# Patient Record
Sex: FEMALE | Race: WHITE | NOT HISPANIC OR LATINO | Employment: OTHER | URBAN - METROPOLITAN AREA
[De-identification: names, ages, dates, MRNs, and addresses within clinical notes are randomized per-mention and may not be internally consistent; named-entity substitution may affect disease eponyms.]

---

## 2017-03-07 ENCOUNTER — OFFICE VISIT (OUTPATIENT)
Dept: INTERNAL MEDICINE | Facility: CLINIC | Age: 62
End: 2017-03-07
Payer: COMMERCIAL

## 2017-03-07 VITALS
OXYGEN SATURATION: 97 % | BODY MASS INDEX: 41.02 KG/M2 | SYSTOLIC BLOOD PRESSURE: 156 MMHG | HEART RATE: 71 BPM | DIASTOLIC BLOOD PRESSURE: 78 MMHG | WEIGHT: 293 LBS | TEMPERATURE: 98 F | HEIGHT: 71 IN

## 2017-03-07 DIAGNOSIS — K21.00 GASTROESOPHAGEAL REFLUX DISEASE WITH ESOPHAGITIS: Primary | ICD-10-CM

## 2017-03-07 DIAGNOSIS — R09.A2 GLOBUS SENSATION: ICD-10-CM

## 2017-03-07 PROCEDURE — 99999 PR PBB SHADOW E&M-EST. PATIENT-LVL III: CPT | Mod: PBBFAC,,, | Performed by: INTERNAL MEDICINE

## 2017-03-07 PROCEDURE — 99213 OFFICE O/P EST LOW 20 MIN: CPT | Mod: S$GLB,,, | Performed by: INTERNAL MEDICINE

## 2017-03-07 PROCEDURE — 1160F RVW MEDS BY RX/DR IN RCRD: CPT | Mod: S$GLB,,, | Performed by: INTERNAL MEDICINE

## 2017-03-07 RX ORDER — GABAPENTIN 600 MG/1
TABLET ORAL
COMMUNITY
Start: 2017-01-14 | End: 2017-06-01 | Stop reason: SDUPTHER

## 2017-03-07 RX ORDER — FOLIC ACID/MULTIVIT,IRON,MINER 0.4MG-18MG
TABLET ORAL
COMMUNITY
End: 2021-02-15

## 2017-03-07 NOTE — MR AVS SNAPSHOT
[facility] - Internal Medicine  [address]  [facility] [address]  Phone: [phone]  Fax: [phone]                  Soha Wheatley   3/7/2017 3:20 PM   Office Visit    Description:  Female : 1955   Provider:  Kortney Lanier MD   Department:  [facility] - Internal Medicine           Reason for Visit     Mass                To Do List           Goals (5 Years of Data)     None      [facility]     [facility] -  Assistance  Registered nurses in the [facility] Center provide clinical advisement, health education, appointment booking, and other advisory services.  Call for this free service at [phone].             Medications           Message regarding Medications     Verify the changes and/or additions to your medication regime listed below are the same as discussed with your clinician today.  If any of these changes or additions are incorrect, please notify your healthcare provider.        These medications were administered today        Dose Freq    (pyxis) gi cocktail (mylanta 30 mL, lidocaine 2 % viscous 10 mL, dicyclomine 10 mL) 50 mL  Clinic/HOD 1 time    Sig: Take by mouth one time.    Class: Normal    Route: Oral           Verify that the below list of medications is an accurate representation of the medications you are currently taking.  If none reported, the list may be blank. If incorrect, please contact your healthcare provider. Carry this list with you in case of emergency.           Current Medications     acetaminophen (TYLENOL ARTHRITIS) 650 MG TbSR Take 650 mg by mouth 2 (two) times daily.     cinnamon bark-chromium picolin 500-100 mg-mcg Cap Take by mouth.    gabapentin (NEURONTIN) 600 MG tablet     GLUCOSAMINE HCL/CHONDR APODACA A NA (OSTEO BI-FLEX ORAL) Take by mouth once daily.    LACTOBACILLUS RHAMNOSUS GG (CULTURELLE ORAL) Take by mouth once daily.    meloxicam (MOBIC) 15 MG tablet Take 1 tablet (15 mg total) by mouth once daily.  "   omeprazole (PRILOSEC) 40 MG capsule Take 1 capsule (40 mg total) by mouth once daily.    tizanidine (ZANAFLEX) 4 MG tablet Take 1 tablet (4 mg total) by mouth every 8 (eight) hours as needed.           Clinical Reference Information           Your Vitals Were     BP Pulse Temp Height    156/78 (BP Location: Left arm, Patient Position: Sitting, BP Method: Manual) 71 98.1 °F (36.7 °C) (Oral) 5' 11" (1.803 m)    Weight SpO2 BMI    151.4 kg (333 lb 12.4 oz) 97% 46.55 kg/m2      Blood Pressure          Most Recent Value    BP  (!)  156/78      Allergies as of 3/7/2017     Adhesive    Flagyl  [Metronidazole Hcl]      Immunizations Administered on Date of Encounter - 3/7/2017     None      Instructions    2 tablespoons of liquid antacid before meals and at bedtime - Maalox, Mylanta, Gaviscon       Language Assistance Services     ATTENTION: Language assistance services are available, free of charge. Please call [phone].      ATENCIÓN: Si habla luis, tiene a mccurdy disposición servicios gratuitos de asistencia lingüística. Llame al [phone].     KAYA Ý: N?u b?n nói Ti?ng Vi?t, có các d?ch v? h? tr? ngôn ng? mi?n phí dành cho b?n. G?i s? [phone].         [facility] complies with applicable Federal civil rights laws and does not discriminate on the basis of race, color, national origin, age, disability, or sex.        "

## 2017-03-07 NOTE — PROGRESS NOTES
Subjective:       Patient ID: Soha Wheatley is a 61 y.o. female.    Chief Complaint: Mass (in throat per pt, diffuculty swallowing )    Head and Neck Mass:   Chronicity:  New  Onset:  In the past 7 days  Progression since onset:  Unchanged  Frequency:  Constantly  Pain Scale:  2/10  Severity:  Mild  Duration:  Off/on all day  Head and neck mass characteristics: can't feel it from the outside.No sore throat, no chills, no ear pain, no fever, no headaches, no dysphasia, no hemoptysis, no myalgias, no nasal congestion, no postnasal drip, no hoarse voice, no immunosuppression, no shortness of breath, no sore throat, no sweats, no weight loss and no infection.   Known GERDAggravated by:  Stress  Treatments tried:  Nothing  Improvement on treatment:  No relief   GERD    Review of Systems   Constitutional: Negative for activity change, chills, fatigue, fever and weight loss.   HENT: Negative for congestion, ear pain, hoarse voice, nosebleeds, postnasal drip, sinus pressure and sore throat.    Eyes: Negative.  Negative for visual disturbance.   Respiratory: Negative for cough, hemoptysis, chest tightness, shortness of breath and wheezing.    Cardiovascular: Negative for chest pain.   Gastrointestinal: Negative for abdominal pain, diarrhea, nausea and vomiting.   Genitourinary: Negative for difficulty urinating, dysuria, frequency and urgency.   Musculoskeletal: Negative for arthralgias, myalgias and neck stiffness.   Skin: Negative for rash.   Neurological: Negative for dizziness, weakness and headaches.   Psychiatric/Behavioral: Negative for sleep disturbance. The patient is not nervous/anxious.        Objective:      Physical Exam   Neck: Trachea normal and full passive range of motion without pain. No thyroid mass and no thyromegaly present.       Assessment:       1. Gastroesophageal reflux disease with esophagitis    2. Globus sensation        Plan:   Soha was seen today for mass.    Diagnoses and all orders for  this visit:    Gastroesophageal reflux disease with esophagitis    Globus sensation    Other orders  -     (pyxis) gi cocktail (mylanta 30 mL, lidocaine 2 % viscous 10 mL, dicyclomine 10 mL) 50 mL; Take by mouth one time.  Relief of symptoms with GI cocktail

## 2017-04-09 ENCOUNTER — HOSPITAL ENCOUNTER (EMERGENCY)
Facility: HOSPITAL | Age: 62
Discharge: HOME | End: 2017-04-09
Attending: EMERGENCY MEDICINE
Payer: COMMERCIAL

## 2017-04-09 VITALS
DIASTOLIC BLOOD PRESSURE: 61 MMHG | BODY MASS INDEX: 41.02 KG/M2 | RESPIRATION RATE: 14 BRPM | WEIGHT: 293 LBS | OXYGEN SATURATION: 99 % | HEART RATE: 69 BPM | SYSTOLIC BLOOD PRESSURE: 132 MMHG | TEMPERATURE: 98 F | HEIGHT: 71 IN

## 2017-04-09 DIAGNOSIS — R00.2 HEART PALPITATIONS: Primary | ICD-10-CM

## 2017-04-09 LAB
ALBUMIN SERPL BCP-MCNC: 4 G/DL
ALP SERPL-CCNC: 84 U/L
ALT SERPL W/O P-5'-P-CCNC: 39 U/L
ANION GAP SERPL CALC-SCNC: 9 MMOL/L
AST SERPL-CCNC: 20 U/L
BASOPHILS # BLD AUTO: 0.01 K/UL
BASOPHILS NFR BLD: 0.2 %
BILIRUB SERPL-MCNC: 0.7 MG/DL
BNP SERPL-MCNC: 51 PG/ML
BUN SERPL-MCNC: 22 MG/DL
CALCIUM SERPL-MCNC: 9.9 MG/DL
CHLORIDE SERPL-SCNC: 107 MMOL/L
CO2 SERPL-SCNC: 25 MMOL/L
CREAT SERPL-MCNC: 0.8 MG/DL
DIFFERENTIAL METHOD: NORMAL
EOSINOPHIL # BLD AUTO: 0.2 K/UL
EOSINOPHIL NFR BLD: 3.6 %
ERYTHROCYTE [DISTWIDTH] IN BLOOD BY AUTOMATED COUNT: 13.8 %
EST. GFR  (AFRICAN AMERICAN): >60 ML/MIN/1.73 M^2
EST. GFR  (NON AFRICAN AMERICAN): >60 ML/MIN/1.73 M^2
GLUCOSE SERPL-MCNC: 121 MG/DL
HCT VFR BLD AUTO: 39.7 %
HGB BLD-MCNC: 12.8 G/DL
INR PPP: 1
LYMPHOCYTES # BLD AUTO: 1.6 K/UL
LYMPHOCYTES NFR BLD: 35.5 %
MCH RBC QN AUTO: 30 PG
MCHC RBC AUTO-ENTMCNC: 32.2 %
MCV RBC AUTO: 93 FL
MONOCYTES # BLD AUTO: 0.4 K/UL
MONOCYTES NFR BLD: 9.2 %
NEUTROPHILS # BLD AUTO: 2.3 K/UL
NEUTROPHILS NFR BLD: 51.3 %
PLATELET # BLD AUTO: 176 K/UL
PMV BLD AUTO: 10.1 FL
POTASSIUM SERPL-SCNC: 4.2 MMOL/L
PROT SERPL-MCNC: 7.4 G/DL
PROTHROMBIN TIME: 10.4 SEC
RBC # BLD AUTO: 4.26 M/UL
SODIUM SERPL-SCNC: 141 MMOL/L
TROPONIN I SERPL DL<=0.01 NG/ML-MCNC: 0.02 NG/ML
TROPONIN I SERPL DL<=0.01 NG/ML-MCNC: 0.02 NG/ML
TSH SERPL DL<=0.005 MIU/L-ACNC: 1.29 UIU/ML
WBC # BLD AUTO: 4.48 K/UL

## 2017-04-09 PROCEDURE — 84443 ASSAY THYROID STIM HORMONE: CPT

## 2017-04-09 PROCEDURE — 99284 EMERGENCY DEPT VISIT MOD MDM: CPT | Mod: 25

## 2017-04-09 PROCEDURE — 85610 PROTHROMBIN TIME: CPT

## 2017-04-09 PROCEDURE — 25000003 PHARM REV CODE 250: Performed by: EMERGENCY MEDICINE

## 2017-04-09 PROCEDURE — 93005 ELECTROCARDIOGRAM TRACING: CPT

## 2017-04-09 PROCEDURE — 80053 COMPREHEN METABOLIC PANEL: CPT

## 2017-04-09 PROCEDURE — 83880 ASSAY OF NATRIURETIC PEPTIDE: CPT

## 2017-04-09 PROCEDURE — 84484 ASSAY OF TROPONIN QUANT: CPT

## 2017-04-09 PROCEDURE — 85025 COMPLETE CBC W/AUTO DIFF WBC: CPT

## 2017-04-09 PROCEDURE — 99285 EMERGENCY DEPT VISIT HI MDM: CPT | Mod: ,,, | Performed by: EMERGENCY MEDICINE

## 2017-04-09 PROCEDURE — 93010 ELECTROCARDIOGRAM REPORT: CPT | Mod: ,,, | Performed by: INTERNAL MEDICINE

## 2017-04-09 RX ORDER — ASPIRIN 325 MG
325 TABLET ORAL
Status: COMPLETED | OUTPATIENT
Start: 2017-04-09 | End: 2017-04-09

## 2017-04-09 RX ORDER — NAPROXEN 250 MG/1
250 TABLET ORAL 2 TIMES DAILY
COMMUNITY
End: 2017-06-13

## 2017-04-09 RX ADMIN — ASPIRIN 325 MG ORAL TABLET 325 MG: 325 PILL ORAL at 09:04

## 2017-04-09 NOTE — ED AVS SNAPSHOT
[facility]  [address] [facility] [facility] [address]               Soha Wheatley   2017  8:40 AM   ED    Description:  Female : 1955   Department:  [facility]           Your Care was Coordinated By:     Provider Role From To    Lisandro Gonzalez MD Attending Provider 17 0846 --    Lisandro Martinez MD Resident 17 0843 --      Reason for Visit     Palpitations           Diagnoses this Visit        Comments    Heart palpitations    -  Primary       ED Disposition     ED Disposition Condition Comment    Discharge             To Do List           Follow-up Information     Follow up with [facility] - Cardiology. Schedule an appointment as soon as possible for a visit in 2 days.    Specialty:  Cardiology    Why:  For follow-up    Contact information:    [address] [id number] [phone]    Additional information:    3rd floor        Go to [facility].    Specialty:  Emergency Medicine    Why:  As needed, If symptoms worsen - heart racing, palpitations, chest pain, shortness of breath    Contact information:    [address] [facility] [id number] [phone]      [facility]     [facility] Care [facility] -  Assistance  Unless otherwise directed by your provider, please contact [facility], our nurse care line that is available for  assistance.     Registered nurses in the [facility] provide: appointment scheduling, clinical advisement, health education, and other advisory services.  Call: [phone] (toll free)               Medications           Message regarding Medications     Verify the changes and/or additions to your medication regime listed below are the same as discussed with your clinician today.  If any of these changes or additions are incorrect, please notify your healthcare provider.        These medications were  "administered today        Dose Freq    aspirin tablet 325 mg 325 mg ED 1 Time    Sig: Take 1 tablet (325 mg total) by mouth ED 1 Time.    Class: Normal    Route: Oral           Verify that the below list of medications is an accurate representation of the medications you are currently taking.  If none reported, the list may be blank. If incorrect, please contact your healthcare provider. Carry this list with you in case of emergency.           Current Medications     naproxen (NAPROSYN) 250 MG tablet Take 250 mg by mouth 2 (two) times daily.    acetaminophen (TYLENOL ARTHRITIS) 650 MG TbSR Take 650 mg by mouth 2 (two) times daily.     cinnamon bark-chromium picolin 500-100 mg-mcg Cap Take by mouth.    EUFLEXXA injection Syrg 40 mg Inject 4 mLs (40 mg total) into the articular space once a week.    gabapentin (NEURONTIN) 600 MG tablet     GLUCOSAMINE HCL/CHONDR APODACA A NA (OSTEO BI-FLEX ORAL) Take by mouth once daily.    LACTOBACILLUS RHAMNOSUS GG (CULTURELLE ORAL) Take by mouth once daily.    meloxicam (MOBIC) 15 MG tablet Take 1 tablet (15 mg total) by mouth once daily.    omeprazole (PRILOSEC) 40 MG capsule Take 1 capsule (40 mg total) by mouth once daily.    tizanidine (ZANAFLEX) 4 MG tablet Take 1 tablet (4 mg total) by mouth every 8 (eight) hours as needed.           Clinical Reference Information           Your Vitals Were     BP Pulse Temp Resp Height Weight    129/60 71 98.2 °F (36.8 °C) (Oral) 17 5' 11" (1.803 m) 152 kg (335 lb)    SpO2 BMI             100% 46.72 kg/m2         Allergies as of 4/9/2017        Reactions    Adhesive     Paper tape usually ok    Flagyl  [Metronidazole Hcl]     Other reaction(s): Unknown      Immunizations Administered on Date of Encounter - 4/9/2017     None      ED Micro, Lab, POCT     Start Ordered       Status Ordering Provider    04/09/17 0846 04/09/17 0910  TSH  STAT      Final result     04/09/17 0845 04/09/17 0910  CBC auto differential  STAT      Final result     04/09/17 " "0845 04/09/17 0910  Comprehensive metabolic panel  STAT      Final result     04/09/17 0845 04/09/17 0910  Protime-INR  STAT      Final result     04/09/17 0845 04/09/17 0910  Troponin I  Now then every 3 hours     Comments:  PLEASE REVIEW ORDER START TIME BEFORE MARKING SPECIMEN COLLECTED.   Start Status   04/09/17 0845 Final result   04/09/17 1145 Final result       Acknowledged     04/09/17 0845 04/09/17 0910  B-Type natriuretic peptide (BNP)  STAT      Final result       ED Imaging Orders     Start Ordered       Status Ordering Provider    04/09/17 0845 04/09/17 0910  X-Ray Chest PA And Lateral  1 time imaging      Final result         Discharge Instructions           Heart Palpitations    Palpitations are the feeling that your heart is beating hard, fast, or irregular. Some describe it as "pounding" or "skipped beats." Palpitations may occur in someone with heart disease, but can also occur in a healthy person.  Heart-related causes:  · Arrhythmia (a change from the heart's normal rhythm)  · Heart valve disease  · Disease of the heart muscle  · Coronary artery disease  · High blood pressure  Non-heart-related causes:  · Certain medicines (such as asthma inhalers and decongestants)  · Some herbal supplements, energy drinks and pills, and weight loss pills  · Illegal stimulant drugs (such as cocaine, crank, methamphetamine, PCP, bath salts, ecstasy)  · Caffeine, alcohol, and tobacco  · Medical conditions such as thyroid disease, anemia, anxiety, and panic disorder  Sometimes the cause can't be found.  Home care  Follow these home care tips:  · Avoid excess caffeine, alcohol, tobacco, and any stimulant drugs.  · Tell your doctor about any prescription or over-the-counter or herbal medicines you take.  Follow-up care  · Follow up with your doctor, or as advised.  Call 911  This is the fastest and safest way to get to the emergency department. The paramedics can also begin treatment on the way to the hospital, if " needed.  Don't wait until your symptoms are severe to call 911. These are reasons to call 911:  · Chest pain  · Shortness of breath  · Feeling lightheaded, faint, or dizzy  · Fainting or loss of consciousness  · Very irregular heartbeat  · Rapid heartbeat that makes you uncomfortable  · Slower than usual heart rate associated with symptoms  · Slower than usual heart rate  · Chest pain with weakness, dizziness, heavy sweating, nausea, or vomiting  · Extreme drowsiness or confusion  · Weakness of an arm or leg, or on 1 side of the face  · Difficulty with speech or vision  When to seek medical advice  Get prompt medical attention if you have palpitations and any of the following:  · Weakness  · Dizziness  · Lightheadedness  · Fainting  Date Last Reviewed: 4/27/2016 © 2000-2016 [employer] [address] [place] [address]. All rights reserved. This information is not intended as a substitute for professional medical care. Always follow your healthcare professional's instructions.           [facility] complies with applicable Federal civil rights laws and does not discriminate on the basis of race, color, national origin, age, disability, or sex.        Language Assistance Services     ATTENTION: Language assistance services are available, free of charge. Please call [phone].      ATENCIÓN: Si habla luis, tiene a mccurdy disposición servicios gratuitos de asistencia lingüística. Llame al [phone].     CHÚ Ý: N?u b?n nói Ti?ng Vi?t, có các d?ch v? h? tr? ngôn ng? mi?n phí dành cho b?n. G?i s? [phone].

## 2017-04-09 NOTE — ED TRIAGE NOTES
Pt reports heart racing this AM around 0230 which woke her up. Denies any chest pain or shortness of breath. Upon arrival pt HR was 144 in triage. Was brought back to room where EKG was completed. HR now 83 on heart monitor. Patient tearful, reports feeling scared. Reports has had an episode of heart racing before where she was sent home on a halter monitor and had an echo completed (about 3 to 4 years ago per patient).    Patient was ambulatory to room.

## 2017-04-09 NOTE — DISCHARGE INSTRUCTIONS
"    Heart Palpitations    Palpitations are the feeling that your heart is beating hard, fast, or irregular. Some describe it as "pounding" or "skipped beats." Palpitations may occur in someone with heart disease, but can also occur in a healthy person.  Heart-related causes:  · Arrhythmia (a change from the heart's normal rhythm)  · Heart valve disease  · Disease of the heart muscle  · Coronary artery disease  · High blood pressure  Non-heart-related causes:  · Certain medicines (such as asthma inhalers and decongestants)  · Some herbal supplements, energy drinks and pills, and weight loss pills  · Illegal stimulant drugs (such as cocaine, crank, methamphetamine, PCP, bath salts, ecstasy)  · Caffeine, alcohol, and tobacco  · Medical conditions such as thyroid disease, anemia, anxiety, and panic disorder  Sometimes the cause can't be found.  Home care  Follow these home care tips:  · Avoid excess caffeine, alcohol, tobacco, and any stimulant drugs.  · Tell your doctor about any prescription or over-the-counter or herbal medicines you take.  Follow-up care  · Follow up with your doctor, or as advised.  Call 911  This is the fastest and safest way to get to the emergency department. The paramedics can also begin treatment on the way to the hospital, if needed.  Don't wait until your symptoms are severe to call 911. These are reasons to call 911:  · Chest pain  · Shortness of breath  · Feeling lightheaded, faint, or dizzy  · Fainting or loss of consciousness  · Very irregular heartbeat  · Rapid heartbeat that makes you uncomfortable  · Slower than usual heart rate associated with symptoms  · Slower than usual heart rate  · Chest pain with weakness, dizziness, heavy sweating, nausea, or vomiting  · Extreme drowsiness or confusion  · Weakness of an arm or leg, or on 1 side of the face  · Difficulty with speech or vision  When to seek medical advice  Get prompt medical attention if you have palpitations and any of the " following:  · Weakness  · Dizziness  · Lightheadedness  · Fainting  Date Last Reviewed: 4/27/2016  © 4884-8432 [employer]. [address] [place] [address], [place] [address]. All rights reserved. This information is not intended as a substitute for professional medical care. Always follow your healthcare professional's instructions.

## 2017-04-09 NOTE — ED PROVIDER NOTES
"Encounter Date: 2017       History     Chief Complaint   Patient presents with    Palpitations     Review of patient's allergies indicates:   Allergen Reactions    Adhesive      Paper tape usually ok    Flagyl  [metronidazole hcl]      Other reaction(s): Unknown     HPI Comments: 62 yo woman with obesity and h/o depression presents with feeling of "heart racing" that started at 3:30 this morning but went away quickly, then returned at 7:30 am and persisted until presentation in ED at 9 am. Experienced brief headache prior to initial palpitations, and brief jaw tightness with palpitations earlier, both now resolved. Denies chest pain, SOB, fever, leg swelling. Endorses more caffeine than usual yesterday including one coffee and two diet soft drinks in the afternoon, but denies recent ETOH use in the past week, smoking, other stimulants, h/o thyroid abnormality, throat swelling. States she was worked up for same thing several years ago. Per chart review, normal EKG and echo .    The history is provided by the patient and medical records.     Past Medical History:   Diagnosis Date    Allergy     Arthritis     Chronic kidney disease     Depression     GERD (gastroesophageal reflux disease)     History of kidney stones     Kidney stones     Obesity      Past Surgical History:   Procedure Laterality Date     SECTION      CHOLECYSTECTOMY      KNEE ARTHRODESIS      KNEE CARTILAGE SURGERY      TONSILLECTOMY      URETEROSCOPY       Family History   Problem Relation Age of Onset    Cancer Mother      breast    VANDANA disease Maternal Grandmother     Heart disease Maternal Grandmother     Hyperlipidemia Maternal Grandmother     Pancreatic cancer Maternal Grandfather     Celiac disease Neg Hx     Cirrhosis Neg Hx     Colon cancer Neg Hx     Colon polyps Neg Hx     Crohn's disease Neg Hx     Cystic fibrosis Neg Hx     Esophageal cancer Neg Hx     Hemochromatosis Neg Hx     " Inflammatory bowel disease Neg Hx     Irritable bowel syndrome Neg Hx     Liver cancer Neg Hx     Liver disease Neg Hx     Rectal cancer Neg Hx     Stomach cancer Neg Hx     Ulcerative colitis Neg Hx     Silvestre's disease Neg Hx     Hyperlipidemia Father     Hypertension Father     Heart disease Maternal Uncle     Heart attack Paternal Grandfather      Social History   Substance Use Topics    Smoking status: Never Smoker    Smokeless tobacco: Never Used    Alcohol use Yes      Comment: no more than once a week     Review of Systems   Constitutional: Negative for diaphoresis and fever.   HENT: Positive for congestion. Negative for facial swelling.    Eyes: Positive for itching. Negative for redness.   Respiratory: Negative for shortness of breath.    Cardiovascular: Positive for palpitations. Negative for chest pain and leg swelling.   Gastrointestinal: Negative for abdominal pain, diarrhea, nausea and vomiting.   Genitourinary: Negative for dysuria.   Musculoskeletal: Negative for joint swelling.   Skin: Negative for wound.   Neurological: Positive for headaches. Negative for facial asymmetry.   Psychiatric/Behavioral: Negative for agitation. The patient is nervous/anxious.        Physical Exam   Initial Vitals   BP Pulse Resp Temp SpO2   04/09/17 0838 04/09/17 0838 04/09/17 0838 04/09/17 0838 04/09/17 0838   195/108 144 20 98.2 °F (36.8 °C) 98 %     Physical Exam    Nursing note and vitals reviewed.  Constitutional: She appears well-developed and well-nourished. She is not diaphoretic. She is Obese . No distress.   HENT:   Head: Normocephalic and atraumatic.   Right Ear: External ear normal.   Left Ear: External ear normal.   Nose: Nose normal.   Mouth/Throat: Oropharynx is clear and moist.   Eyes: Pupils are equal, round, and reactive to light. Right eye exhibits no discharge. Left eye exhibits no discharge. Right conjunctiva is injected. Left conjunctiva is injected. No scleral icterus.   Neck: Neck  supple. No thyromegaly present. No tracheal deviation present. No JVD present.   Cardiovascular: Normal rate, regular rhythm, normal heart sounds and intact distal pulses. Exam reveals no gallop and no friction rub.    No murmur heard.  Pulmonary/Chest: Breath sounds normal. No stridor. No respiratory distress. She has no wheezes. She has no rhonchi. She has no rales. She exhibits no tenderness.   Abdominal: Soft. Bowel sounds are normal. She exhibits no distension. There is no tenderness. There is no rebound and no guarding.   Musculoskeletal: Normal range of motion. She exhibits edema (trace pitting edema bilateral ankles).   Lymphadenopathy:     She has no cervical adenopathy.   Neurological: She is alert and oriented to person, place, and time.   Skin: Skin is warm and dry.   Psychiatric: Her behavior is normal.   Tearful         ED Course   Procedures  Labs Reviewed - No data to display  EKG Readings: (Independently Interpreted)   NSR, rate 81, normal axis and intervals, no CHARLY or STD.       HOII MDM:  Soha Wheatley is a 61 y.o. female with heart racing starting this morning,  at triage, resolved in room before EKG, h/o palpitations one week ago and several years ago with normal work-up.  Exam with normal HR, no thyromegaly, trace pitting edema.  Ddx includes anxiety attack, paroxysmal a-fib with RVR, SVT, v-tach.    EKG NSR, rate 81, normal axis and intervals, no CHARLY or STD.    Cardiac work-up, TSH pending.    Lisandro Martienz PGY2 9:19 AM 04/09/2017    CXR with no acute cardiopulmonary disease. CBC, CMP unremarkable. TSH wnl. Trop negative x 2. BNP unremarkable.    Has remained event-free in ED. Will refer for event monitor and cardiology f/u, discharge.    Lisandro Martinez PGY2 1:21 PM 04/09/2017                         ED Course     Clinical Impression:   The encounter diagnosis was Heart palpitations.          Lisandro Martinez MD  Resident  04/09/17 2426

## 2017-04-10 ENCOUNTER — TELEPHONE (OUTPATIENT)
Dept: ELECTROPHYSIOLOGY | Facility: CLINIC | Age: 62
End: 2017-04-10

## 2017-04-10 NOTE — TELEPHONE ENCOUNTER
----- Message from Lawrence Campos sent at 4/10/2017 11:25 AM CDT -----  Contact: patient  Please call pt at [phone]. Need to schedule a 30 event monitor     Thank you    Patient will come in tomorrow to have her event monitor placed.

## 2017-04-11 ENCOUNTER — CLINICAL SUPPORT (OUTPATIENT)
Dept: ELECTROPHYSIOLOGY | Facility: CLINIC | Age: 62
End: 2017-04-11
Payer: COMMERCIAL

## 2017-04-11 DIAGNOSIS — R00.2 PALPITATIONS: ICD-10-CM

## 2017-04-11 PROCEDURE — 93271 ECG/MONITORING AND ANALYSIS: CPT | Mod: S$GLB,,, | Performed by: INTERNAL MEDICINE

## 2017-04-11 PROCEDURE — 93270 REMOTE 30 DAY ECG REV/REPORT: CPT | Mod: 51,S$GLB,, | Performed by: INTERNAL MEDICINE

## 2017-04-12 DIAGNOSIS — M47.812 CERVICAL SPONDYLOSIS WITHOUT MYELOPATHY: ICD-10-CM

## 2017-04-12 DIAGNOSIS — M54.12 BRACHIAL NEURITIS OR RADICULITIS: ICD-10-CM

## 2017-04-12 DIAGNOSIS — M50.30 DEGENERATION OF CERVICAL INTERVERTEBRAL DISC: ICD-10-CM

## 2017-04-12 DIAGNOSIS — M54.2 CERVICALGIA: ICD-10-CM

## 2017-04-12 DIAGNOSIS — M25.561 ARTHRALGIA OF BOTH KNEES: ICD-10-CM

## 2017-04-12 DIAGNOSIS — M25.562 ARTHRALGIA OF BOTH KNEES: ICD-10-CM

## 2017-04-12 RX ORDER — GABAPENTIN 600 MG/1
TABLET ORAL
Qty: 270 TABLET | Refills: 2 | Status: SHIPPED | OUTPATIENT
Start: 2017-04-12 | End: 2017-11-30

## 2017-04-12 RX ORDER — MELOXICAM 15 MG/1
TABLET ORAL
Qty: 90 TABLET | Refills: 2 | Status: SHIPPED | OUTPATIENT
Start: 2017-04-12 | End: 2018-01-07 | Stop reason: SDUPTHER

## 2017-05-16 PROCEDURE — 93268 ECG RECORD/REVIEW: CPT | Mod: S$GLB,,, | Performed by: INTERNAL MEDICINE

## 2017-05-18 ENCOUNTER — OFFICE VISIT (OUTPATIENT)
Dept: ORTHOPEDICS | Facility: CLINIC | Age: 62
End: 2017-05-18
Payer: COMMERCIAL

## 2017-05-18 ENCOUNTER — HOSPITAL ENCOUNTER (OUTPATIENT)
Dept: RADIOLOGY | Facility: HOSPITAL | Age: 62
Discharge: HOME | End: 2017-05-18
Attending: ORTHOPAEDIC SURGERY
Payer: COMMERCIAL

## 2017-05-18 VITALS — WEIGHT: 293 LBS | BODY MASS INDEX: 41.02 KG/M2 | HEIGHT: 71 IN

## 2017-05-18 DIAGNOSIS — M17.0 PRIMARY OSTEOARTHRITIS OF BOTH KNEES: ICD-10-CM

## 2017-05-18 DIAGNOSIS — G89.29 CHRONIC PAIN OF BOTH KNEES: Primary | ICD-10-CM

## 2017-05-18 DIAGNOSIS — G89.29 CHRONIC PAIN OF BOTH KNEES: ICD-10-CM

## 2017-05-18 DIAGNOSIS — M25.561 CHRONIC PAIN OF BOTH KNEES: Primary | ICD-10-CM

## 2017-05-18 DIAGNOSIS — M25.562 CHRONIC PAIN OF BOTH KNEES: Primary | ICD-10-CM

## 2017-05-18 DIAGNOSIS — M25.562 CHRONIC PAIN OF BOTH KNEES: ICD-10-CM

## 2017-05-18 DIAGNOSIS — M25.561 CHRONIC PAIN OF BOTH KNEES: ICD-10-CM

## 2017-05-18 PROCEDURE — 73562 X-RAY EXAM OF KNEE 3: CPT | Mod: TC,50

## 2017-05-18 PROCEDURE — 99999 PR PBB SHADOW E&M-EST. PATIENT-LVL II: CPT | Mod: PBBFAC,,, | Performed by: ORTHOPAEDIC SURGERY

## 2017-05-18 PROCEDURE — 99214 OFFICE O/P EST MOD 30 MIN: CPT | Mod: S$GLB,,, | Performed by: ORTHOPAEDIC SURGERY

## 2017-05-18 PROCEDURE — 1160F RVW MEDS BY RX/DR IN RCRD: CPT | Mod: S$GLB,,, | Performed by: ORTHOPAEDIC SURGERY

## 2017-05-18 PROCEDURE — 73562 X-RAY EXAM OF KNEE 3: CPT | Mod: 26,50,, | Performed by: RADIOLOGY

## 2017-05-18 NOTE — PROGRESS NOTES
HPI:    Soha Wheatley is a 61 y.o. female who is here today for   Chief Complaint   Patient presents with    Left Knee - Pain    Right Knee - Pain   .  He has tried Visco supplementation and other treatments.  Knee pain has progressed.  Unfortunately the patient has also gained weight.    Duration: 12 months  Intensity: severe  Character of pain: sharp  Location: She reports that the pain is predominately  lateral in the right knee and medial in the left knee, left knee is worse    Past Medical History:   Diagnosis Date    Allergy     Arthritis     Chronic kidney disease     Depression     GERD (gastroesophageal reflux disease)     History of kidney stones     Kidney stones     Obesity           Current Outpatient Prescriptions:     acetaminophen (TYLENOL ARTHRITIS) 650 MG TbSR, Take 650 mg by mouth 2 (two) times daily. , Disp: , Rfl:     cinnamon bark-chromium picolin 500-100 mg-mcg Cap, Take by mouth., Disp: , Rfl:     gabapentin (NEURONTIN) 600 MG tablet, , Disp: , Rfl:     gabapentin (NEURONTIN) 600 MG tablet, TAKE 1 TABLET THREE TIMES A DAY, Disp: 270 tablet, Rfl: 2    GLUCOSAMINE HCL/CHONDR APODACA A NA (OSTEO BI-FLEX ORAL), Take by mouth once daily., Disp: , Rfl:     LACTOBACILLUS RHAMNOSUS GG (CULTURELLE ORAL), Take by mouth once daily., Disp: , Rfl:     meloxicam (MOBIC) 15 MG tablet, TAKE 1 TABLET DAILY, Disp: 90 tablet, Rfl: 2    naproxen (NAPROSYN) 250 MG tablet, Take 250 mg by mouth 2 (two) times daily., Disp: , Rfl:     omeprazole (PRILOSEC) 40 MG capsule, Take 1 capsule (40 mg total) by mouth once daily., Disp: 90 capsule, Rfl: 3    tizanidine (ZANAFLEX) 4 MG tablet, Take 1 tablet (4 mg total) by mouth every 8 (eight) hours as needed., Disp: 270 tablet, Rfl: 0    Current Facility-Administered Medications:     EUFLEXXA injection Syrg 40 mg, 40 mg, Intra-articular, Weekly, Lázaro Carlos MD, 40 mg at 10/12/15 2833     Review of patient's allergies indicates:   Allergen Reactions  "   Adhesive      Paper tape usually ok    Flagyl  [metronidazole hcl]      Other reaction(s): Unknown        ROS  Constitutional: Negative for fever, Positive for weight gain  HENT Negative for congestion  Cardiovascular: Negative chest pain  Respiratory: Negative Shortness of breath  Heme: Negative excessive bleeding  Skin:NegativeItching, Negative breakdown  Musculoskeletal:Positive for back pain, Positive for joint pain, Negative muscle pain, Negative muscle weakness  Neurological: Negative for numbness and paresthesias   Psychiatric/Behavioral: Negative altered mental status, Negative for depression    Physical Exam:   Ht 5' 11" (1.803 m)  Wt (!) 153.9 kg (339 lb 6.4 oz)  BMI 47.34 kg/m2  General appearance: This is a well-developed, Well nourished female No obvious acute distress.  Psychiatric: normal mood and affect;  pleasant and conversant; judgment and thought content normal  Gait is coordinated. Patient has antalgic gait to the bilateral  Cardiovascular: There are no swelling or varicosities present.   Respiratory: normal respiratory effort   Lymphatic: no adenopathy   Neurologic: alert and oriented to person, place, and time   Deep Tendon Reflexes are normal;  Coordination and Balance: Normal   Musculoskeletal   Neck    ROM shows normal flexion and extension and lateral rotation    Palpation: Non-tender    Stability is normal    Strength is normal    Skin is normal without masses and lesions    Sensation is intact to light touch   Back    ROM showsabnormal flexion, extension    and  rotation    Palpation shows no masses    Stability is normal    Strength to flexion and extension well maintained    Core strength is diminished    Skin shows no rashes or cafe au lait spots;     Sensation is intact to light touch    Right Knee  Swelling Mild  TendernessLateral joint line  Range of Motion: 120    Left Knee: Swelling Mild  TendernessMedial joint line  Range of Motion: 120    Radiograph   Osteoarthritis: " severe  Angle: Valgus on the right and varus on the left    Assessment: Osteoarthritis of the knees.    Plan: We have had a long discussion about the risk and benefits of surgery.  With her present BMI of 47, she is at high risk  For complications.  She will discuss her weight reduction situation with her family and try to get ready for possible knee replacement surgery

## 2017-06-01 ENCOUNTER — OFFICE VISIT (OUTPATIENT)
Dept: CARDIOLOGY | Facility: CLINIC | Age: 62
End: 2017-06-01
Payer: COMMERCIAL

## 2017-06-01 VITALS
HEART RATE: 77 BPM | BODY MASS INDEX: 41.02 KG/M2 | WEIGHT: 293 LBS | DIASTOLIC BLOOD PRESSURE: 74 MMHG | HEIGHT: 71 IN | SYSTOLIC BLOOD PRESSURE: 163 MMHG

## 2017-06-01 DIAGNOSIS — Z82.49 FAMILY HISTORY OF EARLY CAD: ICD-10-CM

## 2017-06-01 DIAGNOSIS — R00.2 PALPITATIONS: ICD-10-CM

## 2017-06-01 PROCEDURE — 99999 PR PBB SHADOW E&M-EST. PATIENT-LVL IV: CPT | Mod: PBBFAC,,, | Performed by: INTERNAL MEDICINE

## 2017-06-01 PROCEDURE — 99214 OFFICE O/P EST MOD 30 MIN: CPT | Mod: S$GLB,,, | Performed by: INTERNAL MEDICINE

## 2017-06-01 NOTE — PROGRESS NOTES
I have seen and examined the patient, reviewed relevant diagnostic tests and agree with the assessment and plan below.

## 2017-06-01 NOTE — PROGRESS NOTES
Subjective:       Patient ID: Soha Wheatley is a 61 y.o. female.    Chief Complaint: Palpitations (ER fu 04/09/17)    HPI     Ms. Wheatley is a 61 year old female with a past medical history of anxiety, morbid obesity, osteoarthritis of her knees bilaterally, and palpitations. Her first episode of palpitations was 3 years ago that woke her up from her sleep and lasted for roughly 1 hour before it resolved. She saw Dr. March at that time who felt that her workup was negative and it was mostly related to stress and caffeine intake. She had a 14 day holter that only identified some PACs. She had no further episodes until this April. She stated once again, she woke up from her sleep with her heart racing. She was not able to get comfortable and was not able to calm down and it lasted 3-4 hours this time. She denies any associated SOB, chest pains, lightheadedness, dizziness, or syncope associated with it. She presented to the ED, where her triage HR was 144, but when they placed the EKG on her, the tachycardia had resolved. She is particularly concerned due to the strong family history of heart attacks when they were in their 50s. She was tearful and upset during the interview stating that she has been having a lot of issues with her knees and needs bilateral TKA's, but has to lose weight and see the bariatric surgeons for options.    Review of Systems   Constitutional: Negative for activity change, chills, fatigue and fever.   HENT: Negative.    Respiratory: Negative for cough and shortness of breath.    Cardiovascular: Positive for palpitations. Negative for chest pain and leg swelling.   Gastrointestinal: Negative for abdominal distention, diarrhea, nausea and vomiting.   Genitourinary: Negative for difficulty urinating, dysuria and urgency.   Musculoskeletal: Negative for arthralgias and myalgias.   Skin: Negative for color change and rash.   Neurological: Negative for dizziness, weakness, light-headedness and  "numbness.       Objective:     Vitals:    06/01/17 1439   BP: (!) 163/74   BP Location: Left arm   Patient Position: Sitting   BP Method: Automatic   Pulse: 77   Weight: (!) 153.7 kg (338 lb 13.6 oz)   Height: 5' 11" (1.803 m)       Physical Exam   Constitutional: Vital signs are normal. She appears well-developed and well-nourished. She is active and cooperative.  Non-toxic appearance. No distress.   HENT:   Head: Normocephalic and atraumatic.   Mouth/Throat: Uvula is midline, oropharynx is clear and moist and mucous membranes are normal.   Eyes: Conjunctivae and lids are normal. Pupils are equal, round, and reactive to light.   Neck: Normal carotid pulses and no JVD present. Carotid bruit is not present.   Cardiovascular: Normal rate, regular rhythm, S1 normal, S2 normal, normal heart sounds and normal pulses.  Exam reveals no gallop.    No murmur heard.  Pulmonary/Chest: Effort normal and breath sounds normal. No accessory muscle usage. No respiratory distress. She has no decreased breath sounds. She has no wheezes. She has no rhonchi. She has no rales.   Abdominal: Soft. Bowel sounds are normal. She exhibits no distension and no mass. There is no tenderness.   Neurological: She is alert.   Skin: Skin is warm, dry and intact.       Assessment:       1. Palpitations    2. Family history of early CAD        Plan:     1.) Palpitations  --advised that her palpitations are extremely rare and if it is not that symptomatic or bothersome, would not go chasing after it to figure out what's causing the arrythmia  --if concerned, the next step would be to refer to EP for ILR implantation to try to catch the cause of the palpitations as it is an extremely rare occurrence  --would not recommend any medications or treatments as we do not know what the cause is at this time.  --encouraged the patient to see the bariatric surgeons to discuss options.    2.) Family history of CAD  --the patient has not had a lipid panel in the " past few years. Have placed an order.    Staff addendum to follow. Follow up in 6 months.    Petra Suero MD  Cardiology PGY4

## 2017-06-05 ENCOUNTER — LAB VISIT (OUTPATIENT)
Dept: LAB | Facility: HOSPITAL | Age: 62
End: 2017-06-05
Payer: COMMERCIAL

## 2017-06-05 ENCOUNTER — DOCUMENTATION ONLY (OUTPATIENT)
Dept: BARIATRICS | Facility: CLINIC | Age: 62
End: 2017-06-05

## 2017-06-05 DIAGNOSIS — Z82.49 FAMILY HISTORY OF EARLY CAD: ICD-10-CM

## 2017-06-05 LAB
CHOLEST/HDLC SERPL: 3.4 {RATIO}
HDL/CHOLESTEROL RATIO: 29.7 %
HDLC SERPL-MCNC: 209 MG/DL
HDLC SERPL-MCNC: 62 MG/DL
LDLC SERPL CALC-MCNC: 111.4 MG/DL
NONHDLC SERPL-MCNC: 147 MG/DL
TRIGL SERPL-MCNC: 178 MG/DL

## 2017-06-05 PROCEDURE — 80061 LIPID PANEL: CPT

## 2017-06-05 PROCEDURE — 36415 COLL VENOUS BLD VENIPUNCTURE: CPT

## 2017-06-05 NOTE — PROGRESS NOTES
Bariatric Surgery Online Course Form Submission  Someone has submitted a Bariatric Surgery Online Course Form Submission on this page.  Date: 2017-06-04 - 19:54  Patient's Name: Soha Wheatley  YOB: 1955 Email: [email or web address]  Phone: [phone]   Patient Address: [address] [place] [address]  Preferred Surgical Location: [facility] [place]   I certify that I am 18 years of age or older:   Confirmation Code: [id number]  Verification of Bariatric Seminar: y  Insurance Information  Insurance or Self Pay?   Insurance Company Name: Blue Cross Blue Shield   Type of Coverage/Coverage Plan (i.e. PPO, HMO): HSA   Group Name: [id number]   Subscriber Name:   Member Name (patient's name)   Member ID/Policy #:[id number]   Plan Effective Date: 01/01/2016  Card Issuance date: 12/16/2015

## 2017-06-07 ENCOUNTER — PATIENT MESSAGE (OUTPATIENT)
Dept: ELECTROPHYSIOLOGY | Facility: CLINIC | Age: 62
End: 2017-06-07

## 2017-06-07 NOTE — TELEPHONE ENCOUNTER
ASCVD risk score was calculated to be 3.3%. No need for any statin medication.    Petra Suero MD  Cardiology PGY4

## 2017-06-13 ENCOUNTER — INITIAL CONSULT (OUTPATIENT)
Dept: BARIATRICS | Facility: CLINIC | Age: 62
End: 2017-06-13
Payer: COMMERCIAL

## 2017-06-13 VITALS
BODY MASS INDEX: 41.95 KG/M2 | HEART RATE: 87 BPM | DIASTOLIC BLOOD PRESSURE: 90 MMHG | SYSTOLIC BLOOD PRESSURE: 154 MMHG | WEIGHT: 293 LBS | HEIGHT: 70 IN

## 2017-06-13 DIAGNOSIS — F41.9 ANXIETY: ICD-10-CM

## 2017-06-13 DIAGNOSIS — E55.9 VITAMIN D DEFICIENCY: ICD-10-CM

## 2017-06-13 DIAGNOSIS — R10.31 ABDOMINAL PAIN, RLQ (RIGHT LOWER QUADRANT): ICD-10-CM

## 2017-06-13 DIAGNOSIS — E66.01 MORBID OBESITY WITH BMI OF 45.0-49.9, ADULT: ICD-10-CM

## 2017-06-13 DIAGNOSIS — K21.9 GASTROESOPHAGEAL REFLUX DISEASE, ESOPHAGITIS PRESENCE NOT SPECIFIED: ICD-10-CM

## 2017-06-13 DIAGNOSIS — Z98.84 STATUS POST LAPAROSCOPIC SLEEVE GASTRECTOMY: ICD-10-CM

## 2017-06-13 DIAGNOSIS — E66.01 MORBID OBESITY DUE TO EXCESS CALORIES: Primary | ICD-10-CM

## 2017-06-13 DIAGNOSIS — Z01.818 PRE-OP TESTING: ICD-10-CM

## 2017-06-13 PROCEDURE — 99205 OFFICE O/P NEW HI 60 MIN: CPT | Mod: S$GLB,,, | Performed by: PHYSICIAN ASSISTANT

## 2017-06-13 PROCEDURE — 99999 PR PBB SHADOW E&M-EST. PATIENT-LVL V: CPT | Mod: PBBFAC,,, | Performed by: PHYSICIAN ASSISTANT

## 2017-06-13 NOTE — PROGRESS NOTES
BARIATRIC NEW PATIENT EVALUATION    CHIEF COMPLAINT:   Morbid obesity, Body mass index is 48.43 kg/m². and inability to lose weight.    HPI:  Soha Wheatley is a 61 y.o. female presenting for morbid obesity, Body mass index is 48.43 kg/m². and inability to lose weight. The patient has tried WW x 9 years and lost 60 lbs.  Once her mom  21 years ago, she regained the weight and has had difficulty with her weight ever since.  Her highest weight is her current weight, 334 lbs.  She is an emotional/stress eater.  She wants a Sleeve Gastrectomy with Dr. Holloway.  She is very anxious during the visit and tearful at the end.  She does not see a mental health professional or take medications for her anxiety.  Stressors and increases of anxiety included:  needing 6 MSD with insurance and not being able to have surgery at the end of the year to reduce cost since she met her deductible, needing the surgery ASAP because she can't have knee surgery unless she loses weight, not being able to take NSAIDs in the xavier-operative period for knee pain, scared to death of not waking up from anesthesia, and several other issues.  At this point she absolutely needs to get in to see psychiatry and see psych for this anxiety.  She needs to start her MSDs now.  She may not be able to have the surgery at the end of the year, but she needs to start the process because she has a lot of work to do mentally first.      PAST MEDICAL HISTORY:  Past Medical History:   Diagnosis Date    Allergy     Anxiety     Arthritis     Chronic kidney disease     Depression     GERD (gastroesophageal reflux disease)     History of kidney stones     Kidney stones     Migraines     Obesity          PAST SURGICAL HISTORY:  Past Surgical History:   Procedure Laterality Date     SECTION      CHOLECYSTECTOMY      KNEE ARTHRODESIS      KNEE CARTILAGE SURGERY      TONSILLECTOMY  1986    URETEROSCOPY         FAMILY HISTORY:  Family  History   Problem Relation Age of Onset    Cancer Mother      breast    Hyperlipidemia Father     Hypertension Father     VANDANA disease Maternal Grandmother     Heart disease Maternal Grandmother     Hyperlipidemia Maternal Grandmother     Pancreatic cancer Maternal Grandfather     Heart disease Maternal Uncle     Heart attack Paternal Grandfather     Celiac disease Neg Hx     Cirrhosis Neg Hx     Colon cancer Neg Hx     Colon polyps Neg Hx     Crohn's disease Neg Hx     Cystic fibrosis Neg Hx     Esophageal cancer Neg Hx     Hemochromatosis Neg Hx     Inflammatory bowel disease Neg Hx     Irritable bowel syndrome Neg Hx     Liver cancer Neg Hx     Liver disease Neg Hx     Rectal cancer Neg Hx     Stomach cancer Neg Hx     Ulcerative colitis Neg Hx     Silvestre's disease Neg Hx           SOCIAL HISTORY:   reports that she has never smoked. She has never used smokeless tobacco. She reports that she drinks alcohol. She reports that she does not use drugs.  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  She is  and has 1 daughter and step-daughter. .      MEDICATIONS:  Medications have been reviewed.    ALLERGIES:  Allergies have been reviewed.    Review of Systems   Constitutional: Negative for chills, fever and malaise/fatigue.   Eyes: Negative for blurred vision and double vision.   Respiratory: Negative for cough, hemoptysis and shortness of breath.    Cardiovascular: Negative for chest pain, palpitations and leg swelling.   Gastrointestinal: Positive for abdominal pain (epigastric), diarrhea (s/p lap puneet) and heartburn (symptoms despite PPI daily). Negative for blood in stool, constipation, melena, nausea and vomiting.   Genitourinary: Negative for dysuria and hematuria.   Musculoskeletal: Positive for back pain (mid-lower), joint pain (bilateral) and neck pain. Negative for falls and myalgias.   Skin: Negative for rash.   Neurological: Negative for dizziness, tingling,  weakness and headaches.   Endo/Heme/Allergies: Negative for environmental allergies. Does not bruise/bleed easily.   Psychiatric/Behavioral: Positive for depression. Negative for hallucinations, memory loss, substance abuse and suicidal ideas. The patient is nervous/anxious. The patient does not have insomnia.        Vitals:    06/13/17 1423   BP: (!) 154/90   Pulse: 87       Physical Exam   Constitutional: She is oriented to person, place, and time and well-developed, well-nourished, and in no distress.   Morbidly obese   HENT:   Head: Normocephalic and atraumatic.   Eyes: No scleral icterus.   Cardiovascular: Normal rate, regular rhythm, normal heart sounds and intact distal pulses.  Exam reveals no gallop and no friction rub.    No murmur heard.  Pulmonary/Chest: Effort normal and breath sounds normal. No respiratory distress. She has no wheezes. She has no rales. She exhibits no tenderness.   Abdominal: Soft. Bowel sounds are normal. She exhibits no distension and no mass. There is no tenderness. There is no rebound and no guarding.   Musculoskeletal: She exhibits no edema.   Neurological: She is alert and oriented to person, place, and time.   Skin: Skin is warm and dry. No rash noted. No erythema. No pallor.   Psychiatric: Memory and judgment normal.   Extremely anxious, overwhelmed and depressed   Nursing note and vitals reviewed.      DIAGNOSIS:  1. Morbid Obesity, Body mass index is 48.43 kg/m². and inability to lose weight.  2. Co-morbidities: depression, osteoarthritis and Anxiety    PLAN:  The patient is not a good candidate for Bariatric Surgery at this point.  I think if she gets the anxiety under control and treated, then she may be a good candidate.  She has 6 months of MSDs per insurance.  We should start the MSDs. The patient is interested in sleeve gastrectomy. The surgery and post-op care was discussed in detail with the patient. All questions were answered.    The patient understands that  bariatric surgery is a tool to aid in weight loss and that they need to be committed to the diet and exercise post-operatively for successful weight loss. Discussed with patient that bariatric surgery is not the easy way out and that it will take plenty of dedication on the patient's part to be successful. Also discussed the possibility of weight regain if the patient strays from the diet guidelines or exercise requirements. Patient verbalized understanding and wishes to proceed with the work-up.    Estimated Goal weight is 246 lbs, approximately 50% of their excess weight.      **I certify that I have personally reviewed the patient's blue intake packet with the patient.  All information has been added into epic.        ORDERS:  1. Chest X-Ray, EKG and EGD  2. Psychological Consult, Bariatric Dietician Consult and PCP Clearance  3. Bariatric Labs: BMP, CBC, Folate Serum, H. pylori, HgA1C, Hepatic Panel/LFT, Iron & TIBC, Lipid Profile, Magnesium, Phosphate, T3, T4, TSH, Free T4, Vitamin B12, and Vitamin B1.  4. Psychiatry and therapy for Untreated Anxiety    Referring Physician: Andrew Mallory MD  RTC: As scheduled.

## 2017-06-13 NOTE — PATIENT INSTRUCTIONS
- Start with MSD visits  - See Yuly and dietician on 3rd diet visit  - See Psychiatry for anxiety/depression

## 2017-06-14 ENCOUNTER — TELEPHONE (OUTPATIENT)
Dept: BARIATRICS | Facility: CLINIC | Age: 62
End: 2017-06-14

## 2017-06-15 ENCOUNTER — CLINICAL SUPPORT (OUTPATIENT)
Dept: BARIATRICS | Facility: CLINIC | Age: 62
End: 2017-06-15
Payer: COMMERCIAL

## 2017-06-15 VITALS — BODY MASS INDEX: 48.05 KG/M2 | WEIGHT: 293 LBS

## 2017-06-15 DIAGNOSIS — Z71.3 DIETARY COUNSELING AND SURVEILLANCE: ICD-10-CM

## 2017-06-15 DIAGNOSIS — E66.01 MORBID OBESITY DUE TO EXCESS CALORIES: ICD-10-CM

## 2017-06-15 DIAGNOSIS — E66.01 MORBID OBESITY WITH BMI OF 45.0-49.9, ADULT: ICD-10-CM

## 2017-06-15 PROCEDURE — 99999 PR PBB SHADOW E&M-EST. PATIENT-LVL II: CPT | Mod: PBBFAC,,, | Performed by: DIETITIAN, REGISTERED

## 2017-06-15 PROCEDURE — 99499 UNLISTED E&M SERVICE: CPT | Mod: S$GLB,,, | Performed by: DIETITIAN, REGISTERED

## 2017-06-15 PROCEDURE — 97802 MEDICAL NUTRITION INDIV IN: CPT | Mod: S$GLB,,, | Performed by: DIETITIAN, REGISTERED

## 2017-06-15 NOTE — PROGRESS NOTES
NUTRITIONAL CONSULT    Referring Physician: Andrew Holloway M.D.   Reason for MNT Referral: Initial assessment for sleeve gastrectomy work-up    PAST MEDICAL HISTORY:   61 y.o. female presents with a BMI of Body mass index is 48.05 kg/m².  Weight history includes her highest weight is her current weight.  She is an emotional/stress eater. (patient reports she has been on a binge since she was told she couldn't have knee replacement surgery until she loses weight).   Dieting attempts include the patient has tried WW x 9 years and lost 60 lbs.  Once her mom  21 years ago she regained the weight and has had difficulty with weight ever since.  Patient very tearful during visit. Patient's first statement was that she is an emotional/stress eater and that she is dealing with anxiety. Patient reports she has had an increased appetite due to stress and does not have complete control over eating during this time. Patient asked about medications to reduce appetite, discussed the possibility of patient meeting with bariatrician regarding this. After a long discussion it appears that the patient would most benefit from a psychology/psychiatrist consult to get control of anxiety issues and then assess if this will help with stress/emotional eating.     Past Medical History:   Diagnosis Date    Allergy     Anxiety     Arthritis     Chronic kidney disease     Depression     GERD (gastroesophageal reflux disease)     History of kidney stones     Kidney stones     Migraines     Obesity        CLINICAL DATA:  61 y.o.-year-old White female.  Height: 5 ft 10 in  Weight: 334 lbs  IBW: 156 lbs  BMI: 48.05  The patient's goal weight (50% EBW): 248 lbs  Personal goal weight: 190 lbs    Goal for Bariatric Surgery: to improve health, to improve quality of life, to lose weight and to qualify for knee replacement surgery    NUTRITION & HEALTH HISTORY:  Greatest challenge: dining out frequency, sweets, starchy CHO, portion  control, emotional eating and staying consistent with diet    Current diet recall:   Brk: biscuits with ham and cheese OR weekends eggs, mir, biscuits; cup of coffee with caramel creamer  Snk (10 am): crackers  Sindy (12-1 pm): leftovers or sandwich-ham, lettuce, tomato and cheese  Snk: ice cream or Little Navya cake  Di: Grilled chicken with baked potato or rice dressing with vegetables    Current Diet:  Meal pattern: 3 meals/day  Protein supplements: none  Snackin-3 / day  Vegetables: Likes a variety. (doesn't like cauliflower or brussels sprouts) Eats daily.  Fruits: Likes a variety. Eats rarely.  Beverages: water, sugar-free beverages, coffee with sugar, fat-free milk and 1% milk  Dining out: Daily. Weekly. (a few times per week) Mostly fast food, restaurants and take-out.  Cooking at home: Daily. Weekly. (4-5 times per week with leftovers) Mostly baked, grilled and smothered meat, fish, starchy CHO and vegetables.    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  Grocery shopping and food prep done by patient's  and cooking by both.  Patient believes the household will be supportive after surgery.  Alcohol: Socially.  Smoking: None.    ASSESSMENT:  · Patient reports attempts at weight loss, only to regain lost weight.  · Patient demonstrated knowledge of healthy eating behaviors and exercise patterns; admits to not eating healthy and not exercising at this point.  · Patient states willingness to change lifestyle and make behavior modifications.  · Expect good  compliance after surgery at this time.    Insurance requires 6 months medically supervised diet prior to consideration for bariatric  surgery.    BARIATRIC DIET DISCUSSION:  Discussed diet after surgery and related to patients food record.  Reviewed diet progression before and after surgery.  Reinforced that surgery is not a magic bullet and importance of low fat foods and no snacking.  Stressed importance of exercise and its role in achieving weight loss goals.  Answered all questions.    RECOMMENDATIONS:  Patient is a potential candidate for bariatric surgery.    Needs 5 additional visit(s) with RD to complete MSD visits and demonstrate dietary modifications in preparation for bariatric surgery.    PLAN:  Continue to review Bariatric Nutrition Guidebook at home and call with any questions.  Work on Bariatric Nutrition Checklist.  Work on expanding variety of vegetables.  Work on gradually cutting back on starchy CHO in the diet.  Begin trying various protein supplements to determine preference.  1200-calorie diet.  1500-calorie diet.  5-6 meals per day.  Start including protein supplements in the diet plan daily.  Reduce frequency of dining out.  More grocery shopping and meal preparation at home.  Increase exercise.  Return to clinic.    SESSION TIME:  60 minutes

## 2017-06-15 NOTE — PATIENT INSTRUCTIONS
1200- 1500 calorie Sample meal plan  80-120g protein per day.   Protein drinks and bars: 0-4 grams sugar  Drink lots of water throughout the day and exercise!  MENU # 1  Breakfast: 2 eggs, 1 turkey sausage safia, 1 apple  Snack: Atkins bar  Lunch: 2 roll-ups (sliced turkey, low-fat sliced cheese, mustard), 12 baby carrots dipped in 1 Tbsp natural peanut butter  Mid-Day Snack: ¼ cup unsalted almonds, ½ cup fruit  Dinner: 1 chicken thigh simmered in tomato sauce + 2 Tbsp mozzarella cheese, ½ cup black beans, 1/2 cup steamed carrots  Evening Snack: 1/2 cup grapes with 4 cubes low-fat cheddar cheese   ___________________________________________________  MENU # 2  Breakfast: 200 calorie protein drink  Mid-morning snack : ¼ cup unsalted nuts, medium banana  Lunch: 3oz tuna or chicken salad made with 2 tbsp light lozano, over salad with tomatoes and cucumbers.   Snack: low-fat cheese stick  Dinner: 3oz hamburger safia, slice of low-fat cheese, 1 cup boiled yellow squash and zucchini.   Snack: 6oz light yogurt  _______________________________________________________  Menu #3  Breakfast: 6oz plain Greek yogurt + fruit (½ banana, ½ cup fruit - pineapple, blueberries, strawberries, peach), may add Splenda to quirino.  Lunch: Grilled  chicken breast w/ slice low-fat pepper tabby cheese, 1/2 cup grilled/baked asparagus and small salad with Salad Spritzer.    Mid-Day snack: 200 calorie protein drink   Dinner: 4oz Grilled fish, ½ cup white beans, ½ cup cooked spinach  Evening Snack: fudgsicle no-sugar-added    Menu # 4  Breakfast: 1 scoop vanilla protein powder + 4oz skim milk + 4oz coffee   Snack: Pure protein bar  Lunch: 2 Lettuce tacos: 3oz seasoned ground turkey wrapped in a Adeel lettuce leaves with 1 Tbsp shredded cheese and dollop low-fat sour cream  Snack: ½ cup cottage cheese, ½ cup pineapple chunks  Dinner: Shrimp omelet: 2 eggs, ½ cup shrimp, green onions, and shredded  cheese  ______________________________________________________  Menu #5  Breakfast: 1 cup low-fat cottage cheese, ½ cup peaches (no sugar added)  Snack: 1 apple, 4 cubes cheese  Lunch: 3oz baked pork chop, 1 cup okra and tomato stew  Snack: 1/2 cup black beans + salsa + dollop light sour cream  Dinner: Caprese chicken salad: 3 oz chicken breast, 1oz fresh mozzarella, sliced tomato, 1 Tbsp olive oil, basil  Snack: sugar-free popsicle    Menu #6  Breakfast: ½ cup part-skim ricotta cheese, 2 Tbsp sugar-free strawberry preserves, sprinkle of slivered almonds  Snack: 1 orange  Lunch: 3 oz canned chicken, 1oz shredded cheddar cheese, ½  cup black beans, 2 Tbsp salsa  Snack: 200 calorie Protein drink  Dinner: 4 oz grilled salmon steak, over mixed green salad with 2 tbsp light dressing    Meal Ideas for Regular Bariatric Diet  *Recipes and products available at [email or web address]      Breakfast: (15-20g protein)    - Egg white omelet: 2 egg whites or ½ cup Egg Beaters. (Optional proteins: cheese, shrimp, black beans, chicken, sliced turkey) (Optional veggies: tomatoes, salsa, spinach, mushrooms, onions, green peppers, or small slice avocado)     - Egg and sausage: 1 egg or ¼ cup Egg Beaters (any variety), with 1 safia or 2 links of Turkey sausage or Veggie breakfast sausage ([employer] or Timecros)    - Crust-less breakfast quiche: To make a glass pie dish, mix 4oz part skim Ricotta, 1 cup skim milk, and 2 eggs as your base. Add protein: shredded cheese, sliced lean ham or turkey, turkey mir/sausage. Add veggies: tomato, onion, green onion, mushroom, green pepper, spinach, etc.    - Yogurt parfait: Mix 1 - 6oz container Dannon Light N Fit vanilla yogurt, with ¼ cup crushed unsalted nuts    - Cottage cheese and fruit: ½ cup part-skim cottage cheese or ricotta cheese topped with fresh fruit or sugar free preserves     - Yamilka Bagley's Vanilla Egg custard* (add 2 Tbsp instant coffee granules to make Cappuccino  Custard*)    - Hi-Protein café latte (skim milk, decaf coffee, 1 scoop protein powder). Optional to add Sugar free syrup or extract flavoring.    - Breakfast Lox: spread fat free cream cheese on slices of smoked salmon. Serve over scrambled or egg over easy (sauteed with nonstick cookspray) OR on a cucumber slice    - Eggwhich: Scramble or cook 1 large egg over easy using nonstick cookspray. Place between 2 slices of Micronesian mir and low fat cheese.     Lunch: (20-30g protein)    - ½ cup Black bean soup (Homemade or Progresso), with ¼ cup shredded low-fat cheese. Top with chopped tomato or fresh salsa.     - Lean deli turkey breast and low-fat sliced cheese, mustard or light lozano to moisten, rolled up together, or wrapped in a Adeel lettuce leaf    - Chicken salad made from dinner leftovers, moisten with low-fat salad dressing or light lozano. Also try leftover salmon, shrimp, tuna or boiled eggs. Serve ½ cup over dark green salad    - Fat-free canned refried beans, topped with ¼ cup shredded low-fat cheese. Top with chopped tomato or fresh salsa.     - Greek salad: Top mixed greens with 1-2oz grilled chicken, tomatoes, red onions, 2-3 kalamata olives, and sprinkle lightly with feta cheese. Spritz with Balsamic vinegar to taste.     - Crust-less lunch quiche: To make a glass pie dish, mix 4oz part skim Ricotta, 1 cup skim milk, and 2 eggs as your base. Add protein: shredded cheese, sliced lean ham or turkey, shrimp, chicken. Add veggies: tomato, onion, green onion, mushroom, green pepper, spinach, artichoke, broccoli, etc.    - Pizza bake: spread a  carlos kanika mushroom with tomato sauce, low-fat shredded mozzarella and turkey pepperoni or [place] mir. Add any veggies. Roast for 10-15 minutes, until cheese melted.     - Cucumber crab bites: Spread ¼ cup crab dip (lump crabmeat + light cream cheese and green onions) over sliced cucumber.     - Chicken with light spinach and artichoke dip*: Puree in food  processor: 6oz cooked and drained spinach, 2 cloves garlic, 1 can cannelloni beans, ½ cup chopped green onions, 1 can drained artichoke hearts (not marinated in oil), lemon juice and basil. Mix in 2oz chopped up chicken.    Supper: (20-30g protein)    - Serve grilled fish over dark green salad tossed with low-fat dressing, served with grilled asparagus peterson     - Rotisserie chicken salad: served with sliced strawberries, walnuts, fat-free feta cheese crumbles and 1 tbsp Changs Own Light Raspberry Booker Vinaigrette    - Shrimp cocktail: Dip cold boiled shrimp in homemade low-sugar cocktail sauce (1/2 cup Wolf One Carb ketchup, 2 tbsp horseradish, 1/4 tsp hot sauce, 1 tsp Worcestershire sauce, 1 tbsp freshly-squeezed lemon juice). Serve with dark green salad, walnuts, and crumbled blue cheese drizzled with olive oil and Balsamic vinegar    - Tuna Melt: Spread tuna salad onto 2 thick slices of tomato. Top with low-fat cheese and broil until cheese is melted. May also be made with chicken salad of shrimp salad. [place] with different types of cheeses.    - Chicken or beef fajitas (no tortilla, rice, beans, chips). Top meat and veggies w/ fresh salsa, fat free sour cream.     - Homemade low-fat Chili using extra lean ground beef or ground turkey. Top with shredded cheese and salsa as desired. May add dollop fat-free sour cream if desired    - Chicken parmesan: Top chicken breast w/ low sugar marinara sauce, mozzarella cheese and bake until chicken reaches 165*.  Serve w/ spaghetti SQUASH or Japanese cut green beans    - Dinner Omelet with shrimp or chicken and onion, green peppers and chives.    - No noodle lasagna: Use sliced zucchini or eggplant in place of noodles.  Layer with part skim ricotta cheese and low sugar meat sauce (use very lean ground beef or ground turkey).    - Mexican chicken bake: Bake chunks of chicken breast or thigh with taco seasoning, Pace brand enchilada sauce, green onions and low-fat  cheese. Serve with ¼ cup black beans or fat free refried beans topped with chopped tomatoes or salsa.    - Maurice frozen meatballs, simmered in Classico Marinara sauce. Different flavors of salsa or spaghetti sauce create different dishes! Sprinkle with parmesan cheese. Serve with grilled or steamed veggies, or a dark green salad.    - Simmer boneless skinless chicken thigh chunks in Classico Marinara sauce or roasted salsa until tender with chopped onion, bell pepper, garlic, mushrooms, spinach, etc.     - Hamburger or veggie burger, without the bun, dressed the way you like. Served with grilled or steamed veggies.    - Eggplant parmesan: Bake slices of eggplant at 350 degrees for 15 minutes. Layer tomato sauce, sliced eggplant and low-fat mozzarella cheese in a baking dish and cover with foil. Bake 30-40 more minutes or until bubbly. Uncover and bake at 400 degrees for about 15 more minutes, or until top is slightly crisp.    - Fish tacos: grilled/baked white fish, wrapped in Adeel lettuce leaf, topped with salsa, shredded low-fat cheese, and light coleslaw.    - Chicken wanda: Sprinkle chicken w/ 1 tsp of [place] ranch dip mix. Then grill chicken and top with black beans, salsa and 1 tsp fat free sour cream.     - Cauliflower pizza crust: Use cauliflower as crust (see recipe on ariane, no flour!). Top w/ low fat cheese, turkey pepperoni and veggies and bake again    - chicken or turkey crust pizza: use ground chicken or turkey instead of cauliflower, spread in [place] and bake at 350 for about 20-30 minutes(may want to add garlic, black pepper, oregano and other herbs to ground meat mixture).  Remove and top w/ low fat cheese, turkey pepperoni and veggies and bake again for another 10 minutes or until cheese is browned.     Snacks: (100-200 calories; >5g protein)    - 1 low-fat cheese stick with 8 cherry tomatoes or 1 serving fresh fruit  - 4 thin slices fat-free turkey breast and 1 slice low-fat  cheese  - 4 thin slices fat-free honey ham with wedge of melon  - 6-8 edamame pods (equivalent to about 1/4 cup edamame without pods).   - 1/4 cup unsalted nuts with ½ cup fruit  - 6-oz container Dannon Light n Fit vanilla yogurt, topped with 1oz unsalted nuts         - apple, celery or baby carrots spread with 2 Tbsp PB2  - apple slices with 1 oz slice low-fat cheese  - Apple slices dipped in 2 Tbsp of PB2  - celery, cucumber, bell pepper or baby carrots dipped in ¼ cup hummus bean spread or light spinach and artichoke dip (*recipe in lunch section)  - celery, cucumber, baby carrots dipped in high protein greek yogurt (Mix 16 oz plain greek yogurt + 1 packet of hidden [place] ranch dip mix)  - Delmar Links Beef Steak - 14g protein! (similar to beef jerky)  - 2 wedges [place] - Light Herb & Garlic Cheese with sliced cucumber or green bell pepper  - 1/2 cup low-fat cottage cheese with ¼ cup fruit or ¼ cup salsa  - RTD Protein drinks: Atkins, Low Carb Slim Fast, EAS light, Muscle Milk Light, etc.  - Homemade Protein drinks: GNC Soy95, Isopure, Nectar, UNJURY, Whey Gourmet, etc. Mix 1 scoop powder with 8oz skim/1% milk or light soymilk.  - Protein bars: Atkins, EAS, Pure Protein, Think Thin, Detour, etc. Must have 0-4 grams sugar - Read the label.    Takeout Options: No more than twice/week  Deli - Salads (no pasta or rice), meats, cheeses. Roasted chicken. Lox (salmon)    Mexican - Platters which don't include tortillas, chips, or rice. Go easy on the beans. Example: Fajitas without the tortillas. Ask the  not to bring chips to the table if they are too tempting.    Greek - Meat or fish and vegetable, but no bread or rice. Including hummus, baba ganoush, etc, is OK. Most sit-down Greek restaurants can provide you with cucumber slices for dipping instead of alberto bread.    Fast Food (Avoid as much as possible) - Salads (no croutons and limit salad dressing to 2 tbsp), grilled chicken sandwich without the bun  and ask for no lozano. Mary Ellens low fat chili or Taco Bell pintos and cheese.    BBQ - The meats are fine if you ask for sauces on the side, but most of the traditional side dishes are loaded with carbs. Víctor slaw, baked beans and BBQ sauce are typically made with sugar.    Chinese - Nothing deep-fried, no rice or noodles. Many Chinese sauces have starch and sugar in them, so you'll have to use your judgement. If you find that these sauces trigger cravings, or cause Dumping, you can ask for the sauce to be made without sugar or just use soy sauce.

## 2017-06-16 PROBLEM — F41.9 ANXIETY: Status: ACTIVE | Noted: 2017-06-16

## 2017-06-28 ENCOUNTER — TELEPHONE (OUTPATIENT)
Dept: BARIATRICS | Facility: CLINIC | Age: 62
End: 2017-06-28

## 2017-06-28 NOTE — TELEPHONE ENCOUNTER
----- Message from Pj Huang sent at 6/28/2017 11:58 AM CDT -----  [phone]//pt states that she needs to reschedule her appt//please call//thank you

## 2017-06-28 NOTE — TELEPHONE ENCOUNTER
Called patient to r/s nutrition f/u appointment due to patient being out of town on this date. Appointment r/s on July 13 @ 830 am.  Patient reports increased stomach pain (described as achy) around belly button with increased vegetable intake. Patient reports she thinks it may be due to increased seasonings on foods.   Discussed with DANITA. Re: try vegetables w/o seasoning for a few days then if pain continues consult PCP/GI. Patient v/u. Encouraged patient to call back with any further questions or concerns.

## 2017-07-13 ENCOUNTER — CLINICAL SUPPORT (OUTPATIENT)
Dept: BARIATRICS | Facility: CLINIC | Age: 62
End: 2017-07-13
Payer: COMMERCIAL

## 2017-07-13 VITALS — BODY MASS INDEX: 47.51 KG/M2 | WEIGHT: 293 LBS

## 2017-07-13 DIAGNOSIS — Z71.3 DIETARY COUNSELING AND SURVEILLANCE: ICD-10-CM

## 2017-07-13 DIAGNOSIS — E66.01 MORBID OBESITY WITH BMI OF 45.0-49.9, ADULT: ICD-10-CM

## 2017-07-13 DIAGNOSIS — E66.01 MORBID OBESITY DUE TO EXCESS CALORIES: ICD-10-CM

## 2017-07-13 PROCEDURE — 97803 MED NUTRITION INDIV SUBSEQ: CPT | Mod: S$GLB,,, | Performed by: DIETITIAN, REGISTERED

## 2017-07-13 PROCEDURE — 99999 PR PBB SHADOW E&M-EST. PATIENT-LVL II: CPT | Mod: PBBFAC,,, | Performed by: DIETITIAN, REGISTERED

## 2017-07-13 PROCEDURE — 99499 UNLISTED E&M SERVICE: CPT | Mod: S$GLB,,, | Performed by: DIETITIAN, REGISTERED

## 2017-07-13 NOTE — PATIENT INSTRUCTIONS
Focus Goals:  -You're doing a great job so far!  -Protein goal  gm/day  -Goal 4-6 small meals/day  -Continue with protein drinks/bars  -Try different meal ideas

## 2017-07-13 NOTE — PROGRESS NOTES
NUTRITION NOTE    Referring Physician: Andrew Holloway M.D.   Reason for MNT Referral: 6 months Medically Supervised Diet pending Gastric Sleeve    PAST MEDICAL HISTORY:    Patient presents for 2nd visit for MSD with (-3 lbs) weight loss over the past month; total weight loss by making numerous dietary and lifestyle changes. Patient has made great efforts to adopt bariatric meal plan but did admit to having days that were really hard and stressful. Patient has food logs completed and documented how she was feeling that day to see if there was a relationship between that and her eating. Patient is very tearful during visit when we discussed her current progress, weight loss and relationship with starchy CHO foods. Patient reports that her biggest struggle has been eliminating these items and is very tearful about this. We discussed that diet changes will not happen over night and it is okay that she takes time to slowly eliminate these items over the next months. Encouraged patient that she is making good progress and it's okay to make mistakes from time to time.     Past Medical History:   Diagnosis Date    Allergy     Anxiety     Arthritis     Chronic kidney disease     Depression     GERD (gastroesophageal reflux disease)     History of kidney stones     Kidney stones     Migraines     Obesity        CLINICAL DATA:  61 y.o. female.    There were no vitals filed for this visit.    Current Weight: 331 lbs  Weight Change Since Initial Visit: -3 lbs  Ideal Body Weight: 156 lbs  BMI: 47.51    CURRENT DIET:  Regular diet.  Diet Recall: Food records are present.  Brk (9 am): 2 eggs+onion, peppers-cup of coffee with creamer  10 am: cup of coffee with creamer  Sindy (12:30 pm): salad with grilled chicken, black beans, carrots, corn, eggs, pumpkin seeds with avocado dressing  Snk( 3:30 pm): Pure Protein bar  Di (7 pm): Fajita steak with peppers and onions, lettuce, tomato, polina  Snk: glass of milk     Diet  Includes: 3 meals/day+snacks (lean protein, vegetables, fruit, donuts, pizza, ice cream, salads, protein drinks/protein bars)   Meal Pattern: Improved.  Protein Supplements: 1-2 per day.  Snacking: Adequate. Snacks include healthy choices.    Vegetables: Likes a variety. Eats daily.  Fruits: Likes a variety. Eats almost daily.  Beverages: water, diet soda, coffee with sugar and coffee without sugar  Dining Out: Dining out: Weekly. Mostly restaurants.  Cooking at home: Daily. Weekly. Mostly baked and grilled meat, fish and vegetables.    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  Smoking: None.    ASSESSMENT:  Patient demonstrates some willingness to change lifestyle habits as evidenced by weight loss, daily food logs, dietary changes, including protein drinks, increased fruits, increased vegetables, reduced dining out, better choices when dining out, more food preparation at [employer], healthier cooking at home, bringing snacks to work, healthier snacking at work and healthier snacking at home.    Doing fairly well with working on greatest challenges (dining out frequency, sweets, starchy CHO, portion control, emotional eating and staying consistent with diet).    Adequate calorie intake.  Adequate protein intake.    PLAN:    Diet: Maintain diet plan.    Exercise: Increase.    Behavior Modification: Continue to document food & activity logs daily.    Weight loss prior to surgery (5-10% TBW): 16-33 lbs    Return to clinic in one month.    SESSION TIME:  30 minutes

## 2017-07-21 ENCOUNTER — OFFICE VISIT (OUTPATIENT)
Dept: PSYCHIATRY | Facility: CLINIC | Age: 62
End: 2017-07-21
Payer: COMMERCIAL

## 2017-07-21 VITALS
WEIGHT: 293 LBS | HEIGHT: 70 IN | DIASTOLIC BLOOD PRESSURE: 71 MMHG | HEART RATE: 83 BPM | BODY MASS INDEX: 41.95 KG/M2 | SYSTOLIC BLOOD PRESSURE: 171 MMHG

## 2017-07-21 DIAGNOSIS — F41.9 ANXIETY: Primary | ICD-10-CM

## 2017-07-21 PROCEDURE — 99204 OFFICE O/P NEW MOD 45 MIN: CPT | Mod: S$GLB,,, | Performed by: PSYCHIATRY & NEUROLOGY

## 2017-07-21 PROCEDURE — 3008F BODY MASS INDEX DOCD: CPT | Mod: S$GLB,,, | Performed by: PSYCHIATRY & NEUROLOGY

## 2017-07-21 PROCEDURE — 99999 PR PBB SHADOW E&M-EST. PATIENT-LVL III: CPT | Mod: PBBFAC,,, | Performed by: PSYCHIATRY & NEUROLOGY

## 2017-07-21 RX ORDER — SERTRALINE HYDROCHLORIDE 50 MG/1
50 TABLET, FILM COATED ORAL DAILY
Qty: 30 TABLET | Refills: 2 | Status: SHIPPED | OUTPATIENT
Start: 2017-07-21 | End: 2017-09-20 | Stop reason: SDUPTHER
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
## 2017-07-21 NOTE — PATIENT INSTRUCTIONS
-Start Zoloft 25mg (break 50mg tabs in half) for 3 days then increase to full tab (50mg) daily; 30 day supply with 2 refills sent to [facility]  -If stomach is upset with medication, take with small meal  -Will plan to follow up in 4-6 weeks  -Recommend referral to psychotherapy at [facility]

## 2017-08-04 NOTE — PROGRESS NOTES
I have independently evaluated the patient and have reviewed this note and agree with its contents, including the assessment and plan.     Alejandra Galicia MD

## 2017-08-16 ENCOUNTER — CLINICAL SUPPORT (OUTPATIENT)
Dept: BARIATRICS | Facility: CLINIC | Age: 62
End: 2017-08-16
Payer: COMMERCIAL

## 2017-08-16 ENCOUNTER — TELEPHONE (OUTPATIENT)
Dept: BARIATRICS | Facility: CLINIC | Age: 62
End: 2017-08-16

## 2017-08-16 ENCOUNTER — LAB VISIT (OUTPATIENT)
Dept: LAB | Facility: HOSPITAL | Age: 62
End: 2017-08-16
Attending: SURGERY
Payer: COMMERCIAL

## 2017-08-16 ENCOUNTER — OFFICE VISIT (OUTPATIENT)
Dept: BARIATRICS | Facility: CLINIC | Age: 62
End: 2017-08-16
Payer: COMMERCIAL

## 2017-08-16 VITALS
DIASTOLIC BLOOD PRESSURE: 76 MMHG | HEIGHT: 69 IN | WEIGHT: 293 LBS | HEART RATE: 60 BPM | BODY MASS INDEX: 43.4 KG/M2 | SYSTOLIC BLOOD PRESSURE: 128 MMHG

## 2017-08-16 DIAGNOSIS — F41.9 ANXIETY: ICD-10-CM

## 2017-08-16 DIAGNOSIS — Z71.3 DIETARY COUNSELING AND SURVEILLANCE: ICD-10-CM

## 2017-08-16 DIAGNOSIS — E66.01 MORBID OBESITY WITH BMI OF 45.0-49.9, ADULT: ICD-10-CM

## 2017-08-16 DIAGNOSIS — K21.9 GASTROESOPHAGEAL REFLUX DISEASE, ESOPHAGITIS PRESENCE NOT SPECIFIED: Primary | ICD-10-CM

## 2017-08-16 DIAGNOSIS — E66.01 MORBID OBESITY DUE TO EXCESS CALORIES: ICD-10-CM

## 2017-08-16 DIAGNOSIS — F41.9 ANXIETY: Primary | ICD-10-CM

## 2017-08-16 DIAGNOSIS — R10.31 ABDOMINAL PAIN, RLQ (RIGHT LOWER QUADRANT): ICD-10-CM

## 2017-08-16 DIAGNOSIS — M25.561 CHRONIC PAIN OF RIGHT KNEE: ICD-10-CM

## 2017-08-16 DIAGNOSIS — G89.29 CHRONIC PAIN OF RIGHT KNEE: ICD-10-CM

## 2017-08-16 LAB
25(OH)D3+25(OH)D2 SERPL-MCNC: 28 NG/ML
ALBUMIN SERPL BCP-MCNC: 4.1 G/DL
ALP SERPL-CCNC: 81 U/L
ALT SERPL W/O P-5'-P-CCNC: 28 U/L
ANION GAP SERPL CALC-SCNC: 11 MMOL/L
AST SERPL-CCNC: 18 U/L
BASOPHILS # BLD AUTO: 0.02 K/UL
BASOPHILS NFR BLD: 0.3 %
BILIRUB DIRECT SERPL-MCNC: 0.3 MG/DL
BILIRUB SERPL-MCNC: 0.7 MG/DL
BUN SERPL-MCNC: 20 MG/DL
CALCIUM SERPL-MCNC: 9.8 MG/DL
CHLORIDE SERPL-SCNC: 103 MMOL/L
CO2 SERPL-SCNC: 26 MMOL/L
CREAT SERPL-MCNC: 0.8 MG/DL
DIFFERENTIAL METHOD: ABNORMAL
EOSINOPHIL # BLD AUTO: 0.2 K/UL
EOSINOPHIL NFR BLD: 2.2 %
ERYTHROCYTE [DISTWIDTH] IN BLOOD BY AUTOMATED COUNT: 13.6 %
EST. GFR  (AFRICAN AMERICAN): >60 ML/MIN/1.73 M^2
EST. GFR  (NON AFRICAN AMERICAN): >60 ML/MIN/1.73 M^2
FOLATE SERPL-MCNC: 7.9 NG/ML
GLUCOSE SERPL-MCNC: 96 MG/DL
HCT VFR BLD AUTO: 38.9 %
HGB BLD-MCNC: 12.3 G/DL
IRON SERPL-MCNC: 56 UG/DL
LYMPHOCYTES # BLD AUTO: 2 K/UL
LYMPHOCYTES NFR BLD: 27.9 %
MAGNESIUM SERPL-MCNC: 1.9 MG/DL
MCH RBC QN AUTO: 29.9 PG
MCHC RBC AUTO-ENTMCNC: 31.6 G/DL
MCV RBC AUTO: 95 FL
MONOCYTES # BLD AUTO: 0.6 K/UL
MONOCYTES NFR BLD: 7.7 %
NEUTROPHILS # BLD AUTO: 4.5 K/UL
NEUTROPHILS NFR BLD: 61.8 %
PHOSPHATE SERPL-MCNC: 2.9 MG/DL
PLATELET # BLD AUTO: 227 K/UL
PMV BLD AUTO: 10.3 FL
POTASSIUM SERPL-SCNC: 4.2 MMOL/L
PROT SERPL-MCNC: 7.5 G/DL
RBC # BLD AUTO: 4.11 M/UL
SATURATED IRON: 14 %
SODIUM SERPL-SCNC: 140 MMOL/L
T3 SERPL-MCNC: 97 NG/DL
T4 FREE SERPL-MCNC: 0.94 NG/DL
T4 SERPL-MCNC: 6.5 UG/DL
TOTAL IRON BINDING CAPACITY: 389 UG/DL
TRANSFERRIN SERPL-MCNC: 263 MG/DL
TSH SERPL DL<=0.005 MIU/L-ACNC: 1.06 UIU/ML
VIT B12 SERPL-MCNC: 349 PG/ML
WBC # BLD AUTO: 7.27 K/UL

## 2017-08-16 PROCEDURE — 82306 VITAMIN D 25 HYDROXY: CPT

## 2017-08-16 PROCEDURE — 84425 ASSAY OF VITAMIN B-1: CPT

## 2017-08-16 PROCEDURE — 85025 COMPLETE CBC W/AUTO DIFF WBC: CPT

## 2017-08-16 PROCEDURE — 3008F BODY MASS INDEX DOCD: CPT | Mod: S$GLB,,, | Performed by: PHYSICIAN ASSISTANT

## 2017-08-16 PROCEDURE — 99999 PR PBB SHADOW E&M-EST. PATIENT-LVL II: CPT | Mod: PBBFAC,,, | Performed by: DIETITIAN, REGISTERED

## 2017-08-16 PROCEDURE — 84480 ASSAY TRIIODOTHYRONINE (T3): CPT

## 2017-08-16 PROCEDURE — 84443 ASSAY THYROID STIM HORMONE: CPT

## 2017-08-16 PROCEDURE — 99213 OFFICE O/P EST LOW 20 MIN: CPT | Mod: S$GLB,,, | Performed by: PHYSICIAN ASSISTANT

## 2017-08-16 PROCEDURE — 99499 UNLISTED E&M SERVICE: CPT | Mod: S$GLB,,, | Performed by: DIETITIAN, REGISTERED

## 2017-08-16 PROCEDURE — 80048 BASIC METABOLIC PNL TOTAL CA: CPT

## 2017-08-16 PROCEDURE — 84100 ASSAY OF PHOSPHORUS: CPT

## 2017-08-16 PROCEDURE — 36415 COLL VENOUS BLD VENIPUNCTURE: CPT

## 2017-08-16 PROCEDURE — 99999 PR PBB SHADOW E&M-EST. PATIENT-LVL III: CPT | Mod: PBBFAC,,, | Performed by: PHYSICIAN ASSISTANT

## 2017-08-16 PROCEDURE — 83540 ASSAY OF IRON: CPT

## 2017-08-16 PROCEDURE — 84436 ASSAY OF TOTAL THYROXINE: CPT

## 2017-08-16 PROCEDURE — 83036 HEMOGLOBIN GLYCOSYLATED A1C: CPT

## 2017-08-16 PROCEDURE — 80076 HEPATIC FUNCTION PANEL: CPT

## 2017-08-16 PROCEDURE — 82607 VITAMIN B-12: CPT

## 2017-08-16 PROCEDURE — 84439 ASSAY OF FREE THYROXINE: CPT

## 2017-08-16 PROCEDURE — 82746 ASSAY OF FOLIC ACID SERUM: CPT

## 2017-08-16 PROCEDURE — 86677 HELICOBACTER PYLORI ANTIBODY: CPT

## 2017-08-16 PROCEDURE — 97803 MED NUTRITION INDIV SUBSEQ: CPT | Mod: S$GLB,,, | Performed by: DIETITIAN, REGISTERED

## 2017-08-16 PROCEDURE — 83735 ASSAY OF MAGNESIUM: CPT

## 2017-08-16 RX ORDER — OMEPRAZOLE 40 MG/1
40 CAPSULE, DELAYED RELEASE ORAL DAILY
Qty: 90 CAPSULE | Refills: 3 | Status: SHIPPED | OUTPATIENT
Start: 2017-08-16 | End: 2017-12-01 | Stop reason: SDUPTHER

## 2017-08-16 NOTE — PROGRESS NOTES
NUTRITION NOTE     Referring Physician: Andrew Holloway M.D.   Reason for MNT Referral: 6 months Medically Supervised Diet pending Gastric Sleeve     PAST MEDICAL HISTORY:     Patient presents for 3rd visit for MSD with (-7 lbs) weight loss over the past month; total weight loss by making numerous dietary and lifestyle changes. Patient has made great efforts to adopt bariatric meal plan but did admit to having days that were really hard and stressful. Patient has food logs completed and documented how she was feeling that day to see if there was a relationship between that and her eating. Patient's mood is much more stable at this visit when we discussed her current progress, weight loss and relationship with starchy CHO foods. Patient reports that her biggest struggle has been eliminating starchy CHO foods, diet still includes things like cookies, rice, bread. Ok'd patient to have at least one serving of whole grain items to make transition to elimination successful. Patient states she still struggles with craving pasta and bread. Encouraged patient that she is making good progress. We discussed meal ideas and alternatives for bread and pasta.           Past Medical History:   Diagnosis Date    Allergy      Anxiety      Arthritis      Chronic kidney disease      Depression      GERD (gastroesophageal reflux disease)      History of kidney stones      Kidney stones      Migraines      Obesity           CLINICAL DATA:  61 y.o. female.     There were no vitals filed for this visit.     Current Weight: 324 lbs  Weight Change Since Initial Visit: -10 lbs  Ideal Body Weight: 156 lbs  BMI: 47.51     CURRENT DIET:  Regular diet.  Diet Recall: Food records are present.  Brk (8 am): 2 eggs and grapes  Sindy (12:30 pm): salad with grilled chicken, black beans, carrots, corn, eggs, pumpkin seeds with avocado dressing  Chocolate chip cookie  Snk( 3:30 pm): PB, coffee with creamer  Di (5:30 pm): Zucchini strips,  olive oil, onions, bell pepper, parm cheese, mir bits  Snk: protein bar     Diet Includes: 3 meals/day+snacks (lean protein, vegetables, fruit, donuts, pizza, ice cream, salads, protein drinks/protein bars)   Meal Pattern: Improved.  Protein Supplements: 1-2 per day. (Pure Protein, Think Thin, Powercrunch)   Snacking: Adequate. Snacks include healthy choices.     Vegetables: Likes a variety. Eats daily.  Fruits: Likes a variety. Eats almost daily.  Beverages: water, diet soda, coffee with sugar and coffee without sugar  Dining Out: Dining out: Weekly. Mostly restaurants.  Cooking at home: Daily. Weekly. Mostly baked and grilled meat, fish and vegetables.     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  Smoking: None.     ASSESSMENT:  Patient demonstrates some willingness to change lifestyle habits as evidenced by weight loss, daily food logs, dietary changes, including protein drinks, increased fruits, increased vegetables, reduced dining out, better choices when dining out, more food preparation at steph, healthier cooking at home, bringing snacks to work, healthier snacking at work and healthier snacking at home.     Doing fairly well with working on greatest challenges (dining out frequency, sweets, starchy CHO, portion control, emotional eating and staying consistent with diet).     Adequate calorie intake.  Adequate protein intake.     PLAN:     Diet: Maintain diet plan.     Exercise: Increase.     Behavior Modification: Continue to document food & activity logs daily.     Weight loss prior to surgery (5-10% TBW): 16-33 lbs     Return to clinic in one month.     SESSION TIME:  30 minutes

## 2017-08-16 NOTE — PROGRESS NOTES
BARIATRIC NEW PATIENT FOLLOW UP EVALUATION    CHIEF COMPLAINT:   Morbid obesity, Body mass index is 47.96 kg/m². and inability to lose weight.    HPI:  Soha Wheatley is a 61 y.o. female presenting for morbid obesity, Body mass index is 47.96 kg/m². and inability to lose weight. She has 6 MSDs for insurance and today she is on her 3rd MSD.  I had some concerns initially for her due to Anxiety and Depression.  Her mood is greatly improved and she is doing very well on Zoloft.  I want her to start crushing her Zoloft for 1 month.  She has lost 13 lbs and is slowly making changes.  She would like surgery in January and I think this is a realistic timeline for her.  She will get labs today and she will call to get her psych clearance.  I think she is going to be a good candidate as long as she continues to make changes.  She wants a Sleeve Gastrectomy with Dr. Holloway.    MEDICATIONS:  Medications have been reviewed.    ALLERGIES:  Allergies have been reviewed.    Review of Systems   Constitutional: Negative for chills, fever and malaise/fatigue.   Eyes: Negative for blurred vision and double vision.   Respiratory: Negative for cough, hemoptysis and shortness of breath.    Cardiovascular: Negative for chest pain, palpitations and leg swelling.   Gastrointestinal: Positive for abdominal pain (epigastric), diarrhea (s/p lap puneet) and heartburn (symptoms despite PPI daily). Negative for blood in stool, constipation, melena, nausea and vomiting.   Genitourinary: Negative for dysuria and hematuria.   Musculoskeletal: Positive for back pain (mid-lower), joint pain (bilateral) and neck pain. Negative for falls and myalgias.   Skin: Negative for rash.   Neurological: Negative for dizziness, tingling, weakness and headaches.   Endo/Heme/Allergies: Negative for environmental allergies. Does not bruise/bleed easily.   Psychiatric/Behavioral: Positive for depression. Negative for hallucinations, memory loss, substance abuse  and suicidal ideas. The patient is nervous/anxious. The patient does not have insomnia.        Vitals:    08/16/17 1315   BP: 128/76   Pulse: 60       Physical Exam   Constitutional: She is oriented to person, place, and time and well-developed, well-nourished, and in no distress.   Morbidly obese   HENT:   Head: Normocephalic and atraumatic.   Eyes: No scleral icterus.   Cardiovascular: Normal rate, regular rhythm, normal heart sounds and intact distal pulses.  Exam reveals no gallop and no friction rub.    No murmur heard.  Pulmonary/Chest: Effort normal and breath sounds normal. No respiratory distress. She has no wheezes. She has no rales. She exhibits no tenderness.   Abdominal: Soft. Bowel sounds are normal. She exhibits no distension and no mass. There is no tenderness. There is no rebound and no guarding.   Musculoskeletal: She exhibits no edema.   Neurological: She is alert and oriented to person, place, and time.   Skin: Skin is warm and dry. No rash noted. No erythema. No pallor.   Psychiatric: Memory and judgment normal.   Extremely anxious, overwhelmed and depressed   Nursing note and vitals reviewed.      DIAGNOSIS:  1. Morbid Obesity, Body mass index is 47.96 kg/m². and inability to lose weight.  2. Co-morbidities: depression, osteoarthritis and Anxiety    PLAN:  The patient is not a good candidate for Bariatric Surgery at this point.  I think if she gets the anxiety under control and treated, then she may be a good candidate.  She has 6 months of MSDs per insurance.  We should start the MSDs. The patient is interested in sleeve gastrectomy. The surgery and post-op care was discussed in detail with the patient. All questions were answered.    The patient understands that bariatric surgery is a tool to aid in weight loss and that they need to be committed to the diet and exercise post-operatively for successful weight loss. Discussed with patient that bariatric surgery is not the easy way out and that  it will take plenty of dedication on the patient's part to be successful. Also discussed the possibility of weight regain if the patient strays from the diet guidelines or exercise requirements. Patient verbalized understanding and wishes to proceed with the work-up.    Estimated Goal weight is 246 lbs, approximately 50% of their excess weight.      **I certify that I have personally reviewed the patient's blue intake packet with the patient.  All information has been added into epic.        ORDERS:  1. Chest X-Ray, EKG and EGD  2. Psychological Consult, Bariatric Dietician Consult and PCP Clearance  3. Bariatric Labs: BMP, CBC, Folate Serum, H. pylori, HgA1C, Hepatic Panel/LFT, Iron & TIBC, Lipid Profile, Magnesium, Phosphate, T3, T4, TSH, Free T4, Vitamin B12, and Vitamin B1.  4. Crush Zoloft for 1 month under supervision of psychiatrist      15 minute visit, over 50% of time spent counseling patient face to face on diet, exercise, and weight loss.

## 2017-08-16 NOTE — PATIENT INSTRUCTIONS
Focus Goals:  -You're doing a great job so far!  -Protein goal  gm/day  -Goal 4-6 small meals/day  -Continue with protein drinks/bars  -Try different meal ideas   -Continue to work eliminating starchy CHO foods  -Continue to work on eliminating cookies and sweets-try alternatives.    [email or web address]    1200- 1500 calorie Sample meal plan  80-120g protein per day.   Protein drinks and bars: 0-4 grams sugar  Drink lots of water throughout the day and exercise!  MENU # 1  Breakfast: 2 eggs, 1 turkey sausage safia, 1 apple  Snack: Atkins bar  Lunch: 2 roll-ups (sliced turkey, low-fat sliced cheese, mustard), 12 baby carrots dipped in 1 Tbsp natural peanut butter  Mid-Day Snack: ¼ cup unsalted almonds, ½ cup fruit  Dinner: 1 chicken thigh simmered in tomato sauce + 2 Tbsp mozzarella cheese, ½ cup black beans, 1/2 cup steamed carrots  Evening Snack: 1/2 cup grapes with 4 cubes low-fat cheddar cheese   ___________________________________________________  MENU # 2  Breakfast: 200 calorie protein drink  Mid-morning snack : ¼ cup unsalted nuts, medium banana  Lunch: 3oz tuna or chicken salad made with 2 tbsp light lozano, over salad with tomatoes and cucumbers.   Snack: low-fat cheese stick  Dinner: 3oz hamburger safia, slice of low-fat cheese, 1 cup boiled yellow squash and zucchini.   Snack: 6oz light yogurt  _______________________________________________________  Menu #3  Breakfast: 6oz plain Greek yogurt + fruit (½ banana, ½ cup fruit - pineapple, blueberries, strawberries, peach), may add Splenda to quirino.  Lunch: Grilled  chicken breast w/ slice low-fat pepper tabby cheese, 1/2 cup grilled/baked asparagus and small salad with Salad Spritzer.    Mid-Day snack: 200 calorie protein drink   Dinner: 4oz Grilled fish, ½ cup white beans, ½ cup cooked spinach  Evening Snack: fudgsicle no-sugar-added    Menu # 4  Breakfast: 1 scoop vanilla protein powder + 4oz skim milk + 4oz coffee   Snack: Pure  protein bar  Lunch: 2 Lettuce tacos: 3oz seasoned ground turkey wrapped in a Adeel lettuce leaves with 1 Tbsp shredded cheese and dollop low-fat sour cream  Snack: ½ cup cottage cheese, ½ cup pineapple chunks  Dinner: Shrimp omelet: 2 eggs, ½ cup shrimp, green onions, and shredded cheese  ______________________________________________________  Menu #5  Breakfast: 1 cup low-fat cottage cheese, ½ cup peaches (no sugar added)  Snack: 1 apple, 4 cubes cheese  Lunch: 3oz baked pork chop, 1 cup okra and tomato stew  Snack: 1/2 cup black beans + salsa + dollop light sour cream  Dinner: Caprese chicken salad: 3 oz chicken breast, 1oz fresh mozzarella, sliced tomato, 1 Tbsp olive oil, basil  Snack: sugar-free popsicle    Menu #6  Breakfast: ½ cup part-skim ricotta cheese, 2 Tbsp sugar-free strawberry preserves, sprinkle of slivered almonds  Snack: 1 orange  Lunch: 3 oz canned chicken, 1oz shredded cheddar cheese, ½  cup black beans, 2 Tbsp salsa  Snack: 200 calorie Protein drink  Dinner: 4 oz grilled salmon steak, over mixed green salad with 2 tbsp light dressing    Meal Ideas for Regular Bariatric Diet  *Recipes and products available at [email or web address].com      Breakfast: (15-20g protein)    - Egg white omelet: 2 egg whites or ½ cup Egg Beaters. (Optional proteins: cheese, shrimp, black beans, chicken, sliced turkey) (Optional veggies: tomatoes, salsa, spinach, mushrooms, onions, green peppers, or small slice avocado)     - Egg and sausage: 1 egg or ¼ cup Egg Beaters (any variety), with 1 safia or 2 links of Turkey sausage or Veggie breakfast sausage ([employer]ms or Boca)    - Crust-less breakfast quiche: To make a glass pie dish, mix 4oz part skim Ricotta, 1 cup skim milk, and 2 eggs as your base. Add protein: shredded cheese, sliced lean ham or turkey, turkey mir/sausage. Add veggies: tomato, onion, green onion, mushroom, green pepper, spinach, etc.    - Yogurt parfait: Mix 1 - 6oz container Nikunj Linares N  Fit vanilla yogurt, with ¼ cup crushed unsalted nuts    - Cottage cheese and fruit: ½ cup part-skim cottage cheese or ricotta cheese topped with fresh fruit or sugar free preserves     - Yamilka Bagley's Vanilla Egg custard* (add 2 Tbsp instant coffee granules to make Cappuccino Custard*)    - Hi-Protein café latte (skim milk, decaf coffee, 1 scoop protein powder). Optional to add Sugar free syrup or extract flavoring.    - Breakfast Lox: spread fat free cream cheese on slices of smoked salmon. Serve over scrambled or egg over easy (sauteed with nonstick cookspray) OR on a cucumber slice    - Eggwhich: Scramble or cook 1 large egg over easy using nonstick cookspray. Place between 2 slices of [place] mir and low fat cheese.     Lunch: (20-30g protein)    - ½ cup Black bean soup (Homemade or Progresso), with ¼ cup shredded low-fat cheese. Top with chopped tomato or fresh salsa.     - Lean deli turkey breast and low-fat sliced cheese, mustard or light lozano to moisten, rolled up together, or wrapped in a Adeel lettuce leaf    - Chicken salad made from dinner leftovers, moisten with low-fat salad dressing or light lozano. Also try leftover salmon, shrimp, tuna or boiled eggs. Serve ½ cup over dark green salad    - Fat-free canned refried beans, topped with ¼ cup shredded low-fat cheese. Top with chopped tomato or fresh salsa.     - Greek salad: Top mixed greens with 1-2oz grilled chicken, tomatoes, red onions, 2-3 kalamata olives, and sprinkle lightly with feta cheese. Spritz with Balsamic vinegar to taste.     - Crust-less lunch quiche: To make a glass pie dish, mix 4oz part skim Ricotta, 1 cup skim milk, and 2 eggs as your base. Add protein: shredded cheese, sliced lean ham or turkey, shrimp, chicken. Add veggies: tomato, onion, green onion, mushroom, green pepper, spinach, artichoke, broccoli, etc.    - Pizza bake: spread a  carlos kanika mushroom with tomato sauce, low-fat shredded mozzarella and turkey pepperoni or  [place] mir. Add any veggies. Roast for 10-15 minutes, until cheese melted.     - Cucumber crab bites: Spread ¼ cup crab dip (lump crabmeat + light cream cheese and green onions) over sliced cucumber.     - Chicken with light spinach and artichoke dip*: Puree in : 6oz cooked and drained spinach, 2 cloves garlic, 1 can cannelloni beans, ½ cup chopped green onions, 1 can drained artichoke hearts (not marinated in oil), lemon juice and basil. Mix in 2oz chopped up chicken.    Supper: (20-30g protein)    - Serve grilled fish over dark green salad tossed with low-fat dressing, served with grilled asparagus peterson     - Rotisserie chicken salad: served with sliced strawberries, walnuts, fat-free feta cheese crumbles and 1 tbsp Changs Own Light Raspberry East Sandwich Vinaigrette    - Shrimp cocktail: Dip cold boiled shrimp in homemade low-sugar cocktail sauce (1/2 cup Wolf One Carb ketchup, 2 tbsp horseradish, 1/4 tsp hot sauce, 1 tsp Worcestershire sauce, 1 tbsp freshly-squeezed lemon juice). Serve with dark green salad, walnuts, and crumbled blue cheese drizzled with olive oil and Balsamic vinegar    - Tuna Melt: Spread tuna salad onto 2 thick slices of tomato. Top with low-fat cheese and broil until cheese is melted. May also be made with chicken salad of shrimp salad. [place] with different types of cheeses.    - Chicken or beef fajitas (no tortilla, rice, beans, chips). Top meat and veggies w/ fresh salsa, fat free sour cream.     - Homemade low-fat Chili using extra lean ground beef or ground turkey. Top with shredded cheese and salsa as desired. May add dollop fat-free sour cream if desired    - Chicken parmesan: Top chicken breast w/ low sugar marinara sauce, mozzarella cheese and bake until chicken reaches 165*.  Serve w/ spaghetti SQUASH or Wolof cut green beans    - Dinner Omelet with shrimp or chicken and onion, green peppers and chives.    - No hipolito castillo: Use sliced zucchini or eggplant  in place of noodles.  Layer with part skim ricotta cheese and low sugar meat sauce (use very lean ground beef or ground turkey).    - Mexican chicken bake: Bake chunks of chicken breast or thigh with taco seasoning, Pace brand enchilada sauce, green onions and low-fat cheese. Serve with ¼ cup black beans or fat free refried beans topped with chopped tomatoes or salsa.    - Maurice frozen meatballs, simmered in Classico Marinara sauce. Different flavors of salsa or spaghetti sauce create different dishes! Sprinkle with parmesan cheese. Serve with grilled or steamed veggies, or a dark green salad.    - Simmer boneless skinless chicken thigh chunks in Classico Marinara sauce or roasted salsa until tender with chopped onion, bell pepper, garlic, mushrooms, spinach, etc.     - Hamburger or veggie burger, without the bun, dressed the way you like. Served with grilled or steamed veggies.    - Eggplant parmesan: Bake slices of eggplant at 350 degrees for 15 minutes. Layer tomato sauce, sliced eggplant and low-fat mozzarella cheese in a baking dish and cover with foil. Bake 30-40 more minutes or until bubbly. Uncover and bake at 400 degrees for about 15 more minutes, or until top is slightly crisp.    - Fish tacos: grilled/baked white fish, wrapped in Adeel lettuce leaf, topped with salsa, shredded low-fat cheese, and light coleslaw.    - Chicken wanda: Sprinkle chicken w/ 1 tsp of hidden valley ranch dip mix. Then grill chicken and top with black beans, salsa and 1 tsp fat free sour cream.     - Cauliflower pizza crust: Use cauliflower as crust (see recipe on ariane, no flour!). Top w/ low fat cheese, turkey pepperoni and veggies and bake again    - chicken or turkey crust pizza: use ground chicken or turkey instead of cauliflower, spread in [place] and bake at 350 for about 20-30 minutes(may want to add garlic, black pepper, oregano and other herbs to ground meat mixture).  Remove and top w/ low fat cheese, turkey  pepperoni and veggies and bake again for another 10 minutes or until cheese is browned.     Snacks: (100-200 calories; >5g protein)    - 1 low-fat cheese stick with 8 cherry tomatoes or 1 serving fresh fruit  - 4 thin slices fat-free turkey breast and 1 slice low-fat cheese  - 4 thin slices fat-free honey ham with wedge of melon  - 6-8 edamame pods (equivalent to about 1/4 cup edamame without pods).   - 1/4 cup unsalted nuts with ½ cup fruit  - 6-oz container Dannon Light n Fit vanilla yogurt, topped with 1oz unsalted nuts         - apple, celery or baby carrots spread with 2 Tbsp PB2  - apple slices with 1 oz slice low-fat cheese  - Apple slices dipped in 2 Tbsp of PB2  - celery, cucumber, bell pepper or baby carrots dipped in ¼ cup hummus bean spread or light spinach and artichoke dip (*recipe in lunch section)  - celery, cucumber, baby carrots dipped in high protein greek yogurt (Mix 16 oz plain greek yogurt + 1 packet of hidden SomaLogic ranch dip mix)  - Delmar Links Beef Steak - 14g protein! (similar to beef jerky)  - 2 wedges [place] - Light Herb & Garlic Cheese with sliced cucumber or green bell pepper  - 1/2 cup low-fat cottage cheese with ¼ cup fruit or ¼ cup salsa  - RTD Protein drinks: Atkins, Low Carb Slim Fast, EAS light, Muscle Milk Light, etc.  - Homemade Protein drinks: [facility] Soy95, Isopure, Nectar, UNJURY, Whey Gourmet, etc. Mix 1 scoop powder with 8oz skim/1% milk or light soymilk.  - Protein bars: Atkins, EAS, Pure Protein, Think Thin, Detour, etc. Must have 0-4 grams sugar - Read the label.    Takeout Options: No more than twice/week  Deli - Salads (no pasta or rice), meats, cheeses. Roasted chicken. Lox (salmon)    Mexican - Platters which don't include tortillas, chips, or rice. Go easy on the beans. Example: Fajitas without the tortillas. Ask the  not to bring chips to the table if they are too tempting.    Greek - Meat or fish and vegetable, but no bread or rice. Including baba randell  ganoush, etc, is OK. Most sit-down Greek restaurants can provide you with cucumber slices for dipping instead of alberto bread.    Fast Food (Avoid as much as possible) - Salads (no croutons and limit salad dressing to 2 tbsp), grilled chicken sandwich without the bun and ask for no lozano. Mary Ellens low fat chili or Taco Bell pintos and cheese.    BBQ - The meats are fine if you ask for sauces on the side, but most of the traditional side dishes are loaded with carbs. Víctor slaw, baked beans and BBQ sauce are typically made with sugar.    Chinese - Nothing deep-fried, no rice or noodles. Many Chinese sauces have starch and sugar in them, so you'll have to use your judgement. If you find that these sauces trigger cravings, or cause Dumping, you can ask for the sauce to be made without sugar or just use soy sauce.

## 2017-08-16 NOTE — PATIENT INSTRUCTIONS
-You have been referred to psychological testing because you are considering an elective surgery that does not involve an emergency.  This testing helps us ensure patient safety and effective outcomes.  On your psychological testing day, you will be working on multiple choice or true/false questionnaires that require one to two hours of your time.  Please bring reading glasses or someone to help you read the items if needed.  Feel free to bring water or any medications you may need.  After this testing day, you will return on another day to meet with a clinical psychologist for an interview and to review your results, which will take approximately one hour.  If you have any questions about this process, please call the Department of Psychiatry at [phone].  Thank you for your cooperation!    - Start Crushing Zoloft only for 1 month starting now under supervision of psychiatrist.    - Labs today      From when you started the program, you have lost 13 lbs.  This is great!      - Amazon.com has Dora Protein Sample Pack.  They have coffee flavors without caffeine.          [employer]Sift  Has cauliflower sandwich thins        PILLOWY LIGHT CLOUD BREAD    Author: Mary corey Eating Well Living Thin.  Prep time:  15 mins Cook time:  30 mins Total time:  45 mins  Yield: 9     INGREDIENTS  3 large eggs,   3 tablespoons fat free cream cheese, room temperature  ¼ teaspoon cream of tartar  1 teaspoon artificial sweetener    INSTRUCTIONS  Preheat oven to 300 degrees Fahrenheit. Line a baking sheet with parchment paper.  Mix together egg yolks, cream cheese and sweetener in a small bowl. Set aside.  Using an electric mixer, whisk egg whites and cream of tartar on high speed until stiff peaks are formed, about 5-6 minutes.  Gently fold in cream cheese mixture. Try not to deflate the egg whites.  Scoop batter onto prepared baking sheet, into even rounds, about the size of a hamburger bun.  Bake for 30  minutes, or until martinez brown.  Transfer bread to wire rack and let cool.    NOTES  Store in an airtight container.

## 2017-08-17 LAB
ESTIMATED AVG GLUCOSE: 100 MG/DL
HBA1C MFR BLD HPLC: 5.1 %

## 2017-08-19 LAB — VIT B1 SERPL-MCNC: 45 UG/L (ref 38–122)

## 2017-08-20 ENCOUNTER — DOCUMENTATION ONLY (OUTPATIENT)
Dept: BARIATRICS | Facility: CLINIC | Age: 62
End: 2017-08-20

## 2017-08-21 LAB — H PYLORI IGG SERPL QL IA: POSITIVE

## 2017-08-23 ENCOUNTER — TELEPHONE (OUTPATIENT)
Dept: BARIATRICS | Facility: CLINIC | Age: 62
End: 2017-08-23

## 2017-08-23 RX ORDER — AMOXICILLIN 500 MG/1
1000 CAPSULE ORAL EVERY 12 HOURS
Qty: 56 CAPSULE | Refills: 0 | Status: SHIPPED | OUTPATIENT
Start: 2017-08-23 | End: 2017-09-06

## 2017-08-23 RX ORDER — CLARITHROMYCIN 500 MG/1
500 TABLET, FILM COATED ORAL 2 TIMES DAILY
Qty: 28 TABLET | Refills: 0 | Status: SHIPPED | OUTPATIENT
Start: 2017-08-23 | End: 2017-09-06

## 2017-08-23 NOTE — TELEPHONE ENCOUNTER
Called patient she stated she gets very confused when taking Flagyl.  She stated she does ok with amoxicillin and biaxin and is currently on omeprazole 40 daily.  Informed her to increase omeprazole to twice daily ( she has a 3 month supply) and take along with antibiotics that will be sent to [facility].  Patient verbalized understanding and will also start the OTC vitamin D 2000 IU daily.

## 2017-08-23 NOTE — TELEPHONE ENCOUNTER
----- Message from Pj Huang sent at 8/23/2017 12:43 PM CDT -----  [phone]//pt states that she needs to speak with nurse in ref to her rx that was to be prescribed to her//please call/thank you

## 2017-08-24 ENCOUNTER — TELEPHONE (OUTPATIENT)
Dept: BARIATRICS | Facility: CLINIC | Age: 62
End: 2017-08-24

## 2017-08-24 NOTE — TELEPHONE ENCOUNTER
----- Message from Aubrie Thomas sent at 8/24/2017  2:56 PM CDT -----  Contact: Pt can be reached at  [phone]  Pt missed a call and would like the nurse to return their call.        Thank you!

## 2017-08-24 NOTE — TELEPHONE ENCOUNTER
Returned patient call.  She stated that she did not know why she was having to have the EGD and did not want it.  Notified her that it was ordered at the initial eval because of her symptoms and history or abd pain and heartburn/reflux despite being on PPI.   She stated that the pain is improving and that she still takes medications sometimes but is no longer on Gavascon.  Also notified patient that her H.pylori did come back positive and that she is more prone to developing ulcers.  Notified patient that the EGD would evaluate the HH that was present on last EGD in 2013 and assess the esophagus/stomach for irritation or ulceration.  Patient still not agreeing to the EGD and requested callback from Swathi WRIGHT.  Message forwarded to Swathi.

## 2017-08-28 ENCOUNTER — PATIENT MESSAGE (OUTPATIENT)
Dept: BARIATRICS | Facility: CLINIC | Age: 62
End: 2017-08-28

## 2017-08-29 NOTE — TELEPHONE ENCOUNTER
I called pt back she is scared of being put to sleep and we discussed why procedure is needed and she then decided to call and schedule

## 2017-09-05 ENCOUNTER — PATIENT MESSAGE (OUTPATIENT)
Dept: SPINE | Facility: CLINIC | Age: 62
End: 2017-09-05

## 2017-09-05 ENCOUNTER — PATIENT MESSAGE (OUTPATIENT)
Dept: BARIATRICS | Facility: CLINIC | Age: 62
End: 2017-09-05

## 2017-09-05 ENCOUNTER — OFFICE VISIT (OUTPATIENT)
Dept: INTERNAL MEDICINE | Facility: CLINIC | Age: 62
End: 2017-09-05
Payer: COMMERCIAL

## 2017-09-05 VITALS
HEIGHT: 70 IN | WEIGHT: 293 LBS | SYSTOLIC BLOOD PRESSURE: 134 MMHG | DIASTOLIC BLOOD PRESSURE: 72 MMHG | HEART RATE: 76 BPM | BODY MASS INDEX: 41.95 KG/M2

## 2017-09-05 DIAGNOSIS — M54.12 BRACHIAL NEURITIS OR RADICULITIS: ICD-10-CM

## 2017-09-05 DIAGNOSIS — B37.9 YEAST INFECTION: Primary | ICD-10-CM

## 2017-09-05 DIAGNOSIS — R10.9 ACUTE RIGHT FLANK PAIN: Primary | ICD-10-CM

## 2017-09-05 DIAGNOSIS — M47.812 CERVICAL SPONDYLOSIS WITHOUT MYELOPATHY: ICD-10-CM

## 2017-09-05 DIAGNOSIS — M50.30 DEGENERATION OF CERVICAL INTERVERTEBRAL DISC: ICD-10-CM

## 2017-09-05 DIAGNOSIS — M54.2 CERVICALGIA: ICD-10-CM

## 2017-09-05 LAB
BILIRUB UR QL STRIP: NEGATIVE
CLARITY UR REFRACT.AUTO: CLEAR
COLOR UR AUTO: COLORLESS
GLUCOSE UR QL STRIP: NEGATIVE
HGB UR QL STRIP: NEGATIVE
KETONES UR QL STRIP: NEGATIVE
LEUKOCYTE ESTERASE UR QL STRIP: NEGATIVE
NITRITE UR QL STRIP: NEGATIVE
PH UR STRIP: 5 [PH] (ref 5–8)
PROT UR QL STRIP: NEGATIVE
SP GR UR STRIP: 1 (ref 1–1.03)
URN SPEC COLLECT METH UR: ABNORMAL
UROBILINOGEN UR STRIP-ACNC: NEGATIVE EU/DL

## 2017-09-05 PROCEDURE — 87086 URINE CULTURE/COLONY COUNT: CPT

## 2017-09-05 PROCEDURE — 99213 OFFICE O/P EST LOW 20 MIN: CPT | Mod: S$GLB,,, | Performed by: INTERNAL MEDICINE

## 2017-09-05 PROCEDURE — 99999 PR PBB SHADOW E&M-EST. PATIENT-LVL III: CPT | Mod: PBBFAC,,, | Performed by: INTERNAL MEDICINE

## 2017-09-05 PROCEDURE — 3008F BODY MASS INDEX DOCD: CPT | Mod: S$GLB,,, | Performed by: INTERNAL MEDICINE

## 2017-09-05 PROCEDURE — 81003 URINALYSIS AUTO W/O SCOPE: CPT

## 2017-09-05 RX ORDER — TIZANIDINE 4 MG/1
4 TABLET ORAL EVERY 8 HOURS PRN
Qty: 270 TABLET | Refills: 0 | Status: SHIPPED | OUTPATIENT
Start: 2017-09-05 | End: 2018-08-10 | Stop reason: SDUPTHER

## 2017-09-05 RX ORDER — FLUCONAZOLE 150 MG/1
150 TABLET ORAL DAILY
Qty: 1 TABLET | Refills: 0 | Status: SHIPPED | OUTPATIENT
Start: 2017-09-05 | End: 2017-10-11 | Stop reason: SDUPTHER

## 2017-09-06 LAB — BACTERIA UR CULT: NO GROWTH

## 2017-09-07 NOTE — PROGRESS NOTES
Subjective:       Patient ID: Soha Wheatley is a 61 y.o. female.    Chief Complaint: Hip Pain (right side, 3 days)  Urgent visit  For the past 4 days she's been having pain at waist level on the right side, roughly in the right flank area.  A year ago she had a kidney stone  She is having a very special family event on Sunday and wanted to get checked.  HPI  Review of Systems   Constitutional: Negative.  Negative for activity change, appetite change, chills, fatigue, fever and unexpected weight change.   HENT: Negative for hearing loss and tinnitus.    Eyes: Negative for visual disturbance.   Respiratory: Negative for cough, shortness of breath and wheezing.    Cardiovascular: Negative.  Negative for chest pain, palpitations and leg swelling.   Gastrointestinal: Negative for abdominal pain, blood in stool, constipation, diarrhea and nausea.   Genitourinary: Negative for dysuria, frequency and urgency.   Musculoskeletal: Negative for back pain and neck stiffness.   Skin: Negative for rash.   Neurological: Negative for headaches.   Psychiatric/Behavioral: Negative for dysphoric mood and sleep disturbance. The patient is not nervous/anxious.        Objective:      Physical Exam   Constitutional: She appears well-nourished.   HENT:   Head: Atraumatic.   Eyes: Conjunctivae are normal. No scleral icterus.   Neck: Neck supple.   Cardiovascular: Normal rate and regular rhythm.    Pulmonary/Chest: Effort normal and breath sounds normal.   Abdominal: Soft. There is no tenderness.   Musculoskeletal: She exhibits no edema.   Localized area of muscle spasm in the back where the patient is experiencing pain at the costophrenic angle, roughly.   Lymphadenopathy:     She has no cervical adenopathy.   Neurological: She is alert.   Skin: Skin is warm and dry.   Psychiatric: She has a normal mood and affect. Her behavior is normal.       Assessment:       1. Acute right flank pain        Plan:   Soha was seen today for hip  pain.    Diagnoses and all orders for this visit:    Acute right flank pain.  The patient is reassured there is no evidence of infection on dip but will check more thoroughly because of her apprehensions.  She will try application of alternating heat and ice, massage, Tylenol and remain active.  -     Urinalysis  -     Urine culture

## 2017-09-13 ENCOUNTER — CLINICAL SUPPORT (OUTPATIENT)
Dept: BARIATRICS | Facility: CLINIC | Age: 62
End: 2017-09-13
Payer: COMMERCIAL

## 2017-09-13 VITALS — WEIGHT: 293 LBS | BODY MASS INDEX: 45.8 KG/M2

## 2017-09-13 DIAGNOSIS — E66.01 MORBID OBESITY WITH BMI OF 45.0-49.9, ADULT: ICD-10-CM

## 2017-09-13 DIAGNOSIS — E66.01 MORBID OBESITY DUE TO EXCESS CALORIES: ICD-10-CM

## 2017-09-13 PROCEDURE — 97803 MED NUTRITION INDIV SUBSEQ: CPT | Mod: S$GLB,,, | Performed by: DIETITIAN, REGISTERED

## 2017-09-13 PROCEDURE — 99499 UNLISTED E&M SERVICE: CPT | Mod: S$GLB,,, | Performed by: DIETITIAN, REGISTERED

## 2017-09-13 PROCEDURE — 99999 PR PBB SHADOW E&M-EST. PATIENT-LVL II: CPT | Mod: PBBFAC,,, | Performed by: DIETITIAN, REGISTERED

## 2017-09-13 NOTE — PROGRESS NOTES
NUTRITION NOTE     Referring Physician: Andrew Holloway M.D.   Reason for MNT Referral: 6 months Medically Supervised Diet pending Gastric Sleeve     PAST MEDICAL HISTORY:     Patient presents for 4th visit for MSD with (-5 lbs) weight loss over the past month; total weight loss by making numerous dietary and lifestyle changes. Patient has made great efforts to adopt bariatric meal plan but did admit to having days that were really hard and stressful. Patient has food logs completed and documented how she was feeling that day to see if there was a relationship between that and her eating. Patient's mood is much more stable at this visit when we discussed her current progress, weight loss and relationship with starchy CHO foods. Patient reports that her biggest struggle has been eliminating starchy CHO foods, diet still includes things like cookies, rice, bread, cake. Ok'd patient to have at least one serving of whole grain items to make transition to elimination successful. Encouraged patient that she is making good progress. Patient doing very well overall.            Past Medical History:   Diagnosis Date    Allergy      Anxiety      Arthritis      Chronic kidney disease      Depression      GERD (gastroesophageal reflux disease)      History of kidney stones      Kidney stones      Migraines      Obesity           CLINICAL DATA:  61 y.o. female.     There were no vitals filed for this visit.     Current Weight: 319 lbs  Weight Change Since Initial Visit: -15 lbs  Ideal Body Weight: 156 lbs  BMI: 45.8     CURRENT DIET:  Regular diet.  Diet Recall: Food records are present.  Brk (7 am): 1 egg, 1 egg white, 1 slice of ham, coffee+creamer  Snk (10:30) 1/2 pear  Sindy (12:30 pm): 2 slices of ham, cheese stick  Snk( 2:30 pm): 1/2 cup ice cream  Di (6 pm): chicken strips, coleslaw, slice of bread (wheat)  Snk (8 pm): yogurt      Diet Includes: 3 meals/day+snacks (lean protein, vegetables, fruit, lean ham,  eggs, ice cream, salads, protein drinks/protein bars)   Meal Pattern: Improved.  Protein Supplements: 1-2 per day. (Pure Protein, Think Thin, Powercrunch)   Snacking: Adequate. Snacks include healthy choices.     Vegetables: Likes a variety. Eats daily.  Fruits: Likes a variety. Eats almost daily.  Beverages: water, diet soda, coffee with sugar and coffee without sugar  Dining Out: Dining out: Weekly. Mostly restaurants.  Cooking at home: Daily. Weekly. Mostly baked and grilled meat, fish and vegetables.     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  Smoking: None.     ASSESSMENT:  Patient demonstrates some willingness to change lifestyle habits as evidenced by weight loss, daily food logs, dietary changes, including protein drinks, increased fruits, increased vegetables, reduced dining out, better choices when dining out, more food preparation at steph, healthier cooking at home, bringing snacks to work, healthier snacking at work and healthier snacking at home.     Doing fairly well with working on greatest challenges (dining out frequency, sweets, starchy CHO, portion control, emotional eating and staying consistent with diet).     Adequate calorie intake.  Adequate protein intake.     PLAN:     Diet: Maintain diet plan.     Exercise: Increase.     Behavior Modification: Continue to document food & activity logs daily.     Weight loss prior to surgery (5-10% TBW): 16-33 lbs     Return to clinic in one month.    Focus Goals:  -Protein goal  gm/day  -Goal 4-6 small meals/day  -Continue with protein drinks/bars  -Try different meal ideas   -Continue to work eliminating starchy CHO foods  -Continue to work on eliminating cookies and sweets-try alternatives.      SESSION TIME:  30 minutes

## 2017-09-13 NOTE — PATIENT INSTRUCTIONS
Focus Goals:  -You're doing a great job so far!  -Protein goal  gm/day  -Goal 4-6 small meals/day  -Continue with protein drinks/bars  -Try different meal ideas   -Continue to work eliminating starchy CHO foods  -Continue to work on eliminating cookies and sweets-try alternatives.

## 2017-09-18 ENCOUNTER — ANESTHESIA EVENT (OUTPATIENT)
Dept: ENDOSCOPY | Facility: HOSPITAL | Age: 62
End: 2017-09-18
Payer: COMMERCIAL

## 2017-09-18 ENCOUNTER — SURGERY (OUTPATIENT)
Age: 62
End: 2017-09-18

## 2017-09-18 ENCOUNTER — HOSPITAL ENCOUNTER (OUTPATIENT)
Facility: HOSPITAL | Age: 62
Discharge: HOME | End: 2017-09-18
Attending: INTERNAL MEDICINE | Admitting: INTERNAL MEDICINE
Payer: COMMERCIAL

## 2017-09-18 ENCOUNTER — ANESTHESIA (OUTPATIENT)
Dept: ENDOSCOPY | Facility: HOSPITAL | Age: 62
End: 2017-09-18
Payer: COMMERCIAL

## 2017-09-18 VITALS
BODY MASS INDEX: 41.95 KG/M2 | HEART RATE: 78 BPM | HEIGHT: 70 IN | SYSTOLIC BLOOD PRESSURE: 121 MMHG | TEMPERATURE: 98 F | OXYGEN SATURATION: 100 % | DIASTOLIC BLOOD PRESSURE: 76 MMHG | RESPIRATION RATE: 18 BRPM | WEIGHT: 293 LBS

## 2017-09-18 DIAGNOSIS — K21.9 GASTROESOPHAGEAL REFLUX DISEASE, ESOPHAGITIS PRESENCE NOT SPECIFIED: Primary | ICD-10-CM

## 2017-09-18 DIAGNOSIS — K21.9 GERD (GASTROESOPHAGEAL REFLUX DISEASE): ICD-10-CM

## 2017-09-18 PROCEDURE — 37000009 HC ANESTHESIA EA ADD 15 MINS: Performed by: INTERNAL MEDICINE

## 2017-09-18 PROCEDURE — 88305 TISSUE EXAM BY PATHOLOGIST: CPT | Mod: 26,,, | Performed by: PATHOLOGY

## 2017-09-18 PROCEDURE — 37000008 HC ANESTHESIA 1ST 15 MINUTES: Performed by: INTERNAL MEDICINE

## 2017-09-18 PROCEDURE — C1773 RET DEV, INSERTABLE: HCPCS | Performed by: INTERNAL MEDICINE

## 2017-09-18 PROCEDURE — 43239 EGD BIOPSY SINGLE/MULTIPLE: CPT | Performed by: INTERNAL MEDICINE

## 2017-09-18 PROCEDURE — D9220A PRA ANESTHESIA: Mod: ANES,,, | Performed by: ANESTHESIOLOGY

## 2017-09-18 PROCEDURE — 63600175 PHARM REV CODE 636 W HCPCS: Performed by: NURSE ANESTHETIST, CERTIFIED REGISTERED

## 2017-09-18 PROCEDURE — 88305 TISSUE EXAM BY PATHOLOGIST: CPT | Performed by: PATHOLOGY

## 2017-09-18 PROCEDURE — 43239 EGD BIOPSY SINGLE/MULTIPLE: CPT | Mod: ,,, | Performed by: INTERNAL MEDICINE

## 2017-09-18 PROCEDURE — 88342 IMHCHEM/IMCYTCHM 1ST ANTB: CPT | Mod: 26,,, | Performed by: PATHOLOGY

## 2017-09-18 PROCEDURE — D9220A PRA ANESTHESIA: Mod: CRNA,,, | Performed by: NURSE ANESTHETIST, CERTIFIED REGISTERED

## 2017-09-18 PROCEDURE — 25000003 PHARM REV CODE 250: Performed by: INTERNAL MEDICINE

## 2017-09-18 PROCEDURE — 27201012 HC FORCEPS, HOT/COLD, DISP: Performed by: INTERNAL MEDICINE

## 2017-09-18 RX ORDER — LIDOCAINE HCL/PF 100 MG/5ML
SYRINGE (ML) INTRAVENOUS
Status: DISCONTINUED | OUTPATIENT
Start: 2017-09-18 | End: 2017-09-18

## 2017-09-18 RX ORDER — SODIUM CHLORIDE 9 MG/ML
INJECTION, SOLUTION INTRAVENOUS CONTINUOUS
Status: DISCONTINUED | OUTPATIENT
Start: 2017-09-19 | End: 2017-09-18 | Stop reason: HOSPADM

## 2017-09-18 RX ORDER — PROPOFOL 10 MG/ML
VIAL (ML) INTRAVENOUS
Status: DISCONTINUED | OUTPATIENT
Start: 2017-09-18 | End: 2017-09-18

## 2017-09-18 RX ADMIN — SODIUM CHLORIDE: 900 INJECTION, SOLUTION INTRAVENOUS at 11:09

## 2017-09-18 RX ADMIN — PROPOFOL 100 MG: 10 INJECTION, EMULSION INTRAVENOUS at 12:09

## 2017-09-18 RX ADMIN — PROPOFOL 20 MG: 10 INJECTION, EMULSION INTRAVENOUS at 12:09

## 2017-09-18 RX ADMIN — LIDOCAINE HYDROCHLORIDE 60 MG: 20 INJECTION, SOLUTION INTRAVENOUS at 12:09

## 2017-09-18 NOTE — H&P
Short Stay Endoscopy History and Physical    PCP - Andrew Mallory MD  Referring Physician - Yuly Echevarria PA-C  [address] [place] [address]    Procedure - EGD  ASA - per anesthesia  Mallampati - per anesthesia  History of Anesthesia problems - see anesthesia note  Family history Anesthesia problems -  see anesthesia note  Plan of anesthesia - as per anesthesia    HPI  61 y.o. female    Reason for procedure: GERD, h/o H.pylori    Abdominal/epigastric pain: No  Reflux: No   Dysphagia: No  Change in bowel habits: No  Antiacid/PPI use: yes    ROS:  Constitutional: No fevers, chills  CV: No chest pain  Pulm: No shortness of breath  GI: see HPI    Medical History:  has a past medical history of Allergy; Anxiety; Arthritis; Chronic kidney disease; Depression; GERD (gastroesophageal reflux disease); History of kidney stones; Kidney stones; Migraines; and Obesity.    Surgical History:  has a past surgical history that includes Tonsillectomy ();  section (); Knee arthrodesis (); Knee cartilage surgery; Cholecystectomy; and Ureteroscopy.    Family History: family history includes Cancer in her mother; Cancer (age of onset: 88) in her maternal grandfather; VANDANA disease in her maternal grandmother; Heart attack in her paternal grandfather; Heart disease in her maternal grandmother and maternal uncle; Hyperlipidemia in her father and maternal grandmother; Hypertension in her father; Pancreatic cancer in her maternal grandfather.. Otherwise no colon cancer, inflammatory bowel disease, or GI malignancies.    Social History:  reports that she has never smoked. She has never used smokeless tobacco. She reports that she drinks alcohol. She reports that she does not use drugs.    Review of patient's allergies indicates:   Allergen Reactions    Adhesive      Paper tape usually ok    Flagyl  [metronidazole hcl]      Other reaction(s): Unknown       Medications:   Prescriptions Prior to Admission    Medication Sig Dispense Refill Last Dose    acetaminophen (TYLENOL ARTHRITIS) 650 MG TbSR Take 650 mg by mouth 2 (two) times daily.    9/17/2017 at Unknown time    cinnamon bark-chromium picolin 500-100 mg-mcg Cap Take by mouth.   9/17/2017 at Unknown time    gabapentin (NEURONTIN) 600 MG tablet TAKE 1 TABLET THREE TIMES A  tablet 2 9/18/2017 at Unknown time    GLUCOSAMINE HCL/CHONDR APODACA A NA (OSTEO BI-FLEX ORAL) Take by mouth once daily.   9/17/2017 at Unknown time    LACTOBACILLUS RHAMNOSUS GG (CULTURELLE ORAL) Take by mouth once daily.   9/17/2017 at Unknown time    meloxicam (MOBIC) 15 MG tablet TAKE 1 TABLET DAILY 90 tablet 2 9/17/2017 at Unknown time    omeprazole (PRILOSEC) 40 MG capsule Take 1 capsule (40 mg total) by mouth once daily. 90 capsule 3 9/17/2017 at Unknown time    sertraline (ZOLOFT) 50 MG tablet Take 1 tablet (50 mg total) by mouth once daily. 30 tablet 2 9/17/2017 at Unknown time    tizanidine (ZANAFLEX) 4 MG tablet Take 1 tablet (4 mg total) by mouth every 8 (eight) hours as needed. 270 tablet 0 9/17/2017 at Unknown time       Physical Exam:    Vital Signs:   Vitals:    09/18/17 1058   BP: (!) 121/58   Pulse: 63   Resp: 18   Temp: 98.4 °F (36.9 °C)       General Appearance: Well appearing in no acute distress  Lungs: No respiratory distress.   Heart:  Regular rate, S1, S2 normal.  Abdomen: Soft, non tender, non distended with normal bowel sounds, no masses    Labs:  Lab Results   Component Value Date    WBC 7.27 08/16/2017    HGB 12.3 08/16/2017    HCT 38.9 08/16/2017    MCV 95 08/16/2017     08/16/2017     Lab Results   Component Value Date    INR 1.0 04/09/2017     Lab Results   Component Value Date    IRON 56 08/16/2017    TIBC 389 08/16/2017    FESATURATED 14 (L) 08/16/2017     Lab Results   Component Value Date     08/16/2017    K 4.2 08/16/2017     08/16/2017    CO2 26 08/16/2017    BUN 20 08/16/2017    CREATININE 0.8 08/16/2017       I have explained  the risks and benefits of this endoscopic procedure to the patient/family including but not limited to bleeding, inflammation, infection, perforation, hypoxia/respiratory failure, and death.       Amara Lea MD  Gastroenterology & Hepatology Fellow  Pager: [phone]

## 2017-09-18 NOTE — ANESTHESIA POSTPROCEDURE EVALUATION
"Anesthesia Post Evaluation    Patient: Soha Wheatley    Procedure(s) Performed: Procedure(s) (LRB):  ESOPHAGOGASTRODUODENOSCOPY (EGD) (N/A)    Final Anesthesia Type: general  Patient location during evaluation: PACU  Patient participation: Yes- Able to Participate  Level of consciousness: awake and alert  Post-procedure vital signs: reviewed and stable  Pain management: adequate  Airway patency: patent  PONV status at discharge: No PONV  Anesthetic complications: no      Cardiovascular status: blood pressure returned to baseline  Respiratory status: unassisted  Hydration status: euvolemic  Follow-up not needed.        Visit Vitals  /76   Pulse 78   Temp 36.6 °C (97.9 °F) (Temporal)   Resp 18   Ht 5' 10" (1.778 m)   Wt (!) 144.7 kg (319 lb)   SpO2 100%   Breastfeeding? No   BMI 45.77 kg/m²       Pain/Alfredo Score: Pain Assessment Performed: Yes (9/18/2017 11:01 AM)  Presence of Pain: denies (9/18/2017 11:01 AM)  Alfredo Score: 10 (9/18/2017 12:30 PM)      "

## 2017-09-18 NOTE — TRANSFER OF CARE
"Anesthesia Transfer of Care Note    Patient: Soha Wheatley    Procedure(s) Performed: Procedure(s) (LRB):  ESOPHAGOGASTRODUODENOSCOPY (EGD) (N/A)    Patient location: OPS    Anesthesia Type: general    Transport from OR: Transported from OR on room air with adequate spontaneous ventilation    Post pain: adequate analgesia    Post assessment: no apparent anesthetic complications and tolerated procedure well    Post vital signs: stable    Level of consciousness: awake, alert and oriented    Nausea/Vomiting: no nausea/vomiting    Complications: none    Transfer of care protocol was followed      Last vitals:   Visit Vitals  BP (!) 152/67 (BP Location: Left arm, Patient Position: Lying)   Pulse 60   Temp 36.6 °C (97.9 °F) (Temporal)   Resp 20   Ht 5' 10" (1.778 m)   Wt (!) 144.7 kg (319 lb)   SpO2 97%   Breastfeeding? No   BMI 45.77 kg/m²     "

## 2017-09-18 NOTE — ANESTHESIA PREPROCEDURE EVALUATION
09/18/2017  Soha Wheatley is a 61 y.o., female.    Anesthesia Evaluation    I have reviewed the Patient Summary Reports.     I have reviewed the Medications.     Review of Systems  Anesthesia Hx:  No problems with previous Anesthesia  History of prior surgery of interest to airway management or planning: Previous anesthesia: General   Social:  Non-Smoker, Social Alcohol Use    Hematology/Oncology:  Hematology Normal   Oncology Normal     EENT/Dental:EENT/Dental Normal   Cardiovascular:  Cardiovascular Normal Exercise tolerance: good     Pulmonary:  Pulmonary Normal    Renal/:   Chronic Renal Disease renal calculi    Hepatic/GI:   GERD    Musculoskeletal:  Musculoskeletal Normal    Neurological:   Neuromuscular Disease, Headaches    Endocrine:  Endocrine Normal    Dermatological:  Skin Normal    Psych:   Psychiatric History          Physical Exam  General:  Obesity    Airway/Jaw/Neck:  Airway Findings: Mouth Opening: Normal Tongue: Normal  General Airway Assessment: Adult  Mallampati: II  TM Distance: Normal, at least 6 cm  Jaw/Neck Findings:  Neck ROM: Normal ROM      Dental:  Dental Findings: In tact        Mental Status:  Mental Status Findings:  Cooperative, Alert and Oriented         Anesthesia Plan  Type of Anesthesia, risks & benefits discussed:  Anesthesia Type:  general  Patient's Preference: GA  Intra-op Monitoring Plan: standard ASA monitors  Intra-op Monitoring Plan Comments:   Post Op Pain Control Plan:   Post Op Pain Control Plan Comments:   Induction:   IV  Beta Blocker:  Patient is not currently on a Beta-Blocker (No further documentation required).       Informed Consent: Patient understands risks and agrees with Anesthesia plan.  Questions answered. Anesthesia consent signed with patient.  ASA Score: 3     Day of Surgery Review of History & Physical:  There are no significant changes.   H&P update referred to the provider.         Ready For Surgery From Anesthesia Perspective.

## 2017-09-18 NOTE — PATIENT INSTRUCTIONS
Discharge Summary/Instructions after an Endoscopic Procedure  Patient Name: Soha Wheatley  Patient MRN: [id number]  Patient YOB: 1955 Monday, September 18, 2017  Krystian Chaudhary MD  RESTRICTIONS:  During your procedure today, you received medications for sedation.  These   medications may affect your judgment, balance and coordination.  Therefore,   for 24 hours, you have the following restrictions:   - DO NOT drive a car, operate machinery, make legal/financial decisions,   sign important papers or drink alcohol.    ACTIVITY:  The following day: return to full activity including work, except no heavy   lifting, straining or running for 3 days if polyps were removed.  DIET:  Eat and drink normally unless instructed otherwise.  TREATMENT FOR COMMON SIDE EFFECTS:  - Mild abdominal pain, belching, bloating or excessive gas: rest, eat   lightly and use a heating pad.  - Sore Throat: treat with throat lozenges and/or gargle with warm salt   water.  SYMPTOMS TO WATCH FOR AND REPORT TO YOUR PHYSICIAN:  1. Abdominal pain or bloating, other than gas cramps.  2. Chest pain.  3. Back pain.  4. Chills or fever occurring within 24 hours after the procedure.  5. Rectal bleeding, which would show as bright red, maroon, or black stools.   (A tablespoon of blood from the rectum is not serious, especially if   hemorrhoids are present.)  6. Vomiting.  7. Weakness or dizziness.  8. Because air was used during the procedure, expelling large amounts of air   from your rectum or belching is normal.  9. If a bowel prep was taken, you may not have a bowel movement for 1-3   days.  This is normal.  GO DIRECTLY TO THE EMERGENCY ROOM IF YOU HAVE ANY OF THE FOLLOWING:   Difficulty breathing   Chills and/or fever over 101 F   Persistent vomiting and/or vomiting blood   Severe abdominal pain   Severe chest pain   Black, tarry stools   Bleeding- more than one tablespoon  Your doctor recommends these additional instructions:  If any  biopsies were taken, your doctors clinic will call you in 1 to 2   weeks with any results.  You are being discharged to home.   You have a contact number available for emergencies.  The signs and symptoms   of potential delayed complications were discussed with you.  You may return   to normal activities tomorrow.  Written discharge instructions were   provided to you.   Resume your previous diet.   Continue your present medications.   We are waiting for your pathology results.   Telephone your GI clinic for pathology results in two weeks.   Return to your referring physician.  For questions, problems or results please call your physician - Krystian Chaudhary MD at Work:  [phone].  [facility], EMERGENCY ROOM PHONE NUMBER: [phone]  IF A COMPLICATION OR EMERGENCY SITUATION ARISES AND YOU ARE UNABLE TO REACH   YOUR PHYSICIAN - GO DIRECTLY TO THE EMERGENCY ROOM.  Krystian Chaudhary MD  9/18/2017 12:26:01 PM  This report has been verified and signed electronically.

## 2017-09-20 DIAGNOSIS — F41.9 ANXIETY: ICD-10-CM

## 2017-09-20 RX ORDER — SERTRALINE HYDROCHLORIDE 50 MG/1
50 TABLET, FILM COATED ORAL DAILY
Qty: 30 TABLET | Refills: 2 | Status: SHIPPED | OUTPATIENT
Start: 2017-09-20 | End: 2017-10-20

## 2017-09-20 NOTE — TELEPHONE ENCOUNTER
Refilled patient's request for renewal of Zoloft 50mg daily.  Will contact tomorrow to inform of approval.    Mary Carmen Tello D.O.  -[facility]  [phone]    9/20/2017  5:43 PM

## 2017-09-24 ENCOUNTER — TELEPHONE (OUTPATIENT)
Dept: GASTROENTEROLOGY | Facility: CLINIC | Age: 62
End: 2017-09-24

## 2017-09-24 RX ORDER — LEVOFLOXACIN 500 MG/1
500 TABLET, FILM COATED ORAL DAILY
Qty: 14 TABLET | Refills: 0 | Status: SHIPPED | OUTPATIENT
Start: 2017-09-24 | End: 2017-10-08

## 2017-09-24 RX ORDER — AMOXICILLIN 500 MG/1
1000 TABLET, FILM COATED ORAL 2 TIMES DAILY
Qty: 56 TABLET | Refills: 0 | Status: SHIPPED | OUTPATIENT
Start: 2017-09-24 | End: 2017-10-08

## 2017-09-25 ENCOUNTER — OFFICE VISIT (OUTPATIENT)
Dept: PSYCHIATRY | Facility: CLINIC | Age: 62
End: 2017-09-25
Payer: COMMERCIAL

## 2017-09-25 ENCOUNTER — TELEPHONE (OUTPATIENT)
Dept: ENDOSCOPY | Facility: HOSPITAL | Age: 62
End: 2017-09-25

## 2017-09-25 DIAGNOSIS — Z01.818 PREOP EXAMINATION: Primary | ICD-10-CM

## 2017-09-25 DIAGNOSIS — E66.01 MORBID OBESITY DUE TO EXCESS CALORIES: ICD-10-CM

## 2017-09-25 DIAGNOSIS — F41.9 ANXIETY: ICD-10-CM

## 2017-09-25 DIAGNOSIS — K21.9 GASTROESOPHAGEAL REFLUX DISEASE, ESOPHAGITIS PRESENCE NOT SPECIFIED: ICD-10-CM

## 2017-09-25 PROCEDURE — 96102 PR PSYCHOLOGIC TESTING BY TECHNICIAN: CPT | Mod: 51,S$GLB,, | Performed by: PSYCHOLOGIST

## 2017-09-25 PROCEDURE — 90791 PSYCH DIAGNOSTIC EVALUATION: CPT | Mod: S$GLB,,, | Performed by: PSYCHOLOGIST

## 2017-09-25 PROCEDURE — 96101 PR PSYCHOLOGIC TESTING BY PSYCH/PHYS: CPT | Mod: 59,S$GLB,, | Performed by: PSYCHOLOGIST

## 2017-09-25 NOTE — TELEPHONE ENCOUNTER
Results given to patient per Dr. Chaudhary. Patient verbalized understanding. Gastro appointment scheduled and mailed to address on file.

## 2017-09-25 NOTE — Clinical Note
This patient's anxiety has improved with sertraline, but she could benefit from therapy due to health-related anxiety and emotional eating - although she denied emotional eating in the past 3 months despite stressors and reports working very well with Daisha.  She is interested in the emotional eating support group or bariatric support group.  If anything, at least a few sessions surrounding surgery could be helpful for her due to anxiety related to health and procedures.  Still, she seems to be doing very well and appears very motivated.  It is also recommended she schedule a follow-up with her psychiatrist since there is not one in the system.  Let me know if you have any questions or concerns!  JR Jarod

## 2017-09-25 NOTE — TELEPHONE ENCOUNTER
The patient's stomach biopsies are back and are POSITIVE for H pylori.  This needs treatment.  I recommend triple therapy with the following:    Amoxicillin 1000 mg PO bid,  Levofloxacin 500 mg PO daily, and   Omeprazole twice daily (she can use the prescription she already has)    All meds to be taken for 14 days.    Patient needs follow-up for H pylori in GI clinic in 2-3 months.  Can be with HARVEY.    MA to call patient with results and recommendations.

## 2017-09-26 ENCOUNTER — DOCUMENTATION ONLY (OUTPATIENT)
Dept: BARIATRICS | Facility: CLINIC | Age: 62
End: 2017-09-26

## 2017-09-26 NOTE — PSYCH TESTING
[facility] [address] [place]  [address]  [phone]    REPORT OF PSYCHOLOGICAL TESTING    NAME: Soha Whealtey  OC #: [id number]  : 1955    REFERRED BY: Andrew Holloway Jr., M.D.    EVALUATED BY:  Linette Khan, Ph.D., Clinical Psychologist  Gurinder Emanuel M.S., Psychometrician    DATES OF EVALUATION: 2017, 2017    EVALUATION PROCEDURES AND TIMES:  Conducted by Psychologist:  Interpretation and report of test data  Integration of information from diagnostic interview, medical record, and testing data  Conducted by Technician:  [place] Multiphasic Personality Inventory - 2 Restructured Form (MMPI-2RF)  [facility]al Medicine Diagnostic (MBMD)  CPT Codes and Time:  04662 - 2 hours; 69560 - 2 hours    EVALUATION FINDINGS:  Before this evaluation was initiated, the purposes and process of the assessment and the limits of confidentiality were discussed with the patient who expressed understanding of these issues and orally consented to proceed with the evaluation.    Ms. Soha Wheatley is a 61-year-old White  female who is pursuing bariatric surgery to improve her health and quality of life.  Ms. Wheatley has struggled with weight since high school, but believes she managed her weight with activities.  She notes that it became more of a problem when she was in her mid-20s.  Factors that have contributed to her weight gain over the years include:  lack of exercise (due to poor knees), junk food, and coping with food.  She notes that she gained 100 lbs. during her mothers illness and death.  In addition, Ms. Wheatley acknowledges that she is an emotional eater, with stress and sadness as her primary emotional trigger to overeat.  She believes she has been an emotional eater for the past 20 years.  She is working on increasing her coping mechanisms for stress, including crafting, spending time with friends, and talking to her family.  She has  tried many weight loss methods on her own (i.e., Weight Watchers for 9 years) with good success (70 lbs. weight loss with Weight [employer]chers), and believes that her biggest weight loss challenge is all of it - the mindset, its difficult.  Her motivation for seeking surgery now is to improve health and quality of life.  Her postsurgical goals include walking without pain, feeling better, and being present for her children and grandchildren.    Ms. Wheatlye is currently being treated with sertraline for anxiety issues, and was previously treated with Paxil for grief issues following her mothers death.  She denied any history of suicidality or non-suicidal self-injury.  She denied eating disorder symptoms.    At her initial consultation with Yuly Echevarria PA-C, on 06/13/2017, her BMI was 48.43. Her medical comorbidities include: GERD, Knee pain, Osteoarthritis.  She completed a nutritional consultation with Daisha Melissa RD, bariatric dietician, and reports that she has made many changes to her diet since beginning the program.  She has a good understanding regarding the risks and benefits of the procedure and appears motivated for change.  She was fully cooperative and engaged in the assessment process.     Ms. Wheatley produced a valid and interpretable MMPI-2-RF profile, answering items relevantly based on content. Therefore, her responses should be considered a relatively accurate reflection of his current psychological functioning.  She reports poor health and likely feels weak or tired.  She may be preoccupied with poor health and complain of sleep disturbance, fatigue, low energy, and sexual dysfunction.  She reports being stress-reactive and worry-prone, and may have multiple specific fears of certain animals and acts of nature.  She enjoys social situations and events, and is likely to be perceived as outgoing and gregarious.  She describes others as well-intentioned and trustworthy, and disavows  cynical beliefs about them.  She is possibly overly trusting.    The MBMD indicated that Ms. Wheatley is not reporting any significant psychiatric distress at this time.  She may be experiencing some anxiety, dependency, and increase in somatic complaints due to moderate stress.  She may vacillate between appearing somewhat dramatic or completely denying symptoms, and will respond best to clear directions and a firm (but nonpunitive and nonjudgmental) stance.  She is somewhat preoccupied with bodily functions and physical symptoms.  It is unlikely that traditional medical treatment will reduce her pain.  She may be at a heightened risk for difficult recovery and distress (ranging from lethargy/fatigue to overarousal) following stressful or invasive medical procedures.  She may also have an increased risk for exaggerated negative reactions to serious medical information.  Due to these risks, patients with similar profiles may experience moderate complications after recovery; however, these issues may be mitigated by her strong spiritual sheela.  She may benefit from pre-surgery supportive psychosocial counseling or pain management and post-surgery physical rehabilitation, stress management, bariatric support group, and nutritional instruction plan.    DIAGNOSTIC IMPRESSIONS:    ICD-10-CM ICD-9-CM   1. Preop examination Z01.818 V72.84   2. Morbid obesity due to excess calories E66.01 278.01   3. Gastroesophageal reflux disease, esophagitis presence not specified K21.9 530.81   4. BMI 45.0-49.9, adult Z68.42 V85.42   5. Anxiety F41.9 300.00       SUMMARY AND RECOMMENDATIONS:  Ms. Wheatley has a long history of weight problems and is pursuing bariatric surgery at this time in an effort to improve her health and quality of life.  Test results should be considered valid, and indicate that she reports some health concerns, stress, and anxiety.  Although she reports improvements in anxiety with prescribed sertraline, she  continues to experience occasional health-related anxiety that could cause her additional stress during the bariatric process.  It is recommended she engage in individual or group therapy prior to or after surgery.  She is interested in being referred to the emotional eating support group, and is also interested in the monthly bariatric support group.  It is also recommended she schedule a follow-up session with her psychiatrist.  She reports good social support and demonstrates several characteristics that make her a good candidate for surgery.  Test results show NO overt psychological contraindications for surgery.
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
## 2017-09-26 NOTE — PROGRESS NOTES
Dr. Colvin is her psych doctor and he's renewed her meds for another 3 months. She's not had any problems with crushing.  She is interested in Loren's support group and I will send Loren an email to notify her when she's getting ready to start a new class. Her daughter is expecting a baby in October and may not be able to for several weeks.

## 2017-10-03 ENCOUNTER — DOCUMENTATION ONLY (OUTPATIENT)
Dept: BARIATRICS | Facility: CLINIC | Age: 62
End: 2017-10-03

## 2017-10-11 ENCOUNTER — CLINICAL SUPPORT (OUTPATIENT)
Dept: BARIATRICS | Facility: CLINIC | Age: 62
End: 2017-10-11
Payer: COMMERCIAL

## 2017-10-11 VITALS — WEIGHT: 293 LBS | BODY MASS INDEX: 45.23 KG/M2

## 2017-10-11 DIAGNOSIS — Z71.3 DIETARY COUNSELING AND SURVEILLANCE: ICD-10-CM

## 2017-10-11 DIAGNOSIS — E66.01 MORBID OBESITY WITH BMI OF 45.0-49.9, ADULT: ICD-10-CM

## 2017-10-11 DIAGNOSIS — E66.01 MORBID OBESITY DUE TO EXCESS CALORIES: ICD-10-CM

## 2017-10-11 DIAGNOSIS — B37.9 YEAST INFECTION: ICD-10-CM

## 2017-10-11 PROCEDURE — 97803 MED NUTRITION INDIV SUBSEQ: CPT | Mod: S$GLB,,, | Performed by: DIETITIAN, REGISTERED

## 2017-10-11 PROCEDURE — 99999 PR PBB SHADOW E&M-EST. PATIENT-LVL II: CPT | Mod: PBBFAC,,, | Performed by: DIETITIAN, REGISTERED

## 2017-10-11 PROCEDURE — 99499 UNLISTED E&M SERVICE: CPT | Mod: S$GLB,,, | Performed by: DIETITIAN, REGISTERED

## 2017-10-11 RX ORDER — FLUCONAZOLE 150 MG/1
150 TABLET ORAL DAILY
Qty: 1 TABLET | Refills: 0 | Status: SHIPPED | OUTPATIENT
Start: 2017-10-11 | End: 2017-10-12

## 2017-10-11 NOTE — PROGRESS NOTES
NUTRITION NOTE     Referring Physician: Andrew Holloway M.D.   Reason for MNT Referral: 6 months Medically Supervised Diet pending Gastric Sleeve     PAST MEDICAL HISTORY:     Patient presents for 5th visit for MSD with (-4 lbs) weight loss over the past month; total weight loss by making numerous dietary and lifestyle changes. Patient has made great efforts to adopt bariatric meal plan but did admit to having days that were really hard and stressful. Patient has food logs completed. Patient reports that her biggest struggle has been eliminating starchy CHO foods, diet still includes things like cookies, rice, bread, cake. Ok'd patient to have at least one serving of whole grain items to make transition to elimination successful. Patient to completely eliminate these items over the next month. Encouraged patient that she is making good progress. Patient doing very well overall.    We reviewed vitamins for after surgery. Patient reports she has had kidney stones in the past and was told not to take oral calcium supplements. Discussed with ESTHER. Patient okay to take calcium supplements and will start at about 3 months post op to ensure patient is properly hydrated.              Past Medical History:   Diagnosis Date    Allergy      Anxiety      Arthritis      Chronic kidney disease      Depression      GERD (gastroesophageal reflux disease)      History of kidney stones      Kidney stones      Migraines      Obesity           CLINICAL DATA:  61 y.o. female.     There were no vitals filed for this visit.     Current Weight: 315 lbs  Weight Change Since Initial Visit: -19 lbs  Ideal Body Weight: 156 lbs  BMI: 45.23     CURRENT DIET:  Regular diet.  Diet Recall: Food records are present.  Brk (7 am): 1 egg, 1 egg white, 1 sausage, coffee+creamer  Snk (10:30) banana  Sindy (12:30 pm): chicken and coleslaw, slice of bread  Snk( 2:30 pm): protein bar  Di (6 pm):steak, onions, and salad, roll OR pork rib with  meek/shannan salad, zoodles with onion, peppers, parm cheese & 1 T mir bits  Snk (8 pm): yogurt      Diet Includes: 3 meals/day+snacks (lean protein, vegetables, fruit, lean ham, eggs, ice cream, salads, protein drinks/protein bars)   Meal Pattern: Improved.  Protein Supplements: 1-2 per day. (Pure Protein, Think Thin, Powercrunch)   Snacking: Adequate. Snacks include healthy choices.     Vegetables: Likes a variety. Eats daily.  Fruits: Likes a variety. Eats almost daily.  Beverages: water, diet soda, coffee with sugar and coffee without sugar  Dining Out: Dining out: Weekly. Mostly restaurants.  Cooking at home: Daily. Weekly. Mostly baked and grilled meat, fish and vegetables.     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  Smoking: None.     ASSESSMENT:  Patient demonstrates some willingness to change lifestyle habits as evidenced by weight loss, daily food logs, dietary changes, including protein drinks, increased fruits, increased vegetables, reduced dining out, better choices when dining out, more food preparation at steph, healthier cooking at home, bringing snacks to work, healthier snacking at work and healthier snacking at home.     Doing fairly well with working on greatest challenges (dining out frequency, sweets, starchy CHO, portion control, emotional eating and staying consistent with diet).     Adequate calorie intake.  Adequate protein intake.     PLAN:     Diet: Maintain diet plan.    Focus Goals:  -Protein goal  gm/day  -Goal 4-6 small meals/day  -Continue with protein drinks/bars  -Try different meal ideas   -Continue to work eliminating starchy carbohydrate foods (cut down to once per week for 2 weeks then cut out completely)  -Continue to work on eliminating sweets-try alternatives.      Exercise:  Increase.     Behavior Modification: Continue to document food & activity logs daily.     Weight loss prior to surgery (5-10% TBW): 16-33 lbs     Return to clinic in one month.     SESSION TIME:  30 minutes

## 2017-10-11 NOTE — PATIENT INSTRUCTIONS
Focus Goals:    -Protein goal  gm/day  -Goal 4-6 small meals/day  -Continue with protein drinks/bars  -Try different meal ideas   -Continue to work eliminating starchy carbohydrate foods (cut down to once per week for 2 weeks then cut out completely)  -Continue to work on eliminating sweets-try alternatives.

## 2017-11-08 ENCOUNTER — CLINICAL SUPPORT (OUTPATIENT)
Dept: BARIATRICS | Facility: CLINIC | Age: 62
End: 2017-11-08
Payer: COMMERCIAL

## 2017-11-08 VITALS — BODY MASS INDEX: 44.95 KG/M2 | WEIGHT: 293 LBS

## 2017-11-08 DIAGNOSIS — Z71.3 DIETARY COUNSELING AND SURVEILLANCE: ICD-10-CM

## 2017-11-08 DIAGNOSIS — E66.01 MORBID OBESITY WITH BMI OF 45.0-49.9, ADULT: ICD-10-CM

## 2017-11-08 DIAGNOSIS — E66.01 MORBID OBESITY DUE TO EXCESS CALORIES: ICD-10-CM

## 2017-11-08 PROCEDURE — 97803 MED NUTRITION INDIV SUBSEQ: CPT | Mod: S$GLB,,, | Performed by: DIETITIAN, REGISTERED

## 2017-11-08 PROCEDURE — 99999 PR PBB SHADOW E&M-EST. PATIENT-LVL II: CPT | Mod: PBBFAC,,, | Performed by: DIETITIAN, REGISTERED

## 2017-11-08 PROCEDURE — 99499 UNLISTED E&M SERVICE: CPT | Mod: S$GLB,,, | Performed by: DIETITIAN, REGISTERED

## 2017-11-08 NOTE — PATIENT INSTRUCTIONS
Reminders:  -[place] Cauliflower [place] Thins ([employer])  -Begin shopping for bariatric vitamins (refer to page 18 in Nutrition Booklet)  -Work on eliminating bread from diet

## 2017-11-08 NOTE — Clinical Note
Cleared by nutrition 11/8/17. Completed work-up. Patient to RTC in one month per patient request for diet accountability.

## 2017-11-08 NOTE — PROGRESS NOTES
NUTRITION NOTE     Referring Physician: Andrew Holloway M.D.   Reason for MNT Referral: 6 months Medically Supervised Diet pending Gastric Sleeve     PAST MEDICAL HISTORY:     Patient presents for 6th visit for MSD with (-2 lbs) weight loss over the past month; total weight loss by making numerous dietary and lifestyle changes. Patient has made great efforts to adopt bariatric meal plan but did admit to having days that were really hard and stressful. Patient has food logs completed. Patient reports that her biggest struggle has been eliminating starchy CHO foods, diet still includes occasional items like cookies, rice, bread, cake. Encouraged patient that she is making good progress. Patient doing very well overall. Patient apprehensive about moving forward with surgery. PA met with patient to answer question and concerns.   Patient okay to move forward with surgery, will follow up with RD in one month to keep patient accountable with diet.      We reviewed vitamins for after surgery-rec patient begin shopping for vitamins.              Past Medical History:   Diagnosis Date    Allergy      Anxiety      Arthritis      Chronic kidney disease      Depression      GERD (gastroesophageal reflux disease)      History of kidney stones      Kidney stones      Migraines      Obesity           CLINICAL DATA:  61 y.o. female.     There were no vitals filed for this visit.     Current Weight: 313 lbs  Weight Change Since Initial Visit: -21 lbs  Weight Change Since Initial PA Visit: -24 lbs   Ideal Body Weight: 156 lbs  BMI: 44.95     CURRENT DIET:  Regular diet.  Diet Recall: Food records are present.  Brk (7 am): 2 eggs, 2 slices of mir  Snk (10:30) cheese stick  Sindy (12:30 pm): 3 chicken strips, coleslaw, 1 slice of bread  Snk( 2:30 pm): protein bar, coffee   Di (6 pm): Strip steak, roasted carrots, onions  Snk (8 pm): yogurt      Diet Includes: 3 meals/day+snacks (lean protein, vegetables, fruit, lean ham,  eggs, ice cream-Halo [place], salads, protein drinks/protein bars)   Meal Pattern: Improved.  Protein Supplements: 1-2 per day. (Pure Protein, Think Thin, Powercrunch, Premier Protein RTD)   Snacking: Adequate. Snacks include healthy choices. (sliced cheese, deli meat, yogurt, protein bar)     Vegetables: Likes a variety. Eats daily.  Fruits: Likes a variety. Eats almost daily.  Beverages: water, diet soda, 1/2 caf/1/2 decaf coffee with sugar and coffee without sugar  Dining Out: Dining out: Weekly. Mostly restaurants.  Cooking at home: Daily. Weekly. Mostly baked and grilled meat, fish and vegetables.     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  Smoking: None.     ASSESSMENT:  Patient demonstrates some willingness to change lifestyle habits as evidenced by weight loss, daily food logs, dietary changes, including protein drinks, increased fruits, increased vegetables, reduced dining out, better choices when dining out, more food preparation at steph, healthier cooking at home, bringing snacks to work, healthier snacking at work and healthier snacking at home.     Doing fairly well with working on greatest challenges (dining out frequency, sweets, starchy CHO, portion control, emotional eating and staying consistent with diet).     Adequate calorie intake.  Adequate protein intake.     PLAN:     Patient good candidate for bariatric surgery-sleeve.       Focus Goals:  -Switch to decaf coffee  -Continue to work eliminating starchy carbohydrate foods (bread)-try cauliflower thins  -Continue to work on eliminating sweets-try alternatives.       Exercise: Increase.     Behavior Modification: Continue to document food & activity logs daily.     Weight loss prior to surgery (5-10% TBW): 16-33 lbs     Return to clinic in one month for diet  accountability.     SESSION TIME:  30 minutes

## 2017-11-09 ENCOUNTER — PATIENT MESSAGE (OUTPATIENT)
Dept: BARIATRICS | Facility: CLINIC | Age: 62
End: 2017-11-09

## 2017-11-10 ENCOUNTER — TELEPHONE (OUTPATIENT)
Dept: BARIATRICS | Facility: CLINIC | Age: 62
End: 2017-11-10

## 2017-11-10 NOTE — TELEPHONE ENCOUNTER
----- Message from Daisha Melissa RD sent at 11/8/2017  3:37 PM CST -----  Cleared by nutrition 11/8/17. Completed work-up. Patient to RTC in one month per patient request for diet accountability.

## 2017-11-10 NOTE — TELEPHONE ENCOUNTER
Has been crushing zoloft.  She cannot affort the emotional eating support group. She plans on going to the monthly support group meetings.  She felt she'd talked to Dr. Colvin about her meds but she's not seen her psych doctor since July. Gave her the info that was recommended by Dr. Khan and she seems to indicate she will call her psych doctor. She states the RD and PA told her this week that her other meds can be liquid and she's been crushing the zoloft so she felt she had everything in order. Chart sent to [facility] today.

## 2017-11-14 ENCOUNTER — TELEPHONE (OUTPATIENT)
Dept: BARIATRICS | Facility: CLINIC | Age: 62
End: 2017-11-14

## 2017-11-14 NOTE — TELEPHONE ENCOUNTER
----- Message from Pineda Sahu sent at 11/14/2017 10:03 AM CST -----  Can you have psych update the note to say he is clearing her to proceed with surgery, Darci Mccann wont proceed without it.

## 2017-11-14 NOTE — TELEPHONE ENCOUNTER
----- Message from Linette Khan, PhD sent at 11/14/2017  4:27 PM CST -----  Regarding: RE: Clearance for Bariatric Surgery  Hi, I am happy to see her again.      Lupe - will you schedule her for my next f/u slot?      Linette    ----- Message -----  From: Dawna Rasmussen RN  Sent: 11/14/2017   2:16 PM  To: Linette Khan, PhD  Subject: FW: Clearance for Bariatric Surgery              Hi Dr. Khan,  Are you able to see this patient again.  Please read below.  The insurance company is wanting an update for her psych clearance.  Thanks,  Sharona  ----- Message -----  From: Mary Carmen Tello MD  Sent: 11/14/2017   1:16 PM  To: Dawna Rasmussen RN  Subject: RE: Clearance for Bariatric Surgery              Hello Ms. Rasmussen,    I don't know that I can comment on clearance for bariatric surgery for this patient, as I never saw her for that, and I've honestly only seen the patient once, in July and she did not return for follow up.  I don't know how she has been, and when we did meet, she did not mention that she is in the process for bariatric surgery clearance.  In looking through her chart, Dr. Khan also evaluated her and focused more on bariatric surgery.  I would suggest contacting her, as I don't think I can comment on her progress since I only saw her once, and we didn't discuss anything related to this surgery.    Thank you,    Mary Carmen Tello D.O.    11/14/2017  1:18 PM    ----- Message -----  From: Dawna Rasmussen RN  Sent: 11/14/2017  10:43 AM  To: Mary Carmen Tello MD  Subject: Clearance for Bariatric Surgery                  Hi Dr. Tello,  Please can you notify us after you see Soha that you've cleared her for surgery. You can read our financial coordinators note below.  We've submitted her chart for authorization and they're wanting an updated note from psych that she's ok to move forward with surgery.  I'd also given the patient info from Dr. Khan/Patrick of what she should do for  optimum outcomes for her surgery as well with support groups, etc.. I hope she takes advantage of this.  Thanks,  Sharona  ----- Message -----  From: Pineda Sahu  Sent: 11/14/2017  10:03 AM  To: Dawna Rasmussen RN    Can you have psych update the note to say he is clearing her to proceed with surgery, Darci Mccann wont proceed without it.

## 2017-11-14 NOTE — TELEPHONE ENCOUNTER
States that her crushing zoloft was for the month of Sept and states Mahsa told her she could stop and didn't need to crush any longer.  She has appt with psych on 11/24 at 2pm. Sent Dr. Tello in psych a message to send us a clearance for surgery after patient see's her so we can forward to [facility].

## 2017-11-17 ENCOUNTER — PATIENT MESSAGE (OUTPATIENT)
Dept: PSYCHIATRY | Facility: CLINIC | Age: 62
End: 2017-11-17

## 2017-11-21 ENCOUNTER — TELEPHONE (OUTPATIENT)
Dept: BARIATRICS | Facility: CLINIC | Age: 62
End: 2017-11-21

## 2017-11-21 NOTE — TELEPHONE ENCOUNTER
Patient reports she got an auth letter from her insurance. Notified patient that we have not got an auth back yet, once we do we will call her to schedule her surgery. Patient v/u.

## 2017-11-21 NOTE — TELEPHONE ENCOUNTER
----- Message from Silva Garcia sent at 11/21/2017  9:41 AM CST -----  Contact: self  Soha States that she needs a call returned by a nurse in reference to some information she received from her insurance , Please call Soha @ [phone] . Thanks :)

## 2017-11-27 ENCOUNTER — OFFICE VISIT (OUTPATIENT)
Dept: GASTROENTEROLOGY | Facility: CLINIC | Age: 62
End: 2017-11-27
Payer: COMMERCIAL

## 2017-11-27 ENCOUNTER — TELEPHONE (OUTPATIENT)
Dept: BARIATRICS | Facility: CLINIC | Age: 62
End: 2017-11-27

## 2017-11-27 ENCOUNTER — OFFICE VISIT (OUTPATIENT)
Dept: PSYCHIATRY | Facility: CLINIC | Age: 62
End: 2017-11-27
Payer: COMMERCIAL

## 2017-11-27 ENCOUNTER — DOCUMENTATION ONLY (OUTPATIENT)
Dept: BARIATRICS | Facility: CLINIC | Age: 62
End: 2017-11-27

## 2017-11-27 VITALS
DIASTOLIC BLOOD PRESSURE: 67 MMHG | BODY MASS INDEX: 41.95 KG/M2 | HEIGHT: 70 IN | WEIGHT: 293 LBS | HEART RATE: 77 BPM | SYSTOLIC BLOOD PRESSURE: 145 MMHG

## 2017-11-27 DIAGNOSIS — Z86.19 HISTORY OF HELICOBACTER PYLORI INFECTION: Primary | ICD-10-CM

## 2017-11-27 DIAGNOSIS — F41.9 ANXIETY: Primary | ICD-10-CM

## 2017-11-27 PROCEDURE — 99214 OFFICE O/P EST MOD 30 MIN: CPT | Mod: S$GLB,,, | Performed by: NURSE PRACTITIONER

## 2017-11-27 PROCEDURE — 99999 PR PBB SHADOW E&M-EST. PATIENT-LVL III: CPT | Mod: PBBFAC,,, | Performed by: NURSE PRACTITIONER

## 2017-11-27 PROCEDURE — 90834 PSYTX W PT 45 MINUTES: CPT | Mod: S$GLB,,, | Performed by: PSYCHOLOGIST

## 2017-11-27 NOTE — PROGRESS NOTES
Linette Khan, PhD  Eddie Claire LPN             Yes, she is cleared.  I sent the message earlier to Sharona.  Thanks!     JR    Previous Messages      ----- Message -----   From: Eddie Claire LPN   Sent: 11/27/2017   3:35 PM   To: Linette Khan, PhD   Subject: surgery clearance                                 Dr Khan,        Good Afternoon, Ms Wheatley had a visit with you today and we require final clearance from you to proceed with surgery. Is she cleared for bariatric surgery?     Thanks for your assistance,   Eddie Claire LPN

## 2017-11-27 NOTE — TELEPHONE ENCOUNTER
----- Message from Linette Khan, PhD sent at 11/27/2017  2:39 PM CST -----  Regarding: FW: Clearance for Bariatric Surgery  Hello, I met with Ms. Wheatley today and I am fine providing clearance for surgery from a psychological perspective.  She does have some anxiety surrounding surgery (specifically anesthesia), but has been through 7 prior procedures without significant problems.  We developed a coping plan for her to review every day to help ease her anxiety.  She is doing well with the bariatric diet and has not engaged in emotional eating for the past 5 months, and therefore does not appear to require the support group at this time.    Dr. Tello - Ms. Wheatley queried about potentially being prescribed liquid sertraline.  Would you be able to contact her regarding this issue?      Jarod,          ----- Message -----  From: Lupe Marte MA  Sent: 11/15/2017   3:22 PM  To: Linette Khan, PhD  Subject: RE: Clearance for Bariatric Surgery              Accepted 11-27 at 2 pm    ----- Message -----  From: Linette Khan PhD  Sent: 11/15/2017  10:37 AM  To: Lupe Marte MA  Subject: RE: Clearance for Bariatric Surgery              You can see if she can come on the 27th at 8 am or 2 pm in my testing slots?    ----- Message -----  From: Lupe Marte MA  Sent: 11/15/2017   9:21 AM  To: Linette Khan, PhD  Subject: RE: Clearance for Bariatric Surgery              Your 1st available is Dec 4.  Patient states she needs something sooner because surgery has to be scheduled before the end of the year for financial reasons.  Do you see something sooner?    ----- Message -----  From: Linette Khan, PhD  Sent: 11/14/2017   4:27 PM  To: Dawna Rasmussen RN, Lupe Marte MA  Subject: RE: Clearance for Bariatric Surgery              Hi, I am happy to see her again.      Lupe - will you schedule her for my next f/u slot?      Linette    ----- Message -----  From: Dawna Rasmussen RN  Sent:  11/14/2017   2:16 PM  To: Linette Khan, PhD  Subject: FW: Clearance for Bariatric Surgery              Hi Dr. Khan,  Are you able to see this patient again.  Please read below.  The insurance company is wanting an update for her psych clearance.  ThanksSharona  ----- Message -----  From: Mary Carmen Tello MD  Sent: 11/14/2017   1:16 PM  To: Dawna Rasmussen RN  Subject: RE: Clearance for Bariatric Surgery              Hello Ms. Rasmussen,    I don't know that I can comment on clearance for bariatric surgery for this patient, as I never saw her for that, and I've honestly only seen the patient once, in July and she did not return for follow up.  I don't know how she has been, and when we did meet, she did not mention that she is in the process for bariatric surgery clearance.  In looking through her chart, Dr. Khan also evaluated her and focused more on bariatric surgery.  I would suggest contacting her, as I don't think I can comment on her progress since I only saw her once, and we didn't discuss anything related to this surgery.    Thank you,    Mary Carmen Tello D.O.    11/14/2017  1:18 PM    ----- Message -----  From: Dawna Rasmussen RN  Sent: 11/14/2017  10:43 AM  To: Mary Carmen Tello MD  Subject: Clearance for Bariatric Surgery                  Hi Dr. Tello,  Please can you notify us after you see Soha that you've cleared her for surgery. You can read our financial coordinators note below.  We've submitted her chart for authorization and they're wanting an updated note from psych that she's ok to move forward with surgery.  I'd also given the patient info from Dr. Khan/Patrick of what she should do for optimum outcomes for her surgery as well with support groups, etc.. I hope she takes advantage of this.  ThanksSharona  ----- Message -----  From: Pineda Sahu  Sent: 11/14/2017  10:03 AM  To: Dawna Rasmussen RN    Can you have psych update the note to say he is clearing her to  proceed with surgery, Darci Mccann wont proceed without it.

## 2017-11-27 NOTE — PATIENT INSTRUCTIONS
H. Pylori testing  This stool specimen is to check if you have the H. Pylori bacterial infection in your digestive tract.    Stool H. Pylori antigen needs to be done off the following medications for the following amount of time to ensure accuracy:    1. Off all Antibiotics for 4 (Four) weeks before stool collection.     2. Off all Proton Pump Inhibitors medications for 2 (Two) weeks before collecting stool for H. Pylori Antigen:    Nexium (esomeprazole)  Prilosec (omeprazole)   Protonix (pantoprazole)  Prevacid (lansoprazole)  Aciphex (rabeprazole)  Dexilant (dexlansoprazole)   Zegerid     3. Off all H2 blockers medications for 2 (Two) weeks before collecting stool for H. Pylori Antigen:    Zantac (ranitidine)  Tagamet (cimetadine)  Axid (nizatidine)   Pepcid (famotidine)    4. Off Pepto-Bismol for 2 (Two) weeks prior to collecting stool for H. Pylori Antigen.    You may  take Tums, Mylanta, or Maalox for reflux signs and symptoms as needed until stools collected.     Once you have turned in your specimen you may resume taking your medications as directed.

## 2017-11-27 NOTE — PROGRESS NOTES
"Individual Psychotherapy (PhD/LCSW)    11/27/2017    Site:  [facility]         Therapeutic Intervention: Met with patient.  Outpatient - Insight oriented psychotherapy 45 min - CPT code 14248    Chief complaint/reason for encounter: anxiety     Interval history and content of current session:  Soha arrived on time for her scheduled appointment.  She has continued to demonstrate adherence to the bariatric diet with weight loss since her initial evaluation on 09/25/17.  She has not engaged in emotional eating for the past five months, and has been able to maintain lifestyle changes despite stressors.  She is disinterested in the emotional eating support group at this time, and would like to consider it in the future if she returns to emotional eating or requires stress management.  However, she acknowledge significant concerns about anesthesia (which she also mentioned in her initial evaluation).  Although she has managed seven prior procedures requiring anesthesia, she continues to have worries about "not waking up."  We discussed this concern in detail and generated a list of helpful thoughts for her to review every day.  She was also encouraged to engage in relaxation and coping strategies every day after reading her worries and helpful thoughts.  She was reminded of her motivating factors and her past success with surgery.  She will schedule follow-up sessions as needed prior to surgery.  She requested I contact her psychiatrist to query about liquid sertraline during surgery and recovery.    Treatment plan:  · Target symptoms: anxiety   · Why chosen therapy is appropriate versus another modality: relevant to diagnosis  · Outcome monitoring methods: self-report  · Therapeutic intervention type: insight oriented psychotherapy    Risk parameters:  Patient reports no suicidal ideation  Patient reports no homicidal ideation  Patient reports no self-injurious behavior  Patient reports no violent " behavior    Verbal deficits: None    Patient's response to intervention:  The patient's response to intervention is accepting.    Progress toward goals and other mental status changes:  The patient's progress toward goals is fair .    Diagnosis:     ICD-10-CM ICD-9-CM   1. Anxiety F41.9 300.00       Plan:  individual psychotherapy    Return to clinic: as needed    Length of Service (minutes): 45

## 2017-11-28 ENCOUNTER — TELEPHONE (OUTPATIENT)
Dept: ORTHOPEDICS | Facility: CLINIC | Age: 62
End: 2017-11-28

## 2017-11-28 ENCOUNTER — PATIENT MESSAGE (OUTPATIENT)
Dept: SPINE | Facility: CLINIC | Age: 62
End: 2017-11-28

## 2017-11-28 ENCOUNTER — TELEPHONE (OUTPATIENT)
Dept: BARIATRICS | Facility: CLINIC | Age: 62
End: 2017-11-28

## 2017-11-28 ENCOUNTER — PATIENT MESSAGE (OUTPATIENT)
Dept: PSYCHIATRY | Facility: CLINIC | Age: 62
End: 2017-11-28

## 2017-11-28 ENCOUNTER — DOCUMENTATION ONLY (OUTPATIENT)
Dept: BARIATRICS | Facility: CLINIC | Age: 62
End: 2017-11-28

## 2017-11-28 DIAGNOSIS — M17.0 PRIMARY OSTEOARTHRITIS OF BOTH KNEES: Primary | ICD-10-CM

## 2017-11-28 RX ORDER — HYALURONATE SODIUM 20 MG/2 ML
40 SYRINGE (ML) INTRAARTICULAR WEEKLY
Status: COMPLETED | OUTPATIENT
Start: 2017-11-28 | End: 2017-12-18

## 2017-11-28 NOTE — TELEPHONE ENCOUNTER
----- Message from Rosanna Connors sent at 11/28/2017  2:38 PM CST -----  Contact: self  Patient states is having surgery and had to stop taking the meloxicam (MOBIC) 15 MG tablet want to schedule for gel injections Patient can be reached at     We spoke  I asked Dr [facility] if ok  He said sure, to check with Ms Mason WRIGHT to see if she can do it   Will do

## 2017-11-28 NOTE — TELEPHONE ENCOUNTER
Spoke with patient and scheduled preop appts/ surgery date/ post op appts. All dates and times agreed upon. Pt aware that must have PCP clearance within 30 days of surgery date signed and in chart for preop clearance. Pt aware that protein liquid diet start date is 12/07/17. Pt aware all appts can be seen in my [facility] patient portal at this time and appt reminders mailed to patient's address today by RN. Given office phone and fax number for any future questions/concerns.

## 2017-11-28 NOTE — PROGRESS NOTES
Patient with severe OA of knees. Having gastric sleeve. Having severe knee pain. Will order Euflexxa for both knees.

## 2017-11-29 ENCOUNTER — TELEPHONE (OUTPATIENT)
Dept: INTERNAL MEDICINE | Facility: CLINIC | Age: 62
End: 2017-11-29

## 2017-11-29 NOTE — TELEPHONE ENCOUNTER
----- Message from Lianet Gonzalez sent at 11/28/2017  6:00 PM CST -----  Contact: [employer] message  Appointment Request From: Soah Wheatley    With Provider: Andrew Mallory MD [-Primary Care Physician-]    Would Accept With:Only the person I've selected    Preferred Date Range: From 11/30/2017 To 12/6/2017    Preferred Times: Monday Afternoon, Tuesday Afternoon, Wednesday Afternoon, Thursday Afternoon, Friday Afternoon    Reason for visit: Request an Appt    Comments:  I am having Bariatric Surgery on Dec 21st.  I need Dr. Mallory to sign off the paper work.

## 2017-11-30 ENCOUNTER — PATIENT MESSAGE (OUTPATIENT)
Dept: SPINE | Facility: CLINIC | Age: 62
End: 2017-11-30

## 2017-11-30 DIAGNOSIS — M54.2 CERVICALGIA: ICD-10-CM

## 2017-11-30 DIAGNOSIS — M54.2 NECK PAIN: ICD-10-CM

## 2017-11-30 DIAGNOSIS — M48.02 SPINAL STENOSIS IN CERVICAL REGION: ICD-10-CM

## 2017-11-30 DIAGNOSIS — M48.02 CERVICAL STENOSIS OF SPINAL CANAL: ICD-10-CM

## 2017-11-30 DIAGNOSIS — M47.812 CERVICAL SPONDYLOSIS WITHOUT MYELOPATHY: ICD-10-CM

## 2017-11-30 DIAGNOSIS — F41.9 ANXIETY: Primary | ICD-10-CM

## 2017-11-30 DIAGNOSIS — M54.12 BRACHIAL NEURITIS OR RADICULITIS: Primary | ICD-10-CM

## 2017-11-30 DIAGNOSIS — M50.30 DEGENERATION OF CERVICAL INTERVERTEBRAL DISC: ICD-10-CM

## 2017-11-30 RX ORDER — SERTRALINE HYDROCHLORIDE 20 MG/ML
50 SOLUTION ORAL DAILY
Qty: 60 ML | Refills: 3 | Status: SHIPPED | OUTPATIENT
Start: 2017-11-30 | End: 2018-03-22 | Stop reason: ALTCHOICE

## 2017-11-30 RX ORDER — GABAPENTIN 250 MG/5ML
600 SOLUTION ORAL 3 TIMES DAILY
Qty: 1080 ML | Refills: 2 | Status: SHIPPED | OUTPATIENT
Start: 2017-11-30 | End: 2018-03-22

## 2017-11-30 NOTE — TELEPHONE ENCOUNTER
Pt requesting liquid formulation of Zoloft while recovering from bariatric surgery.  Will switch to Zoloft 20mg/mL liquid formulation.  Will instruct patient to take 2.5mL (total dose 50mg) daily for anxiety/depressive symptoms.  1 bottle = 60mL; 3 refills (bottles).  Will call patient to inform of updated prescription.    Mary Carmen Tello D.O.  [facility]  [phone]    11/30/2017  8:00 AM

## 2017-12-01 ENCOUNTER — PATIENT MESSAGE (OUTPATIENT)
Dept: SPINE | Facility: CLINIC | Age: 62
End: 2017-12-01

## 2017-12-01 DIAGNOSIS — R10.31 ABDOMINAL PAIN, RLQ (RIGHT LOWER QUADRANT): ICD-10-CM

## 2017-12-01 RX ORDER — OMEPRAZOLE 40 MG/1
40 CAPSULE, DELAYED RELEASE ORAL DAILY
Qty: 90 CAPSULE | Refills: 3 | Status: SHIPPED | OUTPATIENT
Start: 2017-12-01 | End: 2018-03-16 | Stop reason: SDUPTHER

## 2017-12-01 RX ORDER — ONDANSETRON 8 MG/1
8 TABLET, ORALLY DISINTEGRATING ORAL EVERY 6 HOURS PRN
Qty: 30 TABLET | Refills: 0 | Status: SHIPPED | OUTPATIENT
Start: 2017-12-01 | End: 2018-03-16

## 2017-12-01 RX ORDER — PROMETHAZINE HYDROCHLORIDE 25 MG/1
SUPPOSITORY RECTAL
Qty: 15 SUPPOSITORY | Refills: 0 | Status: SHIPPED | OUTPATIENT
Start: 2017-12-01 | End: 2018-02-22

## 2017-12-01 RX ORDER — POLYETHYLENE GLYCOL 3350 17 G/17G
17 POWDER, FOR SOLUTION ORAL DAILY
Qty: 1 BOTTLE | Refills: 3 | Status: SHIPPED | OUTPATIENT
Start: 2017-12-01 | End: 2018-03-16

## 2017-12-04 ENCOUNTER — OFFICE VISIT (OUTPATIENT)
Dept: ORTHOPEDICS | Facility: CLINIC | Age: 62
End: 2017-12-04
Payer: COMMERCIAL

## 2017-12-04 ENCOUNTER — IMMUNIZATION (OUTPATIENT)
Dept: INTERNAL MEDICINE | Facility: CLINIC | Age: 62
End: 2017-12-04
Payer: COMMERCIAL

## 2017-12-04 ENCOUNTER — OFFICE VISIT (OUTPATIENT)
Dept: INTERNAL MEDICINE | Facility: CLINIC | Age: 62
End: 2017-12-04
Payer: COMMERCIAL

## 2017-12-04 VITALS
HEART RATE: 68 BPM | BODY MASS INDEX: 41.95 KG/M2 | WEIGHT: 293 LBS | DIASTOLIC BLOOD PRESSURE: 70 MMHG | HEIGHT: 70 IN | SYSTOLIC BLOOD PRESSURE: 120 MMHG

## 2017-12-04 VITALS
HEIGHT: 70 IN | DIASTOLIC BLOOD PRESSURE: 67 MMHG | WEIGHT: 293 LBS | HEART RATE: 67 BPM | BODY MASS INDEX: 41.95 KG/M2 | SYSTOLIC BLOOD PRESSURE: 125 MMHG

## 2017-12-04 DIAGNOSIS — Z01.818 PRE-OP EVALUATION: Primary | ICD-10-CM

## 2017-12-04 DIAGNOSIS — E66.01 MORBID OBESITY WITH BMI OF 45.0-49.9, ADULT: ICD-10-CM

## 2017-12-04 DIAGNOSIS — M17.0 PRIMARY OSTEOARTHRITIS OF BOTH KNEES: Primary | ICD-10-CM

## 2017-12-04 DIAGNOSIS — F41.9 ANXIETY: ICD-10-CM

## 2017-12-04 PROCEDURE — 99213 OFFICE O/P EST LOW 20 MIN: CPT | Mod: S$GLB,,, | Performed by: INTERNAL MEDICINE

## 2017-12-04 PROCEDURE — 99999 PR PBB SHADOW E&M-EST. PATIENT-LVL IV: CPT | Mod: PBBFAC,,, | Performed by: INTERNAL MEDICINE

## 2017-12-04 PROCEDURE — 90471 IMMUNIZATION ADMIN: CPT | Mod: ,,, | Performed by: INTERNAL MEDICINE

## 2017-12-04 PROCEDURE — 20610 DRAIN/INJ JOINT/BURSA W/O US: CPT | Mod: 50,S$GLB,, | Performed by: PHYSICIAN ASSISTANT

## 2017-12-04 PROCEDURE — 99499 UNLISTED E&M SERVICE: CPT | Mod: S$GLB,,, | Performed by: PHYSICIAN ASSISTANT

## 2017-12-04 PROCEDURE — 99999 PR PBB SHADOW E&M-EST. PATIENT-LVL III: CPT | Mod: PBBFAC,,, | Performed by: PHYSICIAN ASSISTANT

## 2017-12-04 PROCEDURE — 90686 IIV4 VACC NO PRSV 0.5 ML IM: CPT | Mod: S$GLB,,, | Performed by: INTERNAL MEDICINE

## 2017-12-04 RX ADMIN — Medication 40 MG: at 09:12

## 2017-12-04 NOTE — PROGRESS NOTES
Subjective:       Patient ID: Soha Wheatley is a 61 y.o. female.    Chief Complaint: Follow-up    HPI   61 year old female morbidly obese, BAYRON (CPAP) patient here today for pre op eval.  Reports she is doing well.  Has no new complaints.  Reports a history of palpitations worked up by cardiology with negative event monitoring.  Denies fever, chills, abdominal pain, nausea/vomiting, palpitations, chest pain, SOB on exertion or PND.          Review of Systems   Constitutional: Negative for chills and fever.   HENT: Negative for congestion, postnasal drip, rhinorrhea and sore throat.    Respiratory: Negative for cough and shortness of breath.    Cardiovascular: Negative for chest pain and palpitations.   Gastrointestinal: Negative for abdominal pain, diarrhea and nausea.   Genitourinary: Negative for dysuria and urgency.   Musculoskeletal: Negative for arthralgias, gait problem and neck stiffness.   Neurological: Negative for dizziness, tremors, speech difficulty and headaches.   Hematological: Negative for adenopathy. Does not bruise/bleed easily.   Psychiatric/Behavioral: Negative for agitation, confusion, self-injury and sleep disturbance.       Objective:      Physical Exam   Constitutional: She is oriented to person, place, and time. She appears well-developed and well-nourished.   Obese    HENT:   Head: Normocephalic and atraumatic.   Right Ear: External ear normal.   Left Ear: External ear normal.   Mouth/Throat: Oropharynx is clear and moist.   Eyes: Conjunctivae and EOM are normal. Pupils are equal, round, and reactive to light.   Neck: Normal range of motion. Neck supple.   Cardiovascular: Normal rate, regular rhythm, normal heart sounds and intact distal pulses.    No murmur heard.  Pulmonary/Chest: Effort normal and breath sounds normal.   Abdominal: Soft. Bowel sounds are normal. She exhibits no distension. There is no tenderness.   Musculoskeletal: Normal range of motion. She exhibits no edema.    Neurological: She is alert and oriented to person, place, and time.   Skin: Skin is warm and dry.   Nursing note and vitals reviewed.      Assessment:       1. Morbid obesity with BMI of 45.0-49.9, adult    2. Anxiety        Plan:       Pre op   Cardiovascular Risk Assessment:  Non-emergent surgery.  No active cardiac problems (such as unstable angina, decompensated heart failure, significant uncontrolled arrhythmias or severe valvular disease).  Intermediate risk surgery.  Functional Status: able to climb a flight of stairs (> 4 METS)  Revised cardiac risk index is 0         Other Issues:      Anticoagulation:None          Coronary Stenting: None  - EKG reviewed  - Stop mobic 1 week prior to surgery   - Pre op formed signed  - Will review labs   - Low cardiac risk for a moderate risk.  - Flu shot given     Case discussed with Dr. Shawnee Smallwood M.D.   PGY-2  Pager [phone]

## 2017-12-04 NOTE — PROGRESS NOTES
Soha Wheatley is a 61 y.o. year old her here today for her 1st Euflexxa injection for degenerative changes of her bilateral knee . she was last seen and treated in the clinic on Visit date not found. There has been no significant change in her medical status since her last visit. No Fever, chills, malaise, or unexplained weight change.      Allergies, Medications, past medical and surgical history were reviewed .    Examination of the knee demonstrates  No evidence of edema, erythema , echymosis strength and range of motion are unchanged from previous visit.    The injection site was identified and the skin was prepared with a betadine solution. The  bilateral knee  joint was injected with 2 ml of Euflexxa solution under sterile technique. Soha Wheatley tolerated the procedure well, she was advised to rest the knee today, ice and elevation. I may take 3 -6 weeks following the last injection to get the full benefit of the medication.  I will see her back in 1 week. Sooner if he has any problems or concerns.           .

## 2017-12-04 NOTE — LETTER
December 4, 2017      Alena Cuevas PA-C  [address]  [facility] [address]           [facility]  [address]  [facility] [address]  Phone: [phone]          Patient: Soha Wheatley   MR Number: [id number]   YOB: 1955   Date of Visit: 12/4/2017       Dear Alena Cuevas:    Thank you for referring Soha Wheatley to me for evaluation. Attached you will find relevant portions of my assessment and plan of care.    If you have questions, please do not hesitate to call me. I look forward to following Soha Wheatley along with you.    Sincerely,    Ben Leary PA-C    Enclosure  CC:  No Recipients    If you would like to receive this communication electronically, please contact [email or web address] or [phone] to request more information on [employer] access.    For providers and/or their staff who would like to refer a patient to [facility], please contact us through our one-stop-shop provider referral line, [facility] [place], at [phone].    If you feel you have received this communication in error or would no longer like to receive these types of communications, please e-mail [email or web address]

## 2017-12-06 ENCOUNTER — LAB VISIT (OUTPATIENT)
Dept: LAB | Facility: HOSPITAL | Age: 62
End: 2017-12-06
Payer: COMMERCIAL

## 2017-12-06 ENCOUNTER — DOCUMENTATION ONLY (OUTPATIENT)
Dept: BARIATRICS | Facility: CLINIC | Age: 62
End: 2017-12-06

## 2017-12-06 ENCOUNTER — OFFICE VISIT (OUTPATIENT)
Dept: BARIATRICS | Facility: CLINIC | Age: 62
End: 2017-12-06
Payer: COMMERCIAL

## 2017-12-06 VITALS
TEMPERATURE: 96 F | RESPIRATION RATE: 18 BRPM | HEART RATE: 68 BPM | BODY MASS INDEX: 41.95 KG/M2 | SYSTOLIC BLOOD PRESSURE: 136 MMHG | DIASTOLIC BLOOD PRESSURE: 62 MMHG | HEIGHT: 70 IN | WEIGHT: 293 LBS

## 2017-12-06 DIAGNOSIS — K21.9 GASTROESOPHAGEAL REFLUX DISEASE, ESOPHAGITIS PRESENCE NOT SPECIFIED: ICD-10-CM

## 2017-12-06 DIAGNOSIS — E66.01 MORBID OBESITY DUE TO EXCESS CALORIES: ICD-10-CM

## 2017-12-06 DIAGNOSIS — R10.31 ABDOMINAL PAIN, RLQ (RIGHT LOWER QUADRANT): ICD-10-CM

## 2017-12-06 DIAGNOSIS — Z01.818 PRE-OP TESTING: ICD-10-CM

## 2017-12-06 DIAGNOSIS — E66.01 MORBID OBESITY WITH BMI OF 45.0-49.9, ADULT: Primary | ICD-10-CM

## 2017-12-06 DIAGNOSIS — E55.9 VITAMIN D DEFICIENCY: ICD-10-CM

## 2017-12-06 DIAGNOSIS — E66.01 MORBID OBESITY WITH BMI OF 45.0-49.9, ADULT: ICD-10-CM

## 2017-12-06 DIAGNOSIS — F41.9 ANXIETY: ICD-10-CM

## 2017-12-06 DIAGNOSIS — N20.0 NEPHROLITHIASIS: ICD-10-CM

## 2017-12-06 LAB
25(OH)D3+25(OH)D2 SERPL-MCNC: 32 NG/ML
ALBUMIN SERPL BCP-MCNC: 3.9 G/DL
ALP SERPL-CCNC: 66 U/L
ALT SERPL W/O P-5'-P-CCNC: 14 U/L
ANION GAP SERPL CALC-SCNC: 8 MMOL/L
AST SERPL-CCNC: 11 U/L
BASOPHILS # BLD AUTO: 0.02 K/UL
BASOPHILS NFR BLD: 0.4 %
BILIRUB SERPL-MCNC: 0.5 MG/DL
BUN SERPL-MCNC: 26 MG/DL
CALCIUM SERPL-MCNC: 9.8 MG/DL
CHLORIDE SERPL-SCNC: 105 MMOL/L
CO2 SERPL-SCNC: 30 MMOL/L
CREAT SERPL-MCNC: 0.8 MG/DL
DIFFERENTIAL METHOD: ABNORMAL
EOSINOPHIL # BLD AUTO: 0.2 K/UL
EOSINOPHIL NFR BLD: 2.9 %
ERYTHROCYTE [DISTWIDTH] IN BLOOD BY AUTOMATED COUNT: 13.5 %
EST. GFR  (AFRICAN AMERICAN): >60 ML/MIN/1.73 M^2
EST. GFR  (NON AFRICAN AMERICAN): >60 ML/MIN/1.73 M^2
ESTIMATED AVG GLUCOSE: 100 MG/DL
GLUCOSE SERPL-MCNC: 117 MG/DL
HBA1C MFR BLD HPLC: 5.1 %
HCT VFR BLD AUTO: 35.9 %
HGB BLD-MCNC: 11 G/DL
IMM GRANULOCYTES # BLD AUTO: 0.01 K/UL
IMM GRANULOCYTES NFR BLD AUTO: 0.2 %
LYMPHOCYTES # BLD AUTO: 1.7 K/UL
LYMPHOCYTES NFR BLD: 30.8 %
MCH RBC QN AUTO: 28.9 PG
MCHC RBC AUTO-ENTMCNC: 30.6 G/DL
MCV RBC AUTO: 94 FL
MONOCYTES # BLD AUTO: 0.6 K/UL
MONOCYTES NFR BLD: 11.2 %
NEUTROPHILS # BLD AUTO: 3.1 K/UL
NEUTROPHILS NFR BLD: 54.5 %
NRBC BLD-RTO: 0 /100 WBC
PLATELET # BLD AUTO: 185 K/UL
PMV BLD AUTO: 9.5 FL
POTASSIUM SERPL-SCNC: 4.9 MMOL/L
PROT SERPL-MCNC: 7.3 G/DL
RBC # BLD AUTO: 3.81 M/UL
SODIUM SERPL-SCNC: 143 MMOL/L
WBC # BLD AUTO: 5.61 K/UL

## 2017-12-06 PROCEDURE — 99213 OFFICE O/P EST LOW 20 MIN: CPT | Mod: S$GLB,,, | Performed by: SURGERY

## 2017-12-06 PROCEDURE — 83036 HEMOGLOBIN GLYCOSYLATED A1C: CPT

## 2017-12-06 PROCEDURE — 85025 COMPLETE CBC W/AUTO DIFF WBC: CPT

## 2017-12-06 PROCEDURE — 82306 VITAMIN D 25 HYDROXY: CPT

## 2017-12-06 PROCEDURE — 99999 PR PBB SHADOW E&M-EST. PATIENT-LVL III: CPT | Mod: PBBFAC,,, | Performed by: SURGERY

## 2017-12-06 PROCEDURE — 80053 COMPREHEN METABOLIC PANEL: CPT

## 2017-12-06 PROCEDURE — 36415 COLL VENOUS BLD VENIPUNCTURE: CPT

## 2017-12-06 RX ORDER — HEPARIN SODIUM 5000 [USP'U]/ML
5000 INJECTION, SOLUTION INTRAVENOUS; SUBCUTANEOUS ONCE
Status: CANCELLED | OUTPATIENT
Start: 2017-12-06 | End: 2017-12-06

## 2017-12-06 RX ORDER — FAMOTIDINE 10 MG/ML
20 INJECTION INTRAVENOUS ONCE
Status: CANCELLED | OUTPATIENT
Start: 2017-12-06 | End: 2017-12-06

## 2017-12-06 RX ORDER — HYDROCODONE BITARTRATE AND ACETAMINOPHEN 7.5; 325 MG/15ML; MG/15ML
15 SOLUTION ORAL 4 TIMES DAILY PRN
Qty: 473 ML | Refills: 0 | Status: SHIPPED | OUTPATIENT
Start: 2017-12-06 | End: 2018-02-22

## 2017-12-06 RX ORDER — SODIUM CITRATE AND CITRIC ACID MONOHYDRATE 334; 500 MG/5ML; MG/5ML
30 SOLUTION ORAL ONCE
Status: CANCELLED | OUTPATIENT
Start: 2017-12-06 | End: 2017-12-06

## 2017-12-06 RX ORDER — METOCLOPRAMIDE HYDROCHLORIDE 5 MG/ML
10 INJECTION INTRAMUSCULAR; INTRAVENOUS ONCE
Status: CANCELLED | OUTPATIENT
Start: 2017-12-06 | End: 2017-12-06

## 2017-12-06 RX ORDER — MUPIROCIN 20 MG/G
OINTMENT TOPICAL
Status: CANCELLED | OUTPATIENT
Start: 2017-12-06

## 2017-12-06 NOTE — PROGRESS NOTES
Subjective:  The patient is a 62 y.o. obese female who presents for pre op for gastric sleeve surgery.  The patient has tried WW x 9 years and lost 60 lbs.  Once her mom  21 years ago, she regained the weight and has had difficulty with her weight ever since.  Her highest weight is her current weight, 334 lbs.  She is an emotional/stress eater.  She wants a Sleeve Gastrectomy with Dr. Holloway.  All workup has been reviewed in clinic today and there is nothing on the review that would prevent us from proceeding with surgery.  All questions were answered in clinic today prior to leaving.  Body mass index is 45.2 kg/m².       Patient Active Problem List    Diagnosis Date Noted    GERD (gastroesophageal reflux disease) 2017    Anxiety 2017    Palpitations 2017    Family history of early CAD 2017    Cervical radiculopathy 2015    Cervical neuropathy 2015    Nephrolithiasis 2015    Calculus of right kidney 2015    Right knee pain 2015    Morbid obesity with BMI of 45.0-49.9, adult 2014    Back pain 2013    Chronic cholecystitis 2013    Abdominal pain, RLQ (right lower quadrant) 2013     Past Medical History:   Diagnosis Date    Allergy     Anxiety     Arthritis     BMI 45.0-49.9, adult     Chronic kidney disease     Depression     GERD (gastroesophageal reflux disease)     History of kidney stones     Kidney stones     Migraines     Morbid obesity     Obesity       Past Surgical History:   Procedure Laterality Date     SECTION      CHOLECYSTECTOMY      KNEE ARTHRODESIS      KNEE CARTILAGE SURGERY      TONSILLECTOMY      URETEROSCOPY          (Not in a hospital admission)  Review of patient's allergies indicates:   Allergen Reactions    Adhesive      Paper tape usually ok- pulls skin off    Flagyl [metronidazole hcl]      confusion      Social History   Substance Use Topics    Smoking  "status: Never Smoker    Smokeless tobacco: Never Used    Alcohol use No      Family History   Problem Relation Age of Onset    Cancer Mother      breast    Hyperlipidemia Father     Hypertension Father     VANDANA disease Maternal Grandmother     Heart disease Maternal Grandmother     Hyperlipidemia Maternal Grandmother     Pancreatic cancer Maternal Grandfather     Cancer Maternal Grandfather 88     colon cancer    Heart disease Maternal Uncle     Heart attack Paternal Grandfather     Celiac disease Neg Hx     Cirrhosis Neg Hx     Colon cancer Neg Hx     Colon polyps Neg Hx     Crohn's disease Neg Hx     Cystic fibrosis Neg Hx     Esophageal cancer Neg Hx     Hemochromatosis Neg Hx     Inflammatory bowel disease Neg Hx     Irritable bowel syndrome Neg Hx     Liver cancer Neg Hx     Liver disease Neg Hx     Rectal cancer Neg Hx     Stomach cancer Neg Hx     Ulcerative colitis Neg Hx     Silvestre's disease Neg Hx         Review of Systems  Constitutional: negative for anorexia, chills and fatigue  Eyes: negative for icterus, irritation and redness  Respiratory: negative for cough and dyspnea on exertion  Cardiovascular: negative for chest pain and chest pressure/discomfort  Gastrointestinal: negative for abdominal pain, change in bowel habits, constipation and diarrhea  Musculoskeletal:negative for arthralgias and back pain  Neurological: negative for coordination problems and dizziness  Behavioral/Psych: negative for anxiety and bad mood    Objective:  Vital signs in last 24 hours:  Vitals:    12/06/17 0943   BP: 136/62   BP Location: Left arm   Patient Position: Sitting   BP Method: Large (Automatic)   Pulse: 68   Resp: 18   Temp: 96.4 °F (35.8 °C)   TempSrc: Oral   Weight: (!) 142.9 kg (315 lb 0.6 oz)   Height: 5' 10" (1.778 m)       General appearance: alert, appears stated age and cooperative  Head: Normocephalic, without obvious abnormality, atraumatic  Eyes: negative findings: lids and " lashes normal and conjunctivae and sclerae normal  Neck: supple, symmetrical, trachea midline and thyroid not enlarged, symmetric, no tenderness/mass/nodules  Lungs: clear to auscultation bilaterally  Heart: regular rate and rhythm, S1, S2 normal, no murmur, click, rub or gallop  Abdomen: soft, non-tender; bowel sounds normal; no masses,  no organomegaly  Extremities: extremities normal, atraumatic, no cyanosis or edema  Skin: Skin color, texture, turgor normal. No rashes or lesions  Neurologic: Grossly normal    Data Review:  Psych: Cleared  Nutrition: Cleared  PCP: Cleared  CXR: WNL  EKG: WNL  EGD: Impression:        - Normal esophagus.                        - Esophagogastric landmarks identified.                        - Normal gastric body and antrum. Biopsied.                        - Normal examined duodenum.  Labs: No abnormalities that would prevent proceeding with surgery.    Assessment/Plan:  Morbid obesity with failure of conservative therapy.    The patient was informed that risks include, but are not limited to: death, leak, obstruction, bleeding, and sepsis. Any of these could require further surgery. Other risks include DVT, PE, pneumonia, wound dehiscence, hernia, wound infection, the need for dilatations and the inability to lose appropriate weight and keep it off.     We discussed that our goal is to ameliorate her medical problems and not to obtain a specific body mass index. She understands the risks and benefits and wishes to proceed with the procedure. She has signed a consent form.       Andrew Holloway MD

## 2017-12-06 NOTE — PROGRESS NOTES
Pre-op for Gastric sleeve:     Provided written/verbal information on pre- and post-bariatric surgery diet.  Reviewed pre-op liquid diet instructions.  Explained diet progression, stressing the importance of meeting protein & fluid needs.  Instructed on the importance of keeping a journal in order to monitor intake.  Provided and reviewed list of multivitamin/mineral supplementation to prevent deficiencies.  Provided written material and phone number to contact if any further questions.     Protein Powder/Shakes:  Premier Protein RTD     Vitamins:   Flinstones Complete  B-12  B-Complex  Calcium Citrate+Vitamin D      Comprehension:  Patient with good comprehension as evidenced by appropriate questions and concern expressed.

## 2017-12-07 NOTE — PROGRESS NOTES
I have personally taken the history and examined this patient and agree with the resident's note as stated above with the following thoughts:  Good luck with surgery.  She will be on liquid diet 2 weeks before suregery.  Will follow up in about 2 months post op.

## 2017-12-11 ENCOUNTER — OFFICE VISIT (OUTPATIENT)
Dept: ORTHOPEDICS | Facility: CLINIC | Age: 62
End: 2017-12-11
Payer: COMMERCIAL

## 2017-12-11 DIAGNOSIS — M17.0 PRIMARY OSTEOARTHRITIS OF BOTH KNEES: Primary | ICD-10-CM

## 2017-12-11 PROCEDURE — 99999 PR PBB SHADOW E&M-EST. PATIENT-LVL III: CPT | Mod: PBBFAC,,, | Performed by: PHYSICIAN ASSISTANT

## 2017-12-11 PROCEDURE — 20610 DRAIN/INJ JOINT/BURSA W/O US: CPT | Mod: 50,S$GLB,, | Performed by: PHYSICIAN ASSISTANT

## 2017-12-11 PROCEDURE — 99499 UNLISTED E&M SERVICE: CPT | Mod: S$GLB,,, | Performed by: PHYSICIAN ASSISTANT

## 2017-12-11 RX ADMIN — Medication 40 MG: at 03:12

## 2017-12-11 NOTE — PROGRESS NOTES
Soha Wheatley presents to clinic today for the second bilateral knee Euflexxa injection.    Exam demonstrates the no effusion in the bilateral knee, and the skin is intact.    Diagnosis: osteoarthritis knee    After time out was performed and patient ID, side, and site were verified, the right knee and the left knee were sterilly prepped in the standard fashion.  A 22-gauge needle was introduced into the right knee and the left knee joint from an rafal-lateral site without complication. The right knee and the left knee were then injected with 2 ml of Euflexxa each. Sterile dressing was applied.  The patient was instructed to resume activities as tolerated and to call with any problems.     We will see Soha Wheatley back next week for the third injection.

## 2017-12-18 ENCOUNTER — OFFICE VISIT (OUTPATIENT)
Dept: ORTHOPEDICS | Facility: CLINIC | Age: 62
End: 2017-12-18
Payer: COMMERCIAL

## 2017-12-18 ENCOUNTER — LAB VISIT (OUTPATIENT)
Dept: LAB | Facility: HOSPITAL | Age: 62
End: 2017-12-18
Payer: COMMERCIAL

## 2017-12-18 VITALS — BODY MASS INDEX: 41.95 KG/M2 | HEIGHT: 70 IN | WEIGHT: 293 LBS

## 2017-12-18 DIAGNOSIS — Z86.19 HISTORY OF HELICOBACTER PYLORI INFECTION: ICD-10-CM

## 2017-12-18 DIAGNOSIS — M17.0 PRIMARY OSTEOARTHRITIS OF BOTH KNEES: Primary | ICD-10-CM

## 2017-12-18 PROCEDURE — 87338 HPYLORI STOOL AG IA: CPT

## 2017-12-18 PROCEDURE — 99499 UNLISTED E&M SERVICE: CPT | Mod: S$GLB,,, | Performed by: PHYSICIAN ASSISTANT

## 2017-12-18 PROCEDURE — 20610 DRAIN/INJ JOINT/BURSA W/O US: CPT | Mod: 50,S$GLB,, | Performed by: PHYSICIAN ASSISTANT

## 2017-12-18 PROCEDURE — 99999 PR PBB SHADOW E&M-EST. PATIENT-LVL III: CPT | Mod: PBBFAC,,, | Performed by: PHYSICIAN ASSISTANT

## 2017-12-18 RX ADMIN — Medication 40 MG: at 01:12

## 2017-12-18 NOTE — PROGRESS NOTES
Soha Wheatley presents to clinic today for the third bilateral knee Euflexxa injection.    Exam demonstrates the no effusion in the bilateral knee, and the skin is intact.    Diagnosis: osteoarthritis knee    After time out was performed and patient ID, side, and site were verified, the right knee and the left knee were sterilly prepped in the standard fashion.  A 22-gauge needle was introduced into the right knee and the left knee joint from an rafal-lateral site without complication. The right knee and the left knee were then injected with 2 ml of Euflexxa each. Sterile dressing was applied.  The patient was instructed to resume activities as tolerated and to call with any problems.     F/u prn.

## 2017-12-20 ENCOUNTER — TELEPHONE (OUTPATIENT)
Dept: BARIATRICS | Facility: CLINIC | Age: 62
End: 2017-12-20

## 2017-12-20 NOTE — TELEPHONE ENCOUNTER
Per Dr. Holloway, he needs to cancel surgery for a family emergency.  Notified patient and informed her that we will call her to reschedule as soon as we know that Dr. Holloway will be back in town.   Patient was very upset but in the end was ok.  Notified Mouna in OR to place cases on hold.

## 2017-12-21 NOTE — TELEPHONE ENCOUNTER
Spoke with Dr. Holloway and rescheduled for Friday 12/29/17; notified patient and Mouna in OR scheduling

## 2017-12-22 LAB — H PYLORI AG STL QL: NOT DETECTED

## 2017-12-28 ENCOUNTER — TELEPHONE (OUTPATIENT)
Dept: BARIATRICS | Facility: CLINIC | Age: 62
End: 2017-12-28

## 2017-12-28 ENCOUNTER — ANESTHESIA EVENT (OUTPATIENT)
Dept: SURGERY | Facility: HOSPITAL | Age: 62
DRG: 621 | End: 2017-12-28
Payer: COMMERCIAL

## 2017-12-28 NOTE — TELEPHONE ENCOUNTER
Notified patient of arrival time to the [facility] 2[facility] floor [facility] at 0500 with expected surgery start time 0700 on 12/19/17.   Instructed patient regarding pre-op instructions including npo status, showering and preop medications to hold/take per anesthesia/preop.  Instructed patient on the s/s of dehydration and for patient to call at the first sign of dehydration.  Informed patient that someone from bariatrics will be call them 1 week post op to review diet/fluid intake and to ensure adequate hydration.   Pt verbalized understanding. Pt given office phone number for any additional questions/concerns.

## 2017-12-29 ENCOUNTER — HOSPITAL ENCOUNTER (INPATIENT)
Facility: HOSPITAL | Age: 62
LOS: 1 days | Discharge: HOME | DRG: 621 | End: 2017-12-30
Attending: SURGERY | Admitting: SURGERY
Payer: COMMERCIAL

## 2017-12-29 ENCOUNTER — ANESTHESIA (OUTPATIENT)
Dept: SURGERY | Facility: HOSPITAL | Age: 62
DRG: 621 | End: 2017-12-29
Payer: COMMERCIAL

## 2017-12-29 ENCOUNTER — SURGERY (OUTPATIENT)
Age: 62
End: 2017-12-29

## 2017-12-29 DIAGNOSIS — E66.01 MORBID OBESITY WITH BMI OF 45.0-49.9, ADULT: ICD-10-CM

## 2017-12-29 LAB — POCT GLUCOSE: 98 MG/DL (ref 70–110)

## 2017-12-29 PROCEDURE — 27000221 HC OXYGEN, UP TO 24 HOURS

## 2017-12-29 PROCEDURE — 63600175 PHARM REV CODE 636 W HCPCS: Performed by: NURSE ANESTHETIST, CERTIFIED REGISTERED

## 2017-12-29 PROCEDURE — 88342 IMHCHEM/IMCYTCHM 1ST ANTB: CPT | Performed by: PATHOLOGY

## 2017-12-29 PROCEDURE — D9220A PRA ANESTHESIA: Mod: CRNA,,, | Performed by: NURSE ANESTHETIST, CERTIFIED REGISTERED

## 2017-12-29 PROCEDURE — 0DB64Z3 EXCISION OF STOMACH, PERCUTANEOUS ENDOSCOPIC APPROACH, VERTICAL: ICD-10-PCS | Performed by: SURGERY

## 2017-12-29 PROCEDURE — 63600175 PHARM REV CODE 636 W HCPCS

## 2017-12-29 PROCEDURE — 88307 TISSUE EXAM BY PATHOLOGIST: CPT | Performed by: PATHOLOGY

## 2017-12-29 PROCEDURE — 43775 LAP SLEEVE GASTRECTOMY: CPT | Mod: ,,, | Performed by: SURGERY

## 2017-12-29 PROCEDURE — 27201423 OPTIME MED/SURG SUP & DEVICES STERILE SUPPLY: Performed by: SURGERY

## 2017-12-29 PROCEDURE — 63600175 PHARM REV CODE 636 W HCPCS: Performed by: STUDENT IN AN ORGANIZED HEALTH CARE EDUCATION/TRAINING PROGRAM

## 2017-12-29 PROCEDURE — 88307 TISSUE EXAM BY PATHOLOGIST: CPT | Mod: 26,,, | Performed by: PATHOLOGY

## 2017-12-29 PROCEDURE — 25000003 PHARM REV CODE 250: Performed by: STUDENT IN AN ORGANIZED HEALTH CARE EDUCATION/TRAINING PROGRAM

## 2017-12-29 PROCEDURE — 71000039 HC RECOVERY, EACH ADD'L HOUR: Performed by: SURGERY

## 2017-12-29 PROCEDURE — 63600175 PHARM REV CODE 636 W HCPCS: Performed by: SURGERY

## 2017-12-29 PROCEDURE — 0DJ08ZZ INSPECTION OF UPPER INTESTINAL TRACT, VIA NATURAL OR ARTIFICIAL OPENING ENDOSCOPIC: ICD-10-PCS | Performed by: SURGERY

## 2017-12-29 PROCEDURE — D9220A PRA ANESTHESIA: Mod: ANES,,, | Performed by: ANESTHESIOLOGY

## 2017-12-29 PROCEDURE — 71000033 HC RECOVERY, INTIAL HOUR: Performed by: SURGERY

## 2017-12-29 PROCEDURE — 36000710: Performed by: SURGERY

## 2017-12-29 PROCEDURE — 11000001 HC ACUTE MED/SURG PRIVATE ROOM

## 2017-12-29 PROCEDURE — 82962 GLUCOSE BLOOD TEST: CPT | Performed by: SURGERY

## 2017-12-29 PROCEDURE — 37000009 HC ANESTHESIA EA ADD 15 MINS: Performed by: SURGERY

## 2017-12-29 PROCEDURE — 27800903 OPTIME MED/SURG SUP & DEVICES OTHER IMPLANTS: Performed by: SURGERY

## 2017-12-29 PROCEDURE — C9113 INJ PANTOPRAZOLE SODIUM, VIA: HCPCS | Performed by: STUDENT IN AN ORGANIZED HEALTH CARE EDUCATION/TRAINING PROGRAM

## 2017-12-29 PROCEDURE — 36000711: Performed by: SURGERY

## 2017-12-29 PROCEDURE — 37000008 HC ANESTHESIA 1ST 15 MINUTES: Performed by: SURGERY

## 2017-12-29 PROCEDURE — 25000003 PHARM REV CODE 250: Performed by: NURSE ANESTHETIST, CERTIFIED REGISTERED

## 2017-12-29 PROCEDURE — 88342 IMHCHEM/IMCYTCHM 1ST ANTB: CPT | Mod: 26,,, | Performed by: PATHOLOGY

## 2017-12-29 PROCEDURE — 94799 UNLISTED PULMONARY SVC/PX: CPT

## 2017-12-29 PROCEDURE — 94761 N-INVAS EAR/PLS OXIMETRY MLT: CPT

## 2017-12-29 PROCEDURE — S0028 INJECTION, FAMOTIDINE, 20 MG: HCPCS | Performed by: SURGERY

## 2017-12-29 PROCEDURE — 25000003 PHARM REV CODE 250: Performed by: SURGERY

## 2017-12-29 DEVICE — SEAMGUARD ESCHELON 60 MM.: Type: IMPLANTABLE DEVICE | Site: ABDOMEN | Status: FUNCTIONAL

## 2017-12-29 RX ORDER — LIDOCAINE HCL/PF 100 MG/5ML
SYRINGE (ML) INTRAVENOUS
Status: DISCONTINUED | OUTPATIENT
Start: 2017-12-29 | End: 2017-12-29

## 2017-12-29 RX ORDER — HYDROCODONE BITARTRATE AND ACETAMINOPHEN 7.5; 325 MG/15ML; MG/15ML
15 SOLUTION ORAL 4 TIMES DAILY PRN
Status: DISCONTINUED | OUTPATIENT
Start: 2017-12-30 | End: 2017-12-29

## 2017-12-29 RX ORDER — LIDOCAINE HYDROCHLORIDE AND EPINEPHRINE 15; 5 MG/ML; UG/ML
INJECTION, SOLUTION EPIDURAL
Status: DISCONTINUED | OUTPATIENT
Start: 2017-12-29 | End: 2017-12-29

## 2017-12-29 RX ORDER — HYDROMORPHONE HYDROCHLORIDE 2 MG/ML
0.2 INJECTION, SOLUTION INTRAMUSCULAR; INTRAVENOUS; SUBCUTANEOUS EVERY 5 MIN PRN
Status: DISCONTINUED | OUTPATIENT
Start: 2017-12-29 | End: 2017-12-29 | Stop reason: HOSPADM

## 2017-12-29 RX ORDER — FAMOTIDINE 10 MG/ML
20 INJECTION INTRAVENOUS ONCE
Status: COMPLETED | OUTPATIENT
Start: 2017-12-29 | End: 2017-12-29

## 2017-12-29 RX ORDER — ENOXAPARIN SODIUM 100 MG/ML
40 INJECTION SUBCUTANEOUS
Status: DISCONTINUED | OUTPATIENT
Start: 2017-12-29 | End: 2017-12-30 | Stop reason: HOSPADM

## 2017-12-29 RX ORDER — PANTOPRAZOLE SODIUM 40 MG/10ML
40 INJECTION, POWDER, LYOPHILIZED, FOR SOLUTION INTRAVENOUS 2 TIMES DAILY
Status: DISCONTINUED | OUTPATIENT
Start: 2017-12-29 | End: 2017-12-30 | Stop reason: HOSPADM

## 2017-12-29 RX ORDER — PROCHLORPERAZINE EDISYLATE 5 MG/ML
INJECTION INTRAMUSCULAR; INTRAVENOUS
Status: DISPENSED
Start: 2017-12-29 | End: 2017-12-29

## 2017-12-29 RX ORDER — PROPOFOL 10 MG/ML
VIAL (ML) INTRAVENOUS
Status: DISCONTINUED | OUTPATIENT
Start: 2017-12-29 | End: 2017-12-29

## 2017-12-29 RX ORDER — GABAPENTIN 250 MG/5ML
600 SOLUTION ORAL 3 TIMES DAILY
Status: DISCONTINUED | OUTPATIENT
Start: 2017-12-30 | End: 2017-12-30 | Stop reason: HOSPADM

## 2017-12-29 RX ORDER — BUPIVACAINE HYDROCHLORIDE 2.5 MG/ML
INJECTION, SOLUTION EPIDURAL; INFILTRATION; INTRACAUDAL
Status: DISCONTINUED | OUTPATIENT
Start: 2017-12-29 | End: 2017-12-29

## 2017-12-29 RX ORDER — GLYCOPYRROLATE 0.2 MG/ML
INJECTION INTRAMUSCULAR; INTRAVENOUS
Status: DISCONTINUED | OUTPATIENT
Start: 2017-12-29 | End: 2017-12-29

## 2017-12-29 RX ORDER — HYDROCODONE BITARTRATE AND ACETAMINOPHEN 7.5; 325 MG/15ML; MG/15ML
15 SOLUTION ORAL 4 TIMES DAILY PRN
Status: DISCONTINUED | OUTPATIENT
Start: 2017-12-30 | End: 2017-12-30 | Stop reason: HOSPADM

## 2017-12-29 RX ORDER — LIDOCAINE HYDROCHLORIDE 10 MG/ML
0.2 INJECTION, SOLUTION EPIDURAL; INFILTRATION; INTRACAUDAL; PERINEURAL ONCE
Status: COMPLETED | OUTPATIENT
Start: 2017-12-29 | End: 2017-12-29

## 2017-12-29 RX ORDER — ONDANSETRON 2 MG/ML
4 INJECTION INTRAMUSCULAR; INTRAVENOUS EVERY 6 HOURS PRN
Status: DISCONTINUED | OUTPATIENT
Start: 2017-12-29 | End: 2017-12-30 | Stop reason: HOSPADM

## 2017-12-29 RX ORDER — MUPIROCIN 20 MG/G
OINTMENT TOPICAL
Status: DISCONTINUED | OUTPATIENT
Start: 2017-12-29 | End: 2017-12-30 | Stop reason: HOSPADM

## 2017-12-29 RX ORDER — ROCURONIUM BROMIDE 10 MG/ML
INJECTION, SOLUTION INTRAVENOUS
Status: DISCONTINUED | OUTPATIENT
Start: 2017-12-29 | End: 2017-12-29

## 2017-12-29 RX ORDER — SUCCINYLCHOLINE CHLORIDE 20 MG/ML
INJECTION INTRAMUSCULAR; INTRAVENOUS
Status: DISCONTINUED | OUTPATIENT
Start: 2017-12-29 | End: 2017-12-29

## 2017-12-29 RX ORDER — NEOSTIGMINE METHYLSULFATE 1 MG/ML
INJECTION, SOLUTION INTRAVENOUS
Status: DISCONTINUED | OUTPATIENT
Start: 2017-12-29 | End: 2017-12-29

## 2017-12-29 RX ORDER — SODIUM CHLORIDE 0.9 % (FLUSH) 0.9 %
3 SYRINGE (ML) INJECTION
Status: DISCONTINUED | OUTPATIENT
Start: 2017-12-29 | End: 2017-12-29 | Stop reason: HOSPADM

## 2017-12-29 RX ORDER — SODIUM CHLORIDE 9 MG/ML
INJECTION, SOLUTION INTRAVENOUS CONTINUOUS
Status: DISCONTINUED | OUTPATIENT
Start: 2017-12-29 | End: 2017-12-30

## 2017-12-29 RX ORDER — ACETAMINOPHEN 10 MG/ML
1000 INJECTION, SOLUTION INTRAVENOUS EVERY 8 HOURS
Status: DISCONTINUED | OUTPATIENT
Start: 2017-12-29 | End: 2017-12-30 | Stop reason: HOSPADM

## 2017-12-29 RX ORDER — HYDROCODONE BITARTRATE AND ACETAMINOPHEN 7.5; 325 MG/15ML; MG/15ML
15 SOLUTION ORAL EVERY 4 HOURS PRN
Status: DISCONTINUED | OUTPATIENT
Start: 2017-12-30 | End: 2017-12-29

## 2017-12-29 RX ORDER — POLYETHYLENE GLYCOL 3350 17 G/17G
17 POWDER, FOR SOLUTION ORAL DAILY
Status: DISCONTINUED | OUTPATIENT
Start: 2017-12-30 | End: 2017-12-30 | Stop reason: HOSPADM

## 2017-12-29 RX ORDER — HYDROMORPHONE HCL IN 0.9% NACL 6 MG/30 ML
PATIENT CONTROLLED ANALGESIA SYRINGE INTRAVENOUS CONTINUOUS
Status: DISCONTINUED | OUTPATIENT
Start: 2017-12-29 | End: 2017-12-30

## 2017-12-29 RX ORDER — NALOXONE HCL 0.4 MG/ML
0.02 VIAL (ML) INJECTION
Status: DISCONTINUED | OUTPATIENT
Start: 2017-12-29 | End: 2017-12-30 | Stop reason: HOSPADM

## 2017-12-29 RX ORDER — HYDROMORPHONE HCL IN 0.9% NACL 6 MG/30 ML
PATIENT CONTROLLED ANALGESIA SYRINGE INTRAVENOUS
Status: COMPLETED
Start: 2017-12-29 | End: 2017-12-29

## 2017-12-29 RX ORDER — ACETAMINOPHEN 10 MG/ML
INJECTION, SOLUTION INTRAVENOUS
Status: DISCONTINUED | OUTPATIENT
Start: 2017-12-29 | End: 2017-12-29

## 2017-12-29 RX ORDER — SODIUM CITRATE AND CITRIC ACID MONOHYDRATE 334; 500 MG/5ML; MG/5ML
30 SOLUTION ORAL ONCE
Status: COMPLETED | OUTPATIENT
Start: 2017-12-29 | End: 2017-12-29

## 2017-12-29 RX ORDER — MIDAZOLAM HYDROCHLORIDE 1 MG/ML
INJECTION, SOLUTION INTRAMUSCULAR; INTRAVENOUS
Status: DISCONTINUED | OUTPATIENT
Start: 2017-12-29 | End: 2017-12-29

## 2017-12-29 RX ORDER — METOCLOPRAMIDE HYDROCHLORIDE 5 MG/ML
10 INJECTION INTRAMUSCULAR; INTRAVENOUS ONCE
Status: COMPLETED | OUTPATIENT
Start: 2017-12-29 | End: 2017-12-29

## 2017-12-29 RX ORDER — ONDANSETRON 2 MG/ML
INJECTION INTRAMUSCULAR; INTRAVENOUS
Status: DISCONTINUED | OUTPATIENT
Start: 2017-12-29 | End: 2017-12-29

## 2017-12-29 RX ORDER — FENTANYL CITRATE 50 UG/ML
INJECTION, SOLUTION INTRAMUSCULAR; INTRAVENOUS
Status: DISCONTINUED | OUTPATIENT
Start: 2017-12-29 | End: 2017-12-29

## 2017-12-29 RX ORDER — HEPARIN SODIUM 5000 [USP'U]/ML
5000 INJECTION, SOLUTION INTRAVENOUS; SUBCUTANEOUS ONCE
Status: COMPLETED | OUTPATIENT
Start: 2017-12-29 | End: 2017-12-29

## 2017-12-29 RX ORDER — ONDANSETRON 8 MG/1
8 TABLET, ORALLY DISINTEGRATING ORAL EVERY 6 HOURS PRN
Status: DISCONTINUED | OUTPATIENT
Start: 2017-12-30 | End: 2017-12-30 | Stop reason: HOSPADM

## 2017-12-29 RX ORDER — DEXAMETHASONE SODIUM PHOSPHATE 4 MG/ML
INJECTION, SOLUTION INTRA-ARTICULAR; INTRALESIONAL; INTRAMUSCULAR; INTRAVENOUS; SOFT TISSUE
Status: DISCONTINUED | OUTPATIENT
Start: 2017-12-29 | End: 2017-12-29

## 2017-12-29 RX ORDER — PROCHLORPERAZINE EDISYLATE 5 MG/ML
5 INJECTION INTRAMUSCULAR; INTRAVENOUS EVERY 6 HOURS PRN
Status: DISCONTINUED | OUTPATIENT
Start: 2017-12-29 | End: 2017-12-29 | Stop reason: HOSPADM

## 2017-12-29 RX ADMIN — SODIUM CITRATE AND CITRIC ACID MONOHYDRATE 30 ML: 500; 334 SOLUTION ORAL at 06:12

## 2017-12-29 RX ADMIN — BUPIVACAINE HYDROCHLORIDE 30 ML: 2.5 INJECTION, SOLUTION EPIDURAL; INFILTRATION; INTRACAUDAL; PERINEURAL at 07:12

## 2017-12-29 RX ADMIN — FENTANYL CITRATE 50 MCG: 50 INJECTION, SOLUTION INTRAMUSCULAR; INTRAVENOUS at 08:12

## 2017-12-29 RX ADMIN — ONDANSETRON 4 MG: 2 INJECTION INTRAMUSCULAR; INTRAVENOUS at 08:12

## 2017-12-29 RX ADMIN — GLYCOPYRROLATE 0.4 MG: 0.2 INJECTION, SOLUTION INTRAMUSCULAR; INTRAVENOUS at 08:12

## 2017-12-29 RX ADMIN — LIDOCAINE HYDROCHLORIDE AND EPINEPHRINE 30 ML: 15; 5 INJECTION, SOLUTION EPIDURAL at 07:12

## 2017-12-29 RX ADMIN — PANTOPRAZOLE SODIUM 40 MG: 40 INJECTION, POWDER, FOR SOLUTION INTRAVENOUS at 10:12

## 2017-12-29 RX ADMIN — EPHEDRINE SULFATE 5 MG: 50 INJECTION, SOLUTION INTRAMUSCULAR; INTRAVENOUS; SUBCUTANEOUS at 07:12

## 2017-12-29 RX ADMIN — SODIUM CHLORIDE: 0.9 INJECTION, SOLUTION INTRAVENOUS at 09:12

## 2017-12-29 RX ADMIN — SUCCINYLCHOLINE CHLORIDE 200 MG: 20 INJECTION, SOLUTION INTRAMUSCULAR; INTRAVENOUS at 07:12

## 2017-12-29 RX ADMIN — ROCURONIUM BROMIDE 5 MG: 10 INJECTION, SOLUTION INTRAVENOUS at 07:12

## 2017-12-29 RX ADMIN — ACETAMINOPHEN 1000 MG: 10 INJECTION, SOLUTION INTRAVENOUS at 03:12

## 2017-12-29 RX ADMIN — ROCURONIUM BROMIDE 45 MG: 10 INJECTION, SOLUTION INTRAVENOUS at 07:12

## 2017-12-29 RX ADMIN — FENTANYL CITRATE 100 MCG: 50 INJECTION, SOLUTION INTRAMUSCULAR; INTRAVENOUS at 07:12

## 2017-12-29 RX ADMIN — LIDOCAINE HYDROCHLORIDE 0.2 MG: 10 INJECTION, SOLUTION EPIDURAL; INFILTRATION; INTRACAUDAL; PERINEURAL at 06:12

## 2017-12-29 RX ADMIN — Medication: at 09:12

## 2017-12-29 RX ADMIN — METOCLOPRAMIDE 10 MG: 5 INJECTION, SOLUTION INTRAMUSCULAR; INTRAVENOUS at 06:12

## 2017-12-29 RX ADMIN — DEXTROSE 3 G: 50 INJECTION, SOLUTION INTRAVENOUS at 07:12

## 2017-12-29 RX ADMIN — EPHEDRINE SULFATE 10 MG: 50 INJECTION, SOLUTION INTRAMUSCULAR; INTRAVENOUS; SUBCUTANEOUS at 07:12

## 2017-12-29 RX ADMIN — SODIUM CHLORIDE: 0.9 INJECTION, SOLUTION INTRAVENOUS at 10:12

## 2017-12-29 RX ADMIN — GLYCOPYRROLATE 0.2 MG: 0.2 INJECTION, SOLUTION INTRAMUSCULAR; INTRAVENOUS at 07:12

## 2017-12-29 RX ADMIN — HEPARIN SODIUM 5000 UNITS: 5000 INJECTION, SOLUTION INTRAVENOUS; SUBCUTANEOUS at 06:12

## 2017-12-29 RX ADMIN — ACETAMINOPHEN 1000 MG: 10 INJECTION, SOLUTION INTRAVENOUS at 11:12

## 2017-12-29 RX ADMIN — DEXAMETHASONE SODIUM PHOSPHATE 8 MG: 4 INJECTION, SOLUTION INTRAMUSCULAR; INTRAVENOUS at 07:12

## 2017-12-29 RX ADMIN — PROPOFOL 170 MG: 10 INJECTION, EMULSION INTRAVENOUS at 07:12

## 2017-12-29 RX ADMIN — Medication: at 03:12

## 2017-12-29 RX ADMIN — MUPIROCIN: 20 OINTMENT TOPICAL at 06:12

## 2017-12-29 RX ADMIN — ACETAMINOPHEN 1000 MG: 10 INJECTION, SOLUTION INTRAVENOUS at 08:12

## 2017-12-29 RX ADMIN — PROCHLORPERAZINE EDISYLATE 5 MG: 5 INJECTION INTRAMUSCULAR; INTRAVENOUS at 09:12

## 2017-12-29 RX ADMIN — NEOSTIGMINE METHYLSULFATE 5 MG: 1 INJECTION INTRAVENOUS at 08:12

## 2017-12-29 RX ADMIN — SODIUM CHLORIDE, SODIUM GLUCONATE, SODIUM ACETATE, POTASSIUM CHLORIDE, MAGNESIUM CHLORIDE, SODIUM PHOSPHATE, DIBASIC, AND POTASSIUM PHOSPHATE: .53; .5; .37; .037; .03; .012; .00082 INJECTION, SOLUTION INTRAVENOUS at 07:12

## 2017-12-29 RX ADMIN — LIDOCAINE HYDROCHLORIDE 100 MG: 20 INJECTION, SOLUTION INTRAVENOUS at 07:12

## 2017-12-29 RX ADMIN — SODIUM CHLORIDE: 0.9 INJECTION, SOLUTION INTRAVENOUS at 06:12

## 2017-12-29 RX ADMIN — FAMOTIDINE 20 MG: 10 INJECTION, SOLUTION INTRAVENOUS at 06:12

## 2017-12-29 RX ADMIN — MIDAZOLAM HYDROCHLORIDE 2 MG: 1 INJECTION, SOLUTION INTRAMUSCULAR; INTRAVENOUS at 07:12

## 2017-12-29 RX ADMIN — FENTANYL CITRATE 50 MCG: 50 INJECTION, SOLUTION INTRAMUSCULAR; INTRAVENOUS at 07:12

## 2017-12-29 RX ADMIN — PANTOPRAZOLE SODIUM 40 MG: 40 INJECTION, POWDER, FOR SOLUTION INTRAVENOUS at 08:12

## 2017-12-29 NOTE — ANESTHESIA PREPROCEDURE EVALUATION
2017  Pre-operative evaluation for Procedure(s) (LRB):  GASTRECTOMY-SLEEVE-LAPAROSCOPIC-35640 , Need intra-op EGD (N/A)    Soha Wheatley is a 62 y.o. female     Patient Active Problem List   Diagnosis    Abdominal pain, RLQ (right lower quadrant)    Back pain    Morbid obesity with BMI of 45.0-49.9, adult    Right knee pain    Calculus of right kidney    Nephrolithiasis    Cervical radiculopathy    Cervical neuropathy    Palpitations    Family history of early CAD    Anxiety    GERD (gastroesophageal reflux disease)       Review of patient's allergies indicates:   Allergen Reactions    Adhesive      Paper tape usually ok- pulls skin off    Flagyl [metronidazole hcl]      confusion       No current facility-administered medications on file prior to encounter.      Current Outpatient Prescriptions on File Prior to Encounter   Medication Sig Dispense Refill    acetaminophen (TYLENOL ARTHRITIS) 650 MG TbSR Take 650 mg by mouth every 8 (eight) hours.       cinnamon bark-chromium picolin 500-100 mg-mcg Cap Take by mouth.      GLUCOSAMINE HCL/CHONDR APODACA A NA (OSTEO BI-FLEX ORAL) Take by mouth once daily.      LACTOBACILLUS RHAMNOSUS GG (CULTURELLE ORAL) Take by mouth once daily.      meloxicam (MOBIC) 15 MG tablet TAKE 1 TABLET DAILY 90 tablet 2    tizanidine (ZANAFLEX) 4 MG tablet Take 1 tablet (4 mg total) by mouth every 8 (eight) hours as needed. 270 tablet 0       Past Surgical History:   Procedure Laterality Date     SECTION      CHOLECYSTECTOMY      KNEE ARTHRODESIS      KNEE CARTILAGE SURGERY      TONSILLECTOMY  1986    URETEROSCOPY         Social History     Social History    Marital status:      Spouse name: N/A    Number of children: N/A    Years of education: N/A     Occupational History     [employer]     Social History Main Topics     Smoking status: Never Smoker    Smokeless tobacco: Never Used    Alcohol use Yes      Comment: social    Drug use: No    Sexual activity: Not on file     Other Topics Concern    Not on file     Social History Narrative    No narrative on file         Vital Signs Range (Last 24H):  Temp:  [36.7 °C (98.1 °F)]   Pulse:  [69]   Resp:  [16]   BP: (143)/(65)   SpO2:  [99 %]       CBC: No results for input(s): WBC, RBC, HGB, HCT, PLT, MCV, MCH, MCHC in the last 72 hours.    CMP: No results for input(s): NA, K, CL, CO2, BUN, CREATININE, GLU, MG, PHOS, CALCIUM, ALBUMIN, PROT, ALKPHOS, ALT, AST, BILITOT in the last 72 hours.    INR  No results for input(s): PT, INR, PROTIME, APTT in the last 72 hours.        Diagnostic Studies:      EKD Echo:        Anesthesia Evaluation    I have reviewed the Patient Summary Reports.     I have reviewed the Medications.     Review of Systems  Anesthesia Hx:  No problems with previous Anesthesia  History of prior surgery of interest to airway management or planning: Denies Family Hx of Anesthesia complications.   Denies Personal Hx of Anesthesia complications.   Cardiovascular:  Cardiovascular Normal Exercise tolerance: good     Pulmonary:   Sleep Apnea    Renal/:   renal calculi    Hepatic/GI:   GERD, well controlled    Neurological:   Headaches    Endocrine:  Endocrine Normal    Psych:   depression          Physical Exam  General:  Morbid Obesity    Airway/Jaw/Neck:  Airway Findings: Mouth Opening: Normal Tongue: Normal  General Airway Assessment: Adult  Mallampati: II  TM Distance: Normal, at least 6 cm  Jaw/Neck Findings:  Neck ROM: Normal ROM      Dental:  Dental Findings: In tact    Chest/Lungs:  Chest/Lungs Findings: Clear to auscultation, Normal Respiratory Rate     Heart/Vascular:  Heart Findings: Rate: Normal  Rhythm: Regular Rhythm  Sounds: Normal        Mental Status:  Mental Status Findings:  Cooperative, Alert and Oriented         Anesthesia Plan  Type of  Anesthesia, risks & benefits discussed:  Anesthesia Type:  general  Patient's Preference:   Intra-op Monitoring Plan: standard ASA monitors  Intra-op Monitoring Plan Comments:   Post Op Pain Control Plan: multimodal analgesia  Post Op Pain Control Plan Comments:   Induction:   IV  Beta Blocker:  Patient is not currently on a Beta-Blocker (No further documentation required).       Informed Consent: Patient understands risks and agrees with Anesthesia plan.  Questions answered. Anesthesia consent signed with patient.  ASA Score: 3     Day of Surgery Review of History & Physical:    H&P update referred to the surgeon.         Ready For Surgery From Anesthesia Perspective.

## 2017-12-29 NOTE — BRIEF OP NOTE
[facility]  Brief Operative Note    SUMMARY     Surgery Date: 12/29/2017     Surgeon(s) and Role:     * Parminder Rey MD - Resident - Assisting     * Andrew Holloway Jr., MD - Primary        Pre-op Diagnosis:  Anxiety [F41.9]  Abdominal pain, RLQ (right lower quadrant) [R10.31]  BMI 45.0-49.9, adult [Z68.42]  Vitamin D deficiency [E55.9]  Morbid obesity due to excess calories [E66.01]  Gastroesophageal reflux disease, esophagitis presence not specified [K21.9]    Post-op Diagnosis:  Post-Op Diagnosis Codes:     * Anxiety [F41.9]     * Abdominal pain, RLQ (right lower quadrant) [R10.31]     * BMI 45.0-49.9, adult [Z68.42]     * Vitamin D deficiency [E55.9]     * Morbid obesity due to excess calories [E66.01]     * Gastroesophageal reflux disease, esophagitis presence not specified [K21.9]    Procedure(s) (LRB):  GASTRECTOMY-SLEEVE-LAPAROSCOPIC-16847 , Need intra-op EGD (N/A)    Anesthesia: General    Description of Procedure: Laparoscopic Sleeve Gastrectomy    Description of the findings of the procedure: Uneventful procedure. Discharged home when awake and alert.     Estimated Blood Loss: 50 mL         Specimens:   Specimen (12h ago through future)    Start     Ordered    12/29/17 0758  Specimen to Pathology - Surgery  Once     Comments:  1.  Stomach -- PERMANENT      12/29/17 0839

## 2017-12-29 NOTE — TRANSFER OF CARE
"Anesthesia Transfer of Care Note    Patient: Soha Wheatley    Procedure(s) Performed: Procedure(s) (LRB):  GASTRECTOMY-SLEEVE-LAPAROSCOPIC-72745 , Need intra-op EGD (N/A)    Patient location: PACU    Anesthesia Type: general    Transport from OR: Transported from OR on 6-10 L/min O2 by face mask with adequate spontaneous ventilation    Post pain: adequate analgesia    Post assessment: no apparent anesthetic complications and tolerated procedure well    Post vital signs: stable    Level of consciousness: awake, alert and oriented    Nausea/Vomiting: no nausea/vomiting    Complications: none    Transfer of care protocol was followed      Last vitals:   Visit Vitals  /68   Pulse (!) 53   Temp 35.9 °C (96.7 °F) (Axillary)   Resp 17   Ht 5' 10" (1.778 m)   Wt 134.3 kg (296 lb 1.2 oz)   SpO2 100%   Breastfeeding? No   BMI 42.48 kg/m²     "

## 2017-12-29 NOTE — NURSING TRANSFER
Nursing Transfer Note      12/29/2017     Transfer 543A    Transfer via stretcher    Transfer with iv pole, etco2 monitor, o2 @2L nc    Transported by tech    Medicines sent: na    Chart send with patient: yes    Notified: , Angel    Patient reassessed at: 12/29 @ 1150

## 2017-12-29 NOTE — OP NOTE
DATE OF PROCEDURE: 12/29/2017   SERVICE: Bariatric Surgery.   Surgeon(s) and Role:     * Parminder Rey MD - Resident - Assisting     * Andrew Holloway Jr., MD - Primary  PREOPERATIVE DIAGNOSES: Morbid obesity with a Body mass index is 42.48 kg/m².   along with GERD .   POSTOPERATIVE DIAGNOSES:Same  PROCEDURE: Laparoscopic sleeve gastrectomy and EGD.  ANESTHESIA: General endotracheal and local.   DESCRIPTION OF PROCEDURE: The patient was taken to the Operating Room, placed under general anesthesia, prepped and draped in sterile fashion. At this time,   incision was made approximately 15 cm from xiphoid and 5 cm to the left of   midline after infiltrating with local anesthetic. Using Optiview trocar,   intraabdominal access was obtained under direct visualization without difficulty   and pneumoperitoneum was obtained. Further ports were then placed including   bilateral anterior axillary subcostal 5 mm ports and midclavicular subcostal 5   mm port on the right and a second 12 port approximately 15 cm from xiphoid just   to right of midline. These were all placed after infiltrating with local   anesthetic and under direct visualization. Once the ports were placed, the   patient was placed in steep reverse Trendelenburg. A liver retractor was   placed. The hiatus was then examined. No hernia was noted. Once this was   complete, we turned attention to the sleeve itself. An area on the greater   curve approximately 6 cm proximal from the pylorus was identified and using a   Harmonic scalpel, the gastrocolic ligament was opened, exposing the lesser sac.   We continued to take down attachments along the greater curve including the   short gastrics to the angle of His, freeing the lateral part of the stomach. A   42-Mongolian bougie was then guided by Anesthesia into the stomach and from the   laparoscopic view guided along the lesser curve. Once the bougie was in   position along the lesser curve, we began the  sleeve with a green load stapler   with SeamGuard at the area 6 cm proximal from the pylorus using the bougie as a   guide on the lesser curve and fired the green load stapler beginning the sleeve.   Further staple loads, blue with SeamGuard were fired along the bougie on the   lesser curve towards the angle of His, completely freeing the lateral part of   the stomach. Once this was complete, the stomach was removed from the incision   approximately 15 cm from xiphoid just to right of midline after it was slightly   incising the skin and stretching the fascia with a Petra. The specimen was   removed and passed off the table with ease. At this time, an 0 Vicryl suture   was then used to close the fascial defect on a suture passer device under direct   visualization. The suture was placed in figure-of-8 fashion, however, it was   not tied down at this time, the patient was laid flat. The bougie was removed.   Attention then turned to an EGD. The scope was placed in the oropharynx,   guided down the esophagus and into the sleeve through the sleeve and into the   antrum with ease. At this time, the area was infiltrated with air and we slowly   pulled the scope back inspecting the sleeve itself. The staple line showed no   signs of bleeding or other abnormalities. The sleeve itself was in good   condition with no abnormalities, no twisting or obstruction and nice uniform   size. After inspection, we suctioned all the air from the antrum and sleeve and   removed the scope under direct visualization, turned our attention back to the   laparoscopic view. We inspected the staple line, no signs of bleeding or other   abnormalities from the staple line. The liver retractor was removed. The ports   were removed under direct visualization. The suture was then placed in the   fascia of the incision 15 cm from xiphoid just to right of midline, was tied   down closing the fascia of this incision and the incision was irrigated    copiously. At this time, the skin of all five port sites was closed with 4-0   Monocryl suture in subcuticular fashion. Mastisol and Steri-Strips were placed.   The patient was allowed to awake from general anesthesia, transferred to bed   for transfer to Recovery.   COMPLICATIONS: None.   SPONGE COUNT: Correct.   BLOOD LOSS: 15 mL.   FLUIDS: Per Anesthesia.   BLOOD GIVEN: None.   DRAINS: None.   SPECIMENS: Stomach.   CONDITION OF THE PATIENT: Good.   I was present for the entire procedure.

## 2017-12-30 VITALS
OXYGEN SATURATION: 98 % | BODY MASS INDEX: 41.95 KG/M2 | RESPIRATION RATE: 16 BRPM | HEART RATE: 59 BPM | TEMPERATURE: 96 F | DIASTOLIC BLOOD PRESSURE: 65 MMHG | HEIGHT: 70 IN | WEIGHT: 293 LBS | SYSTOLIC BLOOD PRESSURE: 139 MMHG

## 2017-12-30 LAB
ANION GAP SERPL CALC-SCNC: 8 MMOL/L
BASOPHILS # BLD AUTO: 0.02 K/UL
BASOPHILS NFR BLD: 0.2 %
BUN SERPL-MCNC: 15 MG/DL
CALCIUM SERPL-MCNC: 9.3 MG/DL
CHLORIDE SERPL-SCNC: 104 MMOL/L
CO2 SERPL-SCNC: 27 MMOL/L
CREAT SERPL-MCNC: 0.7 MG/DL
DIFFERENTIAL METHOD: ABNORMAL
EOSINOPHIL # BLD AUTO: 0 K/UL
EOSINOPHIL NFR BLD: 0 %
ERYTHROCYTE [DISTWIDTH] IN BLOOD BY AUTOMATED COUNT: 13.5 %
EST. GFR  (AFRICAN AMERICAN): >60 ML/MIN/1.73 M^2
EST. GFR  (NON AFRICAN AMERICAN): >60 ML/MIN/1.73 M^2
GLUCOSE SERPL-MCNC: 96 MG/DL
HCT VFR BLD AUTO: 36.2 %
HGB BLD-MCNC: 11.6 G/DL
IMM GRANULOCYTES # BLD AUTO: 0.03 K/UL
IMM GRANULOCYTES NFR BLD AUTO: 0.4 %
LYMPHOCYTES # BLD AUTO: 1.2 K/UL
LYMPHOCYTES NFR BLD: 14.7 %
MAGNESIUM SERPL-MCNC: 2 MG/DL
MCH RBC QN AUTO: 30.1 PG
MCHC RBC AUTO-ENTMCNC: 32 G/DL
MCV RBC AUTO: 94 FL
MONOCYTES # BLD AUTO: 0.7 K/UL
MONOCYTES NFR BLD: 8.8 %
NEUTROPHILS # BLD AUTO: 6.2 K/UL
NEUTROPHILS NFR BLD: 75.9 %
NRBC BLD-RTO: 0 /100 WBC
PHOSPHATE SERPL-MCNC: 2.1 MG/DL
PLATELET # BLD AUTO: 194 K/UL
PMV BLD AUTO: 10.1 FL
POTASSIUM SERPL-SCNC: 4.2 MMOL/L
RBC # BLD AUTO: 3.86 M/UL
SODIUM SERPL-SCNC: 139 MMOL/L
WBC # BLD AUTO: 8.19 K/UL

## 2017-12-30 PROCEDURE — 36415 COLL VENOUS BLD VENIPUNCTURE: CPT

## 2017-12-30 PROCEDURE — 63600175 PHARM REV CODE 636 W HCPCS: Performed by: STUDENT IN AN ORGANIZED HEALTH CARE EDUCATION/TRAINING PROGRAM

## 2017-12-30 PROCEDURE — 80048 BASIC METABOLIC PNL TOTAL CA: CPT

## 2017-12-30 PROCEDURE — 85025 COMPLETE CBC W/AUTO DIFF WBC: CPT

## 2017-12-30 PROCEDURE — 99900035 HC TECH TIME PER 15 MIN (STAT)

## 2017-12-30 PROCEDURE — 84100 ASSAY OF PHOSPHORUS: CPT

## 2017-12-30 PROCEDURE — 25000003 PHARM REV CODE 250: Performed by: STUDENT IN AN ORGANIZED HEALTH CARE EDUCATION/TRAINING PROGRAM

## 2017-12-30 PROCEDURE — 25000003 PHARM REV CODE 250: Performed by: SURGERY

## 2017-12-30 PROCEDURE — C9113 INJ PANTOPRAZOLE SODIUM, VIA: HCPCS | Performed by: STUDENT IN AN ORGANIZED HEALTH CARE EDUCATION/TRAINING PROGRAM

## 2017-12-30 PROCEDURE — 94770 HC EXHALED C02 TEST: CPT

## 2017-12-30 PROCEDURE — 83735 ASSAY OF MAGNESIUM: CPT

## 2017-12-30 RX ORDER — SIMETHICONE 80 MG
80 TABLET,CHEWABLE ORAL 4 TIMES DAILY PRN
Status: DISCONTINUED | OUTPATIENT
Start: 2017-12-30 | End: 2017-12-30 | Stop reason: HOSPADM

## 2017-12-30 RX ADMIN — PANTOPRAZOLE SODIUM 40 MG: 40 INJECTION, POWDER, FOR SOLUTION INTRAVENOUS at 10:12

## 2017-12-30 RX ADMIN — HYDROCODONE BITARTRATE AND ACETAMINOPHEN 15 ML: 7.5; 325 SOLUTION ORAL at 10:12

## 2017-12-30 RX ADMIN — POLYETHYLENE GLYCOL 3350 17 G: 17 POWDER, FOR SOLUTION ORAL at 10:12

## 2017-12-30 RX ADMIN — SIMETHICONE CHEW TAB 80 MG 80 MG: 80 TABLET ORAL at 12:12

## 2017-12-30 RX ADMIN — Medication: at 05:12

## 2017-12-30 RX ADMIN — ENOXAPARIN SODIUM 40 MG: 100 INJECTION SUBCUTANEOUS at 05:12

## 2017-12-31 NOTE — DISCHARGE SUMMARY
[facility]  General Surgery  Discharge Summary      Patient Name: Soha Wheatley  MRN: [id number]  Admission Date: 12/29/2017  Hospital Length of Stay: 1 days  Discharge Date and Time: 12/30/2017  1:05 PM  Attending Physician: Andrew Holloway MD  Discharging Provider: Waylon Peter MD  Primary Care Provider: Andrew Mallory MD     HPI: 63 yo F with morbid obesity    Procedure(s) (LRB):  GASTRECTOMY-SLEEVE-LAPAROSCOPIC-45204 , Need intra-op EGD (N/A)     Hospital Course: Pt admitted for sleeve gastrectomy.  The procedure was uncomplicated.  Pt kept NPO overnight and tolerated clear liquids and PO pain medication on POD1.  Pt was discharged home in stable condition on POD 1.    PE:  NAD, AAOx3  RRR  CTAB  Abd S/ND/ATTP      Consults:     Significant Diagnostic Studies: see EMR    Pending Diagnostic Studies:     None        Final Active Diagnoses:    Diagnosis Date Noted POA    PRINCIPAL PROBLEM:  Morbid obesity with BMI of 45.0-49.9, adult [E66.01, Z68.42] 06/24/2014 Not Applicable      Problems Resolved During this Admission:    Diagnosis Date Noted Date Resolved POA      Discharged Condition: good    Disposition: Home or Self Care    Follow Up:  Follow-up Information     Andrew Holloway Jr, MD In 2 weeks.    Specialties:  General Surgery, Bariatrics  Contact information:  [address] [place] [id number]  [phone]                 Patient Instructions:     Lifting restrictions   Order Comments: No lifting more than 10 pounds for 2 weeks     Notify your health care provider if you experience any of the following:  redness, tenderness, or signs of infection (pain, swelling, redness, odor or green/yellow discharge around incision site)     Notify your health care provider if you experience any of the following:  severe uncontrolled pain     Notify your health care provider if you experience any of the following:  persistent nausea and vomiting or diarrhea     Notify your health  care provider if you experience any of the following:  temperature >100.4     No dressing needed   Order Comments: OK to shower in 24 hours       Medications:  Reconciled Home Medications:   Discharge Medication List as of 12/30/2017 12:05 PM      CONTINUE these medications which have NOT CHANGED    Details   acetaminophen (TYLENOL ARTHRITIS) 650 MG TbSR Take 650 mg by mouth every 8 (eight) hours. , Starting Mon 7/28/2014, Historical Med      cinnamon bark-chromium picolin 500-100 mg-mcg Cap Take by mouth., Until Discontinued, Historical Med      gabapentin (NEURONTIN) 250 mg/5 mL solution Take 12 mLs (600 mg total) by mouth 3 (three) times daily., Starting Thu 11/30/2017, Until Fri 11/30/2018, Normal      GLUCOSAMINE HCL/CHONDR APODACA A NA (OSTEO BI-FLEX ORAL) Take by mouth once daily., Until Discontinued, Historical Med      LACTOBACILLUS RHAMNOSUS GG (CULTURELLE ORAL) Take by mouth once daily., Until Discontinued, Historical Med      meloxicam (MOBIC) 15 MG tablet TAKE 1 TABLET DAILY, Normal      omeprazole (PRILOSEC) 40 MG capsule Take 1 capsule (40 mg total) by mouth once daily., Starting Fri 12/1/2017, Normal      sertraline (ZOLOFT) 20 mg/mL concentrated solution Take 2.5 mLs (50 mg total) by mouth once daily. Can take liquid formulation of Sertraline while healing from bariatric surgery, Starting Thu 11/30/2017, Until Fri 11/30/2018, Normal      tizanidine (ZANAFLEX) 4 MG tablet Take 1 tablet (4 mg total) by mouth every 8 (eight) hours as needed., Starting Tue 9/5/2017, Normal      hydrocodone-acetaminophen (HYCET) solution 7.5-325 mg/15mL Take 15 mLs by mouth 4 (four) times daily as needed for Pain., Starting Wed 12/6/2017, Normal      ondansetron (ZOFRAN-ODT) 8 MG TbDL Take 1 tablet (8 mg total) by mouth every 6 (six) hours as needed., Starting Fri 12/1/2017, Normal      polyethylene glycol (GLYCOLAX) 17 gram/dose powder Take 17 g by mouth once daily., Starting Fri 12/1/2017, Normal      promethazine  (PHENERGAN) 25 MG suppository 1 rectally every 6 hours prn.  Okay to change to liquid or pills., Normal             Waylon Peter MD  General Surgery  [facility]

## 2018-01-02 NOTE — ANESTHESIA POSTPROCEDURE EVALUATION
"Anesthesia Post Evaluation    Patient: Soha Wheatley    Procedure(s) Performed: Procedure(s) (LRB):  GASTRECTOMY-SLEEVE-LAPAROSCOPIC-[id number] , Need intra-op EGD (N/A)    Final Anesthesia Type: general  Patient location during evaluation: PACU  Patient participation: Yes- Able to Participate  Level of consciousness: awake and alert  Post-procedure vital signs: reviewed and stable  Pain management: adequate  Airway patency: patent  PONV status at discharge: No PONV  Anesthetic complications: no      Cardiovascular status: hemodynamically stable  Respiratory status: unassisted  Hydration status: euvolemic  Follow-up not needed.        Visit Vitals  /65 (Patient Position: Sitting)   Pulse (!) 59   Temp (!) 35.3 °C (95.6 °F) (Oral)   Resp 16   Ht 5' 10" (1.778 m)   Wt 134.3 kg (296 lb 1.2 oz)   SpO2 98%   Breastfeeding? No   BMI 42.48 kg/m²       Pain/Alfredo Score: No Data Recorded      "

## 2018-01-05 ENCOUNTER — TELEPHONE (OUTPATIENT)
Dept: BARIATRICS | Facility: CLINIC | Age: 63
End: 2018-01-05

## 2018-01-05 NOTE — TELEPHONE ENCOUNTER
Call transferred from [place].  Spoke with patient.  She stated she was having gas pain has started taking gas-x with relief.  Patient also complained of pain ( 3/10) on right side by incision.   Incision free of s/s of infection.  Informed patient that that incision is the site where the stomach is removed and may cause a little more discomfort than the other sites.  Instructed patient to take gas x and increase activity for the gas pain and to take Acetaminophen for incisional pain.  Patient stated she is hydrated and wants to return to work on 1/8/18.  Reminded patient to continue to take her vitamins and fluids and restrictions to return to work.   Patient verbalized understanding.  Return to work letter typed and sent to patient portal.

## 2018-01-05 NOTE — TELEPHONE ENCOUNTER
----- Message from Paco Faustin RD sent at 2017 12:38 PM CST -----  Regardin week po  GRUPO--sleeve

## 2018-01-05 NOTE — TELEPHONE ENCOUNTER
Called to check in 1 week post op from bariatric surgery.    Water protocol began at 7 am and completed while in hospital (y/n): Y    Dehydration assessment:  Urine output/color: normal/yellow  Chest pain: N  Persistent increased heart rate: N  Fatigue: N  N/V: N  Dizzy/weak: N  BM: Y  Protein and fluid intake assessment: (food diary)  Fluid intake: 32 oz+broth, jello, decaf coffee   Protein supplements: Pure Protein+milk, Vijay+milk  Protein intake yesterday: 40 gm  Vitamins  -What vitamins are you taking?  Multivitamin  B-12  B-complex  -Are you tolerating well? Y  Medications  Omeprazole: Y  Hycet: Y  How are you tolerating pain at this time? 3-4 Gas pain (rate on a scale from 1 to 10; >7 notify PA/MD)  Are you taking home/other medications as prescribed? Y  Are you having any problems? (f/u with PCP, cardiologist, endocrinologist) N  How is your support system at home? good  Exercise reminder (light exercise at this time, no lifting above 10 lbs)     Questions for nurse/MA/PA: Y--questions about pain medications      Assessment:  Doing well.     Discussion:  Continue to work on fluid and protein intake.     Confirmed date and time for 2 week po labs and clinic visit

## 2018-01-07 DIAGNOSIS — M25.562 ARTHRALGIA OF BOTH KNEES: ICD-10-CM

## 2018-01-07 DIAGNOSIS — M25.561 ARTHRALGIA OF BOTH KNEES: ICD-10-CM

## 2018-01-09 ENCOUNTER — LAB VISIT (OUTPATIENT)
Dept: LAB | Facility: HOSPITAL | Age: 63
End: 2018-01-09
Payer: COMMERCIAL

## 2018-01-09 ENCOUNTER — OFFICE VISIT (OUTPATIENT)
Dept: BARIATRICS | Facility: CLINIC | Age: 63
End: 2018-01-09
Payer: COMMERCIAL

## 2018-01-09 ENCOUNTER — CLINICAL SUPPORT (OUTPATIENT)
Dept: BARIATRICS | Facility: CLINIC | Age: 63
End: 2018-01-09
Payer: COMMERCIAL

## 2018-01-09 VITALS
HEIGHT: 70 IN | HEART RATE: 74 BPM | DIASTOLIC BLOOD PRESSURE: 76 MMHG | BODY MASS INDEX: 40.97 KG/M2 | WEIGHT: 286.19 LBS | SYSTOLIC BLOOD PRESSURE: 107 MMHG

## 2018-01-09 DIAGNOSIS — Z98.890 POST-OPERATIVE STATE: ICD-10-CM

## 2018-01-09 DIAGNOSIS — E66.01 MORBID OBESITY DUE TO EXCESS CALORIES: ICD-10-CM

## 2018-01-09 DIAGNOSIS — K21.9 GASTROESOPHAGEAL REFLUX DISEASE, ESOPHAGITIS PRESENCE NOT SPECIFIED: ICD-10-CM

## 2018-01-09 DIAGNOSIS — R10.31 ABDOMINAL PAIN, RLQ (RIGHT LOWER QUADRANT): ICD-10-CM

## 2018-01-09 DIAGNOSIS — F41.9 ANXIETY: ICD-10-CM

## 2018-01-09 DIAGNOSIS — Z98.84 S/P LAPAROSCOPIC SLEEVE GASTRECTOMY: ICD-10-CM

## 2018-01-09 DIAGNOSIS — Z98.84 STATUS POST LAPAROSCOPIC SLEEVE GASTRECTOMY: ICD-10-CM

## 2018-01-09 DIAGNOSIS — R63.4 WEIGHT LOSS: Primary | ICD-10-CM

## 2018-01-09 LAB
ALBUMIN SERPL BCP-MCNC: 3.9 G/DL
ALP SERPL-CCNC: 84 U/L
ALT SERPL W/O P-5'-P-CCNC: 52 U/L
ANION GAP SERPL CALC-SCNC: 9 MMOL/L
AST SERPL-CCNC: 21 U/L
BASOPHILS # BLD AUTO: 0.04 K/UL
BASOPHILS NFR BLD: 0.6 %
BILIRUB SERPL-MCNC: 0.7 MG/DL
BUN SERPL-MCNC: 20 MG/DL
CALCIUM SERPL-MCNC: 10.3 MG/DL
CHLORIDE SERPL-SCNC: 98 MMOL/L
CO2 SERPL-SCNC: 30 MMOL/L
CREAT SERPL-MCNC: 0.8 MG/DL
DIFFERENTIAL METHOD: ABNORMAL
EOSINOPHIL # BLD AUTO: 0.3 K/UL
EOSINOPHIL NFR BLD: 5 %
ERYTHROCYTE [DISTWIDTH] IN BLOOD BY AUTOMATED COUNT: 13.1 %
EST. GFR  (AFRICAN AMERICAN): >60 ML/MIN/1.73 M^2
EST. GFR  (NON AFRICAN AMERICAN): >60 ML/MIN/1.73 M^2
GLUCOSE SERPL-MCNC: 111 MG/DL
HCT VFR BLD AUTO: 40.2 %
HGB BLD-MCNC: 12.7 G/DL
IMM GRANULOCYTES # BLD AUTO: 0.02 K/UL
IMM GRANULOCYTES NFR BLD AUTO: 0.3 %
LYMPHOCYTES # BLD AUTO: 1.8 K/UL
LYMPHOCYTES NFR BLD: 27.3 %
MCH RBC QN AUTO: 29.4 PG
MCHC RBC AUTO-ENTMCNC: 31.6 G/DL
MCV RBC AUTO: 93 FL
MONOCYTES # BLD AUTO: 0.6 K/UL
MONOCYTES NFR BLD: 9.1 %
NEUTROPHILS # BLD AUTO: 3.7 K/UL
NEUTROPHILS NFR BLD: 57.7 %
NRBC BLD-RTO: 0 /100 WBC
PLATELET # BLD AUTO: 248 K/UL
PMV BLD AUTO: 10.5 FL
POTASSIUM SERPL-SCNC: 4.1 MMOL/L
PROT SERPL-MCNC: 7.7 G/DL
RBC # BLD AUTO: 4.32 M/UL
SODIUM SERPL-SCNC: 137 MMOL/L
VIT B12 SERPL-MCNC: 664 PG/ML
WBC # BLD AUTO: 6.45 K/UL

## 2018-01-09 PROCEDURE — 80053 COMPREHEN METABOLIC PANEL: CPT

## 2018-01-09 PROCEDURE — 82607 VITAMIN B-12: CPT

## 2018-01-09 PROCEDURE — 99999 PR PBB SHADOW E&M-EST. PATIENT-LVL IV: CPT | Mod: PBBFAC,,, | Performed by: PHYSICIAN ASSISTANT

## 2018-01-09 PROCEDURE — 84425 ASSAY OF VITAMIN B-1: CPT

## 2018-01-09 PROCEDURE — 99499 UNLISTED E&M SERVICE: CPT | Mod: S$GLB,,, | Performed by: DIETITIAN, REGISTERED

## 2018-01-09 PROCEDURE — 99024 POSTOP FOLLOW-UP VISIT: CPT | Mod: S$GLB,,, | Performed by: PHYSICIAN ASSISTANT

## 2018-01-09 PROCEDURE — 36415 COLL VENOUS BLD VENIPUNCTURE: CPT

## 2018-01-09 PROCEDURE — 85025 COMPLETE CBC W/AUTO DIFF WBC: CPT

## 2018-01-09 RX ORDER — MULTIVIT WITH MINERALS/HERBS
1 TABLET ORAL DAILY
COMMUNITY

## 2018-01-09 RX ORDER — MELOXICAM 15 MG/1
TABLET ORAL
Qty: 90 TABLET | Refills: 2 | Status: ON HOLD | OUTPATIENT
Start: 2018-01-09 | End: 2018-05-23 | Stop reason: HOSPADM

## 2018-01-09 RX ORDER — UBIDECARENONE 75 MG
500 CAPSULE ORAL DAILY
COMMUNITY

## 2018-01-09 RX ORDER — MULTIVITAMIN
1 TABLET ORAL DAILY
COMMUNITY

## 2018-01-09 NOTE — PROGRESS NOTES
BARIATRIC POST OP FOLLOW UP:    Chief Complaint   Patient presents with    Follow-up     2 week sleeve       HISTORY OF PRESENT ILLNESS: Soha Wheatley is a 62 y.o. female with a Body mass index is 41.06 kg/m². who presents for a 2 week follow up s/p lap sleeve with Dr Holloway on 12/29/2017.  she is doing well and tolerating the diet without difficulty.  she has lost 29 lbs, approximately 18% of their excess weight.  she has no complaints.      Denies: nausea, vomiting, abdominal pain, changes in bowel movement pattern, fever, chills, dysphagia, chest pain, and shortness of breath.    Review of Systems   Constitutional: Negative for chills, fever and weight loss.   Eyes: Negative for blurred vision, double vision and pain.   Respiratory: Negative for cough, shortness of breath and wheezing.    Cardiovascular: Negative for chest pain, palpitations and leg swelling.   Gastrointestinal: Negative for abdominal pain, blood in stool, constipation, diarrhea, heartburn, melena, nausea and vomiting.   Genitourinary: Negative for dysuria, frequency and hematuria.   Skin: Negative for itching and rash.   Neurological: Negative for headaches.   Psychiatric/Behavioral: Negative for depression and suicidal ideas.       EXERCISE & VITAMINS:  See Bariatric Assessment    MEDICATIONS/ALLERGIES:  Have been reviewed.    DIET:  Liquid Bariatric Diet.  3 protein shakes (Velvet Ice from PJs, Vijay, and [place] RTD) daily, ~60-80 grams protein.  40+ oz H20 and Clear SF Liquids.      Vitals:    01/09/18 1027   BP: 107/76   Pulse: 74       Physical Exam   Constitutional: She is oriented to person, place, and time. She appears well-developed and well-nourished. No distress.   HENT:   Head: Normocephalic and atraumatic.   Eyes: Conjunctivae are normal. No scleral icterus.   Cardiovascular: Normal rate.    Pulmonary/Chest: Effort normal and breath sounds normal. No respiratory distress.   Abdominal: Soft. Bowel sounds are normal. She  exhibits no distension. There is no tenderness. There is no rebound and no guarding.   Well healing surgical incisions, clean, dry, and intact without signs of infection.     Musculoskeletal: She exhibits no edema.   Neurological: She is alert and oriented to person, place, and time.   Skin: Skin is warm and dry. Capillary refill takes less than 2 seconds. No rash noted. She is not diaphoretic. No erythema. No pallor.   Psychiatric: She has a normal mood and affect. Her behavior is normal. Judgment and thought content normal.   Nursing note and vitals reviewed.      ASSESSMENT:  - Morbid obesity, Body mass index is 41.06 kg/m².,  s/p sleeve gastrectomy on 12/29/2017.  - Estimated goal weight, 235 lbs, which is 50% EWL  - Co-morbidities: GERD and anxiety.  - Great Weight loss, 29 lbs, 18% EWL  - Good daily activity, no formal Exercise regimen  - Good Vitamin Regimen, calcium citrate on hold per urology.  Will start at 3 months.   - Good Diet  - Not at risk for fall or abuse    PLAN:  - Emphasized the importance of regular exercise and adherence to bariatric diet to achieve maximum weight loss.  - Encouraged patient to continue regular activity and begin regular exercise in the next week or so.  - Follow-up with dietician to advance diet.  - Continue daily vitamins and medications.  - Anti-Acid medication, Omeprazole daily for 3 months.  - No lifting more than 10 lbs for 4 weeks.  - Miralax daily for constipation, no fiber.  - No NSAIDs, Tylenol for pain.  - No swallowing whole pills for 3 months.  Can swallow whole pills on March 29, 2018.  - RTC in 2 weeks or sooner if needed.  - Call the office for any issues.  - Check labs today.

## 2018-01-09 NOTE — PROGRESS NOTES
NUTRITION NOTE    Referring Physician: Andrew Holloway M.D.  Reason for MNT Referral: Follow-up 2 Weeks s/p Gastric Sleeve    PAST MEDICAL HISTORY:  Denies nausea, vomiting, constipation and diarrhea.  Reports doing well.    Past Medical History:   Diagnosis Date    Allergy     Anxiety     Arthritis     BMI 45.0-49.9, adult     Chronic kidney disease     Depression     GERD (gastroesophageal reflux disease)     History of kidney stones     Kidney stones     Migraines     Morbid obesity     Obesity     Sleep apnea in adult     WEARS CPAP, DOESNT KNOW SETTINGS.       CLINICAL DATA:  62 y.o. female.    There were no vitals filed for this visit.    Current Weight: 286 lbs  BMI: 41.06  Total Weight Loss: 29 lbs  Excess Weight Loss: 18%    LABS:  Reviewed.    CURRENT DIET:  Bariatric Liquid Diet  Liquid Bariatric Diet.  3 protein shakes (Velvet Ice from PJs, Vijay, and [place] RTD) daily, ~60-80 grams protein.  40+ oz H20 and Clear SF Liquids.      EXERCISE:  None.    Restrictions to Exercise: None.    VITAMINS/MINERALS:  Multivitamins: Centrum Chewables  B-Complex: Tablet crushed--not tolerating well  Calcium Citrate + Vitamin D: TID  Vitamin B12: Sublingual.    ASSESSMENT:  Doing well overall.  Adequate protein intake.  Adequate fluid intake.    BARIATRIC DIET DISCUSSION:  Instructed and provided written materials on bariatric pureed diet plan.  Reinforced post-op nutrition guidelines.    PLAN/RECOMMONDATIONS:  Advance to bariatric soft diet.  Increase protein intake.  Increase fluid intake.  Begin light exercise.  Begin appropriate vitamins & minerals.  Adjust vitamins & minerals by: switching b-complex tablet    Return to clinic in 2 weeks.    SESSION TIME: 15 minutes

## 2018-01-09 NOTE — PATIENT INSTRUCTIONS
LENIN Coenzyme B-Complex Tablets, Cocoa Mint, 60 Count    Take 2 daily.      High Protein Pureed Diet    2 weeks after gastric bypass and sleeve you may be ready to add pureed food to your diet.  All food should be the consistency of baby food, or thinner.  Follow pureed diet for the next 2 weeks.    Protein - It is very important to pay attention to protein intake during this time.      Inadequate protein intake can cause:  ? Delayed Wound Healing  ? Hair Loss  ? Muscle Breakdown    Meal Plan - Eat 3-4 meals per day (2-4 tbsp each), with protein supplements in between to meet protein needs.  Meeting protein needs daily will help increase healing, decrease muscle loss, and increase weight loss.  Your goal is  grams of protein a day.    Protein First - Always eat the foods with the highest protein first.  Foods high in protein include milk, yogurt, cheese, egg whites, and blenderized meat, seafood, and beans.    Fluids - Keep track in your journal of how much you are drinking; you should try to drink at least 64oz of fluids every day.      Foods allowed: Portion size Protein (g)   ? Sugar-free clear liquids As desired 0   ? Skim or 1% milk ½ cup 4   ? Sugar free pudding, light yogurt, custard (use skim or 1% milk in preparation) 3 oz 2.5   ? Strained baby food meats, or home-made pureed lean meats and shrimp 1 oz 7   ? Beans (red, white, black, lima, atkins, fat free refried, hummus) and lentils ¼ cup 4   ? Low-fat/fat free cheese.(cottage cheese, mozzarella string cheese, ricotta cheese, Laughing Cow, Baby Bell, cheddar, etc) ¼ cup 7-8   ? Scrambled eggs or Egg Beaters 1 or ¼ cup 6   ? Edamame or Tofu, mashed ¼ cup 5   ? Unflavored protein powder (add to 1 scoop to  98% fat free soups or SF pudding) 3 Tbsp 9   ? *PB2: peanut powder (45 calories) 2 Tbsp 5     *PB2 powdered peanut butter: 45 calories vs. 190 calories in 2 tbsp of regular peanut butter. Purchase online at [employer], or  at various [employer], [employer],  [employer], [employer] and [employer].      Bariatric Liquid/Pureed Sample Menu    3-4 small meals plus 2-3 protein drinks per day.    8am 1 egg or ¼ cup Egg Beaters   9am 1 cup water, or decaf coffee or tea   10am Protein drink, 30g protein   11am 2 tbsp low-fat cottage cheese, and 1 tbsp pureed peaches   12pm 1 cup water, or sugar-free lemonade    1pm 2 tbsp pureed chicken, and 1 tbsp pureed carrots    2pm 1 cup water, or sugar-free lemonade   3pm Protein drink, 30g protein   5pm 1 cup water    6pm 1 cup hi-protein creamy chicken soup 14g protein (see Recipe below)   7pm 1 cup water, or sugar-free fruit punch    8pm 1 cup water     This sample menu provides approx. 80g protein and 64oz fluids.  Liquid protein supplements should contain 20-30g protein and less than 4 grams of sugar each.    ? Sip fluids continuously in between meals.  Drink at least ¼ cup every 15 minutes.  ? For fluids: ¼ cup = 2 oz = 4 tbsp       RECIPE IDEAS for Bariatric Pureed Diet:    Hi-Protein Creamy Chicken Soup: (10g protein per 1 cup serving)  Empty 1 can of 98% fat free cream of chicken soup into saucepan. Then  blend 1 scoop of unflavored protein powder with 1 can of skim milk until smooth.  Add protein milk to saucepan and heat to warm. (Note: Do NOT boil. Protein powder may clump if heated too hot).     Hi-Protein Pudding: (14g protein per ½ cup serving)  Add 2 scoops protein powder to 2 cups cold skim milk and mix well.  Stir in dry Jell-O Sugar-Free Instant Pudding mix.  Chill and Enjoy!    Tuna Mousse (12g protein per ¼ cup serving) Page 135 in book Eating Well After Weight Loss Surgery.  In a  or , combine all ingredients and pulse until smooth.  2 6-ounce cans tuna packed in water, drained  2 tbsp low-fat mayonnaise  2 tbsp fat-free sour cream  2 tbsp fat-free cream cheese, softened  ½ cup shallots, finely chopped  1 tbsp lemon juice  ¼ tsp ground pepper  ½ tsp celery seed    Chocolate Peanut Butter Mousse   (28g protein total)  6oz plain Greek yogurt  4 tbsp chocolate PB2

## 2018-01-10 LAB — VIT B1 SERPL-MCNC: 42 UG/L (ref 38–122)

## 2018-01-18 ENCOUNTER — TELEPHONE (OUTPATIENT)
Dept: BARIATRICS | Facility: CLINIC | Age: 63
End: 2018-01-18

## 2018-01-30 ENCOUNTER — TELEPHONE (OUTPATIENT)
Dept: BARIATRICS | Facility: CLINIC | Age: 63
End: 2018-01-30

## 2018-01-30 ENCOUNTER — CLINICAL SUPPORT (OUTPATIENT)
Dept: BARIATRICS | Facility: CLINIC | Age: 63
End: 2018-01-30
Payer: COMMERCIAL

## 2018-01-30 ENCOUNTER — OFFICE VISIT (OUTPATIENT)
Dept: BARIATRICS | Facility: CLINIC | Age: 63
End: 2018-01-30
Payer: COMMERCIAL

## 2018-01-30 VITALS
SYSTOLIC BLOOD PRESSURE: 123 MMHG | DIASTOLIC BLOOD PRESSURE: 80 MMHG | BODY MASS INDEX: 39.99 KG/M2 | HEIGHT: 70 IN | WEIGHT: 279.31 LBS | HEART RATE: 82 BPM

## 2018-01-30 DIAGNOSIS — R63.4 WEIGHT LOSS: ICD-10-CM

## 2018-01-30 DIAGNOSIS — Z98.84 S/P LAPAROSCOPIC SLEEVE GASTRECTOMY: ICD-10-CM

## 2018-01-30 DIAGNOSIS — G89.29 CHRONIC PAIN OF RIGHT KNEE: ICD-10-CM

## 2018-01-30 DIAGNOSIS — E66.01 MORBID OBESITY WITH BMI OF 45.0-49.9, ADULT: Primary | ICD-10-CM

## 2018-01-30 DIAGNOSIS — M25.561 CHRONIC PAIN OF RIGHT KNEE: ICD-10-CM

## 2018-01-30 DIAGNOSIS — Z98.890 POST-OPERATIVE STATE: ICD-10-CM

## 2018-01-30 PROCEDURE — 99999 PR PBB SHADOW E&M-EST. PATIENT-LVL IV: CPT | Mod: PBBFAC,,, | Performed by: PHYSICIAN ASSISTANT

## 2018-01-30 PROCEDURE — 99024 POSTOP FOLLOW-UP VISIT: CPT | Mod: S$GLB,,, | Performed by: PHYSICIAN ASSISTANT

## 2018-01-30 PROCEDURE — 99499 UNLISTED E&M SERVICE: CPT | Mod: S$GLB,,, | Performed by: DIETITIAN, REGISTERED

## 2018-01-30 RX ORDER — DICLOFENAC SODIUM 10 MG/G
2 GEL TOPICAL 4 TIMES DAILY
Qty: 1 TUBE | Refills: 1 | Status: SHIPPED | OUTPATIENT
Start: 2018-01-30 | End: 2018-05-09

## 2018-01-30 NOTE — PROGRESS NOTES
BARIATRIC POST OP FOLLOW UP:    Chief Complaint   Patient presents with    Follow-up     4 week  sleeve       HISTORY OF PRESENT ILLNESS: Soha Wheatley is a 62 y.o. female with a Body mass index is 40.08 kg/m². who presents for a 4 week follow up s/p lap sleeve with Dr Holloway on 12/29/2017.   she is doing well and overall tolerating the diet without difficulty. She did experience mild indigestion with chicken last Thursday but has not had any additional dietary difficulties. She is tolerating meatloaf and pork chop without incident.   She reports diarrhea 2/2 stomach virus over the weekend, though her sx have resolved.  She reports possible sx of hypoglycemia (weak, lightheaded, shaky) that resolve with food. This occurs ~ 1 hour after her BF and sometimes while she is having her AM protein shake.  she has lost 36 lbs, approximately 23% of their excess weight. She is concerned that she should have lost more wt by this time. Otherwise, she has no complaints.      Denies: nausea, vomiting, abdominal pain, changes in bowel movement pattern, fever, chills, dysphagia, chest pain, and shortness of breath.    Review of Systems   Constitutional: Negative for chills, fever and weight loss.   Eyes: Negative for blurred vision, double vision and pain.   Respiratory: Negative for cough, shortness of breath and wheezing.    Cardiovascular: Negative for chest pain, palpitations and leg swelling.   Gastrointestinal: Negative for abdominal pain, blood in stool, constipation, diarrhea, heartburn, melena, nausea and vomiting.   Genitourinary: Negative for dysuria, frequency and hematuria.   Musculoskeletal: Positive for joint pain.        Bilateral knee pain, Right most painful   Skin: Negative for itching and rash.   Neurological: Negative for headaches.   Psychiatric/Behavioral: Negative for depression and suicidal ideas.     EXERCISE & VITAMINS:  See Bariatric Assessment    MEDICATIONS/ALLERGIES:  Have been  "reviewed.    DIET:  Puree/Soft Bariatric Diet.  2 protein shakes (Vijay in the AM, and [place] RTD in PM) daily, ~80+ grams protein.  40-60+ oz H20.  See dietitian's note from today for further details.     BF: soft scrambled egg with mir bits, or half turkey sausage safia   1 hr later, Vijay sake   Later, cheese stick  RED: 2 pc deli turkey, 2oz. chicken or egg salad, sometimes bites of string beans or soft broccoli.   DIN: 2 oz grilled fish or baked chicken.    [place] shake after dinner.    Vitals:    01/30/18 1307   BP: 123/80   Pulse: 82   Weight: 126.7 kg (279 lb 5.2 oz)   Height: 5' 10" (1.778 m)       Physical Exam   Constitutional: She is oriented to person, place, and time. She appears well-developed and well-nourished. No distress.   HENT:   Head: Normocephalic and atraumatic.   Eyes: Conjunctivae are normal. No scleral icterus.   Cardiovascular: Normal rate.    Pulmonary/Chest: Effort normal and breath sounds normal. No respiratory distress.   Abdominal: Soft. Bowel sounds are normal. She exhibits no distension. There is tenderness (appropriate mild post surgical tenderness) in the epigastric area. There is no rebound and no guarding.   Well healing surgical incisions, clean, dry, and intact without signs of infection.     Musculoskeletal: She exhibits no edema.   Neurological: She is alert and oriented to person, place, and time.   Skin: Skin is warm and dry. Capillary refill takes less than 2 seconds. No rash noted. She is not diaphoretic. No erythema. No pallor.   Psychiatric: She has a normal mood and affect. Her behavior is normal. Judgment and thought content normal.   Nursing note and vitals reviewed.    ASSESSMENT:  - Morbid obesity, Body mass index is 40.08 kg/m².,  s/p sleeve gastrectomy on 12/29/2017.  - Estimated goal weight, 235 lbs, which is 50% EWL  - Co-morbidities: GERD and anxiety.  - Great Weight loss, 36 lbs, 23% EWL  - Good daily activity, no formal Exercise regimen  - Good Vitamin " Regimen, calcium citrate on hold per urology.  Will start at 3 months.   - Good Diet  - Not at risk for fall or abuse    PLAN:  - Emphasized the importance of regular exercise and adherence to bariatric diet to achieve maximum weight loss.  - Slow down meals, make each bite smaller than the tip of your finger, chew that bite 20-30 times, then swallow.  Wait at least 1 minute or more before the next bite.    - Trial BF of protein shake then egg as a snack to allay sx of hypoglycemia. Notify me if her sx do not resolve with this plan. Consider endocrine referral at that time to r/o hyper-inuslinemia.   - Encouraged patient to continue regular activity and begin regular exercise as able.  - Follow-up with dietician to advance diet.  - Stress that her weight loss is adequate and on track to meet EWL goal.  - Continue daily vitamins and medications.  - Anti-Acid medication, Omeprazole daily for 3 months.  - No NSAIDs, Tylenol for pain. OK to take Voltaren gel with continued PPI use.   - Miralax daily for constipation, no fiber.  - No swallowing whole pills for 3 months.  Can swallow whole pills on March 29, 2018.  - RTC in 2 months or sooner if needed.  - Call the office for any issues.  - Check labs today.

## 2018-01-30 NOTE — TELEPHONE ENCOUNTER
----- Message from Augustine Dyer sent at 1/30/2018  3:40 PM CST -----  Pt is asking for Rema to call her in regards to her going on a cruise. Please call pt at [phone]

## 2018-01-30 NOTE — PROGRESS NOTES
NUTRITION NOTE    Referring Physician: Andrew Holloway M.D.  Reason for MNT Referral: Follow-up 4 Weeks s/p Gastric Sleeve    PAST MEDICAL HISTORY:  Denies nausea, vomiting, constipation and diarrhea.  Reports doing well.    Past Medical History:   Diagnosis Date    Allergy     Anxiety     Arthritis     BMI 45.0-49.9, adult     Chronic kidney disease     Depression     GERD (gastroesophageal reflux disease)     History of kidney stones     Kidney stones     Migraines     Morbid obesity     Obesity     Sleep apnea in adult     WEARS CPAP, DOESNT KNOW SETTINGS.       CLINICAL DATA:  62 y.o. female.    There were no vitals filed for this visit.    Current Weight: 279 lbs  BMI: 40.08  Total Weight Loss: 36 lbs  Excess Weight Loss: 23%    LABS:  Reviewed.    CURRENT DIET:  Bariatric Pureed Diet    Soft Bariatric Diet.  2 protein shakes (Vijay in the AM, and Premier RTD in PM) daily, ~90 grams protein.  40-60+ oz H20.     BF: soft scrambled egg with mir bits, or half turkey sausage safia  1 hr later, Vijay sake  Cheese stick  RED: 2 pc deli turkey, 1oz. chicken or egg salad, sometimes string beans or soft broccoli.   DIN: grilled fish or baked chicken.   Premier shake after dinner.    EXERCISE:  None.  Restrictions to Exercise: knee pain    VITAMINS/MINERALS:  Multivitamins: Centrum Chew BID  B-Complex: tablet daily  Calcium Citrate + Vitamin D: not taking per urology   Vitamin B12: Sublingual.    ASSESSMENT:  Doing well overall.  Adequate protein intake.  Adequate fluid intake.  Advancing diet appropriately.  Exercising.  Adequate vitamins & minerals.    BARIATRIC DIET DISCUSSION:  Instructed and provided written materials on bariatric soft diet plan.  Reinforced post-op nutrition guidelines.    PLAN / RECOMMENDATIONS:  Advance to bariatric soft diet.  Maintain protein intake.  Maintain fluid intake.  Continue light exercise.  Continue appropriate vitamins & minerals.  Adjust vitamins & minerals by  starting calcium.    Return to clinic in 2 months.    SESSION TIME: 15 minutes

## 2018-01-30 NOTE — TELEPHONE ENCOUNTER
Requesting a letter be written by MD or PA stating she cannot drink alcohol.  She is taking a cruise April 1, 2018.  Needs letter so she is NOT required to buy a drink card.  Needs posted to her [facility].  Informed her I will talk to KYLE Uribe.  Verbalized understanding.

## 2018-01-31 ENCOUNTER — TELEPHONE (OUTPATIENT)
Dept: BARIATRICS | Facility: CLINIC | Age: 63
End: 2018-01-31

## 2018-01-31 NOTE — TELEPHONE ENCOUNTER
Notified patient letter was sent regarding her [facility] by KYLE Uribe.  She will check and notify us if there is any problem.

## 2018-02-22 ENCOUNTER — OFFICE VISIT (OUTPATIENT)
Dept: INTERNAL MEDICINE | Facility: CLINIC | Age: 63
End: 2018-02-22
Payer: COMMERCIAL

## 2018-02-22 VITALS
BODY MASS INDEX: 39.26 KG/M2 | DIASTOLIC BLOOD PRESSURE: 60 MMHG | SYSTOLIC BLOOD PRESSURE: 102 MMHG | WEIGHT: 274.25 LBS | HEIGHT: 70 IN | HEART RATE: 70 BPM

## 2018-02-22 DIAGNOSIS — M17.0 PRIMARY OSTEOARTHRITIS OF BOTH KNEES: ICD-10-CM

## 2018-02-22 DIAGNOSIS — E66.01 MORBID OBESITY WITH BMI OF 45.0-49.9, ADULT: ICD-10-CM

## 2018-02-22 DIAGNOSIS — N20.0 CALCULUS OF RIGHT KIDNEY: Primary | ICD-10-CM

## 2018-02-22 PROCEDURE — 3008F BODY MASS INDEX DOCD: CPT | Mod: S$GLB,,, | Performed by: INTERNAL MEDICINE

## 2018-02-22 PROCEDURE — 99999 PR PBB SHADOW E&M-EST. PATIENT-LVL III: CPT | Mod: PBBFAC,,, | Performed by: INTERNAL MEDICINE

## 2018-02-22 PROCEDURE — 99212 OFFICE O/P EST SF 10 MIN: CPT | Mod: S$GLB,,, | Performed by: INTERNAL MEDICINE

## 2018-02-22 NOTE — MEDICAL/APP STUDENT
Progress Note    Patient Name: Soha Wheatley  MRN: [id number]    Patient information was obtained from patient and medical records.     Subjective     Chief Complaint:   Chief Complaint   Patient presents with    Follow-up    Back Pain       HPI   Soha Wheatley is a 62 y.o. female who  has a past medical history of Allergy; Anxiety; Arthritis; BMI 45.0-49.9, adult; Chronic kidney disease; Depression; GERD (gastroesophageal reflux disease); History of kidney stones; Kidney stones; Migraines; Morbid obesity; Obesity; and Sleep apnea in adult., presenting for follow up.     Patient had laproscopic gastric sleeve surgery on 12/29/2017. Surgery had no complications. Scars are healing well. She has lost over 60 lbs since the surgery. She is taking all prescribed vitamins and following instructions on diet. She is struggling with drinking her 8 glasses of water a day, right now is drinking about 5. She recognizes she needs to separate when she drinks from when she eats in order to increase her oral intake. She is nervous about this due to her hx of kidney stones.   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 Otherwise, she only has diarrhea after eating sweets, which she was told is normal.     Patient has severe right knee pain due to her arthritis. She was told she cannot take meloxicam until March nor can she take Tylenol Arthritis. She has been taking liquid tylenol which takes the edge off but does not completely alleviate the pain. She is getting Euflexxa injections with Ortho but is planning for surgery in the summer. She is wondering if there is any other type of pain medication she can take that will not effect her sleeve. She is having difficulty ambulating and is hoping to get the surgery so she can exercise more and lose more weight.     Patient has new onset back pain. She was recently babysitting  her grand daughter who is 13 lbs and was picking her up and putting her down throughout the day, causing this pain.     Review Of Systems:  Review of Systems   Constitutional: Positive for weight loss. Negative for chills and fever.   Eyes: Negative for blurred vision.   Cardiovascular: Negative for chest pain and palpitations.   Gastrointestinal: Positive for diarrhea. Negative for abdominal pain, constipation, heartburn, nausea and vomiting.   Musculoskeletal: Negative for back pain and joint pain.   Skin: Negative for itching and rash.       Patient's Medications:  Current Outpatient Prescriptions on File Prior to Visit   Medication Sig Dispense Refill    acetaminophen (TYLENOL ARTHRITIS) 650 MG TbSR Take 650 mg by mouth every 8 (eight) hours.       b complex vitamins tablet Take 1 tablet by mouth once daily.      cinnamon bark-chromium picolin 500-100 mg-mcg Cap Take by mouth.      cyanocobalamin 500 MCG tablet Take 500 mcg by mouth once daily.      gabapentin (NEURONTIN) 250 mg/5 mL solution Take 12 mLs (600 mg total) by mouth 3 (three) times daily. 1080 mL 2    GLUCOSAMINE HCL/CHONDR APODACA A NA (OSTEO BI-FLEX ORAL) Take by mouth once daily.      multivitamin (ONE DAILY MULTIVITAMIN) per tablet Take 1 tablet by mouth once daily.      sertraline (ZOLOFT) 20 mg/mL concentrated solution Take 2.5 mLs (50 mg total) by mouth once daily. Can take liquid formulation of Sertraline while healing from bariatric surgery (Patient taking differently: Take 50 mg by mouth every evening. Can take liquid formulation of Sertraline while healing from bariatric surgery) 60 mL 3    tizanidine (ZANAFLEX) 4 MG tablet Take 1 tablet (4 mg total) by mouth every 8 (eight) hours as needed. 270 tablet 0    diclofenac sodium 1 % Gel Apply 2 g topically 4 (four) times daily. 1 Tube 1    hydrocodone-acetaminophen (HYCET) solution 7.5-325 mg/15mL Take 15 mLs by mouth 4 (four) times daily as needed for Pain. 473 mL 0    LACTOBACILLUS  "RHAMNOSUS GG (CULTURELLE ORAL) Take by mouth once daily.      meloxicam (MOBIC) 15 MG tablet TAKE 1 TABLET DAILY 90 tablet 2    omeprazole (PRILOSEC) 40 MG capsule Take 1 capsule (40 mg total) by mouth once daily. 90 capsule 3    ondansetron (ZOFRAN-ODT) 8 MG TbDL Take 1 tablet (8 mg total) by mouth every 6 (six) hours as needed. 30 tablet 0    polyethylene glycol (GLYCOLAX) 17 gram/dose powder Take 17 g by mouth once daily. 1 Bottle 3    promethazine (PHENERGAN) 25 MG suppository 1 rectally every 6 hours prn.  Okay to change to liquid or pills. 15 suppository 0     No current facility-administered medications on file prior to visit.      Patient reports that she is not taking the following medications s/p gastric sleeve:  - acetaminophen  - hydrocodone/acetaminophen   - lactobacillus  - meloxicam  - polyethylene glycol  - promethazine      Objective     Vitals:    02/22/18 0832   BP: 102/60   BP Location: Right arm   Patient Position: Sitting   BP Method: Large (Manual)   Pulse: 70   Weight: 124.4 kg (274 lb 4 oz)   Height: 5' 10" (1.778 m)       Physical Exam:  Physical Exam   Constitutional: She appears well-developed and well-nourished.   Cardiovascular: Normal rate, regular rhythm and normal heart sounds.    Pulmonary/Chest: Effort normal and breath sounds normal.   Abdominal: Soft. Bowel sounds are normal. There is tenderness in the epigastric area.       Well healed scars from her laproscopic surgery. Epigastric tenderness only present with deep palpation. Bowel sounds are slightly decreased but present.    Musculoskeletal:        Right knee: She exhibits decreased range of motion and deformity. Tenderness found.   Skin: Skin is warm and dry.           ASSESSMENT/PLAN:     Plan    1. S/p gastric sleeve  - f/u is scheduled with bariatrics in March.  - f/u with Dr. Mallory in 9 months    2. Osteoarthritis  - wait for bariatric approval before beginning " meloxicam.      ______________________________________________________________________  [facility]  Medical Student

## 2018-02-25 NOTE — PROGRESS NOTES
Soha Wheatley is a 62 y.o. female who  has a past medical history of Allergy; Anxiety; Arthritis; BMI 45.0-49.9, adult; Chronic kidney disease; Depression; GERD (gastroesophageal reflux disease); History of kidney stones; Kidney stones; Migraines; Morbid obesity; Obesity; and Sleep apnea in adult., presenting for follow up.      Patient had laproscopic gastric sleeve surgery on 12/29/2017. Surgery had no complications. Scars are healing well. She has lost over 60 lbs since the surgery. She is taking all prescribed vitamins and following instructions on diet. She is struggling with drinking her 8 glasses of water a day, right now is drinking about 5. She recognizes she needs to separate when she drinks from when she eats in order to increase her oral intake. She is nervous about this due to her hx of kidney stones.   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 Otherwise, she only has diarrhea after eating sweets, which she was told is normal.      Patient has severe right knee pain due to her arthritis. She was told she cannot take meloxicam until March nor can she take Tylenol Arthritis. She has been taking liquid tylenol which takes the edge off but does not completely alleviate the pain. She is getting Euflexxa injections with Ortho but is planning for surgery in the summer. She is wondering if there is any other type of pain medication she can take that will not effect her sleeve. She is having difficulty ambulating and is hoping to get the surgery so she can exercise more and lose more weight.      Patient has new onset back pain. She was recently babysitting her grand daughter who is 13 lbs and was picking her up and putting her down throughout the day, causing this pain.      Review Of Systems:  Review of Systems   Constitutional: Positive for weight loss. Negative for chills and fever.   Eyes:  "Negative for blurred vision.   Cardiovascular: Negative for chest pain and palpitations.   Gastrointestinal: Positive for diarrhea. Negative for abdominal pain, constipation, heartburn, nausea and vomiting.   Musculoskeletal: Negative for back pain and joint pain.   Skin: Negative for itching and rash.        Patient reports that she is not taking the following medications s/p gastric sleeve:  - acetaminophen  - hydrocodone/acetaminophen   - lactobacillus  - meloxicam  - polyethylene glycol  - promethazine        Objective      /60 (BP Location: Right arm, Patient Position: Sitting, BP Method: Large (Manual))   Pulse 70   Ht 5' 10" (1.778 m)   Wt 124.4 kg (274 lb 4 oz)   BMI 39.35 kg/m²               Physical Exam:  Physical Exam   Constitutional: She appears well-developed and well-nourished.   Cardiovascular: Normal rate, regular rhythm and normal heart sounds.    Pulmonary/Chest: Effort normal and breath sounds normal.   Abdominal: Soft. Bowel sounds are normal. There is tenderness in the epigastric area.       Well healed scars from her laproscopic surgery. Epigastric tenderness only present with deep palpation. Bowel sounds are slightly decreased but present.    Musculoskeletal:        Right knee: She exhibits decreased range of motion and deformity. Tenderness found.   Skin: Skin is warm and dry.               ASSESSMENT/PLAN:      Plan     1. Obesity --S/p gastric sleeve  - f/u is scheduled with bariatrics in March.  Discussed appropriate weight loss-      2. Osteoarthritis  - wait for bariatric approval before beginning meloxicam.    "

## 2018-03-15 ENCOUNTER — TELEPHONE (OUTPATIENT)
Dept: BARIATRICS | Facility: CLINIC | Age: 63
End: 2018-03-15

## 2018-03-16 ENCOUNTER — CLINICAL SUPPORT (OUTPATIENT)
Dept: BARIATRICS | Facility: CLINIC | Age: 63
End: 2018-03-16
Payer: COMMERCIAL

## 2018-03-16 ENCOUNTER — OFFICE VISIT (OUTPATIENT)
Dept: BARIATRICS | Facility: CLINIC | Age: 63
End: 2018-03-16
Payer: COMMERCIAL

## 2018-03-16 ENCOUNTER — LAB VISIT (OUTPATIENT)
Dept: LAB | Facility: HOSPITAL | Age: 63
End: 2018-03-16
Payer: COMMERCIAL

## 2018-03-16 VITALS
HEART RATE: 78 BPM | WEIGHT: 265.88 LBS | SYSTOLIC BLOOD PRESSURE: 118 MMHG | DIASTOLIC BLOOD PRESSURE: 64 MMHG | BODY MASS INDEX: 38.06 KG/M2 | HEIGHT: 70 IN

## 2018-03-16 DIAGNOSIS — Z98.890 POSTOPERATIVE STATE: ICD-10-CM

## 2018-03-16 DIAGNOSIS — F41.9 ANXIETY: ICD-10-CM

## 2018-03-16 DIAGNOSIS — Z98.84 STATUS POST LAPAROSCOPIC SLEEVE GASTRECTOMY: ICD-10-CM

## 2018-03-16 DIAGNOSIS — Z98.84 S/P LAPAROSCOPIC SLEEVE GASTRECTOMY: Primary | ICD-10-CM

## 2018-03-16 DIAGNOSIS — E66.01 MORBID OBESITY DUE TO EXCESS CALORIES: ICD-10-CM

## 2018-03-16 DIAGNOSIS — M25.569 KNEE PAIN, UNSPECIFIED CHRONICITY, UNSPECIFIED LATERALITY: ICD-10-CM

## 2018-03-16 DIAGNOSIS — R63.4 WEIGHT LOSS: ICD-10-CM

## 2018-03-16 DIAGNOSIS — R10.31 ABDOMINAL PAIN, RLQ (RIGHT LOWER QUADRANT): ICD-10-CM

## 2018-03-16 DIAGNOSIS — K21.9 GASTROESOPHAGEAL REFLUX DISEASE, ESOPHAGITIS PRESENCE NOT SPECIFIED: ICD-10-CM

## 2018-03-16 LAB
25(OH)D3+25(OH)D2 SERPL-MCNC: 42 NG/ML
ALBUMIN SERPL BCP-MCNC: 3.9 G/DL
ALP SERPL-CCNC: 84 U/L
ALT SERPL W/O P-5'-P-CCNC: 23 U/L
ANION GAP SERPL CALC-SCNC: 7 MMOL/L
AST SERPL-CCNC: 20 U/L
BASOPHILS # BLD AUTO: 0.03 K/UL
BASOPHILS NFR BLD: 0.4 %
BILIRUB SERPL-MCNC: 0.5 MG/DL
BUN SERPL-MCNC: 23 MG/DL
CALCIUM SERPL-MCNC: 10.4 MG/DL
CHLORIDE SERPL-SCNC: 105 MMOL/L
CHOLEST SERPL-MCNC: 198 MG/DL
CHOLEST/HDLC SERPL: 3.5 {RATIO}
CO2 SERPL-SCNC: 30 MMOL/L
CREAT SERPL-MCNC: 0.8 MG/DL
DIFFERENTIAL METHOD: ABNORMAL
EOSINOPHIL # BLD AUTO: 0.1 K/UL
EOSINOPHIL NFR BLD: 1.6 %
ERYTHROCYTE [DISTWIDTH] IN BLOOD BY AUTOMATED COUNT: 13.4 %
EST. GFR  (AFRICAN AMERICAN): >60 ML/MIN/1.73 M^2
EST. GFR  (NON AFRICAN AMERICAN): >60 ML/MIN/1.73 M^2
GLUCOSE SERPL-MCNC: 95 MG/DL
HCT VFR BLD AUTO: 42 %
HDLC SERPL-MCNC: 57 MG/DL
HDLC SERPL: 28.8 %
HGB BLD-MCNC: 13.2 G/DL
IMM GRANULOCYTES # BLD AUTO: 0.02 K/UL
IMM GRANULOCYTES NFR BLD AUTO: 0.3 %
IRON SERPL-MCNC: 64 UG/DL
LDLC SERPL CALC-MCNC: 113.6 MG/DL
LYMPHOCYTES # BLD AUTO: 2.2 K/UL
LYMPHOCYTES NFR BLD: 33.2 %
MCH RBC QN AUTO: 29.3 PG
MCHC RBC AUTO-ENTMCNC: 31.4 G/DL
MCV RBC AUTO: 93 FL
MONOCYTES # BLD AUTO: 0.7 K/UL
MONOCYTES NFR BLD: 10.1 %
NEUTROPHILS # BLD AUTO: 3.6 K/UL
NEUTROPHILS NFR BLD: 54.4 %
NONHDLC SERPL-MCNC: 141 MG/DL
NRBC BLD-RTO: 0 /100 WBC
PLATELET # BLD AUTO: 226 K/UL
PMV BLD AUTO: 10.2 FL
POTASSIUM SERPL-SCNC: 4.3 MMOL/L
PROT SERPL-MCNC: 7.3 G/DL
RBC # BLD AUTO: 4.5 M/UL
SATURATED IRON: 18 %
SODIUM SERPL-SCNC: 142 MMOL/L
TOTAL IRON BINDING CAPACITY: 346 UG/DL
TRANSFERRIN SERPL-MCNC: 234 MG/DL
TRIGL SERPL-MCNC: 137 MG/DL
VIT B12 SERPL-MCNC: 675 PG/ML
WBC # BLD AUTO: 6.71 K/UL

## 2018-03-16 PROCEDURE — 82306 VITAMIN D 25 HYDROXY: CPT

## 2018-03-16 PROCEDURE — 84425 ASSAY OF VITAMIN B-1: CPT

## 2018-03-16 PROCEDURE — 99024 POSTOP FOLLOW-UP VISIT: CPT | Mod: S$GLB,,, | Performed by: NURSE PRACTITIONER

## 2018-03-16 PROCEDURE — 99499 UNLISTED E&M SERVICE: CPT | Mod: S$GLB,,, | Performed by: DIETITIAN, REGISTERED

## 2018-03-16 PROCEDURE — 99999 PR PBB SHADOW E&M-EST. PATIENT-LVL IV: CPT | Mod: PBBFAC,,, | Performed by: NURSE PRACTITIONER

## 2018-03-16 PROCEDURE — 82607 VITAMIN B-12: CPT

## 2018-03-16 PROCEDURE — 36415 COLL VENOUS BLD VENIPUNCTURE: CPT

## 2018-03-16 PROCEDURE — 83540 ASSAY OF IRON: CPT

## 2018-03-16 PROCEDURE — 85025 COMPLETE CBC W/AUTO DIFF WBC: CPT

## 2018-03-16 PROCEDURE — 80061 LIPID PANEL: CPT

## 2018-03-16 PROCEDURE — 80053 COMPREHEN METABOLIC PANEL: CPT

## 2018-03-16 RX ORDER — OMEPRAZOLE 40 MG/1
40 CAPSULE, DELAYED RELEASE ORAL DAILY
Qty: 90 CAPSULE | Refills: 1 | Status: SHIPPED | OUTPATIENT
Start: 2018-03-16 | End: 2018-10-07 | Stop reason: SDUPTHER

## 2018-03-16 NOTE — PROGRESS NOTES
NUTRITION NOTE    Referring Physician: Andrew Holloway M.D.  Reason for MNT Referral: Follow-up 3 months s/p Gastric Sleeve    Denies nausea, vomiting, constipation and diarrhea.  Reports doing well.    Past Medical History:   Diagnosis Date    Allergy     Anxiety     Arthritis     BMI 45.0-49.9, adult     Chronic kidney disease     Depression     GERD (gastroesophageal reflux disease)     History of kidney stones     Kidney stones     Migraines     Morbid obesity     Obesity     Sleep apnea in adult     WEARS CPAP, DOESNT KNOW SETTINGS.       CLINICAL DATA:  62 y.o. female.    Excess Weight Loss: 31%    CURRENT DIET:  Bariatric Diet.  Diet Recall:  grams of protein/day; 48 oz of fluids/day    B: 1 egg, 1oz deli ham  - Vijay prot shake 28g  L: P3 prot pack 15g  - 12g greek yogurt  - cheese stick  D: 1oz grilled chicken, 1 cup bean soup    Meal Pattern: 3 meal(s) + 2 snack(s) + 1-2 protein supplement(s)  Adequate protein supplement intake.  Adequate dairy intake. Vijay powder + skim milk. Premier shakes  Adequate vegetable intake. No raw vegetables and lettuce yet.      EXERCISE:  None.    Restrictions to Exercise: Knee Pain. Needs dbl knee replacements.    VITAMINS / MINERALS:  Multivitamins: Centrum Chew BID  B-Complex: tablet daily  Calcium Citrate + Vitamin D: not taking per urology   Vitamin B12: Sublingual.    ASSESSMENT:  Doing well overall.  Weight loss on track.  Adequate calorie intake.  Adequate protein intake.  Adequate fluid intake.  Following diet appropriately.  Not exercising.  Adequate vitamins & minerals.    BARIATRIC DIET DISCUSSION:  Instructed and provided written materials on bariatric diet plan.  Reinforced post-op nutrition guidelines.    PLAN / RECOMMENDATIONS:  May begin to incorporate raw vegetables, lettuce, unsalted nuts, and protein bars as tolerated.  May begin to swallow whole pills as tolerated.  Continue excellent diet plan.  Maintain protein intake.  Increase  fluid intake.  Continue appropriate vitamins & minerals.    Discussed tips for upcoming cruise.    Return to clinic in 3 months.    SESSION TIME: 15 minutes

## 2018-03-16 NOTE — PATIENT INSTRUCTIONS
Meal Ideas for Regular Bariatric Diet  *Recipes and products available at [email or web address].[email or web address]      Breakfast: (15-20g protein)    - Egg white omelet: 2 egg whites or ½ cup Egg Beaters. (Optional proteins: cheese, shrimp, black beans, chicken, sliced turkey) (Optional veggies: tomatoes, salsa, spinach, mushrooms, onions, green peppers, or small slice avocado)     - Egg and sausage: 1 egg or ¼ cup Egg Beaters (any variety), with 1 safia or 2 links of Turkey sausage or Veggie breakfast sausage (Evermede or CÃ[employer]R [employer])    - Crust-less breakfast quiche: To make a glass pie dish, mix 4oz part skim Ricotta, 1 cup skim milk, and 2 eggs as your base. Add protein: shredded cheese, sliced lean ham or turkey, turkey mir/sausage. Add veggies: tomato, onion, green onion, mushroom, green pepper, spinach, etc.    - Yogurt parfait: Mix 1 - 6oz container Dannon Light N Fit vanilla yogurt, with ¼ cup crushed unsalted nuts    - Cottage cheese and fruit: ½ cup part-skim cottage cheese or ricotta cheese topped with fresh fruit or sugar free preserves     - Yamilka Bagley's Vanilla Egg custard* (add 2 Tbsp instant coffee granules to make Cappuccino Custard*)    - Hi-Protein café latte (skim milk, decaf coffee, 1 scoop protein powder). Optional to add Sugar free syrup or extract flavoring.    - Breakfast Lox: spread fat free cream cheese on slices of smoked salmon. Serve over scrambled or egg over easy (sauteed with nonstick cookspray) OR on a cucumber slice    - Eggwhich: Scramble or cook 1 large egg over easy using nonstick cookspray. Place between 2 slices of Nigerian mir and low fat cheese.     Lunch: (20-30g protein)    - ½ cup Black bean soup (Homemade or Progresso), with ¼ cup shredded low-fat cheese. Top with chopped tomato or fresh salsa.     - Lean deli turkey breast and low-fat sliced cheese, mustard or light lozano to moisten, rolled up together, or wrapped in a Adeel lettuce leaf    - Chicken salad made from dinner  leftovers, moisten with low-fat salad dressing or light lozano. Also try leftover salmon, shrimp, tuna or boiled eggs. Serve ½ cup over dark green salad    - Fat-free canned refried beans, topped with ¼ cup shredded low-fat cheese. Top with chopped tomato or fresh salsa.     - Greek salad: Top mixed greens with 1-2oz grilled chicken, tomatoes, red onions, 2-3 kalamata olives, and sprinkle lightly with feta cheese. Spritz with Balsamic vinegar to taste.     - Crust-less lunch quiche: To make a glass pie dish, mix 4oz part skim Ricotta, 1 cup skim milk, and 2 eggs as your base. Add protein: shredded cheese, sliced lean ham or turkey, shrimp, chicken. Add veggies: tomato, onion, green onion, mushroom, green pepper, spinach, artichoke, broccoli, etc.    - Pizza bake: spread a  carlos kanika mushroom with tomato sauce, low-fat shredded mozzarella and turkey pepperoni or [place] mir. Add any veggies. Roast for 10-15 minutes, until cheese melted.     - Cucumber crab bites: Spread ¼ cup crab dip (lump crabmeat + light cream cheese and green onions) over sliced cucumber.     - Chicken with light spinach and artichoke dip*: Puree in : 6oz cooked and drained spinach, 2 cloves garlic, 1 can cannelloni beans, ½ cup chopped green onions, 1 can drained artichoke hearts (not marinated in oil), lemon juice and basil. Mix in 2oz chopped up chicken.    Supper: (20-30g protein)    - Serve grilled fish over dark green salad tossed with low-fat dressing, served with grilled asparagus peterson     - Rotisserie chicken salad: served with sliced strawberries, walnuts, fat-free feta cheese crumbles and 1 tbsp Changs Own Light Raspberry [place] Vinaigrette    - Shrimp cocktail: Dip cold boiled shrimp in homemade low-sugar cocktail sauce (1/2 cup Wolf One Carb ketchup, 2 tbsp horseradish, 1/4 tsp hot sauce, 1 tsp Worcestershire sauce, 1 tbsp freshly-squeezed lemon juice). Serve with dark green salad, walnuts, and crumbled blue  cheese drizzled with olive oil and Balsamic vinegar    - Tuna Melt: Spread tuna salad onto 2 thick slices of tomato. Top with low-fat cheese and broil until cheese is melted. May also be made with chicken salad of shrimp salad. [place] with different types of cheeses.    - Chicken or beef fajitas (no tortilla, rice, beans, chips). Top meat and veggies w/ fresh salsa, fat free sour cream.     - Homemade low-fat Chili using extra lean ground beef or ground turkey. Top with shredded cheese and salsa as desired. May add dollop fat-free sour cream if desired    - Chicken parmesan: Top chicken breast w/ low sugar marinara sauce, mozzarella cheese and bake until chicken reaches 165*.  Serve w/ spaghetti SQUASH or [place] cut green beans    - Dinner Omelet with shrimp or chicken and onion, green peppers and chives.    - No noodle lasagna: Use sliced zucchini or eggplant in place of noodles.  Layer with part skim ricotta cheese and low sugar meat sauce (use very lean ground beef or ground turkey).    - Mexican chicken bake: Bake chunks of chicken breast or thigh with taco seasoning, Pace brand enchilada sauce, green onions and low-fat cheese. Serve with ¼ cup black beans or fat free refried beans topped with chopped tomatoes or salsa.    - Maurice frozen meatballs, simmered in Classico Marinara sauce. Different flavors of salsa or spaghetti sauce create different dishes! Sprinkle with parmesan cheese. Serve with grilled or steamed veggies, or a dark green salad.    - Simmer boneless skinless chicken thigh chunks in Classico Marinara sauce or roasted salsa until tender with chopped onion, bell pepper, garlic, mushrooms, spinach, etc.     - Hamburger or veggie burger, without the bun, dressed the way you like. Served with grilled or steamed veggies.    - Eggplant parmesan: Bake slices of eggplant at 350 degrees for 15 minutes. Layer tomato sauce, sliced eggplant and low-fat mozzarella cheese in a baking dish and cover with  foil. Bake 30-40 more minutes or until bubbly. Uncover and bake at 400 degrees for about 15 more minutes, or until top is slightly crisp.    - Fish tacos: grilled/baked white fish, wrapped in Adeel lettuce leaf, topped with salsa, shredded low-fat cheese, and light coleslaw.    - Chicken wanda: Sprinkle chicken w/ 1 tsp of [place] ranch dip mix. Then grill chicken and top with black beans, salsa and 1 tsp fat free sour cream.     - Cauliflower pizza crust: Use cauliflower as crust (see recipe on pinterest, no flour!). Top w/ low fat cheese, turkey pepperoni and veggies and bake again    - chicken or turkey crust pizza: use ground chicken or turkey instead of cauliflower, spread in [place] and bake at 350 for about 20-30 minutes(may want to add garlic, black pepper, oregano and other herbs to ground meat mixture).  Remove and top w/ low fat cheese, turkey pepperoni and veggies and bake again for another 10 minutes or until cheese is browned.     Snacks: (100-200 calories; >5g protein)    - 1 low-fat cheese stick with 8 cherry tomatoes or 1 serving fresh fruit  - 4 thin slices fat-free turkey breast and 1 slice low-fat cheese  - 4 thin slices fat-free honey ham with wedge of melon  - 6-8 edamame pods (equivalent to about 1/4 cup edamame without pods).   - 1/4 cup unsalted nuts with ½ cup fruit  - 6-oz container Dannon Light n Fit vanilla yogurt, topped with 1oz unsalted nuts         - apple, celery or baby carrots spread with 2 Tbsp PB2  - apple slices with 1 oz slice low-fat cheese  - Apple slices dipped in 2 Tbsp of PB2  - celery, cucumber, bell pepper or baby carrots dipped in ¼ cup hummus bean spread or light spinach and artichoke dip (*recipe in lunch section)  - celery, cucumber, baby carrots dipped in high protein greek yogurt (Mix 16 oz plain greek yogurt + 1 packet of hidden [place] ranch dip mix)  - Delmar Links Beef Steak - 14g protein! (similar to beef jerky)  - 2 wedges Laughing Cow - Light Herb  & Garlic Cheese with sliced cucumber or green bell pepper  - 1/2 cup low-fat cottage cheese with ¼ cup fruit or ¼ cup salsa  - RTD Protein drinks: Atkins, Low Carb Slim Fast, EAS light, Muscle Milk Light, etc.  - Homemade Protein drinks: GNC Soy95, Isopure, Nectar, UNJURY, Whey Gourmet, etc. Mix 1 scoop powder with 8oz skim/1% milk or light soymilk.  - Protein bars: Atkins, EAS, Pure Protein, Think Thin, Detour, etc. Must have 0-4 grams sugar - Read the label.    Takeout Options: No more than twice/week  Deli - Salads (no pasta or rice), meats, cheeses. Roasted chicken. Lox (salmon)    Mexican - Platters which don't include tortillas, chips, or rice. Go easy on the beans. Example: Fajitas without the tortillas. Ask the  not to bring chips to the table if they are too tempting.    Greek - Meat or fish and vegetable, but no bread or rice. Including hummus, baba ganoush, etc, is OK. Most sit-down Greek restaurants can provide you with cucumber slices for dipping instead of alberto bread.    Fast Food (Avoid as much as possible) - Salads (no croutons and limit salad dressing to 2 tbsp), grilled chicken sandwich without the bun and ask for no lozano. Mary Ellens low fat chili or Taco Bell pintos and cheese.    BBQ - The meats are fine if you ask for sauces on the side, but most of the traditional side dishes are loaded with carbs. Víctor slaw, baked beans and BBQ sauce are typically made with sugar.    Chinese - Nothing deep-fried, no rice or noodles. Many Chinese sauces have starch and sugar in them, so you'll have to use your judgement. If you find that these sauces trigger cravings, or cause Dumping, you can ask for the sauce to be made without sugar or just use soy sauce.

## 2018-03-16 NOTE — PROGRESS NOTES
"BARIATRIC POST OP FOLLOW UP:    Chief Complaint   Patient presents with    Follow-up     Sleeve 12/29/17     HISTORY OF PRESENT ILLNESS: Soha Wheatley is a 62 y.o. female with a Body mass index is 38.15 kg/m². who presents for a 3 month follow up s/p lap sleeve with Dr Holloway on 12/29/2017. she is doing well and overall tolerating the diet without difficulty. 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 She notes frequent contact with the students. Her bowel movements have been normal and regular until this AM when loose stools with frequent bowel "gurgling" started. She denies recent antibiotics or hospitalization. Denies bloating or urgency. Overall she is feeling well and is tolerating food and water without issue.    Denies: nausea, vomiting, abdominal pain, fever, chills, dysphagia, chest pain, and shortness of breath. Denies symptoms of hypoglycemia.     She has been instructed to take MVI once daily 2/2 kidney stones.   Ca citrate also on hold per Urology.    Unable to exercise 2/2 knee pain- walking more with ADLs.  She wants to know when she can resume NSAIDS.  She hopes to have knee surgery in May 2018.    she has lost 50 lbs, approximately 31% of their excess weight.     Review of Systems   Constitutional: Positive for weight loss. Negative for chills and fever.   Respiratory: Negative for cough, shortness of breath and wheezing.    Cardiovascular: Negative for chest pain, palpitations and leg swelling.   Gastrointestinal: Negative for abdominal pain, blood in stool, constipation, heartburn, melena, nausea and vomiting.   Genitourinary: Negative for dysuria, frequency and hematuria.   Musculoskeletal: Positive for joint pain.        Bilateral knee pain, Right most painful   Skin: Negative for itching and rash.   Neurological: Negative for focal weakness and headaches.   Psychiatric/Behavioral: Negative for " "depression, memory loss and suicidal ideas.     EXERCISE & VITAMINS:  See Bariatric Assessment    MEDICATIONS/ALLERGIES:  Have been reviewed.    DIET:  Soft Bariatric Diet.    See dietitian's note from today for further details.     Vitals:    01/30/18 1307   BP: 123/80   Pulse: 82   Weight: 126.7 kg (279 lb 5.2 oz)   Height: 5' 10" (1.778 m)     Physical Exam   Constitutional: She is oriented to person, place, and time. She appears well-developed and well-nourished. No distress.   HENT:   Head: Normocephalic and atraumatic.   Eyes: Conjunctivae are normal. No scleral icterus.   Cardiovascular: Normal rate.    Pulmonary/Chest: Effort normal and breath sounds normal. No respiratory distress.   Abdominal: Soft. Bowel sounds are normal. She exhibits no distension and no mass. There is no tenderness (appropriate mild post surgical tenderness). There is no rebound and no guarding.   WHSS.     Musculoskeletal: She exhibits no edema.   Neurological: She is alert and oriented to person, place, and time.   Skin: Skin is warm and dry. Capillary refill takes less than 2 seconds. No rash noted. She is not diaphoretic. No erythema. No pallor.   Psychiatric: She has a normal mood and affect. Her behavior is normal. Judgment and thought content normal.   Nursing note and vitals reviewed.    ASSESSMENT:  - Morbid obesity, Body mass index is 38.15 kg/m².,  s/p sleeve gastrectomy on 12/29/2017.  - Estimated goal weight, 235 lbs, which is 50% EWL  - Co-morbidities: GERD and anxiety.  - Great Weight loss, 50 lbs, 31% EWL  - No formal Exercise regimen  - Vitamin Regimen sub optimal to bariatric requirements as noted.  - Diet: See dietitian's note from today for further details.     PLAN:  - Emphasized the importance of regular exercise and adherence to bariatric diet to achieve maximum weight loss.  - Encouraged patient to continue regular activity and begin regular exercise as able.  - Follow-up with dietician to advance diet.  - " Continue daily vitamins and medications.   - Encouraged adequate hydration.     - Anti-Acid medication, Omeprazole daily for 3 months. Ok to taper.  - Discussed that I recommend continued avoidance of NSAIDS. Recommend Tylenol for pain. OK to take Voltaren gel with continued PPI use. Will continue PPI until RTC as which point she may have had knee surgery.  - Miralax daily for constipation, no fiber.  - Can swallow whole pills on March 29, 2018.    - RTC in 3 months or sooner if needed.  - Call the office for any issues and may f/u with PCP if lower GI symptoms worsen or progress.   - Check labs today.

## 2018-03-19 LAB — VIT B1 SERPL-MCNC: 60 UG/L (ref 38–122)

## 2018-03-22 ENCOUNTER — PATIENT MESSAGE (OUTPATIENT)
Dept: SPINE | Facility: CLINIC | Age: 63
End: 2018-03-22

## 2018-03-22 ENCOUNTER — PATIENT MESSAGE (OUTPATIENT)
Dept: PSYCHIATRY | Facility: CLINIC | Age: 63
End: 2018-03-22

## 2018-03-22 DIAGNOSIS — F41.9 ANXIETY: Primary | ICD-10-CM

## 2018-03-22 RX ORDER — SERTRALINE HYDROCHLORIDE 50 MG/1
50 TABLET, FILM COATED ORAL DAILY
Qty: 30 TABLET | Refills: 1 | Status: SHIPPED | OUTPATIENT
Start: 2018-03-22 | End: 2018-05-08 | Stop reason: SDUPTHER

## 2018-03-22 RX ORDER — GABAPENTIN 600 MG/1
600 TABLET ORAL 3 TIMES DAILY
Qty: 90 TABLET | Refills: 1 | Status: SHIPPED | OUTPATIENT
Start: 2018-03-22 | End: 2018-06-11 | Stop reason: SDUPTHER

## 2018-03-22 NOTE — TELEPHONE ENCOUNTER
Patient last seen in 2016. She needs f/u prior to any additional refills or she needs to get refills from PCP. Please let her know.

## 2018-03-22 NOTE — TELEPHONE ENCOUNTER
Patient requesting PO pill/tablet form of medication again from liquid formulation that was required after bariatric surgery.  Will refill 30 days and 1 refill but advised patient to make follow up as she has not come back for follow up since her initial visit in July, 2017.  Will not provide further refills beyond this prescription until a follow up has been made.    Mary Carmen Tello D.O.  [facility]  [phone]    3/22/2018  12:05 PM

## 2018-04-12 ENCOUNTER — HOSPITAL ENCOUNTER (OUTPATIENT)
Dept: RADIOLOGY | Facility: HOSPITAL | Age: 63
Discharge: HOME | End: 2018-04-12
Attending: ORTHOPAEDIC SURGERY
Payer: COMMERCIAL

## 2018-04-12 ENCOUNTER — OFFICE VISIT (OUTPATIENT)
Dept: ORTHOPEDICS | Facility: CLINIC | Age: 63
End: 2018-04-12
Payer: COMMERCIAL

## 2018-04-12 VITALS — HEIGHT: 70 IN | WEIGHT: 265 LBS | BODY MASS INDEX: 37.94 KG/M2

## 2018-04-12 DIAGNOSIS — M25.569 KNEE PAIN, UNSPECIFIED CHRONICITY, UNSPECIFIED LATERALITY: ICD-10-CM

## 2018-04-12 DIAGNOSIS — M17.0 PRIMARY OSTEOARTHRITIS OF BOTH KNEES: ICD-10-CM

## 2018-04-12 DIAGNOSIS — M25.569 KNEE PAIN, UNSPECIFIED CHRONICITY, UNSPECIFIED LATERALITY: Primary | ICD-10-CM

## 2018-04-12 DIAGNOSIS — M17.11 PRIMARY OSTEOARTHRITIS OF RIGHT KNEE: Primary | ICD-10-CM

## 2018-04-12 PROCEDURE — 99214 OFFICE O/P EST MOD 30 MIN: CPT | Mod: S$GLB,,, | Performed by: ORTHOPAEDIC SURGERY

## 2018-04-12 PROCEDURE — 73560 X-RAY EXAM OF KNEE 1 OR 2: CPT | Mod: 50,TC

## 2018-04-12 PROCEDURE — 99999 PR PBB SHADOW E&M-EST. PATIENT-LVL III: CPT | Mod: PBBFAC,,, | Performed by: ORTHOPAEDIC SURGERY

## 2018-04-12 PROCEDURE — 73560 X-RAY EXAM OF KNEE 1 OR 2: CPT | Mod: 26,50,, | Performed by: RADIOLOGY

## 2018-04-12 NOTE — PROGRESS NOTES
HPI:    Soha Wheatley is a 62 y.o. female who is here today for   Chief Complaint   Patient presents with    Right Knee - Pain   .  Progressing pain and deformity right knee.    Duration: 6 months  Intensity: severe  Character of pain: sharp  Location: She reports that the pain is predominately  lateral  Patient's pain increases with activity.  Pain is increased with weightbearing and interferes with activities of daily living.    She has tried conservative management including NSAIDS, injections, and activity modification without relief.    She has discussed options with her family and wishes to schedule TKA.     PMSFSH reviewed per clinic record       Past Medical History:   Diagnosis Date    Allergy     Anxiety     Arthritis     BMI 45.0-49.9, adult     Chronic kidney disease     Depression     GERD (gastroesophageal reflux disease)     History of kidney stones     Kidney stones     Migraines     Morbid obesity     Obesity     Sleep apnea in adult     WEARS CPAP, DOESNT KNOW SETTINGS.          Current Outpatient Prescriptions:     acetaminophen (TYLENOL ARTHRITIS) 650 MG TbSR, Take 650 mg by mouth every 8 (eight) hours. , Disp: , Rfl:     b complex vitamins tablet, Take 1 tablet by mouth once daily., Disp: , Rfl:     cinnamon bark-chromium picolin 500-100 mg-mcg Cap, Take by mouth., Disp: , Rfl:     cyanocobalamin 500 MCG tablet, Take 500 mcg by mouth once daily., Disp: , Rfl:     gabapentin (NEURONTIN) 600 MG tablet, Take 1 tablet (600 mg total) by mouth 3 (three) times daily., Disp: 90 tablet, Rfl: 1    GLUCOSAMINE HCL/CHONDR APODACA A NA (OSTEO BI-FLEX ORAL), Take by mouth once daily., Disp: , Rfl:     LACTOBACILLUS RHAMNOSUS GG (CULTURELLE ORAL), Take by mouth once daily., Disp: , Rfl:     meloxicam (MOBIC) 15 MG tablet, TAKE 1 TABLET DAILY, Disp: 90 tablet, Rfl: 2    multivitamin (ONE DAILY MULTIVITAMIN) per tablet, Take 1 tablet by mouth once daily., Disp: , Rfl:     omeprazole  "(PRILOSEC) 40 MG capsule, Take 1 capsule (40 mg total) by mouth once daily., Disp: 90 capsule, Rfl: 1    sertraline (ZOLOFT) 50 MG tablet, Take 1 tablet (50 mg total) by mouth once daily., Disp: 30 tablet, Rfl: 1    tizanidine (ZANAFLEX) 4 MG tablet, Take 1 tablet (4 mg total) by mouth every 8 (eight) hours as needed., Disp: 270 tablet, Rfl: 0    diclofenac sodium 1 % Gel, Apply 2 g topically 4 (four) times daily., Disp: 1 Tube, Rfl: 1     Review of patient's allergies indicates:   Allergen Reactions    Adhesive      Paper tape usually ok- pulls skin off    Flagyl [metronidazole hcl]      confusion        ROS  Constitutional: Negative for fever, Positive for weight loss  HENT Negative for congestion  Cardiovascular: Negative chest pain  Respiratory: Negative Shortness of breath  Heme: Negative excessive bleeding  Skin:NegativeItching, Negative breakdown  Musculoskeletal:Positive for back pain, Positive for joint pain, Positive muscle pain, Positive muscle weakness  Neurological: Negative for numbness and paresthesias   Psychiatric/Behavioral: Negative altered mental status, Negative for depression    Physical Exam:   Ht 5' 10" (1.778 m)   Wt 120.2 kg (264 lb 15.9 oz)   BMI 38.02 kg/m²   General appearance: This is a well-developed, Well nourished female No obvious acute distress.  Psychiatric: normal mood and affect;  pleasant and conversant; judgment and thought content normal  Gait is coordinated. Patient has antalgic gait to the right  Cardiovascular: There are no swelling or varicosities present.   Respiratory: normal respiratory effort   Lymphatic: no adenopathy   Neurologic: alert and oriented to person, place, and time   Deep Tendon Reflexes are normal;  Coordination and Balance: Normal   Musculoskeletal   Neck    ROM shows normal flexion and extension and lateral rotation    Palpation: Non-tender    Stability is normal    Strength is normal    Skin is normal without masses and lesions    Sensation is " intact to light touch   Back    ROM showsnormal flexion, extension and     rotation    Palpation shows no masses    Stability is normal    Strength to flexion and extension well maintained    Core strength is diminished    Skin shows no rashes or cafe au lait spots;     Sensation is intact to light touch  Right hip   Range of motion normal    Left Hip  Range of motionnormal    Right Knee  Swelling Mild  TendernessLateral joint line  Range of Motion:   Motion is painfulYes  Crepitance presentYes    Right Leg  Neurologic Intact  Pulses Intact    Left Knee: Swelling None  TendernessNone  Range of Motion: 125   Motion is painful No    Left Leg   Neurologic Intact  Pulses Intact    Radiograph: Show severe degenerative arthritis with subchondral sclerosis, periarticular osteophytes and narrowing of joint space.  Angle: mild valgus    Physical therapy is contraindicated due to potential bone loss on this severe arthritic joint.    Assessment:  Knee arthritis right      She will need to be cleared in our PreOp center.         .    She  has a past medical history of Allergy; Anxiety; Arthritis; BMI 45.0-49.9, adult; Chronic kidney disease; Depression; GERD (gastroesophageal reflux disease); History of kidney stones; Kidney stones; Migraines; Morbid obesity; Obesity; and Sleep apnea in adult. . We will have to take this into account. She  will be followed by the hospitalist service while in the hospital.       We have gone over the hospitalization and recovery with her as well.  This is typically around 2 weeks on a walker and transition to a cane after that.  She will have home health likely for 3-4 weeks and then transition to outpatient if necessary.  She is agreement with this plan of care and is ready to proceed.  I will see her back for clinical recheck at the 2-week postop leann.  I will see her back for clinical recheck for any other questions or problems as needed and certainly for any other issues in the  interim.    We have discussed risks of total knee replacement which include but are not limited to blood clots in the legs that can travel to the lungs (pulmonary embolism). Pulmonary embolism can cause shortness of breath, chest pain, and even shock. Other risks include urinary tract infection, nausea and vomiting (usually related to pain medication), chronic knee pain and stiffness, bleeding into the knee joint, nerve damage, blood vessel injury, and infection of the knee which can require re-operation. Furthermore, the risks of anesthesia include potential heart, lung, kidney, and liver damage.

## 2018-04-25 DIAGNOSIS — M17.9 OSTEOARTHRITIS OF KNEE, UNSPECIFIED (CODE): Primary | ICD-10-CM

## 2018-05-03 ENCOUNTER — ANESTHESIA EVENT (OUTPATIENT)
Dept: SURGERY | Facility: HOSPITAL | Age: 63
DRG: 470 | End: 2018-05-03
Payer: COMMERCIAL

## 2018-05-03 NOTE — PRE ADMISSION SCREENING
Anesthesia Assessment: Preoperative EQUATION    Planned Procedure: Procedure(s) (LRB):  REPLACEMENT-KNEE-TOTAL (Right)  Requested Anesthesia Type:Regional  Surgeon: [facility] Jr., MD  Service: Orthopedics  Known or anticipated Date of Surgery:5/23/2018    Plan:    Testing:  PT/INR and UACBC and CMP done 3/2018   Pre-anesthesia  visit       Visit focus: possible regional anesthesia and/or nerve block      Consultation:IM Perioperative Hospitalist     Sonya Pierre RN 05/03/2018

## 2018-05-03 NOTE — ANESTHESIA PREPROCEDURE EVALUATION
Anesthesia Assessment: Preoperative EQUATION    Planned Procedure: Procedure(s) (LRB):  REPLACEMENT-KNEE-TOTAL (Right)  Requested Anesthesia Type:Regional  Surgeon: [facility] Jr., MD  Service: Orthopedics  Known or anticipated Date of Surgery:5/23/2018    Plan:    Testing:  PT/INR and UACBC and CMP done 3/2018   Pre-anesthesia  visit       Visit focus: possible regional anesthesia and/or nerve block      Consultation:IM Perioperative Hospitalist     Sonya Pierre RN 05/03/2018 05/03/2018  Soha Wheatley is a 62 y.o., female.    Anesthesia Evaluation         Review of Systems  Anesthesia Hx:  Hx of Anesthetic complications Patient reports hx of low b/p after knee scope surgery. Patient states in recovery the staff had to bend her knee to induce pain to arouse patient    Woke up disoriented after sx 2013     Gastric sleeve 12/2017:Method of Intubation: Glidescope; Inserted by: CRNA; Airway Device: Endotracheal Tube; Mask Ventilation: Mod Diff - oral; Intubated: Postinduction; Blade: Other (see comments) (Glidescope #3); Airway Device Size: 7.5; Style: Cuffed; Cuff Inflation: Minimal occlusive pressure; Inflation Amount: 5; Placement Verified By: Auscultation, ETT Condensation, Capnometry; Grade: Grade I; Complicating Factors: Anterior larynx, Small mouth, Oropharyngeal edema or fat, Obesity History of prior surgery of interest to airway management or planning: Denies Family Hx of Anesthesia complications.  Denies Personal Hx of Anesthesia complications.   Social:  Non-Smoker, Social Alcohol Use    Hematology/Oncology:  Hematology Normal   Oncology Normal     EENT/Dental:EENT/Dental Normal   Cardiovascular:   Housework, cooks, grocery shopping, walks the mall.  Denies chest pain or sob Cardiovascular Symptoms: (hx palpitations 4/2017 and 2014. wore holter monitor and no etiology found. Possible  anxiety)    Pulmonary:   Sleep Apnea Not used CPAP in the last 2 months   Renal/:   renal calculi    Hepatic/GI:   GERD (better since surgery), well controlled Denies Liver Disease.    Musculoskeletal:   Arthritis   Cervical Spine Disorder, Cervical Disc Disease, Radiculopathy Cervical neuropathy in right hand-has pinched nerve in neck-on gabapentin and zanaflex   Neurological:   Denies CVA. Headaches (no migranes in 3 years) Denies Seizures.    Endocrine:  Endocrine Normal    Dermatological:  Skin Normal    Psych:   anxiety          Physical Exam  General:  Obesity, Well nourished    Airway/Jaw/Neck:  Airway Findings: Mouth Opening: Normal Tongue: Normal  General Airway Assessment: Adult  Mallampati: I  Jaw/Neck Findings:  Neck ROM: Normal ROM      Dental:  Dental Findings: (upper right bridge) In tact, molar caps   Chest/Lungs:  Chest/Lungs Findings: Clear to auscultation     Heart/Vascular:  Heart Findings: Rate: Normal  Rhythm: Regular Rhythm  Sounds: Normal        Mental Status:  Mental Status Findings:  Cooperative, Alert and Oriented         Labs reviewed    Discharge disposition:  Angel [phone]    Please refer to , Internal Medicine, perioperative risk assessment and recommendations.     Sonya Pierre RN 05/09/2018            Anesthesia Plan  Type of Anesthesia, risks & benefits discussed:  Anesthesia Type:  general, spinal, CSE  Patient's Preference:   Intra-op Monitoring Plan:   Intra-op Monitoring Plan Comments:   Post Op Pain Control Plan:   Post Op Pain Control Plan Comments:   Induction:   IV  Beta Blocker:  Patient is not currently on a Beta-Blocker (No further documentation required).       Informed Consent: Patient understands risks and agrees with Anesthesia plan.  Questions answered. Anesthesia consent signed with patient.  ASA Score: 2     Day of Surgery Review of History & Physical:    H&P update referred to the surgeon.         Ready For Surgery From Anesthesia  Perspective.

## 2018-05-08 DIAGNOSIS — M79.606 PAIN OF LOWER EXTREMITY, UNSPECIFIED LATERALITY: Primary | ICD-10-CM

## 2018-05-08 DIAGNOSIS — F41.9 ANXIETY: Primary | ICD-10-CM

## 2018-05-08 DIAGNOSIS — Z91.89 AT RISK OF UTI: ICD-10-CM

## 2018-05-08 RX ORDER — SERTRALINE HYDROCHLORIDE 50 MG/1
50 TABLET, FILM COATED ORAL DAILY
Qty: 30 TABLET | Refills: 0 | Status: SHIPPED | OUTPATIENT
Start: 2018-05-08 | End: 2018-06-21 | Stop reason: SDUPTHER

## 2018-05-08 NOTE — TELEPHONE ENCOUNTER
Patient again requesting refill and has yet to follow up since our initial visit in July, 2017.  Will send a one month supply only as she needs to follow up for adequate evaluation of care and continued treatment plans as psychological follow up after bariatric surgery as well.    Mary Carmen Tello D.O.  [facility]  [phone]    5/8/2018  6:23 PM

## 2018-05-09 ENCOUNTER — HOSPITAL ENCOUNTER (OUTPATIENT)
Dept: PREADMISSION TESTING | Facility: HOSPITAL | Age: 63
Discharge: HOME | End: 2018-05-09
Attending: ANESTHESIOLOGY
Payer: COMMERCIAL

## 2018-05-09 ENCOUNTER — INITIAL CONSULT (OUTPATIENT)
Dept: INTERNAL MEDICINE | Facility: CLINIC | Age: 63
End: 2018-05-09
Payer: COMMERCIAL

## 2018-05-09 VITALS
BODY MASS INDEX: 37.44 KG/M2 | OXYGEN SATURATION: 97 % | HEIGHT: 70 IN | HEART RATE: 68 BPM | WEIGHT: 261.5 LBS | TEMPERATURE: 98 F | DIASTOLIC BLOOD PRESSURE: 61 MMHG | RESPIRATION RATE: 16 BRPM | SYSTOLIC BLOOD PRESSURE: 104 MMHG

## 2018-05-09 VITALS
TEMPERATURE: 98 F | WEIGHT: 261.5 LBS | HEART RATE: 68 BPM | HEIGHT: 70 IN | OXYGEN SATURATION: 97 % | DIASTOLIC BLOOD PRESSURE: 61 MMHG | BODY MASS INDEX: 37.44 KG/M2 | SYSTOLIC BLOOD PRESSURE: 104 MMHG

## 2018-05-09 DIAGNOSIS — M54.12 CERVICAL RADICULOPATHY: ICD-10-CM

## 2018-05-09 DIAGNOSIS — K21.9 GASTROESOPHAGEAL REFLUX DISEASE, ESOPHAGITIS PRESENCE NOT SPECIFIED: ICD-10-CM

## 2018-05-09 DIAGNOSIS — Z01.818 PREOP EXAMINATION: Primary | ICD-10-CM

## 2018-05-09 DIAGNOSIS — G47.30 SLEEP APNEA, UNSPECIFIED TYPE: ICD-10-CM

## 2018-05-09 DIAGNOSIS — F41.9 ANXIETY: ICD-10-CM

## 2018-05-09 DIAGNOSIS — R00.2 PALPITATIONS: ICD-10-CM

## 2018-05-09 DIAGNOSIS — R60.9 EDEMA, UNSPECIFIED TYPE: ICD-10-CM

## 2018-05-09 DIAGNOSIS — E66.9 CLASS 2 OBESITY WITH BODY MASS INDEX (BMI) OF 37.0 TO 37.9 IN ADULT, UNSPECIFIED OBESITY TYPE, UNSPECIFIED WHETHER SERIOUS COMORBIDITY PRESENT: ICD-10-CM

## 2018-05-09 DIAGNOSIS — Z98.84 BARIATRIC SURGERY STATUS: ICD-10-CM

## 2018-05-09 DIAGNOSIS — N20.0 NEPHROLITHIASIS: ICD-10-CM

## 2018-05-09 PROCEDURE — 99244 OFF/OP CNSLTJ NEW/EST MOD 40: CPT | Mod: S$GLB,,, | Performed by: HOSPITALIST

## 2018-05-09 PROCEDURE — 99999 PR PBB SHADOW E&M-EST. PATIENT-LVL III: CPT | Mod: PBBFAC,,, | Performed by: HOSPITALIST

## 2018-05-09 NOTE — DISCHARGE INSTRUCTIONS
Your surgery has been scheduled for:__________________________________________    You should report to:  ____[facility], located on the [place] side of the first floor of the           [facility] ([phone])  ____[facility], located on the [facility] side of the            Second floor of the [facility] ([phone])  ____3rd Floor [facility] located on the [facility] side of the [facility] [phone]  Please Note   - Tell your doctor if you take Aspirin, products containing Aspirin, herbal medications  or blood thinners, such as Coumadin, Ticlid, or Plavix.  (Consult your provider regarding holding or stopping before surgery).  - Arrange for someone to drive you home following surgery.  You will not be allowed to leave the surgical facility alone or drive yourself home following sedation and anesthesia.  Before Surgery  - Stop taking all herbal medications 14days prior to surgery  - No Motrin/Advil (Ibuprofen) 7 days before surgery  - No Aleve (Naproxen) 7 days before surgery  - Stop Taking Asprin, products containing Asprin _____days before surgery  - Stop taking blood thinners_______days before surgery  - Refrain from drinking alcoholic beverages for 24hours before and after surgery  - Stop or limit smoking _________days before surgery  Night before Surgery  - DO NOT EAT OR DRINK ANYTHING AFTER MIDNIGHT, INCLUDING GUM, HARD CANDY, MINTS, OR CHEWING TOBACCO.  - Take a shower or bath (shower is recommended).  Bathe with Hibiclens soap or an antibacterial soap from the neck down.  If not supplied by your surgeon, hibiclens soap will need to be purchased over the counter in pharmacy.  Rinse soap off thoroughly.  - Shampoo your hair with your regular shampoo  The Day of Surgery  - Take another bath or shower with hibiclens or any antibacterial soap, to reduce the chance of infection.  - Take heart and blood  pressure medications with a small sip of water, as advised by the perioperative team.  - Do not take fluid pills  - You may brush your teeth and rinse your mouth, but do not swall any additional water.   - Do not apply perfumes, powder, body lotions or deodorant on the day of surgery.  - Nail polish should be removed.  - Do not wear makeup or moisturizer  - Wear comfortable clothes, such as a button front shirt and loose fitting pants.  - Leave all jewelry, including body piercings, and valuables at home.    - Bring any devices you will neeed after surgery such as crutches or canes.  - If you have sleep apnea, please bring your CPAP machine  In the event that your physical condition changes including the onset of a cold or respiratory illness, or if you have to delay or cancel your surgery, please notify your surgeon.Anesthesia: Regional Anesthesia    Youre scheduled for surgery. During surgery, youll receive medicine called anesthesia to keep you comfortable and pain-free. Your surgeon has decided that youll receive regional anesthesia. This sheet tells you what to expect with this type of anesthesia.  What is regional anesthesia?  Regional anesthesia numbs one region of your body. The anesthesia may be given around nerves or into veins in your arms, neck, or legs (nerve block or Lucio block). Or it may be sent into the spinal fluid (spinal anesthesia) or into the space just outside the spinal fluid (epidural anesthesia). You may also be given sedatives to help you relax.  Nerve block or [place] block  A small area of the body, such as an arm or leg, can be numbed using a nerve block or Lucio block.  · Nerve block. During a nerve block, your skin is numbed. A needle is then inserted near nerves that serve the area to be numbed. Anesthetic is sent through the needle.  · IV regional or Lucio block. For this type of block, an IV line is put into a vein. The blood flow to the area to be numbed is blocked for a short time.  Anesthetic is sent through the IV.  Spinal anesthesia  Spinal anesthesia numbs your body from about the waist down.  · Anesthetic is injected into the spinal fluid. This is a substance that surrounds the spinal cord in your spinal column. The anesthetic blocks pain traveling from the body to the brain.  · To receive the anesthetic, your skin is numbed at the injection site on your back.  · A needle is then inserted into the spinal space. Anesthetic is sent into the spinal fluid through the needle.  Epidural anesthesia  Epidural anesthesia is most commonly used during childbirth and may also be used after surgical procedures of the chest, belly, and legs.  · Anesthetic is injected into the epidural space. This is just outside the dural sac which contains the spinal fluid.  · To receive the anesthetic, your skin is numbed at the injection site on your back.  · A needle is then inserted into the epidural space. Anesthetic is sent into the epidural space through the needle.  · A small flexible catheter may be attached to the needle and left in place. This allows for continuous injections or infusions of anesthetic.  Anesthesia tools and medicines that might be near you during your procedure  · Local anesthetic. This medicine is given through a needle numbs one region of your body.  · Electrocardiography leads (electrodes). These are used to record your heart rate and rhythm.  · Blood pressure cuff. A cuff is placed on your arm to keep track of your blood pressure.  · Pulse oximeter. This small clip is placed on the end of the finger. It measures your blood oxygen level.  · Sedatives. These medicines may be given through an IV. They help to relax you and keep you comfortable. You may stay awake or sleep lightly.  · Oxygen. You may be given oxygen through a facemask.  Risks and possible complications  Regional anesthesia carries some risks. These include:  · Nausea and vomiting  · Headache  · Backache  · Decreased blood  pressure  · Allergic reaction to the anesthetic  · Ongoing numbness (rare)  · Irregular heartbeat (rare)  · Cardiac arrest (rare)   Date Last Reviewed: 12/1/2016  © 5069-1914 [employer] [address] [place] [address], [place] [address]. All rights reserved. This information is not intended as a substitute for professional medical care. Always follow your healthcare professional's instructions.

## 2018-05-09 NOTE — OUTPATIENT SUBJECTIVE & OBJECTIVE
Outpatient Subjective & Objective     Chief complaint-Preoperative evaluation, Perioperative Medical management, complication reduction plan     Active cardiac conditions- none    Revised cardiac risk index predictors- none    Functional capacity -Examples of physical activity , house work, laundry, grocery, can take a flight of stairs holding on to the railing----- She can undertake all the above activities without  chest pain,chest tightness, Shortness of breath ,dizziness,lightheadedness making her exercise tolerance more,   than 4 Mets.       Review of Systems   Constitutional: Negative for chills and fever.        Weight loss 80 pounds over about 10 months      HENT:        Had sleep apnea in the past and used CPAP until 2 months ago   Stopped since weight loss    No longer having loud  snoring   Suggested follow up    Eyes:        No new visual changes   Respiratory:        No cough , phlegm  No Hemoptysis   Cardiovascular:        As noted   Gastrointestinal:        No overt GI/ blood losses  Bowel movements- Regular    Endocrine:        Prednisone use > 20 mg daily for 3 weeks-None   Genitourinary: Negative for dysuria.        No urinary hesitancy    Musculoskeletal:        As above      Skin: Negative for rash.   Neurological: Negative for syncope.        No unilateral weakness   Hematological:        Current use of Anticoagulants  Current use of Antiplatelet agents  None   Psychiatric/Behavioral:          Depression, Anxiety - Controlled     No SI/HI       No vascular stenting     Past Surgical History:   Procedure Laterality Date     SECTION      CHOLECYSTECTOMY      GASTRECTOMY      KNEE ARTHRODESIS      KNEE CARTILAGE SURGERY Left     TONSILLECTOMY  1986    URETEROSCOPY         No  bleeding, cardiac problems, PONV with previous surgeries/procedures.  Medications and Allergies reviewed in epic.   FH- No anesthesia,bleeding /  thrombosis  in family   Lives with  and 18  "year old daughter     Physical Exam  Blood pressure 104/61, pulse 68, temperature 97.6 °F (36.4 °C), temperature source Oral, height 5' 10" (1.778 m), weight 118.6 kg (261 lb 8 oz), SpO2 97 %.      Investigations  Lab and Imaging have been reviewed in epic.      Physical Exam  Constitutional- Vitals - Body mass index is 37.52 kg/m².,   Vitals:    05/09/18 1458   BP: 104/61   Pulse: 68   Temp: 97.6 °F (36.4 °C)     General appearance-Conscious,Coherent  Eyes- No conjunctival icterus,pupils  round  and reactive to light   ENT-Oral cavity- moist  , Hearing grossly normal   Neck- No thyromegaly ,Trachea -central, No jugular venous distension,   No Carotid Bruit   Cardiovascular -Heart Sounds- Normal  and  no murmur   , No gallop rhythm   Respiratory - Normal Respiratory Effort, Normal breath sounds and  no wheeze    Peripheral pitting pedal edema-- mild and  bilateral lower extremity telangiectasia , no calf pain   Gastrointestinal -Soft abdomen, No palpable masses, Non Tender,Liver,Spleen not palpable. No-- free fluid and shifting dullness  Musculoskeletal- No finger Clubbing. Strength grossly normal   Lymphatic-No Palpable cervical, axillary,Inguinal lymphadenopathy   Psychiatric - normal effect,Orientation  Rt Dorsalis pedis pulses-palpable    Lt Dorsalis pedis pulses- palpable   Rt Posterior tibial pulses -palpable   Left posterior tibial pulses -palpable   Miscellaneous -  no renal bruit    Review of Medicine tests    EKG- I had independently reviewed the EKG from--4/9/2017   It was reported to be showing     Normal sinus rhythm  Normal ECG  When compared with ECG of 23-JUN-2014 18:34,  No significant change was found    July 2014       1 - Normal left ventricular systolic function (EF 60-65%).     2 - Normal left ventricular diastolic function.     3 - Normal right ventricular systolic function .     4 - Mild tricuspid regurgitation.     Review of clinical lab tests:  Lab Results   Component Value Date    " CREATININE 0.8 03/16/2018    HGB 13.2 03/16/2018     03/16/2018         Review of old records- Was done and information gathered regards to events leading to surgery and health conditions of significance in the perioperative period.    Outpatient Subjective & Objective

## 2018-05-09 NOTE — PROGRESS NOTES
[facility] - Pre Op Consult  Progress Note    Patient Name: Soha Wheatley  MRN: [id number]  Date of Evaluation- 05/09/2018  PCP- Andrew Mallory MD    Future cases for Soha Wheatley [[id number]]     Case ID Status Date Time Meir Procedure Provider Location    [id number] [facility] 5/23/2018  8:00  REPLACEMENT-KNEE-TOTAL [facility] [[id number]] [facility] OR 2ND FLR      Rt    HPI:  History of present illness- I had the pleasure of meeting this pleasant 62 y.o. lady in the pre op clinic prior to her elective Orthopedic surgery. The patient is new to me .    I have obtained the history by speaking to the patient and by reviewing the electronic health records.    Events leading up to surgery / History of presenting illness -    She has been troubled with moderate-severe  Rt knee  pain for 6-7 Years . Pain increases with activity and decreases with resting.      Relevant health conditions of significance for the perioperative period/ History of presenting illness -    Patient Active Problem List    Diagnosis Date Noted    Bariatric surgery status- s/p lap sleeve  on 12/29/2017 05/09/2018    Sleep apnea 05/09/2018     Had sleep apnea in the past and used CPAP until 2 months ago   Stopped since weight loss      Edema 05/09/2018         GERD (gastroesophageal reflux disease) 09/18/2017     Better since lost weight       Anxiety 06/16/2017     Had an accident that increased the  anxiety       Palpitations 06/01/2017     Had it twice ( April 2017, 2013/204 )   None since           Cervical radiculopathy 12/23/2015     Numbness Rt little and Ring fingers and on occasions Rt elbow numbness   Uses Nightly Zanaflex           Nephrolithiasis 09/03/2015     No recent problems           Class 2 obesity with body mass index (BMI) of 37.0 to 37.9 in adult 06/24/2014      s/p lap sleeve with  on 12/29/2017                Subjective/ Objective:          Chief complaint-Preoperative evaluation, Perioperative Medical management,  complication reduction plan     Active cardiac conditions- none    Revised cardiac risk index predictors- none    Functional capacity -Examples of physical activity , house work, laundry, grocery, can take a flight of stairs holding on to the railing----- She can undertake all the above activities without  chest pain,chest tightness, Shortness of breath ,dizziness,lightheadedness making her exercise tolerance more,   than 4 Mets.       Review of Systems   Constitutional: Negative for chills and fever.        Weight loss 80 pounds over about 10 months      HENT:        Had sleep apnea in the past and used CPAP until 2 months ago   Stopped since weight loss    No longer having loud  snoring   Suggested follow up    Eyes:        No new visual changes   Respiratory:        No cough , phlegm  No Hemoptysis   Cardiovascular:        As noted   Gastrointestinal:        No overt GI/ blood losses  Bowel movements- Regular    Endocrine:        Prednisone use > 20 mg daily for 3 weeks-None   Genitourinary: Negative for dysuria.        No urinary hesitancy    Musculoskeletal:        As above      Skin: Negative for rash.   Neurological: Negative for syncope.        No unilateral weakness   Hematological:        Current use of Anticoagulants  Current use of Antiplatelet agents  None   Psychiatric/Behavioral:          Depression, Anxiety - Controlled     No SI/HI       No vascular stenting     Past Surgical History:   Procedure Laterality Date     SECTION      CHOLECYSTECTOMY      GASTRECTOMY      KNEE ARTHRODESIS      KNEE CARTILAGE SURGERY Left     TONSILLECTOMY      URETEROSCOPY         No  bleeding, cardiac problems, PONV with previous surgeries/procedures.  Medications and Allergies reviewed in epic.   FH- No anesthesia,bleeding /  thrombosis  in family   Lives with  and 18 year old daughter     Physical Exam  Blood pressure 104/61, pulse 68, temperature 97.6 °F (36.4 °C), temperature  "source Oral, height 5' 10" (1.778 m), weight 118.6 kg (261 lb 8 oz), SpO2 97 %.      Investigations  Lab and Imaging have been reviewed in epic.      Physical Exam  Constitutional- Vitals - Body mass index is 37.52 kg/m².,   Vitals:    05/09/18 1458   BP: 104/61   Pulse: 68   Temp: 97.6 °F (36.4 °C)     General appearance-Conscious,Coherent  Eyes- No conjunctival icterus,pupils  round  and reactive to light   ENT-Oral cavity- moist  , Hearing grossly normal   Neck- No thyromegaly ,Trachea -central, No jugular venous distension,   No Carotid Bruit   Cardiovascular -Heart Sounds- Normal  and  no murmur   , No gallop rhythm   Respiratory - Normal Respiratory Effort, Normal breath sounds and  no wheeze    Peripheral pitting pedal edema-- mild and  bilateral lower extremity telangiectasia , no calf pain   Gastrointestinal -Soft abdomen, No palpable masses, Non Tender,Liver,Spleen not palpable. No-- free fluid and shifting dullness  Musculoskeletal- No finger Clubbing. Strength grossly normal   Lymphatic-No Palpable cervical, axillary,Inguinal lymphadenopathy   Psychiatric - normal effect,Orientation  Rt Dorsalis pedis pulses-palpable    Lt Dorsalis pedis pulses- palpable   Rt Posterior tibial pulses -palpable   Left posterior tibial pulses -palpable   Miscellaneous -  no renal bruit    Review of Medicine tests    EKG- I had independently reviewed the EKG from--4/9/2017   It was reported to be showing     Normal sinus rhythm  Normal ECG  When compared with ECG of 23-JUN-2014 18:34,  No significant change was found    July 2014       1 - Normal left ventricular systolic function (EF 60-65%).     2 - Normal left ventricular diastolic function.     3 - Normal right ventricular systolic function .     4 - Mild tricuspid regurgitation.     Review of clinical lab tests:  Lab Results   Component Value Date    CREATININE 0.8 03/16/2018    HGB 13.2 03/16/2018     03/16/2018         Review of old records- Was done and " information gathered regards to events leading to surgery and health conditions of significance in the perioperative period.        Preoperative cardiac risk assessment-  The patient does not have any active cardiac conditions . Revised cardiac risk index predictors-0 ---.Functional capacity is more than 4 Mets. She will be undergoing a Orthopedic procedure that carries a intermediate risk     The estimated risk of the rate of adverse cardiac outcomes  0.4%    No further cardiac work up is indicated prior to proceeding with the surgery          American Society of Anesthesiologists Physical status classification ( ASA ) class: 3     Postoperative pulmonary complication risk assessment:      ARISCAT ( Canet) risk index- risk class -  Low, if duration of surgery is under 3 hours, intermediate, if duration of surgery is over 3 hours      [facility]- percentage risk of respiratory failure: 0.5 %     Assessment/Plan:     Nephrolithiasis  Encouraged Hydration     Class 2 obesity with body mass index (BMI) of 37.0 to 37.9 in adult  Encouraged weight loss  Not known to have HTN , Diabetes Mellitus, fatty liver     Bariatric surgery status- s/p lap sleeve  on 12/29/2017  Care with NSAID Medication discussed   Uses with Omeprazole and with food   Hopefully may not require them for long     GERD (gastroesophageal reflux disease)   I suggest continuation of the Proton pump inhibitor in the perioperative period . I suggest aspiration precautions    Anxiety  Zoloft   I suggest monitoring the sodium as SIADH from Zoloft   use and hypersecretion of ADH associated with surgery can reduce sodium in the perioperative period    Sleep apnea  Suggested follow up     Edema  Edema- I suggested avoidance of added salt,avoidance of NSAID's unless advised or ordered and suggested Limb elevation and landon hose use        Preventive perioperative care    Thromboembolic prophylaxis:  Her risk factors for thrombosis include  obesity, surgical procedure and age.I suggest  thromboembolic prophylaxis ( mechanical/pharmacological, weighing the risk benefits of pharmacological agent use considering [facility] procedural bleeding )  during the perioperative period.I suggested being active in the post operative period.The patient is a candidate for extended DVT prophylaxis      Postoperative pulmonary complication prophylaxis-Risk factors for post operative pulmonary complications include ASA class >2- I suggest incentive spirometry use, early ambulation and end tidal carbon dioxide monitoring  , oral care , head end of bed elevation     Renal complication prophylaxis- I suggest keeping her well hydrated .I suggested drinking 2 litre's of water a day      Surgical site Infection Prophylaxis-I  suggest appropriate antibiotic for Prophylaxis against Surgical site infections      In view of joint replacement , regional anesthesia  the patient  is at risk of postoperative urinary retention.  I suggest avoidance / minimizing the of  Benzodiazepines,Anticholinergic medication,antihistamines ( Benadryl) , if possible in the perioperative period. I suggest using the minimum possible use of opioids for the minimum period of time in the perioperative period. Benadryl avoidance suggested      This visit was focused on Preoperative evaluation, Perioperative Medical management, complication reduction plans. I suggest that the patient follows up with primary care or relevant sub specialists for ongoing health care.    I appreciate the opportunity to be involved in this patients care. Please feel free to contact me if there were any questions about this consultation.    Patient is optimized     Patient  was instructed to call and update me about any changes to health, changes to medication, office visits ,testing out side of the [facility] center , hospitalizations between now and surgery     Kisha Allen MD  Perioperative Medicine  [facility]   Pager [phone]  ----  5/10- 1400    Urine from 5/9/2018 showed - no UTI   INR - 1.0   ---  5/21- 12 32    Called to follow up   Spoke to sister in law Lindy who answered the call   Doing good ,No changes to  Health  To call, if needed

## 2018-05-09 NOTE — ASSESSMENT & PLAN NOTE
I suggest continuation of the Proton pump inhibitor in the perioperative period . I suggest aspiration precautions

## 2018-05-09 NOTE — ASSESSMENT & PLAN NOTE
Edema- I suggested avoidance of added salt,avoidance of NSAID's unless advised or ordered and suggested Limb elevation and landon hose use

## 2018-05-09 NOTE — LETTER
May 9, 2018      Rhonda G Leopold, MD  [address]  [place] [address]           [facility] - [facility]  [address] [facility] [address]  Phone: [phone]          Patient: Soha Wheatley   MR Number: [id number]   YOB: 1955   Date of Visit: 5/9/2018       Dear Dr. Rhonda G Leopold:    Thank you for referring Soha Wheatley to me for evaluation. Attached you will find relevant portions of my assessment and plan of care.    If you have questions, please do not hesitate to call me. I look forward to following Soha Wheatley along with you.    Sincerely,    Kisha Allen MD    Enclosure  CC:  [facility]    If you would like to receive this communication electronically, please contact [email or web address] or [phone] to request more information on [employer] Link access.    For providers and/or their staff who would like to refer a patient to [facility], please contact us through our one-stop-shop provider referral line, [facility], at [phone].    If you feel you have received this communication in error or would no longer like to receive these types of communications, please e-mail [email or web address]

## 2018-05-09 NOTE — HPI
History of present illness- I had the pleasure of meeting this pleasant 62 y.o. lady in the pre op clinic prior to her elective Orthopedic surgery. The patient is new to me .    I have obtained the history by speaking to the patient and by reviewing the electronic health records.    Events leading up to surgery / History of presenting illness -    She has been troubled with moderate-severe  Rt knee  pain for 6-7 Years . Pain increases with activity and decreases with resting.      Relevant health conditions of significance for the perioperative period/ History of presenting illness -    Patient Active Problem List    Diagnosis Date Noted    Bariatric surgery status- s/p lap sleeve  on 12/29/2017 05/09/2018    Sleep apnea 05/09/2018     Had sleep apnea in the past and used CPAP until 2 months ago   Stopped since weight loss      Edema 05/09/2018         GERD (gastroesophageal reflux disease) 09/18/2017     Better since lost weight       Anxiety 06/16/2017     Had an accident that increased the  anxiety       Palpitations 06/01/2017     Had it twice ( April 2017, 2013/204 )   None since           Cervical radiculopathy 12/23/2015     Numbness Rt little and Ring fingers and on occasions Rt elbow numbness   Uses Nightly Zanaflex           Nephrolithiasis 09/03/2015     No recent problems           Class 2 obesity with body mass index (BMI) of 37.0 to 37.9 in adult 06/24/2014      s/p lap sleeve with  on 12/29/2017     

## 2018-05-09 NOTE — ASSESSMENT & PLAN NOTE
Care with NSAID Medication discussed   Uses with Omeprazole and with food   Hopefully may not require them for long

## 2018-05-15 ENCOUNTER — OFFICE VISIT (OUTPATIENT)
Dept: ORTHOPEDICS | Facility: CLINIC | Age: 63
End: 2018-05-15
Payer: COMMERCIAL

## 2018-05-15 ENCOUNTER — HOSPITAL ENCOUNTER (OUTPATIENT)
Dept: RADIOLOGY | Facility: HOSPITAL | Age: 63
Discharge: HOME | End: 2018-05-15
Attending: PHYSICIAN ASSISTANT
Payer: COMMERCIAL

## 2018-05-15 VITALS
HEART RATE: 72 BPM | DIASTOLIC BLOOD PRESSURE: 72 MMHG | SYSTOLIC BLOOD PRESSURE: 122 MMHG | BODY MASS INDEX: 36.96 KG/M2 | WEIGHT: 258.19 LBS | HEIGHT: 70 IN

## 2018-05-15 DIAGNOSIS — M17.11 PRIMARY OSTEOARTHRITIS OF RIGHT KNEE: Primary | ICD-10-CM

## 2018-05-15 DIAGNOSIS — M25.561 RIGHT KNEE PAIN, UNSPECIFIED CHRONICITY: ICD-10-CM

## 2018-05-15 PROCEDURE — 73560 X-RAY EXAM OF KNEE 1 OR 2: CPT | Mod: TC,RT

## 2018-05-15 PROCEDURE — 99499 UNLISTED E&M SERVICE: CPT | Mod: S$GLB,,, | Performed by: PHYSICIAN ASSISTANT

## 2018-05-15 PROCEDURE — 73560 X-RAY EXAM OF KNEE 1 OR 2: CPT | Mod: 26,RT,, | Performed by: RADIOLOGY

## 2018-05-15 PROCEDURE — 99999 PR PBB SHADOW E&M-EST. PATIENT-LVL III: CPT | Mod: PBBFAC,,, | Performed by: PHYSICIAN ASSISTANT

## 2018-05-16 RX ORDER — MORPHINE SULFATE 10 MG/ML
2 INJECTION, SOLUTION INTRAMUSCULAR; INTRAVENOUS
Status: CANCELLED | OUTPATIENT
Start: 2018-05-16

## 2018-05-16 RX ORDER — MIDAZOLAM HYDROCHLORIDE 5 MG/ML
1 INJECTION INTRAMUSCULAR; INTRAVENOUS EVERY 5 MIN PRN
Status: CANCELLED | OUTPATIENT
Start: 2018-05-16

## 2018-05-16 RX ORDER — RAMELTEON 8 MG/1
8 TABLET ORAL NIGHTLY PRN
Status: CANCELLED | OUTPATIENT
Start: 2018-05-16

## 2018-05-16 RX ORDER — ASPIRIN 325 MG
325 TABLET, DELAYED RELEASE (ENTERIC COATED) ORAL 2 TIMES DAILY
Status: CANCELLED | OUTPATIENT
Start: 2018-05-16

## 2018-05-16 RX ORDER — PREGABALIN 25 MG/1
75 CAPSULE ORAL NIGHTLY
Status: CANCELLED | OUTPATIENT
Start: 2018-05-16

## 2018-05-16 RX ORDER — SODIUM CHLORIDE 9 MG/ML
INJECTION, SOLUTION INTRAVENOUS
Status: CANCELLED | OUTPATIENT
Start: 2018-05-16

## 2018-05-16 RX ORDER — NALOXONE HCL 0.4 MG/ML
0.02 VIAL (ML) INJECTION
Status: CANCELLED | OUTPATIENT
Start: 2018-05-16

## 2018-05-16 RX ORDER — AMOXICILLIN 250 MG
1 CAPSULE ORAL 2 TIMES DAILY
Status: CANCELLED | OUTPATIENT
Start: 2018-05-16

## 2018-05-16 RX ORDER — SODIUM CHLORIDE 0.9 % (FLUSH) 0.9 %
3 SYRINGE (ML) INJECTION EVERY 8 HOURS PRN
Status: CANCELLED | OUTPATIENT
Start: 2018-05-16

## 2018-05-16 RX ORDER — LIDOCAINE HYDROCHLORIDE 10 MG/ML
1 INJECTION, SOLUTION EPIDURAL; INFILTRATION; INTRACAUDAL; PERINEURAL
Status: CANCELLED | OUTPATIENT
Start: 2018-05-16

## 2018-05-16 RX ORDER — CELECOXIB 100 MG/1
200 CAPSULE ORAL DAILY
Status: CANCELLED | OUTPATIENT
Start: 2018-05-17

## 2018-05-16 RX ORDER — BISACODYL 10 MG
10 SUPPOSITORY, RECTAL RECTAL EVERY 12 HOURS PRN
Status: CANCELLED | OUTPATIENT
Start: 2018-05-16

## 2018-05-16 RX ORDER — MUPIROCIN 20 MG/G
1 OINTMENT TOPICAL
Status: CANCELLED | OUTPATIENT
Start: 2018-05-16

## 2018-05-16 RX ORDER — SODIUM CHLORIDE 9 MG/ML
INJECTION, SOLUTION INTRAVENOUS CONTINUOUS
Status: CANCELLED | OUTPATIENT
Start: 2018-05-16 | End: 2018-05-17

## 2018-05-16 RX ORDER — FAMOTIDINE 20 MG/1
20 TABLET, FILM COATED ORAL 2 TIMES DAILY
Status: CANCELLED | OUTPATIENT
Start: 2018-05-16

## 2018-05-16 RX ORDER — ROPIVACAINE HYDROCHLORIDE 2 MG/ML
8 INJECTION, SOLUTION EPIDURAL; INFILTRATION; PERINEURAL CONTINUOUS
Status: CANCELLED | OUTPATIENT
Start: 2018-05-16

## 2018-05-16 RX ORDER — FENTANYL CITRATE 50 UG/ML
25 INJECTION, SOLUTION INTRAMUSCULAR; INTRAVENOUS EVERY 5 MIN PRN
Status: CANCELLED | OUTPATIENT
Start: 2018-05-16

## 2018-05-16 RX ORDER — CELECOXIB 100 MG/1
400 CAPSULE ORAL
Status: CANCELLED | OUTPATIENT
Start: 2018-05-16

## 2018-05-16 RX ORDER — POLYETHYLENE GLYCOL 3350 17 G/17G
17 POWDER, FOR SOLUTION ORAL DAILY
Status: CANCELLED | OUTPATIENT
Start: 2018-05-17

## 2018-05-16 RX ORDER — OXYCODONE HYDROCHLORIDE 5 MG/1
10 TABLET ORAL
Status: CANCELLED | OUTPATIENT
Start: 2018-05-16

## 2018-05-16 RX ORDER — ONDANSETRON 2 MG/ML
4 INJECTION INTRAMUSCULAR; INTRAVENOUS EVERY 8 HOURS PRN
Status: CANCELLED | OUTPATIENT
Start: 2018-05-16

## 2018-05-16 RX ORDER — ACETAMINOPHEN 10 MG/ML
1000 INJECTION, SOLUTION INTRAVENOUS ONCE
Status: CANCELLED | OUTPATIENT
Start: 2018-05-16 | End: 2018-05-16

## 2018-05-16 RX ORDER — OXYCODONE HYDROCHLORIDE 5 MG/1
5 TABLET ORAL
Status: CANCELLED | OUTPATIENT
Start: 2018-05-16

## 2018-05-16 RX ORDER — PREGABALIN 25 MG/1
75 CAPSULE ORAL
Status: CANCELLED | OUTPATIENT
Start: 2018-05-16

## 2018-05-16 RX ORDER — OXYCODONE HYDROCHLORIDE 5 MG/1
15 TABLET ORAL
Status: CANCELLED | OUTPATIENT
Start: 2018-05-16

## 2018-05-16 RX ORDER — ACETAMINOPHEN 500 MG
1000 TABLET ORAL EVERY 6 HOURS
Status: CANCELLED | OUTPATIENT
Start: 2018-05-16 | End: 2018-05-18

## 2018-05-16 NOTE — H&P
CC: Right knee pain    Soha Wheatley is a 62 y.o. female with 2 year history of Right knee pain. Pain is worse with activity and weight bearing.  Patient has experienced interference of activities of daily living due to decreased range of motion and an increase in joint pain and swelling.  Patient has failed non-operative treatment including NSAIDs, corticosteroid injections, viscosupplement injections, and activity modification.  Soha Wheatley currently ambulates independently.     Past Medical History:   Diagnosis Date    Allergy     Anxiety     Arthritis     BMI 45.0-49.9, adult     Chronic kidney disease     Depression     GERD (gastroesophageal reflux disease)     History of kidney stones     Kidney stones     Migraines     Morbid obesity     Obesity     Sleep apnea in adult     WEARS CPAP, DOESNT KNOW SETTINGS.       Past Surgical History:   Procedure Laterality Date     SECTION      CHOLECYSTECTOMY      GASTRECTOMY      KNEE ARTHRODESIS      arthroscopy - Left knee for meniscus     KNEE CARTILAGE SURGERY Left     TONSILLECTOMY  1986    URETEROSCOPY         Family History   Problem Relation Age of Onset    Cancer Mother         breast    Hyperlipidemia Father     Hypertension Father     VANDANA disease Maternal Grandmother     Heart disease Maternal Grandmother     Hyperlipidemia Maternal Grandmother     Colon cancer Maternal Grandmother     Pancreatic cancer Maternal Grandfather     Cancer Maternal Grandfather 88    Heart disease Maternal Uncle     Heart attack Paternal Grandfather     Celiac disease Neg Hx     Cirrhosis Neg Hx     Colon polyps Neg Hx     Crohn's disease Neg Hx     Cystic fibrosis Neg Hx     Esophageal cancer Neg Hx     Hemochromatosis Neg Hx     Inflammatory bowel disease Neg Hx     Irritable bowel syndrome Neg Hx     Liver cancer Neg Hx     Liver disease Neg Hx     Rectal cancer Neg Hx     Stomach cancer Neg Hx      Ulcerative colitis Neg Hx     Silvestre's disease Neg Hx        Review of patient's allergies indicates:   Allergen Reactions    Adhesive      Paper tape usually ok- pulls skin off    Flagyl [metronidazole hcl]      confusion         Current Outpatient Prescriptions:     acetaminophen (TYLENOL ARTHRITIS) 650 MG TbSR, Take 650 mg by mouth 3 (three) times daily as needed. , Disp: , Rfl:     b complex vitamins tablet, Take 1 tablet by mouth once daily., Disp: , Rfl:     cinnamon bark-chromium picolin 500-100 mg-mcg Cap, Take by mouth., Disp: , Rfl:     cyanocobalamin 500 MCG tablet, Take 500 mcg by mouth once daily., Disp: , Rfl:     gabapentin (NEURONTIN) 600 MG tablet, Take 1 tablet (600 mg total) by mouth 3 (three) times daily., Disp: 90 tablet, Rfl: 1    GLUCOSAMINE HCL/CHONDR APODACA A NA (OSTEO BI-FLEX ORAL), Take by mouth once daily., Disp: , Rfl:     meloxicam (MOBIC) 15 MG tablet, TAKE 1 TABLET DAILY, Disp: 90 tablet, Rfl: 2    multivitamin (ONE DAILY MULTIVITAMIN) per tablet, Take 1 tablet by mouth once daily., Disp: , Rfl:     omeprazole (PRILOSEC) 40 MG capsule, Take 1 capsule (40 mg total) by mouth once daily., Disp: 90 capsule, Rfl: 1    sertraline (ZOLOFT) 50 MG tablet, Take 1 tablet (50 mg total) by mouth once daily., Disp: 30 tablet, Rfl: 0    tizanidine (ZANAFLEX) 4 MG tablet, Take 1 tablet (4 mg total) by mouth every 8 (eight) hours as needed. (Patient taking differently: Take 4 mg by mouth every 8 (eight) hours as needed. Usually takes at night), Disp: 270 tablet, Rfl: 0    Review of Systems:  Constitutional: no fever or chills  Eyes: no visual changes  ENT: no nasal congestion or sore throat  Respiratory: no cough or shortness of breath  Cardiovascular: no chest pain or palpitations  Gastrointestinal: no nausea or vomiting, tolerating diet  Genitourinary: no hematuria or dysuria  Integument/Breast: no rash or pruritis  Hematologic/Lymphatic: no easy bruising or  "lymphadenopathy  Musculoskeletal: positive for right knee pain  Neurological: no seizures or tremors  Behavioral/Psych: no auditory or visual hallucinations  Endocrine: no heat or cold intolerance    PE:  /72 (BP Location: Left arm, Patient Position: Sitting, BP Method: Large (Automatic))   Pulse 72   Ht 5' 10" (1.778 m)   Wt 117.1 kg (258 lb 2.5 oz)   BMI 37.04 kg/m²   General: Pleasant, cooperative, NAD   Gait: antalgic  HEENT: NCAT, sclera nonicteric   Lungs: Respirations clear bilaterally; equal and unlabored.   CV: S1S2; 2+ bilateral upper and lower extremity pulses.   Skin: Intact throughout with no rashes, erythema, or lesions  Extremities: No LE edema,  no erythema or warmth of the skin in either lower extremity.    Right knee exam:  Knee Range of Motion: active, pain with passive range of motion  Effusion:none  Condition of skin:intact  Location of tenderness:Lateral joint line   Strength:5 of 5 quadriceps strength and 5 of 5 hamstring strength  Stability: stable to testing    Hip Examination:full painless range of motion, without tenderness    Radiographs: Radiographs reveal advanced degenerative changes including subchondral cyst formation, subchondral sclerosis, osteophyte formation, joint space narrowing.     Knee Alignment: valgus     Diagnosis: osteoarthritis Right knee    Plan: Right total knee arthroplasty    Due to the serious nature of total joint infection and the high prevalence of community acquired MRSA, vancomycin will be used perioperatively.            "

## 2018-05-16 NOTE — PROGRESS NOTES
Soha Wheatley is a 62 y.o. year old here today for a pre-operative visit in preparation for a Right total knee arthroplasty to be performed by  Dr. Carlos on 5/23/2018.  she was last seen and treated in the clinic on 4/12/2018. she will be medically optimized by the pre op center. There has been no significant change in medical status since last visit. No fever, chills, malaise, or unexplained weight change.      Allergies, Medications, past medical and surgical history reviewed.    Focused examination performed.    Dr. [facility] saw this patient today in clinic. All questions answered. Patient encouraged to call with questions. Contact information given.     Pre, xavier, and post operative procedures and expectations discussed. Questions were answered. Soha Wheatley has been educated and is ready to proceed with surgery. Approximately 30 minutes was spent discussing surgical outcomes, plans, procedures pre, xavier, and post operative expections and care.  Surgical consent signed.    Soha Wheatley will contact us if there are any questions, concerns, or changes in medical status prior to surgery.

## 2018-05-22 ENCOUNTER — TELEPHONE (OUTPATIENT)
Dept: ORTHOPEDICS | Facility: CLINIC | Age: 63
End: 2018-05-22

## 2018-05-22 NOTE — TELEPHONE ENCOUNTER
we spoke : Reminded to eat light the night before surgery, NPO after midnight, take hibclens shower the night before surgery  & again in the am , sleep on clean sheets  in clean clothes, no laxatives or stool softeners , report to the 2nd floor surgery unit for 6 am tomorrow  She is very scared  Hasn't slept for days  Wants something for anxiety upon arrival to surgery   She voiced understanding

## 2018-05-23 ENCOUNTER — ANESTHESIA (OUTPATIENT)
Dept: SURGERY | Facility: HOSPITAL | Age: 63
DRG: 470 | End: 2018-05-23
Payer: COMMERCIAL

## 2018-05-23 ENCOUNTER — HOSPITAL ENCOUNTER (INPATIENT)
Facility: HOSPITAL | Age: 63
LOS: 1 days | Discharge: HOME HEALTH | DRG: 470 | End: 2018-05-24
Attending: ORTHOPAEDIC SURGERY | Admitting: ORTHOPAEDIC SURGERY
Payer: COMMERCIAL

## 2018-05-23 DIAGNOSIS — M17.11 PRIMARY OSTEOARTHRITIS OF RIGHT KNEE: ICD-10-CM

## 2018-05-23 LAB
ANION GAP SERPL CALC-SCNC: 7 MMOL/L
BUN SERPL-MCNC: 19 MG/DL
CALCIUM SERPL-MCNC: 9 MG/DL
CHLORIDE SERPL-SCNC: 108 MMOL/L
CO2 SERPL-SCNC: 26 MMOL/L
CREAT SERPL-MCNC: 0.7 MG/DL
EST. GFR  (AFRICAN AMERICAN): >60 ML/MIN/1.73 M^2
EST. GFR  (NON AFRICAN AMERICAN): >60 ML/MIN/1.73 M^2
GLUCOSE SERPL-MCNC: 117 MG/DL
POTASSIUM SERPL-SCNC: 4.6 MMOL/L
SODIUM SERPL-SCNC: 141 MMOL/L

## 2018-05-23 PROCEDURE — 25000003 PHARM REV CODE 250: Performed by: ANESTHESIOLOGY

## 2018-05-23 PROCEDURE — 36415 COLL VENOUS BLD VENIPUNCTURE: CPT

## 2018-05-23 PROCEDURE — 27447 TOTAL KNEE ARTHROPLASTY: CPT | Mod: RT,,, | Performed by: ORTHOPAEDIC SURGERY

## 2018-05-23 PROCEDURE — 94799 UNLISTED PULMONARY SVC/PX: CPT

## 2018-05-23 PROCEDURE — 36000711: Performed by: ORTHOPAEDIC SURGERY

## 2018-05-23 PROCEDURE — 0SRC0J9 REPLACEMENT OF RIGHT KNEE JOINT WITH SYNTHETIC SUBSTITUTE, CEMENTED, OPEN APPROACH: ICD-10-PCS | Performed by: ORTHOPAEDIC SURGERY

## 2018-05-23 PROCEDURE — C1713 ANCHOR/SCREW BN/BN,TIS/BN: HCPCS | Performed by: ORTHOPAEDIC SURGERY

## 2018-05-23 PROCEDURE — 88305 TISSUE EXAM BY PATHOLOGIST: CPT | Performed by: PATHOLOGY

## 2018-05-23 PROCEDURE — 25000003 PHARM REV CODE 250

## 2018-05-23 PROCEDURE — 64448 NJX AA&/STRD FEM NRV NFS IMG: CPT | Mod: 59,RT,, | Performed by: ANESTHESIOLOGY

## 2018-05-23 PROCEDURE — 80048 BASIC METABOLIC PNL TOTAL CA: CPT

## 2018-05-23 PROCEDURE — 88305 TISSUE EXAM BY PATHOLOGIST: CPT | Mod: 26,,, | Performed by: PATHOLOGY

## 2018-05-23 PROCEDURE — 97161 PT EVAL LOW COMPLEX 20 MIN: CPT

## 2018-05-23 PROCEDURE — 63600175 PHARM REV CODE 636 W HCPCS: Performed by: PHYSICIAN ASSISTANT

## 2018-05-23 PROCEDURE — 97165 OT EVAL LOW COMPLEX 30 MIN: CPT

## 2018-05-23 PROCEDURE — 94761 N-INVAS EAR/PLS OXIMETRY MLT: CPT

## 2018-05-23 PROCEDURE — 64450 NJX AA&/STRD OTHER PN/BRANCH: CPT | Mod: 59,RT,, | Performed by: ANESTHESIOLOGY

## 2018-05-23 PROCEDURE — 97530 THERAPEUTIC ACTIVITIES: CPT

## 2018-05-23 PROCEDURE — 76942 ECHO GUIDE FOR BIOPSY: CPT | Performed by: ANESTHESIOLOGY

## 2018-05-23 PROCEDURE — 63600175 PHARM REV CODE 636 W HCPCS: Performed by: ORTHOPAEDIC SURGERY

## 2018-05-23 PROCEDURE — 37000008 HC ANESTHESIA 1ST 15 MINUTES: Performed by: ORTHOPAEDIC SURGERY

## 2018-05-23 PROCEDURE — 88311 DECALCIFY TISSUE: CPT | Mod: 26,,, | Performed by: PATHOLOGY

## 2018-05-23 PROCEDURE — 99900035 HC TECH TIME PER 15 MIN (STAT)

## 2018-05-23 PROCEDURE — 27201423 OPTIME MED/SURG SUP & DEVICES STERILE SUPPLY: Performed by: ORTHOPAEDIC SURGERY

## 2018-05-23 PROCEDURE — 37000009 HC ANESTHESIA EA ADD 15 MINS: Performed by: ORTHOPAEDIC SURGERY

## 2018-05-23 PROCEDURE — 25000003 PHARM REV CODE 250: Performed by: ORTHOPAEDIC SURGERY

## 2018-05-23 PROCEDURE — 25000003 PHARM REV CODE 250: Performed by: NURSE ANESTHETIST, CERTIFIED REGISTERED

## 2018-05-23 PROCEDURE — 63600175 PHARM REV CODE 636 W HCPCS: Performed by: NURSE ANESTHETIST, CERTIFIED REGISTERED

## 2018-05-23 PROCEDURE — C1776 JOINT DEVICE (IMPLANTABLE): HCPCS | Performed by: ORTHOPAEDIC SURGERY

## 2018-05-23 PROCEDURE — 71000039 HC RECOVERY, EACH ADD'L HOUR: Performed by: ORTHOPAEDIC SURGERY

## 2018-05-23 PROCEDURE — 11000001 HC ACUTE MED/SURG PRIVATE ROOM

## 2018-05-23 PROCEDURE — D9220A PRA ANESTHESIA: Mod: ANES,,, | Performed by: ANESTHESIOLOGY

## 2018-05-23 PROCEDURE — 71000033 HC RECOVERY, INTIAL HOUR: Performed by: ORTHOPAEDIC SURGERY

## 2018-05-23 PROCEDURE — 25000003 PHARM REV CODE 250: Performed by: PHYSICIAN ASSISTANT

## 2018-05-23 PROCEDURE — 63600175 PHARM REV CODE 636 W HCPCS: Performed by: ANESTHESIOLOGY

## 2018-05-23 PROCEDURE — 97535 SELF CARE MNGMENT TRAINING: CPT

## 2018-05-23 PROCEDURE — 36000710: Performed by: ORTHOPAEDIC SURGERY

## 2018-05-23 PROCEDURE — D9220A PRA ANESTHESIA: Mod: CRNA,,, | Performed by: NURSE ANESTHETIST, CERTIFIED REGISTERED

## 2018-05-23 DEVICE — INSERT 11MM SZ 6-9 EF: Type: IMPLANTABLE DEVICE | Site: KNEE | Status: FUNCTIONAL

## 2018-05-23 DEVICE — STEM FEM EXT TAP PERSONA 14X30: Type: IMPLANTABLE DEVICE | Site: KNEE | Status: FUNCTIONAL

## 2018-05-23 DEVICE — CEMENT BONE SIMPLE P INDIVID: Type: IMPLANTABLE DEVICE | Site: KNEE | Status: FUNCTIONAL

## 2018-05-23 DEVICE — PLUG BONE #5: Type: IMPLANTABLE DEVICE | Site: KNEE | Status: FUNCTIONAL

## 2018-05-23 DEVICE — COMPONENT FEMPERSONA SZ9 RT: Type: IMPLANTABLE DEVICE | Site: KNEE | Status: FUNCTIONAL

## 2018-05-23 DEVICE — TIBIA BASEPLT SZ E 5 DEG R CEM: Type: IMPLANTABLE DEVICE | Site: KNEE | Status: FUNCTIONAL

## 2018-05-23 DEVICE — PSN ALL POLY PAT 32MM: Type: IMPLANTABLE DEVICE | Site: KNEE | Status: FUNCTIONAL

## 2018-05-23 RX ORDER — BISACODYL 10 MG
10 SUPPOSITORY, RECTAL RECTAL EVERY 12 HOURS PRN
Status: DISCONTINUED | OUTPATIENT
Start: 2018-05-23 | End: 2018-05-24 | Stop reason: HOSPADM

## 2018-05-23 RX ORDER — MORPHINE SULFATE 2 MG/ML
2 INJECTION, SOLUTION INTRAMUSCULAR; INTRAVENOUS
Status: DISCONTINUED | OUTPATIENT
Start: 2018-05-23 | End: 2018-05-23

## 2018-05-23 RX ORDER — FAMOTIDINE 20 MG/1
TABLET, FILM COATED ORAL
Status: COMPLETED
Start: 2018-05-23 | End: 2018-05-23

## 2018-05-23 RX ORDER — FAMOTIDINE 20 MG/1
20 TABLET, FILM COATED ORAL ONCE
Status: COMPLETED | OUTPATIENT
Start: 2018-05-23 | End: 2018-05-23

## 2018-05-23 RX ORDER — SODIUM CHLORIDE 9 MG/ML
INJECTION, SOLUTION INTRAVENOUS CONTINUOUS
Status: ACTIVE | OUTPATIENT
Start: 2018-05-23 | End: 2018-05-24

## 2018-05-23 RX ORDER — OXYCODONE HYDROCHLORIDE 5 MG/1
15 TABLET ORAL
Status: DISCONTINUED | OUTPATIENT
Start: 2018-05-23 | End: 2018-05-23

## 2018-05-23 RX ORDER — POLYETHYLENE GLYCOL 3350 17 G/17G
17 POWDER, FOR SOLUTION ORAL DAILY
Status: DISCONTINUED | OUTPATIENT
Start: 2018-05-23 | End: 2018-05-24 | Stop reason: HOSPADM

## 2018-05-23 RX ORDER — CELECOXIB 200 MG/1
400 CAPSULE ORAL
Status: COMPLETED | OUTPATIENT
Start: 2018-05-23 | End: 2018-05-23

## 2018-05-23 RX ORDER — PANTOPRAZOLE SODIUM 40 MG/1
40 TABLET, DELAYED RELEASE ORAL DAILY
Status: DISCONTINUED | OUTPATIENT
Start: 2018-05-24 | End: 2018-05-24 | Stop reason: HOSPADM

## 2018-05-23 RX ORDER — MIDAZOLAM HYDROCHLORIDE 1 MG/ML
INJECTION, SOLUTION INTRAMUSCULAR; INTRAVENOUS
Status: DISCONTINUED | OUTPATIENT
Start: 2018-05-23 | End: 2018-05-23

## 2018-05-23 RX ORDER — ACETAMINOPHEN 10 MG/ML
1000 INJECTION, SOLUTION INTRAVENOUS ONCE
Status: DISCONTINUED | OUTPATIENT
Start: 2018-05-23 | End: 2018-05-23

## 2018-05-23 RX ORDER — EPHEDRINE SULFATE 50 MG/ML
INJECTION, SOLUTION INTRAVENOUS
Status: DISCONTINUED | OUTPATIENT
Start: 2018-05-23 | End: 2018-05-23

## 2018-05-23 RX ORDER — ASPIRIN 325 MG
325 TABLET, DELAYED RELEASE (ENTERIC COATED) ORAL 2 TIMES DAILY
Status: DISCONTINUED | OUTPATIENT
Start: 2018-05-23 | End: 2018-05-23

## 2018-05-23 RX ORDER — ASPIRIN 325 MG
325 TABLET ORAL 2 TIMES DAILY
Qty: 60 TABLET | Refills: 0 | Status: SHIPPED | OUTPATIENT
Start: 2018-05-23 | End: 2018-05-23 | Stop reason: HOSPADM

## 2018-05-23 RX ORDER — MIDAZOLAM HYDROCHLORIDE 1 MG/ML
1 INJECTION INTRAMUSCULAR; INTRAVENOUS EVERY 5 MIN PRN
Status: DISCONTINUED | OUTPATIENT
Start: 2018-05-23 | End: 2018-05-23 | Stop reason: HOSPADM

## 2018-05-23 RX ORDER — CELECOXIB 200 MG/1
200 CAPSULE ORAL DAILY
Status: DISCONTINUED | OUTPATIENT
Start: 2018-05-24 | End: 2018-05-24 | Stop reason: HOSPADM

## 2018-05-23 RX ORDER — VANCOMYCIN HYDROCHLORIDE
1500
Status: COMPLETED | OUTPATIENT
Start: 2018-05-23 | End: 2018-05-23

## 2018-05-23 RX ORDER — OXYCODONE HYDROCHLORIDE 5 MG/1
10 TABLET ORAL
Status: DISCONTINUED | OUTPATIENT
Start: 2018-05-23 | End: 2018-05-24 | Stop reason: HOSPADM

## 2018-05-23 RX ORDER — ONDANSETRON 8 MG/1
8 TABLET, ORALLY DISINTEGRATING ORAL EVERY 8 HOURS PRN
Qty: 12 TABLET | Refills: 2 | Status: SHIPPED | OUTPATIENT
Start: 2018-05-23 | End: 2018-06-18

## 2018-05-23 RX ORDER — MORPHINE SULFATE 2 MG/ML
2 INJECTION, SOLUTION INTRAMUSCULAR; INTRAVENOUS
Status: DISCONTINUED | OUTPATIENT
Start: 2018-05-23 | End: 2018-05-24 | Stop reason: HOSPADM

## 2018-05-23 RX ORDER — LIDOCAINE HCL/PF 100 MG/5ML
SYRINGE (ML) INTRAVENOUS
Status: DISCONTINUED | OUTPATIENT
Start: 2018-05-23 | End: 2018-05-23

## 2018-05-23 RX ORDER — SODIUM CHLORIDE 0.9 % (FLUSH) 0.9 %
3 SYRINGE (ML) INJECTION EVERY 8 HOURS PRN
Status: DISCONTINUED | OUTPATIENT
Start: 2018-05-23 | End: 2018-05-24 | Stop reason: HOSPADM

## 2018-05-23 RX ORDER — OXYCODONE HYDROCHLORIDE 5 MG/1
5 TABLET ORAL
Status: DISCONTINUED | OUTPATIENT
Start: 2018-05-23 | End: 2018-05-23

## 2018-05-23 RX ORDER — NALOXONE HCL 0.4 MG/ML
0.02 VIAL (ML) INJECTION
Status: DISCONTINUED | OUTPATIENT
Start: 2018-05-23 | End: 2018-05-24 | Stop reason: HOSPADM

## 2018-05-23 RX ORDER — GENTAMICIN SULFATE 40 MG/ML
INJECTION, SOLUTION INTRAMUSCULAR; INTRAVENOUS
Status: DISCONTINUED | OUTPATIENT
Start: 2018-05-23 | End: 2018-05-23 | Stop reason: HOSPADM

## 2018-05-23 RX ORDER — PREGABALIN 75 MG/1
75 CAPSULE ORAL
Status: DISCONTINUED | OUTPATIENT
Start: 2018-05-23 | End: 2018-05-23

## 2018-05-23 RX ORDER — ONDANSETRON 8 MG/1
8 TABLET, ORALLY DISINTEGRATING ORAL EVERY 8 HOURS PRN
Status: DISCONTINUED | OUTPATIENT
Start: 2018-05-23 | End: 2018-05-24 | Stop reason: HOSPADM

## 2018-05-23 RX ORDER — SODIUM CHLORIDE 9 MG/ML
INJECTION, SOLUTION INTRAVENOUS
Status: COMPLETED | OUTPATIENT
Start: 2018-05-23 | End: 2018-05-23

## 2018-05-23 RX ORDER — OXYCODONE AND ACETAMINOPHEN 5; 325 MG/1; MG/1
1 TABLET ORAL EVERY 4 HOURS PRN
Qty: 50 TABLET | Refills: 0 | Status: SHIPPED | OUTPATIENT
Start: 2018-05-23 | End: 2018-05-30 | Stop reason: SDUPTHER

## 2018-05-23 RX ORDER — CEFAZOLIN SODIUM 1 G/3ML
2 INJECTION, POWDER, FOR SOLUTION INTRAMUSCULAR; INTRAVENOUS
Status: COMPLETED | OUTPATIENT
Start: 2018-05-23 | End: 2018-05-23

## 2018-05-23 RX ORDER — FENTANYL CITRATE 50 UG/ML
25 INJECTION, SOLUTION INTRAMUSCULAR; INTRAVENOUS EVERY 5 MIN PRN
Status: DISCONTINUED | OUTPATIENT
Start: 2018-05-23 | End: 2018-05-23 | Stop reason: HOSPADM

## 2018-05-23 RX ORDER — PROPOFOL 10 MG/ML
VIAL (ML) INTRAVENOUS CONTINUOUS PRN
Status: DISCONTINUED | OUTPATIENT
Start: 2018-05-23 | End: 2018-05-23

## 2018-05-23 RX ORDER — CEFAZOLIN SODIUM 1 G/3ML
2 INJECTION, POWDER, FOR SOLUTION INTRAMUSCULAR; INTRAVENOUS
Status: COMPLETED | OUTPATIENT
Start: 2018-05-23 | End: 2018-05-24

## 2018-05-23 RX ORDER — LIDOCAINE HYDROCHLORIDE 10 MG/ML
1 INJECTION, SOLUTION EPIDURAL; INFILTRATION; INTRACAUDAL; PERINEURAL
Status: COMPLETED | OUTPATIENT
Start: 2018-05-23 | End: 2018-05-23

## 2018-05-23 RX ORDER — ACETAMINOPHEN 500 MG
1000 TABLET ORAL EVERY 6 HOURS
Status: DISCONTINUED | OUTPATIENT
Start: 2018-05-23 | End: 2018-05-24 | Stop reason: HOSPADM

## 2018-05-23 RX ORDER — KETAMINE HCL IN 0.9 % NACL 50 MG/5 ML
SYRINGE (ML) INTRAVENOUS
Status: DISCONTINUED | OUTPATIENT
Start: 2018-05-23 | End: 2018-05-23

## 2018-05-23 RX ORDER — HYDROXYZINE HYDROCHLORIDE 25 MG/1
25 TABLET, FILM COATED ORAL 3 TIMES DAILY PRN
Status: DISCONTINUED | OUTPATIENT
Start: 2018-05-23 | End: 2018-05-24 | Stop reason: HOSPADM

## 2018-05-23 RX ORDER — ASPIRIN 81 MG/1
81 TABLET ORAL 2 TIMES DAILY
Status: DISCONTINUED | OUTPATIENT
Start: 2018-05-23 | End: 2018-05-24

## 2018-05-23 RX ORDER — MUPIROCIN 20 MG/G
1 OINTMENT TOPICAL
Status: COMPLETED | OUTPATIENT
Start: 2018-05-23 | End: 2018-05-23

## 2018-05-23 RX ORDER — DEXAMETHASONE SODIUM PHOSPHATE 4 MG/ML
INJECTION, SOLUTION INTRA-ARTICULAR; INTRALESIONAL; INTRAMUSCULAR; INTRAVENOUS; SOFT TISSUE
Status: DISCONTINUED | OUTPATIENT
Start: 2018-05-23 | End: 2018-05-23

## 2018-05-23 RX ORDER — AMOXICILLIN 250 MG
1 CAPSULE ORAL 2 TIMES DAILY
Status: DISCONTINUED | OUTPATIENT
Start: 2018-05-23 | End: 2018-05-24 | Stop reason: HOSPADM

## 2018-05-23 RX ORDER — METHOCARBAMOL 500 MG/1
500 TABLET, FILM COATED ORAL ONCE
Status: COMPLETED | OUTPATIENT
Start: 2018-05-23 | End: 2018-05-23

## 2018-05-23 RX ORDER — ASPIRIN 325 MG
325 TABLET, DELAYED RELEASE (ENTERIC COATED) ORAL 2 TIMES DAILY
Qty: 60 TABLET | Refills: 0 | Status: SHIPPED | OUTPATIENT
Start: 2018-05-23 | End: 2018-05-24 | Stop reason: HOSPADM

## 2018-05-23 RX ORDER — MELOXICAM 7.5 MG/1
15 TABLET ORAL DAILY
Status: DISCONTINUED | OUTPATIENT
Start: 2018-05-23 | End: 2018-05-23

## 2018-05-23 RX ORDER — SERTRALINE HYDROCHLORIDE 50 MG/1
50 TABLET, FILM COATED ORAL DAILY
Status: DISCONTINUED | OUTPATIENT
Start: 2018-05-23 | End: 2018-05-24 | Stop reason: HOSPADM

## 2018-05-23 RX ORDER — ROPIVACAINE HYDROCHLORIDE 2 MG/ML
8 INJECTION, SOLUTION EPIDURAL; INFILTRATION; PERINEURAL CONTINUOUS
Status: DISCONTINUED | OUTPATIENT
Start: 2018-05-23 | End: 2018-05-23

## 2018-05-23 RX ORDER — OXYCODONE HYDROCHLORIDE 5 MG/1
10 TABLET ORAL
Status: DISCONTINUED | OUTPATIENT
Start: 2018-05-23 | End: 2018-05-23

## 2018-05-23 RX ORDER — ACETAMINOPHEN 10 MG/ML
1000 INJECTION, SOLUTION INTRAVENOUS ONCE
Status: COMPLETED | OUTPATIENT
Start: 2018-05-23 | End: 2018-05-23

## 2018-05-23 RX ORDER — SODIUM CHLORIDE 9 MG/ML
INJECTION, SOLUTION INTRAVENOUS CONTINUOUS PRN
Status: DISCONTINUED | OUTPATIENT
Start: 2018-05-23 | End: 2018-05-23

## 2018-05-23 RX ORDER — ACETAMINOPHEN 500 MG
1000 TABLET ORAL EVERY 6 HOURS
Status: DISCONTINUED | OUTPATIENT
Start: 2018-05-23 | End: 2018-05-23

## 2018-05-23 RX ORDER — OXYCODONE HYDROCHLORIDE 5 MG/1
5 TABLET ORAL
Status: DISCONTINUED | OUTPATIENT
Start: 2018-05-23 | End: 2018-05-24 | Stop reason: HOSPADM

## 2018-05-23 RX ORDER — GABAPENTIN 300 MG/1
600 CAPSULE ORAL 3 TIMES DAILY
Status: DISCONTINUED | OUTPATIENT
Start: 2018-05-23 | End: 2018-05-24 | Stop reason: HOSPADM

## 2018-05-23 RX ORDER — OXYCODONE HYDROCHLORIDE 5 MG/1
TABLET ORAL
Status: DISPENSED
Start: 2018-05-23 | End: 2018-05-24

## 2018-05-23 RX ORDER — RAMELTEON 8 MG/1
8 TABLET ORAL NIGHTLY PRN
Status: DISCONTINUED | OUTPATIENT
Start: 2018-05-23 | End: 2018-05-24 | Stop reason: HOSPADM

## 2018-05-23 RX ORDER — DIPHENHYDRAMINE HCL 25 MG
25 CAPSULE ORAL EVERY 6 HOURS PRN
Status: DISCONTINUED | OUTPATIENT
Start: 2018-05-23 | End: 2018-05-24 | Stop reason: HOSPADM

## 2018-05-23 RX ORDER — FAMOTIDINE 20 MG/1
20 TABLET, FILM COATED ORAL 2 TIMES DAILY
Status: DISCONTINUED | OUTPATIENT
Start: 2018-05-23 | End: 2018-05-23

## 2018-05-23 RX ORDER — ONDANSETRON 2 MG/ML
4 INJECTION INTRAMUSCULAR; INTRAVENOUS EVERY 8 HOURS PRN
Status: DISCONTINUED | OUTPATIENT
Start: 2018-05-23 | End: 2018-05-23

## 2018-05-23 RX ORDER — TIZANIDINE 4 MG/1
4 TABLET ORAL EVERY 8 HOURS PRN
Status: DISCONTINUED | OUTPATIENT
Start: 2018-05-23 | End: 2018-05-24 | Stop reason: HOSPADM

## 2018-05-23 RX ORDER — ROPIVACAINE HYDROCHLORIDE 2 MG/ML
8 INJECTION, SOLUTION EPIDURAL; INFILTRATION; PERINEURAL CONTINUOUS
Status: DISCONTINUED | OUTPATIENT
Start: 2018-05-23 | End: 2018-05-24 | Stop reason: HOSPADM

## 2018-05-23 RX ADMIN — CEFAZOLIN 2 G: 330 INJECTION, POWDER, FOR SOLUTION INTRAMUSCULAR; INTRAVENOUS at 03:05

## 2018-05-23 RX ADMIN — MIDAZOLAM HYDROCHLORIDE 2 MG: 1 INJECTION, SOLUTION INTRAMUSCULAR; INTRAVENOUS at 07:05

## 2018-05-23 RX ADMIN — ACETAMINOPHEN 1000 MG: 10 INJECTION, SOLUTION INTRAVENOUS at 11:05

## 2018-05-23 RX ADMIN — SERTRALINE HYDROCHLORIDE 50 MG: 50 TABLET ORAL at 05:05

## 2018-05-23 RX ADMIN — SODIUM CHLORIDE: 0.9 INJECTION, SOLUTION INTRAVENOUS at 11:05

## 2018-05-23 RX ADMIN — GABAPENTIN 600 MG: 300 CAPSULE ORAL at 09:05

## 2018-05-23 RX ADMIN — MUPIROCIN 1 G: 20 OINTMENT TOPICAL at 06:05

## 2018-05-23 RX ADMIN — SODIUM CHLORIDE, SODIUM GLUCONATE, SODIUM ACETATE, POTASSIUM CHLORIDE, MAGNESIUM CHLORIDE, SODIUM PHOSPHATE, DIBASIC, AND POTASSIUM PHOSPHATE: .53; .5; .37; .037; .03; .012; .00082 INJECTION, SOLUTION INTRAVENOUS at 10:05

## 2018-05-23 RX ADMIN — PROPOFOL 25 MCG/KG/MIN: 10 INJECTION, EMULSION INTRAVENOUS at 08:05

## 2018-05-23 RX ADMIN — SODIUM CHLORIDE: 0.9 INJECTION, SOLUTION INTRAVENOUS at 08:05

## 2018-05-23 RX ADMIN — STANDARDIZED SENNA CONCENTRATE AND DOCUSATE SODIUM 1 TABLET: 8.6; 5 TABLET, FILM COATED ORAL at 09:05

## 2018-05-23 RX ADMIN — ROPIVACAINE HYDROCHLORIDE 8 ML/HR: 2 INJECTION, SOLUTION EPIDURAL; INFILTRATION at 07:05

## 2018-05-23 RX ADMIN — MIDAZOLAM HYDROCHLORIDE 1 MG: 1 INJECTION, SOLUTION INTRAMUSCULAR; INTRAVENOUS at 08:05

## 2018-05-23 RX ADMIN — DEXAMETHASONE SODIUM PHOSPHATE 8 MG: 4 INJECTION, SOLUTION INTRAMUSCULAR; INTRAVENOUS at 08:05

## 2018-05-23 RX ADMIN — ASPIRIN 81 MG: 81 TABLET, COATED ORAL at 05:05

## 2018-05-23 RX ADMIN — SODIUM CHLORIDE: 0.9 INJECTION, SOLUTION INTRAVENOUS at 05:05

## 2018-05-23 RX ADMIN — SODIUM CHLORIDE, SODIUM GLUCONATE, SODIUM ACETATE, POTASSIUM CHLORIDE, MAGNESIUM CHLORIDE, SODIUM PHOSPHATE, DIBASIC, AND POTASSIUM PHOSPHATE: .53; .5; .37; .037; .03; .012; .00082 INJECTION, SOLUTION INTRAVENOUS at 09:05

## 2018-05-23 RX ADMIN — ROPIVACAINE HYDROCHLORIDE 8 ML/HR: 2 INJECTION, SOLUTION EPIDURAL; INFILTRATION at 11:05

## 2018-05-23 RX ADMIN — ACETAMINOPHEN 1000 MG: 500 TABLET, FILM COATED ORAL at 03:05

## 2018-05-23 RX ADMIN — OXYCODONE HYDROCHLORIDE 15 MG: 5 TABLET ORAL at 11:05

## 2018-05-23 RX ADMIN — Medication 20 MG: at 08:05

## 2018-05-23 RX ADMIN — OXYCODONE HYDROCHLORIDE 5 MG: 5 TABLET ORAL at 09:05

## 2018-05-23 RX ADMIN — LIDOCAINE HYDROCHLORIDE 60 MG: 20 INJECTION, SOLUTION INTRAVENOUS at 08:05

## 2018-05-23 RX ADMIN — VANCOMYCIN HYDROCHLORIDE 1500 MG: 10 INJECTION, POWDER, LYOPHILIZED, FOR SOLUTION INTRAVENOUS at 06:05

## 2018-05-23 RX ADMIN — EPHEDRINE SULFATE 10 MG: 50 INJECTION, SOLUTION INTRAMUSCULAR; INTRAVENOUS; SUBCUTANEOUS at 09:05

## 2018-05-23 RX ADMIN — SODIUM CHLORIDE: 0.9 INJECTION, SOLUTION INTRAVENOUS at 06:05

## 2018-05-23 RX ADMIN — OXYCODONE HYDROCHLORIDE 10 MG: 5 TABLET ORAL at 03:05

## 2018-05-23 RX ADMIN — LIDOCAINE HYDROCHLORIDE 0.2 MG: 10 INJECTION, SOLUTION EPIDURAL; INFILTRATION; INTRACAUDAL; PERINEURAL at 06:05

## 2018-05-23 RX ADMIN — CELECOXIB 400 MG: 200 CAPSULE ORAL at 06:05

## 2018-05-23 RX ADMIN — GABAPENTIN 600 MG: 300 CAPSULE ORAL at 05:05

## 2018-05-23 RX ADMIN — FAMOTIDINE 20 MG: 20 TABLET, FILM COATED ORAL at 12:05

## 2018-05-23 RX ADMIN — Medication 2 MG: at 11:05

## 2018-05-23 RX ADMIN — STANDARDIZED SENNA CONCENTRATE AND DOCUSATE SODIUM 1 TABLET: 8.6; 5 TABLET, FILM COATED ORAL at 11:05

## 2018-05-23 RX ADMIN — Medication 1500 MG: at 06:05

## 2018-05-23 RX ADMIN — ONDANSETRON 8 MG: 8 TABLET, ORALLY DISINTEGRATING ORAL at 07:05

## 2018-05-23 RX ADMIN — MIDAZOLAM HYDROCHLORIDE 2 MG: 1 INJECTION, SOLUTION INTRAMUSCULAR; INTRAVENOUS at 08:05

## 2018-05-23 RX ADMIN — OXYCODONE HYDROCHLORIDE 5 MG: 5 TABLET ORAL at 06:05

## 2018-05-23 RX ADMIN — CEFAZOLIN 2 G: 330 INJECTION, POWDER, FOR SOLUTION INTRAMUSCULAR; INTRAVENOUS at 08:05

## 2018-05-23 RX ADMIN — METHOCARBAMOL 500 MG: 500 TABLET ORAL at 11:05

## 2018-05-23 NOTE — ANESTHESIA PROCEDURE NOTES
IPACK Single Injection Block    Patient location during procedure: pre-op   Block not for primary anesthetic.  Reason for block: at surgeon's request and post-op pain management   Post-op Pain Location: right knee pain  Start time: 5/23/2018 7:52 AM  Timeout: 5/23/2018 7:36 AM   End time: 5/23/2018 7:54 AM  Staffing  Anesthesiologist: ESTHER PEACE  Performed: anesthesiologist   Preanesthetic Checklist  Completed: patient identified, site marked, surgical consent, pre-op evaluation, timeout performed, IV checked, risks and benefits discussed and monitors and equipment checked  Peripheral Block  Patient position: supine  Prep: ChloraPrep  Patient monitoring: heart rate, cardiac monitor, continuous pulse ox, continuous capnometry and frequent blood pressure checks  Block type: I PACK  Laterality: right  Injection technique: single shot  Needle  Needle type: Stimuplex   Needle gauge: 21 G  Needle length: 4 in  Needle localization: anatomical landmarks and ultrasound guidance   -ultrasound image captured on disc.  Assessment  Injection assessment: negative aspiration, negative parasthesia and local visualized surrounding nerve  Paresthesia pain: none  Heart rate change: no  Slow fractionated injection: yes  Medications:  Bolus administered: 20 mL of 0.25 ropivacaine  Epinephrine added: 3.75 mcg/mL (1/300,000)  Additional Notes  VSS.  [facility] RN monitoring vitals throughout procedure.  Patient tolerated procedure well.

## 2018-05-23 NOTE — ANESTHESIA PROCEDURE NOTES
Spinal    Diagnosis: knee pain   Patient location during procedure: OR  Start time: 5/23/2018 8:15 AM  Timeout: 5/23/2018 8:11 AM  End time: 5/23/2018 8:30 AM  Staffing  Anesthesiologist: DAVID BUTLER  Performed: anesthesiologist   Preanesthetic Checklist  Completed: patient identified, site marked, surgical consent, pre-op evaluation, timeout performed, IV checked, risks and benefits discussed and monitors and equipment checked  Spinal Block  Patient position: sitting  Prep: ChloraPrep  Patient monitoring: continuous pulse ox, cardiac monitor and heart rate  Approach: midline  Location: L3-4  Injection technique: single shot  CSF Fluid: clear free-flowing CSF  Needle  Needle type: Fadi   Needle gauge: 25 G  Needle length: 5 in  Additional Documentation: no paresthesia on injection  Needle localization: anatomical landmarks  Assessment  Sensory level: T9   Dermatomal levels determined by pinch or prick  Ease of block: easy  Patient's tolerance of the procedure: comfortable throughout block  Medications:  Bolus administered: 3 mL of 2.0 mepivacaine

## 2018-05-23 NOTE — PLAN OF CARE
Problem: Physical Therapy Goal  Goal: Physical Therapy Goal  Goals to be met by: 18     Patient will increase functional independence with mobility by performin. Supine to sit with Set-up [place]  2. Sit to supine with Set-up [place]  3. Sit to stand transfer with Supervision  4. Bed to chair transfer with Stand-by Assistance using Rolling Walker  5. Gait  x 100 feet with Stand-by Assistance using Rolling Walker.   6. Ascend/Descend 7 inch curb step with Contact Guard Assistance using Rolling Walker.  7. Lower extremity exercise program x20-30 reps per handout, with independence      Outcome: Ongoing (interventions implemented as appropriate)  PT eval complete. Pt tolerated session well but was anxious about all movements and reported pain throughout. Pt with no LOB during ambulation and t/f's using SW for support. Pt will cont to benefit from skilled therapy services and is appropriate for HHPT upon d/c.

## 2018-05-23 NOTE — NURSING TRANSFER
Nursing Transfer Note      5/23/2018     Transfer To: [address]    Transfer via bed    Transfer with cardiac monitoring    Transported by PCT, RN    Medicines sent: IVF, PNC    Chart send with patient: Yes    Notified: spouse    Patient reassessed at: 5/23/18, 1430    Upon arrival to floor: cardiac monitor applied, patient oriented to room, call bell in reach and bed in lowest position

## 2018-05-23 NOTE — ANESTHESIA PROCEDURE NOTES
Adductor Canal Catheter    Patient location during procedure: pre-op   Block not for primary anesthetic.  Reason for block: at surgeon's request and post-op pain management   Post-op Pain Location: right knee pain  Start time: 5/23/2018 7:38 AM  Timeout: 5/23/2018 7:36 AM   End time: 5/23/2018 7:54 AM  Staffing  Anesthesiologist: ESTHER PEACE  Resident/CRNA: BRODY MAJANO  Performed: resident/CRNA   Preanesthetic Checklist  Completed: patient identified, site marked, surgical consent, pre-op evaluation, timeout performed, IV checked, risks and benefits discussed and monitors and equipment checked  Peripheral Block  Patient position: supine  Prep: ChloraPrep and site prepped and draped  Patient monitoring: heart rate, cardiac monitor, continuous pulse ox, continuous capnometry and frequent blood pressure checks  Block type: adductor canal  Laterality: right  Injection technique: continuous  Needle  Needle type: Tuohy   Needle gauge: 17 G  Needle length: 3.5 in  Needle localization: anatomical landmarks and ultrasound guidance  Catheter type: spring wound  Catheter size: 19 G  Test dose: lidocaine 1.5% with Epi 1-to-200,000 and negative   -ultrasound image captured on disc.  Assessment  Injection assessment: negative aspiration, negative parasthesia and local visualized surrounding nerve  Paresthesia pain: none  Heart rate change: no  Slow fractionated injection: yes  Medications:  Bolus administered: 20 mL of 0.25 ropivacaine  Epinephrine added: 3.75 mcg/mL (1/300,000)  Additional Notes  VSS.  [facility] RN monitoring vitals throughout procedure.  Patient tolerated procedure well.

## 2018-05-23 NOTE — PT/OT/SLP EVAL
Occupational Therapy   Evaluation    Name: Soha Wheatley  MRN: [id number]  Admitting Diagnosis:  Primary osteoarthritis of right knee Day of Surgery    Recommendations:     Discharge recommendations: Home w/ HH     Barriers to discharge: None    Equipment recommendations: none - all necessary equipment in place at home    History:     Occupational Profile:  Living Environment: Pt lives with her  and daughter in [facility] w/ 1STE; pt uses a tub/shower combo  Previous level of function: PTA, pt reports being independent w/ ADLs and mobility   Equipment Owned: rolling walker, bedside commode and shower chair  - pt getting BSC from sister-in-law  Assistance Upon Discharge: Pt reports her sister-in-law will provide assistance while  is out of town upon d/c from hospital.     Past Medical History:   Diagnosis Date    Allergy     Anxiety     Arthritis     BMI 45.0-49.9, adult     Chronic kidney disease     Depression     GERD (gastroesophageal reflux disease)     History of kidney stones     Kidney stones     Migraines     Morbid obesity     Obesity     Sleep apnea in adult     WEARS CPAP, DOESNT KNOW SETTINGS.       Past Surgical History:   Procedure Laterality Date     SECTION      CHOLECYSTECTOMY      GASTRECTOMY      KNEE ARTHRODESIS      arthroscopy - Left knee for meniscus     KNEE CARTILAGE SURGERY Left     TONSILLECTOMY  1986    URETEROSCOPY         Subjective     Communicated with: RN prior to session.    Pt agreeable to Evaluation.    Pain/Comfort:  Pain level: 7/10 in R knee    Objective:     Pt found supine in bed with blood pressure cuff, rojo catheter, telemetry, PNC, PCEA, pulse ox, Peripheral IV and FCD.    Orthopedic Precautions: RLE weight bearing as tolerated    Occupational Performance:    Bed Mobility:    Supine to sit: Min A  Sit to supine: Min A    Transfers:    Sit<>Stand: Min A, SW    Ambulation:    Pt ambulated 3 steps forward, backward, and side  stepped to HOB w/ CGA and SW - no signs of LOB or SOB.     Activities of Daily Living:  UE dressing: Maximum Assistance to thor gown around back  LE dressing: Total Assistance to thor socks  Pt educated on LE dressing techniques and need for AD with LE dressing      Patient left supine in bed with all lines intact and RN notified.    [facility] 6 Click: Self-care  [facility] Total Score: 16    Education:    Assessment:     Soha Wheatley is a 62 y.o. female with a medical diagnosis of Primary osteoarthritis of right knee.  She presents with decreased function of R LE impeding her ability to perform ADLs and functional mobility and would benefit from OT services to maximize functional (I) and safety.    Performance deficits affecting function are weakness, impaired endurance, impaired self care skills, impaired functional mobilty, gait instability, impaired balance, decreased lower extremity function, decreased safety awareness, pain, impaired skin, decreased ROM, edema, orthopedic precautions.    Rehab Prognosis:  Good    Plan:     Patient to be seen QD to address the above listed problems via self-care/home management, therapeutic activities, therapeutic exercises    Plan of Care Reviewed with: patient    This Plan of care has been discussed with the patient who was involved in its development and understands and is in agreement with the identified goals and treatment plan    GOALS:    Occupational Therapy Goals        Problem: Occupational Therapy Goal    Goal Priority Disciplines Outcome Interventions   Occupational Therapy Goal     OT, PT/OT Ongoing (interventions implemented as appropriate)    Description:  Goals to be met by: 7 days    Patient will increase functional independence with ADLs by performing:    UE Dressing with Supervision.  LE Dressing with Supervision with AD as needed.  Grooming while standing with Supervision.  Toileting from bedside commode with Supervision for hygiene and clothing management.    Stand pivot transfers with Supervision.  Toilet transfer to bedside commode with Supervision.                         Time Tracking:     OT Received On: 5/23/2018  OT Start Time: 1300  OT Stop Time: 1320   OT Total Time: 20 minutes     Billable Minutes:  Evaluation 12 minutes  Self Care/Home Management 8 minutes    Sivla Zapata, OT  5/23/2018

## 2018-05-23 NOTE — PT/OT/SLP EVAL
Physical Therapy Evaluation    Patient Name:  Soha Wheatley   MRN:  [id number]    Recommendations:     Discharge Recommendations:  home with home health   Discharge Equipment Recommendations: none (owns all DME)   Barriers to discharge: None    Assessment:     Soha Wheatley is a 62 y.o. female admitted with a medical diagnosis of Primary osteoarthritis of right knee.  She presents with the following impairments/functional limitations:  weakness, impaired endurance, impaired self care skills, impaired balance, gait instability, impaired functional mobilty, decreased lower extremity function, decreased ROM, orthopedic precautions, pain, impaired skin.    -PT eval complete. Pt tolerated session well but was anxious about all movements and reported pain throughout. Pt with no LOB during ambulation and t/f's using SW for support. Pt will cont to benefit from skilled therapy services and is appropriate for HHPT upon d/c.    Rehab Prognosis:  Good; patient would benefit from acute skilled PT services to address these deficits and reach maximum level of function.      Recent Surgery: Procedure(s) (LRB):  REPLACEMENT-KNEE-TOTAL (Right) Day of Surgery    Plan:     During this hospitalization, patient to be seen BID to address the above listed problems via gait training, therapeutic activities, therapeutic exercises, neuromuscular re-education  · Plan of Care Expires:  06/23/18   Plan of Care Reviewed with: patient    Subjective     Communicated with nsg prior to session.  Patient found supine upon PT entry to room, agreeable to evaluation.      Chief Complaint: impaired functional mobility and (R) LE weakness  Patient comments/goals: return home to [facility]  Pain/Comfort:  · Pain Rating 1: 7/10  · Location - Side 1: Right  · Location - Orientation 1: anterior  · Location 1: knee  · Pain Addressed 1: Pre-medicate for activity, Cessation of Activity, Reposition  · Pain Rating Post-Intervention 1: 7/10    Patients cultural,  spiritual, Scientologist conflicts given the current situation: none stated    Living Environment:  -Pt lives in a H with  and dtr with 1 CHARLY and no rails. Pt will have 24/7 (A) from sister in law who will be staying upon d/c. Pt has all DME in place  Prior to admission, patients level of function was (I).  Patient has the following equipment: walker, rolling, bedside commode, shower chair.  DME owned (not currently used): .  Upon discharge, patient will have assistance from family.    Objective:     Patient found with: blood pressure cuff, cryotherapy, FCD, perineural catheter, peripheral IV, telemetry, pulse ox (continuous)     General Precautions: Standard, fall   Orthopedic Precautions:RLE weight bearing as tolerated   Braces: N/A     Exams:  · Sensation:    · -       Intact  light/touch (B) LE  · RLE ROM: WFL except limits from bulky dressing  · RLE Strength: Deficits: 4/5 at knee, hip based on sit<>stand and ambulation  · LLE ROM: WFL  · LLE Strength: WFL    Functional Mobility:  · Bed Mobility:     · Scooting: contact guard assistance  · Supine to Sit: minimum assistance  · Sit to Supine: minimum assistance  · Transfers:     · Sit to Stand:  minimum assistance with standard walker  · Gait: 3 steps fwd/back, 3 lateral steps using SW with CGA    AM-PAC 6 CLICK MOBILITY  Total Score:18       Therapeutic Activities and Exercises:   -Pt educated on:  A. PT POC and role of PT  B. Importance of OOB activity to improve functional outcomes   C. DME mgmt and t/f sequencing  D. Performing HEP to reduce the risk of developing blood clots  E. Gait sequencing   F. D/c planning      Patient left supine with all lines intact, call button in reach and nsg notified.    GOALS:    Physical Therapy Goals        Problem: Physical Therapy Goal    Goal Priority Disciplines Outcome Goal Variances Interventions   Physical Therapy Goal     PT/OT, PT Ongoing (interventions implemented as appropriate)     Description:  Goals to be  met by: 18     Patient will increase functional independence with mobility by performin. Supine to sit with Set-up [place]  2. Sit to supine with Set-up [place]  3. Sit to stand transfer with Supervision  4. Bed to chair transfer with Stand-by Assistance using Rolling Walker  5. Gait  x 100 feet with Stand-by Assistance using Rolling Walker.   6. Ascend/Descend 7 inch curb step with Contact Guard Assistance using Rolling Walker.  7. Lower extremity exercise program x20-30 reps per handout, with independence                        History:     Past Medical History:   Diagnosis Date    Allergy     Anxiety     Arthritis     BMI 45.0-49.9, adult     Chronic kidney disease     Depression     GERD (gastroesophageal reflux disease)     History of kidney stones     Kidney stones     Migraines     Morbid obesity     Obesity     Sleep apnea in adult     WEARS CPAP, DOESNT KNOW SETTINGS.       Past Surgical History:   Procedure Laterality Date     SECTION      CHOLECYSTECTOMY      GASTRECTOMY      KNEE ARTHRODESIS      arthroscopy - Left knee for meniscus     KNEE CARTILAGE SURGERY Left     TONSILLECTOMY      URETEROSCOPY         Clinical Decision Making:     History  Co-morbidities and personal factors that may impact the plan of care Examination  Body Structures and Functions, activity limitations and participation restrictions that may impact the plan of care Clinical Presentation   Decision Making/ Complexity Score   Co-morbidities:   [] Time since onset of injury / illness / exacerbation  [] Status of current condition  []Patient's cognitive status and safety concerns    [] Multiple Medical Problems (see med hx)  Personal Factors:   [] Patient's age  [] Prior Level of function   [] Patient's home situation (environment and family support)  [] Patient's level of motivation  [] Expected progression of patient      HISTORY:(criteria)    [x] 21201 - no personal  factors/history    [] 43978 - has 1-2 personal factor/comorbidity     [] 44860 - has >3 personal factor/comorbidity     Body Regions:  [] Objective examination findings  [] Head     []  Neck  [] Trunk   [] Upper Extremity  [x] Lower Extremity    Body Systems:  [] For communication ability, affect, cognition, language, and learning style: the assessment of the ability to make needs known, consciousness, orientation (person, place, and time), expected emotional /behavioral responses, and learning preferences (eg, learning barriers, education  needs)  [x] For the neuromuscular system: a general assessment of gross coordinated movement (eg, balance, gait, locomotion, transfers, and transitions) and motor function  (motor control and motor learning)  [x] For the musculoskeletal system: the assessment of gross symmetry, gross range of motion, gross strength, height, and weight  [x] For the integumentary system: the assessment of pliability(texture), presence of scar formation, skin color, and skin integrity  [] For cardiovascular/pulmonary system: the assessment of heart rate, respiratory rate, blood pressure, and edema     Activity limitations:    [] Patient's cognitive status and saf ety concerns          [] Status of current condition      [] Weight bearing restriction  [] Cardiopulmunary Restriction    Participation Restrictions:   [] Goals and goal agreement with the patient     [] Rehab potential (prognosis) and probable outcome      Examination of Body System: (criteria)    [] 80360 - addressing 1-2 elements    [x] 34480 - addressing a total of 3 or more elements     [] 78384 -  Addressing a total of 4 or more elements         Clinical Presentation: (criteria)  Stable - 37851     On examination of body system using standardized tests and measures patient presents with 1-2 elements from any of the following: body structures and functions, activity limitations, and/or participation restrictions.  Leading to a  clinical presentation that is considered stable and/or uncomplicated                              Clinical Decision Making  (Eval Complexity):  Low- 92785     Time Tracking:     PT Received On: 05/23/18  PT Start Time: 1300     PT Stop Time: 1320  PT Total Time (min): 20 min     Billable Minutes: Evaluation 10 and Therapeutic Activity 10      Dominic Diane, PT  05/23/2018

## 2018-05-23 NOTE — OP NOTE
DATE OF PROCEDURE:  05/23/2018.    SURGEON:  John Lockwood [facility], Jr., M.D.    ASSISTANT:  Waylon Farris M.D. (RES).    OPERATION PERFORMED:  Right total knee arthroplasty.    PREOPERATIVE DIAGNOSIS:  Osteoarthritis, right knee.    POSTOPERATIVE DIAGNOSIS:  Osteoarthritis, right knee.    COMPONENTS USED:  Mayra Persona knee system.  We used a size 9 femur, right,   standard, posterior stabilized; a size E tibia, right, 5-degree stemmed with a   14 mm diameter short extension stem.  The insert was 11 mm, posterior   stabilized; vitamin E, highly cross-linked poly, right; and a 32 mm patella.    OPERATIVE TECHNIQUE:  The patient was placed supine on the operating table.  A   spinal anesthesia had been introduced.  The right leg was prepped and draped in   sterile fashion.  The patient was given preoperative antibiotics and the OR team   wore the sterile exhaust suits in order to minimize risk for infection.  A foot   pump was placed on the left foot to help prevent DVT.  Right leg was   exsanguinated and the tourniquet was inflated to 300 pounds of pressure.  A   straight anterior incision was made.  Sharp dissection was carried down to the   extensor mechanism.  The extensor mechanism was opened in a straight [place]   approach.  The patient had a valgus knee, so we did not do a release of the   medial collateral.  The valgus deformity was not great.  We did the   intramedullary guide in the femur, made a distal cut at 5 degrees of valgus on   the +3 cutting because of the flexion contracture.  We then did the proximal   tibial cut, removing about 4-5 from the medial side and about 6 or 7 from the   lateral side.  We measured the femur for a 9 and cut it posteriorly for a 9.  It   was a little bit tight.  We took an extra 2.  This balanced things nicely   within a millimeter or two.  At that point, we went ahead and did the notch and   chamfer-plasty on the femur, sized the tibia; at first, I tried the F and I    floated it and got the correct alignment, except that created a medial overhang,   so I had to re-drill it for the E and that fit better.  That is when I decided   to use the extension stem, make it a little more secure.  At that point, we went   ahead and put the trial components in and cut the patella.  The patella was 25   mm and we cut it at 16.  I then Pulsavac'd and dried the surfaces, cemented the   tibial baseplate, then the femoral component, removed the excess cement, put the   11 mm trial in, put the knee out in extension and cemented the patella.  While   the cement was hardening, we bathed the knee with the Betadine solution and then   Pulsavac cleared.  With the cement hard, I put the actual 11 insert in and then   closed the extensor mechanism with #1 Vicryl over tranexamic acid, closed the   subcutaneous tissues with 0 and 3-0 Vicryl and the skin with a running 3-0   Monocryl and skin adhesive.  A sterile surgical dressing was applied.  There   were no complications.      YOANNA  dd: 05/23/2018 11:04:33 (CDT)  td: 05/23/2018 11:21:00 (CDT)  Doc ID   #[id number]  Job ID #[id number]    CC:

## 2018-05-23 NOTE — PLAN OF CARE
Problem: Occupational Therapy Goal  Goal: Occupational Therapy Goal  Goals to be met by: 7 days    Patient will increase functional independence with ADLs by performing:    UE Dressing with Supervision.  LE Dressing with Supervision with AD as needed.  Grooming while standing with Supervision.  Toileting from bedside commode with Supervision for hygiene and clothing management.   Stand pivot transfers with Supervision.  Toilet transfer to bedside commode with Supervision.       Outcome: Ongoing (interventions implemented as appropriate)  OT eval complete. Pt tolerated session well. Pt's functional outcomes established today based on her presenting functional level    Comments: Cont OT POC

## 2018-05-23 NOTE — BRIEF OP NOTE
[facility]  Surgery Department  Operative Note    SUMMARY     Date of Procedure: 5/23/2018     Procedure: Procedure(s) (LRB):  REPLACEMENT-KNEE-TOTAL (Right)     Surgeon(s) and Role:     * [facility] L[facility] Jr., MD - Primary     * Waylon Farris MD - Resident - Assisting        Pre-Operative Diagnosis: Primary osteoarthritis of right knee [M17.11]    Post-Operative Diagnosis: Post-Op Diagnosis Codes:     * Primary osteoarthritis of right knee [M17.11]    Anesthesia: Spinal    Technical Procedures Used:  PS       Description of the Findings of the Procedure: Valgus    Significant Surgical Tasks Conducted by the Assistant(s), if Applicable: closure    Complications: No    Estimated Blood Loss (EBL): 100cc             Implants:   Implant Name Type Inv. Item Serial No.  Lot No. LRB No. Used   PLUG BONE #5 - [id number]  PLUG BONE #5  [employer]. [id number] Right 1   CEMENT BONE SIMPLE P INDIVID - [id number]  CEMENT BONE SIMPLE P INDIVID  [employer]. [id number] Right 2   COMPONENT FEMPERSONA SZ9 RT - [id number]  COMPONENT FEMPERSONA SZ9 RT  ZEINAB,INC [id number] Right 1   SCREW HEX HEAD - [id number]  SCREW HEX HEAD  [employer]IN[employer],INC  Right 1   BIT DRILL PIN TROCAR 3.2MM - [id number]  BIT DRILL PIN TROCAR 3.2MM  ZEINAB,INC  Right 1   SCREW HEX HEAD 3.5X38MM - [id number]  SCREW HEX HEAD 3.5X38MM  ZEINAB,INC  Right 1   TIBIA BASEPLT SZ E 5 DEG R SHOLA - [id number]  TIBIA BASEPLT SZ E 5 DEG R SHOLA  ZEINAB,INC [id number] Right 1   STEM FEM EXT TAP PERSONA 14X30 - [id number]  STEM FEM EXT TAP PERSONA 14X30  ZEINAB,INC [id number] Right 1   PSN ALL POLY PAT 32MM - [id number]  PSN ALL POLY PAT 32MM  ZEINAB,INC [id number] Right 1   INSERT 11MM SZ 6-9 EF - [id number]   INSERT 11MM SZ 6-9 EF   ZEINAB,INC [id number] Right 1       Specimens:   Specimen (12h ago through future)    Start     Ordered    05/23/18 0941  Specimen to Pathology - Surgery  Once     Comments:  1.  Bone and soft tissue right knee - permanent      05/23/18  0938                  Condition: Good    Disposition: PACU - hemodynamically stable.    Attestation: I was present and scrubbed for the entire procedure.

## 2018-05-23 NOTE — ANESTHESIA RELEASE NOTE
"Anesthesia Release from PACU Note    Patient: Soha Wheatley    Procedure(s) Performed: Procedure(s) (LRB):  REPLACEMENT-KNEE-TOTAL (Right)    Anesthesia type: spinal    Post pain: Adequate analgesia    Post assessment: no apparent anesthetic complications    Last Vitals:   Visit Vitals  BP (!) 103/54   Pulse 61   Temp 36.5 °C (97.7 °F) (Oral)   Resp (!) 23   Ht 5' 10" (1.778 m)   Wt 115.2 kg (254 lb)   SpO2 99%   Breastfeeding? No   BMI 36.45 kg/m²       Post vital signs: stable    Level of consciousness: awake, alert  and oriented    Nausea/Vomiting: no nausea/no vomiting    Complications: none    Airway Patency: patent    Respiratory: unassisted, spontaneous ventilation, room air    Cardiovascular: stable and blood pressure at baseline    Hydration: euvolemic  "

## 2018-05-23 NOTE — ADDENDUM NOTE
Addendum  created 05/23/18 1440 by Jenelle Burgess RN    Order list changed, Order sets accessed

## 2018-05-23 NOTE — PROGRESS NOTES
Attempted to call report on patient, nurse on 5th floor informed me that there was no bed in the patient's assigned room and that the admitting nurse would call back when bed is in room. Will follow up.

## 2018-05-23 NOTE — TRANSFER OF CARE
"Anesthesia Transfer of Care Note    Patient: Soha Wheatley    Procedure(s) Performed: Procedure(s) (LRB):  REPLACEMENT-KNEE-TOTAL (Right)    Patient location: PACU    Anesthesia Type: regional    Transport from OR: Transported from OR on room air with adequate spontaneous ventilation    Post pain: adequate analgesia    Post assessment: no apparent anesthetic complications    Post vital signs: stable    Level of consciousness: awake and sedated    Nausea/Vomiting: no nausea/vomiting    Complications: none    Transfer of care protocol was followed      Last vitals:   Visit Vitals  BP (!) 82/47 (BP Location: Left arm, Patient Position: Lying)   Pulse 63   Temp 36 °C (96.8 °F) (Axillary)   Resp 16   Ht 5' 10" (1.778 m)   Wt 115.2 kg (254 lb)   SpO2 97%   Breastfeeding? No   BMI 36.45 kg/m²     "

## 2018-05-23 NOTE — PLAN OF CARE
Problem: Patient Care Overview  Goal: Plan of Care Review  Outcome: Ongoing (interventions implemented as appropriate)  Pt is AAOx4. VSS. No falls/injury as pt calls for assistance when needed. Walker and bedside commode in room. No s/s of skin breakdown as pt can re-position self in bed with minimal assist. Pain being monitored and controlled with PRN and scheduled meds. PNC intact and infusing. Antibiotics given as scheduled. Polar ice on. Bed in lowest position. Call light within reach. Will continue to monitor.

## 2018-05-23 NOTE — PLAN OF CARE
[facility]    HOME HEALTH ORDERS  FACE TO FACE ENCOUNTER        Patient Name: Soha Wheatley  YOB: 1955    PCP: Andrew Mallory MD   PCP Address: [address] / [place] [address]  PCP Phone Number: [phone]  PCP Fax: [phone]    Encounter Date: 05/23/2018    Admit to Home Health    Diagnoses:  Active Hospital Problems    Diagnosis  POA    *Primary osteoarthritis of right knee [M17.11]  Yes      Resolved Hospital Problems    Diagnosis Date Resolved POA   No resolved problems to display.       Future Appointments  Date Time Provider Department Center   6/5/2018 9:30 AM Alena Cuevas PA-C [facility] ORTHO [facility]   6/18/2018 10:30 AM DAVID Jimenes [facility] BARIAT [facility]   6/21/2018 11:00 AM Mary Carmen Tello MD [facility] PSYCH [facility]     Follow-up Information     Alena Cuevas PA-C On 6/5/2018.    Specialty:  Orthopedic Surgery  Contact information:  [address]  [facility] [address]  [phone]                     I have seen and examined this patient face to face today. My clinical findings that support the need for the home health skilled services and home bound status are the following:  Weakness/numbness causing balance and gait disturbance due to Joint Replacement making it taxing to leave home.    Allergies:  Review of patient's allergies indicates:   Allergen Reactions    Adhesive      Paper tape usually ok- pulls skin off    Flagyl [metronidazole hcl]      confusion       Diet: regular diet    Activities: activity as tolerated    Nursing:   SN to complete comprehensive assessment including routine vital signs. Instruct on disease process and s/s of complications to report to MD. Follow specific home health arthoplasty protocol. Review/verify medication list sent home with the patient at time of discharge  and instruct patient/caregiver as needed.    Notify MD if SBP > 160 or < 90; DBP > 90 or < 50; HR > 120 or < 50; Temp >  101    Home Medical Equipment:  Walker, 3-1 bedside commode, transfer tub bench    CONSULTS:    Physical Therapy to evaluate and treat. Evaluate for home safety and equipment needs; Establish/upgrade home exercise program. Perform / instruct on therapeutic exercises, gait training, transfer training, and Range of Motion.    WOUND CARE ORDERS  Do not remove surgical dressing for 2 weeks post-op. This will be done only by MD at initial post-op visit. If dressing is completely saturated, replace with identical dressing - silver-impregnated hydrocolloid dressing.     Do not get dressings wet. Do not shower.     If dressing continues to be saturated or there are signs of infection, please call [facility] [phone] for further instructions and to make appt to be seen.       Medications: Review discharge medications with patient and family and provide education.      Current Discharge Medication List      START taking these medications    Details   aspirin 325 MG tablet Take 1 tablet (325 mg total) by mouth 2 (two) times daily.  Qty: 60 tablet, Refills: 0      ondansetron (ZOFRAN-ODT) 8 MG TbDL Take 1 tablet (8 mg total) by mouth every 8 (eight) hours as needed.  Qty: 12 tablet, Refills: 2      oxyCODONE-acetaminophen (PERCOCET) 5-325 mg per tablet Take 1 tablet by mouth every 4 (four) hours as needed (Post operative pain).  Qty: 50 tablet, Refills: 0         CONTINUE these medications which have NOT CHANGED    Details   b complex vitamins tablet Take 1 tablet by mouth once daily.      cinnamon bark-chromium picolin 500-100 mg-mcg Cap Take by mouth.      cyanocobalamin 500 MCG tablet Take 500 mcg by mouth once daily.      gabapentin (NEURONTIN) 600 MG tablet Take 1 tablet (600 mg total) by mouth 3 (three) times daily.  Qty: 90 tablet, Refills: 1      GLUCOSAMINE HCL/CHONDR APODACA A NA (OSTEO BI-FLEX ORAL) Take by mouth once daily.      multivitamin (ONE DAILY MULTIVITAMIN) per tablet Take 1 tablet by mouth once daily.       omeprazole (PRILOSEC) 40 MG capsule Take 1 capsule (40 mg total) by mouth once daily.  Qty: 90 capsule, Refills: 1    Associated Diagnoses: Knee pain, unspecified chronicity, unspecified laterality      sertraline (ZOLOFT) 50 MG tablet Take 1 tablet (50 mg total) by mouth once daily.  Qty: 30 tablet, Refills: 0    Associated Diagnoses: Anxiety      tizanidine (ZANAFLEX) 4 MG tablet Take 1 tablet (4 mg total) by mouth every 8 (eight) hours as needed.  Qty: 270 tablet, Refills: 0    Associated Diagnoses: Brachial neuritis or radiculitis; Cervical spondylosis without myelopathy; Degeneration of cervical intervertebral disc; Cervicalgia         STOP taking these medications       acetaminophen (TYLENOL ARTHRITIS) 650 MG TbSR Comments:   Reason for Stopping:         meloxicam (MOBIC) 15 MG tablet Comments:   Reason for Stopping:               I certify that this patient is confined to her home and needs intermittent skilled nursing care and physical therapy.

## 2018-05-24 VITALS
SYSTOLIC BLOOD PRESSURE: 102 MMHG | TEMPERATURE: 97 F | HEART RATE: 46 BPM | DIASTOLIC BLOOD PRESSURE: 49 MMHG | RESPIRATION RATE: 18 BRPM | BODY MASS INDEX: 36.36 KG/M2 | OXYGEN SATURATION: 98 % | WEIGHT: 254 LBS | HEIGHT: 70 IN

## 2018-05-24 PROCEDURE — 25000003 PHARM REV CODE 250: Performed by: ANESTHESIOLOGY

## 2018-05-24 PROCEDURE — 97530 THERAPEUTIC ACTIVITIES: CPT

## 2018-05-24 PROCEDURE — 99231 SBSQ HOSP IP/OBS SF/LOW 25: CPT | Mod: ,,, | Performed by: ANESTHESIOLOGY

## 2018-05-24 PROCEDURE — 97535 SELF CARE MNGMENT TRAINING: CPT

## 2018-05-24 PROCEDURE — 25000003 PHARM REV CODE 250: Performed by: PHYSICIAN ASSISTANT

## 2018-05-24 PROCEDURE — 97116 GAIT TRAINING THERAPY: CPT

## 2018-05-24 PROCEDURE — 97110 THERAPEUTIC EXERCISES: CPT

## 2018-05-24 PROCEDURE — 25000003 PHARM REV CODE 250: Performed by: ORTHOPAEDIC SURGERY

## 2018-05-24 PROCEDURE — 63600175 PHARM REV CODE 636 W HCPCS: Performed by: PHYSICIAN ASSISTANT

## 2018-05-24 RX ORDER — ASPIRIN 81 MG/1
81 TABLET ORAL DAILY
Status: DISCONTINUED | OUTPATIENT
Start: 2018-05-24 | End: 2018-05-24 | Stop reason: HOSPADM

## 2018-05-24 RX ORDER — ASPIRIN 81 MG/1
81 TABLET ORAL 2 TIMES DAILY
Qty: 60 TABLET | Refills: 0 | Status: SHIPPED | OUTPATIENT
Start: 2018-05-24 | End: 2018-08-23

## 2018-05-24 RX ORDER — FLUCONAZOLE 150 MG/1
150 TABLET ORAL ONCE
Status: COMPLETED | OUTPATIENT
Start: 2018-05-24 | End: 2018-05-24

## 2018-05-24 RX ADMIN — ROPIVACAINE HYDROCHLORIDE 8 ML/HR: 2 INJECTION, SOLUTION EPIDURAL; INFILTRATION at 07:05

## 2018-05-24 RX ADMIN — SERTRALINE HYDROCHLORIDE 50 MG: 50 TABLET ORAL at 08:05

## 2018-05-24 RX ADMIN — POLYETHYLENE GLYCOL 3350 17 G: 17 POWDER, FOR SOLUTION ORAL at 08:05

## 2018-05-24 RX ADMIN — ACETAMINOPHEN 1000 MG: 500 TABLET, FILM COATED ORAL at 05:05

## 2018-05-24 RX ADMIN — OXYCODONE HYDROCHLORIDE 5 MG: 5 TABLET ORAL at 05:05

## 2018-05-24 RX ADMIN — ACETAMINOPHEN 1000 MG: 500 TABLET, FILM COATED ORAL at 11:05

## 2018-05-24 RX ADMIN — FLUCONAZOLE 150 MG: 150 TABLET ORAL at 11:05

## 2018-05-24 RX ADMIN — OXYCODONE HYDROCHLORIDE 10 MG: 5 TABLET ORAL at 08:05

## 2018-05-24 RX ADMIN — ONDANSETRON 8 MG: 8 TABLET, ORALLY DISINTEGRATING ORAL at 11:05

## 2018-05-24 RX ADMIN — PANTOPRAZOLE SODIUM 40 MG: 40 TABLET, DELAYED RELEASE ORAL at 08:05

## 2018-05-24 RX ADMIN — ASPIRIN 81 MG: 81 TABLET, COATED ORAL at 08:05

## 2018-05-24 RX ADMIN — OXYCODONE HYDROCHLORIDE 5 MG: 5 TABLET ORAL at 12:05

## 2018-05-24 RX ADMIN — CEFAZOLIN 2 G: 330 INJECTION, POWDER, FOR SOLUTION INTRAMUSCULAR; INTRAVENOUS at 01:05

## 2018-05-24 RX ADMIN — GABAPENTIN 600 MG: 300 CAPSULE ORAL at 08:05

## 2018-05-24 RX ADMIN — STANDARDIZED SENNA CONCENTRATE AND DOCUSATE SODIUM 1 TABLET: 8.6; 5 TABLET, FILM COATED ORAL at 08:05

## 2018-05-24 RX ADMIN — CELECOXIB 200 MG: 200 CAPSULE ORAL at 08:05

## 2018-05-24 RX ADMIN — ACETAMINOPHEN 1000 MG: 500 TABLET, FILM COATED ORAL at 12:05

## 2018-05-24 RX ADMIN — OXYCODONE HYDROCHLORIDE 10 MG: 5 TABLET ORAL at 11:05

## 2018-05-24 NOTE — PROGRESS NOTES
[facility]  Orthopedics  Progress Note    Patient Name: Soha Wheatley  MRN: [id number]  Admission Date: 5/23/2018  Hospital Length of Stay: 1 days  Attending Provider: [facility] L. [facility] Jr., MD  Primary Care Provider: Andrew Mallory MD  Follow-up For: Procedure(s) (LRB):  REPLACEMENT-KNEE-TOTAL (Right)    Post-Operative Day: 1 Day Post-Op  Subjective:     Principal Problem:Primary osteoarthritis of right knee    Principal Orthopedic Problem: right TKA    Interval History: no acute events.  Pain currently controlled.  Tolerated diet, though felt sick because she at too quickly.  No chest pain, dyspnea, nausea/vomiting, dysuria, headaches, syncope.      Review of patient's allergies indicates:   Allergen Reactions    Adhesive      Paper tape usually ok- pulls skin off    Flagyl [metronidazole hcl]      confusion       Current Facility-Administered Medications   Medication    0.9%  NaCl infusion    acetaminophen tablet 1,000 mg    aspirin EC tablet 81 mg    bisacodyl suppository 10 mg    celecoxib capsule 200 mg    diphenhydrAMINE capsule 25 mg    gabapentin capsule 600 mg    hydrOXYzine HCl tablet 25 mg    morphine injection 2 mg    morphine injection 2 mg    naloxone 0.4 mg/mL injection 0.02 mg    ondansetron disintegrating tablet 8 mg    oxyCODONE immediate release tablet 10 mg    oxyCODONE immediate release tablet 5 mg    pantoprazole EC tablet 40 mg    polyethylene glycol packet 17 g    promethazine (PHENERGAN) 6.25 mg in dextrose 5 % 50 mL IVPB    ramelteon tablet 8 mg    ropivacaine (PF) 2 mg/ml (0.2%) infusion    ropivacaine 0.2% ON-Q C-BLOC 400 ML (SELECT A FLOW)    senna-docusate 8.6-50 mg per tablet 1 tablet    sertraline tablet 50 mg    sodium chloride 0.9% flush 3 mL    tiZANidine tablet 4 mg     Objective:     Vital Signs (Most Recent):  Temp: 97.3 °F (36.3 °C) (05/24/18 0450)  Pulse: (!) 45 (05/24/18 0700)  Resp: 18 (05/24/18 0450)  BP: (!) 141/67 (05/24/18  "0450)  SpO2: 96 % (05/24/18 0450) Vital Signs (24h Range):  Temp:  [96 °F (35.6 °C)-97.7 °F (36.5 °C)] 97.3 °F (36.3 °C)  Pulse:  [44-90] 45  Resp:  [11-23] 18  SpO2:  [93 %-100 %] 96 %  BP: ()/(39-67) 141/67     Weight: 115.2 kg (254 lb)  Height: 5' 10" (177.8 cm)  Body mass index is 36.45 kg/m².      Intake/Output Summary (Last 24 hours) at 05/24/18 0754  Last data filed at 05/23/18 1933   Gross per 24 hour   Intake             3876 ml   Output              400 ml   Net             3476 ml       Ortho/SPM Exam    Gen:  No acute distress  Head:  Normocephalic.  Atraumatic.  Neuro:  Intact  CV:  Peripherally well-perfused.  Pulses 2+ bilaterally.  Lungs:  Normal respiratory effort.  Abdomen:  Soft, non-tender, non-distended  Extremities:  No cyanosis, clubbing, or edema.  RLE:  Dressing c/d/i.  5/5 ankle dorislfexion and EHL.  Warm well perfused foot      Significant Labs:   BMP:   Recent Labs  Lab 05/23/18  1106   *      K 4.6      CO2 26   BUN 19   CREATININE 0.7   CALCIUM 9.0     All pertinent labs within the past 24 hours have been reviewed.    Significant Imaging: xray right knee shows TKA in good position    Assessment/Plan:     * Primary osteoarthritis of right knee    POD-1 s/p right TKA.  Doing well.    Aspirin 81mg bid for VTE prophylaxis.  Foot compression devices in place  Ancef post op  WBAT  PT today    Diflucan one dose - patient gets yeast infections after antibiotics    Home today                   Waylon Farris MD  Orthopedics  [facility]  "

## 2018-05-24 NOTE — PROGRESS NOTES
Pt and family present, consent to converting adductor PNC to On Q.  Site CDI.  Educated regarding continued pain management, fall risk, signs of complications, continued monitoring, as well as discontinuing catheter on 5/26.  Two numbers obtained for APS to follow up.  Understanding verbalized.

## 2018-05-24 NOTE — NURSING
Discharged to home. Condition stable.IV removed. No redness or swelling noted to site. Verbalizes understanding of discharge instructions

## 2018-05-24 NOTE — ASSESSMENT & PLAN NOTE
POD-1 s/p right TKA.  Doing well.    Aspirin 81mg bid for VTE prophylaxis.  Foot compression devices in place  Ancef post op  WBAT  PT today    Home today

## 2018-05-24 NOTE — PLAN OF CARE
11:23 AM  SW received home health orders. Pt's preference is [facility]. Faxed orders to [facility]. Will f/u as needed.    Funmi Bowles LMSW   [facility]  Ext [phone]

## 2018-05-24 NOTE — PHYSICIAN QUERY
PT Name: Soha Wheatley  MR #: [id number]  Physician Query Form - CKD Clarification     CDS/: Raquel Bermudez RN              Contact information:[email or web address]  This form is a permanent document in the medical record.     Query Date: May 24, 2018    By submitting this query, we are merely seeking further clarification of documentation. Please utilize your independent clinical judgment when addressing the question(s) below.    The Medical record contains the following:     Indicators   Supporting Clinical Findings   Location in Medical Record   x CKD or Chronic Kidney (Renal) Failure / Disease CKD  H&P and Anesthesia record   x BUN/Creatinine                          GFR BUN 19  Cr 0.7   GFR >60 Labs 5/23    Dehydration      Nausea / Vomiting      Dialysis / CRRT      Medication      Treatment     x Other Chronic Conditions History of kidney stones, Sleep apnea, obesity H&P    Other       Provider, please further specify the stage of CKD.      [  x Chronic Kidney Disease (CKD) (please specify stage* below)       [employer] Definitions  Stage Description  eGFR (mL/min)        [ x ]     II Mildly reduced kidney function 60-89     [  ] Other (please specify): ________________________  [ x ] Clinically Undetermined    Please document in your progress notes daily for the duration of treatment until resolved and include in your discharge summary.

## 2018-05-24 NOTE — PLAN OF CARE
POD 1 s/p R TKA. PT/OT recommended HH and DME. Plans to discharge patient home today with Mina . Patient and family verbalized understanding. All questions and concerns addressed. SW and CM will continue to follow for any additional needs. Discharge and follow-up instructions to be completed by the bedside nurse.    Future Appointments  Date Time Provider Department Center   6/5/2018 9:30 AM Alena Cuevas PA-C [facility] ORTHO [facility]   6/11/2018 9:00 AM Maxi Rosenthal, PT [facility] OP [facility]   6/18/2018 10:30 AM DAVID Jimenes [facility] BARIAT [facility]   6/21/2018 11:00 AM Mary Carmen Tello MD [facility] PSYCH [facility]        05/24/18 1201   Final Note   Assessment Type Final Discharge Note   Discharge Disposition Home-Health  ([facility] )   What phone number can be called within the next 1-3 days to see how you are doing after discharge? ([phone])   Hospital Follow Up  Appt(s) scheduled? Yes   Discharge plans and expectations educations in teach back method with documentation complete? Yes

## 2018-05-24 NOTE — PT/OT/SLP PROGRESS
Occupational Therapy   Treatment/Discharge Summary    Name: Soha Wheatley  MRN: [id number]  Admitting Diagnosis:  Primary osteoarthritis of right knee  1 Day Post-Op    Recommendations:     Discharge Recommendations: home with home health  Discharge Equipment Recommendations:  walker, rolling  Barriers to discharge:  None    Subjective     Communicated with: RN prior to session.  Pain/Comfort:  · Pain Rating 1: 4/10  · Location - Side 1: Right  · Location - Orientation 1: anterior  · Location 1: knee  · Pain Addressed 1: Pre-medicate for activity, Cessation of Activity, Reposition  · Pain Rating Post-Intervention 1: 5/10    Patients cultural, spiritual, Anabaptist conflicts given the current situation: none stated     Objective:     Patient found with: pulse ox (continuous), telemetry, FCD (CO2)    General Precautions: Standard, fall   Orthopedic Precautions:RLE weight bearing as tolerated   Braces: N/A     Occupational Performance:    Bed Mobility:    · Patient completed Scooting/Bridging with supervision  · Patient completed Supine to Sit with stand by assistance     Functional Mobility/Transfers:  · Patient completed Sit <> Stand Transfer with contact guard assistance  with  rolling walker   · Patient completed Bed <> Chair Transfer using Stand Pivot technique with stand by assistance with rolling walker  · Patient completed Toilet Transfer Stand Pivot technique with stand by assistance with  rolling walker  · Functional Mobility: Pt ambulated household distance w/ CGA - SBA and RW; no signs of LOB or SOB noted.     Activities of Daily Living:  · Grooming: supervision washing hands while standing at sink   · Bathing: supervision bathing buttocks region   · UB Dressing: modified independence donning bra and pullover top  · LB Dressing: supervision and verbal cues for compensatory strategy  to don underwear and pants  · Toileting: modified independence for xavier-hygiene and clothing management     Patient left up  in chair with all lines intact, call button in reach and   present    [facility] 6 Click: Self-care  [facility] Total Score: 24    Treatment & Education:  - OT POC & d/c from acute OT -- pt and spouse verbalized understanding. All further questions were addressed.   - Importance of OOB activity to maximize recovery and increase activity tolerance  - Safety w/ functional mobility; safe hand placement and RW management   - Compensatory strategy to facilitate LB dressing   - Polar ice management      Education:    Assessment:     Soha Wheatley is a 62 y.o. female with a medical diagnosis of Primary osteoarthritis of right knee.  She presents with the following performance deficits affecting function:  weakness, impaired endurance, impaired self care skills, impaired functional mobilty, gait instability, decreased lower extremity function, pain, orthopedic precautions, edema, impaired balance.      Pt tolerated session well. Pt continues to c/o R knee pain w/ mobility but demonstrated improved functional ability to complete all self-care and functional mobility tasks safely w/ CGA- SBA and RW. Pt has successfully achieved all functional outcomes and is safe to d/c from acute OT. Pt will benefit from continued therapy w/ HH services upon d/c from hospital.     Rehab Prognosis:  Good; patient would benefit from acute skilled OT services to address these deficits and reach maximum level of function.       Plan:      D/c from acute OT - please reconsult if anything changes   · Plan of Care Reviewed with: patient, spouse    This Plan of care has been discussed with the patient who was involved in its development and understands and is in agreement with the identified goals and treatment plan    GOALS:    Occupational Therapy Goals     Not on file          Multidisciplinary Problems (Resolved)        Problem: Occupational Therapy Goal    Goal Priority Disciplines Outcome Interventions   Occupational Therapy Goal   (Resolved)      OT, PT/OT Outcome(s) achieved    Description:  Goals to be met by: 7 days    Patient will increase functional independence with ADLs by performing:    UE Dressing with Supervision. -- Met (5/24)  LE Dressing with Supervision with AD as needed.  -- Met (5/24)  Grooming while standing with Supervision.  -- Met (5/24)  Toileting from bedside commode with Supervision for hygiene and clothing management.  -- Met (5/24)  Stand pivot transfers with Supervision.  -- Met (5/24)  Toilet transfer to bedside commode with Supervision.  -- Met (5/24)                          Time Tracking:     OT Date of Treatment: 05/24/18  OT Start Time: 0909  OT Stop Time: 0947  OT Total Time (min): 38 min    Billable Minutes:Self Care/Home Management 25  Therapeutic Activity 13    Silva Zapata, OT  5/24/2018

## 2018-05-24 NOTE — PLAN OF CARE
Problem: Knee Arthroplasty (Total, Partial) (Adult)  Intervention: Promote Active Range of Motion/Prevent Adhesions  Continue passive and active ROM

## 2018-05-24 NOTE — PT/OT/SLP PROGRESS
Physical Therapy Treatment     Patient Name:  Soha Wheatley   MRN:  [id number]    Recommendations:     Discharge Recommendations:  home with home health   Discharge Equipment Recommendations: walker, rolling   Barriers to discharge: None    Assessment:     Soha Wheatley is a 62 y.o. female admitted with a medical diagnosis of Primary osteoarthritis of right knee.  She presents with the below impairments/functional limitations.  Pt tolerated treatment well.  Pt is progressing towards goals, but will benefit from one more session to ensure safe discharge to home and to maximize functional potential.  Pt will continue to benefit from skilled PT services to address the below deficits and to increase functional independence.      Rehab Identified impairments/functional limitations:   impaired endurance, impaired functional mobilty, weakness, gait instability, impaired balance, decreased lower extremity function, decreased ROM, edema, orthopedic precautions, impaired skin    Rehab Prognosis:  good; patient would benefit from acute skilled PT services to address these deficits and reach maximum level of function.      Recent Surgery: Procedure(s) (LRB):  REPLACEMENT-KNEE-TOTAL (Right) 1 Day Post-Op    Plan:     During this hospitalization, patient to be seen BID to address the above listed problems via gait training, therapeutic activities, therapeutic exercises, neuromuscular re-education  · Plan of Care Expires:  06/23/18   Plan of Care Reviewed with: patient, spouse    Subjective     Communicated with RN prior to session.  Patient found seated in chair upon PT entry to room, agreeable to treatment.      Chief Complaint: pain and nausea     Pain/Comfort:  · Pain Rating 1: 4/10  · Location - Side 1: Right  · Location - Orientation 1: anterior  · Location 1: knee  · Pain Addressed 1: Pre-medicate for activity, Reposition, Distraction, Cessation of Activity  · Pain Rating Post-Intervention 1: 8/10    Patients  "cultural, spiritual, Protestant conflicts given the current situation: none noted    Objective:     Patient found with: pulse ox (continuous), telemetry, FCD, cryotherapy     General Precautions: Standard, fall   Orthopedic Precautions:RLE weight bearing as tolerated   Braces: N/A     Functional Mobility:    Bed Mobility:    · Supine to Sit: SBA  · Sit to Supine: SBA    Transfers:  · Sit to Stand: Mod A with RW from low bedside chair; CGA from elevated mat    Gait:  Pt ambulated ~45 feet with RW and CGA.  No LOB or SOB noted.  Good gait quality noted.      Stairs:  Pt ascended/descended 6" curb step with Rolling Walker with no handrails with Contact Guard Assistance.     AM-PAC 6 CLICK MOBILITY  Turning over in bed (including adjusting bedclothes, sheets and blankets)?: 3  Sitting down on and standing up from a chair with arms (e.g., wheelchair, bedside commode, etc.): 3  Moving from lying on back to sitting on the side of the bed?: 3  Moving to and from a bed to a chair (including a wheelchair)?: 3  Need to walk in hospital room?: 3  Climbing 3-5 steps with a railing?: 3  Total Score: 18     Therapeutic Activities and Exercises:    Supine therex per handout (Knee protocol), 15-30x each; AROM/AAROM  · Ankle pumps  · Quad sets  · Glute squeezes  · SAQ  · Heel slides  · Supine hip abduction      Pt educated on:  · DME Management   · Common signs and symptoms associated with TKA  · Importance of protecting surgical incision during functional tasks to reduce the risk of bleeding/infection  · HEP      Patient left up in chair with all lines intact, call button in reach and RN notified..    GOALS:    Physical Therapy Goals        Problem: Physical Therapy Goal    Goal Priority Disciplines Outcome Goal Variances Interventions   Physical Therapy Goal     PT/OT, PT Ongoing (interventions implemented as appropriate)     Description:  Goals to be met by: 5/30/18     Patient will increase functional independence with mobility by " performin. Supine to sit with Set-up [place]  2. Sit to supine with Set-up [place]  3. Sit to stand transfer with Supervision  4. Bed to chair transfer with Stand-by Assistance using Rolling Walker  5. Gait  x 100 feet with Stand-by Assistance using Rolling Walker.   6. Ascend/Descend 7 inch curb step with Contact Guard Assistance using Rolling Walker.  7. Lower extremity exercise program x20-30 reps per handout, with independence                        Time Tracking:     PT Received On: 18  PT Start Time: 1012     PT Stop Time: 1051  PT Total Time (min): 39 min     Billable Minutes: Gait Training 10, Therapeutic Activity 10 and Therapeutic Exercise 19    Raghu Calderón, PT, DPT  2018   [phone]

## 2018-05-24 NOTE — ADDENDUM NOTE
Addendum  created 05/24/18 1051 by Jodi Avery MD    Charge Capture section accepted, Cosign clinical note with attestation

## 2018-05-24 NOTE — HPI
62 year old female with severe bilateral knee osteoarthritis.  She had a right valgus knee so she elected to undergo right TKA.

## 2018-05-24 NOTE — PT/OT/SLP PROGRESS
"Physical Therapy Treatment and Discharge    Patient Name:  Soha Wheatley   MRN:  [id number]    Recommendations:     Discharge Recommendations:  home with home health   Discharge Equipment Recommendations: walker, rolling   Barriers to discharge: None    Assessment:     Soha Wheatley is a 62 y.o. female admitted with a medical diagnosis of Primary osteoarthritis of right knee.  Pt tolerated treatment well.  Pt is able to perform all functional mobility without incident and is safe to discharge home from a mobility standpoint.  Pt will continue to benefit from skilled PT services to address the below deficits and to increase functional independence.      Rehab Prognosis:  good; patient would benefit from acute skilled PT services to address these deficits and reach maximum level of function.      Recent Surgery: Procedure(s) (LRB):  REPLACEMENT-KNEE-TOTAL (Right) 1 Day Post-Op    Plan:     · D/C acute PT   Plan of Care Reviewed with: patient, spouse    Subjective     Communicated with RN prior to session.  Patient found seated in chair upon PT entry to room, agreeable to treatment.      Chief Complaint: none    Pain/Comfort:  · Pain Rating 1: 2/10  · Location - Side 1: Right  · Location - Orientation 1: generalized  · Location 1: knee  · Pain Addressed 1: Reposition, Distraction, Cessation of Activity  · Pain Rating Post-Intervention 1: 2/10    Patients cultural, spiritual, Episcopalian conflicts given the current situation: none noted    Objective:     Patient found with: cryotherapy, FCD     General Precautions: Standard, fall   Orthopedic Precautions:RLE weight bearing as tolerated   Braces: N/A     Functional Mobility:    Bed Mobility:    · Supine to Sit: (S)  · Sit to Supine: (S)    Transfers:  · Sit to Stand: SBA with RW    Gait:  Pt ambulated 120 feet with RW and SBA.  No LOB or SOB noted.  Good gait quality noted.      Stairs:  Pt ascended/descended 6" curb step with Rolling Walker with no handrails with " Stand-by Assistance.     AM-PAC 6 CLICK MOBILITY  Turning over in bed (including adjusting bedclothes, sheets and blankets)?: 4  Sitting down on and standing up from a chair with arms (e.g., wheelchair, bedside commode, etc.): 3  Moving from lying on back to sitting on the side of the bed?: 4  Moving to and from a bed to a chair (including a wheelchair)?: 3  Need to walk in hospital room?: 3  Climbing 3-5 steps with a railing?: 3  Total Score: 20     Therapeutic Activities and Exercises:    Supine therex per handout (Knee protocol), 20x each  · SLR  · LAQ      Pt educated on:  · Car transfers  · DME Management   · Common signs and symptoms associated with TKA  · Importance of protecting surgical incision during functional tasks to reduce the risk of bleeding/infection  · HEP      Patient left up in chair with all lines intact, call button in reach and RN notified..    GOALS:    Physical Therapy Goals     Not on file          Multidisciplinary Problems (Resolved)        Problem: Physical Therapy Goal    Goal Priority Disciplines Outcome Goal Variances Interventions   Physical Therapy Goal   (Resolved)     PT/OT, PT Outcome(s) achieved     Description:  Goals to be met by: 18     Patient will increase functional independence with mobility by performin. Supine to sit with Set-up [place]  2. Sit to supine with Set-up [place]  3. Sit to stand transfer with Supervision  4. Bed to chair transfer with Stand-by Assistance using Rolling Walker  5. Gait  x 100 feet with Stand-by Assistance using Rolling Walker.   6. Ascend/Descend 7 inch curb step with Contact Guard Assistance using Rolling Walker.  7. Lower extremity exercise program x20-30 reps per handout, with independence                        Time Tracking:     PT Received On: 18  PT Start Time: 1257     PT Stop Time: 1320  PT Total Time (min): 23 min     Billable Minutes: Gait Training 10 and Therapeutic Activity 13    Raghu Calderón, PT,  DPT  5/24/2018   [phone]

## 2018-05-24 NOTE — ANESTHESIA POST-OP PAIN MANAGEMENT
Acute Pain Service and Perioperative Surgical Home Progress Note    HPI  Soha Wheatley is a 62 y.o., female,     Interval history      Surgery:  Procedure(s) (LRB):  REPLACEMENT-KNEE-TOTAL (Right)    Post Op Day #: 1    Catheter type: Perineural Adductor Canal    Infusion type: Ropivacaine 0.2%  8 ml/hr basal    Problem List:    Active Hospital Problems    Diagnosis  POA    *Primary osteoarthritis of right knee [M17.11]  Yes      Resolved Hospital Problems    Diagnosis Date Resolved POA   No resolved problems to display.       Subjective:       General appearance of alert, oriented, no complaints   Pain with rest: 4    Numbers   Pain with movement: 5    Numbers   Side Effects    1. Pruritis No    2. Nausea No    3. Motor Blockade No, 0=Ability to raise lower extremities off bed    4. Sedation No, 1=awake and alert    Schedule Medications:    acetaminophen  1,000 mg Oral Q6H    aspirin  81 mg Oral BID    celecoxib  200 mg Oral Daily    gabapentin  600 mg Oral TID    pantoprazole  40 mg Oral Daily    polyethylene glycol  17 g Oral Daily    senna-docusate 8.6-50 mg  1 tablet Oral BID    sertraline  50 mg Oral Daily        Continuous Infusions:   sodium chloride 0.9% 150 mL/hr at 05/23/18 1707    ropivacaine (PF) 2 mg/ml (0.2%) 8 mL/hr (05/24/18 0702)    ropivacaine          PRN Medications:  bisacodyl, diphenhydrAMINE, hydrOXYzine HCl, morphine, morphine, naloxone, ondansetron, oxyCODONE, oxyCODONE, promethazine (PHENERGAN) IVPB, ramelteon, ropivacaine, sodium chloride 0.9%, tiZANidine       Antibiotics:  Antibiotics     None             Objective:     Catheter site clean, dry, intact          Vital Signs (Most Recent):  Temp: 36.3 °C (97.3 °F) (05/24/18 0450)  Pulse: (!) 45 (05/24/18 0700)  Resp: 18 (05/24/18 0450)  BP: (!) 141/67 (05/24/18 0450)  SpO2: 96 % (05/24/18 0450) Vital Signs Range (Last 24H):  Temp:  [35.6 °C (96 °F)-36.5 °C (97.7 °F)]   Pulse:  [44-90]   Resp:  [11-23]   BP:  ()/(39-67)   SpO2:  [93 %-100 %]          I & O (Last 24H):  Intake/Output Summary (Last 24 hours) at 05/24/18 0751  Last data filed at 05/23/18 1933   Gross per 24 hour   Intake             3876 ml   Output              400 ml   Net             3476 ml       Physical Exam:    GA: Alert, comfortable, no acute distress.   Pulmonary: Clear to auscultation A/P/L. No wheezing, crackles, or rhonchi.  Cardiac: RRR S1 & S2 w/o rubs/murmurs/gallops.   Abdominal:Bowel sounds present. No tenderness to palpation or distension. No appreciable hepatosplenomegaly.   Skin: No jaundice, rashes, or visible lesions.         Laboratory:  CBC: No results for input(s): WBC, RBC, HGB, HCT, PLT, MCV, MCH, MCHC in the last 72 hours.    BMP: Recent Labs      05/23/18   1106   NA  141   K  4.6   CO2  26   CL  108   BUN  19   CREATININE  0.7   GLU  117*   CALCIUM  9.0       No results for input(s): PT, INR, PROTIME, APTT in the last 72 hours.      Anticoagulant dose ASA 81    Assessment:         Pain control adequate    Plan:     1) Pain:    Adductor canal perineural catheter in place and infusing. Good level. Multimodal pain regimen with acetaminophen, Celebrex, Lyrica, and prn oxycodone given  Will continue to monitor. Plan to D/C perineural catheter in AM or home with On-Q if patient discharged today.   2) Post op labs stable   3) Home meds resumed    4) FEN/GI: Tolerating liquids, advance diet as tolerated. + flatus. BM.   5) Dispo: Pt working well with PT/OT. Case management and SW for placement. Plan for D/C possibly today if patient works well with PT and meets goals.           Evaluator Monae Zamora MD

## 2018-05-24 NOTE — DISCHARGE SUMMARY
"[facility]  Orthopedics  Discharge Summary      Patient Name: Soha Wheatley  MRN: [id number]  Admission Date: 5/23/2018  Hospital Length of Stay: 1 days  Discharge Date and Time:  05/24/2018 11:57 AM  Attending Physician: [facility]., MD   Discharging Provider: Waylon Farris MD  Primary Care Provider: Andrew Mallory MD    HPI:   62 year old female with severe bilateral knee osteoarthritis.  She had a right valgus knee so she elected to undergo right TKA.      Procedure(s) (LRB):  REPLACEMENT-KNEE-TOTAL (Right)      Hospital Course:  5/23/18 right TKA without complication.  Post op PT  Progressed well.    Discharged home in good position      Consults         Status Ordering Provider     Inpatient consult to Pain Management  Once     Provider:  (Not yet assigned)    Acknowledged ALENA CUEVAS     Inpatient consult to Respiratory Care  Once     Provider:  (Not yet assigned)    Acknowledged ALENA CUEVAS     Inpatient consult to Social Work  Once     Provider:  (Not yet assigned)    Acknowledged ALENA CUEVAS          Significant Diagnostic Studies:     Pending Diagnostic Studies:     None        Final Active Diagnoses:    Diagnosis Date Noted POA    PRINCIPAL PROBLEM:  Primary osteoarthritis of right knee [M17.11] 05/23/2018 Yes      Problems Resolved During this Admission:    Diagnosis Date Noted Date Resolved POA      Discharged Condition: good    Disposition: Home or Self Care    Follow Up:  Follow-up Information     Alena Cuevas PA-C On 6/5/2018.    Specialty:  Orthopedic Surgery  Contact information:  [address] [place] [id number]  [phone]                 Patient Instructions:     COMMODE FOR HOME USE   Order Specific Question Answer Comments   Type: Standard    Height: 5' 10" (1.778 m)    Weight: 115.2 kg (254 lb)    Does patient have medical equipment at home? shower chair    Length of need (1-99 months): 99    Vendor: [facility]    Expected " "Date of Delivery: 5/24/2018      TRANSFER TUB BENCH FOR HOME USE   Order Specific Question Answer Comments   Type of Transfer Tub Bench: Unpadded    Height: 5' 10" (1.778 m)    Weight: 115.2 kg (254 lb)    Does patient have medical equipment at home? shower chair    Length of need (1-99 months): 99    Vendor: Other (use comments)    Expected Date of Delivery: 5/24/2018      WALKER FOR HOME USE   Order Specific Question Answer Comments   Type of Walker: Bryson (4'4"-5'7")    With wheels? Yes    Height: 5' 10" (1.778 m)    Weight: 115.2 kg (254 lb)    Length of need (1-99 months): 12    Does patient have medical equipment at home? shower chair    Please check all that apply: Patient is unable to safely ambulate without equipment.    Vendor: Other (use comments)    Expected Date of Delivery: 5/24/2018      WALKER FOR HOME USE   Order Specific Question Answer Comments   Type of Walker: Adult (5'4"-6'6")    With wheels? Yes    Height: 5' 10" (1.778 m)    Weight: 115.2 kg (254 lb)    Length of need (1-99 months): 99    Does patient have medical equipment at home? shower chair    Please check all that apply: Patient is unable to safely ambulate without equipment.    Please check all that apply: Walker will be used for gait training.    Vendor: [facility]    Expected Date of Delivery: 5/24/2018      COMMODE FOR HOME USE   Order Specific Question Answer Comments   Type: Standard    Height: 5' 10" (1.778 m)    Weight: 115.2 kg (254 lb)    Does patient have medical equipment at home? shower chair    Length of need (1-99 months): 99    Vendor: Other (use comments)    Expected Date of Delivery: 5/24/2018      Ambulatory Referral to Physical/Occupational Therapy   Referral Priority: Routine Referral Type: Physical Medicine   Referral Reason: Specialty Services Required    Requested Specialty: Physical Therapy    Number of Visits Requested: 1      Call MD for:  temperature >100.4     Call MD for:  persistent nausea and vomiting " or diarrhea     Call MD for:  severe uncontrolled pain     Call MD for:  redness, tenderness, or signs of infection (pain, swelling, redness, odor or green/yellow discharge around incision site)     Call MD for:  difficulty breathing or increased cough     Leave dressing on - Keep it clean, dry, and intact until clinic visit   Order Comments: No showers or baths.  Keep dressing clean and dry.  Home health with change dressing only if completely saturated.     Activity as tolerated       Medications:  Reconciled Home Medications:      Medication List      START taking these medications    aspirin 81 MG EC tablet  Commonly known as:  ECOTRIN  Take 1 tablet (81 mg total) by mouth 2 (two) times daily. Take aspirin 81 mg twice daily to prevent blood clots after joint replacement.  After 30 days resume your previous aspirin regimen (if any)     ondansetron 8 MG Tbdl  Commonly known as:  ZOFRAN-ODT  Take 1 tablet (8 mg total) by mouth every 8 (eight) hours as needed.     oxyCODONE-acetaminophen 5-325 mg per tablet  Commonly known as:  PERCOCET  Take 1 tablet by mouth every 4 (four) hours as needed (Post operative pain).        CHANGE how you take these medications    tiZANidine 4 MG tablet  Commonly known as:  ZANAFLEX  Take 1 tablet (4 mg total) by mouth every 8 (eight) hours as needed.  What changed:  additional instructions        CONTINUE taking these medications    b complex vitamins tablet  Take 1 tablet by mouth once daily.     cinnamon bark-chromium picolin 500-100 mg-mcg Cap  Take by mouth.     cyanocobalamin 500 MCG tablet  Take 500 mcg by mouth once daily.     gabapentin 600 MG tablet  Commonly known as:  NEURONTIN  Take 1 tablet (600 mg total) by mouth 3 (three) times daily.     omeprazole 40 MG capsule  Commonly known as:  PRILOSEC  Take 1 capsule (40 mg total) by mouth once daily.     ONE DAILY MULTIVITAMIN per tablet  Generic drug:  multivitamin  Take 1 tablet by mouth once daily.     OSTEO BI-FLEX  ORAL  Take by mouth once daily.     sertraline 50 MG tablet  Commonly known as:  ZOLOFT  Take 1 tablet (50 mg total) by mouth once daily.        STOP taking these medications    meloxicam 15 MG tablet  Commonly known as:  MOBIC     TYLENOL ARTHRITIS 650 MG Tbsr  Generic drug:  acetaminophen            Waylon Farris MD  Orthopedics  [facility]

## 2018-05-24 NOTE — ADDENDUM NOTE
Addendum  created 05/24/18 9118 by Jenelle Burgess RN    Sign clinical note, Visit Navigator SmartForm Flowsheet section accepted

## 2018-05-24 NOTE — PLAN OF CARE
Problem: Occupational Therapy Goal  Goal: Occupational Therapy Goal  Goals to be met by: 7 days    Patient will increase functional independence with ADLs by performing:    UE Dressing with Supervision. -- Met (5/24)  LE Dressing with Supervision with AD as needed.  -- Met (5/24)  Grooming while standing with Supervision.  -- Met (5/24)  Toileting from bedside commode with Supervision for hygiene and clothing management.  -- Met (5/24)  Stand pivot transfers with Supervision.  -- Met (5/24)  Toilet transfer to bedside commode with Supervision.  -- Met (5/24)        Outcome: Outcome(s) achieved Date Met: 05/24/18  Pt has successfully achieved all functional outcomes at this time and is safe to d/c from acute OT. Pt will benefit from continued therapy w/ HH services upon d/c from hospital.     Comments: D/c from acute OT

## 2018-05-24 NOTE — PLAN OF CARE
POD 1 s/p R TKA. PT/OT ordered to eval and treat. PT/OT recommended HH and DME. Patient currently lives with her spouse. Patient's spouse is present at the bedside. Patient has good family support. CM completed discharge assessment and planning with patient. Patient and family verbalized understanding. All questions and concerns addressed. SW and CM will continue to follow for any additional needs. Plan A to discharge home with home health as soon as medically stable. Plan B to discharge home with family support and plans for outpatient rehab.    Patient requested a rolling walker and bedside commode. DME ordered from [facility] with [facility]. DME for bedside delivery. Patient requested [facility].    PCP: Andrew Mallory MD    Pharmacy:   [facility] [address] [place] [address] [place] - [address] [facility]  [address]  [facility] [address]  Phone: [phone] Fax: [phone]    [facility] - [place], [place] - [address] [place] [address]  [address] [facility]  [address]  [facility] [address]  Phone: [phone] Fax: [phone]    [facility] - [place], [place] - [address] [facility]  [address] [facility] [address]  Phone: [phone] Fax: [phone]    Payor: BLUE CROSS BLUE SHIELD / Plan: BCBS ALL OUT OF STATE / Product Type: PPO /      05/24/18 0900   Discharge Assessment   Assessment Type Discharge Planning Assessment   Confirmed/corrected address and phone number on facesheet? Yes   Assessment information obtained from? Patient;Caregiver;Medical Record   Expected Length of Stay (days) 2   Communicated expected length of stay with patient/caregiver yes   Prior to hospitilization cognitive status: Alert/Oriented   Prior to hospitalization functional status: Independent   Current cognitive status: Alert/Oriented   Current Functional Status: Assistive Equipment   Lives With spouse   Able to Return to Prior Arrangements yes   Is patient able to care for self after discharge? Yes   Who are  your caregiver(s) and their phone number(s)? DioniLenay (Spouse) [phone], [phone]    Patient's perception of discharge disposition home health   Readmission Within The Last 30 Days no previous admission in last 30 days   Patient currently being followed by outpatient case management? No   Patient currently receives any other outside agency services? No   Equipment Currently Used at Home shower chair   Do you have any problems affording any of your prescribed medications? No   Is the patient taking medications as prescribed? yes   Does the patient have transportation home? Yes   Transportation Available family or friend will provide   Does the patient receive services at the [facility]? No   Discharge Plan A Home Health;Home with family   Discharge Plan B Home with family;Other  (outpatient rehab)   Patient/Family In Agreement With Plan yes

## 2018-05-24 NOTE — SUBJECTIVE & OBJECTIVE
"Principal Problem:Primary osteoarthritis of right knee    Principal Orthopedic Problem: right TKA    Interval History: no acute events.  Pain currently controlled.  Tolerated diet, though felt sick because she at too quickly.  No chest pain, dyspnea, nausea/vomiting, dysuria, headaches, syncope.      Review of patient's allergies indicates:   Allergen Reactions    Adhesive      Paper tape usually ok- pulls skin off    Flagyl [metronidazole hcl]      confusion       Current Facility-Administered Medications   Medication    0.9%  NaCl infusion    acetaminophen tablet 1,000 mg    aspirin EC tablet 81 mg    bisacodyl suppository 10 mg    celecoxib capsule 200 mg    diphenhydrAMINE capsule 25 mg    gabapentin capsule 600 mg    hydrOXYzine HCl tablet 25 mg    morphine injection 2 mg    morphine injection 2 mg    naloxone 0.4 mg/mL injection 0.02 mg    ondansetron disintegrating tablet 8 mg    oxyCODONE immediate release tablet 10 mg    oxyCODONE immediate release tablet 5 mg    pantoprazole EC tablet 40 mg    polyethylene glycol packet 17 g    promethazine (PHENERGAN) 6.25 mg in dextrose 5 % 50 mL IVPB    ramelteon tablet 8 mg    ropivacaine (PF) 2 mg/ml (0.2%) infusion    ropivacaine 0.2% ON-Q C-BLOC 400 ML (SELECT A FLOW)    senna-docusate 8.6-50 mg per tablet 1 tablet    sertraline tablet 50 mg    sodium chloride 0.9% flush 3 mL    tiZANidine tablet 4 mg     Objective:     Vital Signs (Most Recent):  Temp: 97.3 °F (36.3 °C) (05/24/18 0450)  Pulse: (!) 45 (05/24/18 0700)  Resp: 18 (05/24/18 0450)  BP: (!) 141/67 (05/24/18 0450)  SpO2: 96 % (05/24/18 0450) Vital Signs (24h Range):  Temp:  [96 °F (35.6 °C)-97.7 °F (36.5 °C)] 97.3 °F (36.3 °C)  Pulse:  [44-90] 45  Resp:  [11-23] 18  SpO2:  [93 %-100 %] 96 %  BP: ()/(39-67) 141/67     Weight: 115.2 kg (254 lb)  Height: 5' 10" (177.8 cm)  Body mass index is 36.45 kg/m².      Intake/Output Summary (Last 24 hours) at 05/24/18 0754  Last data " filed at 05/23/18 1933   Gross per 24 hour   Intake             3876 ml   Output              400 ml   Net             3476 ml       Ortho/SPM Exam    Significant Labs:   BMP:   Recent Labs  Lab 05/23/18  1106   *      K 4.6      CO2 26   BUN 19   CREATININE 0.7   CALCIUM 9.0     All pertinent labs within the past 24 hours have been reviewed.    Significant Imaging: xray right knee shows TKA in good position

## 2018-05-24 NOTE — PLAN OF CARE
Problem: Physical Therapy Goal  Goal: Physical Therapy Goal  Goals to be met by: 18     Patient will increase functional independence with mobility by performin. Supine to sit with Set-up [place]  2. Sit to supine with Set-up [place]  3. Sit to stand transfer with Supervision  4. Bed to chair transfer with Stand-by Assistance using Rolling Walker  5. Gait  x 100 feet with Stand-by Assistance using Rolling Walker.   6. Ascend/Descend 7 inch curb step with Contact Guard Assistance using Rolling Walker.  7. Lower extremity exercise program x20-30 reps per handout, with independence       Outcome: Ongoing (interventions implemented as appropriate)  Continue POC.

## 2018-05-25 NOTE — PROGRESS NOTES
Called patient to check in on OnQ infusion. Patient denies any issues with infusion; pain is manageable and catheter site remains clean, dry and intact. Discussed with patient that we would call again tomorrow to ensure successful catheter removal. Instructed patient to call anesthesia team with any questions or concerns.    Monae Zamora MD  12:28 PM  05/25/2018

## 2018-05-26 NOTE — ADDENDUM NOTE
Addendum  created 05/26/18 1416 by Kennedy Jacobson MD    Anesthesia Event edited, Sign clinical note

## 2018-05-26 NOTE — PROGRESS NOTES
Called to follow up on PNC. Patient reports successfully removed approximately 30 minutes ago with the tip intact. The patient reports the infusion was very effective.    Kennedy Jacobson MD

## 2018-05-30 ENCOUNTER — TELEPHONE (OUTPATIENT)
Dept: ORTHOPEDICS | Facility: CLINIC | Age: 63
End: 2018-05-30

## 2018-05-30 DIAGNOSIS — Z96.651 STATUS POST TOTAL RIGHT KNEE REPLACEMENT: Primary | ICD-10-CM

## 2018-05-30 RX ORDER — OXYCODONE AND ACETAMINOPHEN 5; 325 MG/1; MG/1
1 TABLET ORAL EVERY 6 HOURS PRN
Qty: 50 TABLET | Refills: 0 | Status: SHIPPED | OUTPATIENT
Start: 2018-05-30 | End: 2018-06-05 | Stop reason: SDUPTHER

## 2018-05-30 NOTE — TELEPHONE ENCOUNTER
----- Message from Lien Simmons sent at 5/30/2018  2:31 PM CDT -----  Contact: pt [phone]  Home health nurse for this pt called on pt's behalf.  Pt would like to know if she can restart her vitamins again.  Pt requesting a call back from Dr [facility]'s nurse.    Please call her at [phone]    Thanks  felipe    No answer  Phone went right to voice mail  Per her Discharge summary  I read the medications she may take & resume

## 2018-05-30 NOTE — TELEPHONE ENCOUNTER
----- Message from Bassam Corcoran MA sent at 5/30/2018  9:23 AM CDT -----  Contact: self      ----- Message -----  From: Rosanna Connors  Sent: 5/30/2018   9:13 AM  To: [facility] Staff    Patient states need a refill on her oxyCODONE-acetaminophen (PERCOCET) 5-325 mg Patient state only have 3 days left. Please send refill to [facility] [address] [place] [address] [phone] (Phone) [phone] (Fax)  Patient can be reached at

## 2018-06-01 ENCOUNTER — TELEPHONE (OUTPATIENT)
Dept: ORTHOPEDICS | Facility: CLINIC | Age: 63
End: 2018-06-01

## 2018-06-01 ENCOUNTER — LAB VISIT (OUTPATIENT)
Dept: LAB | Facility: HOSPITAL | Age: 63
End: 2018-06-01
Attending: PHYSICIAN ASSISTANT
Payer: COMMERCIAL

## 2018-06-01 ENCOUNTER — TELEPHONE (OUTPATIENT)
Dept: INTERNAL MEDICINE | Facility: CLINIC | Age: 63
End: 2018-06-01

## 2018-06-01 DIAGNOSIS — Z91.89 AT RISK OF UTI: ICD-10-CM

## 2018-06-01 DIAGNOSIS — R31.9 URINARY TRACT INFECTION WITH HEMATURIA, SITE UNSPECIFIED: Primary | ICD-10-CM

## 2018-06-01 DIAGNOSIS — N39.0 URINARY TRACT INFECTION WITH HEMATURIA, SITE UNSPECIFIED: Primary | ICD-10-CM

## 2018-06-01 DIAGNOSIS — Z91.89 AT RISK OF UTI: Primary | ICD-10-CM

## 2018-06-01 LAB
BACTERIA #/AREA URNS AUTO: ABNORMAL /HPF
BILIRUB UR QL STRIP: NEGATIVE
CLARITY UR REFRACT.AUTO: ABNORMAL
COLOR UR AUTO: YELLOW
GLUCOSE UR QL STRIP: NEGATIVE
HGB UR QL STRIP: ABNORMAL
KETONES UR QL STRIP: NEGATIVE
LEUKOCYTE ESTERASE UR QL STRIP: ABNORMAL
MICROSCOPIC COMMENT: ABNORMAL
NITRITE UR QL STRIP: POSITIVE
PH UR STRIP: 6 [PH] (ref 5–8)
PROT UR QL STRIP: NEGATIVE
RBC #/AREA URNS AUTO: 24 /HPF (ref 0–4)
SP GR UR STRIP: 1.01 (ref 1–1.03)
SQUAMOUS #/AREA URNS AUTO: 0 /HPF
URN SPEC COLLECT METH UR: ABNORMAL
UROBILINOGEN UR STRIP-ACNC: 4 EU/DL
WBC #/AREA URNS AUTO: 24 /HPF (ref 0–5)
WBC CLUMPS UR QL AUTO: ABNORMAL

## 2018-06-01 PROCEDURE — 81001 URINALYSIS AUTO W/SCOPE: CPT

## 2018-06-01 RX ORDER — CIPROFLOXACIN 250 MG/1
250 TABLET, FILM COATED ORAL EVERY 12 HOURS
Qty: 6 TABLET | Refills: 0 | Status: SHIPPED | OUTPATIENT
Start: 2018-06-01 | End: 2018-06-04

## 2018-06-01 NOTE — TELEPHONE ENCOUNTER
Spoke to pt, she states she is waiting on urinalysis results. Informed pt it is still in process, no results at this time. Informed her I will forward the message to Alena WRIGHT, she will call her once the results are in. Pt pleased and asked that she call her cell phone. [phone]. Understanding verbalized.

## 2018-06-01 NOTE — TELEPHONE ENCOUNTER
----- Message from Conchitacaren Salgadory sent at 6/1/2018 12:03 PM CDT -----  Contact: Megha/ [phone]  Megha was calling from home health to ask a few questions about the patient. And the patient is having some bleeding when she goes to the restroom.              I spoke with the patient    She noticed blood and burning  Upon  wiping with urination starting  10:30 pm last night   Up often during the night to urinate  It stopped this am   Ms Mason WRIGHT approved an order for a UA , Nurse had given her a sterile container  Her child will deliver it to the main clinic

## 2018-06-01 NOTE — TELEPHONE ENCOUNTER
----- Message from Sherman Gonzalez sent at 6/1/2018  4:31 PM CDT -----  Contact: Pt  PT is calling for test results.    Pt can be reached at [phone].    Thanks

## 2018-06-01 NOTE — TELEPHONE ENCOUNTER
----- Message from Marianna Connors sent at 6/1/2018 12:05 PM CDT -----  Contact: [facility]   Type: Home Health (orders, updates, clarifications, etc.)    Home Health Agency/Nurse:  [facility]      Phone number: [phone]    Reason for call: patient is still having burning with urinating     Comments: please advise , Thanks

## 2018-06-02 NOTE — PROGRESS NOTES
Pt notified of UA results. Cipro 250 mg bid x 3 days to pharmacy. She will call Monday if not seeing improvement

## 2018-06-04 ENCOUNTER — TELEPHONE (OUTPATIENT)
Dept: INTERNAL MEDICINE | Facility: CLINIC | Age: 63
End: 2018-06-04

## 2018-06-04 NOTE — TELEPHONE ENCOUNTER
----- Message from Sherman Gonzalez sent at 6/1/2018  4:34 PM CDT -----  Contact: Pt  Pt missed a call and would like the nurse to return their call.    Pt can be reached at [phone].    Thanks

## 2018-06-05 ENCOUNTER — OFFICE VISIT (OUTPATIENT)
Dept: ORTHOPEDICS | Facility: CLINIC | Age: 63
End: 2018-06-05
Payer: COMMERCIAL

## 2018-06-05 VITALS
SYSTOLIC BLOOD PRESSURE: 120 MMHG | HEIGHT: 70 IN | HEART RATE: 58 BPM | WEIGHT: 251.63 LBS | BODY MASS INDEX: 36.02 KG/M2 | DIASTOLIC BLOOD PRESSURE: 66 MMHG

## 2018-06-05 DIAGNOSIS — Z96.651 STATUS POST TOTAL RIGHT KNEE REPLACEMENT: Primary | ICD-10-CM

## 2018-06-05 DIAGNOSIS — Z96.651 STATUS POST TOTAL RIGHT KNEE REPLACEMENT: ICD-10-CM

## 2018-06-05 PROCEDURE — 99024 POSTOP FOLLOW-UP VISIT: CPT | Mod: S$GLB,,, | Performed by: PHYSICIAN ASSISTANT

## 2018-06-05 PROCEDURE — 99999 PR PBB SHADOW E&M-EST. PATIENT-LVL III: CPT | Mod: PBBFAC,,, | Performed by: PHYSICIAN ASSISTANT

## 2018-06-05 RX ORDER — OXYCODONE AND ACETAMINOPHEN 5; 325 MG/1; MG/1
1 TABLET ORAL EVERY 6 HOURS PRN
Qty: 40 TABLET | Refills: 0 | Status: SHIPPED | OUTPATIENT
Start: 2018-06-05 | End: 2018-06-18 | Stop reason: SDUPTHER

## 2018-06-05 NOTE — PROGRESS NOTES
"Soha Wheatley presents for initial post-operative visit following a right total knee arthroplasty performed by Dr. [facility] on 5/23/2018. Tolerating pain medication well.  She has been on cipro for UTI since Friday. Symptoms have resolved.    Exam:   Blood pressure 120/66, pulse (!) 58, height 5' 10" (1.778 m), weight 114.1 kg (251 lb 10.5 oz).   Ambulating well with assistive device.  Incision is clean and dry without drainage or erythema. Passive ROM 0-90, actively pt is having difficulty with SLR and can flex to 85 degrees     Initial post-operative radiographs reviewed today revealing a well fixed and aligned prosthesis.    A/P:  2 weeks s/p right total knee replacement  Dr. [facility] interviewed and examined patient today and agrees with plan.   - The patient was advised to keep the incision clean and dry for the next 24 hours after which she may wash the area with antibacterial soap in the shower. Will not submerge until the incision is completely healed.   - Outpatient PT: [facility]. She may have a one week extension for  PT and then go to [facility] on the 18th.   - Continue ASA for 1 month from surgery.  - Pain medication refilled: Percocet 5  - Follow up in 4 weeks with Dr. [facility]. Pt will call clinic with problems/concerns.     "

## 2018-06-11 ENCOUNTER — TELEPHONE (OUTPATIENT)
Dept: SPINE | Facility: CLINIC | Age: 63
End: 2018-06-11

## 2018-06-11 DIAGNOSIS — M47.812 CERVICAL SPONDYLOSIS WITHOUT MYELOPATHY: ICD-10-CM

## 2018-06-11 DIAGNOSIS — M54.2 CERVICALGIA: ICD-10-CM

## 2018-06-11 DIAGNOSIS — M50.30 DEGENERATION OF CERVICAL INTERVERTEBRAL DISC: ICD-10-CM

## 2018-06-11 DIAGNOSIS — M54.12 BRACHIAL NEURITIS OR RADICULITIS: Primary | ICD-10-CM

## 2018-06-11 DIAGNOSIS — M48.02 CERVICAL STENOSIS OF SPINAL CANAL: ICD-10-CM

## 2018-06-11 DIAGNOSIS — M48.02 SPINAL STENOSIS IN CERVICAL REGION: ICD-10-CM

## 2018-06-11 RX ORDER — GABAPENTIN 600 MG/1
600 TABLET ORAL 3 TIMES DAILY
Qty: 270 TABLET | Refills: 0 | Status: SHIPPED | OUTPATIENT
Start: 2018-06-11 | End: 2018-08-10 | Stop reason: SDUPTHER

## 2018-06-11 RX ORDER — GABAPENTIN 600 MG/1
600 TABLET ORAL 3 TIMES DAILY
Qty: 90 TABLET | Refills: 0 | Status: SHIPPED | OUTPATIENT
Start: 2018-06-11 | End: 2018-06-11 | Stop reason: SDUPTHER

## 2018-06-12 ENCOUNTER — PATIENT MESSAGE (OUTPATIENT)
Dept: SPINE | Facility: CLINIC | Age: 63
End: 2018-06-12

## 2018-06-15 ENCOUNTER — TELEPHONE (OUTPATIENT)
Dept: ADMINISTRATIVE | Facility: CLINIC | Age: 63
End: 2018-06-15

## 2018-06-15 ENCOUNTER — TELEPHONE (OUTPATIENT)
Dept: BARIATRICS | Facility: CLINIC | Age: 63
End: 2018-06-15

## 2018-06-18 ENCOUNTER — TELEPHONE (OUTPATIENT)
Dept: ORTHOPEDICS | Facility: CLINIC | Age: 63
End: 2018-06-18

## 2018-06-18 ENCOUNTER — LAB VISIT (OUTPATIENT)
Dept: LAB | Facility: HOSPITAL | Age: 63
End: 2018-06-18
Payer: COMMERCIAL

## 2018-06-18 ENCOUNTER — OFFICE VISIT (OUTPATIENT)
Dept: BARIATRICS | Facility: CLINIC | Age: 63
End: 2018-06-18
Payer: COMMERCIAL

## 2018-06-18 VITALS
WEIGHT: 247.81 LBS | HEART RATE: 84 BPM | SYSTOLIC BLOOD PRESSURE: 112 MMHG | BODY MASS INDEX: 35.56 KG/M2 | DIASTOLIC BLOOD PRESSURE: 62 MMHG

## 2018-06-18 DIAGNOSIS — E66.09 CLASS 2 OBESITY DUE TO EXCESS CALORIES WITH BODY MASS INDEX (BMI) OF 35.0 TO 35.9 IN ADULT, UNSPECIFIED WHETHER SERIOUS COMORBIDITY PRESENT: ICD-10-CM

## 2018-06-18 DIAGNOSIS — R63.4 WEIGHT LOSS: ICD-10-CM

## 2018-06-18 DIAGNOSIS — Z98.84 S/P LAPAROSCOPIC SLEEVE GASTRECTOMY: ICD-10-CM

## 2018-06-18 DIAGNOSIS — Z96.651 STATUS POST TOTAL RIGHT KNEE REPLACEMENT: Primary | ICD-10-CM

## 2018-06-18 DIAGNOSIS — Z96.651 STATUS POST TOTAL RIGHT KNEE REPLACEMENT: ICD-10-CM

## 2018-06-18 DIAGNOSIS — Z98.84 S/P LAPAROSCOPIC SLEEVE GASTRECTOMY: Primary | ICD-10-CM

## 2018-06-18 LAB
ALBUMIN SERPL BCP-MCNC: 3.6 G/DL
ALP SERPL-CCNC: 114 U/L
ALT SERPL W/O P-5'-P-CCNC: 24 U/L
ANION GAP SERPL CALC-SCNC: 8 MMOL/L
AST SERPL-CCNC: 19 U/L
BASOPHILS # BLD AUTO: 0.03 K/UL
BASOPHILS NFR BLD: 0.4 %
BILIRUB SERPL-MCNC: 0.5 MG/DL
BUN SERPL-MCNC: 26 MG/DL
CALCIUM SERPL-MCNC: 10 MG/DL
CHLORIDE SERPL-SCNC: 101 MMOL/L
CO2 SERPL-SCNC: 31 MMOL/L
CREAT SERPL-MCNC: 0.7 MG/DL
DIFFERENTIAL METHOD: ABNORMAL
EOSINOPHIL # BLD AUTO: 0.2 K/UL
EOSINOPHIL NFR BLD: 2.1 %
ERYTHROCYTE [DISTWIDTH] IN BLOOD BY AUTOMATED COUNT: 12.5 %
EST. GFR  (AFRICAN AMERICAN): >60 ML/MIN/1.73 M^2
EST. GFR  (NON AFRICAN AMERICAN): >60 ML/MIN/1.73 M^2
GLUCOSE SERPL-MCNC: 98 MG/DL
HCT VFR BLD AUTO: 39.1 %
HGB BLD-MCNC: 12.3 G/DL
IMM GRANULOCYTES # BLD AUTO: 0.02 K/UL
IMM GRANULOCYTES NFR BLD AUTO: 0.2 %
LYMPHOCYTES # BLD AUTO: 2.4 K/UL
LYMPHOCYTES NFR BLD: 28.3 %
MCH RBC QN AUTO: 30.3 PG
MCHC RBC AUTO-ENTMCNC: 31.5 G/DL
MCV RBC AUTO: 96 FL
MONOCYTES # BLD AUTO: 0.6 K/UL
MONOCYTES NFR BLD: 7.3 %
NEUTROPHILS # BLD AUTO: 5.3 K/UL
NEUTROPHILS NFR BLD: 61.7 %
NRBC BLD-RTO: 0 /100 WBC
PLATELET # BLD AUTO: 249 K/UL
PMV BLD AUTO: 10.4 FL
POTASSIUM SERPL-SCNC: 4.2 MMOL/L
PROT SERPL-MCNC: 7.2 G/DL
RBC # BLD AUTO: 4.06 M/UL
SODIUM SERPL-SCNC: 140 MMOL/L
VIT B12 SERPL-MCNC: 1052 PG/ML
WBC # BLD AUTO: 8.53 K/UL

## 2018-06-18 PROCEDURE — 3008F BODY MASS INDEX DOCD: CPT | Mod: CPTII,S$GLB,, | Performed by: NURSE PRACTITIONER

## 2018-06-18 PROCEDURE — 80053 COMPREHEN METABOLIC PANEL: CPT

## 2018-06-18 PROCEDURE — 99213 OFFICE O/P EST LOW 20 MIN: CPT | Mod: S$GLB,,, | Performed by: NURSE PRACTITIONER

## 2018-06-18 PROCEDURE — 36415 COLL VENOUS BLD VENIPUNCTURE: CPT

## 2018-06-18 PROCEDURE — 85025 COMPLETE CBC W/AUTO DIFF WBC: CPT

## 2018-06-18 PROCEDURE — 99999 PR PBB SHADOW E&M-EST. PATIENT-LVL III: CPT | Mod: PBBFAC,,, | Performed by: NURSE PRACTITIONER

## 2018-06-18 PROCEDURE — 82607 VITAMIN B-12: CPT

## 2018-06-18 RX ORDER — DOCUSATE SODIUM 100 MG/1
100 CAPSULE, LIQUID FILLED ORAL 2 TIMES DAILY
COMMUNITY
End: 2018-08-23

## 2018-06-18 RX ORDER — OXYCODONE AND ACETAMINOPHEN 5; 325 MG/1; MG/1
1 TABLET ORAL
Qty: 40 TABLET | Refills: 0 | Status: SHIPPED | OUTPATIENT
Start: 2018-06-18 | End: 2018-06-29 | Stop reason: SDUPTHER

## 2018-06-18 NOTE — TELEPHONE ENCOUNTER
----- Message from Bassam Corcoran MA sent at 6/18/2018  1:32 PM CDT -----  Contact: self      ----- Message -----  From: Teresa Reyes  Sent: 6/18/2018  12:31 PM  To: [facility] Staff    Pt requesting a refill on oxyCODONE-acetaminophen (PERCOCET) 5-325 mg per tablet. Pt is using [place] [id number] [address].    Pt can be reached at [phone].    Thank you

## 2018-06-18 NOTE — TELEPHONE ENCOUNTER
----- Message from Alena Cuevas PA-C sent at 6/18/2018  3:58 PM CDT -----  Pls let her know that I called in her refill for pain medication.

## 2018-06-18 NOTE — PROGRESS NOTES
BARIATRIC POST OP FOLLOW UP:    Chief Complaint   Patient presents with    Follow-up     6 month      HISTORY OF PRESENT ILLNESS: Soha Wheatley is a 62 y.o. female with a Body mass index is 35.56 kg/m². who presents for a 6 month follow up s/p lap sleeve with Dr Holloway on 12/29/2017. she is doing well and overall tolerating the diet without difficulty. She has lost 68 lbs, approximately 43% of their excess weight.     S/p Right TKR 4 weeks ago.   Plans for Left TKR 11/2018.    She has been instructed to take MVI once daily 2/2 kidney stones.   Ca citrate also on hold per Urology.    Denies: nausea, vomiting, abdominal pain, fever, chills, dysphagia, chest pain, and shortness of breath. Denies symptoms of hypoglycemia.     Did not taper PPI. Not ready to taper off.   Still has HB symptoms when she eats too quickly, acidic foods which she eats often.   Also notes possibility she will resume Mobic 2/2 left knee pain.   Stopped daily ASA last week 2/2 knee surgery.    Review of Systems   Constitutional: Negative for chills, fever and weight loss.   Eyes: Negative for blurred vision and double vision.   Respiratory: Negative for cough, shortness of breath and wheezing.    Cardiovascular: Negative for chest pain and palpitations.   Gastrointestinal: Positive for heartburn. Negative for abdominal pain, blood in stool, constipation, melena, nausea and vomiting.   Genitourinary: Negative for dysuria and frequency.   Musculoskeletal: Positive for joint pain (knees).        RLE knee swelling 2/2 surgery   Skin: Negative for itching and rash.   Neurological: Negative for dizziness, tingling, focal weakness and headaches.   Psychiatric/Behavioral: Negative for memory loss.     EXERCISE & VITAMINS:  See Bariatric Assessment    MEDICATIONS/ALLERGIES:  Have been reviewed.    DIET:  Reg Bariatric Diet.    1 protein shake Vijay with skim milk (28g pro)  1 protein bar Pure Protein (19g)  3-4 small meals (egg, P3 meals, tuna,  "chicken, veggies, greek yogurt)  + grams protein  48+ oz water, SF liquids    Vitals:    01/30/18 1307   BP: 123/80   Pulse: 82   Weight: 126.7 kg (279 lb 5.2 oz)   Height: 5' 10" (1.778 m)     Physical Exam   Constitutional: She is oriented to person, place, and time. She appears well-developed and well-nourished. No distress.   HENT:   Head: Normocephalic and atraumatic.   Eyes: Conjunctivae are normal. No scleral icterus.   Cardiovascular: Normal rate and intact distal pulses.    Pulmonary/Chest: Effort normal and breath sounds normal. No respiratory distress.   Abdominal: Soft. Bowel sounds are normal. She exhibits no distension and no mass. There is no tenderness. There is no rebound and no guarding.   WHSS.     Musculoskeletal: She exhibits no edema.   Neurological: She is alert and oriented to person, place, and time.   Skin: Skin is warm and dry. Capillary refill takes less than 2 seconds. No rash noted. She is not diaphoretic. No erythema. No pallor.   Psychiatric: She has a normal mood and affect. Her behavior is normal. Judgment and thought content normal.   Nursing note and vitals reviewed.    ASSESSMENT:  - Morbid obesity, Body mass index is 35.56 kg/m².,  s/p sleeve gastrectomy on 12/29/2017.  - Estimated goal weight, 235 lbs, which is 50% EWL  - Co-morbidities: GERD, sleep apnea (improving) no longer using mask and will follow up with sleep med  - Great Weight loss, 68 lbs, 43% EWL  - No formal Exercise regimen  - Vitamin Regimen sub optimal to bariatric requirements as noted.  - Diet: good    PLAN:  - Emphasized the importance of regular exercise and adherence to bariatric diet to achieve maximum weight loss.  - Encouraged patient to continue regular activity   - Continue daily vitamins and medications.   - Encouraged adequate hydration.   - Anti-Acid medication, Omeprazole daily.    Discussed that I recommend continued avoidance of NSAIDS. Recommend Tylenol for pain. OK to take Voltaren gel " with continued PPI use.     Discussed avoidance of triggers.   Slow down meals, make each bite smaller than the tip of your finger, chew that bite 20-30 times, then swallow.  Wait at least 1 minute or more before the next bite.    - Miralax daily for constipation, no fiber.  - Can swallow whole pills on March 29, 2018.  - RTC in 6 months or sooner if needed.  - Call the office for any issues   - Check labs

## 2018-06-19 ENCOUNTER — CLINICAL SUPPORT (OUTPATIENT)
Dept: REHABILITATION | Facility: HOSPITAL | Age: 63
End: 2018-06-19
Payer: COMMERCIAL

## 2018-06-19 DIAGNOSIS — M25.561 CHRONIC PAIN OF RIGHT KNEE: ICD-10-CM

## 2018-06-19 DIAGNOSIS — Z96.651 STATUS POST TOTAL RIGHT KNEE REPLACEMENT: ICD-10-CM

## 2018-06-19 DIAGNOSIS — G89.29 CHRONIC PAIN OF RIGHT KNEE: ICD-10-CM

## 2018-06-19 DIAGNOSIS — M17.11 PRIMARY OSTEOARTHRITIS OF RIGHT KNEE: Primary | ICD-10-CM

## 2018-06-19 PROCEDURE — 97161 PT EVAL LOW COMPLEX 20 MIN: CPT

## 2018-06-19 PROCEDURE — 97110 THERAPEUTIC EXERCISES: CPT

## 2018-06-19 NOTE — PLAN OF CARE
[facility]  Physical Therapy Initial Evaluation    Name: Soha Wheatley  Clinic Number: [id number]    Therapy Diagnosis:   Encounter Diagnoses   Name Primary?    Primary osteoarthritis of right knee Yes    Status post total right knee replacement     Chronic pain of right knee        Physician: Waylon Farris MD    Physician Orders: PT Eval and Treat   Medical Diagnosis: R knee OA  Evaluation Date: 2018  Authorization period Expiration: 18  Plan of Care Certification Period: 18    Visit #/Visits authorized:   Time In: 9:10 am  Time Out: 9:55 am  Total Billable Time: 45 minutes    Precautions: Standard    Subjective     Date of onset:  Chronic knee pain. TKR 18  History of current condition - Soha reports: she had chronic knee pain with OA and underwent TKR on R on 18. She had home health PT for 3 weeks and was doing well and then she began having pain yesterday after walking a lot. She denies any inc in swelling.        Past Medical History:   Diagnosis Date    Allergy     Anxiety     Arthritis     BMI 45.0-49.9, adult     Chronic kidney disease     Depression     GERD (gastroesophageal reflux disease)     History of kidney stones     Kidney stones     Migraines     Morbid obesity     Obesity     Sleep apnea in adult     WEARS CPAP, DOESNT KNOW SETTINGS.     Soha Wheatley  has a past surgical history that includes  section (); Knee arthrodesis (); Cholecystectomy; Ureteroscopy; Gastrectomy; Knee cartilage surgery (Left, ); Tonsillectomy (); and Total knee arthroplasty (Right, 2018).    Soha has a current medication list which includes the following prescription(s): aspirin, b complex vitamins, cinnamon bark-chromium picolin, cyanocobalamin, docusate sodium, gabapentin, glucosamine/chondr mccurdy a sod, multivitamin, omeprazole, oxycodone-acetaminophen, sertraline, and tizanidine.    Review of patient's allergies  "indicates:   Allergen Reactions    Adhesive      Paper tape usually ok- pulls skin off    Flagyl [metronidazole hcl]      confusion        Prior Therapy: home health 3 weeks  Social History: lives with their family and lives with their spouse, one step into home no railing  Occupation: , returns Aug 6  Prior Level of Function: walking I without AD  Current Level of Function: walking with RW, difficulty with stairs    Pain:  Location: R knee  Current 5/10  Worst 7/10  Best 3/10   Description: Aching  Aggravating Factors: Prolonged walking, transfers, prolonged positioning  Easing Factors: pain medication and ice    Pts goals: walk without pain, drive    Objective       Gait: mod antalgic gt with RW    Functional Mobility:  Squat: dysfunctional  SL stance R: unable L: 3"  Sit to stand: multiple attempts required, use of UE required    Sensation: Light touch: numbness in ant knee into lower leg    Range of Motion: AROM/PROM  Knee Right Left   Flexion   95/110 122   Extension   -3 0         Lower Extremity Strength: MMT 5/5   Right LE Left LE   Knee extension:   4- 5   Knee flexion:   4+ 5   Hip flexion:   4- 5   Hip abduction:    4 5   Hip adduction:   4- 5   Hip extension:   3- 4   Ankle dorsiflexion:   5 5       Edema: mod swelling in R knee    Joint Mobility: WNL patellar mobility    LE Flexibility:   Hamstrings: R: mod restriction, L: mod restriction  Piriformis: R: mod restriction, L: mod restriction  Quad: R: severe restriction, L: mod restriction      CMS Impairment/Limitation/Restriction for FOTO Knee Survey    Therapist reviewed FOTO scores for Soha Wheatley on 6/19/2018.   FOTO documents entered into [facility] - see Media section.    Limitation Score: 62%  Cateory: Mobility       PT Evaluation Completed? Yes  Discussed Plan of Care with patient: Yes    TREATMENT     Treatment Time In: 9:25 am  Treatment Time Out: 9:55 am  Total Treatment time separate from Evaluation time: 30 min    Soha" "received therapeutic exercises to develop strength, endurance, ROM and flexibility for 20 minutes including:    Table:  QS 20 x 5"   Heel Slides 20 x 5"  SAQ 20 x 5"  SLR flexion 2 x 10      Soha received cold pack to R knee for 10 minutes to to decrease circulation, pain, and swelling.      Home Exercises and Patient Education Provided    Education provided re:   -Diagnosis and Treatment plan  -Home program  -Healing Time  -Therapy goals  No spiritual or educational barriers to learning provided.    Written Home Exercises Provided: QS, Heel slides, SAQ, SLR.  Exercises were reviewed and Soha was able to demonstrate them prior to the end of the session.   Pt received a written copy of exercises to perform at home. Soha demonstrated good  understanding of the education provided.       Assessment     Soha is a 62 y.o. female referred to outpatient Physical Therapy with a medical diagnosis of R TKR and demonstrates limitations as described in the problem list.    Pt prognosis is Excellent.   Pt will benefit from skilled outpatient Physical Therapy to address the deficits stated above and in the chart below, provide pt/family education, and to maximize pt's level of independence.     Plan of care discussed with patient: Yes  Pt's spiritual, cultural and educational needs considered and pt agreeable to plan of care and goals as stated below:     Anticipated Barriers for therapy: co morbidities    Medical Necessity is demonstrated by the following  History  Co-morbidities and personal factors that may impact the plan of care Examination  Body Structures and Functions, activity limitations and participation restrictions that may impact the plan of care    Clinical Presentation   Co-morbidities:   See above list        Personal Factors:   obesity Body Regions:   lower extremities    Body Systems:    ROM  strength  balance  gait  transfers            Participation Restrictions:   none     Activity limitations:   Learning " and applying knowledge  no deficits    General Tasks and Commands  no deficits    Communication  no deficits    Mobility  walking  driving (bike, car, motorcycle)    Self care  no deficits    Domestic Life  shopping  cooking  doing house work (cleaning house, washing dishes, laundry)    Interactions/Relationships  no deficits    Life Areas  no deficits    Community and Social Life  community life  recreation and leisure         stable and uncomplicated                      low   moderate  moderate Decision Making/ Complexity Score:  low     Medical necessity is demonstrated by the following IMPAIRMENTS/PROBLEM LIST:  1. Impaired balance-Fall risk   2. Limited participation in daily and recreational activities   3. Limited participation in vocational pursuits   4. Requires skilled supervision to complete and progress HEP   5. Decreased strength  6. Decreased ROM  7. Decreased flexibility  8. Decreased Cardiovascular endurance   9. Gait abnormality  10. Impaired muscle tone    GOALS:   Short Term Goals:  3 weeks  1. Patient will report decreased knee pain to 4/10 in order to improve sit to stand transfer to Indep.  2. Patient will increase knee ROM to 0-115 degrees in order to show improved functional mobility.  3. Patient will be independent and consistent with issued HEP in order to show carryover between therapy sessions.    Long Term Goals: 6 weeks  1. Patient will report decreased knee pain to 0/10 in order to enable return to I ADLs.  2. Patient will improve knee ROM to 0-120 degrees to enable community ambulation with use of SC.  3. Patient will improve LE strength to 5/5 or greater to enable stairs with reciprocal pattern.  4. Patient will report 80% improvement in function to demonstrate an increase in LE function and mobility in home and community environment.   5. Patient will be independent with updated HEP to maintain gains following discharge with therapy.      Plan     Certification Period: 6/19/2018 to  7/31/18    Outpatient Physical Therapy 2 times weekly for 6 weeks to include the following interventions: Electrical Stimulation (IFC, NMES), Gait Training, Manual Therapy, Moist Heat/ Ice, Neuromuscular Re-ed, Patient Education, Therapeutic Activites, Therapeutic Exercise, Ultrasound/Phonophoresis and Other: Dry needling as indicated.     Lien Bush, PT

## 2018-06-20 ENCOUNTER — CLINICAL SUPPORT (OUTPATIENT)
Dept: REHABILITATION | Facility: HOSPITAL | Age: 63
End: 2018-06-20
Payer: COMMERCIAL

## 2018-06-20 DIAGNOSIS — G89.29 CHRONIC PAIN OF RIGHT KNEE: Primary | ICD-10-CM

## 2018-06-20 DIAGNOSIS — Z96.651 STATUS POST TOTAL RIGHT KNEE REPLACEMENT: ICD-10-CM

## 2018-06-20 DIAGNOSIS — M25.561 CHRONIC PAIN OF RIGHT KNEE: Primary | ICD-10-CM

## 2018-06-20 DIAGNOSIS — M17.11 PRIMARY OSTEOARTHRITIS OF RIGHT KNEE: ICD-10-CM

## 2018-06-20 PROCEDURE — 97140 MANUAL THERAPY 1/> REGIONS: CPT

## 2018-06-20 PROCEDURE — 97110 THERAPEUTIC EXERCISES: CPT

## 2018-06-20 NOTE — PROGRESS NOTES
"                                                  Physical Therapy Daily Note     Date: 06/20/2018  Name: Soha Wheatley  Clinic Number: [id number]  Diagnosis:   Encounter Diagnoses   Name Primary?    Status post total right knee replacement     Primary osteoarthritis of right knee     Chronic pain of right knee Yes     Physician: Waylon Farrsi MD    Physician: Waylon Farris MD     Physician Orders: PT Eval and Treat   Medical Diagnosis: R knee OA  Evaluation Date: 6/19/2018  Authorization period Expiration: 12/31/18  Plan of Care Certification Period: 7/31/18     Visit #/Visits authorized: 2/20  Time In: 700  Time Out: 800  Total Billable Time: 60 minutes     Precautions: Standard      Subjective     Pt reports 4/10 R knee pain pre-tx. Reports she just took a pain pill at 6am so it's "not that bad." Pt inquired about when she will be able to walk again without the walker. Reports she is really nervous about this process. She was given a cane at her last appointment but she is too scared to try it.    Objective       Patient received individual therapy to increase strength, endurance, ROM and flexibility with activities as follows:     Soha received therapeutic exercises to develop strength, endurance, ROM and flexibility for 52 minutes including:     Nu Step 5'  Quad Set 20x5"  SLR 2x10  SAQ 2x10  Heel slides x20 with PROM hold at end of exercise  Bridges 2x10  Clamshells 2x10  Seated TKE with manual overpressure and DF stretch 10x5"  Sit to stand x5 26"  Sit to stand x5 25"  Gait training with SPC x1 lap, 2 x 1/2 lap      Soha received the following manual therapy techniques for 8 minutes.     STM to R quadriceps just superior to patella  Scar massage over incision  Patella mobilizations 4 directions grade 2-3    Pt received ice to R knee after being cleared for contraindications for 10 minutes post-tx.    Written Home Exercises Provided: no new exercises provided this session  Pt demo good understanding of " the education provided. Soha demonstrated good return demonstration of activities.     Education provided: Pt educated in scar massage   Soha verbalized good understanding of education provided.   No spiritual or educational barriers to learning identified.    Assessment     Pt tolerated treatment poorly with reports of increased pain throughout all exercises and tears with ROM exercises. Pt anxious of movement and focused on pain; focus tx on improvements in mobility and function. End feel PROM knee flexion and extension are empty with pain as the limiting factor. Pt will continue to benefit from skilled PT in order to decrease pain, improve strength/ROM, improve gait mechanics. and improve functional mobility.    Medical necessity is demonstrated by the following IMPAIRMENTS/PROBLEM LIST:  1. Impaired balance-Fall risk   2. Limited participation in daily and recreational activities   3. Limited participation in vocational pursuits   4. Requires skilled supervision to complete and progress HEP   5. Decreased strength  6. Decreased ROM  7. Decreased flexibility  8. Decreased Cardiovascular endurance   9. Gait abnormality  10. Impaired muscle tone     GOALS:   Short Term Goals:  3 weeks  1. Patient will report decreased knee pain to 4/10 in order to improve sit to stand transfer to [facility].  2. Patient will increase knee ROM to 0-115 degrees in order to show improved functional mobility.  3. Patient will be independent and consistent with issued HEP in order to show carryover between therapy sessions.     Long Term Goals: 6 weeks  1. Patient will report decreased knee pain to 0/10 in order to enable return to I ADLs.  2. Patient will improve knee ROM to 0-120 degrees to enable community ambulation with use of SC.  3. Patient will improve LE strength to 5/5 or greater to enable stairs with reciprocal pattern.  4. Patient will report 80% improvement in function to demonstrate an increase in LE function and mobility in  home and community environment.   5. Patient will be independent with updated HEP to maintain gains following discharge with therapy.       Plan   Continue with established Plan of Care towards PT goals.      Therapist: Trisha Boland, PT, DPT

## 2018-06-21 ENCOUNTER — OFFICE VISIT (OUTPATIENT)
Dept: PSYCHIATRY | Facility: CLINIC | Age: 63
End: 2018-06-21
Payer: COMMERCIAL

## 2018-06-21 VITALS
HEIGHT: 70 IN | SYSTOLIC BLOOD PRESSURE: 126 MMHG | WEIGHT: 248.69 LBS | HEART RATE: 69 BPM | DIASTOLIC BLOOD PRESSURE: 59 MMHG | BODY MASS INDEX: 35.6 KG/M2

## 2018-06-21 DIAGNOSIS — F41.9 ANXIETY: ICD-10-CM

## 2018-06-21 DIAGNOSIS — F43.21 ADJUSTMENT DISORDER WITH DEPRESSED MOOD: ICD-10-CM

## 2018-06-21 PROCEDURE — 99213 OFFICE O/P EST LOW 20 MIN: CPT | Mod: S$GLB,,, | Performed by: PSYCHIATRY & NEUROLOGY

## 2018-06-21 PROCEDURE — 3008F BODY MASS INDEX DOCD: CPT | Mod: CPTII,S$GLB,, | Performed by: PSYCHIATRY & NEUROLOGY

## 2018-06-21 PROCEDURE — 99999 PR PBB SHADOW E&M-EST. PATIENT-LVL III: CPT | Mod: PBBFAC,,, | Performed by: PSYCHIATRY & NEUROLOGY

## 2018-06-21 RX ORDER — SERTRALINE HYDROCHLORIDE 50 MG/1
75 TABLET, FILM COATED ORAL DAILY
Qty: 45 TABLET | Refills: 5 | Status: SHIPPED | OUTPATIENT
Start: 2018-06-21 | End: 2018-09-18 | Stop reason: SDUPTHER

## 2018-06-21 NOTE — PROGRESS NOTES
06/21/2018  11:06 AM  Soha Wheatley  [id number]          Psychiatry Clinic Note    SUBJECTIVE  Soha Wheatley is a 62 y.o. old female who presents to clinic today for her initial follow up for Depression/Anxiety.  She reports that since the last visit she has been doing well emotionally and feels like her anxiety and depression are controlled on Zoloft 50mg daily since July, 2017.  Pt recently had a right knee replacement a month ago after having a gastric sleeve in December.  She notes her pain from the surgery is pretty substantial and does not know if she will be able to go through with the second surgery planned in November.  Discussed alternative medication options that could be beneficial such as Cymbalta or Effexor for chronic pain as well as anxiety & depression, however, pt would like to stay on Zoloft for now.  She mostly feels limited by mobility right now and misses being independent with alone time.  Suspect patient will feel better emotionally with physical improvement; she concurs.  Will plan to increase Zoloft to 75mg daily for now in the interim and maybe consider and SNRI in the future.  Stressed the importance of regular follow ups and seeing patient in the future at the end of the summer to reassess.    PSYCHIATRIC ROS  Denies: delusions, paranoia,  periods of persistently elevated/expansive or irritable mood and/or increased goal directed behavior.    Issues/problems with:   Sleep no  Appetite changes yes - decreased sense gastric sleeve  Low energy yes - due to knee surgery  Poor concentration no  Psychomotor agitation no  Suicidal ideation no  Depressed mood yes - due to decreased mobility following knee replacement  Anhedonia no  Anxiety yes  Worry no  Fear no        CURRENT HOME PSYCHIATRIC MEDICATIONS:  Zoloft 50mg daily    Patient reports that She is tolerating medications as prescribed without any adverse side effects.     OTHER HOME MEDICATIONS    MEDICAL ROS  General ROS: positive  for  - fatigue  ENT ROS: negative  Cardiovascular ROS: no chest pain or dyspnea on exertion  Respiratory ROS: no cough, shortness of breath, or wheezing  Gastrointestinal ROS: positive for - constipation (from opiate pain medication)  Neurological ROS: positive for - gait disturbance, impaired coordination/balance, numbness/tingling, weakness and pain in right knee following surgery  Dermatological ROS: negative  Endocrine ROS: negative      OBJECTIVE    Vitals:    06/21/18 1021   BP: (!) 126/59   Pulse: 69       MENTAL STATUS EXAM  Appearance: normal, no acute distress, appropriate attire  Behavior: calm, cooperative  Speech: normal tone, volume, prosody  Thought process: linear, goal directed  Thought content: denies SI/HI/AVH, paranoid or delusional thought content  Mood: euthymic  Affect: mood congruent, appropriate  Cognition: grossly intact  Insight: good  Judgment: appropriate for setting    THERAPY    STATUS/PROGRESS Based on the examination today, the patient's problem(s) is/are stable. New problems have presented today. Co-morbidities are complicating management of the primary condition. There are active rule-out diagnoses for this patient at this time.       ASSESSMENT AND PLAN  1) Unspecified Anxiety Disorder, most likely JANEY  2) R/O Adjustment disorder with depressed mood following surgery    Increase Zoloft from 50mg to 75mg daily to address both anxiety and depression.  Will reassess at next appointment for need for SNRI if further surgeries are planned.  30 day supply, 5 refills.  Discussed resident transition in July with patient.      Discussed diagnosis, risks and benefits of proposed treatment above vs alternative treatments vs no treatment, and potential side effects of these treatments. The patient expresses understanding of the above and displays the capacity to agree with this treatment given said understanding. Patient also agrees that, currently, the benefits outweigh the risks and would  like to pursue treatment at this time.     Return to clinic 3 months    Total Time: 25 mins    More than 50% of the time was spent on counseling and coordination of care.    Psychoeducation, medication management counseling and coordination of care.      Mary Carmen Tello D.O.  [facility]  [phone]    6/21/2018  11:06 AM

## 2018-06-22 NOTE — PROGRESS NOTES
This encounter was reviewed by me and case was discussed with the resident physician. I agree with the assessment and  treatment plan as stated.    Alejandra Galicia MD

## 2018-06-26 ENCOUNTER — CLINICAL SUPPORT (OUTPATIENT)
Dept: REHABILITATION | Facility: HOSPITAL | Age: 63
End: 2018-06-26
Payer: COMMERCIAL

## 2018-06-26 DIAGNOSIS — Z96.651 STATUS POST TOTAL RIGHT KNEE REPLACEMENT: ICD-10-CM

## 2018-06-26 DIAGNOSIS — M25.561 CHRONIC PAIN OF RIGHT KNEE: Primary | ICD-10-CM

## 2018-06-26 DIAGNOSIS — M17.11 PRIMARY OSTEOARTHRITIS OF RIGHT KNEE: ICD-10-CM

## 2018-06-26 DIAGNOSIS — G89.29 CHRONIC PAIN OF RIGHT KNEE: Primary | ICD-10-CM

## 2018-06-26 PROCEDURE — 97110 THERAPEUTIC EXERCISES: CPT

## 2018-06-26 NOTE — PROGRESS NOTES
"                                                  Physical Therapy Daily Note     Date: 06/26/2018  Name: Soha Wheatley  Clinic Number: [id number]  Diagnosis:   Encounter Diagnoses   Name Primary?    Status post total right knee replacement     Primary osteoarthritis of right knee     Chronic pain of right knee Yes     Physician: Waylon Farris MD    Physician: Waylon Farris MD     Physician Orders: PT Eval and Treat   Medical Diagnosis: R knee OA  Evaluation Date: 6/19/2018  Authorization period Expiration: 12/31/18  Plan of Care Certification Period: 7/31/18     Visit #/Visits authorized: 3/20  Time In: 9:25  Time Out: 10:42  Total Billable Time: 67 minutes     Precautions: Standard      Subjective     Pt reports 3/10 R knee pain pre-tx. R knee pain    Objective       Patient received individual therapy to increase strength, endurance, ROM and flexibility with activities as follows:     Soha received therapeutic exercises to develop strength, endurance, ROM and flexibility for 62 minutes including:     Nu Step 5'  gastroc S 2x1'  Quad Set 20x5"  SLR 3x10  SAQ 3x10  Heel slides x20 with 5" hold  HS S 3x30"  Bridges 2x10  Seated TKE with manual overpressure and DF stretch 10x5"  Clamshells 3x10  SL hip abd 2x10  Prone TKE w/ bolster 2x10  Sit to stand 3x5 25"  Standing hip ext    Gait training with SPC  2 x 1 lap      Soha received the following manual therapy techniques for 5 minutes.     STM to R quadriceps just superior to patella  Scar massage over incision  Patella mobilizations 4 directions grade 2-3    Pt received ice to R knee after being cleared for contraindications for 10 minutes post-tx.    Written Home Exercises Provided: no new exercises provided this session  Pt demo good understanding of the education provided. Soha demonstrated good return demonstration of activities.     Education provided: Pt educated in scar massage   Soha verbalized good understanding of education provided.   No " spiritual or educational barriers to learning identified.    Assessment     Pt tolerated treatment poorly with reports of increased pain throughout all exercises and tears with ROM exercises. Poor VMO activation w/ QS.Able to add exs today as well as increase reps. Slight ext lag w/ SLR.Pt presented w/ 0 degrees PROM ext w/ clinician over pressure but did not measure. Pt will continue to benefit from skilled PT in order to decrease pain, improve strength/ROM, improve gait mechanics. and improve functional mobility.    Medical necessity is demonstrated by the following IMPAIRMENTS/PROBLEM LIST:  1. Impaired balance-Fall risk   2. Limited participation in daily and recreational activities   3. Limited participation in vocational pursuits   4. Requires skilled supervision to complete and progress HEP   5. Decreased strength  6. Decreased ROM  7. Decreased flexibility  8. Decreased Cardiovascular endurance   9. Gait abnormality  10. Impaired muscle tone     GOALS:   Short Term Goals:  3 weeks  1. Patient will report decreased knee pain to 4/10 in order to improve sit to stand transfer to Indep.  2. Patient will increase knee ROM to 0-115 degrees in order to show improved functional mobility.  3. Patient will be independent and consistent with issued HEP in order to show carryover between therapy sessions.     Long Term Goals: 6 weeks  1. Patient will report decreased knee pain to 0/10 in order to enable return to I ADLs.  2. Patient will improve knee ROM to 0-120 degrees to enable community ambulation with use of SC.  3. Patient will improve LE strength to 5/5 or greater to enable stairs with reciprocal pattern.  4. Patient will report 80% improvement in function to demonstrate an increase in LE function and mobility in home and community environment.   5. Patient will be independent with updated HEP to maintain gains following discharge with therapy.       Plan   Continue with established Plan of Care towards PT  goals.      Therapist: Kiara Bermudez PTA, DPT

## 2018-06-29 ENCOUNTER — TELEPHONE (OUTPATIENT)
Dept: ORTHOPEDICS | Facility: CLINIC | Age: 63
End: 2018-06-29

## 2018-06-29 ENCOUNTER — CLINICAL SUPPORT (OUTPATIENT)
Dept: REHABILITATION | Facility: HOSPITAL | Age: 63
End: 2018-06-29
Payer: COMMERCIAL

## 2018-06-29 DIAGNOSIS — M17.11 PRIMARY OSTEOARTHRITIS OF RIGHT KNEE: ICD-10-CM

## 2018-06-29 DIAGNOSIS — G89.29 CHRONIC PAIN OF RIGHT KNEE: Primary | ICD-10-CM

## 2018-06-29 DIAGNOSIS — M25.561 CHRONIC PAIN OF RIGHT KNEE: Primary | ICD-10-CM

## 2018-06-29 DIAGNOSIS — Z96.651 STATUS POST TOTAL RIGHT KNEE REPLACEMENT: Primary | ICD-10-CM

## 2018-06-29 DIAGNOSIS — Z96.651 STATUS POST TOTAL RIGHT KNEE REPLACEMENT: ICD-10-CM

## 2018-06-29 PROCEDURE — 97110 THERAPEUTIC EXERCISES: CPT

## 2018-06-29 RX ORDER — OXYCODONE AND ACETAMINOPHEN 5; 325 MG/1; MG/1
1 TABLET ORAL
Qty: 40 TABLET | Refills: 0 | Status: SHIPPED | OUTPATIENT
Start: 2018-06-29 | End: 2018-09-20

## 2018-06-29 NOTE — TELEPHONE ENCOUNTER
----- Message from Alena Cuevas PA-C sent at 6/29/2018 12:08 PM CDT -----  Contact: Pt  I refilled it. Please let her know.     ----- Message -----  From: Bassam Corcoran MA  Sent: 6/29/2018  11:02 AM  To: Alena Cuevas PA-C        ----- Message -----  From: Elin Ramos  Sent: 6/29/2018  10:59 AM  To: [facility] Staff    Pt is requesting a callback from nurse regarding pain medication    oxyCODONE-acetaminophen (PERCOCET) 5-325 mg per tablet    Informed pt of the 18th and she says she need to get another refill for Monday    Pt can be reached at [phone]    Thanks

## 2018-06-29 NOTE — PROGRESS NOTES
"                                                  Physical Therapy Daily Note     Date: 06/29/2018  Name: Soha Wheatley  Clinic Number: [id number]  Diagnosis:   Encounter Diagnoses   Name Primary?    Status post total right knee replacement     Primary osteoarthritis of right knee     Chronic pain of right knee Yes     Physician: Waylon Farris MD    Physician: Waylon Farris MD     Physician Orders: PT Eval and Treat   Medical Diagnosis: R knee OA  Evaluation Date: 6/19/2018  Authorization period Expiration: 12/31/18  Plan of Care Certification Period: 7/31/18     Visit #/Visits authorized: 3/20  Time In: 9:01  Time Out: 10:06  Total Billable Time: 49 minutes     Precautions: Standard      Subjective     Pt reports 3-4/10 R knee pain pre-tx. R knee pain    Objective       Patient received individual therapy to increase strength, endurance, ROM and flexibility with activities as follows:     Soha received therapeutic exercises to develop strength, endurance, ROM and flexibility for 50 minutes including:     Nu Step 5'  gastroc S 2x1'  Quad Set 20x5"  SLR 3x10  SAQ 3x10  Heel slides x20 with 5" hold  HS S 3x30"  Bridges 2x10  Seated TKE with manual overpressure and DF stretch 10x5"  Clamshells 3x10  SL hip abd 2x10  Prone TKE w/ bolster 2x10  Sit to stand 3x5 25"  Standing hip ext 2x10    Gait training with SPC  1 x 1 lap      Soha received the following manual therapy techniques for 5 minutes.     STM to R quadriceps just superior to patella  Scar massage over incision  Patella mobilizations 4 directions grade 2-3    Pt received ice to R knee after being cleared for contraindications for 10 minutes post-tx.    Written Home Exercises Provided: no new exercises provided this session  Pt demo good understanding of the education provided. Soha demonstrated good return demonstration of activities.     Education provided: Pt educated in scar massage   Soha verbalized good understanding of education provided.   No " spiritual or educational barriers to learning identified.    Assessment     Pt tolerated treatment well w/o adverse reaction. Pt ed on performing QS all the time at home as well as proper LE up w/o anything under knee. Pts ROM slowly progressing w/ knee ROM. Cont to benefit from skilled PT.     Medical necessity is demonstrated by the following IMPAIRMENTS/PROBLEM LIST:  1. Impaired balance-Fall risk   2. Limited participation in daily and recreational activities   3. Limited participation in vocational pursuits   4. Requires skilled supervision to complete and progress HEP   5. Decreased strength  6. Decreased ROM  7. Decreased flexibility  8. Decreased Cardiovascular endurance   9. Gait abnormality  10. Impaired muscle tone     GOALS:   Short Term Goals:  3 weeks  1. Patient will report decreased knee pain to 4/10 in order to improve sit to stand transfer to Indep.  2. Patient will increase knee ROM to 0-115 degrees in order to show improved functional mobility.  3. Patient will be independent and consistent with issued HEP in order to show carryover between therapy sessions.     Long Term Goals: 6 weeks  1. Patient will report decreased knee pain to 0/10 in order to enable return to I ADLs.  2. Patient will improve knee ROM to 0-120 degrees to enable community ambulation with use of SC.  3. Patient will improve LE strength to 5/5 or greater to enable stairs with reciprocal pattern.  4. Patient will report 80% improvement in function to demonstrate an increase in LE function and mobility in home and community environment.   5. Patient will be independent with updated HEP to maintain gains following discharge with therapy.       Plan   Continue with established Plan of Care towards PT goals.      Therapist: Kiara Bermudez, PTA, DPT

## 2018-07-03 ENCOUNTER — CLINICAL SUPPORT (OUTPATIENT)
Dept: REHABILITATION | Facility: HOSPITAL | Age: 63
End: 2018-07-03
Payer: COMMERCIAL

## 2018-07-03 DIAGNOSIS — M17.11 PRIMARY OSTEOARTHRITIS OF RIGHT KNEE: ICD-10-CM

## 2018-07-03 DIAGNOSIS — Z96.651 STATUS POST TOTAL RIGHT KNEE REPLACEMENT: ICD-10-CM

## 2018-07-03 DIAGNOSIS — M25.561 CHRONIC PAIN OF RIGHT KNEE: Primary | ICD-10-CM

## 2018-07-03 DIAGNOSIS — G89.29 CHRONIC PAIN OF RIGHT KNEE: Primary | ICD-10-CM

## 2018-07-03 PROCEDURE — 97110 THERAPEUTIC EXERCISES: CPT

## 2018-07-03 NOTE — PROGRESS NOTES
"                                                  Physical Therapy Daily Note     Date: 07/03/2018  Name: Soha Wheatley  Clinic Number: [id number]  Diagnosis:   Encounter Diagnoses   Name Primary?    Status post total right knee replacement     Primary osteoarthritis of right knee     Chronic pain of right knee Yes     Physician: Waylon Farris MD    Physician: Waylon Farris MD     Physician Orders: PT Eval and Treat   Medical Diagnosis: R knee OA  Evaluation Date: 6/19/2018  Authorization period Expiration: 12/31/18  Plan of Care Certification Period: 7/31/18     Visit #/Visits authorized: 4/20  Time In: 7:30 a  Time Out: 8:52 a  Total Billable Time:60 minutes     Precautions: Standard      Subjective     Pt reports: 0/10 R knee pain pre-tx. R knee is stiff    Objective   AROM FL: 112  PROM: 118  AROM ext: -3  PROM: 0    Patient received individual therapy to increase strength, endurance, ROM and flexibility with activities as follows:     Soha received therapeutic exercises to develop strength, endurance, ROM and flexibility for 72 minutes including:     Upright bike x 8min  gastroc S 2x1'  Quad Set 20x5"  SLR 3x10  SAQ 3x10  Heel slides x20 with 5" hold  HS S 2x1'  Bridges 3x10  Seated TKE with manual overpressure and DF stretch 10x5"  Clamshells 3x10   SL hip abd 2x10  Prone TKE w/ bolster 2x10-NT  Sit to stand 3x10 25"  Standing hip ext 3x10  TKE w/ MSC 1x10    Gait training with SPC  2 x 1 lap      Soha received the following manual therapy techniques for 0 minutes.     STM to R quadriceps just superior to patella  Scar massage over incision  Patella mobilizations 4 directions grade 2-3    Pt received ice to R knee after being cleared for contraindications for 10 minutes post-tx.    Written Home Exercises Provided: no new exercises provided this session  Pt demo good understanding of the education provided. Soha demonstrated good return demonstration of activities.     Education provided: Pt educated " in scar massage   Soha verbalized good understanding of education provided.   No spiritual or educational barriers to learning identified.    Assessment     Pt tolerated treatment well w/o adverse reaction. During gait training VC needed for toe off and knee FL w/ swing through. Some anterior knee pain and fatigue w/ TKE standing. Pt has improved w/ knee FL and ext. Pt has full PROM ext but not w/ AROM showing decreased quad strength. Cont to benefit from skilled PT.     Medical necessity is demonstrated by the following IMPAIRMENTS/PROBLEM LIST:  1. Impaired balance-Fall risk   2. Limited participation in daily and recreational activities   3. Limited participation in vocational pursuits   4. Requires skilled supervision to complete and progress HEP   5. Decreased strength  6. Decreased ROM  7. Decreased flexibility  8. Decreased Cardiovascular endurance   9. Gait abnormality  10. Impaired muscle tone     GOALS:   Short Term Goals:  3 weeks  1. Patient will report decreased knee pain to 4/10 in order to improve sit to stand transfer to Indep.  2. Patient will increase knee ROM to 0-115 degrees in order to show improved functional mobility.  3. Patient will be independent and consistent with issued HEP in order to show carryover between therapy sessions.     Long Term Goals: 6 weeks  1. Patient will report decreased knee pain to 0/10 in order to enable return to I ADLs.  2. Patient will improve knee ROM to 0-120 degrees to enable community ambulation with use of SC.  3. Patient will improve LE strength to 5/5 or greater to enable stairs with reciprocal pattern.  4. Patient will report 80% improvement in function to demonstrate an increase in LE function and mobility in home and community environment.   5. Patient will be independent with updated HEP to maintain gains following discharge with therapy.       Plan   Continue with established Plan of Care towards PT goals.      Therapist: Kiara Bermudez PTA,  DPT

## 2018-07-05 ENCOUNTER — OFFICE VISIT (OUTPATIENT)
Dept: ORTHOPEDICS | Facility: CLINIC | Age: 63
End: 2018-07-05
Payer: COMMERCIAL

## 2018-07-05 VITALS
HEART RATE: 76 BPM | SYSTOLIC BLOOD PRESSURE: 98 MMHG | HEIGHT: 70 IN | WEIGHT: 245.5 LBS | DIASTOLIC BLOOD PRESSURE: 62 MMHG | BODY MASS INDEX: 35.15 KG/M2

## 2018-07-05 DIAGNOSIS — Z96.651 STATUS POST TOTAL RIGHT KNEE REPLACEMENT: Primary | ICD-10-CM

## 2018-07-05 PROCEDURE — 99024 POSTOP FOLLOW-UP VISIT: CPT | Mod: S$GLB,,, | Performed by: ORTHOPAEDIC SURGERY

## 2018-07-05 PROCEDURE — 99999 PR PBB SHADOW E&M-EST. PATIENT-LVL III: CPT | Mod: PBBFAC,,, | Performed by: ORTHOPAEDIC SURGERY

## 2018-07-06 ENCOUNTER — CLINICAL SUPPORT (OUTPATIENT)
Dept: REHABILITATION | Facility: HOSPITAL | Age: 63
End: 2018-07-06
Payer: COMMERCIAL

## 2018-07-06 DIAGNOSIS — Z96.651 STATUS POST TOTAL RIGHT KNEE REPLACEMENT: ICD-10-CM

## 2018-07-06 DIAGNOSIS — M25.561 CHRONIC PAIN OF RIGHT KNEE: Primary | ICD-10-CM

## 2018-07-06 DIAGNOSIS — G89.29 CHRONIC PAIN OF RIGHT KNEE: Primary | ICD-10-CM

## 2018-07-06 DIAGNOSIS — M17.11 PRIMARY OSTEOARTHRITIS OF RIGHT KNEE: ICD-10-CM

## 2018-07-06 PROCEDURE — 97110 THERAPEUTIC EXERCISES: CPT

## 2018-07-06 NOTE — PROGRESS NOTES
"                                                  Physical Therapy Daily Note     Date: 07/06/2018  Name: Soha Wheatley  Clinic Number: [id number]  Diagnosis:   Encounter Diagnoses   Name Primary?    Status post total right knee replacement     Primary osteoarthritis of right knee     Chronic pain of right knee Yes     Physician: Waylon Farris MD    Physician: Waylon Farris MD     Physician Orders: PT Eval and Treat   Medical Diagnosis: R knee OA  Evaluation Date: 6/19/2018  Authorization period Expiration: 12/31/18  Plan of Care Certification Period: 7/31/18     Visit #/Visits authorized: 5/20  Time In: 9:30 a  Time Out: 10:35 a  Total Billable Time:30 minutes     Precautions: Standard      Subjective     Pt reports: 0/10 R knee pain pre-tx. R knee is stiff    Objective   AROM FL: 112  PROM: 118 -NT  AROM ext: -3  PROM: 0- NT    Patient received individual therapy to increase strength, endurance, ROM and flexibility with activities as follows:     Soha received therapeutic exercises to develop strength, endurance, ROM and flexibility for 55 minutes including:     Upright bike x 8min  gastroc S 2x1'  Quad Set 20x5"  SLR 3x10  SAQ 3x10  Heel slides x20 with 5" hold  HS S 2x1'  Bridges 3x10  Seated TKE with manual overpressure and DF stretch 10x5"-NT  Clamshells 3x10   SL hip abd 2x10  Prone TKE w/ bolster 2x10-NT  Sit to stand 3x10 25"  Standing hip ext 3x10  TKE w/ RSC 2x10    Gait training with SPC  2 x 1 lap      Soha received the following manual therapy techniques for 0 minutes.     STM to R quadriceps just superior to patella  Scar massage over incision  Patella mobilizations 4 directions grade 2-3    Pt received ice to R knee after being cleared for contraindications for 10 minutes post-tx.    Written Home Exercises Provided: no new exercises provided this session  Pt demo good understanding of the education provided. Soha demonstrated good return demonstration of activities.     Education provided: " Pt educated in scar massage   Soha verbalized good understanding of education provided.   No spiritual or educational barriers to learning identified.    Assessment     Pt tolerated treatment well w/o adverse reaction. Pt performed TKE w/o pain w/ using red SC. Decreased guarding w/ PROM. Advance pt as edgardo next session. Cont to benefit from skilled PT.     Medical necessity is demonstrated by the following IMPAIRMENTS/PROBLEM LIST:  1. Impaired balance-Fall risk   2. Limited participation in daily and recreational activities   3. Limited participation in vocational pursuits   4. Requires skilled supervision to complete and progress HEP   5. Decreased strength  6. Decreased ROM  7. Decreased flexibility  8. Decreased Cardiovascular endurance   9. Gait abnormality  10. Impaired muscle tone     GOALS:   Short Term Goals:  3 weeks  1. Patient will report decreased knee pain to 4/10 in order to improve sit to stand transfer to Indep.  2. Patient will increase knee ROM to 0-115 degrees in order to show improved functional mobility.  3. Patient will be independent and consistent with issued HEP in order to show carryover between therapy sessions.     Long Term Goals: 6 weeks  1. Patient will report decreased knee pain to 0/10 in order to enable return to I ADLs.  2. Patient will improve knee ROM to 0-120 degrees to enable community ambulation with use of SC.  3. Patient will improve LE strength to 5/5 or greater to enable stairs with reciprocal pattern.  4. Patient will report 80% improvement in function to demonstrate an increase in LE function and mobility in home and community environment.   5. Patient will be independent with updated HEP to maintain gains following discharge with therapy.       Plan   Continue with established Plan of Care towards PT goals.      Therapist: Kiara Bermudez, PTA, DPT

## 2018-07-10 ENCOUNTER — CLINICAL SUPPORT (OUTPATIENT)
Dept: REHABILITATION | Facility: HOSPITAL | Age: 63
End: 2018-07-10
Payer: COMMERCIAL

## 2018-07-10 DIAGNOSIS — G89.29 CHRONIC PAIN OF RIGHT KNEE: Primary | ICD-10-CM

## 2018-07-10 DIAGNOSIS — Z96.651 STATUS POST TOTAL RIGHT KNEE REPLACEMENT: ICD-10-CM

## 2018-07-10 DIAGNOSIS — M17.11 PRIMARY OSTEOARTHRITIS OF RIGHT KNEE: ICD-10-CM

## 2018-07-10 DIAGNOSIS — M25.561 CHRONIC PAIN OF RIGHT KNEE: Primary | ICD-10-CM

## 2018-07-10 PROCEDURE — 97110 THERAPEUTIC EXERCISES: CPT

## 2018-07-10 NOTE — PROGRESS NOTES
"                                                  Physical Therapy Daily Note     Date: 07/10/2018  Name: Soha Wheatley  Clinic Number: [id number]  Diagnosis:   Encounter Diagnoses   Name Primary?    Status post total right knee replacement     Primary osteoarthritis of right knee     Chronic pain of right knee Yes     Physician: Waylon Farris MD    Physician: Waylon Farris MD     Physician Orders: PT Eval and Treat   Medical Diagnosis: R knee OA  Evaluation Date: 6/19/2018  Authorization period Expiration: 12/31/18  Plan of Care Certification Period: 7/31/18     Visit #/Visits authorized: 6/20  Time In: 8:03 a  Time Out: 9:10 a  Total Billable Time:30 minutes     Precautions: Standard      Subjective     Pt reports: 2/10 R knee pain pre-tx. Having less pain R knee    Objective   Foto Score: 51%    AROM FL: 112  PROM: 118 -NT  AROM ext: -3  PROM: 0- NT    Patient received individual therapy to increase strength, endurance, ROM and flexibility with activities as follows:     Soha received therapeutic exercises to develop strength, endurance, ROM and flexibility for 57 minutes including:     Upright bike x 10 min  gastroc S 2x1'  Quad Set 20x5"  SLR 3x10  SAQ 3x10 #1  Heel slides x20 with 5" hold  HS S 2x1'  Bridges 3x10  Seated TKE with manual overpressure and DF stretch 10x5"-NT  Clamshells 3x10   SL hip abd 2x10  Prone TKE w/ bolster 2x10-NT  Sit to stand 3x10 21"  Standing hip ext 3x10  TKE w/ RSC 2x10    Gait training with SPC  2 x 1 lap-NT    Add next session:  R LE shuttle      Soha received the following manual therapy techniques for 0 minutes.     STM to R quadriceps just superior to patella  Scar massage over incision  Patella mobilizations 4 directions grade 2-3    Pt received ice to R knee after being cleared for contraindications for 10 minutes post-tx.    Written Home Exercises Provided: no new exercises provided this session  Pt demo good understanding of the education provided. Soha " "demonstrated good return demonstration of activities.     Education provided: Pt educated in scar massage   Soha verbalized good understanding of education provided.   No spiritual or educational barriers to learning identified.    Assessment     Pt tolerated treatment well w/o adverse reaction. Pt was dizzy after TKE BP taken: 122/74. Better after sitting. Able to resume treatment. Able to decrease hi/low to 21" for sit to stand. Added shuttle next session to work on on R LE strength. Cont to benefit from skilled PT.    Medical necessity is demonstrated by the following IMPAIRMENTS/PROBLEM LIST:  1. Impaired balance-Fall risk   2. Limited participation in daily and recreational activities   3. Limited participation in vocational pursuits   4. Requires skilled supervision to complete and progress HEP   5. Decreased strength  6. Decreased ROM  7. Decreased flexibility  8. Decreased Cardiovascular endurance   9. Gait abnormality  10. Impaired muscle tone     GOALS:   Short Term Goals:  3 weeks  1. Patient will report decreased knee pain to 4/10 in order to improve sit to stand transfer to Indep.  2. Patient will increase knee ROM to 0-115 degrees in order to show improved functional mobility.  3. Patient will be independent and consistent with issued HEP in order to show carryover between therapy sessions.     Long Term Goals: 6 weeks  1. Patient will report decreased knee pain to 0/10 in order to enable return to I ADLs.  2. Patient will improve knee ROM to 0-120 degrees to enable community ambulation with use of SC.  3. Patient will improve LE strength to 5/5 or greater to enable stairs with reciprocal pattern.  4. Patient will report 80% improvement in function to demonstrate an increase in LE function and mobility in home and community environment.   5. Patient will be independent with updated HEP to maintain gains following discharge with therapy.       Plan   Continue with established Plan of Care towards PT " goals.      Therapist: Kiara Bermudez PTA, DPT

## 2018-07-13 ENCOUNTER — CLINICAL SUPPORT (OUTPATIENT)
Dept: REHABILITATION | Facility: HOSPITAL | Age: 63
End: 2018-07-13
Payer: COMMERCIAL

## 2018-07-13 DIAGNOSIS — M25.561 CHRONIC PAIN OF RIGHT KNEE: Primary | ICD-10-CM

## 2018-07-13 DIAGNOSIS — M17.11 PRIMARY OSTEOARTHRITIS OF RIGHT KNEE: ICD-10-CM

## 2018-07-13 DIAGNOSIS — Z96.651 STATUS POST TOTAL RIGHT KNEE REPLACEMENT: ICD-10-CM

## 2018-07-13 DIAGNOSIS — G89.29 CHRONIC PAIN OF RIGHT KNEE: Primary | ICD-10-CM

## 2018-07-13 PROCEDURE — 97110 THERAPEUTIC EXERCISES: CPT

## 2018-07-13 NOTE — PROGRESS NOTES
"                                                  Physical Therapy Daily Note     Date: 07/13/2018  Name: Soha Wheatley  Clinic Number: [id number]  Diagnosis:   Encounter Diagnoses   Name Primary?    Status post total right knee replacement     Primary osteoarthritis of right knee     Chronic pain of right knee Yes     Physician: Waylon Farris MD    Physician: Waylon Farris MD     Physician Orders: PT Eval and Treat   Medical Diagnosis: R knee OA  Evaluation Date: 6/19/2018  Authorization period Expiration: 12/31/18  Plan of Care Certification Period: 7/31/18     Visit #/Visits authorized: 7/20  Time In: 1100  Time Out: 1155  Total Billable Time:45 minutes     Precautions: Standard      Subjective     Pt reports: 3/10 R knee pain pre-tx. Reports she is trying not to take pain pills so she didn't take one before tx. Pt reports she would really like to return to driving herself because she doesn't want to rely on her 18 year old daughter    Objective   Foto Score: 51%    AROM FL: 112  PROM: 118 -NT  AROM ext: -3  PROM: 0- NT    Patient received individual therapy to increase strength, endurance, ROM and flexibility with activities as follows:     Soha received therapeutic exercises to develop strength, endurance, ROM and flexibility for 45 minutes including:     Upright bike x 5 min  gastroc S 2x1'  Quad Set 20x5"  SLR 3x10  SAQ 3x10 #1  Heel slides x20 with 5" hold  HS S 2x1'  Bridges 3x10  Seated TKE with manual overpressure and DF stretch 10x5"-NT  Clamshells 3x10 YTB  SL hip abd 2x10  Prone TKE w/ bolster 2x10-NT  Sit to stand 3x10 21"  Standing hip ext 3x10  TKE w/ RSC 2x10  Prone quad stretch 3x30"  RLE Shuttle press 2c 3x10    Gait training with SPC  2 x 1 lap-NT      20 minutes of therex overseen by PT Karina baer.    Soha received the following manual therapy techniques for 0 minutes.     STM to R quadriceps just superior to patella  Scar massage over incision  Patella mobilizations 4 directions " grade 2-3    Pt received ice to R knee after being cleared for contraindications for 10 minutes post-tx.    Written Home Exercises Provided: no new exercises provided this session  Pt demo good understanding of the education provided. Soha demonstrated good return demonstration of activities.     Education provided: Pt educated in scar massage   Soha verbalized good understanding of education provided.   No spiritual or educational barriers to learning identified.    Assessment     Pt tolerated treatment well w/o adverse reaction. Pt entered clinic today with single point cane for the first time, demonstrating improved gait mechanics and confidence with mobility. Added prone quad stretch to improve tissue extensibility and ROM; added shuttle press to increase RLE strength. Pt will continue to benefit from skilled PT in order to decrease pain, increase strength/ROM, and improve functional mobility.    Medical necessity is demonstrated by the following IMPAIRMENTS/PROBLEM LIST:  1. Impaired balance-Fall risk   2. Limited participation in daily and recreational activities   3. Limited participation in vocational pursuits   4. Requires skilled supervision to complete and progress HEP   5. Decreased strength  6. Decreased ROM  7. Decreased flexibility  8. Decreased Cardiovascular endurance   9. Gait abnormality  10. Impaired muscle tone     GOALS:   Short Term Goals:  3 weeks  1. Patient will report decreased knee pain to 4/10 in order to improve sit to stand transfer to [facility].  2. Patient will increase knee ROM to 0-115 degrees in order to show improved functional mobility.  3. Patient will be independent and consistent with issued HEP in order to show carryover between therapy sessions.     Long Term Goals: 6 weeks  1. Patient will report decreased knee pain to 0/10 in order to enable return to I ADLs.  2. Patient will improve knee ROM to 0-120 degrees to enable community ambulation with use of SC.  3. Patient will  improve LE strength to 5/5 or greater to enable stairs with reciprocal pattern.  4. Patient will report 80% improvement in function to demonstrate an increase in LE function and mobility in home and community environment.   5. Patient will be independent with updated HEP to maintain gains following discharge with therapy.       Plan   Continue with established Plan of Care towards PT goals.      Therapist: Trisha Boland, PT, DPT

## 2018-07-17 ENCOUNTER — CLINICAL SUPPORT (OUTPATIENT)
Dept: REHABILITATION | Facility: HOSPITAL | Age: 63
End: 2018-07-17
Payer: COMMERCIAL

## 2018-07-17 ENCOUNTER — TELEPHONE (OUTPATIENT)
Dept: ORTHOPEDICS | Facility: CLINIC | Age: 63
End: 2018-07-17

## 2018-07-17 DIAGNOSIS — M25.561 CHRONIC PAIN OF RIGHT KNEE: Primary | ICD-10-CM

## 2018-07-17 DIAGNOSIS — Z96.651 STATUS POST TOTAL RIGHT KNEE REPLACEMENT: ICD-10-CM

## 2018-07-17 DIAGNOSIS — M17.11 PRIMARY OSTEOARTHRITIS OF RIGHT KNEE: ICD-10-CM

## 2018-07-17 DIAGNOSIS — G89.29 CHRONIC PAIN OF RIGHT KNEE: Primary | ICD-10-CM

## 2018-07-17 PROCEDURE — 97110 THERAPEUTIC EXERCISES: CPT

## 2018-07-17 NOTE — TELEPHONE ENCOUNTER
I spoke with patient who asked if she could get a RTW note mailed to her because she forgot to get it when she was here.  I will ask Alena Cuevas if she would fill it in and I will mail it.

## 2018-07-17 NOTE — TELEPHONE ENCOUNTER
----- Message from Larissa Rodriguez sent at 7/17/2018 12:29 PM CDT -----  Contact: self  Pt called requesting a return to work letter for her employer. Pt could be reached at [phone]

## 2018-07-17 NOTE — PROGRESS NOTES
"                                                  Physical Therapy Daily Note     Date: 07/17/2018  Name: Soha Wheatley  Clinic Number: [id number]  Diagnosis:   No diagnosis found.  Physician: Waylon Farris MD    Physician: Waylon Farris MD     Physician Orders: PT Eval and Treat   Medical Diagnosis: R knee OA  Evaluation Date: 6/19/2018  Authorization period Expiration: 12/31/18  Plan of Care Certification Period: 7/31/18     Visit #/Visits authorized: 9/20 (12 visits in POC)  Time In: 8:56 am  Time Out: 10:05 am     Precautions: Standard      Subjective     Pt reports: knee is feeling okay today with 2-3/10 pain, mostly just feels stiff.    Objective     AROM FL: 122  PROM: 125  AROM ext: -3  PROM: 0    Patient received individual therapy to increase strength, endurance, ROM and flexibility with activities as follows:     Soha received therapeutic exercises to develop strength, endurance, ROM and flexibility for 55 minutes including:   NuStep x 8 min  gastroc S 2x1'  Quad Set 20x5"  Heel slides x20 with 5" hold  SLR 3x10  SAQ 3x10 #1  HS S 2x1'  Bridges 3x10  Clamshells 3x10 RTB  SL hip abd 2x10  Sit to stand 3x10 21"  Standing hip ext 3x10  TKE w/ RSC 3x10  Prone quad stretch 3x30"  RLE Shuttle press 2c 3x10    Gait training with SPC  2 x 1 lap-NT  Seated TKE with manual overpressure and DF stretch 10x5"-NT  Prone TKE w/ bolster 2x10-NT    Soha received the following manual therapy techniques for 5 minutes.   STM to R quadriceps just superior to patella  Scar massage over incision  Patella mobilizations 4 directions grade 2-3    Pt received ice to R knee after being cleared for contraindications for 5 minutes post-tx.    Written Home Exercises Provided: no new exercises provided this session  Pt demo good understanding of the education provided. Soha demonstrated good return demonstration of activities.     Education provided: importance of quad exercises at home to improve quad activation  Soha verbalized " good understanding of education provided.   No spiritual or educational barriers to learning identified.    Assessment     Pt tolerated treatment well w/o adverse reaction. Pt cont to ambulate with single point cane, demonstrating improved gait mechanics and confidence with mobility. Pt requires cues for quad activation to prevent extensor lag with SLR. Pt had improved AROM/PROM with knee flexion measurements following treatment session. Pt will continue to benefit from skilled PT in order to decrease pain, increase strength/ROM, and improve functional mobility.    Medical necessity is demonstrated by the following IMPAIRMENTS/PROBLEM LIST:  1. Impaired balance-Fall risk   2. Limited participation in daily and recreational activities   3. Limited participation in vocational pursuits   4. Requires skilled supervision to complete and progress HEP   5. Decreased strength  6. Decreased ROM  7. Decreased flexibility  8. Decreased Cardiovascular endurance   9. Gait abnormality  10. Impaired muscle tone     GOALS:   Short Term Goals:  3 weeks  1. Patient will report decreased knee pain to 4/10 in order to improve sit to stand transfer to Indep.  2. Patient will increase knee ROM to 0-115 degrees in order to show improved functional mobility.  3. Patient will be independent and consistent with issued HEP in order to show carryover between therapy sessions.     Long Term Goals: 6 weeks  1. Patient will report decreased knee pain to 0/10 in order to enable return to I ADLs.  2. Patient will improve knee ROM to 0-120 degrees to enable community ambulation with use of SC.  3. Patient will improve LE strength to 5/5 or greater to enable stairs with reciprocal pattern.  4. Patient will report 80% improvement in function to demonstrate an increase in LE function and mobility in home and community environment.   5. Patient will be independent with updated HEP to maintain gains following discharge with therapy.       Plan   Continue  with established Plan of Care towards PT goals.      Therapist: Kori Heath, SPT, DPT

## 2018-07-19 ENCOUNTER — PATIENT MESSAGE (OUTPATIENT)
Dept: ADMINISTRATIVE | Facility: OTHER | Age: 63
End: 2018-07-19

## 2018-07-19 ENCOUNTER — CLINICAL SUPPORT (OUTPATIENT)
Dept: REHABILITATION | Facility: HOSPITAL | Age: 63
End: 2018-07-19
Payer: COMMERCIAL

## 2018-07-19 DIAGNOSIS — M17.11 PRIMARY OSTEOARTHRITIS OF RIGHT KNEE: ICD-10-CM

## 2018-07-19 DIAGNOSIS — Z96.651 STATUS POST TOTAL RIGHT KNEE REPLACEMENT: ICD-10-CM

## 2018-07-19 DIAGNOSIS — M25.561 CHRONIC PAIN OF RIGHT KNEE: Primary | ICD-10-CM

## 2018-07-19 DIAGNOSIS — G89.29 CHRONIC PAIN OF RIGHT KNEE: Primary | ICD-10-CM

## 2018-07-19 PROCEDURE — 97110 THERAPEUTIC EXERCISES: CPT

## 2018-07-19 NOTE — PROGRESS NOTES
"                                                  Physical Therapy Daily Note     Date: 07/19/2018  Name: Soha Wheatley  Clinic Number: [id number]  Diagnosis:   No diagnosis found.  Physician: Waylon Farris MD    Physician: Waylon Farris MD     Physician Orders: PT Eval and Treat   Medical Diagnosis: R knee OA  Evaluation Date: 6/19/2018  Authorization period Expiration: 12/31/18  Plan of Care Certification Period: 7/31/18     Visit #/Visits authorized: 10/20 (12 visits in POC)  Time In: 9:02 am  Time Out: 10:04 am     Precautions: Standard      Subjective     Pt reports: knee is feeling okay today with 4/10 pain, mostly feels stiff.    Objective     AROM FL: 122  PROM: 125  AROM ext: -3  PROM: 0    Patient received individual therapy to increase strength, endurance, ROM and flexibility with activities as follows:     Soha received therapeutic exercises to develop strength, endurance, ROM and flexibility for 62 minutes including:   NuStep x 8 min  gastroc S 2x1'  BLE Shuttle press 2c 3x10  TKE w/ RSC 3x10  Standing hip ext 3x10  Quad Set 20x5"  Heel slides x20 with 5" hold  SLR 3x10  SAQ 3x10 #1  Bridges 3x10  SL hip abd 2x10  Clamshells 3x10 RTB  HS S 2x1'  Prone quad stretch 3x30"  Sit to stand 3x10 21"     Gait training with SPC  2 x 1 lap-NT  Seated TKE with manual overpressure and DF stretch 10x5"-NT  Prone TKE w/ bolster 2x10-NT    Soha received the following manual therapy techniques for 0 minutes. NT  STM to R quadriceps just superior to patella  Scar massage over incision  Patella mobilizations 4 directions grade 2-3    Pt received ice to R knee after being cleared for contraindications for 0 minutes post-tx. -NT    Written Home Exercises Provided: no new exercises provided this session  Pt demo good understanding of the education provided. Soha demonstrated good return demonstration of activities.     Education provided: importance of quad exercises at home to improve quad activation  Soha " verbalized good understanding of education provided.   No spiritual or educational barriers to learning identified.    Assessment     Pt tolerated treatment well w/o adverse reaction. Pt cont to require cues for quad activation to prevent extensor lag with SLR but able to correct following VC. Pt with functional quad strength as evidenced by improved ability to perform sit<> stand without using momentum. Pt will continue to benefit from skilled PT in order to decrease pain, increase strength/ROM, and improve functional mobility.    Medical necessity is demonstrated by the following IMPAIRMENTS/PROBLEM LIST:  1. Impaired balance-Fall risk   2. Limited participation in daily and recreational activities   3. Limited participation in vocational pursuits   4. Requires skilled supervision to complete and progress HEP   5. Decreased strength  6. Decreased ROM  7. Decreased flexibility  8. Decreased Cardiovascular endurance   9. Gait abnormality  10. Impaired muscle tone     GOALS:   Short Term Goals:  3 weeks  1. Patient will report decreased knee pain to 4/10 in order to improve sit to stand transfer to Indep.  2. Patient will increase knee ROM to 0-115 degrees in order to show improved functional mobility.  3. Patient will be independent and consistent with issued HEP in order to show carryover between therapy sessions.     Long Term Goals: 6 weeks  1. Patient will report decreased knee pain to 0/10 in order to enable return to I ADLs.  2. Patient will improve knee ROM to 0-120 degrees to enable community ambulation with use of SC.  3. Patient will improve LE strength to 5/5 or greater to enable stairs with reciprocal pattern.  4. Patient will report 80% improvement in function to demonstrate an increase in LE function and mobility in home and community environment.   5. Patient will be independent with updated HEP to maintain gains following discharge with therapy.       Plan   Continue with established Plan of Care  towards PT goals.      Therapist: Kori Heath, SPT, DPT

## 2018-07-26 ENCOUNTER — CLINICAL SUPPORT (OUTPATIENT)
Dept: REHABILITATION | Facility: HOSPITAL | Age: 63
End: 2018-07-26
Payer: COMMERCIAL

## 2018-07-26 DIAGNOSIS — M17.11 PRIMARY OSTEOARTHRITIS OF RIGHT KNEE: ICD-10-CM

## 2018-07-26 DIAGNOSIS — G89.29 CHRONIC PAIN OF RIGHT KNEE: Primary | ICD-10-CM

## 2018-07-26 DIAGNOSIS — M25.561 CHRONIC PAIN OF RIGHT KNEE: Primary | ICD-10-CM

## 2018-07-26 DIAGNOSIS — Z96.651 STATUS POST TOTAL RIGHT KNEE REPLACEMENT: ICD-10-CM

## 2018-07-26 PROCEDURE — 97110 THERAPEUTIC EXERCISES: CPT

## 2018-07-26 NOTE — PROGRESS NOTES
"                                                  Physical Therapy Daily Note     Date: 07/26/2018  Name: Soha Wheatley  Clinic Number: [id number]  Diagnosis:   Encounter Diagnoses   Name Primary?    Status post total right knee replacement     Primary osteoarthritis of right knee     Chronic pain of right knee Yes     Physician: Waylon Farris MD    Physician: Waylon Farris MD     Physician Orders: PT Eval and Treat   Medical Diagnosis: R knee OA  Evaluation Date: 6/19/2018  Authorization period Expiration: 12/31/18  Plan of Care Certification Period: 7/31/18     Visit #/Visits authorized: 11/20 (13 visits in POC)  Time In: 9:00 am  Time Out: 10:08 am     Precautions: Standard      Subjective     Pt reports: R knee is stiff today 0/10    Objective     AROM FL: 122  PROM: 125  AROM ext: -3  PROM: 0    Patient received individual therapy to increase strength, endurance, ROM and flexibility with activities as follows:     Soha received therapeutic exercises to develop strength, endurance, ROM and flexibility for 58 minutes including:   Bike x 810min  gastroc S 2x1'  BLE Shuttle press 2c 3x10  TKE w/ RSC 3x10  Standing hip ext 3x10  Step up on airex 2x10  SLS 2x20"  Quad Set 20x5"  Heel slides x20 with 5" hold  SLR 3x10  SAQ 3x10 #1  Bridges 3x10  SL hip abd 3x10  Clamshells 3x10 RTB  HS S 2x1'  Prone quad stretch 3x30"  Sit to stand 3x10 21"-NT      Gait training with SPC  2 x 1 lap-NT  Seated TKE with manual overpressure and DF stretch 10x5"-NT  Prone TKE w/ bolster 2x10-NT    Soha received the following manual therapy techniques for 0 minutes. NT  STM to R quadriceps just superior to patella  Scar massage over incision  Patella mobilizations 4 directions grade 2-3    Pt received ice to R knee after being cleared for contraindications for 0 minutes post-tx. -NT    Written Home Exercises Provided: no new exercises provided this session  Pt demo good understanding of the education provided. Soha demonstrated " good return demonstration of activities.     Education provided: importance of quad exercises at home to improve quad activation  Soha verbalized good understanding of education provided.   No spiritual or educational barriers to learning identified.    Assessment     Pt tolerated treatment well w/o adverse reaction. Added sls and step ups on airex to work on LE stability. Some discomfort w/ SLS.Pt walks w/ increased deviated gait w/o cane. Pt will continue to benefit from skilled PT in order to decrease pain, increase strength/ROM, and improve functional mobility.    Medical necessity is demonstrated by the following IMPAIRMENTS/PROBLEM LIST:  1. Impaired balance-Fall risk   2. Limited participation in daily and recreational activities   3. Limited participation in vocational pursuits   4. Requires skilled supervision to complete and progress HEP   5. Decreased strength  6. Decreased ROM  7. Decreased flexibility  8. Decreased Cardiovascular endurance   9. Gait abnormality  10. Impaired muscle tone     GOALS:   Short Term Goals:  3 weeks  1. Patient will report decreased knee pain to 4/10 in order to improve sit to stand transfer to Indep.  2. Patient will increase knee ROM to 0-115 degrees in order to show improved functional mobility.  3. Patient will be independent and consistent with issued HEP in order to show carryover between therapy sessions.     Long Term Goals: 6 weeks  1. Patient will report decreased knee pain to 0/10 in order to enable return to I ADLs.  2. Patient will improve knee ROM to 0-120 degrees to enable community ambulation with use of SC.  3. Patient will improve LE strength to 5/5 or greater to enable stairs with reciprocal pattern.  4. Patient will report 80% improvement in function to demonstrate an increase in LE function and mobility in home and community environment.   5. Patient will be independent with updated HEP to maintain gains following discharge with therapy.       Plan    Continue with established Plan of Care towards PT goals.      Therapist: Kiara Bermudez, PTA, DPT

## 2018-07-27 ENCOUNTER — CLINICAL SUPPORT (OUTPATIENT)
Dept: REHABILITATION | Facility: HOSPITAL | Age: 63
End: 2018-07-27
Payer: COMMERCIAL

## 2018-07-27 DIAGNOSIS — Z96.651 STATUS POST TOTAL RIGHT KNEE REPLACEMENT: ICD-10-CM

## 2018-07-27 DIAGNOSIS — G89.29 CHRONIC PAIN OF RIGHT KNEE: Primary | ICD-10-CM

## 2018-07-27 DIAGNOSIS — M17.11 PRIMARY OSTEOARTHRITIS OF RIGHT KNEE: ICD-10-CM

## 2018-07-27 DIAGNOSIS — M25.561 CHRONIC PAIN OF RIGHT KNEE: Primary | ICD-10-CM

## 2018-07-27 PROCEDURE — 97110 THERAPEUTIC EXERCISES: CPT

## 2018-07-27 NOTE — PROGRESS NOTES
"                                                  Physical Therapy Daily Note     Date: 07/27/2018  Name: Soha Wheatley  Clinic Number: [id number]  Diagnosis:   Encounter Diagnoses   Name Primary?    Status post total right knee replacement     Primary osteoarthritis of right knee     Chronic pain of right knee Yes     Physician: Waylon Farris MD    Physician: Waylon Farris MD     Physician Orders: PT Eval and Treat   Medical Diagnosis: R knee OA  Evaluation Date: 6/19/2018  Authorization period Expiration: 12/31/18  Plan of Care Certification Period: 7/31/18     Visit #/Visits authorized: 12/20  Time In: 8:28 am  Time Out: 9:40 am     Precautions: Standard      Subjective     Pt reports: R knee is stiff today 0/10    Objective       Patient received individual therapy to increase strength, endurance, ROM and flexibility with activities as follows:     Soha received therapeutic exercises to develop strength, endurance, ROM and flexibility for 40 minutes including:   Bike x 10min  gastroc S 2x1'  BLE Shuttle press 2c 3x10  TKE w/ RSC 3x10  Standing hip ext 3x10  Step up on airex 3x10  SLS 3x20"  Quad Set 20x5" ankle on bolster  Heel slides x20 with 5" hold  SLR 3x10  SAQ 3x10 #1  Bridges 3x10  SL hip abd 3x10  Clamshells 3x10 RTB  HS S 2x1'  Prone quad stretch 3x30"  Sit to stand 3x10 21"-NT      Gait training with SPC  2 x 1 lap-NT  Seated TKE with manual overpressure and DF stretch 10x5"-NT  Prone TKE w/ bolster 2x10-NT    Soha received the following manual therapy techniques for 0 minutes. NT  STM to R quadriceps just superior to patella  Scar massage over incision  Patella mobilizations 4 directions grade 2-3    Pt received ice to R knee after being cleared for contraindications for 0 minutes post-tx. -NT    Written Home Exercises Provided: no new exercises provided this session  Pt demo good understanding of the education provided. Soha demonstrated good return demonstration of activities.     Education " provided: importance of quad exercises at home to improve quad activation  Soha verbalized good understanding of education provided.   No spiritual or educational barriers to learning identified.    Assessment     Pt tolerated treatment well w/o adverse reaction. Today was soha's 12th visit on POC. She has 20 auth visits. Pt would benefit from cont therapy, extend poc next session if PT agrees. VC needed to decrease UE support w/ SLS. Still presents w/ R LE strength.Pt will continue to benefit from skilled PT in order to decrease pain, increase strength/ROM, and improve functional mobility.    Medical necessity is demonstrated by the following IMPAIRMENTS/PROBLEM LIST:  1. Impaired balance-Fall risk   2. Limited participation in daily and recreational activities   3. Limited participation in vocational pursuits   4. Requires skilled supervision to complete and progress HEP   5. Decreased strength  6. Decreased ROM  7. Decreased flexibility  8. Decreased Cardiovascular endurance   9. Gait abnormality  10. Impaired muscle tone     GOALS:   Short Term Goals:  3 weeks  1. Patient will report decreased knee pain to 4/10 in order to improve sit to stand transfer to [facility].  2. Patient will increase knee ROM to 0-115 degrees in order to show improved functional mobility.  3. Patient will be independent and consistent with issued HEP in order to show carryover between therapy sessions.     Long Term Goals: 6 weeks  1. Patient will report decreased knee pain to 0/10 in order to enable return to I ADLs.  2. Patient will improve knee ROM to 0-120 degrees to enable community ambulation with use of SC.  3. Patient will improve LE strength to 5/5 or greater to enable stairs with reciprocal pattern.  4. Patient will report 80% improvement in function to demonstrate an increase in LE function and mobility in home and community environment.   5. Patient will be independent with updated HEP to maintain gains following discharge with  therapy.       Plan   Continue with established Plan of Care towards PT goals.      Therapist: Kiara Bermudez, PTA, DPT

## 2018-07-30 NOTE — PROGRESS NOTES
"                                                  Physical Therapy Reassessment Note     Date: 07/30/2018  Name: Soha Wheatley  Clinic Number: [id number]  Diagnosis:   Encounter Diagnoses   Name Primary?    Status post total right knee replacement     Primary osteoarthritis of right knee     Chronic pain of right knee Yes     Physician: Waylon Farris MD    Physician Orders: PT Eval and Treat   Medical Diagnosis: R knee OA  Evaluation Date: 6/19/2018  Authorization period Expiration: 12/31/18  Plan of Care Certification Period: 9/10/18     Visit #/Visits authorized: 13/20  Time In: 9:00 am  Time Out: 10:10 am   Billable time: 30 min    Precautions: Standard    Subjective     Pt reports: her knee is sore this morning. She thinks she did too much this weekend.     Pain: R knee  Current: 4/10    Foto: 7/10/18: 51%    Objective       Gait: mild antalgic gt with SC     Functional Mobility:  Squat: dysfunctional  SL stance R: 2" L: 3"  Sit to stand: requires use of UE     Sensation: Light touch: numbness in ant knee into lower leg     Range of Motion: AROM/PROM  Knee Right Left   Flexion    110/122 122   Extension    -3/0 0         Lower Extremity Strength: MMT 5/5    Right LE Left LE   Knee extension:    4+ 5   Knee flexion:    5 5   Hip flexion:    4- 5   Hip abduction:     4 5   Hip adduction:    4 5   Hip extension:    3 5   Ankle dorsiflexion:    5 5         Edema: mild swelling in R knee     Joint Mobility: WNL patellar mobility     LE Flexibility:  Hamstrings: WNL  Piriformis: R: mod restriction, L: mod restriction  Quad: R: severe restriction, L: mod restriction      Soha received therapeutic exercises to develop strength, endurance, ROM and flexibility for 59 minutes including:     Bike x 10min-NP  gastroc S 2x1'-NP  BLE Shuttle press 2c 3x10  TKE w/ RSC 3x10  Standing hip ext 3x10  Step up on airex 3x10  SLS 3x20"  Quad Set 20x5" ankle on bolster  Heel slides x20 with 5" hold  SLR 3x10  SAQ 3x10 #2  Bridges " "3x10  SL hip abd 3x10  Clamshells 3x10 RTB  HS S 2x1'  Prone quad stretch 3x30"  Sit to stand 3x10 21"-NP    Gait training with SPC  2 x 1 lap-NP  Seated TKE with manual overpressure and DF stretch 10x5"-NP  Prone TKE w/ bolster 2x10-NP    Soha received the following manual therapy techniques for 1 minutes.  Grade III-IV knee ext mobilizations  STM to R quadriceps just superior to patella-NP  Scar massage over incision-NP  Patella mobilizations 4 directions grade 2-3-NP    Pt received ice to R knee after being cleared for contraindications for 10 minutes post-tx.     Written Home Exercises Provided: no new exercises provided this session  Pt demo good understanding of the education provided. Soha demonstrated good return demonstration of activities.     Education provided: importance of quad exercises at home to improve quad activation  Soha verbalized good understanding of education provided.   No spiritual or educational barriers to learning identified.    Assessment     Patient has attended 13 sessions of PT for R TKR. Treatment has consisted of strengthening, flexibility, manual therapy, balance and gait training, education, HEP. Patient is progressing well towards therapy goals. She has met all of her STG's and shows good progression towards LTG's. Patient shows improved ROM to WNL, improved strength of R LE and improved flexibility and gait pattern. Pt is currently ambulating community level distances, . She continues to have limitations with ambulation, stairs, ROM, Quad flexibility and transfers. Recommend continuation of therapy to improve function and gait pattern.    Patient will continue to benefit from skilled outpatient physical therapy to address the remaining functional deficits, provide pt/family education, and to maximize pt's level of independence in the home and community environment. Rehab potential is good for stated goals.       GOALS:   Short Term Goals:  3 weeks  1. Patient will report " decreased knee pain to 4/10 in order to improve sit to stand transfer to Indep. Met 7/31/18  2. Patient will increase knee ROM to 0-115 degrees in order to show improved functional mobility. Met 7/31/18  3. Patient will be independent and consistent with issued HEP in order to show carryover between therapy sessions. Met 7/31/18     Long Term Goals: 6 weeks  1. Patient will report decreased knee pain to 0/10 in order to enable return to I ADLs. Progressing  2. Patient will improve knee ROM to 0-120 degrees to enable community ambulation with use of SC. Met 7/31/18  3. Patient will improve LE strength to 5/5 or greater to enable stairs with reciprocal pattern. Progressing  4. Patient will report 80% improvement in function to demonstrate an increase in LE function and mobility in home and community environment.  Progressing-reports 75% improvement  5. Patient will be independent with updated HEP to maintain gains following discharge with therapy. Progressing       Plan     Certification Period: 7/31/2018 to 9/10/18    Outpatient Physical Therapy 2 times weekly for 6 weeks to include the following interventions: Aquatic Therapy, Electrical Stimulation prn, Gait Training, Manual Therapy, Moist Heat/ Ice, Neuromuscular Re-ed, Patient Education, Self Care, Therapeutic Activites, Therapeutic Exercise, Ultrasound and dry needling prn.     Lien Bush, PT

## 2018-07-31 ENCOUNTER — CLINICAL SUPPORT (OUTPATIENT)
Dept: REHABILITATION | Facility: HOSPITAL | Age: 63
End: 2018-07-31
Payer: COMMERCIAL

## 2018-07-31 DIAGNOSIS — M17.11 PRIMARY OSTEOARTHRITIS OF RIGHT KNEE: ICD-10-CM

## 2018-07-31 DIAGNOSIS — G89.29 CHRONIC PAIN OF RIGHT KNEE: Primary | ICD-10-CM

## 2018-07-31 DIAGNOSIS — Z96.651 STATUS POST TOTAL RIGHT KNEE REPLACEMENT: ICD-10-CM

## 2018-07-31 DIAGNOSIS — M25.561 CHRONIC PAIN OF RIGHT KNEE: Primary | ICD-10-CM

## 2018-07-31 PROCEDURE — 97110 THERAPEUTIC EXERCISES: CPT

## 2018-08-06 ENCOUNTER — CLINICAL SUPPORT (OUTPATIENT)
Dept: REHABILITATION | Facility: HOSPITAL | Age: 63
End: 2018-08-06
Payer: COMMERCIAL

## 2018-08-06 DIAGNOSIS — Z12.39 BREAST CANCER SCREENING: ICD-10-CM

## 2018-08-06 DIAGNOSIS — G89.29 CHRONIC PAIN OF RIGHT KNEE: Primary | ICD-10-CM

## 2018-08-06 DIAGNOSIS — M17.11 PRIMARY OSTEOARTHRITIS OF RIGHT KNEE: ICD-10-CM

## 2018-08-06 DIAGNOSIS — M25.561 CHRONIC PAIN OF RIGHT KNEE: Primary | ICD-10-CM

## 2018-08-06 DIAGNOSIS — Z96.651 STATUS POST TOTAL RIGHT KNEE REPLACEMENT: ICD-10-CM

## 2018-08-06 PROCEDURE — 97110 THERAPEUTIC EXERCISES: CPT

## 2018-08-06 NOTE — PROGRESS NOTES
"                                                  Physical Therapy Reassessment Note     Date: 08/06/2018  Name: Soha Wheatley  Clinic Number: [id number]  Diagnosis:   Encounter Diagnoses   Name Primary?    Status post total right knee replacement     Primary osteoarthritis of right knee     Chronic pain of right knee Yes     Physician: Waylon Farris MD    Physician Orders: PT Eval and Treat   Medical Diagnosis: R knee OA  Evaluation Date: 6/19/2018  Authorization period Expiration: 12/31/18  Plan of Care Certification Period: 9/10/18     Visit #/Visits authorized: 14/20  Time In: 2:10 pm  Time Out: 3:15 pm   Billable time: 23 min    Precautions: Standard    Subjective     Pt reports: knee is stiff 2* sitting a lot at work today    Pain: R knee  Current: 0/10        Objective       Gait: mild antalgic gt with SC     Functional Mobility:  Squat: dysfunctional  SL stance R: 2" L: 3"  Sit to stand: requires use of UE     Sensation: Light touch: numbness in ant knee into lower leg     Range of Motion: AROM/PROM  Knee Right Left   Flexion    110/122 122   Extension    -3/0 0         Lower Extremity Strength: MMT 5/5    Right LE Left LE   Knee extension:    4+ 5   Knee flexion:    5 5   Hip flexion:    4- 5   Hip abduction:     4 5   Hip adduction:    4 5   Hip extension:    3 5   Ankle dorsiflexion:    5 5             Soha received therapeutic exercises to develop strength, endurance, ROM and flexibility for 55 minutes including:     Bike x 5 min  gastroc S 2x1'  BLE Shuttle press 2c 3x10  TKE w/ RSC 3x10  Standing hip ext 3x10  Standing HS curl #2 2x10  Step up on airex 3x10  SLS 3x20"  Quad Set 20x5" ankle on bolster  Heel slides x20 with 5" hold  SLR 3x10 #1  SAQ 3x10 #2  Bridges 3x10  SL hip abd 3x10 #1  Clamshells 3x10 RTB  HS S 2x1'  Prone quad stretch 10x10"  Sit to stand 3x10 21"-NP    Gait training with SPC  2 x 1 lap-NP  Seated TKE with manual overpressure and DF stretch 10x5"-NP  Prone TKE w/ bolster " 2x10-NP    Soha received the following manual therapy techniques for 0 minutes.  Grade III-IV knee ext mobilizations  STM to R quadriceps just superior to patella-NP  Scar massage over incision-NP  Patella mobilizations 4 directions grade 2-3-NP    Pt received ice to R knee after being cleared for contraindications for 10 minutes post-tx.     Written Home Exercises Provided: no new exercises provided this session  Pt demo good understanding of the education provided. Soha demonstrated good return demonstration of activities.     Education provided: importance of quad exercises at home to improve quad activation  Soha verbalized good understanding of education provided.   No spiritual or educational barriers to learning identified.    Assessment     PT edgardo quan well w/o adverse reaction. Cont w/ decreased quad strength to perform full AROM knee ext. Added resistance to SLR and SL abd. Added standing HS curl for strengthening. Cont to benefit from skilled PT.    Patient will continue to benefit from skilled outpatient physical therapy to address the remaining functional deficits, provide pt/family education, and to maximize pt's level of independence in the home and community environment. Rehab potential is good for stated goals.       GOALS:   Short Term Goals:  3 weeks  1. Patient will report decreased knee pain to 4/10 in order to improve sit to stand transfer to [facility]. Met 7/31/18  2. Patient will increase knee ROM to 0-115 degrees in order to show improved functional mobility. Met 7/31/18  3. Patient will be independent and consistent with issued HEP in order to show carryover between therapy sessions. Met 7/31/18     Long Term Goals: 6 weeks  1. Patient will report decreased knee pain to 0/10 in order to enable return to I ADLs. Progressing  2. Patient will improve knee ROM to 0-120 degrees to enable community ambulation with use of SC. Met 7/31/18  3. Patient will improve LE strength to 5/5 or greater to  enable stairs with reciprocal pattern. Progressing  4. Patient will report 80% improvement in function to demonstrate an increase in LE function and mobility in home and community environment.  Progressing-reports 75% improvement  5. Patient will be independent with updated HEP to maintain gains following discharge with therapy. Progressing       Plan     Certification Period: 8/6/2018 to 9/10/18    Outpatient Physical Therapy 2 times weekly for 6 weeks to include the following interventions: Aquatic Therapy, Electrical Stimulation prn, Gait Training, Manual Therapy, Moist Heat/ Ice, Neuromuscular Re-ed, Patient Education, Self Care, Therapeutic Activites, Therapeutic Exercise, Ultrasound and dry needling prn.     Kiara Bermudez, PTA

## 2018-08-08 ENCOUNTER — CLINICAL SUPPORT (OUTPATIENT)
Dept: REHABILITATION | Facility: HOSPITAL | Age: 63
End: 2018-08-08
Payer: COMMERCIAL

## 2018-08-08 DIAGNOSIS — M17.11 PRIMARY OSTEOARTHRITIS OF RIGHT KNEE: ICD-10-CM

## 2018-08-08 DIAGNOSIS — G89.29 CHRONIC PAIN OF RIGHT KNEE: Primary | ICD-10-CM

## 2018-08-08 DIAGNOSIS — M25.561 CHRONIC PAIN OF RIGHT KNEE: Primary | ICD-10-CM

## 2018-08-08 DIAGNOSIS — Z96.651 STATUS POST TOTAL RIGHT KNEE REPLACEMENT: ICD-10-CM

## 2018-08-08 PROCEDURE — 97110 THERAPEUTIC EXERCISES: CPT

## 2018-08-08 NOTE — PROGRESS NOTES
"                                                  Physical Therapy Reassessment Note     Date: 08/08/2018  Name: Soha Wheatley  Clinic Number: [id number]  Diagnosis:   Encounter Diagnoses   Name Primary?    Status post total right knee replacement     Primary osteoarthritis of right knee     Chronic pain of right knee Yes     Physician: Waylon Farris MD    Physician Orders: PT Eval and Treat   Medical Diagnosis: R knee OA  Evaluation Date: 6/19/2018  Authorization period Expiration: 12/31/18  Plan of Care Certification Period: 9/10/18     Visit #/Visits authorized: 15/20  Time In: 2:00 pm  Time Out: 3:10 pm   Billable time: 60 min    Precautions: Standard    Subjective     Pt reports: LE feels heavy    Pain: R knee  Current: 0/10        Objective         Soha received therapeutic exercises to develop strength, endurance, ROM and flexibility for 60 minutes including:     Bike x 5 min  gastroc S 2x1'  R LE Shuttle press 2c 2x10  Squat w/ ball on wall 1x10  TKE w/ RSC 3x10  Standing hip ext 3x10 RTB  Standing HS curl #2 2x10  Step up on airex 3x10  SLS 3x30"  Heel raises 2x10  Quad Set 20x5" ankle on bolster  Heel slides x20 with 5" hold  SLR 3x10 #1  SAQ 3x10 #2  Bridges 3x10  SL hip abd 3x10 #1  Clamshells 3x10 RTB  HS S 2x1'  Prone quad stretch 10x10"-NT  Sit to stand 3x10 21"-NP    Gait training with SPC  2 x 1 lap-NP  Seated TKE with manual overpressure and DF stretch 10x5"-NP  Prone TKE w/ bolster 2x10-NP    Soha received the following manual therapy techniques for 0 minutes.  Grade III-IV knee ext mobilizations  STM to R quadriceps just superior to patella-NP  Scar massage over incision-NP  Patella mobilizations 4 directions grade 2-3-NP    Pt received ice to R knee after being cleared for contraindications for 10 minutes post-tx.     Written Home Exercises Provided: no new exercises provided this session  Pt demo good understanding of the education provided. Soha demonstrated good return demonstration " of activities.     Education provided: importance of quad exercises at home to improve quad activation  Soha verbalized good understanding of education provided.   No spiritual or educational barriers to learning identified.    Assessment     PT edgardo quan well w/o adverse reaction. Attempted mini squat on wall, only able to perform  only10 reps 2* L knee pain. Change shuttle to single leg to work on R LE strength.Cont to benefit from skilled PT.    Patient will continue to benefit from skilled outpatient physical therapy to address the remaining functional deficits, provide pt/family education, and to maximize pt's level of independence in the home and community environment. Rehab potential is good for stated goals.       GOALS:   Short Term Goals:  3 weeks  1. Patient will report decreased knee pain to 4/10 in order to improve sit to stand transfer to [facility]. Met 7/31/18  2. Patient will increase knee ROM to 0-115 degrees in order to show improved functional mobility. Met 7/31/18  3. Patient will be independent and consistent with issued HEP in order to show carryover between therapy sessions. Met 7/31/18     Long Term Goals: 6 weeks  1. Patient will report decreased knee pain to 0/10 in order to enable return to I ADLs. Progressing  2. Patient will improve knee ROM to 0-120 degrees to enable community ambulation with use of SC. Met 7/31/18  3. Patient will improve LE strength to 5/5 or greater to enable stairs with reciprocal pattern. Progressing  4. Patient will report 80% improvement in function to demonstrate an increase in LE function and mobility in home and community environment.  Progressing-reports 75% improvement  5. Patient will be independent with updated HEP to maintain gains following discharge with therapy. Progressing       Plan     Certification Period: 8/8/2018 to 9/10/18    Outpatient Physical Therapy 2 times weekly for 6 weeks to include the following interventions: Aquatic Therapy, Electrical  Stimulation prn, Gait Training, Manual Therapy, Moist Heat/ Ice, Neuromuscular Re-ed, Patient Education, Self Care, Therapeutic Activites, Therapeutic Exercise, Ultrasound and dry needling prn.     Kiara Bermudez, PTA

## 2018-08-10 DIAGNOSIS — M47.812 CERVICAL SPONDYLOSIS WITHOUT MYELOPATHY: ICD-10-CM

## 2018-08-10 DIAGNOSIS — M54.12 BRACHIAL NEURITIS OR RADICULITIS: ICD-10-CM

## 2018-08-10 DIAGNOSIS — M50.30 DEGENERATION OF CERVICAL INTERVERTEBRAL DISC: ICD-10-CM

## 2018-08-10 DIAGNOSIS — M54.2 CERVICALGIA: ICD-10-CM

## 2018-08-10 RX ORDER — TIZANIDINE 4 MG/1
4 TABLET ORAL EVERY 8 HOURS PRN
Qty: 270 TABLET | Refills: 0 | Status: SHIPPED | OUTPATIENT
Start: 2018-08-10 | End: 2019-04-01 | Stop reason: SDUPTHER

## 2018-08-10 RX ORDER — GABAPENTIN 600 MG/1
600 TABLET ORAL 3 TIMES DAILY
Qty: 270 TABLET | Refills: 0 | Status: SHIPPED | OUTPATIENT
Start: 2018-08-10 | End: 2018-10-26 | Stop reason: SDUPTHER

## 2018-08-13 ENCOUNTER — CLINICAL SUPPORT (OUTPATIENT)
Dept: REHABILITATION | Facility: HOSPITAL | Age: 63
End: 2018-08-13
Payer: COMMERCIAL

## 2018-08-13 DIAGNOSIS — G89.29 CHRONIC PAIN OF RIGHT KNEE: Primary | ICD-10-CM

## 2018-08-13 DIAGNOSIS — M17.11 PRIMARY OSTEOARTHRITIS OF RIGHT KNEE: ICD-10-CM

## 2018-08-13 DIAGNOSIS — M25.561 CHRONIC PAIN OF RIGHT KNEE: Primary | ICD-10-CM

## 2018-08-13 DIAGNOSIS — Z96.651 STATUS POST TOTAL RIGHT KNEE REPLACEMENT: ICD-10-CM

## 2018-08-13 PROCEDURE — 97110 THERAPEUTIC EXERCISES: CPT

## 2018-08-13 NOTE — PROGRESS NOTES
"                                                  Physical Therapy Reassessment Note     Date: 08/13/2018  Name: Soha Wheatley  Clinic Number: [id number]  Diagnosis:   Encounter Diagnoses   Name Primary?    Status post total right knee replacement     Primary osteoarthritis of right knee     Chronic pain of right knee Yes     Physician: Waylon Farris MD    Physician Orders: PT Eval and Treat   Medical Diagnosis: R knee OA  Evaluation Date: 6/19/2018  Authorization period Expiration: 12/31/18  Plan of Care Certification Period: 9/10/18     Visit #/Visits authorized: 16/20  Time In: 2:00 pm  Time Out: 3:15 pm   Billable time: 30 min    Precautions: Standard    Subjective     Pt reports:R knee stiff and tired    Pain: R knee  Current: 0/10        Objective       Soha received therapeutic exercises to develop strength, endurance, ROM and flexibility for 65 minutes including:     Bike x 8 min  gastroc S 2x1'  R LE Shuttle press 2c 3x10  Squat w/ ball on wall 2x10  TKE w/ RSC 3x10   Standing hip ext 3x10 RTB  Standing HS curl #2 2x10  Step up on airex on L2 step 3x10  SLS 3x30"  Heel raises on airex 2x10  Quad Set 20x5" ankle on bolster  Heel slides x20 with 5" hold  SLR 3x10 #1  SAQ 3x10 #2  Bridges 3x10  SL hip abd 3x10 #1  Clamshells 3x10 RTB  HS S 2x1'  Prone quad stretch 10x10"-NT  Sit to stand 3x10 21"-NP    Gait training with SPC  2 x 1 lap-NP  Seated TKE with manual overpressure and DF stretch 10x5"-NP  Prone TKE w/ bolster 2x10-NP    Soha received the following manual therapy techniques for 0 minutes.  Grade III-IV knee ext mobilizations  STM to R quadriceps just superior to patella-NP  Scar massage over incision-NP  Patella mobilizations 4 directions grade 2-3-NP    Pt received ice to R knee after being cleared for contraindications for 10 minutes post-tx.     Written Home Exercises Provided: no new exercises provided this session  Pt demo good understanding of the education provided. Soha demonstrated " good return demonstration of activities.     Education provided: importance of quad exercises at home to improve quad activation  Soha verbalized good understanding of education provided.   No spiritual or educational barriers to learning identified.    Assessment     PT edgardo quan well w/o adverse reaction.VC to shift even wt on B LE during wall squats. UE support needed for step ups. Cont w/ some quad weakness.  Cont to benefit from skilled PT.    Patient will continue to benefit from skilled outpatient physical therapy to address the remaining functional deficits, provide pt/family education, and to maximize pt's level of independence in the home and community environment. Rehab potential is good for stated goals.       GOALS:   Short Term Goals:  3 weeks  1. Patient will report decreased knee pain to 4/10 in order to improve sit to stand transfer to [facility]. Met 7/31/18  2. Patient will increase knee ROM to 0-115 degrees in order to show improved functional mobility. Met 7/31/18  3. Patient will be independent and consistent with issued HEP in order to show carryover between therapy sessions. Met 7/31/18     Long Term Goals: 6 weeks  1. Patient will report decreased knee pain to 0/10 in order to enable return to I ADLs. Progressing  2. Patient will improve knee ROM to 0-120 degrees to enable community ambulation with use of SC. Met 7/31/18  3. Patient will improve LE strength to 5/5 or greater to enable stairs with reciprocal pattern. Progressing  4. Patient will report 80% improvement in function to demonstrate an increase in LE function and mobility in home and community environment.  Progressing-reports 75% improvement  5. Patient will be independent with updated HEP to maintain gains following discharge with therapy. Progressing       Plan     Certification Period: 8/13/2018 to 9/10/18    Outpatient Physical Therapy 2 times weekly for 6 weeks to include the following interventions: Aquatic Therapy, Electrical  Stimulation prn, Gait Training, Manual Therapy, Moist Heat/ Ice, Neuromuscular Re-ed, Patient Education, Self Care, Therapeutic Activites, Therapeutic Exercise, Ultrasound and dry needling prn.     Kiara Bermudez, PTA

## 2018-08-15 ENCOUNTER — CLINICAL SUPPORT (OUTPATIENT)
Dept: REHABILITATION | Facility: HOSPITAL | Age: 63
End: 2018-08-15
Payer: COMMERCIAL

## 2018-08-15 DIAGNOSIS — M17.11 PRIMARY OSTEOARTHRITIS OF RIGHT KNEE: ICD-10-CM

## 2018-08-15 DIAGNOSIS — Z96.651 STATUS POST TOTAL RIGHT KNEE REPLACEMENT: ICD-10-CM

## 2018-08-15 DIAGNOSIS — G89.29 CHRONIC PAIN OF RIGHT KNEE: Primary | ICD-10-CM

## 2018-08-15 DIAGNOSIS — M25.561 CHRONIC PAIN OF RIGHT KNEE: Primary | ICD-10-CM

## 2018-08-15 PROCEDURE — 97110 THERAPEUTIC EXERCISES: CPT

## 2018-08-15 NOTE — PROGRESS NOTES
"                                                  Physical Therapy Reassessment Note     Date: 08/15/2018  Name: Soha Wheatley  Clinic Number: [id number]  Diagnosis:   Encounter Diagnoses   Name Primary?    Status post total right knee replacement     Primary osteoarthritis of right knee     Chronic pain of right knee Yes     Physician: Waylon Farris MD    Physician Orders: PT Eval and Treat   Medical Diagnosis: R knee OA  Evaluation Date: 6/19/2018  Authorization period Expiration: 12/31/18  Plan of Care Certification Period: 9/10/18     Visit #/Visits authorized: 17/20  Time In: 2:02 pm  Time Out: 3:20 pm   Billable time: 68 min    Precautions: Standard    Subjective     Pt reports:R knee pain anterior lateral    Pain: R knee  Current: 6/10        Objective       Soha received therapeutic exercises to develop strength, endurance, ROM and flexibility for 68 minutes including:     Bike x 8 min  gastroc S 2x1'  R LE Shuttle press 2c 3x10  Squat w/ ball on wall 2x10- NT  TKE w/ MSC 3x10   Standing hip ext 3x10 RTB  Standing HS curl #2 2x10  Step up on airex on L2 step 3x10  SLS 3x30"  Heel raises on airex 2x10  Quad Set 20x5" ankle on bolster  Heel slides x20 with 5" hold-NT  SLR 3x10 #1  SAQ 3x10 #2  Bridges 3x10  SL hip abd 3x10 #1  Clamshells 3x10 RTB  HS S 2x1'  Prone quad stretch 10x10"-NT      Soha received the following manual therapy techniques for80 minutes.  Grade III-IV knee ext mobilizations- NT  STM R quad and HS w/ therastick      Pt received ice to R knee after being cleared for contraindications for 10 minutes post-tx.     Written Home Exercises Provided: no new exercises provided this session  Pt demo good understanding of the education provided. Soha demonstrated good return demonstration of activities.     Education provided: importance of quad exercises at home to improve quad activation  Soha verbalized good understanding of education provided.   No spiritual or educational barriers to " learning identified.    Assessment     PT edgardo quan well w/o adverse reaction. did not perform wall squats 2* increased pain today. Increased knee and quad pain today. Performed STM to quad and HS to break up soft tissue restrictions.   Cont to benefit from skilled PT.    Patient will continue to benefit from skilled outpatient physical therapy to address the remaining functional deficits, provide pt/family education, and to maximize pt's level of independence in the home and community environment. Rehab potential is good for stated goals.       GOALS:   Short Term Goals:  3 weeks  1. Patient will report decreased knee pain to 4/10 in order to improve sit to stand transfer to Indep. Met 7/31/18  2. Patient will increase knee ROM to 0-115 degrees in order to show improved functional mobility. Met 7/31/18  3. Patient will be independent and consistent with issued HEP in order to show carryover between therapy sessions. Met 7/31/18     Long Term Goals: 6 weeks  1. Patient will report decreased knee pain to 0/10 in order to enable return to I ADLs. Progressing  2. Patient will improve knee ROM to 0-120 degrees to enable community ambulation with use of SC. Met 7/31/18  3. Patient will improve LE strength to 5/5 or greater to enable stairs with reciprocal pattern. Progressing  4. Patient will report 80% improvement in function to demonstrate an increase in LE function and mobility in home and community environment.  Progressing-reports 75% improvement  5. Patient will be independent with updated HEP to maintain gains following discharge with therapy. Progressing       Plan     Certification Period: 8/15/2018 to 9/10/18    Outpatient Physical Therapy 2 times weekly for 6 weeks to include the following interventions: Aquatic Therapy, Electrical Stimulation prn, Gait Training, Manual Therapy, Moist Heat/ Ice, Neuromuscular Re-ed, Patient Education, Self Care, Therapeutic Activites, Therapeutic Exercise, Ultrasound and dry  maría elena bray.     Kiara Bermudez, PTA

## 2018-08-20 ENCOUNTER — CLINICAL SUPPORT (OUTPATIENT)
Dept: REHABILITATION | Facility: HOSPITAL | Age: 63
End: 2018-08-20
Payer: COMMERCIAL

## 2018-08-20 DIAGNOSIS — M25.561 CHRONIC PAIN OF RIGHT KNEE: Primary | ICD-10-CM

## 2018-08-20 DIAGNOSIS — M17.11 PRIMARY OSTEOARTHRITIS OF RIGHT KNEE: ICD-10-CM

## 2018-08-20 DIAGNOSIS — Z96.651 STATUS POST TOTAL RIGHT KNEE REPLACEMENT: ICD-10-CM

## 2018-08-20 DIAGNOSIS — G89.29 CHRONIC PAIN OF RIGHT KNEE: Primary | ICD-10-CM

## 2018-08-20 PROCEDURE — 97110 THERAPEUTIC EXERCISES: CPT

## 2018-08-20 PROCEDURE — 97140 MANUAL THERAPY 1/> REGIONS: CPT

## 2018-08-20 NOTE — PROGRESS NOTES
"                                                  Physical Therapy Reassessment Note     Date: 08/20/2018  Name: Soha Wheatley  Clinic Number: [id number]  Diagnosis:   Encounter Diagnoses   Name Primary?    Status post total right knee replacement     Primary osteoarthritis of right knee     Chronic pain of right knee Yes     Physician: Waylon Farris MD    Physician Orders: PT Eval and Treat   Medical Diagnosis: R knee OA  Evaluation Date: 6/19/2018  Authorization period Expiration: 12/31/18  Plan of Care Certification Period: 9/10/18     Visit #/Visits authorized: 18/20  Time In: 2:00 pm  Time Out: 3:04 pm   Billable time: 53 min    Precautions: Standard    Subjective     Pt reports:still having R knee pain, seeing MD thursday    Pain: R knee  Current: 6/10        Objective       Soha received therapeutic exercises to develop strength, endurance, ROM and flexibility for 43 minutes including:     Bike x 5 min  gastroc S 2x1'  R LE Shuttle press 2c 3x10-NT  Squat w/ ball on wall 2x10- NT  TKE w/ MSC 3x10   Standing hip ext 3x10 RTB ( no resistance)  Standing HS curl #2 2x10 ( no wt)  Step up on airex on L2 step 3x10 - NT  SLS 1x30"  Heel raises on airex 2x10- NT  Quad Set 20x5" ankle on bolster  Heel slides x20 with 5" hold-NT  SLR 3x10 #1 (no wt today)  SAQ 3x10 #2 (no wt today)  Bridges 3x10  SL hip abd 3x10 #1(no wt today)  Clamshells 3x10 RTB ( no resistance)  HS S 2x1'  Prone quad stretch 10x10"-NT      Soha received the following manual therapy techniques for 10 minutes.  Grade III-IV knee ext mobilizations- NT  STM R quad  w/ thera stick  Cupping R knee      Pt received ice to R knee after being cleared for contraindications for 10 minutes post-tx.     Written Home Exercises Provided: no new exercises provided this session  Pt demo good understanding of the education provided. Soha demonstrated good return demonstration of activities.     Education provided: importance of quad exercises at home to " improve quad activation  Soha verbalized good understanding of education provided.   No spiritual or educational barriers to learning identified.    Assessment     PT edgardo quan well w/o adverse reaction. Pt states she is still having knee pain since last wed. States the thera stick helped as well added cupping to break up soft tissue restrictions.some quan not performed as well as not resistance used today. Only able to perform 1 set of SLS 2* R knee pain. Able to flex R knee to same ROM as L. Cont to benefit from skilled PT.    Patient will continue to benefit from skilled outpatient physical therapy to address the remaining functional deficits, provide pt/family education, and to maximize pt's level of independence in the home and community environment. Rehab potential is good for stated goals.       GOALS:   Short Term Goals:  3 weeks  1. Patient will report decreased knee pain to 4/10 in order to improve sit to stand transfer to Indep. Met 7/31/18  2. Patient will increase knee ROM to 0-115 degrees in order to show improved functional mobility. Met 7/31/18  3. Patient will be independent and consistent with issued HEP in order to show carryover between therapy sessions. Met 7/31/18     Long Term Goals: 6 weeks  1. Patient will report decreased knee pain to 0/10 in order to enable return to I ADLs. Progressing  2. Patient will improve knee ROM to 0-120 degrees to enable community ambulation with use of SC. Met 7/31/18  3. Patient will improve LE strength to 5/5 or greater to enable stairs with reciprocal pattern. Progressing  4. Patient will report 80% improvement in function to demonstrate an increase in LE function and mobility in home and community environment.  Progressing-reports 75% improvement  5. Patient will be independent with updated HEP to maintain gains following discharge with therapy. Progressing       Plan     Certification Period: 8/20/2018 to 9/10/18    Outpatient Physical Therapy 2 times weekly  for 6 weeks to include the following interventions: Aquatic Therapy, Electrical Stimulation prn, Gait Training, Manual Therapy, Moist Heat/ Ice, Neuromuscular Re-ed, Patient Education, Self Care, Therapeutic Activites, Therapeutic Exercise, Ultrasound and dry needling prn.     Kiara Bermudez, PTA

## 2018-08-22 ENCOUNTER — CLINICAL SUPPORT (OUTPATIENT)
Dept: REHABILITATION | Facility: HOSPITAL | Age: 63
End: 2018-08-22
Payer: COMMERCIAL

## 2018-08-22 DIAGNOSIS — G89.29 CHRONIC PAIN OF RIGHT KNEE: Primary | ICD-10-CM

## 2018-08-22 DIAGNOSIS — M25.561 CHRONIC PAIN OF RIGHT KNEE: Primary | ICD-10-CM

## 2018-08-22 DIAGNOSIS — Z96.651 STATUS POST TOTAL RIGHT KNEE REPLACEMENT: ICD-10-CM

## 2018-08-22 DIAGNOSIS — M17.11 PRIMARY OSTEOARTHRITIS OF RIGHT KNEE: ICD-10-CM

## 2018-08-22 PROCEDURE — 97110 THERAPEUTIC EXERCISES: CPT

## 2018-08-22 NOTE — PROGRESS NOTES
"                                                  Physical Therapy Reassessment Note     Date: 08/22/2018  Name: Soha Wheatley  Clinic Number: [id number]  Diagnosis:   Encounter Diagnoses   Name Primary?    Status post total right knee replacement     Primary osteoarthritis of right knee     Chronic pain of right knee Yes     Physician: Waylon Farrsi MD    Physician Orders: PT Eval and Treat   Medical Diagnosis: R knee OA  Evaluation Date: 6/19/2018  Authorization period Expiration: 12/31/18  Plan of Care Certification Period: 9/10/18     Visit #/Visits authorized: 19/20  Time In: 2:00 pm  Time Out: 3:00 pm   Billable time: 30 min    Precautions: Standard    Subjective     Pt reports: R knee is feeling better, just heavy  Pain: R knee  Current: 0/10        Objective       Soha received therapeutic exercises to develop strength, endurance, ROM and flexibility for 50 minutes including:     Bike x 5 min  gastroc S 2x1'  R LE Shuttle press 2c 3x10  Squat w/ ball on wall 2x10- NT  TKE w/ MSC 3x10   Standing hip ext 3x10 RTB   Standing HS curl #2 2x10   Step up on airex on L2 step 3x10   SLS 1x30"  Heel raises on airex 2x10  Quad Set 20x5" ankle on bolster  Heel slides x20 with 5" hold-NT  SLR 3x10 #1   SAQ 3x10 #2  Bridges 3x10  SL hip abd 3x10 #1  Clamshells 3x10 RTB   HS S 2x1'  Prone quad stretch 10x10"-NT      Soha received the following manual therapy techniques for 10 minutes.  Grade III-IV knee ext mobilizations- NT  STM R quad  w/ thera stick  Cupping R knee      Pt received ice to R knee after being cleared for contraindications for 10 minutes post-tx.     Written Home Exercises Provided: no new exercises provided this session  Pt demo good understanding of the education provided. Soha demonstrated good return demonstration of activities.     Education provided: importance of quad exercises at home to improve quad activation  Soha verbalized good understanding of education provided.   No spiritual or " educational barriers to learning identified.    Assessment     PT edgardo quan well w/o adverse reaction. No knee pain today but knee still feels heavy. Added resistance back today. Next session is last on poc. Pt would like to cont therapy if approved.  Cont to benefit from skilled PT.    Patient will continue to benefit from skilled outpatient physical therapy to address the remaining functional deficits, provide pt/family education, and to maximize pt's level of independence in the home and community environment. Rehab potential is good for stated goals.       GOALS:   Short Term Goals:  3 weeks  1. Patient will report decreased knee pain to 4/10 in order to improve sit to stand transfer to Indep. Met 7/31/18  2. Patient will increase knee ROM to 0-115 degrees in order to show improved functional mobility. Met 7/31/18  3. Patient will be independent and consistent with issued HEP in order to show carryover between therapy sessions. Met 7/31/18     Long Term Goals: 6 weeks  1. Patient will report decreased knee pain to 0/10 in order to enable return to I ADLs. Progressing  2. Patient will improve knee ROM to 0-120 degrees to enable community ambulation with use of SC. Met 7/31/18  3. Patient will improve LE strength to 5/5 or greater to enable stairs with reciprocal pattern. Progressing  4. Patient will report 80% improvement in function to demonstrate an increase in LE function and mobility in home and community environment.  Progressing-reports 75% improvement  5. Patient will be independent with updated HEP to maintain gains following discharge with therapy. Progressing       Plan     Certification Period: 8/22/2018 to 9/10/18    Outpatient Physical Therapy 2 times weekly for 6 weeks to include the following interventions: Aquatic Therapy, Electrical Stimulation prn, Gait Training, Manual Therapy, Moist Heat/ Ice, Neuromuscular Re-ed, Patient Education, Self Care, Therapeutic Activites, Therapeutic Exercise,  Ultrasound and dry needling prn.     Kiara Bermudez, PTA

## 2018-08-23 ENCOUNTER — OFFICE VISIT (OUTPATIENT)
Dept: ORTHOPEDICS | Facility: CLINIC | Age: 63
End: 2018-08-23
Payer: COMMERCIAL

## 2018-08-23 VITALS
BODY MASS INDEX: 33.77 KG/M2 | SYSTOLIC BLOOD PRESSURE: 98 MMHG | WEIGHT: 235.88 LBS | HEART RATE: 74 BPM | DIASTOLIC BLOOD PRESSURE: 59 MMHG | HEIGHT: 70 IN

## 2018-08-23 DIAGNOSIS — Z96.651 STATUS POST TOTAL RIGHT KNEE REPLACEMENT: Primary | ICD-10-CM

## 2018-08-23 PROCEDURE — 99999 PR PBB SHADOW E&M-EST. PATIENT-LVL III: CPT | Mod: PBBFAC,,, | Performed by: ORTHOPAEDIC SURGERY

## 2018-08-23 PROCEDURE — 99024 POSTOP FOLLOW-UP VISIT: CPT | Mod: S$GLB,,, | Performed by: ORTHOPAEDIC SURGERY

## 2018-08-23 NOTE — PROGRESS NOTES
Patient is here today for 12 week PO FU of her TKA on 5/23/18. She is progressing well.      We will allow her to return as needed for repeat radiographs and certainly for any other questions.    xrays are  reviewed today with good alignment.    We will check repeat radiographs in 1 year.

## 2018-08-27 ENCOUNTER — CLINICAL SUPPORT (OUTPATIENT)
Dept: REHABILITATION | Facility: HOSPITAL | Age: 63
End: 2018-08-27
Payer: COMMERCIAL

## 2018-08-27 DIAGNOSIS — Z96.651 STATUS POST TOTAL RIGHT KNEE REPLACEMENT: ICD-10-CM

## 2018-08-27 DIAGNOSIS — M25.561 CHRONIC PAIN OF RIGHT KNEE: Primary | ICD-10-CM

## 2018-08-27 DIAGNOSIS — G89.29 CHRONIC PAIN OF RIGHT KNEE: Primary | ICD-10-CM

## 2018-08-27 DIAGNOSIS — M17.11 PRIMARY OSTEOARTHRITIS OF RIGHT KNEE: ICD-10-CM

## 2018-08-27 PROCEDURE — 97110 THERAPEUTIC EXERCISES: CPT

## 2018-08-27 NOTE — PLAN OF CARE
"                                                  Physical Therapy Reassessment Note     Date: 08/27/2018  Name: Soha Wheatley  Clinic Number: [id number]  Diagnosis:   Encounter Diagnoses   Name Primary?    Status post total right knee replacement     Primary osteoarthritis of right knee     Chronic pain of right knee Yes     Physician: Waylon Farris MD    Physician Orders: PT Eval and Treat   Medical Diagnosis: R knee OA  Evaluation Date: 6/19/2018, re-eval 8/27/18  Authorization period Expiration: 12/31/18  Plan of Care Certification Period: 9/10/18, 8/24/18     Visit #/Visits authorized: 20/20  Time In: 2:00 pm  Time Out: 3:03 pm    Precautions: Standard    Subjective     Pt reports: R knee just feels heavy, pt is having L knee pain. Pt reports Dr. [facility] wants her to continue PT x 4 more weeks. Pt reports 75% improvement with PT intervention.   Pain: R knee  Current: 0/10    FOTO Knee Survey:  49% limitation  Objective     Range of Motion: AROM  Knee Right Left   Flexion    122 122   Extension    -3 0      Lower Extremity Strength: MMT 5/5    Right LE Left LE   Knee extension:    4+ 5   Knee flexion:    5 5   Hip flexion:    4+ 5   Hip abduction:     4 5   Hip adduction:    5 5   Hip extension:    3 5   Ankle dorsiflexion:    5 5      Soha received therapeutic exercises to develop strength, endurance, ROM and flexibility for 53 minutes including:     Bike x 5 min  gastroc S 2x1'  R LE Shuttle press 2c 3x10  Squat w/ ball on wall 2x10- NT  TKE w/ MSC 3x10   Steamboats 2x10 RTB   Standing HS curl #2 2x10 - NT  Step up on airex on L2 step 3x10   SLS 2x30"  Heel raises on airex 2x10  Quad Set 20x5" ankle on bolster  Heel slides x20 with 5" hold-NT  SLR 3x10 #1   SAQ 3x10 #2  Bridges 3x10  SL hip abd 3x10 #1 - NT  Clamshells 3x10 RTB - NT  HS S 2x1'  Prone quad stretch 10x10"-NT  Mini squats at EOT 2x10    Soha received the following manual therapy techniques for 00 minutes.  Grade III-IV knee ext " mobilizations- NT  STM R quad  w/ thera stick - NT  Cupping R knee - NT      Pt received ice to R knee after being cleared for contraindications for 10 minutes post-tx.     Written Home Exercises Provided: no new exercises provided this session  Pt demo good understanding of the education provided. Soha demonstrated good return demonstration of activities.     Education provided: importance of quad exercises at home to improve quad activation  Soha verbalized good understanding of education provided.   No spiritual or educational barriers to learning identified.    Assessment     PT edgardo quan well w/o adverse reaction. No knee pain today but reports R knee still feels heavy. Pt presents with improved strength and ROM, but continues with deficits that subsequently effect functional activities. Pt continues to benefit from skilled PT to progress towards unmet LTGs.    Patient will continue to benefit from skilled outpatient physical therapy to address the remaining functional deficits, provide pt/family education, and to maximize pt's level of independence in the home and community environment. Rehab potential is good for stated goals.       GOALS:   Short Term Goals:  3 weeks  1. Patient will report decreased knee pain to 4/10 in order to improve sit to stand transfer to [facility]. Met 7/31/18  2. Patient will increase knee ROM to 0-115 degrees in order to show improved functional mobility. Met 7/31/18  3. Patient will be independent and consistent with issued HEP in order to show carryover between therapy sessions. Met 7/31/18     Long Term Goals: 6 weeks  1. Patient will report decreased knee pain to 0/10 in order to enable return to I ADLs. Met 8/27/18  2. Patient will improve knee ROM to 0-120 degrees to enable community ambulation with use of SC. Met 7/31/18  3. Patient will improve LE strength to 5/5 or greater to enable stairs with reciprocal pattern. Progressing  4. Patient will report 80% improvement in function  to demonstrate an increase in LE function and mobility in home and community environment.  Progressing-reports 75% improvement  5. Patient will be independent with updated HEP to maintain gains following discharge with therapy. Progressing       Plan     Continue outpatient Physical Therapy 2 times weekly for 4 weeks to include the following interventions: Aquatic Therapy, Electrical Stimulation prn, Gait Training, Manual Therapy, Moist Heat/ Ice, Neuromuscular Re-ed, Patient Education, Self Care, Therapeutic Activites, Therapeutic Exercise, Ultrasound and dry needling prn.     Eddie Louis, PT

## 2018-08-27 NOTE — PROGRESS NOTES
"                                                  Physical Therapy Reassessment Note     Date: 08/27/2018  Name: Soha Wheatley  Clinic Number: [id number]  Diagnosis:   Encounter Diagnoses   Name Primary?    Status post total right knee replacement     Primary osteoarthritis of right knee     Chronic pain of right knee Yes     Physician: Waylon Farris MD    Physician Orders: PT Eval and Treat   Medical Diagnosis: R knee OA  Evaluation Date: 6/19/2018, re-eval 8/27/18  Authorization period Expiration: 12/31/18  Plan of Care Certification Period: 9/10/18, 8/24/18     Visit #/Visits authorized: 20/20  Time In: 2:00 pm  Time Out: 3:03 pm    Precautions: Standard    Subjective     Pt reports: R knee just feels heavy, pt is having L knee pain. Pt reports Dr. [facility] wants her to continue PT x 4 more weeks. Pt reports 75% improvement with PT intervention.   Pain: R knee  Current: 0/10    FOTO Knee Survey:  49% limitation  Objective     Range of Motion: AROM  Knee Right Left   Flexion    122 122   Extension    -3 0      Lower Extremity Strength: MMT 5/5    Right LE Left LE   Knee extension:    4+ 5   Knee flexion:    5 5   Hip flexion:    4+ 5   Hip abduction:     4 5   Hip adduction:    5 5   Hip extension:    3 5   Ankle dorsiflexion:    5 5      Soha received therapeutic exercises to develop strength, endurance, ROM and flexibility for 53 minutes including:     Bike x 5 min  gastroc S 2x1'  R LE Shuttle press 2c 3x10  Squat w/ ball on wall 2x10- NT  TKE w/ MSC 3x10   Steamboats 2x10 RTB   Standing HS curl #2 2x10 - NT  Step up on airex on L2 step 3x10   SLS 2x30"  Heel raises on airex 2x10  Quad Set 20x5" ankle on bolster  Heel slides x20 with 5" hold-NT  SLR 3x10 #1   SAQ 3x10 #2  Bridges 3x10  SL hip abd 3x10 #1 - NT  Clamshells 3x10 RTB - NT  HS S 2x1'  Prone quad stretch 10x10"-NT  Mini squats at EOT 2x10    Soha received the following manual therapy techniques for 00 minutes.  Grade III-IV knee ext " mobilizations- NT  STM R quad  w/ thera stick - NT  Cupping R knee - NT      Pt received ice to R knee after being cleared for contraindications for 10 minutes post-tx.     Written Home Exercises Provided: no new exercises provided this session  Pt demo good understanding of the education provided. Soha demonstrated good return demonstration of activities.     Education provided: importance of quad exercises at home to improve quad activation  Soha verbalized good understanding of education provided.   No spiritual or educational barriers to learning identified.    Assessment     PT edgardo quan well w/o adverse reaction. No knee pain today but reports R knee still feels heavy. Pt presents with improved strength and ROM, but continues with deficits that subsequently effect functional activities. Pt continues to benefit from skilled PT to progress towards unmet LTGs.    Patient will continue to benefit from skilled outpatient physical therapy to address the remaining functional deficits, provide pt/family education, and to maximize pt's level of independence in the home and community environment. Rehab potential is good for stated goals.       GOALS:   Short Term Goals:  3 weeks  1. Patient will report decreased knee pain to 4/10 in order to improve sit to stand transfer to [facility]. Met 7/31/18  2. Patient will increase knee ROM to 0-115 degrees in order to show improved functional mobility. Met 7/31/18  3. Patient will be independent and consistent with issued HEP in order to show carryover between therapy sessions. Met 7/31/18     Long Term Goals: 6 weeks  1. Patient will report decreased knee pain to 0/10 in order to enable return to I ADLs. Met 8/27/18  2. Patient will improve knee ROM to 0-120 degrees to enable community ambulation with use of SC. Met 7/31/18  3. Patient will improve LE strength to 5/5 or greater to enable stairs with reciprocal pattern. Progressing  4. Patient will report 80% improvement in function  to demonstrate an increase in LE function and mobility in home and community environment.  Progressing-reports 75% improvement  5. Patient will be independent with updated HEP to maintain gains following discharge with therapy. Progressing       Plan     Continue outpatient Physical Therapy 2 times weekly for 4 weeks to include the following interventions: Aquatic Therapy, Electrical Stimulation prn, Gait Training, Manual Therapy, Moist Heat/ Ice, Neuromuscular Re-ed, Patient Education, Self Care, Therapeutic Activites, Therapeutic Exercise, Ultrasound and dry needling prn.     Eddie Louis, PT

## 2018-08-30 ENCOUNTER — CLINICAL SUPPORT (OUTPATIENT)
Dept: REHABILITATION | Facility: HOSPITAL | Age: 63
End: 2018-08-30
Payer: COMMERCIAL

## 2018-08-30 DIAGNOSIS — M25.561 CHRONIC PAIN OF RIGHT KNEE: Primary | ICD-10-CM

## 2018-08-30 DIAGNOSIS — Z96.651 STATUS POST TOTAL RIGHT KNEE REPLACEMENT: ICD-10-CM

## 2018-08-30 DIAGNOSIS — G89.29 CHRONIC PAIN OF RIGHT KNEE: Primary | ICD-10-CM

## 2018-08-30 DIAGNOSIS — M17.11 PRIMARY OSTEOARTHRITIS OF RIGHT KNEE: ICD-10-CM

## 2018-08-30 PROCEDURE — 97110 THERAPEUTIC EXERCISES: CPT

## 2018-08-30 NOTE — PROGRESS NOTES
"                                                  Physical Therapy Reassessment Note     Date: 08/30/2018  Name: Soha Wheatley  Clinic Number: [id number]  Diagnosis:   Encounter Diagnoses   Name Primary?    Status post total right knee replacement     Primary osteoarthritis of right knee     Chronic pain of right knee Yes     Physician: [facility], John L. Jr., MD    Physician Orders: PT Eval and Treat   Medical Diagnosis: R knee OA  Evaluation Date: 6/19/2018, re-eval 8/27/18  Authorization period Expiration: 12/31/18  Plan of Care Certification Period: 9/10/18, 8/24/18     Visit #/Visits authorized: 21  Time In: 1528  Time Out: 1633    Precautions: Standard    Subjective     Pt reports: no pain pre-tx but that her "knee feels weird." Pt reports she told her doctor about new shocking sensations at times and that he said it's normal and that the nerves are just waking up.   Objective        Soha received therapeutic exercises to develop strength, endurance, ROM and flexibility for 60 minutes including:     Bike x 5 min  gastroc S 2x1'  R LE Shuttle press 2c 3x10  Squat w/ ball on wall 3x10  TKE w/ MSC 3x10   Steamboats 2x10 RTB   Standing HS curl #2 2x10 - NP  Step up on airex on L2 step 3x10   SLS 2x30"  Heel raises on airex 2x10  Mini squats at EOT 2x10  Quad Set 20x5" ankle on bolster  Heel slides x20 with 5" hold-NP  SLR 3x10 #1   SAQ 3x10 #2  Bridges 3x10  SL hip abd 3x10 #1   Clamshells 3x10 RTB   HS S 2x1'  Prone quad stretch 3x30"  Gastroc stretch with manual TKE, heel on towel roll 10x5"      Soha received the following manual therapy techniques for 5 minutes.  Grade III-IV knee ext mobilizations- NT  STM R quad  w/ thera stick - NT  Cupping R knee - NT  IASTM R quad      Pt received ice to R knee after being cleared for contraindications for 10 minutes post-tx.     Written Home Exercises Provided: no new exercises provided this session  Pt demo good understanding of the education provided. Soha " demonstrated good return demonstration of activities.     Education provided: importance of quad exercises at home to improve quad activation  Soha verbalized good understanding of education provided.   No spiritual or educational barriers to learning identified.    Assessment     PT edgardo quan well w/o adverse reaction. Pt with increased quad tightness superior to incision site-decreased with manual therapy techniques. Pt demos improved endurance during ball squats. Pt continues to benefit from skilled PT to progress towards unmet LTGs.    Patient will continue to benefit from skilled outpatient physical therapy to address the remaining functional deficits, provide pt/family education, and to maximize pt's level of independence in the home and community environment. Rehab potential is good for stated goals.       GOALS:   Short Term Goals:  3 weeks  1. Patient will report decreased knee pain to 4/10 in order to improve sit to stand transfer to [facility]. Met 7/31/18  2. Patient will increase knee ROM to 0-115 degrees in order to show improved functional mobility. Met 7/31/18  3. Patient will be independent and consistent with issued HEP in order to show carryover between therapy sessions. Met 7/31/18     Long Term Goals: 6 weeks  1. Patient will report decreased knee pain to 0/10 in order to enable return to I ADLs. Met 8/27/18  2. Patient will improve knee ROM to 0-120 degrees to enable community ambulation with use of SC. Met 7/31/18  3. Patient will improve LE strength to 5/5 or greater to enable stairs with reciprocal pattern. Progressing  4. Patient will report 80% improvement in function to demonstrate an increase in LE function and mobility in home and community environment.  Progressing-reports 75% improvement  5. Patient will be independent with updated HEP to maintain gains following discharge with therapy. Progressing       Plan   Continue per new POC.    Trisha Boland, PT

## 2018-09-04 ENCOUNTER — CLINICAL SUPPORT (OUTPATIENT)
Dept: REHABILITATION | Facility: HOSPITAL | Age: 63
End: 2018-09-04
Payer: COMMERCIAL

## 2018-09-04 DIAGNOSIS — Z96.651 STATUS POST TOTAL RIGHT KNEE REPLACEMENT: ICD-10-CM

## 2018-09-04 DIAGNOSIS — G89.29 CHRONIC PAIN OF RIGHT KNEE: Primary | ICD-10-CM

## 2018-09-04 DIAGNOSIS — M25.561 CHRONIC PAIN OF RIGHT KNEE: Primary | ICD-10-CM

## 2018-09-04 DIAGNOSIS — M17.11 PRIMARY OSTEOARTHRITIS OF RIGHT KNEE: ICD-10-CM

## 2018-09-04 PROCEDURE — 97110 THERAPEUTIC EXERCISES: CPT

## 2018-09-04 NOTE — PROGRESS NOTES
Patient awake  Dressings c/d/i.  Right ankle 5/5 dorsiflexion, 5/5 EHl.  Warm, well perfused foot    Xray right knee shows TKA with tibial stem in good position    POD-0 s/p right total knee arthroplasty.  Doing well    WBAT.    Ancef post op    Aspirin 325 bid for VTE prophylaxis.  FCDs    Home hopefullly tomorrow.    Waylon Farris MD  Orthopedic Surgery PGY-5  [phone] pager     F60.3

## 2018-09-04 NOTE — PROGRESS NOTES
"                                                  Physical Therapy Reassessment Note     Date: 09/04/2018  Name: Soha Wheatley  Clinic Number: [id number]  Diagnosis:   Encounter Diagnoses   Name Primary?    Status post total right knee replacement     Primary osteoarthritis of right knee     Chronic pain of right knee Yes     Physician: Waylon Farris MD    Physician Orders: PT Eval and Treat   Medical Diagnosis: R knee OA  Evaluation Date: 6/19/2018, re-eval 8/27/18  Authorization period Expiration: 12/31/18  Plan of Care Certification Period: 9/10/18, 8/24/18     Visit #/Visits authorized: 22  Time In: 1300  Time Out: 1356    Precautions: Standard    Subjective     Pt reports: no pain pre-tx. Reports that the part of her knee that felt like it was waking up and had a shocking sensation is back to being numb now. Pt reports her leg feels wobbly today and that her doctor told her to just keep strengthening because her muscles are still weak.  Objective        Soha received therapeutic exercises to develop strength, endurance, ROM and flexibility for 56 minutes including:     Bike x 5 min  gastroc S 2x1'  R LE Shuttle press 2c 3x10  Squat w/ ball on wall 3x10  TKE w/ MSC 3x10   Steamboats 2x10 RTB   Standing HS curl #2 2x10 - NP  Step up on airex on L2 step 3x10   SLS 2x30"  Heel raises on airex 3x10  Mini squats at EOT 2x10  Quad Set 20x5" ankle on towel roll  Heel slides x20 with 5" hold-NP  SLR 3x10 #1   SAQ 3x10 #3  Bridges 2x10 on SB  SL hip abd 3x10 #1   Clamshells 3x10 RTB   HS S 2x1'  Prone quad stretch 3x30"  Gastroc stretch with manual TKE, heel on towel roll 10x5" NP      Soha received the following manual therapy techniques for 0 minutes. NP  Grade III-IV knee ext mobilizations- NT  STM R quad  w/ thera stick - NT  Cupping R knee - NT  IASTM R quad      Pt received ice to R knee after being cleared for contraindications for 10 minutes post-tx.     Written Home Exercises Provided: no new exercises " provided this session  Pt demo good understanding of the education provided. Soha demonstrated good return demonstration of activities.     Education provided: importance of quad exercises at home to improve quad activation  Soha verbalized good understanding of education provided.   No spiritual or educational barriers to learning identified.    Assessment     PT edgardo quan well w/o adverse reaction. Pt with difficulty isolating quad set without glut activation. Pt demos improved strength with squats. Pt continues to benefit from skilled PT to progress towards unmet LTGs.    Patient will continue to benefit from skilled outpatient physical therapy to address the remaining functional deficits, provide pt/family education, and to maximize pt's level of independence in the home and community environment. Rehab potential is good for stated goals.       GOALS:   Short Term Goals:  3 weeks  1. Patient will report decreased knee pain to 4/10 in order to improve sit to stand transfer to [facility]. Met 7/31/18  2. Patient will increase knee ROM to 0-115 degrees in order to show improved functional mobility. Met 7/31/18  3. Patient will be independent and consistent with issued HEP in order to show carryover between therapy sessions. Met 7/31/18     Long Term Goals: 6 weeks  1. Patient will report decreased knee pain to 0/10 in order to enable return to I ADLs. Met 8/27/18  2. Patient will improve knee ROM to 0-120 degrees to enable community ambulation with use of SC. Met 7/31/18  3. Patient will improve LE strength to 5/5 or greater to enable stairs with reciprocal pattern. Progressing  4. Patient will report 80% improvement in function to demonstrate an increase in LE function and mobility in home and community environment.  Progressing-reports 75% improvement  5. Patient will be independent with updated HEP to maintain gains following discharge with therapy. Progressing       Plan   Continue per new POC.    Trisha Boland,  PT

## 2018-09-06 ENCOUNTER — CLINICAL SUPPORT (OUTPATIENT)
Dept: REHABILITATION | Facility: HOSPITAL | Age: 63
End: 2018-09-06
Payer: COMMERCIAL

## 2018-09-06 DIAGNOSIS — Z96.651 STATUS POST TOTAL RIGHT KNEE REPLACEMENT: ICD-10-CM

## 2018-09-06 DIAGNOSIS — M25.561 CHRONIC PAIN OF RIGHT KNEE: Primary | ICD-10-CM

## 2018-09-06 DIAGNOSIS — G89.29 CHRONIC PAIN OF RIGHT KNEE: Primary | ICD-10-CM

## 2018-09-06 DIAGNOSIS — M17.11 PRIMARY OSTEOARTHRITIS OF RIGHT KNEE: ICD-10-CM

## 2018-09-06 PROCEDURE — 97110 THERAPEUTIC EXERCISES: CPT

## 2018-09-06 NOTE — PROGRESS NOTES
"                                                  Physical Therapy Reassessment Note     Date: 09/06/2018  Name: Soha Wheatley  Clinic Number: [id number]  Diagnosis:   Encounter Diagnoses   Name Primary?    Status post total right knee replacement     Primary osteoarthritis of right knee     Chronic pain of right knee Yes     Physician: Waylon Farris MD    Physician Orders: PT Eval and Treat   Medical Diagnosis: R knee OA  Evaluation Date: 6/19/2018, re-eval 8/27/18  Authorization period Expiration: 12/31/18  Plan of Care Certification Period: 9/10/18, 8/24/18     Visit #/Visits authorized: 23  Time In: 1600  Time Out: 1700    Precautions: Standard    Subjective     Pt reports:minimal pain pre-tx, 2/10. Reports she is getting the shocking sensation again and had to wear pants she could roll up over her knee because the pants increase the shock/pain.  Objective        Soha received therapeutic exercises to develop strength, endurance, ROM and flexibility for 45 minutes including:     Bike x 5 min  gastroc S 2x1'  R LE Shuttle press 3c 3x10  Squat w/ ball on wall 3x10  TKE w/ MSC 2x10 with lvl 2 step   2x10 just TKE  Steamboats 2x10 RTB   Step up on airex on L2 step 3x10   SLS 2x30"  Heel raises on airex 3x10  Mini squats at EOT 2x10  Quad Set 20x5" ankle on towel roll  Heel slides x20 with 5" hold-NP  SLR 3x10 #1   SL hip abd 3x10 #1   Clamshells 3x10 RTB   SAQ 3x10 #3  Bridges 2x10 on SB  HS S 2x1'  Prone quad stretch 3x30"  Gastroc stretch with manual TKE, heel on towel roll 10x5" NP      Soha received the following manual therapy techniques for 0 minutes. NP  Grade III-IV knee ext mobilizations- NT  STM R quad  w/ thera stick - NT  Cupping R knee - NT  IASTM R quad      Pt received ice to R knee after being cleared for contraindications for 10 minutes post-tx.     Written Home Exercises Provided: no new exercises provided this session  Pt demo good understanding of the education provided. Soha demonstrated " good return demonstration of activities.     Education provided: importance of quad exercises at home to improve quad activation  Soha verbalized good understanding of education provided.   No spiritual or educational barriers to learning identified.    Assessment     PT edgardo quan well w/o adverse reaction. Pt continues to present with difficulty isolating quad set without glut activation. Pt demos improved form with squats after verbal and tactile cues, requiring CGA to prevent backward LOB when not holding onto wedge at edge of table. Progressed TKE to add step up. Pt continues to benefit from skilled PT to progress towards unmet LTGs.    Patient will continue to benefit from skilled outpatient physical therapy to address the remaining functional deficits, provide pt/family education, and to maximize pt's level of independence in the home and community environment. Rehab potential is good for stated goals.       GOALS:   Short Term Goals:  3 weeks  1. Patient will report decreased knee pain to 4/10 in order to improve sit to stand transfer to [facility]. Met 7/31/18  2. Patient will increase knee ROM to 0-115 degrees in order to show improved functional mobility. Met 7/31/18  3. Patient will be independent and consistent with issued HEP in order to show carryover between therapy sessions. Met 7/31/18     Long Term Goals: 6 weeks  1. Patient will report decreased knee pain to 0/10 in order to enable return to I ADLs. Met 8/27/18  2. Patient will improve knee ROM to 0-120 degrees to enable community ambulation with use of SC. Met 7/31/18  3. Patient will improve LE strength to 5/5 or greater to enable stairs with reciprocal pattern. Progressing  4. Patient will report 80% improvement in function to demonstrate an increase in LE function and mobility in home and community environment.  Progressing-reports 75% improvement  5. Patient will be independent with updated HEP to maintain gains following discharge with therapy.  Progressing       Plan   Continue per new POC.    Trisha Boland, PT

## 2018-09-10 ENCOUNTER — CLINICAL SUPPORT (OUTPATIENT)
Dept: REHABILITATION | Facility: HOSPITAL | Age: 63
End: 2018-09-10
Payer: COMMERCIAL

## 2018-09-10 DIAGNOSIS — M25.561 CHRONIC PAIN OF RIGHT KNEE: Primary | ICD-10-CM

## 2018-09-10 DIAGNOSIS — G89.29 CHRONIC PAIN OF RIGHT KNEE: Primary | ICD-10-CM

## 2018-09-10 DIAGNOSIS — Z96.651 STATUS POST TOTAL RIGHT KNEE REPLACEMENT: ICD-10-CM

## 2018-09-10 DIAGNOSIS — M17.11 PRIMARY OSTEOARTHRITIS OF RIGHT KNEE: ICD-10-CM

## 2018-09-10 PROCEDURE — 97110 THERAPEUTIC EXERCISES: CPT

## 2018-09-10 NOTE — PROGRESS NOTES
"                                                  Physical Therapy Reassessment Note     Date: 09/10/2018  Name: Soha Wheatley  Clinic Number: [id number]  Diagnosis:   Encounter Diagnoses   Name Primary?    Status post total right knee replacement     Primary osteoarthritis of right knee     Chronic pain of right knee Yes     Physician: Waylon Farris MD    Physician Orders: PT Eval and Treat   Medical Diagnosis: R knee OA  Evaluation Date: 6/19/2018, re-eval 8/27/18  Authorization period Expiration: 12/31/18  Plan of Care Certification Period: 9/10/18, 8/24/18     Visit #/Visits authorized: 24  Time In: 4:55  Time Out: 5:55    Precautions: Standard    Subjective     Pt reports:minimal pain pre-tx, 1/10. Min knee pain  Objective     Foto Score: 36%     Range of Motion: AROM  Knee Right Left   Flexion    122    Extension    0       Lower Extremity Strength: MMT 5/5    Right LE    Knee extension:    4+    Knee flexion:    5    Hip flexion:    4+    Hip abduction:     4+    Hip adduction:    5    Hip extension:    3+    Ankle dorsiflexion:    5        Soha received therapeutic exercises to develop strength, endurance, ROM and flexibility for 50 minutes including:     Bike x 5 min  gastroc S 2x1'  R LE Shuttle press 3c 3x10  Squat w/ ball on wall 3x10  TKE w/ MSC 2x10 with lvl 2 step   2x10 just TKE-NT  Steamboats 2x10 RTB   Step up on airex on L2 step 3x10   SLS 2x30"  Heel raises on airex 3x10  Mini squats at EOT 2x10-NT  Quad Set 20x5" ankle on towel roll  Heel slides x20 with 5" hold-NP  SLR 3x10 #12  SL hip abd 3x10 #2  Clamshells 3x10 RTB   SAQ 3x10 #3  Bridges 2x10 on SB  HS S 2x1'  Prone quad stretch 3x30"-NT  Gastroc stretch with manual TKE, heel on towel roll 10x5" NP      Soha received the following manual therapy techniques for 0 minutes. NP  Grade III-IV knee ext mobilizations- NT  STM R quad  w/ thera stick - NT  Cupping R knee - NT  IASTM R quad      Pt received ice to R knee after being cleared for " contraindications for 10 minutes post-tx.     Written Home Exercises Provided: updated HEP  Pt demo good understanding of the education provided. Soha demonstrated good return demonstration of activities.     Education provided: [facility]  Soha verbalized good understanding of education provided.   No spiritual or educational barriers to learning identified.    Assessment     PT edgardo quan well w/o adverse reaction. Pt's poc expires today. Pt is going to be d/c to med fit to cont strengthening R LE. Pt given updated HEP. Pt has full AROM ext. Still has some quad a hip weakness but will cont to work on strengthening in med fit.          GOALS:   Short Term Goals:  3 weeks  1. Patient will report decreased knee pain to 4/10 in order to improve sit to stand transfer to [facility]. Met 7/31/18  2. Patient will increase knee ROM to 0-115 degrees in order to show improved functional mobility. Met 7/31/18  3. Patient will be independent and consistent with issued HEP in order to show carryover between therapy sessions. Met 7/31/18     Long Term Goals: 6 weeks  1. Patient will report decreased knee pain to 0/10 in order to enable return to I ADLs. Met 8/27/18  2. Patient will improve knee ROM to 0-120 degrees to enable community ambulation with use of SC. Met 7/31/18  3. Patient will improve LE strength to 5/5 or greater to enable stairs with reciprocal pattern. Progressing  4. Patient will report 80% improvement in function to demonstrate an increase in LE function and mobility in home and community environment.  Progressing-reports 75% improvement  5. Patient will be independent with updated HEP to maintain gains following discharge with therapy. Met 9/10/18       Plan   Discharge patient to [facility] Program.    Kiara Bermudez, PTA   Co sign: Trisha Boland, PT, DPT

## 2018-09-18 ENCOUNTER — IMMUNIZATION (OUTPATIENT)
Dept: INTERNAL MEDICINE | Facility: CLINIC | Age: 63
End: 2018-09-18
Payer: COMMERCIAL

## 2018-09-18 ENCOUNTER — OFFICE VISIT (OUTPATIENT)
Dept: INTERNAL MEDICINE | Facility: CLINIC | Age: 63
End: 2018-09-18
Payer: COMMERCIAL

## 2018-09-18 VITALS
WEIGHT: 235.44 LBS | OXYGEN SATURATION: 97 % | BODY MASS INDEX: 33.7 KG/M2 | SYSTOLIC BLOOD PRESSURE: 110 MMHG | HEIGHT: 70 IN | HEART RATE: 72 BPM | DIASTOLIC BLOOD PRESSURE: 60 MMHG

## 2018-09-18 DIAGNOSIS — Z01.419 WELL WOMAN EXAM: ICD-10-CM

## 2018-09-18 DIAGNOSIS — E66.9 OBESITY (BMI 30-39.9): ICD-10-CM

## 2018-09-18 DIAGNOSIS — F41.9 ANXIETY: ICD-10-CM

## 2018-09-18 DIAGNOSIS — Z12.31 ENCOUNTER FOR SCREENING MAMMOGRAM FOR BREAST CANCER: Primary | ICD-10-CM

## 2018-09-18 PROCEDURE — 90471 IMMUNIZATION ADMIN: CPT | Mod: S$GLB,,, | Performed by: INTERNAL MEDICINE

## 2018-09-18 PROCEDURE — 99999 PR PBB SHADOW E&M-EST. PATIENT-LVL IV: CPT | Mod: PBBFAC,,, | Performed by: INTERNAL MEDICINE

## 2018-09-18 PROCEDURE — 90686 IIV4 VACC NO PRSV 0.5 ML IM: CPT | Mod: S$GLB,,, | Performed by: INTERNAL MEDICINE

## 2018-09-18 PROCEDURE — 99396 PREV VISIT EST AGE 40-64: CPT | Mod: 25,S$GLB,, | Performed by: INTERNAL MEDICINE

## 2018-09-18 RX ORDER — SERTRALINE HYDROCHLORIDE 100 MG/1
100 TABLET, FILM COATED ORAL DAILY
Qty: 90 TABLET | Refills: 3 | Status: SHIPPED | OUTPATIENT
Start: 2018-09-18 | End: 2019-09-06 | Stop reason: SDUPTHER

## 2018-09-20 ENCOUNTER — HOSPITAL ENCOUNTER (OUTPATIENT)
Dept: RADIOLOGY | Facility: HOSPITAL | Age: 63
Discharge: HOME | End: 2018-09-20
Attending: ORTHOPAEDIC SURGERY
Payer: COMMERCIAL

## 2018-09-20 ENCOUNTER — OFFICE VISIT (OUTPATIENT)
Dept: ORTHOPEDICS | Facility: CLINIC | Age: 63
End: 2018-09-20
Payer: COMMERCIAL

## 2018-09-20 VITALS — BODY MASS INDEX: 34.02 KG/M2 | WEIGHT: 237.63 LBS | HEIGHT: 70 IN

## 2018-09-20 DIAGNOSIS — M17.9 OSTEOARTHRITIS OF KNEE, UNSPECIFIED (CODE): ICD-10-CM

## 2018-09-20 DIAGNOSIS — M17.9 OSTEOARTHRITIS OF KNEE, UNSPECIFIED (CODE): Primary | ICD-10-CM

## 2018-09-20 PROCEDURE — 73560 X-RAY EXAM OF KNEE 1 OR 2: CPT | Mod: TC,LT

## 2018-09-20 PROCEDURE — 3008F BODY MASS INDEX DOCD: CPT | Mod: CPTII,S$GLB,, | Performed by: ORTHOPAEDIC SURGERY

## 2018-09-20 PROCEDURE — 99214 OFFICE O/P EST MOD 30 MIN: CPT | Mod: S$GLB,,, | Performed by: ORTHOPAEDIC SURGERY

## 2018-09-20 PROCEDURE — 73560 X-RAY EXAM OF KNEE 1 OR 2: CPT | Mod: 26,LT,, | Performed by: RADIOLOGY

## 2018-09-20 PROCEDURE — 99999 PR PBB SHADOW E&M-EST. PATIENT-LVL III: CPT | Mod: PBBFAC,,, | Performed by: ORTHOPAEDIC SURGERY

## 2018-09-20 RX ORDER — MELOXICAM 15 MG/1
15 TABLET ORAL DAILY
Status: ON HOLD | COMMUNITY
End: 2018-10-31 | Stop reason: HOSPADM

## 2018-09-20 NOTE — PROGRESS NOTES
HPI:    Soha Wheatley is a 62 y.o. female who is here today for   Chief Complaint   Patient presents with    Right Knee - Post-op Evaluation   .  Patient is here for left knee pain and would like to have a left total knee arthroplasty.    Duration: 6 months  Intensity: severe  Character of pain: sharp  Location: She reports that the pain is predominately  medial  Patient's pain increases with activity.  Pain is increased with weightbearing and interferes with activities of daily living.    She has tried conservative management including NSAIDS, injections, and activity modification without relief.    She has discussed options with her family and wishes to schedule TKA.     PMSFSH reviewed per clinic record       Past Medical History:   Diagnosis Date    Allergy     Anxiety     Arthritis     BMI 45.0-49.9, adult     Chronic kidney disease     Depression     GERD (gastroesophageal reflux disease)     History of kidney stones     Kidney stones     Migraines     Morbid obesity     Obesity     Sleep apnea in adult     WEARS CPAP, DOESNT KNOW SETTINGS.          Current Outpatient Medications:     b complex vitamins tablet, Take 1 tablet by mouth once daily., Disp: , Rfl:     cinnamon bark-chromium picolin 500-100 mg-mcg Cap, Take by mouth., Disp: , Rfl:     cyanocobalamin 500 MCG tablet, Take 500 mcg by mouth once daily., Disp: , Rfl:     gabapentin (NEURONTIN) 600 MG tablet, Take 1 tablet (600 mg total) by mouth 3 (three) times daily., Disp: 270 tablet, Rfl: 0    GLUCOSAMINE HCL/CHONDR APODACA A NA (OSTEO BI-FLEX ORAL), Take by mouth once daily., Disp: , Rfl:     meloxicam (MOBIC) 15 MG tablet, Take 15 mg by mouth once daily., Disp: , Rfl:     multivitamin (ONE DAILY MULTIVITAMIN) per tablet, Take 1 tablet by mouth once daily., Disp: , Rfl:     omeprazole (PRILOSEC) 40 MG capsule, Take 1 capsule (40 mg total) by mouth once daily., Disp: 90 capsule, Rfl: 1    sertraline (ZOLOFT) 100 MG tablet, Take 1  "tablet (100 mg total) by mouth once daily., Disp: 90 tablet, Rfl: 3    tiZANidine (ZANAFLEX) 4 MG tablet, Take 1 tablet (4 mg total) by mouth every 8 (eight) hours as needed., Disp: 270 tablet, Rfl: 0     Review of patient's allergies indicates:   Allergen Reactions    Adhesive      Paper tape usually ok- pulls skin off    Flagyl [metronidazole hcl]      confusion        ROS  Constitutional: Negative for fever, Negative for weight loss  HENT Negative for congestion  Cardiovascular: Negative chest pain  Respiratory: Negative Shortness of breath  Heme: Negative excessive bleeding  Skin:NegativeItching, Negative breakdown  Musculoskeletal:Positive for back pain, Positive for joint pain, Negative muscle pain, Negative muscle weakness  Neurological: Negative for numbness and paresthesias   Psychiatric/Behavioral: Negative altered mental status, Negative for depression    Physical Exam:   Ht 5' 10" (1.778 m)   Wt 107.8 kg (237 lb 10.5 oz)   BMI 34.10 kg/m²   General appearance: This is a well-developed, Well nourished female No obvious acute distress.  Psychiatric: normal mood and affect;  pleasant and conversant; judgment and thought content normal  Gait is coordinated. Patient has antalgic gait to the left  Cardiovascular: There are no swelling or varicosities present.   Respiratory: normal respiratory effort   Lymphatic: no adenopathy   Neurologic: alert and oriented to person, place, and time   Deep Tendon Reflexes are normal;  Coordination and Balance: Normal   Musculoskeletal   Neck    ROM shows normal flexion and extension and lateral rotation    Palpation: Non-tender    Stability is normal    Strength is normal    Skin is normal without masses and lesions    Sensation is intact to light touch   Back    ROM showsnormal flexion, extension and     rotation    Palpation shows no masses    Stability is normal    Strength to flexion and extension well maintained    Core strength is diminished    Skin shows no " rashes or cafe au lait spots;     Sensation is intact to light touch  Right hip   Range of motion normal    Left Hip  Range of motionnormal    Right Knee  Swelling None  TendernessNone  Range of Motion: 120  Motion is painfulNo  Crepitance presentNo    Right Leg  Neurologic Intact  Pulses Intact    Left Knee: Swelling Mild  TendernessMedial joint line  Range of Motion: 120   Motion is painful Yes    Left Leg   Neurologic Intact  Pulses Intact    Radiograph: Show severe degenerative arthritis with subchondral sclerosis, periarticular osteophytes and narrowing of joint space.  Angle: mild varus    Physical therapy is contraindicated due to potential bone loss on this severe arthritic joint.    Assessment:  Knee arthritis left      She will need to be cleared in our PreOp center.         .    She  has a past medical history of Allergy, Anxiety, Arthritis, BMI 45.0-49.9, adult, Chronic kidney disease, Depression, GERD (gastroesophageal reflux disease), History of kidney stones, Kidney stones, Migraines, Morbid obesity, Obesity, and Sleep apnea in adult. . We will have to take this into account. She  will be followed by the hospitalist service while in the hospital.       We have gone over the hospitalization and recovery with her as well.  This is typically around 2 weeks on a walker and transition to a cane after that.  She will have home health likely for 3-4 weeks and then transition to outpatient if necessary.  She is agreement with this plan of care and is ready to proceed.  I will see her back for clinical recheck at the 2-week postop leann.  I will see her back for clinical recheck for any other questions or problems as needed and certainly for any other issues in the interim.    We have discussed risks of total knee replacement which include but are not limited to blood clots in the legs that can travel to the lungs (pulmonary embolism). Pulmonary embolism can cause shortness of breath, chest pain, and even  shock. Other risks include urinary tract infection, nausea and vomiting (usually related to pain medication), chronic knee pain and stiffness, bleeding into the knee joint, nerve damage, blood vessel injury, and infection of the knee which can require re-operation. Furthermore, the risks of anesthesia include potential heart, lung, kidney, and liver damage.

## 2018-10-03 DIAGNOSIS — M17.9 OSTEOARTHRITIS OF KNEE, UNSPECIFIED (CODE): Primary | ICD-10-CM

## 2018-10-06 PROBLEM — E66.9 OBESITY (BMI 30-39.9): Status: ACTIVE | Noted: 2018-10-06

## 2018-10-06 NOTE — PROGRESS NOTES
HISTORY OF PRESENT ILLNESS:  She is a 62-year-old female coming in today for her   annual physical exam.  She has had a gastric sleeve back in December 2017.    Since then, she has lost considerable amount of weight.  She has come down to   235 pounds, so she has lost about 40 pounds, but she actually lost 104 pounds   since June last year.  She is on omeprazole for this.  She has had some right   knee pain, which she has had replacement and she is getting physical therapy and   she says doing well and she has OA of her left knee, which she is thinking   about getting replaced in November.  Otherwise, she says she has been doing   well.  She has been drinking water.  She is not having abdominal pain.  No   nausea or vomiting.  She has a history of chronic OA of the knees, GERD, status   post bariatric surgery back in December 2017, had a right knee replacement in   May, still on obesity range with a BMI of 33 that has come down and history of   some kidney stones.  She also has sleep apnea, but no longer using CPAP after   her gastric sleeve.    PHYSICAL EXAMINATION:  GENERAL:  She is a well-appearing 62-year-old female in no acute distress.  NECK:  Supple.  She has no JVD.  Thyroid is not enlarged.  CARDIOVASCULAR:  S1 and S2, regular rate and rhythm without murmur, gallop or   rub.  ABDOMEN:  Soft, nontender, no hepatosplenomegaly, no guarding or rebound   tenderness.  She had surgery laparoscopically and those scars are well healed.  LOWER EXTREMITIES:  No edema.      ASSESSMENT:  Anxiety, which she complains about.  She wishes to increase her   Zoloft to 100 mg a day.  So we will work on that and she will followup.  We also   talked about some behavior modification, which may help.  I think she will feel   better after she   her second knee done in November.  She is due for well   woman exam, so we will have her see GYN for this.  We will also check a   hepatitis C antibody just for screening purposes and we  will get her set up for   a mammogram.      BONY/IN  dd: 10/06/2018 08:42:14 (CDT)  td: 10/07/2018 06:50:28 (CDT)  Doc ID   #[id number]  Job ID #[id number]    CC:

## 2018-10-07 DIAGNOSIS — M25.569 KNEE PAIN, UNSPECIFIED CHRONICITY, UNSPECIFIED LATERALITY: ICD-10-CM

## 2018-10-08 ENCOUNTER — PATIENT MESSAGE (OUTPATIENT)
Dept: BARIATRICS | Facility: CLINIC | Age: 63
End: 2018-10-08

## 2018-10-08 RX ORDER — OMEPRAZOLE 40 MG/1
CAPSULE, DELAYED RELEASE ORAL
Qty: 90 CAPSULE | Refills: 0 | Status: SHIPPED | OUTPATIENT
Start: 2018-10-08 | End: 2019-01-06 | Stop reason: SDUPTHER

## 2018-10-09 ENCOUNTER — PATIENT MESSAGE (OUTPATIENT)
Dept: PODIATRY | Facility: CLINIC | Age: 63
End: 2018-10-09

## 2018-10-10 ENCOUNTER — TELEPHONE (OUTPATIENT)
Dept: INTERNAL MEDICINE | Facility: CLINIC | Age: 63
End: 2018-10-10

## 2018-10-10 DIAGNOSIS — M79.606 PAIN OF LOWER EXTREMITY, UNSPECIFIED LATERALITY: Primary | ICD-10-CM

## 2018-10-10 DIAGNOSIS — Z91.89 AT RISK OF UTI: ICD-10-CM

## 2018-10-10 NOTE — PRE ADMISSION SCREENING
Anesthesia Assessment: Preoperative EQUATION    Planned Procedure: Procedure(s) (LRB):  REPLACEMENT-KNEE-TOTAL (Left)  Requested Anesthesia Type:Femoral Block  Surgeon: John L. Ochsner Jr., MD  Service: Orthopedics  Known or anticipated Date of Surgery:10/31/2018    Plan:    Testing:  Hematology Profile, BMP, PT/INR and UA   Pre-anesthesia  visit       Visit focus: s/p R-TKR 5/2018.  Phone update ROS     Consultation:Patient's PCP for a statement of optimization      Co-morbidities: BAYRON, GERD, migranes, anxiety    Sonya Pierre RN 10/10/2018

## 2018-10-10 NOTE — TELEPHONE ENCOUNTER
----- Message from Sonya Pierre RN sent at 10/10/2018 10:52 AM CDT -----  Patient is scheduled for a TKR 10/31 with Dr. Ochsner under spinal anesthesia.  Anesthesia is requesting medical optimization.  Patient was just seen by you 10/8.  Anesthesia will order a hematology profile, BMP, UA, PT/INR.  EKG from 2017 reviewed.  Please advise.    Sonya Pierre RN  [place] [phone]

## 2018-10-12 ENCOUNTER — TELEPHONE (OUTPATIENT)
Dept: INTERNAL MEDICINE | Facility: CLINIC | Age: 63
End: 2018-10-12

## 2018-10-12 ENCOUNTER — TELEPHONE (OUTPATIENT)
Dept: ORTHOPEDICS | Facility: CLINIC | Age: 63
End: 2018-10-12

## 2018-10-12 NOTE — TELEPHONE ENCOUNTER
After reviewing her last visit- most recent labs - she is cleared for anesthesia and surgery for her TKR unless the labs done on 10/2/ 2018 are significantly abnormal.      BONY

## 2018-10-12 NOTE — TELEPHONE ENCOUNTER
----- Message from Sonya Pierre RN sent at 10/10/2018 10:52 AM CDT -----  Patient is scheduled for a TKR 10/31 with Dr. Ochsner under spinal anesthesia.  Anesthesia is requesting medical optimization.  Patient was just seen by you 10/8.  Anesthesia will order a hematology profile, BMP, UA, PT/INR.  EKG from 2017 reviewed.  Please advise.    Sonya Pierre RN  [place] [phone]

## 2018-10-12 NOTE — TELEPHONE ENCOUNTER
----- Message from Sonya Pierre RN sent at 10/10/2018 10:52 AM CDT -----  Patient is scheduled for a TKR 10/31 with Dr. Ochsner under spinal anesthesia.  Anesthesia is requesting medical optimization.  Patient was just seen by you 10/8.  Anesthesia will order a hematology profile, BMP, UA, PT/INR.  EKG from 2017 reviewed.  Please advise.    Sonya Pierre RN  [place] [phone]

## 2018-10-12 NOTE — TELEPHONE ENCOUNTER
Left message informing patient that Tiffanie is out until Monday and will address her concerns at that time but if she feels she needs to talk to someone today she can call back at [phone].          ----- Message from Mary Carmen Mckeon sent at 10/12/2018  3:56 PM CDT -----  Contact: Self  Patient requesting a call back from Tiffanie in regards to her RT knee replacement. States she tripped and fell last night and had x-rays done but would like to discuss with Tiffanie.  [phone]

## 2018-10-23 ENCOUNTER — ANESTHESIA EVENT (OUTPATIENT)
Dept: SURGERY | Facility: HOSPITAL | Age: 63
DRG: 470 | End: 2018-10-23
Payer: COMMERCIAL

## 2018-10-23 ENCOUNTER — LAB VISIT (OUTPATIENT)
Dept: LAB | Facility: HOSPITAL | Age: 63
End: 2018-10-23
Attending: ORTHOPAEDIC SURGERY
Payer: COMMERCIAL

## 2018-10-23 ENCOUNTER — RESEARCH ENCOUNTER (OUTPATIENT)
Dept: RESEARCH | Facility: HOSPITAL | Age: 63
End: 2018-10-23

## 2018-10-23 ENCOUNTER — OFFICE VISIT (OUTPATIENT)
Dept: ORTHOPEDICS | Facility: CLINIC | Age: 63
End: 2018-10-23
Payer: COMMERCIAL

## 2018-10-23 VITALS
DIASTOLIC BLOOD PRESSURE: 64 MMHG | BODY MASS INDEX: 33.52 KG/M2 | SYSTOLIC BLOOD PRESSURE: 117 MMHG | HEIGHT: 70 IN | HEART RATE: 57 BPM | WEIGHT: 234.13 LBS

## 2018-10-23 DIAGNOSIS — M17.12 PRIMARY OSTEOARTHRITIS OF LEFT KNEE: Primary | ICD-10-CM

## 2018-10-23 DIAGNOSIS — M17.9 OSTEOARTHRITIS OF KNEE, UNSPECIFIED (CODE): ICD-10-CM

## 2018-10-23 LAB
CRP SERPL-MCNC: 2.6 MG/L
ERYTHROCYTE [SEDIMENTATION RATE] IN BLOOD BY WESTERGREN METHOD: 6 MM/HR

## 2018-10-23 PROCEDURE — 99499 UNLISTED E&M SERVICE: CPT | Mod: S$GLB,,, | Performed by: PHYSICIAN ASSISTANT

## 2018-10-23 PROCEDURE — 86140 C-REACTIVE PROTEIN: CPT

## 2018-10-23 PROCEDURE — 99999 PR PBB SHADOW E&M-EST. PATIENT-LVL III: CPT | Mod: PBBFAC,,, | Performed by: PHYSICIAN ASSISTANT

## 2018-10-23 PROCEDURE — 85652 RBC SED RATE AUTOMATED: CPT

## 2018-10-23 NOTE — PRE-PROCEDURE INSTRUCTIONS
PreOp Instructions given:  - NPO guidelines  - Arrival place directions given  - Time to be given the day before procedure by the  Surgeon's Office  - Bathing with antibacterial soap/Hibiclens   - Don't wear any jewelry or bring any valuables AM of surgery  - No makeup or moisturizer to face  - No perfume/cologne, powder, lotions or aftershave    Patient was instructed to call and update the PreOp nurse about any changes to health, changes to medication, new office visits or hospitalizations between now and surgery.    Pt. verbalized understanding.

## 2018-10-23 NOTE — ANESTHESIA PREPROCEDURE EVALUATION
Anesthesia Assessment: Preoperative EQUATION    Planned Procedure: Procedure(s) (LRB):  REPLACEMENT-KNEE-TOTAL (Left)  Requested Anesthesia Type:Femoral Block  Surgeon: John L. Ochsner Jr., MD  Service: Orthopedics  Known or anticipated Date of Surgery:10/31/2018    Plan:    Testing:  Hematology Profile, BMP, PT/INR and UA   Pre-anesthesia  visit       Visit focus: s/p R-TKR 5/2018.  Phone update ROS     Consultation:Patient's PCP for a statement of optimization      Co-morbidities: BAYRON, GERD, migranes, anxiety    Sonya Pierre RN 10/10/2018                                                                                                               10/23/2018  Soha Wheatley is a 62 y.o., female.    Anesthesia Evaluation         Review of Systems  Anesthesia Hx:  Hx of Anesthetic complications Patient reports hx of low b/p after knee scope surgery. Patient states in recovery the staff had to bend her knee to induce pain to arouse patient    Woke up disoriented after sx 2013     Gastric sleeve 12/2017:Method of Intubation: Glidescope; Inserted by: CRNA; Airway Device: Endotracheal Tube; Mask Ventilation: Mod Diff - oral; Intubated: Postinduction; Blade: Other (see comments) (Glidescope #3); Airway Device Size: 7.5; Style: Cuffed; Cuff Inflation: Minimal occlusive pressure; Inflation Amount: 5; Placement Verified By: Auscultation, ETT Condensation, Capnometry; Grade: Grade I; Complicating Factors: Anterior larynx, Small mouth, Oropharyngeal edema or fat, Obesity   Social:  Non-Smoker, Social Alcohol Use   Hematology/Oncology:  Hematology Normal   Oncology Normal     EENT/Dental:EENT/Dental Normal   Cardiovascular:   Housework, cooks, grocery shopping, walks the mall.  Denies chest pain or sob Cardiovascular Symptoms: (hx palpitations 4/2017 and 2014. wore holter monitor and no etiology found. Possible anxiety)    Pulmonary:   Sleep Apnea Not used CPAP since weight loss. Total weight loss 107 lb   Renal/:    renal calculi    Hepatic/GI:   Denies PUD. GERD (better since surgery), well controlled Denies Liver Disease.    Musculoskeletal:  Musculoskeletal General/Symptoms: Functional capacity is ambulatory without assistance.  Joint Disease:  Arthritis, Osteoarthritis  Cervical Spine Disorder, Cervical Disc Disease, Radiculopathy Cervical neuropathy in right hand-has pinched nerve in neck-on gabapentin and zanaflex   Neurological:   Denies CVA. Headaches (no migranes in 3 years) Denies Seizures.  Pain , onset is chronic , location of knee , quality of aching/dull, sharp , severity is a 6 , precipitating factors are walking , alleviating factors are rest, tylenol arthritis. Osteoarthritis    Endocrine:  Endocrine Normal    Dermatological:  Skin Normal    Psych:   anxiety          Physical Exam  General:  Well nourished, Obesity    Airway/Jaw/Neck:  Airway Findings: Mallampati: III Improves to II with phonation.  TM Distance: Normal, at least 6 cm  Jaw/Neck Findings:  Neck ROM: Normal ROM       Chest/Lungs:  Chest/Lungs Findings: Normal Respiratory Rate     Heart/Vascular:  Heart Findings: Rate: Normal        Mental Status:  Mental Status Findings:  Cooperative, Alert and Oriented         Labs reviewed     Medical clearance with Dr. Mallory 10/12, After reviewing her last visit- most recent labs - she is cleared for anesthesia and surgery for her TKR unless the labs done on 10/2/ 2018 are significantly abnormal.       JWB   ADDENDUM GODWIN OSMAN RN 10/25/18:  UA- positive nitrites.  Reviewed by Dr. Leopold.  Urine cultured added on. Message to PCP and surgeon for treatment recommendations.  ADDENDUM GODWIN OSMAN RN 10/26/18:  Culture pending.  Patient started on Macrodantin         Anesthesia Plan  Type of Anesthesia, risks & benefits discussed:  Anesthesia Type:  CSE, spinal, general  Patient's Preference: Neuraxial vs GA  Intra-op Monitoring Plan: standard ASA monitors  Intra-op Monitoring Plan Comments:   Post Op Pain  Control Plan: multimodal analgesia, IV/PO Opiods PRN and peripheral nerve block  Post Op Pain Control Plan Comments:   Induction:   IV  Beta Blocker:  Patient is not currently on a Beta-Blocker (No further documentation required).       Informed Consent: Patient understands risks and agrees with Anesthesia plan.  Questions answered. Anesthesia consent signed with patient.  ASA Score: 2     Day of Surgery Review of History & Physical:    H&P update referred to the surgeon.     Anesthesia Plan Notes: Discussed Risks/Benefits of anesthetic plan  PMH was reviewed and PE was performed  No issues with previous anesthetic for TKA  Will plan for neuraxial technique and convert to GA if needed        Ready For Surgery From Anesthesia Perspective.

## 2018-10-24 ENCOUNTER — LAB VISIT (OUTPATIENT)
Dept: LAB | Facility: HOSPITAL | Age: 63
End: 2018-10-24
Attending: ANESTHESIOLOGY
Payer: COMMERCIAL

## 2018-10-24 DIAGNOSIS — M79.606 PAIN OF LOWER EXTREMITY, UNSPECIFIED LATERALITY: ICD-10-CM

## 2018-10-24 LAB
ANION GAP SERPL CALC-SCNC: 9 MMOL/L
BUN SERPL-MCNC: 28 MG/DL
CALCIUM SERPL-MCNC: 10.1 MG/DL
CHLORIDE SERPL-SCNC: 103 MMOL/L
CO2 SERPL-SCNC: 28 MMOL/L
CREAT SERPL-MCNC: 0.8 MG/DL
ERYTHROCYTE [DISTWIDTH] IN BLOOD BY AUTOMATED COUNT: 13.9 %
EST. GFR  (AFRICAN AMERICAN): >60 ML/MIN/1.73 M^2
EST. GFR  (NON AFRICAN AMERICAN): >60 ML/MIN/1.73 M^2
GLUCOSE SERPL-MCNC: 98 MG/DL
HCT VFR BLD AUTO: 36.9 %
HGB BLD-MCNC: 11.4 G/DL
INR PPP: 1
MCH RBC QN AUTO: 30.2 PG
MCHC RBC AUTO-ENTMCNC: 30.9 G/DL
MCV RBC AUTO: 98 FL
PLATELET # BLD AUTO: 209 K/UL
PMV BLD AUTO: 10 FL
POTASSIUM SERPL-SCNC: 4.5 MMOL/L
PROTHROMBIN TIME: 10.4 SEC
RBC # BLD AUTO: 3.78 M/UL
SODIUM SERPL-SCNC: 140 MMOL/L
WBC # BLD AUTO: 6.87 K/UL

## 2018-10-24 PROCEDURE — 36415 COLL VENOUS BLD VENIPUNCTURE: CPT

## 2018-10-24 PROCEDURE — 85610 PROTHROMBIN TIME: CPT

## 2018-10-24 PROCEDURE — 85027 COMPLETE CBC AUTOMATED: CPT

## 2018-10-24 PROCEDURE — 80048 BASIC METABOLIC PNL TOTAL CA: CPT

## 2018-10-24 RX ORDER — PREGABALIN 25 MG/1
75 CAPSULE ORAL NIGHTLY
Status: CANCELLED | OUTPATIENT
Start: 2018-10-24

## 2018-10-24 RX ORDER — ONDANSETRON 2 MG/ML
4 INJECTION INTRAMUSCULAR; INTRAVENOUS EVERY 8 HOURS PRN
Status: CANCELLED | OUTPATIENT
Start: 2018-10-24

## 2018-10-24 RX ORDER — ROPIVACAINE HYDROCHLORIDE 2 MG/ML
8 INJECTION, SOLUTION EPIDURAL; INFILTRATION; PERINEURAL CONTINUOUS
Status: CANCELLED | OUTPATIENT
Start: 2018-10-24

## 2018-10-24 RX ORDER — FENTANYL CITRATE 50 UG/ML
25 INJECTION, SOLUTION INTRAMUSCULAR; INTRAVENOUS EVERY 5 MIN PRN
Status: CANCELLED | OUTPATIENT
Start: 2018-10-24

## 2018-10-24 RX ORDER — MIDAZOLAM HYDROCHLORIDE 5 MG/ML
1 INJECTION INTRAMUSCULAR; INTRAVENOUS EVERY 5 MIN PRN
Status: CANCELLED | OUTPATIENT
Start: 2018-10-24

## 2018-10-24 RX ORDER — PREGABALIN 25 MG/1
75 CAPSULE ORAL
Status: CANCELLED | OUTPATIENT
Start: 2018-10-24

## 2018-10-24 RX ORDER — POLYETHYLENE GLYCOL 3350 17 G/17G
17 POWDER, FOR SOLUTION ORAL DAILY
Status: CANCELLED | OUTPATIENT
Start: 2018-10-24

## 2018-10-24 RX ORDER — CELECOXIB 100 MG/1
400 CAPSULE ORAL
Status: CANCELLED | OUTPATIENT
Start: 2018-10-24

## 2018-10-24 RX ORDER — OXYCODONE HYDROCHLORIDE 5 MG/1
10 TABLET ORAL
Status: CANCELLED | OUTPATIENT
Start: 2018-10-24

## 2018-10-24 RX ORDER — NALOXONE HCL 0.4 MG/ML
0.02 VIAL (ML) INJECTION
Status: CANCELLED | OUTPATIENT
Start: 2018-10-24

## 2018-10-24 RX ORDER — ASPIRIN 81 MG/1
81 TABLET ORAL 2 TIMES DAILY
Status: CANCELLED | OUTPATIENT
Start: 2018-10-24

## 2018-10-24 RX ORDER — MORPHINE SULFATE 10 MG/ML
2 INJECTION, SOLUTION INTRAMUSCULAR; INTRAVENOUS
Status: CANCELLED | OUTPATIENT
Start: 2018-10-24

## 2018-10-24 RX ORDER — MUPIROCIN 20 MG/G
1 OINTMENT TOPICAL 2 TIMES DAILY
Status: CANCELLED | OUTPATIENT
Start: 2018-10-24 | End: 2018-10-29

## 2018-10-24 RX ORDER — AMOXICILLIN 250 MG
1 CAPSULE ORAL 2 TIMES DAILY
Status: CANCELLED | OUTPATIENT
Start: 2018-10-24

## 2018-10-24 RX ORDER — FAMOTIDINE 20 MG/1
20 TABLET, FILM COATED ORAL 2 TIMES DAILY
Status: CANCELLED | OUTPATIENT
Start: 2018-10-24

## 2018-10-24 RX ORDER — RAMELTEON 8 MG/1
8 TABLET ORAL NIGHTLY PRN
Status: CANCELLED | OUTPATIENT
Start: 2018-10-24

## 2018-10-24 RX ORDER — BISACODYL 10 MG
10 SUPPOSITORY, RECTAL RECTAL EVERY 12 HOURS PRN
Status: CANCELLED | OUTPATIENT
Start: 2018-10-24

## 2018-10-24 RX ORDER — SODIUM CHLORIDE 0.9 % (FLUSH) 0.9 %
3 SYRINGE (ML) INJECTION EVERY 8 HOURS PRN
Status: CANCELLED | OUTPATIENT
Start: 2018-10-24

## 2018-10-24 RX ORDER — CELECOXIB 100 MG/1
200 CAPSULE ORAL DAILY
Status: CANCELLED | OUTPATIENT
Start: 2018-10-24

## 2018-10-24 RX ORDER — SODIUM CHLORIDE 9 MG/ML
INJECTION, SOLUTION INTRAVENOUS
Status: CANCELLED | OUTPATIENT
Start: 2018-10-24

## 2018-10-24 RX ORDER — OXYCODONE HYDROCHLORIDE 5 MG/1
5 TABLET ORAL
Status: CANCELLED | OUTPATIENT
Start: 2018-10-24

## 2018-10-24 RX ORDER — ACETAMINOPHEN 10 MG/ML
1000 INJECTION, SOLUTION INTRAVENOUS ONCE
Status: CANCELLED | OUTPATIENT
Start: 2018-10-24 | End: 2018-10-24

## 2018-10-24 RX ORDER — MUPIROCIN 20 MG/G
1 OINTMENT TOPICAL
Status: CANCELLED | OUTPATIENT
Start: 2018-10-24

## 2018-10-24 RX ORDER — LIDOCAINE HYDROCHLORIDE 10 MG/ML
1 INJECTION, SOLUTION EPIDURAL; INFILTRATION; INTRACAUDAL; PERINEURAL
Status: CANCELLED | OUTPATIENT
Start: 2018-10-24

## 2018-10-24 RX ORDER — OXYCODONE HYDROCHLORIDE 5 MG/1
15 TABLET ORAL
Status: CANCELLED | OUTPATIENT
Start: 2018-10-24

## 2018-10-24 RX ORDER — ACETAMINOPHEN 500 MG
1000 TABLET ORAL EVERY 6 HOURS
Status: CANCELLED | OUTPATIENT
Start: 2018-10-24 | End: 2018-10-26

## 2018-10-24 RX ORDER — SODIUM CHLORIDE 9 MG/ML
INJECTION, SOLUTION INTRAVENOUS CONTINUOUS
Status: CANCELLED | OUTPATIENT
Start: 2018-10-24 | End: 2018-10-25

## 2018-10-24 NOTE — H&P (VIEW-ONLY)
CC: Left knee pain    Soha Wheatley is a 62 y.o. female with 2 year history of Left knee pain. Pain is worse with activity and weight bearing.  Patient has experienced interference of activities of daily living due to decreased range of motion and an increase in joint pain and swelling.  Patient has failed non-operative treatment including NSAIDs, corticosteroid injections, viscosupplement injections, and activity modification.  Soha Wheatley currently ambulates independently.  She had right tka 2018 and did well.     Relevant medical conditions of significance in perioperative period:  Obesity: Pt is s/p gastrectomy and has lost >100 pounds. Counseled on continued weight loss  BAYRON: resolved following weight loss; no longer using CPAP      Past Medical History:   Diagnosis Date    Allergy     Anxiety     Arthritis     BMI 45.0-49.9, adult     Chronic kidney disease     Depression     GERD (gastroesophageal reflux disease)     History of kidney stones     Kidney stones     Migraines     Morbid obesity     Obesity     Sleep apnea in adult     WEARS CPAP, DOESNT KNOW SETTINGS.       Past Surgical History:   Procedure Laterality Date     SECTION      CHOLECYSTECTOMY      CHOLECYSTECTOMY, LAPAROSCOPIC N/A 2013    [facility]formed by [facility] Jr[facility] at [facility] OR 2ND FLR    CYSTOSCOPY  9/3/2015    Performed by Joann Garcia [facility] at [facility] OR 1ST FLR    CYSTOSCOPY WITH STENT PLACEMENT Right 2015    Performed by Natasha Jenkins [facility] at [facility] OR 1ST FLR    DILATION-URETHRA N/A 2015    Performed by [facility] Jenkins [facility] at [facility] OR 1ST FLR    EGD (ESOPHAGOGASTRODUODENOSCOPY) N/A 2013    Performed by Andrew Gonzalez MD at [facility] ENDO (4TH FLR)    ESOPHAGOGASTRODUODENOSCOPY (EGD) N/A 2017    Performed by Krystian Chaudhary MD at [facility] ENDO (4TH FLR)    EXTRACTION-STONE-URETEROSCOPY Right 9/3/2015    Performed by [facility] Garcia [facility] at [facility] OR 1ST FLR     GASTRECTOMY      GASTRECTOMY-SLEEVE-LAPAROSCOPIC-26905 , Need intra-op EGD N/A 12/29/2017    Performed by [facility] at [facility] OR 2ND FLR    KNEE ARTHRODESIS  2001    arthroscopy - Left knee for meniscus     KNEE CARTILAGE SURGERY Left 2001    LITHOTRIPSY-LASER Right 9/3/2015    Performed by Joann Garcia [facility] at [facility] OR 1ST FLR    REPLACEMENT-KNEE-TOTAL Right 5/23/2018    Performed by John L. Ochsner Jr., MD at [facility] OR 2ND FLR    REPLACEMENT-STENT Right 9/3/2015    Performed by Joann Garcia [facility] at [facility] OR 1ST FLR    TONSILLECTOMY  1986    TOTAL KNEE ARTHROPLASTY Right 5/23/2018    Procedure: REPLACEMENT-KNEE-TOTAL;  Surgeon: John L. Ochsner Jr., MD;  Location: [facility] OR [address] [place] [address];  Service: Orthopedics;  Laterality: Right;    URETEROSCOPY         Family History   Problem Relation Age of Onset    Cancer Mother         breast    Hyperlipidemia Father     Hypertension Father     VANDANA disease Maternal Grandmother     Heart disease Maternal Grandmother     Hyperlipidemia Maternal Grandmother     Colon cancer Maternal Grandmother     Pancreatic cancer Maternal Grandfather     Cancer Maternal Grandfather 88    Heart disease Maternal Uncle     Heart attack Paternal Grandfather     Celiac disease Neg Hx     Cirrhosis Neg Hx     Colon polyps Neg Hx     Crohn's disease Neg Hx     Cystic fibrosis Neg Hx     Esophageal cancer Neg Hx     Hemochromatosis Neg Hx     Inflammatory bowel disease Neg Hx     Irritable bowel syndrome Neg Hx     Liver cancer Neg Hx     Liver disease Neg Hx     Rectal cancer Neg Hx     Stomach cancer Neg Hx     Ulcerative colitis Neg Hx     Silvestre's disease Neg Hx        Review of patient's allergies indicates:   Allergen Reactions    Adhesive      Paper tape usually ok- pulls skin off    Flagyl [metronidazole hcl]      confusion         Current Outpatient Medications:     b complex vitamins tablet, Take 1 tablet by mouth once daily., Disp: ,  "Rfl:     cinnamon bark-chromium picolin 500-100 mg-mcg Cap, Take by mouth., Disp: , Rfl:     cyanocobalamin 500 MCG tablet, Take 500 mcg by mouth once daily., Disp: , Rfl:     gabapentin (NEURONTIN) 600 MG tablet, Take 1 tablet (600 mg total) by mouth 3 (three) times daily., Disp: 270 tablet, Rfl: 0    GLUCOSAMINE HCL/CHONDR APODACA A NA (OSTEO BI-FLEX ORAL), Take by mouth once daily., Disp: , Rfl:     meloxicam (MOBIC) 15 MG tablet, Take 15 mg by mouth once daily., Disp: , Rfl:     multivitamin (ONE DAILY MULTIVITAMIN) per tablet, Take 1 tablet by mouth once daily., Disp: , Rfl:     omeprazole (PRILOSEC) 40 MG capsule, TAKE 1 CAPSULE DAILY, Disp: 90 capsule, Rfl: 0    sertraline (ZOLOFT) 100 MG tablet, Take 1 tablet (100 mg total) by mouth once daily., Disp: 90 tablet, Rfl: 3    tiZANidine (ZANAFLEX) 4 MG tablet, Take 1 tablet (4 mg total) by mouth every 8 (eight) hours as needed., Disp: 270 tablet, Rfl: 0    Review of Systems:  Constitutional: no fever or chills  Eyes: no visual changes  ENT: no nasal congestion or sore throat  Respiratory: no cough or shortness of breath  Cardiovascular: no chest pain or palpitations  Gastrointestinal: no nausea or vomiting, tolerating diet  Genitourinary: no hematuria or dysuria  Integument/Breast: no rash or pruritis  Hematologic/Lymphatic: no easy bruising or lymphadenopathy  Musculoskeletal: positive for knee pain  Neurological: no seizures or tremors  Behavioral/Psych: no auditory or visual hallucinations  Endocrine: no heat or cold intolerance    PE:  /64   Pulse (!) 57   Ht 5' 10" (1.778 m)   Wt 106.2 kg (234 lb 2.1 oz)   BMI 33.59 kg/m²   General: Pleasant, cooperative, NAD   Gait: antalgic  HEENT: NCAT, sclera nonicteric   Lungs: Respirations clear bilaterally; equal and unlabored.   CV: S1S2; 2+ bilateral upper and lower extremity pulses.   Skin: Intact throughout with no rashes, erythema, or lesions  Extremities: No LE edema,  no erythema or warmth of the " skin in either lower extremity.    Left knee exam:  Knee Range of Motion:0-120 active, pain with passive range of motion  Effusion:none  Condition of skin:intact  Location of tenderness:Medial joint line   Strength:4 of 5 quadriceps strength and 5 of 5 hamstring strength  Stability: stable to testing    Hip Examination:full painless range of motion, without tenderness    Radiographs: Radiographs reveal advanced degenerative changes including subchondral cyst formation, subchondral sclerosis, osteophyte formation, joint space narrowing.     Knee Alignment:  Moderate varus    Diagnosis: osteoarthritis Left knee    Plan: Left total knee arthroplasty    Due to the serious nature of total joint infection and the high prevalence of community acquired MRSA, vancomycin will be used perioperatively.

## 2018-10-24 NOTE — PROGRESS NOTES
Date: 24 October 2018        Time: 0808  Subject Initials: TAD  IRB#: [id number]      Study: The Role of Fitness Monitors in Total Knee Arthroplasty     PI: MD Alena Leon consented this patient on 23-Oct-2018. The following was discussed:     · Met with participant to discuss possible participation in a research study  · Participant was given a copy of the Informed Consent Form for review  · Participant read the Informed Consent Form in full   · All risks, benefits, alternative therapies, confidentiality, and study requirements were discussed  · Privacy issues and withdrawal options, including HIPAA, were discussed  · Ample opportunity was provided for participant to ask questions and to consider participation  · Participant verbalized that all questions were satisfactorily answered and that they understood the protocol and its requirements  · Participant was provided with contact information for the investigator, physician & research coordinator for future questions or concerns  · Participant was asked about involvement in any other research study  · Informed consent was signed and participant was provided with a signed consent form for his/her records.   · Consent was obtained prior to conducting any study-related procedures.

## 2018-10-24 NOTE — H&P
CC: Left knee pain    Soha Wheatley is a 62 y.o. female with 2 year history of Left knee pain. Pain is worse with activity and weight bearing.  Patient has experienced interference of activities of daily living due to decreased range of motion and an increase in joint pain and swelling.  Patient has failed non-operative treatment including NSAIDs, corticosteroid injections, viscosupplement injections, and activity modification.  Soha Wheatley currently ambulates independently.  She had right tka 2018 and did well.     Relevant medical conditions of significance in perioperative period:  Obesity: Pt is s/p gastrectomy and has lost >100 pounds. Counseled on continued weight loss  BAYRON: resolved following weight loss; no longer using CPAP      Past Medical History:   Diagnosis Date    Allergy     Anxiety     Arthritis     BMI 45.0-49.9, adult     Chronic kidney disease     Depression     GERD (gastroesophageal reflux disease)     History of kidney stones     Kidney stones     Migraines     Morbid obesity     Obesity     Sleep apnea in adult     WEARS CPAP, DOESNT KNOW SETTINGS.       Past Surgical History:   Procedure Laterality Date     SECTION      CHOLECYSTECTOMY      CHOLECYSTECTOMY, LAPAROSCOPIC N/A 2013    Performed by Andrew Holloway Jr., MD at [facility] OR 2ND FLR    CYSTOSCOPY  9/3/2015    Performed by Joann Garcia MD at [facility] OR 1ST FLR    CYSTOSCOPY WITH STENT PLACEMENT Right 2015    Performed by Natasha Jenkins MD at [facility] OR 1ST FLR    DILATION-URETHRA N/A 2015    Performed by Natasha Jenkins MD at [facility] OR 1ST FLR    EGD (ESOPHAGOGASTRODUODENOSCOPY) N/A 2013    Performed by Andrew Gonzalez MD at [facility] ENDO (4TH FLR)    ESOPHAGOGASTRODUODENOSCOPY (EGD) N/A 2017    Performed by Krystian Chaudhary MD at [facility] ENDO (4TH FLR)    EXTRACTION-STONE-URETEROSCOPY Right 9/3/2015    Performed by Jaonn Garcia MD at [facility] OR 1ST FLR     GASTRECTOMY      GASTRECTOMY-SLEEVE-LAPAROSCOPIC-75203 , Need intra-op EGD N/A 12/29/2017    Performed by Andrew [facility] Jr., [facility] at [facility] OR 2ND FLR    KNEE ARTHRODESIS  2001    arthroscopy - Left knee for meniscus     KNEE CARTILAGE SURGERY Left 2001    LITHOTRIPSY-LASER Right 9/3/2015    Performed by Joann Garcia MD at [facility] OR 1ST FLR    REPLACEMENT-KNEE-TOTAL Right 5/23/2018    Performed by [facility] at [facility] OR 2ND FLR    REPLACEMENT-STENT Right 9/3/2015    Performed by Joann Garcia MD at [facility] OR 1ST FLR    TONSILLECTOMY  1986    TOTAL KNEE ARTHROPLASTY Right 5/23/2018    Procedure: REPLACEMENT-KNEE-TOTAL;  Surgeon: [facility] L. [facility] Jr., MD;  Location: [facility] OR [address] [place] [address];  Service: Orthopedics;  Laterality: Right;    URETEROSCOPY         Family History   Problem Relation Age of Onset    Cancer Mother         breast    Hyperlipidemia Father     Hypertension Father     VANDANA disease Maternal Grandmother     Heart disease Maternal Grandmother     Hyperlipidemia Maternal Grandmother     Colon cancer Maternal Grandmother     Pancreatic cancer Maternal Grandfather     Cancer Maternal Grandfather 88    Heart disease Maternal Uncle     Heart attack Paternal Grandfather     Celiac disease Neg Hx     Cirrhosis Neg Hx     Colon polyps Neg Hx     Crohn's disease Neg Hx     Cystic fibrosis Neg Hx     Esophageal cancer Neg Hx     Hemochromatosis Neg Hx     Inflammatory bowel disease Neg Hx     Irritable bowel syndrome Neg Hx     Liver cancer Neg Hx     Liver disease Neg Hx     Rectal cancer Neg Hx     Stomach cancer Neg Hx     Ulcerative colitis Neg Hx     Silvestre's disease Neg Hx        Review of patient's allergies indicates:   Allergen Reactions    Adhesive      Paper tape usually ok- pulls skin off    Flagyl [metronidazole hcl]      confusion         Current Outpatient Medications:     b complex vitamins tablet, Take 1 tablet by mouth once daily., Disp: ,  "Rfl:     cinnamon bark-chromium picolin 500-100 mg-mcg Cap, Take by mouth., Disp: , Rfl:     cyanocobalamin 500 MCG tablet, Take 500 mcg by mouth once daily., Disp: , Rfl:     gabapentin (NEURONTIN) 600 MG tablet, Take 1 tablet (600 mg total) by mouth 3 (three) times daily., Disp: 270 tablet, Rfl: 0    GLUCOSAMINE HCL/CHONDR APODACA A NA (OSTEO BI-FLEX ORAL), Take by mouth once daily., Disp: , Rfl:     meloxicam (MOBIC) 15 MG tablet, Take 15 mg by mouth once daily., Disp: , Rfl:     multivitamin (ONE DAILY MULTIVITAMIN) per tablet, Take 1 tablet by mouth once daily., Disp: , Rfl:     omeprazole (PRILOSEC) 40 MG capsule, TAKE 1 CAPSULE DAILY, Disp: 90 capsule, Rfl: 0    sertraline (ZOLOFT) 100 MG tablet, Take 1 tablet (100 mg total) by mouth once daily., Disp: 90 tablet, Rfl: 3    tiZANidine (ZANAFLEX) 4 MG tablet, Take 1 tablet (4 mg total) by mouth every 8 (eight) hours as needed., Disp: 270 tablet, Rfl: 0    Review of Systems:  Constitutional: no fever or chills  Eyes: no visual changes  ENT: no nasal congestion or sore throat  Respiratory: no cough or shortness of breath  Cardiovascular: no chest pain or palpitations  Gastrointestinal: no nausea or vomiting, tolerating diet  Genitourinary: no hematuria or dysuria  Integument/Breast: no rash or pruritis  Hematologic/Lymphatic: no easy bruising or lymphadenopathy  Musculoskeletal: positive for knee pain  Neurological: no seizures or tremors  Behavioral/Psych: no auditory or visual hallucinations  Endocrine: no heat or cold intolerance    PE:  /64   Pulse (!) 57   Ht 5' 10" (1.778 m)   Wt 106.2 kg (234 lb 2.1 oz)   BMI 33.59 kg/m²   General: Pleasant, cooperative, NAD   Gait: antalgic  HEENT: NCAT, sclera nonicteric   Lungs: Respirations clear bilaterally; equal and unlabored.   CV: S1S2; 2+ bilateral upper and lower extremity pulses.   Skin: Intact throughout with no rashes, erythema, or lesions  Extremities: No LE edema,  no erythema or warmth of the " skin in either lower extremity.    Left knee exam:  Knee Range of Motion:0-120 active, pain with passive range of motion  Effusion:none  Condition of skin:intact  Location of tenderness:Medial joint line   Strength:4 of 5 quadriceps strength and 5 of 5 hamstring strength  Stability: stable to testing    Hip Examination:full painless range of motion, without tenderness    Radiographs: Radiographs reveal advanced degenerative changes including subchondral cyst formation, subchondral sclerosis, osteophyte formation, joint space narrowing.     Knee Alignment:  Moderate varus    Diagnosis: osteoarthritis Left knee    Plan: Left total knee arthroplasty    Due to the serious nature of total joint infection and the high prevalence of community acquired MRSA, vancomycin will be used perioperatively.

## 2018-10-24 NOTE — PROGRESS NOTES
Soha Wheatley is a 62 y.o. year old here today for a pre-operative visit in preparation for a Left total knee arthroplasty to be performed by  Dr. [facility] on 10/31/2018.  she was last seen and treated in the clinic on 9/20/2018. she will be medically optimized by the pre op center. There has been no significant change in medical status since last visit. No fever, chills, malaise, or unexplained weight change.      Allergies, Medications, past medical and surgical history reviewed.    Focused examination performed.    Dr. [facility] saw this patient today in clinic. All questions answered. Patient encouraged to call with questions. Contact information given.     Pre, xavier, and post operative procedures and expectations discussed. Questions were answered. Soha Wheatley has been educated and is ready to proceed with surgery. Approximately 30 minutes was spent discussing surgical outcomes, plans, procedures pre, xavier, and post operative expections and care.  Surgical consent signed.    Soha Wheatley will contact us if there are any questions, concerns, or changes in medical status prior to surgery.

## 2018-10-25 ENCOUNTER — NURSE TRIAGE (OUTPATIENT)
Dept: ADMINISTRATIVE | Facility: CLINIC | Age: 63
End: 2018-10-25

## 2018-10-25 ENCOUNTER — TELEPHONE (OUTPATIENT)
Dept: INTERNAL MEDICINE | Facility: CLINIC | Age: 63
End: 2018-10-25

## 2018-10-25 ENCOUNTER — LAB VISIT (OUTPATIENT)
Dept: LAB | Facility: HOSPITAL | Age: 63
End: 2018-10-25
Attending: ANESTHESIOLOGY
Payer: COMMERCIAL

## 2018-10-25 ENCOUNTER — DOCUMENTATION ONLY (OUTPATIENT)
Dept: INTERNAL MEDICINE | Facility: CLINIC | Age: 63
End: 2018-10-25

## 2018-10-25 DIAGNOSIS — N39.0 URINARY TRACT INFECTION WITHOUT HEMATURIA, SITE UNSPECIFIED: Primary | ICD-10-CM

## 2018-10-25 DIAGNOSIS — N39.0 URINARY TRACT INFECTION WITHOUT HEMATURIA, SITE UNSPECIFIED: ICD-10-CM

## 2018-10-25 PROCEDURE — 87086 URINE CULTURE/COLONY COUNT: CPT

## 2018-10-25 PROCEDURE — 87186 SC STD MICRODIL/AGAR DIL: CPT

## 2018-10-25 PROCEDURE — 87088 URINE BACTERIA CULTURE: CPT

## 2018-10-25 PROCEDURE — 87077 CULTURE AEROBIC IDENTIFY: CPT

## 2018-10-25 RX ORDER — NITROFURANTOIN (MACROCRYSTALS) 100 MG/1
100 CAPSULE ORAL EVERY 12 HOURS
Qty: 14 CAPSULE | Refills: 0 | Status: SHIPPED | OUTPATIENT
Start: 2018-10-25 | End: 2018-11-01

## 2018-10-25 NOTE — TELEPHONE ENCOUNTER
Patient called to report the following:     -states that she spoke with MD office and will need abx for UTI  -patient would like medication called in   -on call provider contacted x 2   -orders noted for Macrobid called to [facility]/ [place]   -patient advised to f/u with pharmacy   -states ok     Reason for Disposition   [1] Prescription not at pharmacy AND [2] was prescribed today by PCP    Protocols used: ST MEDICATION QUESTION CALL-A-AH

## 2018-10-25 NOTE — TELEPHONE ENCOUNTER
----- Message from Ingris Doherty sent at 10/25/2018  1:54 PM CDT -----  Contact: Soha Dioni [phone]  Soha states she seen Dr.[facility] and they sent her to give a urine specimen. Soha states Dr. [facility] told her they would get in contact with Dr. Mallory to get an antibiotic sent over to her pharmacy. Soha states the doctor would like for the UTI to be cleared by Wednesday 10/31 when she goes to have her surgery.

## 2018-10-25 NOTE — TELEPHONE ENCOUNTER
----- Message from Sonya Pierre RN sent at 10/25/2018 10:57 AM CDT -----  UA done 10/24 indicated positive nitrites.  Reviewed by Dr. Leopold.  Urine cultured added on today. Anesthesia is requesting your review and treatment as indicated.    Sonya Pierre RN  [facility]  [id number]

## 2018-10-25 NOTE — TELEPHONE ENCOUNTER
Spoke with pt and informed her previous message was sent and Dr. Mallory just got in clinic once he gets a chance mediation will be sent to pharmacy.

## 2018-10-26 ENCOUNTER — TELEPHONE (OUTPATIENT)
Dept: ORTHOPEDICS | Facility: CLINIC | Age: 63
End: 2018-10-26

## 2018-10-26 ENCOUNTER — OFFICE VISIT (OUTPATIENT)
Dept: SPINE | Facility: CLINIC | Age: 63
End: 2018-10-26
Payer: COMMERCIAL

## 2018-10-26 VITALS
WEIGHT: 234 LBS | HEIGHT: 66 IN | BODY MASS INDEX: 37.61 KG/M2 | DIASTOLIC BLOOD PRESSURE: 59 MMHG | SYSTOLIC BLOOD PRESSURE: 126 MMHG | HEART RATE: 67 BPM

## 2018-10-26 DIAGNOSIS — M47.812 CERVICAL SPONDYLOSIS WITHOUT MYELOPATHY: Primary | ICD-10-CM

## 2018-10-26 DIAGNOSIS — M54.2 NECK PAIN: ICD-10-CM

## 2018-10-26 DIAGNOSIS — M48.02 CERVICAL SPINAL STENOSIS: ICD-10-CM

## 2018-10-26 DIAGNOSIS — M50.30 DDD (DEGENERATIVE DISC DISEASE), CERVICAL: ICD-10-CM

## 2018-10-26 PROCEDURE — 99999 PR PBB SHADOW E&M-EST. PATIENT-LVL III: CPT | Mod: PBBFAC,,, | Performed by: PHYSICIAN ASSISTANT

## 2018-10-26 PROCEDURE — 3008F BODY MASS INDEX DOCD: CPT | Mod: CPTII,S$GLB,, | Performed by: PHYSICIAN ASSISTANT

## 2018-10-26 PROCEDURE — 99214 OFFICE O/P EST MOD 30 MIN: CPT | Mod: S$GLB,,, | Performed by: PHYSICIAN ASSISTANT

## 2018-10-26 RX ORDER — GABAPENTIN 600 MG/1
600 TABLET ORAL 3 TIMES DAILY
Qty: 270 TABLET | Refills: 4 | Status: SHIPPED | OUTPATIENT
Start: 2018-10-26 | End: 2019-11-04 | Stop reason: SDUPTHER

## 2018-10-26 NOTE — TELEPHONE ENCOUNTER
----- Message from Horace Kent MA sent at 10/25/2018  5:00 PM CDT -----  Contact: self      ----- Message -----  From: Rosanna Connors  Sent: 10/25/2018   4:32 PM  To: [facility]    Patient states that she have not received a response back from her pcp in regards to a prescription for her UTI, ask for a call at       We spoke this am  She called the nurse on call last night  She was given a RX for Macrodantin  I will inform DR [facility]

## 2018-10-26 NOTE — PROGRESS NOTES
"Subjective:     Patient ID:  Soha Wheatley is a 62 y.o. female.    [facility], [place]    Chief Complaint: Right shoulder blade pain and right hand paresthesias    HPI    Soha Wheatley is a 62 y.o. female who presents for follow up.  I saw her last in 2016.  She has right shoulder blade pain on and off.  Recently she has started to have some tingling in the right middle, ring, and pinky fingers that comes and goes.  No neck pain.  No arm pain or paresthesias.    Mobic everyday and zanaflex everyday.  Gabapentin 600mg TID which has helped her shoulder blade pain.    Patient denies any recent accidents or trauma, no saddle anesthesias, and no bowel or bladder incontinence.    Patient denies any difficulty with balance or gait, no difficulty tying shoes or buttoning clothes, is not dropping things, does not have difficulty opening containers, and has had no change in handwriting.    Review of Systems:  Constitution: Negative for chills, fever, night sweats and weight loss.   Musculoskeletal: Negative for falls.   Gastrointestinal: Negative for bowel incontinence, nausea and vomiting.   Genitourinary: Negative for bladder incontinence.   Neurological: Negative for disturbances in coordination and loss of balance.      Objective:      Vitals:    10/26/18 1445   BP: (!) 126/59   Pulse: 67   Weight: 106.1 kg (234 lb)   Height: 5' 6" (1.676 m)   PainSc: 0-No pain         Physical Exam:    General:  Soha Wheatley is well-developed, well-nourished, appears stated age, in no acute distress, alert and oriented to person, place, and time.    Pulmonary/Chest:  Respiratory effort normal  Abdominal: Exhibits no distension  Psychiatric:  Normal mood and affect.  Behavior is normal.  Judgement and thought content normal    Musculoskeletal:    Patient arises from a sitting to standing position without difficulty.  Patient walks to the door without evidence of limp, pain, or abnormality of gait. Patient is able to walk heel to toe " without difficulty.    Cervical ROM:   No pain in cervical spine flexion, extension, right lateral bending, left lateral bending, right rotation, and left rotation.    Cervical Spine Inspection:  Normal with no surgical scars with no visible rashes.    Cervical Spine Palpation:  No tenderness to cervical spine palpation.    Neurological:    Muscle strength against resistance:     Right Left   Deltoid  5 / 5 5 / 5   Biceps  5 / 5 5 / 5        Triceps 5 / 5 5 / 5   Wrist flexion  5 / 5 5 / 5   Wrist extension 5 / 5 5 / 5   Finger abduction 5 / 5 5 / 5   Thumb opposition 5 / 5 5 / 5   Handgrip 5 / 5 5 / 5   Hip flexion  5 / 5 5 / 5   Hip extension 5 / 5 5 / 5   Hip abduction 5 / 5 5 / 5   Hip adduction 5/ 5 5 / 5   Knee extension  5 / 5 5 / 5   Knee flexion  5 / 5 5 / 5   Dorsiflexion  5 / 5 5 / 5   EHL  5 / 5 5 / 5   Plantar flexion  5 / 5 5 / 5   Inversion of the feet 5 / 5 5 / 5   Eversion of the feet 5 / 5 5 / 5     Reflexes:     Right Left   Triceps 2+ 2+   Biceps 2+ 2+   Brachioradialis 2+ 2+   Patellar 2+ 2+   Achilles 2+ 2+     Babinski: Negative bilaterally  Clonus:  Negative bilaterally  Baum: Negative bilaterally  Negative tinels of the right elbow  No splitting of the ring finger to light touch sensation on the right ring finger    On gross examination of the bilateral lower extremities, patient has full painfree ROM with no signs of clubbing, cyanosis, edema, and weakness.    MRI Interpretation:   Cervical spine MRI was personally reviewed today. There is HNP at C5-6 and C6-7. There is bilateral NFS at C5-6 and C6-7. Severe right NFS at C7-T1 and moderate at C7-T1 on the left.      Assessment:          1. Cervical spondylosis without myelopathy    2. DDD (degenerative disc disease), cervical    3. Cervical spinal stenosis    4. Neck pain            Plan:          Orders Placed This Encounter    gabapentin (NEURONTIN) 600 MG tablet     Symptoms most likely from right C7-T1 NFS    Refilled Gabapentin  today  Would need updated MRI cervical spine if right hand symptoms persist or get worse  Could consider C7-T1 [place] or right C8 TESI  FU PRN       Follow-Up:  Follow-up if symptoms worsen or fail to improve. If there are any questions prior to this, the patient was instructed to contact the office.       ANGELITA Monet, PA-C  Neurosurgery  [facility]  [facility]

## 2018-10-27 LAB — BACTERIA UR CULT: NORMAL

## 2018-10-30 ENCOUNTER — TELEPHONE (OUTPATIENT)
Dept: ORTHOPEDICS | Facility: CLINIC | Age: 63
End: 2018-10-30

## 2018-10-30 NOTE — TELEPHONE ENCOUNTER
we spoke : Reminded to eat light the night before surgery, NPO after midnight, take hibclens shower the night before surgery  & again in the am , sleep on clean sheets  in clean clothes, no laxatives or stool softeners , report to the 2nd floor surgery unit for 8 am tomorrow  She voiced understanding

## 2018-10-31 ENCOUNTER — HOSPITAL ENCOUNTER (INPATIENT)
Facility: HOSPITAL | Age: 63
LOS: 1 days | Discharge: HOME HEALTH | DRG: 470 | End: 2018-11-01
Attending: ORTHOPAEDIC SURGERY | Admitting: ORTHOPAEDIC SURGERY
Payer: COMMERCIAL

## 2018-10-31 ENCOUNTER — ANESTHESIA (OUTPATIENT)
Dept: SURGERY | Facility: HOSPITAL | Age: 63
DRG: 470 | End: 2018-10-31
Payer: COMMERCIAL

## 2018-10-31 DIAGNOSIS — M17.12 PRIMARY OSTEOARTHRITIS OF LEFT KNEE: ICD-10-CM

## 2018-10-31 LAB
ANION GAP SERPL CALC-SCNC: 7 MMOL/L
BUN SERPL-MCNC: 16 MG/DL
CALCIUM SERPL-MCNC: 8.8 MG/DL
CHLORIDE SERPL-SCNC: 105 MMOL/L
CO2 SERPL-SCNC: 27 MMOL/L
CREAT SERPL-MCNC: 0.7 MG/DL
EST. GFR  (AFRICAN AMERICAN): >60 ML/MIN/1.73 M^2
EST. GFR  (NON AFRICAN AMERICAN): >60 ML/MIN/1.73 M^2
GLUCOSE SERPL-MCNC: 109 MG/DL
POTASSIUM SERPL-SCNC: 4.7 MMOL/L
SODIUM SERPL-SCNC: 139 MMOL/L

## 2018-10-31 PROCEDURE — 25000003 PHARM REV CODE 250: Performed by: ANESTHESIOLOGY

## 2018-10-31 PROCEDURE — 94761 N-INVAS EAR/PLS OXIMETRY MLT: CPT

## 2018-10-31 PROCEDURE — 76942 ECHO GUIDE FOR BIOPSY: CPT | Performed by: ANESTHESIOLOGY

## 2018-10-31 PROCEDURE — 27200688 HC TRAY, SPINAL-HYPER/ ISOBARIC: Performed by: ANESTHESIOLOGY

## 2018-10-31 PROCEDURE — 37000008 HC ANESTHESIA 1ST 15 MINUTES: Performed by: ORTHOPAEDIC SURGERY

## 2018-10-31 PROCEDURE — 36000711: Performed by: ORTHOPAEDIC SURGERY

## 2018-10-31 PROCEDURE — 63600175 PHARM REV CODE 636 W HCPCS: Performed by: NURSE ANESTHETIST, CERTIFIED REGISTERED

## 2018-10-31 PROCEDURE — 36415 COLL VENOUS BLD VENIPUNCTURE: CPT

## 2018-10-31 PROCEDURE — 71000039 HC RECOVERY, EACH ADD'L HOUR: Performed by: ORTHOPAEDIC SURGERY

## 2018-10-31 PROCEDURE — G8988 SELF CARE GOAL STATUS: HCPCS | Mod: CI

## 2018-10-31 PROCEDURE — 25000003 PHARM REV CODE 250: Performed by: PHYSICIAN ASSISTANT

## 2018-10-31 PROCEDURE — 97530 THERAPEUTIC ACTIVITIES: CPT

## 2018-10-31 PROCEDURE — C1713 ANCHOR/SCREW BN/BN,TIS/BN: HCPCS | Performed by: ORTHOPAEDIC SURGERY

## 2018-10-31 PROCEDURE — 97161 PT EVAL LOW COMPLEX 20 MIN: CPT

## 2018-10-31 PROCEDURE — 37000009 HC ANESTHESIA EA ADD 15 MINS: Performed by: ORTHOPAEDIC SURGERY

## 2018-10-31 PROCEDURE — C1776 JOINT DEVICE (IMPLANTABLE): HCPCS | Performed by: ORTHOPAEDIC SURGERY

## 2018-10-31 PROCEDURE — 11000001 HC ACUTE MED/SURG PRIVATE ROOM

## 2018-10-31 PROCEDURE — 80048 BASIC METABOLIC PNL TOTAL CA: CPT

## 2018-10-31 PROCEDURE — 63600175 PHARM REV CODE 636 W HCPCS: Performed by: ANESTHESIOLOGY

## 2018-10-31 PROCEDURE — G8987 SELF CARE CURRENT STATUS: HCPCS | Mod: CL

## 2018-10-31 PROCEDURE — 27201423 OPTIME MED/SURG SUP & DEVICES STERILE SUPPLY: Performed by: ORTHOPAEDIC SURGERY

## 2018-10-31 PROCEDURE — 25000003 PHARM REV CODE 250: Performed by: NURSE ANESTHETIST, CERTIFIED REGISTERED

## 2018-10-31 PROCEDURE — G8978 MOBILITY CURRENT STATUS: HCPCS | Mod: CK

## 2018-10-31 PROCEDURE — 88311 DECALCIFY TISSUE: CPT | Performed by: PATHOLOGY

## 2018-10-31 PROCEDURE — 88311 DECALCIFY TISSUE: CPT | Mod: 26,,, | Performed by: PATHOLOGY

## 2018-10-31 PROCEDURE — 36000710: Performed by: ORTHOPAEDIC SURGERY

## 2018-10-31 PROCEDURE — 97165 OT EVAL LOW COMPLEX 30 MIN: CPT

## 2018-10-31 PROCEDURE — 27000221 HC OXYGEN, UP TO 24 HOURS

## 2018-10-31 PROCEDURE — D9220A PRA ANESTHESIA: Mod: CRNA,,, | Performed by: NURSE ANESTHETIST, CERTIFIED REGISTERED

## 2018-10-31 PROCEDURE — 63600175 PHARM REV CODE 636 W HCPCS: Performed by: PHYSICIAN ASSISTANT

## 2018-10-31 PROCEDURE — G8989 SELF CARE D/C STATUS: HCPCS | Mod: CL

## 2018-10-31 PROCEDURE — 64448 NJX AA&/STRD FEM NRV NFS IMG: CPT | Mod: 59,LT,, | Performed by: ANESTHESIOLOGY

## 2018-10-31 PROCEDURE — 97116 GAIT TRAINING THERAPY: CPT

## 2018-10-31 PROCEDURE — 27447 TOTAL KNEE ARTHROPLASTY: CPT | Mod: LT,,, | Performed by: ORTHOPAEDIC SURGERY

## 2018-10-31 PROCEDURE — 25000003 PHARM REV CODE 250: Performed by: ORTHOPAEDIC SURGERY

## 2018-10-31 PROCEDURE — 63600175 PHARM REV CODE 636 W HCPCS: Performed by: ORTHOPAEDIC SURGERY

## 2018-10-31 PROCEDURE — G8979 MOBILITY GOAL STATUS: HCPCS | Mod: CJ

## 2018-10-31 PROCEDURE — 27100025 HC TUBING, SET FLUID WARMER: Performed by: NURSE ANESTHETIST, CERTIFIED REGISTERED

## 2018-10-31 PROCEDURE — 0SRD0J9 REPLACEMENT OF LEFT KNEE JOINT WITH SYNTHETIC SUBSTITUTE, CEMENTED, OPEN APPROACH: ICD-10-PCS | Performed by: ORTHOPAEDIC SURGERY

## 2018-10-31 PROCEDURE — 71000033 HC RECOVERY, INTIAL HOUR: Performed by: ORTHOPAEDIC SURGERY

## 2018-10-31 PROCEDURE — 64448 NJX AA&/STRD FEM NRV NFS IMG: CPT | Performed by: ANESTHESIOLOGY

## 2018-10-31 PROCEDURE — D9220A PRA ANESTHESIA: Mod: ANES,,, | Performed by: ANESTHESIOLOGY

## 2018-10-31 PROCEDURE — 88305 TISSUE EXAM BY PATHOLOGIST: CPT | Mod: 26,,, | Performed by: PATHOLOGY

## 2018-10-31 PROCEDURE — 27447 TOTAL KNEE ARTHROPLASTY: CPT | Mod: AS,LT,, | Performed by: PHYSICIAN ASSISTANT

## 2018-10-31 PROCEDURE — 88305 TISSUE EXAM BY PATHOLOGIST: CPT | Performed by: PATHOLOGY

## 2018-10-31 DEVICE — PSN TIB STM 5 DEG SZ EL: Type: IMPLANTABLE DEVICE | Site: KNEE | Status: FUNCTIONAL

## 2018-10-31 DEVICE — PSN ALL POLY PAT 32MM: Type: IMPLANTABLE DEVICE | Site: KNEE | Status: FUNCTIONAL

## 2018-10-31 DEVICE — CEMENT BONE SIMPLE P INDIVID: Type: IMPLANTABLE DEVICE | Site: KNEE | Status: FUNCTIONAL

## 2018-10-31 DEVICE — STEM FEM EXT TAP PERSONA 14X30: Type: IMPLANTABLE DEVICE | Site: KNEE | Status: FUNCTIONAL

## 2018-10-31 DEVICE — PLUG BONE #5: Type: IMPLANTABLE DEVICE | Site: KNEE | Status: FUNCTIONAL

## 2018-10-31 DEVICE — IMPLANTABLE DEVICE: Type: IMPLANTABLE DEVICE | Site: KNEE | Status: FUNCTIONAL

## 2018-10-31 RX ORDER — PREGABALIN 75 MG/1
75 CAPSULE ORAL
Status: COMPLETED | OUTPATIENT
Start: 2018-10-31 | End: 2018-10-31

## 2018-10-31 RX ORDER — SERTRALINE HYDROCHLORIDE 100 MG/1
100 TABLET, FILM COATED ORAL DAILY
Status: DISCONTINUED | OUTPATIENT
Start: 2018-11-01 | End: 2018-11-01 | Stop reason: HOSPADM

## 2018-10-31 RX ORDER — GENTAMICIN SULFATE 40 MG/ML
INJECTION, SOLUTION INTRAMUSCULAR; INTRAVENOUS
Status: DISCONTINUED | OUTPATIENT
Start: 2018-10-31 | End: 2018-10-31 | Stop reason: HOSPADM

## 2018-10-31 RX ORDER — CEFAZOLIN SODIUM 1 G/3ML
2 INJECTION, POWDER, FOR SOLUTION INTRAMUSCULAR; INTRAVENOUS
Status: COMPLETED | OUTPATIENT
Start: 2018-10-31 | End: 2018-11-01

## 2018-10-31 RX ORDER — VANCOMYCIN HYDROCHLORIDE 500 MG/10ML
INJECTION, POWDER, LYOPHILIZED, FOR SOLUTION INTRAVENOUS
Status: DISCONTINUED | OUTPATIENT
Start: 2018-10-31 | End: 2018-10-31 | Stop reason: HOSPADM

## 2018-10-31 RX ORDER — SODIUM CHLORIDE 9 MG/ML
INJECTION, SOLUTION INTRAVENOUS CONTINUOUS
Status: ACTIVE | OUTPATIENT
Start: 2018-10-31 | End: 2018-11-01

## 2018-10-31 RX ORDER — SODIUM CHLORIDE 9 MG/ML
INJECTION, SOLUTION INTRAVENOUS CONTINUOUS PRN
Status: DISCONTINUED | OUTPATIENT
Start: 2018-10-31 | End: 2018-10-31

## 2018-10-31 RX ORDER — SODIUM CHLORIDE 0.9 % (FLUSH) 0.9 %
3 SYRINGE (ML) INJECTION EVERY 8 HOURS PRN
Status: DISCONTINUED | OUTPATIENT
Start: 2018-10-31 | End: 2018-11-01 | Stop reason: HOSPADM

## 2018-10-31 RX ORDER — PREGABALIN 150 MG/1
150 CAPSULE ORAL NIGHTLY
Status: DISCONTINUED | OUTPATIENT
Start: 2018-10-31 | End: 2018-11-01 | Stop reason: HOSPADM

## 2018-10-31 RX ORDER — MIDAZOLAM HYDROCHLORIDE 1 MG/ML
INJECTION, SOLUTION INTRAMUSCULAR; INTRAVENOUS
Status: DISCONTINUED | OUTPATIENT
Start: 2018-10-31 | End: 2018-10-31

## 2018-10-31 RX ORDER — BISACODYL 10 MG
10 SUPPOSITORY, RECTAL RECTAL EVERY 12 HOURS PRN
Status: DISCONTINUED | OUTPATIENT
Start: 2018-10-31 | End: 2018-11-01 | Stop reason: HOSPADM

## 2018-10-31 RX ORDER — MUPIROCIN 20 MG/G
1 OINTMENT TOPICAL
Status: COMPLETED | OUTPATIENT
Start: 2018-10-31 | End: 2018-10-31

## 2018-10-31 RX ORDER — LIDOCAINE HYDROCHLORIDE 10 MG/ML
1 INJECTION, SOLUTION EPIDURAL; INFILTRATION; INTRACAUDAL; PERINEURAL
Status: COMPLETED | OUTPATIENT
Start: 2018-10-31 | End: 2018-10-31

## 2018-10-31 RX ORDER — FENTANYL CITRATE 50 UG/ML
25 INJECTION, SOLUTION INTRAMUSCULAR; INTRAVENOUS EVERY 5 MIN PRN
Status: COMPLETED | OUTPATIENT
Start: 2018-10-31 | End: 2018-10-31

## 2018-10-31 RX ORDER — VANCOMYCIN HCL IN 5 % DEXTROSE 1.5G/250ML
1500 PLASTIC BAG, INJECTION (ML) INTRAVENOUS
Status: COMPLETED | OUTPATIENT
Start: 2018-10-31 | End: 2018-10-31

## 2018-10-31 RX ORDER — ROPIVACAINE HYDROCHLORIDE 5 MG/ML
INJECTION, SOLUTION EPIDURAL; INFILTRATION; PERINEURAL
Status: COMPLETED | OUTPATIENT
Start: 2018-10-31 | End: 2018-10-31

## 2018-10-31 RX ORDER — MORPHINE SULFATE 4 MG/ML
2 INJECTION, SOLUTION INTRAMUSCULAR; INTRAVENOUS
Status: DISCONTINUED | OUTPATIENT
Start: 2018-10-31 | End: 2018-11-01 | Stop reason: HOSPADM

## 2018-10-31 RX ORDER — ONDANSETRON 8 MG/1
8 TABLET, ORALLY DISINTEGRATING ORAL EVERY 12 HOURS PRN
Qty: 20 TABLET | Refills: 0 | Status: SHIPPED | OUTPATIENT
Start: 2018-10-31 | End: 2018-12-07

## 2018-10-31 RX ORDER — MORPHINE SULFATE 4 MG/ML
2 INJECTION, SOLUTION INTRAMUSCULAR; INTRAVENOUS
Status: DISCONTINUED | OUTPATIENT
Start: 2018-10-31 | End: 2018-10-31

## 2018-10-31 RX ORDER — ASPIRIN 81 MG/1
81 TABLET ORAL 2 TIMES DAILY
Qty: 60 TABLET | Refills: 0 | Status: SHIPPED | OUTPATIENT
Start: 2018-10-31 | End: 2018-12-07

## 2018-10-31 RX ORDER — SODIUM CHLORIDE 0.9 % (FLUSH) 0.9 %
3 SYRINGE (ML) INJECTION
Status: DISCONTINUED | OUTPATIENT
Start: 2018-10-31 | End: 2018-10-31 | Stop reason: HOSPADM

## 2018-10-31 RX ORDER — MIDAZOLAM HYDROCHLORIDE 1 MG/ML
1 INJECTION INTRAMUSCULAR; INTRAVENOUS EVERY 5 MIN PRN
Status: DISCONTINUED | OUTPATIENT
Start: 2018-10-31 | End: 2018-10-31 | Stop reason: HOSPADM

## 2018-10-31 RX ORDER — PREGABALIN 75 MG/1
75 CAPSULE ORAL NIGHTLY
Status: DISCONTINUED | OUTPATIENT
Start: 2018-10-31 | End: 2018-10-31

## 2018-10-31 RX ORDER — AMOXICILLIN 250 MG
1 CAPSULE ORAL 2 TIMES DAILY
Status: DISCONTINUED | OUTPATIENT
Start: 2018-10-31 | End: 2018-11-01 | Stop reason: HOSPADM

## 2018-10-31 RX ORDER — OXYCODONE HYDROCHLORIDE 10 MG/1
10 TABLET ORAL
Status: DISCONTINUED | OUTPATIENT
Start: 2018-10-31 | End: 2018-11-01 | Stop reason: HOSPADM

## 2018-10-31 RX ORDER — OXYCODONE HYDROCHLORIDE 5 MG/1
5 TABLET ORAL
Status: DISCONTINUED | OUTPATIENT
Start: 2018-10-31 | End: 2018-10-31

## 2018-10-31 RX ORDER — CEFAZOLIN SODIUM 1 G/3ML
2 INJECTION, POWDER, FOR SOLUTION INTRAMUSCULAR; INTRAVENOUS
Status: COMPLETED | OUTPATIENT
Start: 2018-10-31 | End: 2018-10-31

## 2018-10-31 RX ORDER — FAMOTIDINE 20 MG/1
20 TABLET, FILM COATED ORAL 2 TIMES DAILY
Status: DISCONTINUED | OUTPATIENT
Start: 2018-10-31 | End: 2018-11-01 | Stop reason: HOSPADM

## 2018-10-31 RX ORDER — CELECOXIB 200 MG/1
200 CAPSULE ORAL DAILY
Status: DISCONTINUED | OUTPATIENT
Start: 2018-11-01 | End: 2018-11-01 | Stop reason: HOSPADM

## 2018-10-31 RX ORDER — NALOXONE HCL 0.4 MG/ML
0.02 VIAL (ML) INJECTION
Status: DISCONTINUED | OUTPATIENT
Start: 2018-10-31 | End: 2018-11-01 | Stop reason: HOSPADM

## 2018-10-31 RX ORDER — FENTANYL CITRATE 50 UG/ML
25 INJECTION, SOLUTION INTRAMUSCULAR; INTRAVENOUS EVERY 5 MIN PRN
Status: DISCONTINUED | OUTPATIENT
Start: 2018-10-31 | End: 2018-10-31 | Stop reason: HOSPADM

## 2018-10-31 RX ORDER — POLYETHYLENE GLYCOL 3350 17 G/17G
17 POWDER, FOR SOLUTION ORAL DAILY
Status: DISCONTINUED | OUTPATIENT
Start: 2018-11-01 | End: 2018-11-01 | Stop reason: HOSPADM

## 2018-10-31 RX ORDER — ROPIVACAINE HYDROCHLORIDE 2 MG/ML
8 INJECTION, SOLUTION EPIDURAL; INFILTRATION; PERINEURAL CONTINUOUS
Status: DISCONTINUED | OUTPATIENT
Start: 2018-10-31 | End: 2018-11-01 | Stop reason: HOSPADM

## 2018-10-31 RX ORDER — ACETAMINOPHEN 10 MG/ML
1000 INJECTION, SOLUTION INTRAVENOUS ONCE
Status: COMPLETED | OUTPATIENT
Start: 2018-10-31 | End: 2018-10-31

## 2018-10-31 RX ORDER — ASPIRIN 81 MG/1
81 TABLET ORAL 2 TIMES DAILY
Status: DISCONTINUED | OUTPATIENT
Start: 2018-10-31 | End: 2018-11-01 | Stop reason: HOSPADM

## 2018-10-31 RX ORDER — CELECOXIB 200 MG/1
400 CAPSULE ORAL
Status: COMPLETED | OUTPATIENT
Start: 2018-10-31 | End: 2018-10-31

## 2018-10-31 RX ORDER — EPHEDRINE SULFATE 50 MG/ML
INJECTION, SOLUTION INTRAVENOUS
Status: DISCONTINUED | OUTPATIENT
Start: 2018-10-31 | End: 2018-10-31

## 2018-10-31 RX ORDER — ACETAMINOPHEN 500 MG
1000 TABLET ORAL EVERY 6 HOURS
Status: DISCONTINUED | OUTPATIENT
Start: 2018-10-31 | End: 2018-11-01 | Stop reason: HOSPADM

## 2018-10-31 RX ORDER — OXYCODONE HYDROCHLORIDE 5 MG/1
5 TABLET ORAL
Status: DISCONTINUED | OUTPATIENT
Start: 2018-10-31 | End: 2018-11-01 | Stop reason: HOSPADM

## 2018-10-31 RX ORDER — MUPIROCIN 20 MG/G
1 OINTMENT TOPICAL 2 TIMES DAILY
Status: DISCONTINUED | OUTPATIENT
Start: 2018-10-31 | End: 2018-11-01 | Stop reason: HOSPADM

## 2018-10-31 RX ORDER — DOCUSATE SODIUM 100 MG/1
100 CAPSULE, LIQUID FILLED ORAL 2 TIMES DAILY PRN
Qty: 60 CAPSULE | Refills: 0 | Status: SHIPPED | OUTPATIENT
Start: 2018-10-31 | End: 2019-09-06

## 2018-10-31 RX ORDER — DEXAMETHASONE SODIUM PHOSPHATE 4 MG/ML
INJECTION, SOLUTION INTRA-ARTICULAR; INTRALESIONAL; INTRAMUSCULAR; INTRAVENOUS; SOFT TISSUE
Status: DISCONTINUED | OUTPATIENT
Start: 2018-10-31 | End: 2018-10-31

## 2018-10-31 RX ORDER — NALOXONE HCL 0.4 MG/ML
0.02 VIAL (ML) INJECTION
Status: DISCONTINUED | OUTPATIENT
Start: 2018-10-31 | End: 2018-10-31 | Stop reason: SDUPTHER

## 2018-10-31 RX ORDER — RAMELTEON 8 MG/1
8 TABLET ORAL NIGHTLY PRN
Status: DISCONTINUED | OUTPATIENT
Start: 2018-10-31 | End: 2018-11-01 | Stop reason: HOSPADM

## 2018-10-31 RX ORDER — ONDANSETRON 2 MG/ML
4 INJECTION INTRAMUSCULAR; INTRAVENOUS EVERY 8 HOURS PRN
Status: DISCONTINUED | OUTPATIENT
Start: 2018-10-31 | End: 2018-11-01 | Stop reason: HOSPADM

## 2018-10-31 RX ORDER — PROPOFOL 10 MG/ML
VIAL (ML) INTRAVENOUS CONTINUOUS PRN
Status: DISCONTINUED | OUTPATIENT
Start: 2018-10-31 | End: 2018-10-31

## 2018-10-31 RX ORDER — KETAMINE HCL IN 0.9 % NACL 50 MG/5 ML
SYRINGE (ML) INTRAVENOUS
Status: DISCONTINUED | OUTPATIENT
Start: 2018-10-31 | End: 2018-10-31

## 2018-10-31 RX ORDER — OXYCODONE HYDROCHLORIDE 5 MG/1
15 TABLET ORAL
Status: DISCONTINUED | OUTPATIENT
Start: 2018-10-31 | End: 2018-10-31

## 2018-10-31 RX ORDER — OXYCODONE AND ACETAMINOPHEN 5; 325 MG/1; MG/1
1 TABLET ORAL
Qty: 50 TABLET | Refills: 0 | Status: SHIPPED | OUTPATIENT
Start: 2018-10-31 | End: 2018-11-09 | Stop reason: SDUPTHER

## 2018-10-31 RX ORDER — PHENYLEPHRINE HYDROCHLORIDE 10 MG/ML
INJECTION INTRAVENOUS
Status: DISCONTINUED | OUTPATIENT
Start: 2018-10-31 | End: 2018-10-31

## 2018-10-31 RX ORDER — SODIUM CHLORIDE 9 MG/ML
INJECTION, SOLUTION INTRAVENOUS
Status: COMPLETED | OUTPATIENT
Start: 2018-10-31 | End: 2018-10-31

## 2018-10-31 RX ORDER — OXYCODONE HYDROCHLORIDE 5 MG/1
10 TABLET ORAL
Status: DISCONTINUED | OUTPATIENT
Start: 2018-10-31 | End: 2018-10-31

## 2018-10-31 RX ADMIN — PROPOFOL 100 MCG/KG/MIN: 10 INJECTION, EMULSION INTRAVENOUS at 12:10

## 2018-10-31 RX ADMIN — ROPIVACAINE HYDROCHLORIDE 8 ML/HR: 2 INJECTION, SOLUTION EPIDURAL; INFILTRATION at 09:10

## 2018-10-31 RX ADMIN — MORPHINE SULFATE 2 MG: 4 INJECTION, SOLUTION INTRAMUSCULAR; INTRAVENOUS at 03:10

## 2018-10-31 RX ADMIN — EPHEDRINE SULFATE 10 MG: 50 INJECTION, SOLUTION INTRAMUSCULAR; INTRAVENOUS; SUBCUTANEOUS at 12:10

## 2018-10-31 RX ADMIN — PHENYLEPHRINE HYDROCHLORIDE 200 MCG: 10 INJECTION INTRAVENOUS at 12:10

## 2018-10-31 RX ADMIN — ACETAMINOPHEN 1000 MG: 500 TABLET, FILM COATED ORAL at 09:10

## 2018-10-31 RX ADMIN — PREGABALIN 75 MG: 75 CAPSULE ORAL at 09:10

## 2018-10-31 RX ADMIN — ROPIVACAINE HYDROCHLORIDE 10 ML: 5 INJECTION, SOLUTION EPIDURAL; INFILTRATION; PERINEURAL at 10:10

## 2018-10-31 RX ADMIN — DEXAMETHASONE SODIUM PHOSPHATE 8 MG: 4 INJECTION, SOLUTION INTRAMUSCULAR; INTRAVENOUS at 12:10

## 2018-10-31 RX ADMIN — FAMOTIDINE 20 MG: 20 TABLET ORAL at 09:10

## 2018-10-31 RX ADMIN — FENTANYL CITRATE 25 MCG: 50 INJECTION INTRAMUSCULAR; INTRAVENOUS at 02:10

## 2018-10-31 RX ADMIN — CELECOXIB 400 MG: 200 CAPSULE ORAL at 09:10

## 2018-10-31 RX ADMIN — SODIUM CHLORIDE, SODIUM GLUCONATE, SODIUM ACETATE, POTASSIUM CHLORIDE, MAGNESIUM CHLORIDE, SODIUM PHOSPHATE, DIBASIC, AND POTASSIUM PHOSPHATE: .53; .5; .37; .037; .03; .012; .00082 INJECTION, SOLUTION INTRAVENOUS at 01:10

## 2018-10-31 RX ADMIN — LIDOCAINE HYDROCHLORIDE 10 MG: 10 INJECTION, SOLUTION EPIDURAL; INFILTRATION; INTRACAUDAL at 09:10

## 2018-10-31 RX ADMIN — VANCOMYCIN HYDROCHLORIDE 1500 MG: 10 INJECTION, POWDER, LYOPHILIZED, FOR SOLUTION INTRAVENOUS at 09:10

## 2018-10-31 RX ADMIN — SODIUM CHLORIDE: 0.9 INJECTION, SOLUTION INTRAVENOUS at 02:10

## 2018-10-31 RX ADMIN — CEFAZOLIN 2 G: 330 INJECTION, POWDER, FOR SOLUTION INTRAMUSCULAR; INTRAVENOUS at 12:10

## 2018-10-31 RX ADMIN — PREGABALIN 150 MG: 150 CAPSULE ORAL at 09:10

## 2018-10-31 RX ADMIN — OXYCODONE HYDROCHLORIDE 15 MG: 5 TABLET ORAL at 02:10

## 2018-10-31 RX ADMIN — SODIUM CHLORIDE: 0.9 INJECTION, SOLUTION INTRAVENOUS at 09:10

## 2018-10-31 RX ADMIN — ACETAMINOPHEN 1000 MG: 10 INJECTION, SOLUTION INTRAVENOUS at 02:10

## 2018-10-31 RX ADMIN — Medication 30 MG: at 12:10

## 2018-10-31 RX ADMIN — MUPIROCIN 1 G: 20 OINTMENT TOPICAL at 09:10

## 2018-10-31 RX ADMIN — ASPIRIN 81 MG: 81 TABLET, COATED ORAL at 09:10

## 2018-10-31 RX ADMIN — ROPIVACAINE HYDROCHLORIDE 8 ML/HR: 2 INJECTION, SOLUTION EPIDURAL; INFILTRATION at 02:10

## 2018-10-31 RX ADMIN — MIDAZOLAM HYDROCHLORIDE 2 MG: 1 INJECTION, SOLUTION INTRAMUSCULAR; INTRAVENOUS at 11:10

## 2018-10-31 RX ADMIN — SENNOSIDES AND DOCUSATE SODIUM 1 TABLET: 8.6; 5 TABLET ORAL at 09:10

## 2018-10-31 RX ADMIN — OXYCODONE HYDROCHLORIDE 10 MG: 10 TABLET ORAL at 09:10

## 2018-10-31 RX ADMIN — FENTANYL CITRATE 50 MCG: 50 INJECTION INTRAMUSCULAR; INTRAVENOUS at 10:10

## 2018-10-31 RX ADMIN — EPHEDRINE SULFATE 10 MG: 50 INJECTION, SOLUTION INTRAMUSCULAR; INTRAVENOUS; SUBCUTANEOUS at 01:10

## 2018-10-31 RX ADMIN — SODIUM CHLORIDE: 0.9 INJECTION, SOLUTION INTRAVENOUS at 11:10

## 2018-10-31 RX ADMIN — MIDAZOLAM HYDROCHLORIDE 1 MG: 1 INJECTION, SOLUTION INTRAMUSCULAR; INTRAVENOUS at 10:10

## 2018-10-31 RX ADMIN — MEPIVACAINE HYDROCHLORIDE 2.5 ML: 20 INJECTION, SOLUTION EPIDURAL; INFILTRATION at 11:10

## 2018-10-31 RX ADMIN — OXYCODONE HYDROCHLORIDE 10 MG: 10 TABLET ORAL at 04:10

## 2018-10-31 RX ADMIN — CEFAZOLIN 2 G: 1 INJECTION, POWDER, FOR SOLUTION INTRAMUSCULAR; INTRAVENOUS at 08:10

## 2018-10-31 NOTE — PLAN OF CARE
Problem: Occupational Therapy Goal  Goal: Occupational Therapy Goal  Goals to be met by: 11/7/18     Patient will increase functional independence with ADLs by performing:    UE Dressing with Modified [place].  LE Dressing with Modified [place] and Assistive Devices as needed.  Grooming while standing at sink with Modified [place].  Toileting from bedside commode with Modified [place] for hygiene and clothing management.   Bathing from  edge of bed with Modified [place].  Toilet transfer to bedside commode with Modified [place].  Increased functional strength to WFL for B UE.  Upper extremity exercise program x15 reps per handout, with independence.    POC initiated.

## 2018-10-31 NOTE — BRIEF OP NOTE
[facility]  Surgery Department  Operative Note    SUMMARY     Date of Procedure: 10/31/2018     Procedure: Procedure(s) (LRB):  REPLACEMENT-KNEE-TOTAL (Left)     Surgeon(s) and Role:     * [facility] Jr., MD - Primary    Assisting Surgeon: None    Pre-Operative Diagnosis: Osteoarthritis of knee, unspecified (CODE) [M17.9]    Post-Operative Diagnosis: Post-Op Diagnosis Codes:     * Osteoarthritis of knee, unspecified (CODE) [M17.9]    Anesthesia: Spinal    Technical Procedures Used:  PS       Description of the Findings of the Procedure: Varus    Significant Surgical Tasks Conducted by the Assistant(s), if Applicable: closure    Complications: No    Estimated Blood Loss (EBL): 100cc             Implants:   Implant Name Type Inv. Item Serial No.  Lot No. LRB No. Used   PLUG BONE #5 - [id number]  PLUG BONE #5  [employer] MALLIKA. [id number] Left 1   CEMENT BONE SIMPLE P INDIVID - [id number]  CEMENT BONE SIMPLE P INDIVID  [employer] MALLIKA. [id number] Left 2   SCREW HEX HEAD - [id number]  SCREW HEX HEAD  ZEINAB,INC  Left 1   BIT DRILL PIN TROCAR 3.2MM - [id number]  BIT DRILL PIN TROCAR 3.2MM  ZEINAB,INC  Left 2   SCREW HEX HEAD 3.5X38MM - [id number]  SCREW HEX HEAD 3.5X38MM  ZEINAB,INC  Left 2   Persona Femur Cemented PS Narrow left Size 9    ZEIN[employer],INC [id number] Left 1   PSN TIB STM 5 DEG SZ EL - [id number]  PSN TIB STM 5 DEG SZ EL  ZEIN[employer],INC [id number] Left 1   STEM FEM EXT TAP PERSONA 14X30 - [id number]  STEM FEM EXT TAP PERSONA 14X30  ZEIN[employer],INC [id number] Left 1   PSN ALL POLY PAT 32MM - [id number]  PSN ALL POLY PAT 32MM  ZEIN[employer],INC [id number] Left 1   INSERT TIBIAL ART 11MM LEFT - [id number]  INSERT TIBIAL ART 11MM LEFT  ZEINAB,INC [id number] Left 1       Specimens:   Specimen (12h ago, onward)    Start     Ordered    10/31/18 1350  Specimen to Pathology - Surgery  Once     Comments:  1.  Bone and soft tissue left knee - GROSS - placed in 2nd floor specimen refrigerator per Moll     Start  Status   10/31/18 1350 Collected (10/31/18 1350)       10/31/18 1350                  Condition: Good    Disposition: PACU - hemodynamically stable.    Attestation: I was present and scrubbed for the entire procedure.

## 2018-10-31 NOTE — PT/OT/SLP EVAL
Occupational Therapy   Evaluation    Name: Soha Wheatley  MRN: [id number]  Admitting Diagnosis:  Primary osteoarthritis of left knee Day of Surgery    Recommendations:     Discharge Recommendations: home health OT  Discharge Equipment Recommendations:  none  Barriers to discharge:  None    History:     Occupational Profile:  Living Environment: Pt lives in a one story house c 1 CHARLY and pt has a tub/shower combo c a shower chair.  Previous level of function: I PTA  Roles and Routines: N/A  Equipment Used at Home:  bedside commode, walker, rolling, shower chair  Assistance upon Discharge:     Past Medical History:   Diagnosis Date    Allergy     Anxiety     Arthritis     BMI 45.0-49.9, adult     Chronic kidney disease     Depression     GERD (gastroesophageal reflux disease)     History of kidney stones     Kidney stones     Migraines     Morbid obesity     Obesity     Sleep apnea in adult     WEARS CPAP, DOESNT KNOW SETTINGS.       Past Surgical History:   Procedure Laterality Date     SECTION      CHOLECYSTECTOMY      CHOLECYSTECTOMY, LAPAROSCOPIC N/A 2013    Performed by Andrew Holloway Jr., MD at [facility] OR 2ND FLR    CYSTOSCOPY  9/3/2015    Performed by Joann Garcia MD at [facility] OR 1ST FLR    CYSTOSCOPY WITH STENT PLACEMENT Right 2015    Performed by Natasha Jenkins MD at [facility] OR 1ST FLR    DILATION-URETHRA N/A 2015    Performed by Natasha Jenkins MD at [facility] OR 1ST FLR    EGD (ESOPHAGOGASTRODUODENOSCOPY) N/A 2013    Performed by Andrew Gonzalez MD at [facility] ENDO (4TH FLR)    ESOPHAGOGASTRODUODENOSCOPY (EGD) N/A 2017    Performed by Krystian Chaudhary MD at [facility] ENDO (4TH FLR)    EXTRACTION-STONE-URETEROSCOPY Right 9/3/2015    Performed by Joann Garcia MD at [facility] OR 1ST FLR    GASTRECTOMY      GASTRECTOMY-SLEEVE-LAPAROSCOPIC-90221 , Need intra-op EGD N/A 2017    Performed by Andrew Holloway Jr., MD at [facility] OR 2ND FLR     KNEE ARTHRODESIS  2001    arthroscopy - Left knee for meniscus     KNEE CARTILAGE SURGERY Left 2001    LITHOTRIPSY-LASER Right 9/3/2015    Performed by Joann Garcia MD at [facility] OR 1ST FLR    REPLACEMENT-KNEE-TOTAL Right 5/23/2018    Performed by John L. [facility] Jr., MD at [facility] OR 2ND FLR    REPLACEMENT-STENT Right 9/3/2015    Performed by Joann Garcia MD at [facility] OR 1ST FLR    TONSILLECTOMY  1986    TOTAL KNEE ARTHROPLASTY Right 5/23/2018    Procedure: REPLACEMENT-KNEE-TOTAL;  Surgeon: John L. Ochsner Jr., MD;  [facility];  Service: Orthopedics;  Laterality: Right;    URETEROSCOPY         Subjective     Chief Complaint: Pt is s/p L TKA and is WBAT L LE  Patient/Family Comments/goals: To get better.    Pain/Comfort:  · Pain Rating 1: 9/10    Patients cultural, spiritual, [employer]ology conflicts given the current situation: None    Objective:     Communicated with: RN prior to session.  Patient found all lines intact, call button in reach and RN notified and blood pressure cuff, cryotherapy, FCD, perineural catheter, PureWick, peripheral IV, pulse ox (continuous), telemetry upon OT entry to room.    General Precautions: Standard, fall   Orthopedic Precautions:LLE weight bearing as tolerated   Braces: N/A     Occupational Performance:    Bed Mobility:    Patient completed Supine to Sit with minimum assistance   Patient completed Sit to Supine with minimum assistance  Functional Mobility/Transfers:  · Patient completed Sit <> Stand Transfer with minimum assistance  with  standard walker   · Functional Mobility: Pt was able to stand x2 and was able to take 3 steps forward and backward c CGA and SW.    Activities of Daily Living:  · Upper Body Dressing: maximal assistance to don hospital gown.    Cognitive/Visual Perceptual:  Cognitive/Psychosocial Skills:     -       Oriented to: Person, Place, Time and Situation   -       Follows Commands/attention:Follows multistep   "commands    Physical Exam:  Upper Extremity Range of Motion:     -       Right Upper Extremity: WFL R UE  -       Left Upper Extremity: WFL L UE  Upper Extremity Strength:    -       Right Upper Extremity: WFL  -       Left Upper Extremity: WFL B UE    AMPAC 6 Click ADL:  AMPAC Total Score: 16      Education:    Patient left supine with all lines intact, call button in reach and RN notified    Assessment:     Soha Wheatley is a 62 y.o. female with a medical diagnosis of Primary osteoarthritis of left knee.  She presents with the following performance deficits affecting function: impaired self care skills, impaired functional mobilty, pain, orthopedic precautions.  Pt was able to perform supine/sit, sit/stand T/F c min A and take steps c CGA and SW in PACU.  Pt c/o 9/10 pain throughout assessment.  B UE are WFL.  Able to perform UB dressing c max A.    Rehab Prognosis: Good; patient would benefit from acute skilled OT services to address these deficits and reach maximum level of function.         Clinical Decision Makin.  OT Low:  "Pt evaluation falls under low complexity for evaluation coding due to performance deficits noted in 1-3 areas as stated above and 0 co-morbities affecting current functional status. Data obtained from problem focused assessments. No modifications or assistance was required for completion of evaluation. Only brief occupational profile and history review completed."     Plan:     Patient to be seen daily to address the above listed problems via self-care/home management, therapeutic activities, therapeutic exercises  · Plan of Care Expires: 18  · Plan of Care Reviewed with: patient    This Plan of care has been discussed with the patient who was involved in its development and understands and is in agreement with the identified goals and treatment plan    GOALS:   Multidisciplinary Problems     Occupational Therapy Goals        Problem: Occupational Therapy Goal    Goal " Priority Disciplines Outcome Interventions   Occupational Therapy Goal     OT, PT/OT     Description:  Goals to be met by: 11/7/18     Patient will increase functional independence with ADLs by performing:    UE Dressing with Modified [place].  LE Dressing with Modified [place] and Assistive Devices as needed.  Grooming while standing at sink with Modified [place].  Toileting from bedside commode with Modified [place] for hygiene and clothing management.   Bathing from  edge of bed with Modified [place].  Toilet transfer to bedside commode with Modified [place].  Increased functional strength to WFL for B UE.  Upper extremity exercise program x15 reps per handout, with independence.                      Time Tracking:     OT Date of Treatment: 10/31/18  OT Start Time: 1522  OT Stop Time: 1538  OT Total Time (min): 16 min    Billable Minutes:Evaluation 8  Therapeutic Activity 8    BRIAN Rivas  10/31/2018

## 2018-10-31 NOTE — ASSESSMENT & PLAN NOTE
62 y.o. female POD1 s/p L TKA    Pain control: multimodal per surgical home  PT/OT: WBAT LLE  DVT PPx: ASA 81mg BID, FCDs at all times when not ambulating  Abx: postop Ancef    Dispo: f/u PT recs, d/c home today once DME obtained

## 2018-10-31 NOTE — SUBJECTIVE & OBJECTIVE
"Principal Problem:Primary osteoarthritis of left knee    Principal Orthopedic Problem: L TKA    Interval History: Patient seen and examined at bedside.  No acute events overnight.  Pain controlled.  Denies numbness/tingling to LLE.       Review of patient's allergies indicates:   Allergen Reactions    Adhesive      Paper tape usually ok- pulls skin off    Flagyl [metronidazole hcl]      confusion       Current Facility-Administered Medications   Medication    0.9%  NaCl infusion    acetaminophen tablet 1,000 mg    aspirin EC tablet 81 mg    bisacodyl suppository 10 mg    ceFAZolin injection 2 g    famotidine tablet 20 mg    fentaNYL injection 25 mcg    midazolam (VERSED) 1 mg/mL injection 1 mg    morphine injection 2 mg    morphine injection 2 mg    naloxone 0.4 mg/mL injection 0.02 mg    ondansetron injection 4 mg    oxyCODONE immediate release tablet 10 mg    oxyCODONE immediate release tablet 5 mg    pregabalin capsule 75 mg    promethazine (PHENERGAN) 6.25 mg in dextrose 5 % 50 mL IVPB    ramelteon tablet 8 mg    ropivacaine (PF) 2 mg/ml (0.2%) infusion    senna-docusate 8.6-50 mg per tablet 1 tablet    sodium chloride 0.9% flush 3 mL    sodium chloride 0.9% flush 3 mL    tranexamic acid (CYKLOKAPRON) 3,000 mg in sodium chloride 0.9% 100 mL    vancomycin 1.5 g in 5 % dextrose 250 mL IVPB     Objective:     Vital Signs (Most Recent):  Temp: 97.9 °F (36.6 °C) (11/01/18 0558)  Pulse: (!) 53 (11/01/18 0558)  Resp: 17 (11/01/18 0558)  BP: (!) 125/59 (11/01/18 0558)  SpO2: 99 % (11/01/18 0558) Vital Signs (24h Range):  Temp:  [96.5 °F (35.8 °C)-97.9 °F (36.6 °C)] 97.9 °F (36.6 °C)  Pulse:  [48-72] 53  Resp:  [10-18] 17  SpO2:  [91 %-100 %] 99 %  BP: ()/(44-66) 125/59     Weight: 103.9 kg (229 lb)  Height: 5' 10" (177.8 cm)  Body mass index is 32.86 kg/m².      Intake/Output Summary (Last 24 hours) at 11/1/2018 0608  Last data filed at 10/31/2018 1800  Gross per 24 hour   Intake 2800 ml "   Output 300 ml   Net 2500 ml       Ortho/SPM Exam  AAOx4  NAD  RRR  No increased WOB  Aquacel c/d/i  Polar ice in place  SILT and motor intact T/SP/DP  WWP extremities  FCDs in place and functioning      Significant Labs:   CMP:   Recent Labs   Lab 10/31/18  1359      K 4.7      CO2 27      BUN 16   CREATININE 0.7   CALCIUM 8.8   ANIONGAP 7*   EGFRNONAA >60.0     All pertinent labs within the past 24 hours have been reviewed.    Significant Imaging: I have reviewed and interpreted all pertinent imaging results/findings.

## 2018-10-31 NOTE — NURSING TRANSFER
Nursing Transfer Note      10/31/2018     Transfer To: [address]    Transfer via stretcher    Transfer with polarcare, saphire pump    Transported by pct    Medicines sent: yes    Chart send with patient: Yes    Notified: spouse

## 2018-10-31 NOTE — OP NOTE
DATE OF PROCEDURE:  10/31/2018    SURGEON:  John Lockwood Ochsner, Jr., M.D.    ASSISTANT:  KYLE Simons    OPERATION PERFORMED:  Left total knee arthroplasty.    PREOPERATIVE DIAGNOSIS:  Osteoarthritis, left knee.    POSTOPERATIVE DIAGNOSIS:  Osteoarthritis, left knee.    COMPONENTS USED:  Mayra Persona knee system.  We used a size 9 femur narrow   posterior stabilized left.  We used a size E tibia, left 5-degree stem, an 11 mm   articular insert, posterior stabilized, vitamin E, highly cross-linked poly and   the tibia also had a 14 x 30 stem.  The patella was a 32 mm patella.    No residents were available.  KYLE Simons scrubbed on this case and was   necessary.    ESTIMATED BLOOD LOSS:  100 mL.    SPECIMEN:  Resected bone and tissue sent to Pathology for routine examination.    OPERATIVE TECHNIQUE:  The patient was placed supine on the operating table.    Spinal anesthesia was introduced.  The left leg was prepped and draped in   sterile fashion.  The room was laminar airflow.  The patient was given   preoperative antibiotics and the OR team wore the sterile exhaust suits in order   to minimize risk for infection.  A foot pump was placed on the right foot to   help prevent DVT.  The left leg was exsanguinated and the tourniquet was   inflated to 300 pounds of pressure.  A straight anterior incision was made.    Sharp dissection was carried down to the gluteus bethanie fibers.  Gluteus   bethanie fibers were split in line with the orientation.  The medial collateral   ligament was released.  There was some slight varus deformity.  The   intramedullary guide was placed in the femur.  Distal cut was made at 5 degrees   of valgus with the +2 setting.  We then made the proximal tibial cut, removing   about 4 from the medial side and about 6 to 7 from the lateral side.  The femur   measured for a 9.  We cut it posteriorly for a 9.  It was a little tight in   extension, so I took another two off the  extension cut.  That balanced things   nicer.  We went ahead and did the notch and chamfer-plasty on the femur, sized   the tibia for an E and drilled and prepared it with the same rotation as the   femur.  We then put the trial components in and measured the patella.  The   patella was very thick.  It was a good 26 to 28 mm and we cut it about 18.  I   then Pulsavac'd, dried the surfaces, cemented the tibial baseplate, then the   femoral component, removed the excess cement, put the 11 mm trial in, put the   knee out in extension and cemented the patella.  While the cement was hardening,   I bathed the knee in the Betadine solution, then Pulsavapriscilla'd cleared.  When the   cement was hard, I put the actual 11 mm insert in, then closed the extensor   mechanism with 1 Vicryl over tranexamic acid and vancomycin powder, closed the   subcutaneous tissues with 0 and 3-0 Vicryl, closed the skin with a running   subcuticular 3-0 Monocryl and skin adhesive.  Sterile surgical dressing was   applied.  There were no complications to the procedure.      YOANNA  dd: 10/31/2018 14:06:44 (CDT)  td: 10/31/2018 14:18:35 (CDT)  Doc ID   #[id number]  Job ID #[id number]    CC:

## 2018-10-31 NOTE — ANESTHESIA PROCEDURE NOTES
Adductor Canal Catheter    Patient location during procedure: pre-op   Block not for primary anesthetic.  Reason for block: at surgeon's request and post-op pain management   Post-op Pain Location: L knee pain  Start time: 10/31/2018 10:31 AM  Timeout: 10/31/2018 10:31 AM   End time: 10/31/2018 10:45 AM  Staffing  Anesthesiologist: Paulette Kahn MD  Other anesthesia staff: John Esposito MD  Performed: other anesthesia staff   Preanesthetic Checklist  Completed: patient identified, site marked, surgical consent, pre-op evaluation, timeout performed, IV checked, risks and benefits discussed and monitors and equipment checked  Peripheral Block  Patient position: supine  Prep: ChloraPrep and site prepped and draped  Patient monitoring: heart rate, cardiac monitor, continuous pulse ox, continuous capnometry and frequent blood pressure checks  Block type: adductor canal  Laterality: left  Injection technique: continuous  Needle  Needle type: Tuohy   Needle gauge: 17 G  Needle length: 3.5 in  Needle localization: anatomical landmarks and ultrasound guidance  Catheter type: spring wound  Catheter size: 19 G  Test dose: lidocaine 1.5% with Epi 1-to-200,000 and negative   -ultrasound image captured on disc.  Assessment  Injection assessment: negative aspiration, negative parasthesia and local visualized surrounding nerve  Paresthesia pain: none  Heart rate change: no  Slow fractionated injection: yes  Additional Notes  Diluted 10 cc of 0.5% Ropivacaine with epi to 20 cc of 0.25% Ropivacaine with epi.  VSS.  [facility] RN monitoring vitals throughout procedure.  Patient tolerated procedure well.

## 2018-10-31 NOTE — ANESTHESIA PROCEDURE NOTES
Spinal    Diagnosis: Left Knee OA  Patient location during procedure: OR  Start time: 10/31/2018 11:57 AM  Timeout: 10/31/2018 11:56 AM  End time: 10/31/2018 11:59 AM  Staffing  Anesthesiologist: Krystian Reeves MD  Performed: anesthesiologist   Preanesthetic Checklist  Completed: patient identified, site marked, surgical consent, pre-op evaluation, timeout performed, IV checked, risks and benefits discussed and monitors and equipment checked  Spinal Block  Patient position: sitting  Prep: ChloraPrep  Patient monitoring: heart rate and continuous pulse ox  Approach: midline  Location: L4-5  Injection technique: single shot  CSF Fluid: clear free-flowing CSF  Needle  Needle type: Fadi   Needle gauge: 25 G  Needle length: 3.5 in  Additional Documentation: negative aspiration for heme and no paresthesia on injection  Needle localization: anatomical landmarks  Assessment  Sensory level: T8   Dermatomal levels determined by pinch or prick  Ease of block: easy  Patient's tolerance of the procedure: comfortable throughout block and no complaints  Additional Notes  VSS  No complications

## 2018-10-31 NOTE — TRANSFER OF CARE
"Anesthesia Transfer of Care Note    Patient: Soha Wheatley    Procedure(s) Performed: Procedure(s) (LRB):  REPLACEMENT-KNEE-TOTAL (Left)    Patient location: PACU    Anesthesia Type: general    Transport from OR: Transported from OR on 6-10 L/min O2 by face mask with adequate spontaneous ventilation    Post pain: adequate analgesia    Post assessment: no apparent anesthetic complications and tolerated procedure well    Post vital signs: stable    Level of consciousness: awake, alert and oriented    Nausea/Vomiting: no nausea/vomiting    Complications: none    Transfer of care protocol was followed      Last vitals:   Visit Vitals  BP (!) 104/51 (BP Location: Right arm, Patient Position: Lying)   Pulse (!) 52   Temp 36.6 °C (97.8 °F) (Oral)   Resp 14   Ht 5' 10" (1.778 m)   Wt 103.9 kg (229 lb)   SpO2 96%   Breastfeeding? No   BMI 32.86 kg/m²     "

## 2018-10-31 NOTE — OPERATIVE NOTE ADDENDUM
Certification of Assistant at Surgery       Surgery Date: 10/31/2018     Participating Surgeons:  Surgeon(s) and Role:     * John L. Ochsner Jr., MD - Primary    Procedures:  Procedure(s) (LRB):  REPLACEMENT-KNEE-TOTAL (Left)    Assistant Surgeon's Certification of Necessity:  I understand that section 1842 (b) (6) (d) of the Social Security Act generally prohibits Medicare Part B reasonable charge payment for the services of assistants at surgery in teaching hospitals when qualified residents are available to furnish such services. I certify that the services for which payment is claimed were medically necessary, and that no qualified resident was available to perform the services. I further understand that these services are subject to post-payment review by the Medicare carrier.      Alena Cuevas PA-C    10/31/2018  2:02 PM

## 2018-10-31 NOTE — PLAN OF CARE
Problem: Physical Therapy Goal  Goal: Physical Therapy Goal  Goals to be met by: 11/10/18     Patient will increase functional independence with mobility by performin. Supine to sit with Stand-by Assistance  2. Sit to supine with Stand-by Assistance  3. Sit to stand transfer with Contact Guard Assistance  4. Bed to chair transfer with Minimal Assistance using Rolling Walker or LRD.   5. Gait  x 25 feet with Contact Guard Assistance using Rolling Walker.   6. Ascend/descend 1 stair with no Handrails Moderate Assistance using Rolling Walker or LRD.   7. Lower extremity exercise program x20 reps per handout, with independence    Outcome: Ongoing (interventions implemented as appropriate)  Patient evaluated today. All goals established are appropriate for patient progression at this time.   Garett Godfrey PT, DPT  10/31/2018  Pager: [phone]

## 2018-10-31 NOTE — PT/OT/SLP EVAL
Physical Therapy Evaluation    Patient Name:  Soha Wheatley   MRN:  [id number]    Recommendations:     Discharge Recommendations:  home health PT   Discharge Equipment Recommendations: none   Barriers to discharge: Inaccessible home and Decreased caregiver support    Assessment:     Soha Wheatley is a 62 y.o. female admitted with a medical diagnosis of <principal problem not specified>.  She presents with the following impairments/functional limitations:  weakness, impaired endurance, gait instability, pain, decreased lower extremity function, impaired functional mobilty, impaired joint extensibility, impaired balance . Patient tolerated evaluation  poorly. Patient limited at this time by increased pain with all activity. Pt eventually able to gait x 3 steps fwd/bwd with CGA with SW.  Pt req min A for LLE mgmt during bed mobility.  Patient will continue to require skilled PT services to address the above impairments to return to prior level of function as independent as possible. Discharge recommendation home with home health PT in order to maximize mobility, decrease caregiver burden and increase  functional independence while in the home setting.        Rehab Prognosis:  good; patient would benefit from acute skilled PT services to address these deficits and reach maximum level of function.      Recent Surgery: Procedure(s) (LRB):  REPLACEMENT-KNEE-TOTAL (Left) Day of Surgery    Plan:     During this hospitalization, patient to be seen daily to address the above listed problems via therapeutic activities, gait training, therapeutic exercises, neuromuscular re-education  · Plan of Care Expires:  11/30/18   Plan of Care Reviewed with: patient    Subjective     Communicated with RN  prior to session.  Patient found supine upon PT entry to room, agreeable to evaluation.      Chief Complaint: pain; pt states she feels like she is going to pass out ( pt did not pass out ); pt states that her stomach is  buring  Patient comments/goals: to not be in pain  Pain/Comfort:  · Pain Rating 1: 9/10  · Location - Side 1: Left  · Location - Orientation 1: generalized  · Location 1: knee  · Pain Addressed 1: Pre-medicate for activity, Reposition, Distraction, Cessation of Activity  · Pain Rating Post-Intervention 1: 9/10    Patients cultural, spiritual, Restorationist conflicts given the current situation: none stated    Living Environment:    · patient lives with her    · Pt lives in 1-story house/ trailer, number of outside stairs: 1 with no HR, tub-shower,   ·  Prior to admit patient was independent with ambulation, bathing and hygiene, feeding, continence, grooming, toileting and dressing with No Assistive Device  · Patient was primarily a community ambulatory.  · Equipment owned but not currently used rolling walker, bedside commode and shower chair .    ·  At return home patient will have assistance ( if needed )from  .       Patient has the following equipment: walker, rolling, shower chair, bedside commode.   Objective:     Patient found with: PureWick, blood pressure cuff, telemetry, peripheral IV, perineural catheter, SCD(polar ice device)     General Precautions: Standard, fall   Orthopedic Precautions:LLE weight bearing as tolerated   Braces: N/A     Exams:  · Cognitive Exam:  Patient is oriented to Person, Place, Time and Situation  · Fine Motor Coordination: -       Intact  · Gross Motor Coordination:  WFL  · Postural Exam:  Patient presented with the following abnormalities: -       Rounded shoulders  · -       Forward head  · Sensation: -       Intact  · Skin Integrity/Edema:  -       Skin integrity: edema to L knee/wrapped in ace bandage  · RLE ROM: WFL  · RLE Strength: WFL  · LLE ROM: Deficits: limited AROM: knee flexion/ext compared to R  · LLE Strength: Deficits: knee ext/flexion without resistance; hip/ankle WFL    Functional Mobility:  · Bed Mobility:  Rolling Left:  minimum assistance  · Supine  "to Sit: minimum assistance  · Sit to Supine: minimum assistance  · Transfers:  Sit to Stand x 2 trials:  minimum assistance with standard walker  · Gait: x 3 feet fwd/bwd with SW CGA  · Step to pattern with decrease step length L compared to R  · V/c for step sequencing and RW mgmt    AM-PAC 6 CLICK MOBILITY  Total Score:17       Therapeutic Activities and Exercises:  Pt performed 2 sit to stand with min A 2/2 feeling like she was going to " pass out " during first attempt  Pt given gait training on sequence of LE and RW mgmt  Pt edu on positioning affected LE in reduced WB position in ext to decrease pain during sit to stand transitions.   · All of patients questions were answered within the scope of PT    Patient education  · Patient educated on the role of PT and POC  · Patient educated on importance  activity while in the hosptial per tolerance for improved endurance and to limit deconditioning   · Patient educated on safe transfers with nursing as appropriate  · Patient educated on energy conservation, pursed lip breathing  · Patient educated on proper transfer mechanics and safety      Patient left HOB elevated with all lines intact, call button in reach and RN notified.    GOALS:   Multidisciplinary Problems     Physical Therapy Goals        Problem: Physical Therapy Goal    Goal Priority Disciplines Outcome Goal Variances Interventions   Physical Therapy Goal     PT, PT/OT Ongoing (interventions implemented as appropriate)     Description:  Goals to be met by: 11/10/18     Patient will increase functional independence with mobility by performin. Supine to sit with Stand-by Assistance  2. Sit to supine with Stand-by Assistance  3. Sit to stand transfer with Contact Guard Assistance  4. Bed to chair transfer with Minimal Assistance using Rolling Walker or LRD.   5. Gait  x 25 feet with Contact Guard Assistance using Rolling Walker.   6. Ascend/descend 1 stair with no Handrails Moderate Assistance using " Rolling Walker or LRD.   7. Lower extremity exercise program x20 reps per handout, with independence                      History:     Past Medical History:   Diagnosis Date    Allergy     Anxiety     Arthritis     BMI 45.0-49.9, adult     Chronic kidney disease     Depression     GERD (gastroesophageal reflux disease)     History of kidney stones     Kidney stones     Migraines     Morbid obesity     Obesity     Sleep apnea in adult     WEARS CPAP, DOESNT KNOW SETTINGS.       Past Surgical History:   Procedure Laterality Date     SECTION      CHOLECYSTECTOMY      CHOLECYSTECTOMY, LAPAROSCOPIC N/A 2013    Performed by Andrew [facility]ay Jr[facility] at [facility] OR 2ND FLR    CYSTOSCOPY  9/3/2015    Performed by Joann Garcia MD at [facility] OR 1ST FLR    CYSTOSCOPY WITH STENT PLACEMENT Right 2015    Performed by Natasha Jenkins MD at [facility] OR [address] [place] [address]    DILATION-URETHRA N/A 2015    Performed by Natasha Jenkins MD at [facility] OR 1ST FLR    EGD (ESOPHAGOGASTRODUODENOSCOPY) N/A 2013    Performed by Andrew Gonzalez MD at [facility] ENDO (4TH FLR)    ESOPHAGOGASTRODUODENOSCOPY (EGD) N/A 2017    Performed by Krystian Chaudhary MD at [facility] ENDO (4TH FLR)    EXTRACTION-STONE-URETEROSCOPY Right 9/3/2015    Performed by Joann Garcia MD at [facility] OR 1ST FLR    GASTRECTOMY      GASTRECTOMY-SLEEVE-LAPAROSCOPIC-09072 , Need intra-op EGD N/A 2017    Performed by Andrew Holloway Jr., MD at [facility] OR 2ND FLR    KNEE ARTHRODESIS      arthroscopy - Left knee for meniscus     KNEE CARTILAGE SURGERY Left     LITHOTRIPSY-LASER Right 9/3/2015    Performed by Joann Garcia MD at [facility] OR 1ST FLR    REPLACEMENT-KNEE-TOTAL Right 2018    Performed by [facility] Jr[facility] at [facility] OR 2ND FLR    REPLACEMENT-STENT Right 9/3/2015    Performed by Joann Garcia MD at [facility] OR 1ST FLR    TONSILLECTOMY  1986    TOTAL KNEE ARTHROPLASTY Right 2018     Procedure: REPLACEMENT-KNEE-TOTAL;  Surgeon: John L. [facility] Jr., MD;  Location: [facility] OR [address] [place] [address];  Service: Orthopedics;  Laterality: Right;    URETEROSCOPY         Clinical Decision Making:     History  Co-morbidities and personal factors that may impact the plan of care Examination  Body Structures and Functions, activity limitations and participation restrictions that may impact the plan of care Clinical Presentation   Decision Making/ Complexity Score   Co-morbidities:   [] Time since onset of injury / illness / exacerbation  [x] Status of current condition  []Patient's cognitive status and safety concerns    [] Multiple Medical Problems (see med hx)  Personal Factors:   [] Patient's age  [] Prior Level of function   [] Patient's home situation (environment and family support)  [] Patient's level of motivation  [] Expected progression of patient      HISTORY:(criteria)    [] 61637 - no personal factors/history    [x] 74496 - has 1-2 personal factor/comorbidity     [] 62961 - has >3 personal factor/comorbidity     Body Regions:  [] Objective examination findings  [] Head     []  Neck  [] Trunk   [] Upper Extremity  [x] Lower Extremity    Body Systems:  [] For communication ability, affect, cognition, language, and learning style: the assessment of the ability to make needs known, consciousness, orientation (person, place, and time), expected emotional /behavioral responses, and learning preferences (eg, learning barriers, education  needs)  [] For the neuromuscular system: a general assessment of gross coordinated movement (eg, balance, gait, locomotion, transfers, and transitions) and motor function  (motor control and motor learning)  [x] For the musculoskeletal system: the assessment of gross symmetry, gross range of motion, gross strength, height, and weight  [] For the integumentary system: the assessment of pliability(texture), presence of scar formation, skin color, and skin integrity  [] For  cardiovascular/pulmonary system: the assessment of heart rate, respiratory rate, blood pressure, and edema     Activity limitations:    [] Patient's cognitive status and saf ety concerns          [] Status of current condition      [] Weight bearing restriction  [] Cardiopulmunary Restriction    Participation Restrictions:   [] Goals and goal agreement with the patient     [] Rehab potential (prognosis) and probable outcome      Examination of Body System: (criteria)    [] 88887 - addressing 1-2 elements    [x] 13535 - addressing a total of 3 or more elements     [] 58766 -  Addressing a total of 4 or more elements         Clinical Presentation: (criteria)  Stable - 47772     On examination of body system using standardized tests and measures patient presents with 1-2 elements from any of the following: body structures and functions, activity limitations, and/or participation restrictions.  Leading to a clinical presentation that is considered stable and/or uncomplicated                              Clinical Decision Making  (Eval Complexity):  Low- 91062     Time Tracking:     PT Received On: 10/31/18  PT Start Time: 1522     PT Stop Time: 1538  PT Total Time (min): 16 min     Billable Minutes: Evaluation 8 min and Gait Training 8min      Garett Godfrey PT  10/31/2018

## 2018-11-01 ENCOUNTER — ANESTHESIA (OUTPATIENT)
Dept: SURGERY | Facility: HOSPITAL | Age: 63
End: 2018-11-01

## 2018-11-01 ENCOUNTER — ANESTHESIA EVENT (OUTPATIENT)
Dept: SURGERY | Facility: HOSPITAL | Age: 63
End: 2018-11-01

## 2018-11-01 VITALS
TEMPERATURE: 99 F | HEIGHT: 70 IN | RESPIRATION RATE: 18 BRPM | BODY MASS INDEX: 32.78 KG/M2 | SYSTOLIC BLOOD PRESSURE: 106 MMHG | DIASTOLIC BLOOD PRESSURE: 53 MMHG | HEART RATE: 59 BPM | WEIGHT: 229 LBS | OXYGEN SATURATION: 97 %

## 2018-11-01 PROCEDURE — 99900035 HC TECH TIME PER 15 MIN (STAT)

## 2018-11-01 PROCEDURE — G8988 SELF CARE GOAL STATUS: HCPCS | Mod: CH

## 2018-11-01 PROCEDURE — 97116 GAIT TRAINING THERAPY: CPT

## 2018-11-01 PROCEDURE — G8987 SELF CARE CURRENT STATUS: HCPCS | Mod: CH

## 2018-11-01 PROCEDURE — 25000003 PHARM REV CODE 250: Performed by: ANESTHESIOLOGY

## 2018-11-01 PROCEDURE — G8989 SELF CARE D/C STATUS: HCPCS | Mod: CH

## 2018-11-01 PROCEDURE — 97110 THERAPEUTIC EXERCISES: CPT

## 2018-11-01 PROCEDURE — 94660 CPAP INITIATION&MGMT: CPT

## 2018-11-01 PROCEDURE — 27000190 HC CPAP FULL FACE MASK W/VALVE

## 2018-11-01 PROCEDURE — 25000003 PHARM REV CODE 250: Performed by: PHYSICIAN ASSISTANT

## 2018-11-01 PROCEDURE — 63600175 PHARM REV CODE 636 W HCPCS: Performed by: PHYSICIAN ASSISTANT

## 2018-11-01 PROCEDURE — 97535 SELF CARE MNGMENT TRAINING: CPT

## 2018-11-01 PROCEDURE — 97530 THERAPEUTIC ACTIVITIES: CPT

## 2018-11-01 RX ADMIN — SENNOSIDES AND DOCUSATE SODIUM 1 TABLET: 8.6; 5 TABLET ORAL at 09:11

## 2018-11-01 RX ADMIN — CELECOXIB 200 MG: 200 CAPSULE ORAL at 09:11

## 2018-11-01 RX ADMIN — OXYCODONE HYDROCHLORIDE 10 MG: 10 TABLET ORAL at 01:11

## 2018-11-01 RX ADMIN — SERTRALINE 100 MG: 100 TABLET, FILM COATED ORAL at 09:11

## 2018-11-01 RX ADMIN — OXYCODONE HYDROCHLORIDE 5 MG: 5 TABLET ORAL at 12:11

## 2018-11-01 RX ADMIN — ROPIVACAINE HYDROCHLORIDE 8 ML/HR: 2 INJECTION, SOLUTION EPIDURAL; INFILTRATION at 11:11

## 2018-11-01 RX ADMIN — CEFAZOLIN 2 G: 1 INJECTION, POWDER, FOR SOLUTION INTRAMUSCULAR; INTRAVENOUS at 04:11

## 2018-11-01 RX ADMIN — OXYCODONE HYDROCHLORIDE 10 MG: 10 TABLET ORAL at 04:11

## 2018-11-01 RX ADMIN — ACETAMINOPHEN 1000 MG: 500 TABLET, FILM COATED ORAL at 06:11

## 2018-11-01 RX ADMIN — ASPIRIN 81 MG: 81 TABLET, COATED ORAL at 09:11

## 2018-11-01 RX ADMIN — POLYETHYLENE GLYCOL 3350 17 G: 17 POWDER, FOR SOLUTION ORAL at 09:11

## 2018-11-01 RX ADMIN — FAMOTIDINE 20 MG: 20 TABLET ORAL at 09:11

## 2018-11-01 RX ADMIN — MUPIROCIN 1 G: 20 OINTMENT TOPICAL at 09:11

## 2018-11-01 NOTE — PLAN OF CARE
Problem: Occupational Therapy Goal  Goal: Occupational Therapy Goal  Goals to be met by: 11/7/18     Patient will increase functional independence with ADLs by performing:    UE Dressing with Modified Evans.  LE Dressing with Modified Evans and Assistive Devices as needed.  Grooming while standing at sink with Modified Evans.  Toileting from bedside commode with Modified Evans for hygiene and clothing management.   Bathing from  edge of bed with Modified Evans.  Toilet transfer to bedside commode with Modified Evans.  Increased functional strength to WFL for B UE.  Upper extremity exercise program x15 reps per handout, with independence.     Cont. POC.

## 2018-11-01 NOTE — ANESTHESIA POSTPROCEDURE EVALUATION
"Anesthesia Post Evaluation    Patient: Soha Wheatley    Procedure(s) Performed: Procedure(s) (LRB):  REPLACEMENT-KNEE-TOTAL (Left)    Final Anesthesia Type: general  Patient location during evaluation: PACU  Patient participation: Yes- Able to Participate  Level of consciousness: awake and alert  Post-procedure vital signs: reviewed and stable  Pain management: adequate  Airway patency: patent  PONV status at discharge: No PONV  Anesthetic complications: no      Cardiovascular status: blood pressure returned to baseline  Respiratory status: unassisted  Hydration status: euvolemic  Follow-up not needed.        Visit Vitals  BP (!) 125/59 (BP Location: Left arm, Patient Position: Lying)   Pulse (!) 53   Temp 36.6 °C (97.9 °F) (Oral)   Resp 17   Ht 5' 10" (1.778 m)   Wt 103.9 kg (229 lb)   SpO2 99%   Breastfeeding? No   BMI 32.86 kg/m²       Pain/Alfredo Score: Pain Assessment Performed: Yes (10/31/2018  7:56 PM)  Presence of Pain: complains of pain/discomfort (10/31/2018  7:56 PM)  Pain Rating Prior to Med Admin: 4 (11/1/2018  6:07 AM)  Alfredo Score: 10 (10/31/2018  4:15 PM)        "

## 2018-11-01 NOTE — NURSING
Pt awake, alert resp even and unlabored. Skin warm and dry to touch. Polar ice in place tp left knee. Safety measures in place.

## 2018-11-01 NOTE — ANESTHESIA POST-OP PAIN MANAGEMENT
Acute Pain Service and Perioperative Surgical Home Progress Note    HPI  Soha Wheatley is a 62 y.o., female, pmhx anxiety, gerd, and arthritis    Interval history NAEON.      Surgery:  Procedure(s) (LRB):  REPLACEMENT-KNEE-TOTAL (Left)    Post Op Day #: 1    Catheter type: Perineural Adductor Canal    Infusion type: Ropivacaine 0.2%  8 ml/hr basal    Problem List:    Active Hospital Problems    Diagnosis  POA    *Primary osteoarthritis of left knee [M17.12]  Yes      Resolved Hospital Problems   No resolved problems to display.       Subjective:       General appearance of alert, oriented, no complaints   Pain with rest: 3    Numbers   Pain with movement: 7    Numbers   Side Effects    1. Pruritis No    2. Nausea No    3. Motor Blockade No, 0=Ability to raise lower extremities off bed    4. Sedation No, 1=awake and alert    Schedule Medications:    acetaminophen  1,000 mg Oral Q6H    aspirin  81 mg Oral BID    celecoxib  200 mg Oral Daily    famotidine  20 mg Oral BID    mupirocin  1 g Nasal BID    polyethylene glycol  17 g Oral Daily    pregabalin  150 mg Oral QHS    senna-docusate 8.6-50 mg  1 tablet Oral BID    sertraline  100 mg Oral Daily        Continuous Infusions:   sodium chloride 0.9% 150 mL/hr at 10/31/18 1418    ropivacaine (PF) 2 mg/ml (0.2%) 8 mL/hr (11/01/18 1124)    ropivacaine          PRN Medications:  bisacodyl, morphine, morphine, naloxone, ondansetron, oxyCODONE, oxyCODONE, promethazine (PHENERGAN) IVPB, ramelteon, ropivacaine, sodium chloride 0.9%       Antibiotics:  Antibiotics (From admission, onward)    Start     Stop Route Frequency Ordered    10/31/18 2112  mupirocin 2 % ointment 1 g      11/05 2059 Nasl 2 times daily 10/31/18 2112             Objective:     Catheter site clean, dry, intact          Vital Signs (Most Recent):  Temp: 36.9 °C (98.5 °F) (11/01/18 1118)  Pulse: (!) 58 (11/01/18 1129)  Resp: 18 (11/01/18 1118)  BP: (!) 106/53 (11/01/18 1118)  SpO2: 97 %  (11/01/18 1118) Vital Signs Range (Last 24H):  Temp:  [35.8 °C (96.5 °F)-36.9 °C (98.5 °F)]   Pulse:  [48-72]   Resp:  [10-18]   BP: ()/(44-66)   SpO2:  [91 %-100 %]          I & O (Last 24H):    Intake/Output Summary (Last 24 hours) at 11/1/2018 1338  Last data filed at 10/31/2018 1800  Gross per 24 hour   Intake 800 ml   Output 300 ml   Net 500 ml       Physical Exam:    GA: Alert, comfortable, no acute distress.   Pulmonary: Clear to auscultation A/P/L. No wheezing, crackles, or rhonchi.  Cardiac: RRR S1 & S2 w/o rubs/murmurs/gallops.   Abdominal:Bowel sounds present. No tenderness to palpation or distension. No appreciable hepatosplenomegaly.   Skin: No jaundice, rashes, or visible lesions.         Laboratory:  CBC: No results for input(s): WBC, RBC, HGB, HCT, PLT, MCV, MCH, MCHC in the last 72 hours.    BMP:   Recent Labs     10/31/18  1359      K 4.7   CO2 27      BUN 16   CREATININE 0.7      CALCIUM 8.8       No results for input(s): PT, INR, PROTIME, APTT in the last 72 hours.      Assessment:       Pain control adequate    Plan:     1) Pain:    Adductor canal perineural catheter in place and infusing. Good level. Multimodal pain regimen with acetaminophen, Celebrex, Lyrica, and prn oxycodone given  Will continue to monitor. Plan to D/C perineural catheter in AM or home with On-Q if patient discharged today.   2) GERD : cont famotidine BID   3) Anxiety: cont sertraline QD   4) FEN/GI: Tolerating liquids, advance diet as tolerated.    5) Dispo: Pt working well with PT/OT. Case management and SW for    placement. Plan for D/C tomorrow morning or possibly today if patient   works well with PT and meets goals.      Evaluator Adry Cox DO

## 2018-11-01 NOTE — PLAN OF CARE
Problem: Pain, Acute (Adult)  Goal: Acceptable Pain Control/Comfort Level  Patient will demonstrate the desired outcomes by discharge/transition of care.  Outcome: Ongoing (interventions implemented as appropriate)  Pt verbalized understanding of on going plan of care and able to verbalize her level of comfort.

## 2018-11-01 NOTE — NURSING
pts blood pressure at  8:02 AM was 97/50  Heart rate of 48. Pt is on Telemetry. Blood pressure rechecked at 10:36 /58 HR 59. Call placed into Dr . [facility] at 10: 36 AM awaiting call back to report status.

## 2018-11-01 NOTE — CARE UPDATE
RN Proactive Rounding Note  Time of Visit: 09:48    Admit Date: 10/31/2018  LOS: 1  Code Status: Prior   Date of Visit: 2018  : 1955  Age: 62 y.o.  Sex: female  Race: White  Bed: 540/540 B:   MRN: [id number]  Was the patient discharged from an ICU this admission? no   Was the patient discharged from a PACU within last 24 hours?  yes  Did the patient receive conscious sedation/general anesthesia in last 24 hours?  yes  Was the patient in the ED within the past 24 hours?  no  Was the patient started on NIPPV within the past 24 hours?  no  Attending Physician: John L. [facility]ner Jr., MD  Primary Service: Networked reference to record PCT       ASSESSMENT:     Abnormal Vital Signs: bradycardia   Clinical Issues: Circulatory     INTERVENTIONS/ RECOMMENDATIONS:     Patient admitted for total knee replacement, POD 1.   Seen on proactive rounds for bradycardia HR 50s.   Chart review reveals trend of bradycardia, patient's normal. Last clinic visit 10/23 HR 57.   BP stable at this time.   Otherwise, no acute issues noted.     Discussed plan of care with Samuel DAVID     PHYSICIAN ESCALATION:     Yes/No  no    Orders received and case discussed with Dr. COON .    Disposition: Remain in room 540B.    FOLLOW-UP/CONTINGENCY:     Call back the Rapid Response Nurse at x [phone] for additional questions or concerns.

## 2018-11-01 NOTE — PLAN OF CARE
Extended Emergency Contact Information  Primary Emergency Contact: Angel Wheatley  Address: [address] [place] [address] [facility] [place]  Home Phone: [phone]  Mobile Phone: [phone]  Relation: Spouse  Secondary Emergency Contact: Ana M Precidao  Address: unknown   [place]  Mobile Phone: [phone]  Relation: Daughter    Andrew CAROLINA MD [place]  [address] / [place] [address]    Future Appointments   Date Time Provider Department Center   11/13/2018  9:00 AM Alena Cuevas PA-C [facility] ORTHO [facility]     Payor: [facility] BLUE [facility] / Plan: BCBS ALL OUT OF STATE / Product Type: PPO /       [facility] - [place] - [address]  [address]  [place] [address]  Phone: [phone] Fax: [phone]    [facility] - [address]  [address]  [facility] [address]  Phone: [phone] Fax: [phone]    [facility] - [place], [place] - [address] [place] [address]  Phone: [phone] Fax: [phone]    [facility] - [place], [place] - [address] [place] [address]  Phone: [phone] Fax: [phone]       11/01/18 1420   Discharge Assessment   Assessment Type Discharge Planning Assessment   Confirmed/corrected address and phone number on facesheet? Yes   Assessment information obtained from? Patient;Medical Record   Expected Length of Stay (days) 1   Communicated expected length of stay with patient/caregiver yes   Prior to hospitilization cognitive status: Alert/Oriented   Prior to hospitalization functional status: Assistive Equipment   Current cognitive status: Alert/Oriented   Current Functional Status: Assistive Equipment   Lives With spouse   Able to Return to Prior Arrangements yes   Patient's perception of discharge disposition home or selfcare;home health    Readmission Within The Last 30 Days no previous admission in last 30 days   Patient currently being followed by outpatient case management? No   Patient currently receives any other outside agency services? No   Equipment Currently Used at Home bedside commode;walker, rolling;bath bench   Do you have any problems affording any of your prescribed medications? No   Is the patient taking medications as prescribed? yes   Does the patient have transportation home? Yes   Transportation Available family or friend will provide   Does the patient receive services at the [facility] Clinic? No   Discharge Plan A Home with family;Home Health   Discharge Plan B Home with family   Patient/Family In Agreement With Plan yes

## 2018-11-01 NOTE — PLAN OF CARE
"Problem: Physical Therapy Goal  Goal: Physical Therapy Goal  Goals to be met by: 11/10/18     Patient will increase functional independence with mobility by performin. Supine to sit with Stand-by Assistance  2. Sit to supine with Stand-by Assistance  3. Sit to stand transfer with Contact Guard Assistance  4. Bed to chair transfer with Minimal Assistance using Rolling Walker or LRD.   5. Gait  x 25 feet with Contact Guard Assistance using Rolling Walker.   6. Ascend/descend 1 stair with no Handrails Moderate Assistance using Rolling Walker or LRD.   7. Lower extremity exercise program x20 reps per handout, with independence     Outcome: Outcome(s) achieved Date Met: 18  Pt tolerated session well today with no complications and demonstrated appropriate mobility s/p (L) TKA. Pt with no LOB during ambulation using RW for support. Pt able to follow 3-point gait sequencing well with no complications. Pt able to step up/down on a 6" curb step with no instability noted. Pt reported moderate pain with supine exercises which subsided with rest. Pt verbalized understanding of car t/f technique and is safe to d/c home with HHPT at this time.        "

## 2018-11-01 NOTE — NURSING
Pt being discharged home per dr orders. Discharge instructions given pt verbalized understanding. IV removed. Pt sent with polar ice

## 2018-11-01 NOTE — PLAN OF CARE
[facility]    HOME HEALTH ORDERS  FACE TO FACE ENCOUNTER    Patient Name: Soha Wheatley  YOB: 1955    PCP: Andrew Mallory MD   PCP Address: [address] [facility] [place] [address]  PCP Phone Number: [phone]  PCP Fax: [phone]    Encounter Date: 11/01/2018    Admit to Home Health    Diagnoses:  Active Hospital Problems    Diagnosis  POA    *Primary osteoarthritis of left knee [M17.12]  Yes      Resolved Hospital Problems   No resolved problems to display.       Future Appointments   Date Time Provider Department Center   11/13/2018  9:00 AM Alena Cuevas PA-C [facility]           I have seen and examined this patient face to face today. My clinical findings that support the need for the home health skilled services and home bound status are the following:  Weakness/numbness causing balance and gait disturbance due to Joint Replacement making it taxing to leave home.    Allergies:  Review of patient's allergies indicates:   Allergen Reactions    Adhesive      Paper tape usually ok- pulls skin off    Flagyl [metronidazole hcl]      confusion       Diet: regular diet    Activities: activity as tolerated    Nursing:   SN to complete comprehensive assessment including routine vital signs. Instruct on disease process and s/s of complications to report to MD. Follow specific home health arthoplasty protocol. Review/verify medication list sent home with the patient at time of discharge  and instruct patient/caregiver as needed. If coumadin ordered, coumadin clinic to manage INR with INR draws 2x per week with a goal to maintain INR between 1.8 and 2.2. Frequency may be adjusted depending on start of care date.    Notify MD if SBP > 160 or < 90; DBP > 90 or < 50; HR > 120 or < 50; Temp > 101    Home Medical Equipment:  Walker, 3-1 bedside commode, transfer tub bench    CONSULTS:    Physical Therapy to evaluate and treat. Evaluate for home safety and equipment  needs; Establish/upgrade home exercise program. Perform / instruct on therapeutic exercises, gait training, transfer training, and Range of Motion.    OTHER:  Occupational Therapy to evaluate and treat. Evaluate home environment for safety and equipment needs. Perform/Instruct on transfers, ADL training, ROM, and therapeutic exercises.   to evaluate for community resources/long-range planning.  Aide to provide assistance with personal care, ADLs, and vital signs.  \  WOUND CARE ORDERS  Do not remove surgical dressing for 2 weeks post-op. This will be done only by MD at initial post-op visit. If dressing is completely saturated, replace with identical dressing - silver-impregnated hydrocolloid dressing.     Do not get dressings wet. Do not shower.     If dressing continues to be saturated or there are signs of infection, please call [facility] [phone] for further instructions and to make appt to be seen.         Medications: Review discharge medications with patient and family and provide education.      Current Discharge Medication List      START taking these medications    Details   aspirin (ECOTRIN) 81 MG EC tablet Take 1 tablet (81 mg total) by mouth 2 (two) times daily.  Qty: 60 tablet, Refills: 0      docusate sodium (COLACE) 100 MG capsule Take 1 capsule (100 mg total) by mouth 2 (two) times daily as needed for Constipation.  Qty: 60 capsule, Refills: 0      ondansetron (ZOFRAN-ODT) 8 MG TbDL Dissolve 1 tablet (8 mg total) by mouth every 12 (twelve) hours as needed (nausea).  Qty: 20 tablet, Refills: 0      oxyCODONE-acetaminophen (PERCOCET) 5-325 mg per tablet Take 1 tablet by mouth every 4 to 6 hours as needed for Pain.  Qty: 50 tablet, Refills: 0         CONTINUE these medications which have NOT CHANGED    Details   gabapentin (NEURONTIN) 600 MG tablet Take 1 tablet (600 mg total) by mouth 3 (three) times daily.  Qty: 270 tablet, Refills: 4      nitrofurantoin (MACRODANTIN) 100 MG  capsule Take 1 capsule (100 mg total) by mouth every 12 (twelve) hours. for 7 days  Qty: 14 capsule, Refills: 0      omeprazole (PRILOSEC) 40 MG capsule TAKE 1 CAPSULE DAILY  Qty: 90 capsule, Refills: 0    Associated Diagnoses: Knee pain, unspecified chronicity, unspecified laterality      sertraline (ZOLOFT) 100 MG tablet Take 1 tablet (100 mg total) by mouth once daily.  Qty: 90 tablet, Refills: 3    Associated Diagnoses: Anxiety      tiZANidine (ZANAFLEX) 4 MG tablet Take 1 tablet (4 mg total) by mouth every 8 (eight) hours as needed.  Qty: 270 tablet, Refills: 0    Associated Diagnoses: Brachial neuritis or radiculitis; Cervical spondylosis without myelopathy; Degeneration of cervical intervertebral disc; Cervicalgia      b complex vitamins tablet Take 1 tablet by mouth once daily.      cinnamon bark-chromium picolin 500-100 mg-mcg Cap Take by mouth.      cyanocobalamin 500 MCG tablet Take 500 mcg by mouth once daily.      GLUCOSAMINE HCL/CHONDR APODACA A NA (OSTEO BI-FLEX ORAL) Take by mouth once daily.      multivitamin (ONE DAILY MULTIVITAMIN) per tablet Take 1 tablet by mouth once daily.         STOP taking these medications       meloxicam (MOBIC) 15 MG tablet Comments:   Reason for Stopping:               I certify that this patient is confined to her home and needs intermittent skilled nursing care, physical therapy and occupational therapy.

## 2018-11-01 NOTE — PLAN OF CARE
Problem: Patient Care Overview  Goal: Plan of Care Review  Outcome: Ongoing (interventions implemented as appropriate)  Pt verbalized understanding of on going plan of care

## 2018-11-01 NOTE — PT/OT/SLP PROGRESS
Occupational Therapy   Treatment    Name: Soha Wheatley  MRN: [id number]  Admitting Diagnosis:  Primary osteoarthritis of left knee  1 Day Post-Op    Recommendations:     Discharge Recommendations: home health OT  Discharge Equipment Recommendations:  none  Barriers to discharge:  None    Subjective     Communicated with: RN prior to session.  Pain/Comfort:  · Pain Rating 1: 7/10    Patients cultural, spiritual, Lutheran conflicts given the current situation: None    Objective:     Patient found with: cryotherapy, peripheral IV, perineural catheter    General Precautions: Standard, fall   Orthopedic Precautions:LLE weight bearing as tolerated   Braces: N/A     Occupational Performance:    Bed Mobility:    · Patient completed Supine to Sit with stand by assistance     Functional Mobility/Transfers:  · Patient completed Sit <> Stand Transfer with stand by assistance  with  rolling walker   · Patient completed Bed <> Chair Transfer using Stand Pivot technique with stand by assistance with rolling walker  · Patient completed Toilet Transfer Stand Pivot technique with stand by assistance with  rolling walker and bedside commode  · Patient completed  Shower Transfer Stand Pivot technique with minimum assistance with rolling walker  · Functional Mobility: Pt was able to walk to bathroom c SBA and RW.    Activities of Daily Living:  · Grooming: modified independence to brush teeth while standing at sink  · Upper Body Dressing: modified independence to don shirt.  · Lower Body Dressing: modified independence to don underwear, pants, doff socks, and don sandals.    Patient left up in chair with all lines intact, call button in reach, RN notified and  present    [facility] 6 Click:  AMPA Total Score: 24    Treatment & Education:  Educated pt on bathing and car T/F's.  Education:    Assessment:     Soha Wheatley is a 62 y.o. female with a medical diagnosis of Primary osteoarthritis of left knee.  She was able to  perform supine/sit, sit/stand, bed/chair, and BSC T/F c SBA and RW and shower chair T/F c min A and RW.  Able to perform UB/LB dressing and grooming c set-up.  Was able to walk to bathroom c SBA and RW.  Performance deficits affecting function are impaired self care skills, impaired functional mobilty, orthopedic precautions.      Rehab Prognosis:  Good; patient would benefit from acute skilled OT services to address these deficits and reach maximum level of function.       Plan:     Patient to be seen daily to address the above listed problems via self-care/home management, therapeutic activities, therapeutic exercises  · Plan of Care Expires: 11/07/18  · Plan of Care Reviewed with: patient    This Plan of care has been discussed with the patient who was involved in its development and understands and is in agreement with the identified goals and treatment plan    GOALS:   Multidisciplinary Problems     Occupational Therapy Goals        Problem: Occupational Therapy Goal    Goal Priority Disciplines Outcome Interventions   Occupational Therapy Goal     OT, PT/OT     Description:  Goals to be met by: 11/7/18     Patient will increase functional independence with ADLs by performing:    UE Dressing with Modified [place].  LE Dressing with Modified [place] and Assistive Devices as needed.  Grooming while standing at sink with Modified [place].  Toileting from bedside commode with Modified [place] for hygiene and clothing management.   Bathing from  edge of bed with Modified [place].  Toilet transfer to bedside commode with Modified [place].  Increased functional strength to WFL for B UE.  Upper extremity exercise program x15 reps per handout, with independence.                      Time Tracking:     OT Date of Treatment: 11/01/18  OT Start Time: 0910  OT Stop Time: 0945  OT Total Time (min): 35 min    Billable Minutes:Self Care/Home Management 25  Therapeutic Activity 10    Jose A  BRIAN Falcon  11/1/2018

## 2018-11-01 NOTE — NURSING
Pt arrived to the unit via stretcher awake, alert resp even and unlabored. Skin warm and dry to touch. Incentive spirometer teaching done and demonstrated. Safety measures in place.

## 2018-11-01 NOTE — PT/OT/SLP DISCHARGE
Physical Therapy Discharge Summary    Name: Soha Wheatley  MRN: [id number]   Principal Problem: Primary osteoarthritis of left knee     Patient Discharged from acute Physical Therapy on 18.  Please refer to prior PT noted date on 18 for functional status.     Assessment:     Patient has met all goals and is not appropriate for therapy.    Objective:     GOALS:   Multidisciplinary Problems     Physical Therapy Goals     Not on file          Multidisciplinary Problems (Resolved)        Problem: Physical Therapy Goal    Goal Priority Disciplines Outcome Goal Variances Interventions   Physical Therapy Goal   (Resolved)     PT, PT/OT Outcome(s) achieved     Description:  Goals to be met by: 11/10/18     Patient will increase functional independence with mobility by performin. Supine to sit with Stand-by Assistance  2. Sit to supine with Stand-by Assistance  3. Sit to stand transfer with Contact Guard Assistance  4. Bed to chair transfer with Minimal Assistance using Rolling Walker or LRD.   5. Gait  x 25 feet with Contact Guard Assistance using Rolling Walker.   6. Ascend/descend 1 stair with no Handrails Moderate Assistance using Rolling Walker or LRD.   7. Lower extremity exercise program x20 reps per handout, with independence                      Reasons for Discontinuation of Therapy Services  Satisfactory goal achievement.      Plan:     Patient Discharged to: Home with Home Health Service.    Dominic Diane, PT  2018

## 2018-11-01 NOTE — PROGRESS NOTES
[facility]  Orthopedics  Progress Note    Patient Name: Soha Wheatley  MRN: [id number]  Admission Date: 10/31/2018  Hospital Length of Stay: 1 days  Attending Provider: [facility]., MD  Primary Care Provider: Andrew Mallory MD  Follow-up For: Procedure(s) (LRB):  REPLACEMENT-KNEE-TOTAL (Left)    Post-Operative Day: 1 Day Post-Op  Subjective:     Principal Problem:Primary osteoarthritis of left knee    Principal Orthopedic Problem: L TKA    Interval History: Patient seen and examined at bedside.  No acute events overnight.  Pain controlled.  Denies numbness/tingling to LLE.       Review of patient's allergies indicates:   Allergen Reactions    Adhesive      Paper tape usually ok- pulls skin off    Flagyl [metronidazole hcl]      confusion       Current Facility-Administered Medications   Medication    0.9%  NaCl infusion    acetaminophen tablet 1,000 mg    aspirin EC tablet 81 mg    bisacodyl suppository 10 mg    ceFAZolin injection 2 g    famotidine tablet 20 mg    fentaNYL injection 25 mcg    midazolam (VERSED) 1 mg/mL injection 1 mg    morphine injection 2 mg    morphine injection 2 mg    naloxone 0.4 mg/mL injection 0.02 mg    ondansetron injection 4 mg    oxyCODONE immediate release tablet 10 mg    oxyCODONE immediate release tablet 5 mg    pregabalin capsule 75 mg    promethazine (PHENERGAN) 6.25 mg in dextrose 5 % 50 mL IVPB    ramelteon tablet 8 mg    ropivacaine (PF) 2 mg/ml (0.2%) infusion    senna-docusate 8.6-50 mg per tablet 1 tablet    sodium chloride 0.9% flush 3 mL    sodium chloride 0.9% flush 3 mL    tranexamic acid (CYKLOKAPRON) 3,000 mg in sodium chloride 0.9% 100 mL    vancomycin 1.5 g in 5 % dextrose 250 mL IVPB     Objective:     Vital Signs (Most Recent):  Temp: 97.9 °F (36.6 °C) (11/01/18 0558)  Pulse: (!) 53 (11/01/18 0558)  Resp: 17 (11/01/18 0558)  BP: (!) 125/59 (11/01/18 0558)  SpO2: 99 % (11/01/18 0558) Vital Signs (24h  "Range):  Temp:  [96.5 °F (35.8 °C)-97.9 °F (36.6 °C)] 97.9 °F (36.6 °C)  Pulse:  [48-72] 53  Resp:  [10-18] 17  SpO2:  [91 %-100 %] 99 %  BP: ()/(44-66) 125/59     Weight: 103.9 kg (229 lb)  Height: 5' 10" (177.8 cm)  Body mass index is 32.86 kg/m².      Intake/Output Summary (Last 24 hours) at 11/1/2018 0608  Last data filed at 10/31/2018 1800  Gross per 24 hour   Intake 2800 ml   Output 300 ml   Net 2500 ml       Ortho/SPM Exam  AAOx4  NAD  RRR  No increased WOB  Aquacel c/d/i  Polar ice in place  SILT and motor intact T/SP/DP  WWP extremities  FCDs in place and functioning      Significant Labs:   CMP:   Recent Labs   Lab 10/31/18  1359      K 4.7      CO2 27      BUN 16   CREATININE 0.7   CALCIUM 8.8   ANIONGAP 7*   EGFRNONAA >60.0     All pertinent labs within the past 24 hours have been reviewed.    Significant Imaging: I have reviewed and interpreted all pertinent imaging results/findings.    Assessment/Plan:     * Primary osteoarthritis of left knee    62 y.o. female POD1 s/p L TKA    Pain control: multimodal per surgical home  PT/OT: WBAT LLE  DVT PPx: ASA 81mg BID, FCDs at all times when not ambulating  Abx: postop Ancef    Dispo: f/u PT recs, d/c home today once DME obtained               Larissa Saenz MD  Orthopedics  [facility]  "

## 2018-11-01 NOTE — PT/OT/SLP PROGRESS
"Physical Therapy Treatment    Patient Name:  Soha Wheatley   MRN:  [id number]    Recommendations:     Discharge Recommendations:  home with home health   Discharge Equipment Recommendations: none   Barriers to discharge: None    Assessment:     Soha Wheatley is a 62 y.o. female admitted with a medical diagnosis of Primary osteoarthritis of left knee.  She presents with the following impairments/functional limitations:  weakness, impaired endurance, impaired self care skills, gait instability, impaired functional mobilty, pain, impaired joint extensibility, decreased lower extremity function, decreased ROM, orthopedic precautions, impaired balance.    -Pt tolerated session well today with no complications and demonstrated appropriate mobility s/p (L) TKA. Pt with no LOB during ambulation using RW for support. Pt able to follow 3-point gait sequencing well with no complications. Pt able to step up/down on a 6" curb step with no instability noted. Pt reported moderate pain with supine exercises which subsided with rest. Pt verbalized understanding of car t/f technique and is safe to d/c home with HHPT at this time.    Rehab Prognosis:  Good; patient would benefit from acute skilled PT services to address these deficits and reach maximum level of function.      Recent Surgery: Procedure(s) (LRB):  REPLACEMENT-KNEE-TOTAL (Left) 1 Day Post-Op    Plan:     During this hospitalization, patient to be seen daily to address the above listed problems via gait training, therapeutic activities, therapeutic exercises, neuromuscular re-education  · Plan of Care Expires:  11/30/18   Plan of Care Reviewed with: patient    Subjective     Communicated with nsg prior to session.  Patient found sitting in chair upon PT entry to room, agreeable to treatment.      Chief Complaint: impaired mobility  Patient comments/goals: return home to [facility]  Pain/Comfort:  · Pain Rating 1: 7/10  · Location - Side 1: Left  · Location - Orientation " "1: generalized  · Location 1: knee  · Pain Addressed 1: Pre-medicate for activity, Cessation of Activity, Reposition  · Pain Rating Post-Intervention 1: 6/10    Patients cultural, spiritual, Presybeterian conflicts given the current situation: none    Objective:     Patient found with: cryotherapy, peripheral IV, perineural catheter     General Precautions: Standard, fall   Orthopedic Precautions:LLE weight bearing as tolerated   Braces: N/A     Functional Mobility:  · Bed Mobility:     · Scooting: stand by assistance  · Supine to Sit: stand by assistance  · Sit to Supine: stand by assistance  · Transfers:     · Sit to Stand:  stand by assistance with rolling walker  · Bed to Chair: stand by assistance with  rolling walker  using  Stand Pivot  · Gait: 100' x1 trial, 25' x1 trial using RW with SBA  · Stairs:  Pt ascended/descended 6" curb step with Rolling Walker with no handrails with Contact Guard Assistance.       AM-PAC 6 CLICK MOBILITY  Turning over in bed (including adjusting bedclothes, sheets and blankets)?: 4  Sitting down on and standing up from a chair with arms (e.g., wheelchair, bedside commode, etc.): 4  Moving from lying on back to sitting on the side of the bed?: 4  Moving to and from a bed to a chair (including a wheelchair)?: 4  Need to walk in hospital room?: 4  Climbing 3-5 steps with a railing?: 4  Basic Mobility Total Score: 24       Therapeutic Activities and Exercises:   -Pt performed TKR protocol exercises x15 reps of:  A. AP  B. GS  C. QS  D. SAQ-AAROM  E. Heel slides-AAROM  F. Hip abd/add-AAROM  G. SLR-AAROM  H. LAQ-AAROM    -Pt educated on:  A. PT POC and role of PT  B. Importance of OOB activity to improve functional outcomes after surgery  C. S/s associated with TKR procedure  D. Importance of re-gaining ROM early in acute phase  E. DME mgmt and gait/transfer sequencing  F. Performing HEP to reduce risk of developing blood clots  G. Car t/f      Patient left up in chair with all lines " intact, call button in reach and nsg notified..    GOALS:   Multidisciplinary Problems     Physical Therapy Goals     Not on file          Multidisciplinary Problems (Resolved)        Problem: Physical Therapy Goal    Goal Priority Disciplines Outcome Goal Variances Interventions   Physical Therapy Goal   (Resolved)     PT, PT/OT Outcome(s) achieved     Description:  Goals to be met by: 11/10/18     Patient will increase functional independence with mobility by performin. Supine to sit with Stand-by Assistance  2. Sit to supine with Stand-by Assistance  3. Sit to stand transfer with Contact Guard Assistance  4. Bed to chair transfer with Minimal Assistance using Rolling Walker or LRD.   5. Gait  x 25 feet with Contact Guard Assistance using Rolling Walker.   6. Ascend/descend 1 stair with no Handrails Moderate Assistance using Rolling Walker or LRD.   7. Lower extremity exercise program x20 reps per handout, with independence                      Time Tracking:     PT Received On: 18  PT Start Time: 1006     PT Stop Time: 1036  PT Total Time (min): 30 min     Billable Minutes: Gait Training 15 and Therapeutic Exercise 15    Treatment Type: Treatment  PT/PTA: PT     PTA Visit Number: 0     Dominic Diane, PT  2018

## 2018-11-02 NOTE — HOSPITAL COURSE
Patient presented for left TKA.  Tolerated it well and was discharged home POD1 after voiding, tolerating diet, ambulating, pain controlled.  Patient was informed about signs and symptoms of DVT, PE, wound infection and if any of these should present then he should immediately call us back and come to the ED.  Discharge instructions, follow-up appointment, and med rec are below.

## 2018-11-02 NOTE — DISCHARGE SUMMARY
[facility]  Orthopedics  Discharge Summary      Patient Name: Soha Wheatley  MRN: [id number]  Admission Date: 10/31/2018  Hospital Length of Stay: 1 days  Discharge Date and Time: 11/1/2018  5:56 PM  Attending Physician: No att. providers found   Discharging Provider: Larissa Saenz MD  Primary Care Provider: Andrew Mallory MD    HPI:   Soha Wheatley is a 62 y.o. female with 2 year history of Left knee pain. Pain is worse with activity and weight bearing.  Patient has experienced interference of activities of daily living due to decreased range of motion and an increase in joint pain and swelling.  Patient has failed non-operative treatment including NSAIDs, corticosteroid injections, viscosupplement injections, and activity modification.  Soha Wheatley currently ambulates independently.  She had right tka 5/2018 and did well.         Procedure(s) (LRB):  REPLACEMENT-KNEE-TOTAL (Left)      Hospital Course:  Patient presented for left TKA.  Tolerated it well and was discharged home POD1 after voiding, tolerating diet, ambulating, pain controlled.  Patient was informed about signs and symptoms of DVT, PE, wound infection and if any of these should present then he should immediately call us back and come to the ED.  Discharge instructions, follow-up appointment, and med rec are below.      Consults (From admission, onward)        Status Ordering Provider     Inpatient consult to Pain Management  Once     Provider:  (Not yet assigned)    Acknowledged MERCEDES MELCHOR     Inpatient consult to Respiratory Care  Once     Provider:  (Not yet assigned)    Acknowledged MERCEDES MELCHOR     Inpatient consult to Social Work  Once     Provider:  (Not yet assigned)    Acknowledged MERCEDES MELCHOR          Significant Diagnostic Studies: No pertinent studies.    Pending Diagnostic Studies:     None        Final Active Diagnoses:    Diagnosis Date Noted POA    PRINCIPAL PROBLEM:  Primary  "osteoarthritis of left knee [M17.12] 10/31/2018 Yes      Problems Resolved During this Admission:      Discharged Condition: stable    Disposition: Admitted as an Inpatient    Follow Up:    Patient Instructions:      3 IN 1 COMMODE FOR HOME USE     Order Specific Question Answer Comments   Type: Standard    Height: 5' 10" (1.778 m)    Weight: 106.2 kg (234 lb 2.1 oz)    Does patient have medical equipment at home? bedside commode    Does patient have medical equipment at home? walker, rolling    Does patient have medical equipment at home? bath bench    Length of need (1-99 months): 99    Vendor: Other (use comments) Pt has needed equipment   Expected Date of Delivery: 11/1/2018      TRANSFER TUB BENCH FOR HOME USE     Order Specific Question Answer Comments   Type of Transfer Tub Bench: Unpadded    Height: 5' 10" (1.778 m)    Weight: 106.2 kg (234 lb 2.1 oz)    Does patient have medical equipment at home? bedside commode    Does patient have medical equipment at home? walker, rolling    Does patient have medical equipment at home? bath bench    Length of need (1-99 months): 99    Vendor: Other (use comments) Pt has needed equipment   Expected Date of Delivery: 11/1/2018      WALKER FOR HOME USE     Order Specific Question Answer Comments   Type of Walker: Adult (5'4"-6'6")    With wheels? No    Height: 5' 10" (1.778 m)    Weight: 106.2 kg (234 lb 2.1 oz)    Length of need (1-99 months): 99    Does patient have medical equipment at home? bedside commode    Does patient have medical equipment at home? walker, rolling    Does patient have medical equipment at home? bath bench    Please check all that apply: Walker will be used for gait training.    Vendor: Other (use comments) Pt has needed equipment   Expected Date of Delivery: 11/1/2018      Activity as tolerated     Call MD for:  difficulty breathing, headache or visual disturbances     Call MD for:  extreme fatigue     Call MD for:  hives     Call MD for:  " persistent dizziness or light-headedness     Call MD for:  persistent nausea and vomiting     Call MD for:  redness, tenderness, or signs of infection (pain, swelling, redness, odor or green/yellow discharge around incision site)     Call MD for:  severe uncontrolled pain     Call MD for:  temperature >100.4     Keep surgical extremity elevated     Leave dressing on - Keep it clean, dry, and intact until clinic visit   Order Comments: Do not remove surgical dressing for 2 weeks post-op. This will be done only by MD at initial post-op visit. If dressing is completely saturated, replace with identical dressing - silver-impregnated hydrocolloid dressing.     Do not get dressings wet. Do not shower.     If dressing continues to be saturated or there are signs of infection, please call [facility] [phone] for further instructions and to make appt to be seen.     Lifting restrictions   Order Comments: No strenuous exercise or lifting of > 10 lbs     No driving, operating heavy equipment or signing legal documents while taking pain medication     Sponge bath only until clinic visit     Weight bearing as tolerated     Medications:  Reconciled Home Medications:      Medication List      START taking these medications    aspirin 81 MG EC tablet  Commonly known as:  ECOTRIN  Take 1 tablet (81 mg total) by mouth 2 (two) times daily.     ondansetron 8 MG Tbdl  Commonly known as:  ZOFRAN-ODT  Dissolve 1 tablet (8 mg total) by mouth every 12 (twelve) hours as needed (nausea).     oxyCODONE-acetaminophen 5-325 mg per tablet  Commonly known as:  PERCOCET  Take 1 tablet by mouth every 4 to 6 hours as needed for Pain.     STOOL SOFTENER 100 MG capsule  Generic drug:  docusate sodium  Take 1 capsule (100 mg total) by mouth 2 (two) times daily as needed for Constipation.        STOP taking these medications    meloxicam 15 MG tablet  Commonly known as:  MOBIC        ASK your doctor about these medications    b complex vitamins  tablet  Take 1 tablet by mouth once daily.     cinnamon bark-chromium picolin 500-100 mg-mcg Cap  Take by mouth.     cyanocobalamin 500 MCG tablet  Take 500 mcg by mouth once daily.     gabapentin 600 MG tablet  Commonly known as:  NEURONTIN  Take 1 tablet (600 mg total) by mouth 3 (three) times daily.     nitrofurantoin 100 MG capsule  Commonly known as:  MACRODANTIN  Take 1 capsule (100 mg total) by mouth every 12 (twelve) hours. for 7 days     omeprazole 40 MG capsule  Commonly known as:  PRILOSEC  TAKE 1 CAPSULE DAILY     ONE DAILY MULTIVITAMIN per tablet  Generic drug:  multivitamin  Take 1 tablet by mouth once daily.     OSTEO BI-FLEX ORAL  Take by mouth once daily.     sertraline 100 MG tablet  Commonly known as:  ZOLOFT  Take 1 tablet (100 mg total) by mouth once daily.     tiZANidine 4 MG tablet  Commonly known as:  ZANAFLEX  Take 1 tablet (4 mg total) by mouth every 8 (eight) hours as needed.            Larissa Saenz MD  Orthopedics  [facility]

## 2018-11-02 NOTE — ANESTHESIA POST-OP PAIN MANAGEMENT
Called patient at [phone]. PNC infusing without issues. Reiterated plan for PNC removal tomorrow. Patient stated understanding. Will follow up.

## 2018-11-02 NOTE — HPI
Soha Wheatley is a 62 y.o. female with 2 year history of Left knee pain. Pain is worse with activity and weight bearing.  Patient has experienced interference of activities of daily living due to decreased range of motion and an increase in joint pain and swelling.  Patient has failed non-operative treatment including NSAIDs, corticosteroid injections, viscosupplement injections, and activity modification.  Soha Wheatley currently ambulates independently.  She had right tka 5/2018 and did well.

## 2018-11-03 NOTE — ANESTHESIA POST-OP PAIN MANAGEMENT
Contacted Soha Wheatley at home regarding their OnQ ball. Patient reports tolerable pain level, well controlled w/ supplemental PO meds. Patient removed PNC this evening w/o issue, blue tip remained intact. All questions answered, all concerns addressed during telephone conversation.      Bib Lema MD  CA-2  [phone]

## 2018-11-06 NOTE — ADDENDUM NOTE
Addendum  created 11/06/18 1137 by Paulette Kahn MD    Diagnosis association updated, Intraprocedure Attestations filed, Intraprocedure Blocks edited, Sign clinical note

## 2018-11-08 ENCOUNTER — PATIENT MESSAGE (OUTPATIENT)
Dept: ORTHOPEDICS | Facility: CLINIC | Age: 63
End: 2018-11-08

## 2018-11-08 DIAGNOSIS — Z96.652 STATUS POST TOTAL LEFT KNEE REPLACEMENT: Primary | ICD-10-CM

## 2018-11-09 ENCOUNTER — TELEPHONE (OUTPATIENT)
Dept: ORTHOPEDICS | Facility: CLINIC | Age: 63
End: 2018-11-09

## 2018-11-09 DIAGNOSIS — Z96.652 STATUS POST TOTAL LEFT KNEE REPLACEMENT: Primary | ICD-10-CM

## 2018-11-09 RX ORDER — OXYCODONE AND ACETAMINOPHEN 5; 325 MG/1; MG/1
1 TABLET ORAL
Qty: 50 TABLET | Refills: 0 | Status: SHIPPED | OUTPATIENT
Start: 2018-11-09 | End: 2018-11-13 | Stop reason: SDUPTHER

## 2018-11-09 RX ORDER — OXYCODONE AND ACETAMINOPHEN 5; 325 MG/1; MG/1
1 TABLET ORAL
Qty: 50 TABLET | Refills: 0 | Status: SHIPPED | OUTPATIENT
Start: 2018-11-09 | End: 2018-11-09 | Stop reason: SDUPTHER

## 2018-11-09 NOTE — TELEPHONE ENCOUNTER
----- Message from Bassam Corcoran MA sent at 11/9/2018 12:59 PM CST -----  Contact: [phone]       ----- Message -----  From: Kiara Kent  Sent: 11/9/2018  12:20 PM  To: Mason Carvajal Staff    Pt is needing a call back regarding pain medication, @ [phone]

## 2018-11-13 ENCOUNTER — RESEARCH ENCOUNTER (OUTPATIENT)
Dept: RESEARCH | Facility: HOSPITAL | Age: 63
End: 2018-11-13

## 2018-11-13 ENCOUNTER — OFFICE VISIT (OUTPATIENT)
Dept: ORTHOPEDICS | Facility: CLINIC | Age: 63
End: 2018-11-13
Payer: COMMERCIAL

## 2018-11-13 VITALS
SYSTOLIC BLOOD PRESSURE: 116 MMHG | DIASTOLIC BLOOD PRESSURE: 64 MMHG | HEIGHT: 70 IN | BODY MASS INDEX: 32.86 KG/M2 | HEART RATE: 73 BPM | WEIGHT: 229.5 LBS

## 2018-11-13 DIAGNOSIS — Z96.652 STATUS POST TOTAL LEFT KNEE REPLACEMENT: Primary | ICD-10-CM

## 2018-11-13 PROCEDURE — 99999 PR PBB SHADOW E&M-EST. PATIENT-LVL IV: CPT | Mod: PBBFAC,,, | Performed by: PHYSICIAN ASSISTANT

## 2018-11-13 PROCEDURE — 99024 POSTOP FOLLOW-UP VISIT: CPT | Mod: S$GLB,,, | Performed by: PHYSICIAN ASSISTANT

## 2018-11-13 RX ORDER — OXYCODONE AND ACETAMINOPHEN 5; 325 MG/1; MG/1
1 TABLET ORAL
Qty: 50 TABLET | Refills: 0 | Status: SHIPPED | OUTPATIENT
Start: 2018-11-13 | End: 2018-11-27 | Stop reason: SDUPTHER

## 2018-11-13 RX ORDER — OXYCODONE AND ACETAMINOPHEN 5; 325 MG/1; MG/1
1 TABLET ORAL
Qty: 50 TABLET | Refills: 0 | Status: SHIPPED | OUTPATIENT
Start: 2018-11-13 | End: 2018-11-13 | Stop reason: SDUPTHER

## 2018-11-13 NOTE — PROGRESS NOTES
"Soha Wheatley presents for initial post-operative visit following a left total knee arthroplasty performed by Dr. [facility] on 10/31/2018. Tolerating pain medication well.      Exam:   Blood pressure 116/64, pulse 73, height 5' 10" (1.778 m), weight 104.1 kg (229 lb 8 oz).   Ambulating well with assistive device.  Incision with some blistering and irritation in pattern of dermabond.  ROM:0-100    Initial post-operative radiographs reviewed today revealing a well fixed and aligned prosthesis.    A/P:  2 weeks s/p left total knee replacement  Dr. [facility] interviewed and examined patient today and agrees with plan.   - The patient was advised to keep the incision clean and dry for the next 24 hours after which she may wash the area with antibacterial soap in the shower. Will not submerge until the incision is completely healed.   - Outpatient PT: [facility]  - Continue ASA for 1 month from surgery.  - Pain medication refilled: Percocet 5  - Follow up in 4 weeks with Dr. [facility]. Pt will call clinic with problems/concerns.     "

## 2018-11-13 NOTE — PROGRESS NOTES
Date: 13 November 2018         Time: 0939  Subject Initials: TAD  IRB#: [id number]       Study: The Role of Fitness Monitors in Total Knee Arthroplasty     PI: MD Alena Leon saw this patient today for her SOC initial 2 week follow-up appointment post TKA. Soha is in arm C of the study, so no data was retrieved from a fitness tracker. Standard ROM scores were obtained.

## 2018-11-15 ENCOUNTER — OFFICE VISIT (OUTPATIENT)
Dept: ORTHOPEDICS | Facility: CLINIC | Age: 63
End: 2018-11-15
Payer: COMMERCIAL

## 2018-11-15 ENCOUNTER — TELEPHONE (OUTPATIENT)
Dept: ORTHOPEDICS | Facility: CLINIC | Age: 63
End: 2018-11-15

## 2018-11-15 ENCOUNTER — LAB VISIT (OUTPATIENT)
Dept: LAB | Facility: HOSPITAL | Age: 63
End: 2018-11-15
Attending: ORTHOPAEDIC SURGERY
Payer: COMMERCIAL

## 2018-11-15 VITALS
DIASTOLIC BLOOD PRESSURE: 63 MMHG | HEART RATE: 64 BPM | BODY MASS INDEX: 32.86 KG/M2 | SYSTOLIC BLOOD PRESSURE: 98 MMHG | WEIGHT: 229.5 LBS | HEIGHT: 70 IN | TEMPERATURE: 98 F

## 2018-11-15 DIAGNOSIS — Z96.659 TOTAL KNEE REPLACEMENT STATUS, UNSPECIFIED LATERALITY: ICD-10-CM

## 2018-11-15 DIAGNOSIS — Z96.659 TOTAL KNEE REPLACEMENT STATUS, UNSPECIFIED LATERALITY: Primary | ICD-10-CM

## 2018-11-15 LAB
CRP SERPL-MCNC: 7.9 MG/L
ERYTHROCYTE [SEDIMENTATION RATE] IN BLOOD BY WESTERGREN METHOD: 13 MM/HR

## 2018-11-15 PROCEDURE — 36415 COLL VENOUS BLD VENIPUNCTURE: CPT

## 2018-11-15 PROCEDURE — 85652 RBC SED RATE AUTOMATED: CPT

## 2018-11-15 PROCEDURE — 99024 POSTOP FOLLOW-UP VISIT: CPT | Mod: S$GLB,,, | Performed by: ORTHOPAEDIC SURGERY

## 2018-11-15 PROCEDURE — 99999 PR PBB SHADOW E&M-EST. PATIENT-LVL III: CPT | Mod: PBBFAC,,, | Performed by: ORTHOPAEDIC SURGERY

## 2018-11-15 PROCEDURE — 86140 C-REACTIVE PROTEIN: CPT

## 2018-11-15 NOTE — PROGRESS NOTES
Status post left total knee 10/31/2018  Patient is concerned about wound and is here for a wound check.  Wound appears to have a reaction from the Dermabond.  Wound appears a bit weepy.    Have wrapped the wound and will immobilize it for 48 hr.    Plan  CRP and sed rate today.  Return to clinic Monday a.m. NPO for wound check.

## 2018-11-15 NOTE — TELEPHONE ENCOUNTER
Spoke with pt she is having ozing at on her left knee replacement. Pt was told to come in this morning to  clinic.

## 2018-11-19 ENCOUNTER — OFFICE VISIT (OUTPATIENT)
Dept: ORTHOPEDICS | Facility: CLINIC | Age: 63
End: 2018-11-19
Payer: COMMERCIAL

## 2018-11-19 VITALS — WEIGHT: 227.31 LBS | TEMPERATURE: 96 F | BODY MASS INDEX: 32.54 KG/M2 | HEIGHT: 70 IN

## 2018-11-19 DIAGNOSIS — Z96.652 STATUS POST TOTAL LEFT KNEE REPLACEMENT: Primary | ICD-10-CM

## 2018-11-19 PROCEDURE — 99024 POSTOP FOLLOW-UP VISIT: CPT | Mod: S$GLB,,, | Performed by: ORTHOPAEDIC SURGERY

## 2018-11-19 PROCEDURE — 99999 PR PBB SHADOW E&M-EST. PATIENT-LVL II: CPT | Mod: PBBFAC,,, | Performed by: ORTHOPAEDIC SURGERY

## 2018-11-19 NOTE — PROGRESS NOTES
Status post left total knee 10/31/2018.  Seen in clinic 11/15/2018 for repeating blisters on wound.  Immobilize the knee over the weekend.  Very little weeping from the wound.  CRP was normal.    Wound today looks much better.  Have reapply dressing.    Impression reaction to the silver antibiotic.    Plan may start physical therapy and return to clinic 11/23 for wound check.  If wound looks good I will see the patient back in 3 weeks.

## 2018-11-20 ENCOUNTER — TELEPHONE (OUTPATIENT)
Dept: ORTHOPEDICS | Facility: CLINIC | Age: 63
End: 2018-11-20

## 2018-11-20 NOTE — TELEPHONE ENCOUNTER
Attempted to contact Mariana twice phone rang and just stopped no option for voicemail. Per Alena Cuevas pt can continue PT until outpatient therapy begins.              ----- Message from Alena Cuevas PA-C sent at 11/20/2018  4:04 PM CST -----  Contact: EMORY Peña - [facility]   Please let her know that she may continue.    ----- Message -----  From: Berry Beaver  Sent: 11/20/2018   3:58 PM  To: Alena Cuevas PA-C        ----- Message -----  From: Rosanna Connors  Sent: 11/20/2018   3:43 PM  To: Mason Meyer    Mariana is requesting more at home physical therapy visits until her out patient therapy begins.

## 2018-11-22 ENCOUNTER — TELEPHONE (OUTPATIENT)
Dept: ADMINISTRATIVE | Facility: CLINIC | Age: 63
End: 2018-11-22

## 2018-11-23 ENCOUNTER — OFFICE VISIT (OUTPATIENT)
Dept: ORTHOPEDICS | Facility: CLINIC | Age: 63
End: 2018-11-23
Payer: COMMERCIAL

## 2018-11-23 DIAGNOSIS — Z51.89 VISIT FOR WOUND CHECK: Primary | ICD-10-CM

## 2018-11-23 PROCEDURE — 99999 PR PBB SHADOW E&M-EST. PATIENT-LVL III: CPT | Mod: PBBFAC,,, | Performed by: NURSE PRACTITIONER

## 2018-11-23 PROCEDURE — 99024 POSTOP FOLLOW-UP VISIT: CPT | Mod: S$GLB,,, | Performed by: NURSE PRACTITIONER

## 2018-11-23 NOTE — PROGRESS NOTES
Pt returns for wound check s/p knee replacement. Seen in clinic last week with Dr. [facility] and covered with a bandage. Bandage removed. No erythema. No active drainage. Scabbing noted around the incision. Will leave open to air. She can shower, but will avoid rubbing the incision with the washcloth. She will f/u as scheduled or sooner as needed.

## 2018-11-27 ENCOUNTER — TELEPHONE (OUTPATIENT)
Dept: ORTHOPEDICS | Facility: CLINIC | Age: 63
End: 2018-11-27

## 2018-11-27 DIAGNOSIS — Z96.652 STATUS POST TOTAL LEFT KNEE REPLACEMENT: ICD-10-CM

## 2018-11-27 DIAGNOSIS — Z96.652 STATUS POST TOTAL LEFT KNEE REPLACEMENT: Primary | ICD-10-CM

## 2018-11-27 RX ORDER — OXYCODONE AND ACETAMINOPHEN 5; 325 MG/1; MG/1
1 TABLET ORAL
Qty: 40 TABLET | Refills: 0 | Status: SHIPPED | OUTPATIENT
Start: 2018-11-27 | End: 2018-12-10 | Stop reason: SDUPTHER

## 2018-12-05 ENCOUNTER — CLINICAL SUPPORT (OUTPATIENT)
Dept: REHABILITATION | Facility: HOSPITAL | Age: 63
End: 2018-12-05
Payer: COMMERCIAL

## 2018-12-05 DIAGNOSIS — Z96.652 S/P TKR (TOTAL KNEE REPLACEMENT), LEFT: Primary | ICD-10-CM

## 2018-12-05 NOTE — PROGRESS NOTES
Physical Therapy Initial Evaluation     Name: Soha Wheatley  Clinic Number: [id number]    Soha is a 62 y.o. female evaluated on 12/05/2018.     Diagnosis:   Encounter Diagnosis   Name Primary?    S/P TKR (total knee replacement), left Yes     Physician: Alena Cuevas, ESTHER  Treatment Orders: PT Eval and Treat    Past Medical History:   Diagnosis Date    Allergy     Anxiety     Arthritis     BMI 45.0-49.9, adult     Chronic kidney disease     Depression     GERD (gastroesophageal reflux disease)     History of kidney stones     Kidney stones     Migraines     Morbid obesity     Obesity     Sleep apnea in adult     WEARS CPAP, DOESNT KNOW SETTINGS.     Current Outpatient Medications   Medication Sig    aspirin (ECOTRIN) 81 MG EC tablet Take 1 tablet (81 mg total) by mouth 2 (two) times daily.    b complex vitamins tablet Take 1 tablet by mouth once daily.    cinnamon bark-chromium picolin 500-100 mg-mcg Cap Take by mouth.    cyanocobalamin 500 MCG tablet Take 500 mcg by mouth once daily.    docusate sodium (COLACE) 100 MG capsule Take 1 capsule (100 mg total) by mouth 2 (two) times daily as needed for Constipation.    gabapentin (NEURONTIN) 600 MG tablet Take 1 tablet (600 mg total) by mouth 3 (three) times daily.    GLUCOSAMINE HCL/CHONDR APODACA A NA (OSTEO BI-FLEX ORAL) Take by mouth once daily.    multivitamin (ONE DAILY MULTIVITAMIN) per tablet Take 1 tablet by mouth once daily.    omeprazole (PRILOSEC) 40 MG capsule TAKE 1 CAPSULE DAILY    ondansetron (ZOFRAN-ODT) 8 MG TbDL Dissolve 1 tablet (8 mg total) by mouth every 12 (twelve) hours as needed (nausea).    oxyCODONE-acetaminophen (PERCOCET) 5-325 mg per tablet Take 1 tablet by mouth every 4 to 6 hours as needed for Pain.    sertraline (ZOLOFT) 100 MG tablet Take 1 tablet (100 mg total) by mouth once daily.    tiZANidine (ZANAFLEX) 4 MG tablet Take 1 tablet (4 mg total) by mouth  every 8 (eight) hours as needed.     No current facility-administered medications for this visit.      Review of patient's allergies indicates:   Allergen Reactions    Dermabond [tissue glues] Rash    Adhesive      Paper tape usually ok- pulls skin off    Flagyl [metronidazole hcl]      confusion     Precautions: standard    Time In: 14:00  Time Out: 14:55    Evaluation Date: 12/5/18  Visit # authorized: 1/12  Authorization period: 12/31/18  Plan of care Expiration: 12/31/18  MD referral: Z96.652 (ICD-10-CM) - Status post total left knee replacement    Surgery date: 10/31/18    Subjective     Patient reports she had the TKR 6 weeks ago. She was supposed to be in 2 weeks ago but was having a reaction to her antibiotics where the incision was weeping. The Dr states it was not an infection. She stopped home PT for 2 weeks as a precaution. She has a hx of R TKR in march 2018. She states she had a fall on 10/11/18 where she had fallen onto her R side and bruised her R side. Since then she has not had any issues with the R knee following the fall and X-rays show the R knee is still in good alignment. She reports that she is currently not driving and is using a RW for ambulation.   Per HHPT L knee ROM was (-2)-112. Minor tingling sensation at site of scar.     Diagnostic Imaging: Knee x-ray 10/31/18  FINDINGS:  Two views.    Total knee arthroplasty has been placed, femoral component and tibial component appear well positioned and well aligned.  Postsurgical changes are noted of the soft tissues.  No acute displaced fracture.  There is osteopenia.        Pain Scale: Soha rates pain on a scale of 0-10 to be 7 at worst; 4 currently; 3 at best .  Aggravating Factors: cold weather  Relieving Factors: pain medication, ice and elevation    PLOF: Independent  Occupation: school assistant  Patient Goals: Return to ADL without pain or difficulty (housework, yard work)     Objective     Observation: Brownness around scar,  swollen, scar appears to be healing appropriately. Incision CDI    Palpation: Mild tenderness    Range of Motion: Knee (degrees)   Left Right   Flexion: 85 (108 PROM) 128   Extension -2 (0 PROM) 0     Strength: Knee   Left Right   Quadriceps 3+/5 4+/5   Hamstrings 3+/5 4+/5       Strength: Hip    Left Right   Iliopsoas 4/5 4+/5   PGM 4/5 4+/5   IR 4/5 4+/5   ER 4/5 4+/5   Ext 4/5 4+/5       Joint Mobility: hypomobile    Edema:  Left: minimal    Girth   Left   10cm above mid patella  51 cm   Mid patella  49 cm   10cm below mid patella  46 cm       Gait: Dioni ambulated with rolling walker and straight cane.  Level of Assistance: independent  Patient displays Antalgic gait on R side .       TREATMENT     PT Evaluation Completed? Yes  Discussed Plan of Care with patient: Yes    Soha received 10 minutes of therapeutic exercise including:     Quad sets  SLR  Heel slides  Gastroc stretch  Hamstring stretch      Written Home Exercises Provided: same as from home health (see handout)   Soha demonstrated good understanding of the education provided. Patient demonstrated good return demonstration of skill of exercises.    ASSESSMENT     History  Co-morbidities and personal factors that may impact the plan of care Examination  Body Structures and Functions, activity limitations and participation restrictions that may impact the plan of care    Clinical Presentation   Co-morbidities:   high BMI        Personal Factors:   no deficits Body Regions:   lower extremities    Body Systems:    ROM  strength  balance  gait            Participation Restrictions:   Exercise, driving, and work     Activity limitations:   Mobility  walking  driving (bike, car, motorcycle)                 stable and uncomplicated                      low   low  low Decision Making/ Complexity Score:  low     Pt presents s/p L TKA on 10/31/18.  Pt ambulates c/ antalgic gait pattern and demos decreased LLE strength and ROM.  Pt is highly motivated to  return to PLOF and ability to perform house and yard work.  PT educated about scar massage and to continue c/ HEP from HHPT.  Pt prognosis is Excellent.  Pt will benefit from skilled outpatient physical therapy to address the above stated deficits, provide pt/family education and to maximize pt's level of independence.     Medical necessity is demonstrated by the following IMPAIRMENTS/PROBLEMS:  1. Increased Pain  2. Decreased Segmental Mobility & Decreased ROM  3. Decreased Core & BLE strength  4. Decreased Flexibility BLE  5. Decreased Tolerance to Functional Activities    Pt's spiritual, cultural and educational needs considered and pt agreeable to plan of care and goals as stated below:     Anticipated Barriers for physical therapy: none    Short Term GOALS: 2 weeks. Pt agrees with goals set.  1. Patient demonstrates independence with HEP.   2. Patient demonstrates independence with Postural Awareness.   3. Patient demonstrates independence with body mechanics.     Long Term GOALS: 4 weeks. Pt agrees with goals set.  1. Patient demonstrates increased L knee ROM to WNL to improve tolerance to functional activities pain free.   2. Patient demonstrates increased strength BLE's to 4+/5 or greater to improve tolerance to functional activities pain free.   3. Patient demonstrates improved overall function per FOTO Knee Survey to 49% Limitation or less.     Functional Limitations Reports - G Codes  Category: mobility  Tool: FOTO Knee Survey  Score: 66% Limitation    PLAN     Outpatient physical therapy 3 times weekly to include: pt ed, hep, therapeutic exercises, neuromuscular re-education/ balance exercises, joint mobilizations, aquatic therapy and modalities prn. Cont PT for  4 weeks. Pt may be seen by PTA as part of the rehabilitation team.     Therapist: Bill Mancini, PT,DPT  12/5/2018    Agree with treatment plan.  ROSA Cuevas PA-C 12/5/2018

## 2018-12-05 NOTE — PLAN OF CARE
Physical Therapy Initial Evaluation     Name: Soha Wheatley  Clinic Number: [id number]    Soha is a 62 y.o. female evaluated on 12/05/2018.     Diagnosis:   Encounter Diagnosis   Name Primary?    S/P TKR (total knee replacement), left Yes     Physician: Alena Cuevas, ESTHER  Treatment Orders: PT Eval and Treat    Past Medical History:   Diagnosis Date    Allergy     Anxiety     Arthritis     BMI 45.0-49.9, adult     Chronic kidney disease     Depression     GERD (gastroesophageal reflux disease)     History of kidney stones     Kidney stones     Migraines     Morbid obesity     Obesity     Sleep apnea in adult     WEARS CPAP, DOESNT KNOW SETTINGS.     Current Outpatient Medications   Medication Sig    aspirin (ECOTRIN) 81 MG EC tablet Take 1 tablet (81 mg total) by mouth 2 (two) times daily.    b complex vitamins tablet Take 1 tablet by mouth once daily.    cinnamon bark-chromium picolin 500-100 mg-mcg Cap Take by mouth.    cyanocobalamin 500 MCG tablet Take 500 mcg by mouth once daily.    docusate sodium (COLACE) 100 MG capsule Take 1 capsule (100 mg total) by mouth 2 (two) times daily as needed for Constipation.    gabapentin (NEURONTIN) 600 MG tablet Take 1 tablet (600 mg total) by mouth 3 (three) times daily.    GLUCOSAMINE HCL/CHONDR APODACA A NA (OSTEO BI-FLEX ORAL) Take by mouth once daily.    multivitamin (ONE DAILY MULTIVITAMIN) per tablet Take 1 tablet by mouth once daily.    omeprazole (PRILOSEC) 40 MG capsule TAKE 1 CAPSULE DAILY    ondansetron (ZOFRAN-ODT) 8 MG TbDL Dissolve 1 tablet (8 mg total) by mouth every 12 (twelve) hours as needed (nausea).    oxyCODONE-acetaminophen (PERCOCET) 5-325 mg per tablet Take 1 tablet by mouth every 4 to 6 hours as needed for Pain.    sertraline (ZOLOFT) 100 MG tablet Take 1 tablet (100 mg total) by mouth once daily.    tiZANidine (ZANAFLEX) 4 MG tablet Take 1 tablet (4 mg total) by mouth  every 8 (eight) hours as needed.     No current facility-administered medications for this visit.      Review of patient's allergies indicates:   Allergen Reactions    Dermabond [tissue glues] Rash    Adhesive      Paper tape usually ok- pulls skin off    Flagyl [metronidazole hcl]      confusion     Precautions: standard    Time In: 14:00  Time Out: 14:55    Evaluation Date: 12/5/18  Visit # authorized: 1/12  Authorization period: 12/31/18  Plan of care Expiration: 12/31/18  MD referral: Z96.652 (ICD-10-CM) - Status post total left knee replacement    Surgery date: 10/31/18    Subjective     Patient reports she had the TKR 6 weeks ago. She was supposed to be in 2 weeks ago but was having a reaction to her antibiotics where the incision was weeping. The Dr states it was not an infection. She stopped home PT for 2 weeks as a precaution. She has a hx of R TKR in march 2018. She states she had a fall on 10/11/18 where she had fallen onto her R side and bruised her R side. Since then she has not had any issues with the R knee following the fall and X-rays show the R knee is still in good alignment. She reports that she is currently not driving and is using a RW for ambulation.   Per HHPT L knee ROM was (-2)-112. Minor tingling sensation at site of scar.     Diagnostic Imaging: Knee x-ray 10/31/18  FINDINGS:  Two views.    Total knee arthroplasty has been placed, femoral component and tibial component appear well positioned and well aligned.  Postsurgical changes are noted of the soft tissues.  No acute displaced fracture.  There is osteopenia.        Pain Scale: Soha rates pain on a scale of 0-10 to be 7 at worst; 4 currently; 3 at best .  Aggravating Factors: cold weather  Relieving Factors: pain medication, ice and elevation    PLOF: Independent  Occupation: school assistant  Patient Goals: Return to ADL without pain or difficulty (housework, yard work)     Objective     Observation: Brownness around scar,  swollen, scar appears to be healing appropriately. Incision CDI    Palpation: Mild tenderness    Range of Motion: Knee (degrees)   Left Right   Flexion: 85 (108 PROM) 128   Extension -2 (0 PROM) 0     Strength: Knee   Left Right   Quadriceps 3+/5 4+/5   Hamstrings 3+/5 4+/5       Strength: Hip    Left Right   Iliopsoas 4/5 4+/5   PGM 4/5 4+/5   IR 4/5 4+/5   ER 4/5 4+/5   Ext 4/5 4+/5       Joint Mobility: hypomobile    Edema:  Left: minimal    Girth   Left   10cm above mid patella  51 cm   Mid patella  49 cm   10cm below mid patella  46 cm       Gait: Dioni ambulated with rolling walker and straight cane.  Level of Assistance: independent  Patient displays Antalgic gait on R side .       TREATMENT     PT Evaluation Completed? Yes  Discussed Plan of Care with patient: Yes    Soha received 10 minutes of therapeutic exercise including:     Quad sets  SLR  Heel slides  Gastroc stretch  Hamstring stretch      Written Home Exercises Provided: same as from home health (see handout)   Soha demonstrated good understanding of the education provided. Patient demonstrated good return demonstration of skill of exercises.    ASSESSMENT     History  Co-morbidities and personal factors that may impact the plan of care Examination  Body Structures and Functions, activity limitations and participation restrictions that may impact the plan of care    Clinical Presentation   Co-morbidities:   high BMI        Personal Factors:   no deficits Body Regions:   lower extremities    Body Systems:    ROM  strength  balance  gait            Participation Restrictions:   Exercise, driving, and work     Activity limitations:   Mobility  walking  driving (bike, car, motorcycle)                 stable and uncomplicated                      low   low  low Decision Making/ Complexity Score:  low     Pt presents s/p L TKA on 10/31/18.  Pt ambulates c/ antalgic gait pattern and demos decreased LLE strength and ROM.  Pt is highly motivated to  return to PLOF and ability to perform house and yard work.  PT educated about scar massage and to continue c/ HEP from HHPT.  Pt prognosis is Excellent.  Pt will benefit from skilled outpatient physical therapy to address the above stated deficits, provide pt/family education and to maximize pt's level of independence.     Medical necessity is demonstrated by the following IMPAIRMENTS/PROBLEMS:  1. Increased Pain  2. Decreased Segmental Mobility & Decreased ROM  3. Decreased Core & BLE strength  4. Decreased Flexibility BLE  5. Decreased Tolerance to Functional Activities    Pt's spiritual, cultural and educational needs considered and pt agreeable to plan of care and goals as stated below:     Anticipated Barriers for physical therapy: none    Short Term GOALS: 2 weeks. Pt agrees with goals set.  1. Patient demonstrates independence with HEP.   2. Patient demonstrates independence with Postural Awareness.   3. Patient demonstrates independence with body mechanics.     Long Term GOALS: 4 weeks. Pt agrees with goals set.  1. Patient demonstrates increased L knee ROM to WNL to improve tolerance to functional activities pain free.   2. Patient demonstrates increased strength BLE's to 4+/5 or greater to improve tolerance to functional activities pain free.   3. Patient demonstrates improved overall function per FOTO Knee Survey to 49% Limitation or less.     Functional Limitations Reports - G Codes  Category: mobility  Tool: FOTO Knee Survey  Score: 66% Limitation    PLAN     Outpatient physical therapy 3 times weekly to include: pt ed, hep, therapeutic exercises, neuromuscular re-education/ balance exercises, joint mobilizations, aquatic therapy and modalities prn. Cont PT for  4 weeks. Pt may be seen by PTA as part of the rehabilitation team.     Therapist: Bill Mancini, PT,DPT  12/5/2018

## 2018-12-07 ENCOUNTER — OFFICE VISIT (OUTPATIENT)
Dept: BARIATRICS | Facility: CLINIC | Age: 63
End: 2018-12-07
Payer: COMMERCIAL

## 2018-12-07 ENCOUNTER — CLINICAL SUPPORT (OUTPATIENT)
Dept: REHABILITATION | Facility: HOSPITAL | Age: 63
End: 2018-12-07
Payer: COMMERCIAL

## 2018-12-07 VITALS
HEART RATE: 70 BPM | DIASTOLIC BLOOD PRESSURE: 74 MMHG | WEIGHT: 224.19 LBS | HEIGHT: 70 IN | SYSTOLIC BLOOD PRESSURE: 118 MMHG | BODY MASS INDEX: 32.1 KG/M2

## 2018-12-07 DIAGNOSIS — Z98.84 BARIATRIC SURGERY STATUS: ICD-10-CM

## 2018-12-07 DIAGNOSIS — Z96.652 STATUS POST TOTAL LEFT KNEE REPLACEMENT: Primary | ICD-10-CM

## 2018-12-07 DIAGNOSIS — Z96.652 STATUS POST TOTAL LEFT KNEE REPLACEMENT: ICD-10-CM

## 2018-12-07 DIAGNOSIS — R63.4 WEIGHT LOSS: Primary | ICD-10-CM

## 2018-12-07 DIAGNOSIS — K21.9 GASTROESOPHAGEAL REFLUX DISEASE, ESOPHAGITIS PRESENCE NOT SPECIFIED: ICD-10-CM

## 2018-12-07 DIAGNOSIS — Z96.651 HISTORY OF TOTAL RIGHT KNEE REPLACEMENT: ICD-10-CM

## 2018-12-07 DIAGNOSIS — M25.519 SHOULDER PAIN, UNSPECIFIED CHRONICITY, UNSPECIFIED LATERALITY: ICD-10-CM

## 2018-12-07 PROCEDURE — 99999 PR PBB SHADOW E&M-EST. PATIENT-LVL III: CPT | Mod: PBBFAC,,, | Performed by: PHYSICIAN ASSISTANT

## 2018-12-07 PROCEDURE — 99213 OFFICE O/P EST LOW 20 MIN: CPT | Mod: S$GLB,,, | Performed by: PHYSICIAN ASSISTANT

## 2018-12-07 PROCEDURE — 97110 THERAPEUTIC EXERCISES: CPT

## 2018-12-07 PROCEDURE — 3008F BODY MASS INDEX DOCD: CPT | Mod: CPTII,S$GLB,, | Performed by: PHYSICIAN ASSISTANT

## 2018-12-07 PROCEDURE — 97140 MANUAL THERAPY 1/> REGIONS: CPT

## 2018-12-07 RX ORDER — DICLOFENAC SODIUM 10 MG/G
2 GEL TOPICAL 4 TIMES DAILY
Qty: 1 TUBE | Refills: 1 | Status: SHIPPED | OUTPATIENT
Start: 2018-12-07 | End: 2021-02-01

## 2018-12-07 NOTE — PROGRESS NOTES
BARIATRIC POST OP FOLLOW UP:    Chief Complaint   Patient presents with    Follow-up     HISTORY OF PRESENT ILLNESS: Soha Wheatley is a 63 y.o. female with a Body mass index is 32.17 kg/m². who presents for a 6 month follow up s/p lap sleeve with Dr Holloway on 12/29/2017. she is doing well and overall tolerating the diet without difficulty. She has lost 91 lbs, approximately 57% of their excess weight.     S/p Right TKR on 10/31/18 ago.   Left TKR 11/2018.    She has been instructed to take MVI once daily 2/2 kidney stones.   Ca citrate also on hold per Urology.    Denies: nausea, vomiting, abdominal pain, fever, chills, dysphagia, chest pain, and shortness of breath. Denies symptoms of hypoglycemia.     Still not ready to taper off of the Prilosec.  Pt states that she is willing try to 20mg once a day on her next refill.    Has not taken Mobic since last visit. Willing to try Voltaren gel for knee and shoulder pain.       Review of Systems   Constitutional: Negative for chills, fever and weight loss.   HENT: Negative for tinnitus.    Eyes: Negative for blurred vision and double vision.   Respiratory: Negative for cough, shortness of breath and wheezing.    Cardiovascular: Negative for chest pain and palpitations.   Gastrointestinal: Positive for heartburn (intermittent, improving ). Negative for abdominal pain, blood in stool, constipation, melena, nausea and vomiting.   Genitourinary: Negative for dysuria and frequency.   Musculoskeletal: Positive for joint pain (knees).        RLE knee swelling 2/2 surgery   Skin: Negative for itching and rash.   Neurological: Negative for dizziness, tingling, focal weakness and headaches.   Psychiatric/Behavioral: Negative for depression and memory loss.     EXERCISE & VITAMINS:  See Bariatric Assessment    MEDICATIONS/ALLERGIES:  Have been reviewed.    DIET:  Reg Bariatric Diet.  Eggs and ham or pieces of mir (630 am)     [place] 1 protein shake [place]ij[place] with skim milk  "(28g pro)  Lunch: P3 meal tuna salad cherry tomatoes or cucumbers    Snack: 1 protein bar Pure Protein (19g)  Dinner: Protein and veggie  Snack before bed: yogurt, peanut butter bites     + grams protein  50+ oz water, SF liquids    Vitals:    01/30/18 1307   BP: 123/80   Pulse: 82   Weight: 126.7 kg (279 lb 5.2 oz)   Height: 5' 10" (1.778 m)     Physical Exam   Constitutional: She is oriented to person, place, and time. She appears well-developed and well-nourished. No distress.   HENT:   Head: Normocephalic and atraumatic.   Eyes: Conjunctivae are normal. No scleral icterus.   Cardiovascular: Normal rate and intact distal pulses.   Pulmonary/Chest: Effort normal and breath sounds normal. No respiratory distress.   Abdominal: Soft. Bowel sounds are normal. She exhibits no distension and no mass. There is no tenderness. There is no rebound and no guarding.   WHSS.     Musculoskeletal: She exhibits no edema.   Neurological: She is alert and oriented to person, place, and time.   Skin: Skin is warm and dry. Capillary refill takes less than 2 seconds. No rash noted. She is not diaphoretic. No erythema. No pallor.   Psychiatric: She has a normal mood and affect. Her behavior is normal. Judgment and thought content normal.   Nursing note and vitals reviewed.    ASSESSMENT:  - Morbid obesity, Body mass index is 32.17 kg/m².,  s/p sleeve gastrectomy on 12/29/2017.  - Estimated goal weight, 235 lbs, which is 50% EWL  - Co-morbidities: GERD, sleep apnea (improving) no longer using mask and will follow up with sleep med  - Great Weight loss, 90.7 lbs, 57% EWL  - No formal Exercise regimen  - Vitamin Regimen sub optimal to bariatric requirements as noted.  - Diet: good    PLAN:    - Emphasized the importance of regular exercise and adherence to bariatric diet to achieve maximum weight loss.  - Encouraged patient to continue regular activity   - Continue daily vitamins and medications, will contact urology for use of MVI " and calcium   - Encouraged adequate hydration.   - Anti-Acid medication, Omeprazole daily.         Recommend Tylenol for pain. Prescribed Voltaren gel for shoulder pain, will continue to take PPI daily.    Discussed avoidance of triggers.   Slow down meals, make each bite smaller than the tip of your finger, chew that bite 20-30 times, then swallow.  Wait at least 1 minute or more before the next bite.    - Miralax daily for constipation, no fiber.  - RTC in 6 months or sooner if needed.  - Call the office for any issues   - Check labs

## 2018-12-07 NOTE — PATIENT INSTRUCTIONS
Meal Ideas for Regular Bariatric Diet  *Recipes and products available at [email or web address]      Breakfast: (15-20g protein)    - Egg white omelet: 2 egg whites or ½ cup Egg Beaters. (Optional proteins: cheese, shrimp, black beans, chicken, sliced turkey) (Optional veggies: tomatoes, salsa, spinach, mushrooms, onions, green peppers, or small slice avocado)     - Egg and sausage: 1 egg or ¼ cup Egg Beaters (any variety), with 1 safia or 2 links of Turkey sausage or Veggie breakfast sausage (72xuan or Zocere)    - Crust-less breakfast quiche: To make a glass pie dish, mix 4oz part skim Ricotta, 1 cup skim milk, and 2 eggs as your base. Add protein: shredded cheese, sliced lean ham or turkey, turkey mir/sausage. Add veggies: tomato, onion, green onion, mushroom, green pepper, spinach, etc.    - Yogurt parfait: Mix 1 - 6oz container Dannon Light N Fit vanilla yogurt, with ¼ cup crushed unsalted nuts    - Cottage cheese and fruit: ½ cup part-skim cottage cheese or ricotta cheese topped with fresh fruit or sugar free preserves     - Yamilka Bagley's Vanilla Egg custard* (add 2 Tbsp instant coffee granules to make Cappuccino Custard*)    - Hi-Protein café latte (skim milk, decaf coffee, 1 scoop protein powder). Optional to add Sugar free syrup or extract flavoring.    - Breakfast Lox: spread fat free cream cheese on slices of smoked salmon. Serve over scrambled or egg over easy (sauteed with nonstick cookspray) OR on a cucumber slice    - Eggwhich: Scramble or cook 1 large egg over easy using nonstick cookspray. Place between 2 slices of [place] mir and low fat cheese.     Lunch: (20-30g protein)    - ½ cup Black bean soup (Homemade or Progresso), with ¼ cup shredded low-fat cheese. Top with chopped tomato or fresh salsa.     - Lean deli turkey breast and low-fat sliced cheese, mustard or light lozano to moisten, rolled up together, or wrapped in a Adeel lettuce leaf    - Chicken salad made from dinner  leftovers, moisten with low-fat salad dressing or light lozano. Also try leftover salmon, shrimp, tuna or boiled eggs. Serve ½ cup over dark green salad    - Fat-free canned refried beans, topped with ¼ cup shredded low-fat cheese. Top with chopped tomato or fresh salsa.     - Greek salad: Top mixed greens with 1-2oz grilled chicken, tomatoes, red onions, 2-3 kalamata olives, and sprinkle lightly with feta cheese. Spritz with Balsamic vinegar to taste.     - Crust-less lunch quiche: To make a glass pie dish, mix 4oz part skim Ricotta, 1 cup skim milk, and 2 eggs as your base. Add protein: shredded cheese, sliced lean ham or turkey, shrimp, chicken. Add veggies: tomato, onion, green onion, mushroom, green pepper, spinach, artichoke, broccoli, etc.    - Pizza bake: spread a  carlos kanika mushroom with tomato sauce, low-fat shredded mozzarella and turkey pepperoni or [place] mir. Add any veggies. Roast for 10-15 minutes, until cheese melted.     - Cucumber crab bites: Spread ¼ cup crab dip (lump crabmeat + light cream cheese and green onions) over sliced cucumber.     - Chicken with light spinach and artichoke dip*: Puree in : 6oz cooked and drained spinach, 2 cloves garlic, 1 can cannelloni beans, ½ cup chopped green onions, 1 can drained artichoke hearts (not marinated in oil), lemon juice and basil. Mix in 2oz chopped up chicken.    Supper: (20-30g protein)    - Serve grilled fish over dark green salad tossed with low-fat dressing, served with grilled asparagus peterson     - Rotisserie chicken salad: served with sliced strawberries, walnuts, fat-free feta cheese crumbles and 1 tbsp Changs Own Light Raspberry [place] Vinaigrette    - Shrimp cocktail: Dip cold boiled shrimp in homemade low-sugar cocktail sauce (1/2 cup [place] One Carb ketchup, 2 tbsp horseradish, 1/4 tsp hot sauce, 1 tsp Worcestershire sauce, 1 tbsp freshly-squeezed lemon juice). Serve with dark green salad, walnuts, and crumbled blue  cheese drizzled with olive oil and Balsamic vinegar    - Tuna Melt: Spread tuna salad onto 2 thick slices of tomato. Top with low-fat cheese and broil until cheese is melted. May also be made with chicken salad of shrimp salad. Brethren with different types of cheeses.    - Chicken or beef fajitas (no tortilla, rice, beans, chips). Top meat and veggies w/ fresh salsa, fat free sour cream.     - Homemade low-fat Chili using extra lean ground beef or ground turkey. Top with shredded cheese and salsa as desired. May add dollop fat-free sour cream if desired    - Chicken parmesan: Top chicken breast w/ low sugar marinara sauce, mozzarella cheese and bake until chicken reaches 165*.  Serve w/ spaghetti SQUASH or [place] cut green beans    - Dinner Omelet with shrimp or chicken and onion, green peppers and chives.    - No noodle lasagna: Use sliced zucchini or eggplant in place of noodles.  Layer with part skim ricotta cheese and low sugar meat sauce (use very lean ground beef or ground turkey).    - Mexican chicken bake: Bake chunks of chicken breast or thigh with taco seasoning, Pace brand enchilada sauce, green onions and low-fat cheese. Serve with ¼ cup black beans or fat free refried beans topped with chopped tomatoes or salsa.    - Maurice frozen meatballs, simmered in [place] Marinara sauce. Different flavors of salsa or spaghetti sauce create different dishes! Sprinkle with parmesan cheese. Serve with grilled or steamed veggies, or a dark green salad.    - Simmer boneless skinless chicken thigh chunks in Classico Marinara sauce or roasted salsa until tender with chopped onion, bell pepper, garlic, mushrooms, spinach, etc.     - Hamburger or veggie burger, without the bun, dressed the way you like. Served with grilled or steamed veggies.    - Eggplant parmesan: Bake slices of eggplant at 350 degrees for 15 minutes. Layer tomato sauce, sliced eggplant and low-fat mozzarella cheese in a baking dish and cover with  foil. Bake 30-40 more minutes or until bubbly. Uncover and bake at 400 degrees for about 15 more minutes, or until top is slightly crisp.    - Fish tacos: grilled/baked white fish, wrapped in Adeel lettuce leaf, topped with salsa, shredded low-fat cheese, and light coleslaw.    - Chicken wanda: Sprinkle chicken w/ 1 tsp of [place] ranch dip mix. Then grill chicken and top with black beans, salsa and 1 tsp fat free sour cream.     - Cauliflower pizza crust: Use cauliflower as crust (see recipe on pinterest, no flour!). Top w/ low fat cheese, turkey pepperoni and veggies and bake again    - chicken or turkey crust pizza: use ground chicken or turkey instead of cauliflower, spread in [place] and bake at 350 for about 20-30 minutes(may want to add garlic, black pepper, oregano and other herbs to ground meat mixture).  Remove and top w/ low fat cheese, turkey pepperoni and veggies and bake again for another 10 minutes or until cheese is browned.     Snacks: (100-200 calories; >5g protein)    - 1 low-fat cheese stick with 8 cherry tomatoes or 1 serving fresh fruit  - 4 thin slices fat-free turkey breast and 1 slice low-fat cheese  - 4 thin slices fat-free honey ham with wedge of melon  - 6-8 edamame pods (equivalent to about 1/4 cup edamame without pods).   - 1/4 cup unsalted nuts with ½ cup fruit  - 6-oz container Dannon Light n Fit vanilla yogurt, topped with 1oz unsalted nuts         - apple, celery or baby carrots spread with 2 Tbsp PB2  - apple slices with 1 oz slice low-fat cheese  - Apple slices dipped in 2 Tbsp of PB2  - celery, cucumber, bell pepper or baby carrots dipped in ¼ cup hummus bean spread or light spinach and artichoke dip (*recipe in lunch section)  - celery, cucumber, baby carrots dipped in high protein greek yogurt (Mix 16 oz plain greek yogurt + 1 packet of [place] ranch dip mix)  - Delmar Links Beef Steak - 14g protein! (similar to beef jerky)  - 2 wedges Laughing Cow - Light Herb  & Garlic Cheese with sliced cucumber or green bell pepper  - 1/2 cup low-fat cottage cheese with ¼ cup fruit or ¼ cup salsa  - RTD Protein drinks: Atkins, Low Carb Slim Fast, EAS light, Muscle Milk Light, etc.  - Homemade Protein drinks: GNC Soy95, Isopure, Nectar, UNJURY, Whey Gourmet, etc. Mix 1 scoop powder with 8oz skim/1% milk or light soymilk.  - Protein bars: Atkins, EAS, Pure Protein, Think Thin, Detour, etc. Must have 0-4 grams sugar - Read the label.    Takeout Options: No more than twice/week  Deli - Salads (no pasta or rice), meats, cheeses. Roasted chicken. Lox (salmon)    Mexican - Platters which don't include tortillas, chips, or rice. Go easy on the beans. Example: Fajitas without the tortillas. Ask the  not to bring chips to the table if they are too tempting.    Greek - Meat or fish and vegetable, but no bread or rice. Including hummus, baba ganoush, etc, is OK. Most sit-down Greek restaurants can provide you with cucumber slices for dipping instead of alberto bread.    Fast Food (Avoid as much as possible) - Salads (no croutons and limit salad dressing to 2 tbsp), grilled chicken sandwich without the bun and ask for no lozano. Mary Ellens low fat chili or Taco Bell pintos and cheese.    BBQ - The meats are fine if you ask for sauces on the side, but most of the traditional side dishes are loaded with carbs. Víctor slaw, baked beans and BBQ sauce are typically made with sugar.    Chinese - Nothing deep-fried, no rice or noodles. Many Chinese sauces have starch and sugar in them, so you'll have to use your judgement. If you find that these sauces trigger cravings, or cause Dumping, you can ask for the sauce to be made without sugar or just use soy sauce.    GOALS:      **Every day get at least 80 gm protein **    Daily calories: 7293-7101 kcal    Daily water/ sugar free beverages:  64 fluid ounces    NO Carbonated Beverages (even Diet Coke)!!    **NO SweetS**    EXERCISE 5 days/ week for at least 45  minutes.    EAT YOUR PROTEIN FIRST at every meal!!!!!!    Daily carbohydrates: 45-70 gm daily!    Take 30 minutes to eat your meal    VITAMINS EVERY DAY!

## 2018-12-07 NOTE — PROGRESS NOTES
Physical Therapy Daily Note     Name: Soha Wheatley  Clinic Number: [id number]  Diagnosis:   Encounter Diagnosis   Name Primary?    Status post total left knee replacement 10/31/2018 Yes     Physician: Alena Cuevas PA-C  Precautions: standard  Visit #: 2 of 12  PTA Visit #: 0  Time In: 10:00   Time Out: 11:18     Initial Evaluation Date: 12/5/18  POC Expiration: 12/31/18    Subjective     Pt reports that she has been busy so far this morning and her knee is kind of aggravated right now.  Pain Scale: Soha rates pain at L knee on a scale of 0-10 to be 6 currently.    Objective     12/7/18 L knee extension measured at -1 degrees actively     Soha received individual therapeutic exercises to develop strength, endurance, ROM and flexibility for 57 minutes including:    Quad sets 20x  c/ 5 sec hold  SLR 2 x10  Heel slides 20x c/ 5 sec hold  Gastroc stretch wedge stretch 2 x 1 min  Hamstring  stretch c/ strap 3 x 30sec  SAQ 3 x 10     Step ups on lvl 3 2 x 10  Sit<>stands 2 x 10        Soha received the following manual therapy techniques: Joint mobilizations and Soft tissue Mobilization were applied to the: L knee  for 11 minutes including:  Tibial anterior and posterior joint mobs  Contract relax technique to promote extension   Tissue massage for scar mobilization      Cold pack applied to L knee for 10 minutes    Written Home Exercises Provided: at eval  Pt demo good understanding of the education provided. Soha demonstrated good return demonstration of activities.     Education provided re: importance of HEP  Soha verbalized good understanding of education provided.   No spiritual or educational barriers to learning provided    Assessment     Patient tolerated new exercises and well.  Pt c/ improved active extension compared to initial evaluation. Requires minor verbal cues on maintaining upright posture when performing step ups. Pt has been compliant  c/ HEP.  PT will progress pt based off pt's tolerance.    This is a 63 y.o. female referred to outpatient physical therapy and presents with a medical diagnosis of s/p L TKA and demonstrates limitations as described in the problem list. Pt prognosis is Excellent. Pt will continue to benefit from skilled outpatient physical therapy to address the deficits listed in the problem list, provide pt/family education and to maximize pt's level of independence in the home and community environment.     Medical necessity is demonstrated by the following IMPAIRMENTS/PROBLEMS:  1. Increased Pain  2. Decreased Segmental Mobility & Decreased ROM  3. Decreased Core & BLE strength  4. Decreased Flexibility BLE  5. Decreased Tolerance to Functional Activities     Pt's spiritual, cultural and educational needs considered and pt agreeable to plan of care and goals as stated below:      Anticipated Barriers for physical therapy: none     Short Term GOALS: 2 weeks. Pt agrees with goals set.  1. Patient demonstrates independence with HEP.   2. Patient demonstrates independence with Postural Awareness.   3. Patient demonstrates independence with body mechanics.      Long Term GOALS: 4 weeks. Pt agrees with goals set.  1. Patient demonstrates increased L knee ROM to WNL to improve tolerance to functional activities pain free.   2. Patient demonstrates increased strength BLE's to 4+/5 or greater to improve tolerance to functional activities pain free.   3. Patient demonstrates improved overall function per FOTO Knee Survey to 49% Limitation or less.   Plan     Continue with established Plan of Care towards PT goals.    Therapist: Bill Mancini, PT,DPT  12/7/2018

## 2018-12-10 ENCOUNTER — CLINICAL SUPPORT (OUTPATIENT)
Dept: REHABILITATION | Facility: HOSPITAL | Age: 63
End: 2018-12-10
Payer: COMMERCIAL

## 2018-12-10 DIAGNOSIS — Z96.652 STATUS POST TOTAL LEFT KNEE REPLACEMENT: Primary | ICD-10-CM

## 2018-12-10 DIAGNOSIS — Z96.652 STATUS POST TOTAL LEFT KNEE REPLACEMENT: ICD-10-CM

## 2018-12-10 PROCEDURE — 97140 MANUAL THERAPY 1/> REGIONS: CPT

## 2018-12-10 PROCEDURE — 97110 THERAPEUTIC EXERCISES: CPT

## 2018-12-10 RX ORDER — OXYCODONE AND ACETAMINOPHEN 5; 325 MG/1; MG/1
1 TABLET ORAL
Qty: 40 TABLET | Refills: 0 | Status: SHIPPED | OUTPATIENT
Start: 2018-12-10 | End: 2018-12-21 | Stop reason: SDUPTHER

## 2018-12-10 NOTE — PROGRESS NOTES
Physical Therapy Daily Note     Name: Soha Wheatley  Clinic Number: [id number]  Diagnosis:   Encounter Diagnosis   Name Primary?    Status post total left knee replacement 10/31/2018 Yes     Physician: Alena Cuevas PA-C  Precautions: standard  Visit #: 3 of 12  PTA Visit #: 0  Time In: 10:30  Time Out: 11:50     Initial Evaluation Date: 12/5/18  POC Expiration: 12/31/18    Subjective     Pt reports that she was able to walk around at the mall and [facility] over the weekend.  Pt took a pain about an hour ago.  Pain Scale: Soha rates pain at L knee on a scale of 0-10 to be 3 currently.    Objective     Range of Motion: Knee (degrees)    Left Right   Flexion: 85 (108 PROM) 128   Extension -2 (0 PROM) 0         12/10/18 L knee extension measured at 0 degrees actively   12/10/18 L knee flexion measured at 97 actively and 114 passively  Soha received individual therapeutic exercises to develop strength, endurance, ROM and flexibility for 55 minutes including:    Quad sets 20x  c/ 5 sec hold-  SLR 3 x10-  Heel slides 20x c/ 5 sec hold-  Gastroc stretch wedge stretch 2 x 1 min-  Hamstring  stretch c/ strap 3 x 30sec-  SAQ c/ #1 ankle weight 3 x 10     Step ups on lvl 3 2 x 10  Sit<>stands 2 x 10  Shuttle 3c 3 x 10          Soha received the following manual therapy techniques: Joint mobilizations and Soft tissue Mobilization were applied to the: L knee  for 15 minutes including:  Tibial anterior and posterior joint mobs  Contract relax technique to promote extension   Tissue massage for scar mobilization      Cold pack applied to L knee for 10 minutes    Written Home Exercises Provided: at eval  Pt demo good understanding of the education provided. Soha demonstrated good return demonstration of activities.     Education provided re: importance of HEP  Soha verbalized good understanding of education provided.   No spiritual or educational barriers to learning  provided    Assessment     Patient tolerated new exercises well with minimal complaints of pain or discomfort.  Pt c/ improved active extension and flexion compared to initial evaluation. PT added shuttle press at today's session. . Pt has been compliant c/ HEP.  PT will progress pt based off pt's tolerance.    This is a 63 y.o. female referred to outpatient physical therapy and presents with a medical diagnosis of s/p L TKA and demonstrates limitations as described in the problem list. Pt prognosis is Excellent. Pt will continue to benefit from skilled outpatient physical therapy to address the deficits listed in the problem list, provide pt/family education and to maximize pt's level of independence in the home and community environment.     Medical necessity is demonstrated by the following IMPAIRMENTS/PROBLEMS:  1. Increased Pain  2. Decreased Segmental Mobility & Decreased ROM  3. Decreased Core & BLE strength  4. Decreased Flexibility BLE  5. Decreased Tolerance to Functional Activities     Pt's spiritual, cultural and educational needs considered and pt agreeable to plan of care and goals as stated below:      Anticipated Barriers for physical therapy: none     Short Term GOALS: 2 weeks. Pt agrees with goals set.  1. Patient demonstrates independence with HEP.   2. Patient demonstrates independence with Postural Awareness.   3. Patient demonstrates independence with body mechanics.      Long Term GOALS: 4 weeks. Pt agrees with goals set.  1. Patient demonstrates increased L knee ROM to WNL to improve tolerance to functional activities pain free.   2. Patient demonstrates increased strength BLE's to 4+/5 or greater to improve tolerance to functional activities pain free.   3. Patient demonstrates improved overall function per FOTO Knee Survey to 49% Limitation or less.   Plan     Continue with established Plan of Care towards PT goals.    Therapist: Bill Mancini, PT,DPT  12/10/2018

## 2018-12-11 ENCOUNTER — OFFICE VISIT (OUTPATIENT)
Dept: ORTHOPEDICS | Facility: CLINIC | Age: 63
End: 2018-12-11
Payer: COMMERCIAL

## 2018-12-11 VITALS — WEIGHT: 220.88 LBS | BODY MASS INDEX: 31.62 KG/M2 | HEIGHT: 70 IN

## 2018-12-11 DIAGNOSIS — Z96.652 STATUS POST TOTAL LEFT KNEE REPLACEMENT: Primary | ICD-10-CM

## 2018-12-11 PROCEDURE — 99999 PR PBB SHADOW E&M-EST. PATIENT-LVL II: CPT | Mod: PBBFAC,,, | Performed by: ORTHOPAEDIC SURGERY

## 2018-12-11 PROCEDURE — 99024 POSTOP FOLLOW-UP VISIT: CPT | Mod: S$GLB,,, | Performed by: ORTHOPAEDIC SURGERY

## 2018-12-11 NOTE — PROGRESS NOTES
Physical Therapy Daily Note         Name: Soha Wheatley  Clinic Number: [id number]  Diagnosis: No diagnosis found.  Physician: Alena Cuevas PA-C  Treatment Orders: PT Eval and Treat  Past Medical History:   Diagnosis Date    Allergy     Anxiety     Arthritis     BMI 45.0-49.9, adult     Chronic kidney disease     Depression     GERD (gastroesophageal reflux disease)     History of kidney stones     Kidney stones     Migraines     Morbid obesity     Obesity     Sleep apnea in adult     WEARS CPAP, DOESNT KNOW SETTINGS.     Current Outpatient Medications   Medication Sig    b complex vitamins tablet Take 1 tablet by mouth once daily.    cinnamon bark-chromium picolin 500-100 mg-mcg Cap Take by mouth.    cyanocobalamin 500 MCG tablet Take 500 mcg by mouth once daily.    diclofenac sodium (VOLTAREN) 1 % Gel Apply 2 g topically 4 (four) times daily.    docusate sodium (COLACE) 100 MG capsule Take 1 capsule (100 mg total) by mouth 2 (two) times daily as needed for Constipation.    gabapentin (NEURONTIN) 600 MG tablet Take 1 tablet (600 mg total) by mouth 3 (three) times daily.    GLUCOSAMINE HCL/CHONDR APODACA A NA (OSTEO BI-FLEX ORAL) Take by mouth once daily.    multivitamin (ONE DAILY MULTIVITAMIN) per tablet Take 1 tablet by mouth once daily.    omeprazole (PRILOSEC) 40 MG capsule TAKE 1 CAPSULE DAILY    oxyCODONE-acetaminophen (PERCOCET) 5-325 mg per tablet Take 1 tablet by mouth every 4 to 6 hours as needed for Pain.    sertraline (ZOLOFT) 100 MG tablet Take 1 tablet (100 mg total) by mouth once daily.    tiZANidine (ZANAFLEX) 4 MG tablet Take 1 tablet (4 mg total) by mouth every 8 (eight) hours as needed.     No current facility-administered medications for this visit.      Review of patient's allergies indicates:   Allergen Reactions    Dermabond [tissue glues] Rash    Adhesive      Paper tape usually ok- pulls skin off    Flagyl  [metronidazole hcl]      confusion       Precautions: standard  Visit #: 4 of 12  PTA Visit #: 0  Time In: 10:30  Time Out: 11:33     Initial Evaluation Date: 12/5/18  POC Expiration: 12/31/18    Subjective     Pt reports increased pain today and she's not sure why. She feels like theres increased swelling. She has been getting ~3000 steps a day.   Pain Scale: Soha rates pain at L knee on a scale of 0-10 to be 5 currently.    Objective   12/10/18 L knee extension measured at 0 degrees actively   12/10/18 L knee flexion measured at 97 actively and 114 passively    Soha received individual therapeutic exercises to develop strength, endurance, ROM, flexibility, posture and core stabilization for 41 minutes including:    Bike- several fullrevolutions 5 min  Quad sets 20x  c/ 5 sec hold-  SLR 3 x10-  Heel slides 20x c/ 5 sec hold-  Gastroc stretch wedge stretch 2 x 1 min-  Hamstring  stretch c/ strap 3 x 30sec-  SAQ c/ #1 ankle weight 3 x 10    Step ups on lvl 3 2 x 10 NP  Sit<>stands 3 x 10  Shuttle 3c 3 x 10  TKE maroon 3x10    Soha received the following manual therapy techniques: Joint mobilizations and Soft tissue Mobilization were applied for 12 minutes including:    Tibial anterior and posterior joint mobs  Contract relax technique to promote extension  NP  PROM knee flex  Tissue massage for scar mobilization       Cold pack applied to L knee for 10 minutes    Written Home Exercises Provided: at eval  Pt demo good understanding of the education provided. Soha demonstrated good return demonstration of activities.      Education provided re: importance of HEP  Soha verbalized good understanding of education provided.   No spiritual or educational barriers to learning provided    Assessment     Patient tolerated treatment well with no adverse effects. Improvement in ROM demonstrated by ability to perform full revolutions on bike today. Pt tolerated increased reps with STS with decreased UE support and rocking.  Added TKEs this visit to work on extension. Pt making good progress towards goals. This is a 63 y.o. female referred to outpatient physical therapy and presents with a medical diagnosis of s/p LTKA and demonstrates limitations as described in the problem list. Pt prognosis is Good. Pt will continue to benefit from skilled outpatient physical therapy to address the deficits listed in the problem list, provide pt/family education and to maximize pt's level of independence in the home and community environment.     Medical necessity is demonstrated by the following IMPAIRMENTS/PROBLEMS:  1. Increased Pain  2. Decreased Segmental Mobility & Decreased ROM  3. Decreased Core & BLE strength  4. Decreased Flexibility BLE  5. Decreased Tolerance to Functional Activities     Pt's spiritual, cultural and educational needs considered and pt agreeable to plan of care and goals as stated below:      Anticipated Barriers for physical therapy: none     Short Term GOALS: 2 weeks. Pt agrees with goals set.  1. Patient demonstrates independence with HEP.   2. Patient demonstrates independence with Postural Awareness.   3. Patient demonstrates independence with body mechanics.      Long Term GOALS: 4 weeks. Pt agrees with goals set.  1. Patient demonstrates increased L knee ROM to WNL to improve tolerance to functional activities pain free.   2. Patient demonstrates increased strength BLE's to 4+/5 or greater to improve tolerance to functional activities pain free.   3. Patient demonstrates improved overall function per FOTO Knee Survey to 49% Limitation or less.      Plan     Continue with established Plan of Care towards PT goals.    Therapist: Jeanne Mcgrath, PT, DPT  12/12/2018

## 2018-12-12 ENCOUNTER — CLINICAL SUPPORT (OUTPATIENT)
Dept: REHABILITATION | Facility: HOSPITAL | Age: 63
End: 2018-12-12
Payer: COMMERCIAL

## 2018-12-12 DIAGNOSIS — Z96.652 STATUS POST TOTAL LEFT KNEE REPLACEMENT: Primary | ICD-10-CM

## 2018-12-12 PROCEDURE — 97110 THERAPEUTIC EXERCISES: CPT

## 2018-12-12 PROCEDURE — 97140 MANUAL THERAPY 1/> REGIONS: CPT

## 2018-12-13 NOTE — PROGRESS NOTES
Physical Therapy Daily Note         Name: Soha Wheatley  Clinic Number: [id number]  Diagnosis:   Encounter Diagnosis   Name Primary?    Status post total knee replacement, left Yes     Physician: Alena Cuevas PA-C  Treatment Orders: PT Eval and Treat  Past Medical History:   Diagnosis Date    Allergy     Anxiety     Arthritis     BMI 45.0-49.9, adult     Chronic kidney disease     Depression     GERD (gastroesophageal reflux disease)     History of kidney stones     Kidney stones     Migraines     Morbid obesity     Obesity     Sleep apnea in adult     WEARS CPAP, DOESNT KNOW SETTINGS.     Current Outpatient Medications   Medication Sig    b complex vitamins tablet Take 1 tablet by mouth once daily.    cinnamon bark-chromium picolin 500-100 mg-mcg Cap Take by mouth.    cyanocobalamin 500 MCG tablet Take 500 mcg by mouth once daily.    diclofenac sodium (VOLTAREN) 1 % Gel Apply 2 g topically 4 (four) times daily.    docusate sodium (COLACE) 100 MG capsule Take 1 capsule (100 mg total) by mouth 2 (two) times daily as needed for Constipation.    gabapentin (NEURONTIN) 600 MG tablet Take 1 tablet (600 mg total) by mouth 3 (three) times daily.    GLUCOSAMINE HCL/CHONDR APODACA A NA (OSTEO BI-FLEX ORAL) Take by mouth once daily.    multivitamin (ONE DAILY MULTIVITAMIN) per tablet Take 1 tablet by mouth once daily.    omeprazole (PRILOSEC) 40 MG capsule TAKE 1 CAPSULE DAILY    oxyCODONE-acetaminophen (PERCOCET) 5-325 mg per tablet Take 1 tablet by mouth every 4 to 6 hours as needed for Pain.    sertraline (ZOLOFT) 100 MG tablet Take 1 tablet (100 mg total) by mouth once daily.    tiZANidine (ZANAFLEX) 4 MG tablet Take 1 tablet (4 mg total) by mouth every 8 (eight) hours as needed.     No current facility-administered medications for this visit.      Review of patient's allergies indicates:   Allergen Reactions    Dermabond [tissue glues] Rash     Adhesive      Paper tape usually ok- pulls skin off    Flagyl [metronidazole hcl]      confusion       Precautions: standard  Visit #: 5 of 12  PTA Visit #: 0  Time In: 9:26  Time Out: 9:35     Initial Evaluation Date: 12/5/18  POC Expiration: 12/31/18    Subjective     Pt reports she is feeling ok today but she left dank pain pill on the counter. She has been able to roll onto her side. She isn't able to stay on it long but is happy she has accomplished that.   Pain Scale: Soha rates pain at L knee on a scale of 0-10 to be 5 currently.    Objective     12/10/18 L knee extension measured at 0 degrees actively   12/10/18 L knee flexion measured at 97 actively and 114 passively     Soha received individual therapeutic exercises to develop strength, endurance, ROM, flexibility, posture and core stabilization for 47 minutes including:     Bike- several fullrevolutions 5 min  Quad sets 20x  c/ 5 sec hold  SLR 3 x10  Heel slides 20x c/ 5 sec hold- NP  Gastroc stretch wedge stretch 2 x 1 min  Hamstring  stretch c/ strap 3 x 30sec  SAQ c/ #1 ankle weight 3 x 10    Step ups on lvl 3 2 x 10    Shuttle 3c 3 x 10  Sit<>stands 3 x 10  TKE maroon 3x10  Standing hip abd 2x10  Standing hip ext 2x10  Clamshells 2x10  SL hip abd 2x10  Swiss ball squats 3x10    Soha received the following manual therapy techniques: Joint mobilizations were applied for 10 minutes including:  Tibial anterior and posterior joint mobs  Contract relax technique to promote extension  NP  PROM knee flex  Tissue massage for scar mobilization        Cold pack applied to L knee for 10 minutes    Written Home Exercises Provided: at eval  Pt demo good understanding of the education provided. Soha demonstrated good return demonstration of activities.      Education provided re: importance of HEP  Soha verbalized good understanding of education provided.   No spiritual or educational barriers to learning provided    Assessment     Patient tolerated  treatment well with no adverse effects. Increased standing exercises this visit as well as SL with good tolerance. Pt required some rest breaks and alternation between sitting and standing exercises. Pt tolerated decreased seat height with sit to stand. Pt required UE support with step ups. Pt is making good progress towards goals. This is a 63 y.o. female referred to outpatient physical therapy and presents with a medical diagnosis of chronic knee pain s/p L TKA and demonstrates limitations as described in the problem list. Pt prognosis is Excellent. Pt will continue to benefit from skilled outpatient physical therapy to address the deficits listed in the problem list, provide pt/family education and to maximize pt's level of independence in the home and community environment.     Medical necessity is demonstrated by the following IMPAIRMENTS/PROBLEMS:  1. Increased Pain  2. Decreased Segmental Mobility & Decreased ROM  3. Decreased Core & BLE strength  4. Decreased Flexibility BLE  5. Decreased Tolerance to Functional Activities     Pt's spiritual, cultural and educational needs considered and pt agreeable to plan of care and goals as stated below:      Anticipated Barriers for physical therapy: none     Short Term GOALS: 2 weeks. Pt agrees with goals set.  1. Patient demonstrates independence with HEP.   2. Patient demonstrates independence with Postural Awareness.   3. Patient demonstrates independence with body mechanics.      Long Term GOALS: 4 weeks. Pt agrees with goals set.  1. Patient demonstrates increased L knee ROM to WNL to improve tolerance to functional activities pain free.   2. Patient demonstrates increased strength BLE's to 4+/5 or greater to improve tolerance to functional activities pain free.   3. Patient demonstrates improved overall function per FOTO Knee Survey to 49% Limitation or less.    Plan     Continue with established Plan of Care towards PT goals.    Therapist: Jeanne Mcgrath, PT,  DPT  12/14/2018

## 2018-12-14 ENCOUNTER — CLINICAL SUPPORT (OUTPATIENT)
Dept: REHABILITATION | Facility: HOSPITAL | Age: 63
End: 2018-12-14
Payer: COMMERCIAL

## 2018-12-14 DIAGNOSIS — Z96.652 STATUS POST TOTAL KNEE REPLACEMENT, LEFT: Primary | ICD-10-CM

## 2018-12-14 PROCEDURE — 97140 MANUAL THERAPY 1/> REGIONS: CPT

## 2018-12-14 PROCEDURE — 97110 THERAPEUTIC EXERCISES: CPT

## 2018-12-14 NOTE — PROGRESS NOTES
Physical Therapy Daily Note         Name: Soha Wheatley  Clinic Number: [id number]  Diagnosis:   Encounter Diagnosis   Name Primary?    Status post total knee replacement, left Yes     Physician: Alena Cuevas PA-C  Treatment Orders: PT Eval and Treat  Past Medical History:   Diagnosis Date    Allergy     Anxiety     Arthritis     BMI 45.0-49.9, adult     Chronic kidney disease     Depression     GERD (gastroesophageal reflux disease)     History of kidney stones     Kidney stones     Migraines     Morbid obesity     Obesity     Sleep apnea in adult     WEARS CPAP, DOESNT KNOW SETTINGS.     Current Outpatient Medications   Medication Sig    b complex vitamins tablet Take 1 tablet by mouth once daily.    cinnamon bark-chromium picolin 500-100 mg-mcg Cap Take by mouth.    cyanocobalamin 500 MCG tablet Take 500 mcg by mouth once daily.    diclofenac sodium (VOLTAREN) 1 % Gel Apply 2 g topically 4 (four) times daily.    docusate sodium (COLACE) 100 MG capsule Take 1 capsule (100 mg total) by mouth 2 (two) times daily as needed for Constipation.    gabapentin (NEURONTIN) 600 MG tablet Take 1 tablet (600 mg total) by mouth 3 (three) times daily.    GLUCOSAMINE HCL/CHONDR APODACA A NA (OSTEO BI-FLEX ORAL) Take by mouth once daily.    multivitamin (ONE DAILY MULTIVITAMIN) per tablet Take 1 tablet by mouth once daily.    omeprazole (PRILOSEC) 40 MG capsule TAKE 1 CAPSULE DAILY    oxyCODONE-acetaminophen (PERCOCET) 5-325 mg per tablet Take 1 tablet by mouth every 4 to 6 hours as needed for Pain.    sertraline (ZOLOFT) 100 MG tablet Take 1 tablet (100 mg total) by mouth once daily.    tiZANidine (ZANAFLEX) 4 MG tablet Take 1 tablet (4 mg total) by mouth every 8 (eight) hours as needed.     No current facility-administered medications for this visit.      Review of patient's allergies indicates:   Allergen Reactions    Dermabond [tissue glues] Rash     Adhesive      Paper tape usually ok- pulls skin off    Flagyl [metronidazole hcl]      confusion       Precautions: standard  Visit #: 6 of 12  PTA Visit #: 0  Time In: 9:30  Time Out: 10:30     Initial Evaluation Date: 12/5/18  POC Expiration: 12/31/18    Subjective     Pt reports her knee is feeling pretty sore today. She thinks it may be due to the weather.   Pain Scale: Soha rates pain at L knee on a scale of 0-10 to be 5 currently.    Objective   12/10/18 L knee extension measured at 0 degrees actively   12/10/18 L knee flexion measured at 97 actively and 114 passively    Soha received individual therapeutic exercises to develop strength, endurance, ROM, flexibility and posture for 40 minutes including:    Bike- several fullrevolutions 5 min  Quad sets 20x  c/ 5 sec hold NP  SLR 3 x10  Heel slides 20x c/ 5 sec hold- NP  Gastroc stretch wedge stretch 2 x 1 min  Hamstring  stretch c/ strap 3 x 30sec  SAQ c/ #2 ankle weight 2 x 10    Step ups on lvl 3 2 x 10    Shuttle 3c 3 x 10  Sit<>stands 3 x 10  TKE maroon 3x10  Standing hip abd 2x10  Standing hip ext 2x10  Clamshells 2x10  SL hip abd 2x10  Swiss ball squats 3x10 NP    Soha received the following manual therapy techniques: Joint mobilizations were applied  for 10 minutes including:    Tibial anterior and posterior joint mobs  Contract relax technique to promote extension  NP  PROM knee flex  Tissue massage for scar mobilization      Cold pack applied to L knee for 10 minutes    Written Home Exercises Provided: at eval  Pt demo good understanding of the education provided. Soha demonstrated good return demonstration of activities.      Education provided re: importance of HEP  Soha verbalized good understanding of education provided.   No spiritual or educational barriers to learning provided    Assessment     Patient tolerated treatment well. Continued to progress with there-ex and work on ROM. Pt with increased soreness this visit.Pt is continuing  to make progress towards goals. This is a 63 y.o. female referred to outpatient physical therapy and presents with a medical diagnosis of status post L total knee replacement and demonstrates limitations as described in the problem list. Pt prognosis is Good. Pt will continue to benefit from skilled outpatient physical therapy to address the deficits listed in the problem list, provide pt/family education and to maximize pt's level of independence in the home and community environment.     Medical necessity is demonstrated by the following IMPAIRMENTS/PROBLEMS:  1. Increased Pain  2. Decreased Segmental Mobility & Decreased ROM  3. Decreased Core & BLE strength  4. Decreased Flexibility BLE  5. Decreased Tolerance to Functional Activities     Pt's spiritual, cultural and educational needs considered and pt agreeable to plan of care and goals as stated below:      Anticipated Barriers for physical therapy: none     Short Term GOALS: 2 weeks. Pt agrees with goals set.  1. Patient demonstrates independence with HEP.   2. Patient demonstrates independence with Postural Awareness.   3. Patient demonstrates independence with body mechanics.      Long Term GOALS: 4 weeks. Pt agrees with goals set.  1. Patient demonstrates increased L knee ROM to WNL to improve tolerance to functional activities pain free.   2. Patient demonstrates increased strength BLE's to 4+/5 or greater to improve tolerance to functional activities pain free.   3. Patient demonstrates improved overall function per FOTO Knee Survey to 49% Limitation or less.     Plan     Continue with established Plan of Care towards PT goals.    Therapist: Jeanne Mcgrath, PT, DPT  12/17/2018

## 2018-12-17 ENCOUNTER — CLINICAL SUPPORT (OUTPATIENT)
Dept: REHABILITATION | Facility: HOSPITAL | Age: 63
End: 2018-12-17
Payer: COMMERCIAL

## 2018-12-17 DIAGNOSIS — Z96.652 STATUS POST TOTAL KNEE REPLACEMENT, LEFT: Primary | ICD-10-CM

## 2018-12-17 PROCEDURE — 97110 THERAPEUTIC EXERCISES: CPT

## 2018-12-17 PROCEDURE — 97140 MANUAL THERAPY 1/> REGIONS: CPT

## 2018-12-19 ENCOUNTER — CLINICAL SUPPORT (OUTPATIENT)
Dept: REHABILITATION | Facility: HOSPITAL | Age: 63
End: 2018-12-19
Payer: COMMERCIAL

## 2018-12-19 DIAGNOSIS — Z96.652 STATUS POST TOTAL LEFT KNEE REPLACEMENT: Primary | ICD-10-CM

## 2018-12-19 PROCEDURE — 97110 THERAPEUTIC EXERCISES: CPT

## 2018-12-19 PROCEDURE — 97140 MANUAL THERAPY 1/> REGIONS: CPT

## 2018-12-19 NOTE — PROGRESS NOTES
Physical Therapy Daily Note         Name: Soha Wheatley  Clinic Number: [id number]  Diagnosis:   Encounter Diagnosis   Name Primary?    Status post total left knee replacement 10/31/2018 Yes     Physician: Alena Cuevas PA-C  Treatment Orders: PT Eval and Treat  Past Medical History:   Diagnosis Date    Allergy     Anxiety     Arthritis     BMI 45.0-49.9, adult     Chronic kidney disease     Depression     GERD (gastroesophageal reflux disease)     History of kidney stones     Kidney stones     Migraines     Morbid obesity     Obesity     Sleep apnea in adult     WEARS CPAP, DOESNT KNOW SETTINGS.     Current Outpatient Medications   Medication Sig    b complex vitamins tablet Take 1 tablet by mouth once daily.    cinnamon bark-chromium picolin 500-100 mg-mcg Cap Take by mouth.    cyanocobalamin 500 MCG tablet Take 500 mcg by mouth once daily.    diclofenac sodium (VOLTAREN) 1 % Gel Apply 2 g topically 4 (four) times daily.    docusate sodium (COLACE) 100 MG capsule Take 1 capsule (100 mg total) by mouth 2 (two) times daily as needed for Constipation.    gabapentin (NEURONTIN) 600 MG tablet Take 1 tablet (600 mg total) by mouth 3 (three) times daily.    GLUCOSAMINE HCL/CHONDR APODACA A NA (OSTEO BI-FLEX ORAL) Take by mouth once daily.    multivitamin (ONE DAILY MULTIVITAMIN) per tablet Take 1 tablet by mouth once daily.    omeprazole (PRILOSEC) 40 MG capsule TAKE 1 CAPSULE DAILY    oxyCODONE-acetaminophen (PERCOCET) 5-325 mg per tablet Take 1 tablet by mouth every 4 to 6 hours as needed for Pain.    sertraline (ZOLOFT) 100 MG tablet Take 1 tablet (100 mg total) by mouth once daily.    tiZANidine (ZANAFLEX) 4 MG tablet Take 1 tablet (4 mg total) by mouth every 8 (eight) hours as needed.     No current facility-administered medications for this visit.      Review of patient's allergies indicates:   Allergen Reactions    Dermabond [tissue  glues] Rash    Adhesive      Paper tape usually ok- pulls skin off    Flagyl [metronidazole hcl]      confusion       Precautions: standard  Visit #: 7 of 12  PTA Visit #: 0  Time In: 10:00  Time Out: 11:12     Initial Evaluation Date: 12/5/18  POC Expiration: 12/31/18    Subjective     Pt reports her knee is feeling pretty sore today. She thinks it may be due to the weather.   Pain Scale: Soha rates pain at L knee on a scale of 0-10 to be 5 currently.    Objective   12/10/18 L knee extension measured at 0 degrees actively   12/10/18 L knee flexion measured at 97 actively and 114 passively    12/19/18 L knee flexion measured at 110 actively and 124 passively    Soha received individual therapeutic exercises to develop strength, endurance, ROM, flexibility and posture for 50 minutes including:    Bike-  5 min  Quad sets 20x  c/ 5 sec hold   SLR 3 x10  Heel slides 20x c/ 5 sec hold  Gastroc stretch wedge stretch 2 x 1 min   Hamstring  stretch c/ strap 3 x 30sec  SAQ c/ #2 ankle weight 2 x 10    Step ups on lvl 3 2 x 10    Shuttle 3c 3 x 10  Sit<>stands 3 x 10  TKE maroon 3x10  Standing hip abd 2x10  Standing hip ext 2x10  Clamshells 2x10  SL hip abd 2x10  Swiss ball squats 3x10 NP    Soha received the following manual therapy techniques: Joint mobilizations were applied  for 12 minutes including:    Tibial anterior and posterior joint mobs  Contract relax technique to promote extension  NP  PROM knee flex  Tissue massage for scar mobilization      Cold pack applied to L knee for 10 minutes    Written Home Exercises Provided: at eval  Pt demo good understanding of the education provided. Soha demonstrated good return demonstration of activities.      Education provided re: importance of HEP  Soha verbalized good understanding of education provided.   No spiritual or educational barriers to learning provided    Assessment     Patient tolerated treatment well. Continued to progress with there-ex and work on ROM.  Pt is continuing to make progress towards goals. This is a 63 y.o. female referred to outpatient physical therapy and presents with a medical diagnosis of status post L total knee replacement and demonstrates limitations as described in the problem list. Pt prognosis is Good. Pt will continue to benefit from skilled outpatient physical therapy to address the deficits listed in the problem list, provide pt/family education and to maximize pt's level of independence in the home and community environment.     Medical necessity is demonstrated by the following IMPAIRMENTS/PROBLEMS:  1. Increased Pain  2. Decreased Segmental Mobility & Decreased ROM  3. Decreased Core & BLE strength  4. Decreased Flexibility BLE  5. Decreased Tolerance to Functional Activities     Pt's spiritual, cultural and educational needs considered and pt agreeable to plan of care and goals as stated below:      Anticipated Barriers for physical therapy: none     Short Term GOALS: 2 weeks. Pt agrees with goals set.  1. Patient demonstrates independence with HEP.   2. Patient demonstrates independence with Postural Awareness.   3. Patient demonstrates independence with body mechanics.      Long Term GOALS: 4 weeks. Pt agrees with goals set.  1. Patient demonstrates increased L knee ROM to WNL to improve tolerance to functional activities pain free.   2. Patient demonstrates increased strength BLE's to 4+/5 or greater to improve tolerance to functional activities pain free.   3. Patient demonstrates improved overall function per FOTO Knee Survey to 49% Limitation or less.     Plan     Continue with established Plan of Care towards PT goals.    Therapist: Bill Mancini, PT, DPT  12/19/2018

## 2018-12-20 DIAGNOSIS — Z96.652 STATUS POST TOTAL LEFT KNEE REPLACEMENT: ICD-10-CM

## 2018-12-20 RX ORDER — OXYCODONE AND ACETAMINOPHEN 5; 325 MG/1; MG/1
1 TABLET ORAL
Qty: 40 TABLET | Refills: 0 | OUTPATIENT
Start: 2018-12-20

## 2018-12-21 ENCOUNTER — CLINICAL SUPPORT (OUTPATIENT)
Dept: REHABILITATION | Facility: HOSPITAL | Age: 63
End: 2018-12-21
Payer: COMMERCIAL

## 2018-12-21 DIAGNOSIS — Z96.652 STATUS POST TOTAL LEFT KNEE REPLACEMENT: Primary | ICD-10-CM

## 2018-12-21 DIAGNOSIS — Z96.652 STATUS POST TOTAL LEFT KNEE REPLACEMENT: ICD-10-CM

## 2018-12-21 PROCEDURE — 97110 THERAPEUTIC EXERCISES: CPT

## 2018-12-21 PROCEDURE — 97140 MANUAL THERAPY 1/> REGIONS: CPT

## 2018-12-21 RX ORDER — OXYCODONE AND ACETAMINOPHEN 5; 325 MG/1; MG/1
1 TABLET ORAL
Qty: 40 TABLET | Refills: 0 | Status: SHIPPED | OUTPATIENT
Start: 2018-12-21 | End: 2019-09-06

## 2018-12-21 RX ORDER — OXYCODONE AND ACETAMINOPHEN 5; 325 MG/1; MG/1
1 TABLET ORAL
Qty: 40 TABLET | Refills: 0 | Status: CANCELLED | OUTPATIENT
Start: 2018-12-21

## 2018-12-21 NOTE — PROGRESS NOTES
Physical Therapy Daily Note         Name: Soha Wheatley  Clinic Number: [id number]  Diagnosis:   Encounter Diagnosis   Name Primary?    Status post total left knee replacement 10/31/2018 Yes     Physician: Alena Cuevas PA-C  Treatment Orders: PT Eval and Treat  Past Medical History:   Diagnosis Date    Allergy     Anxiety     Arthritis     BMI 45.0-49.9, adult     Chronic kidney disease     Depression     GERD (gastroesophageal reflux disease)     History of kidney stones     Kidney stones     Migraines     Morbid obesity     Obesity     Sleep apnea in adult     WEARS CPAP, DOESNT KNOW SETTINGS.     Current Outpatient Medications   Medication Sig    b complex vitamins tablet Take 1 tablet by mouth once daily.    cinnamon bark-chromium picolin 500-100 mg-mcg Cap Take by mouth.    cyanocobalamin 500 MCG tablet Take 500 mcg by mouth once daily.    diclofenac sodium (VOLTAREN) 1 % Gel Apply 2 g topically 4 (four) times daily.    docusate sodium (COLACE) 100 MG capsule Take 1 capsule (100 mg total) by mouth 2 (two) times daily as needed for Constipation.    gabapentin (NEURONTIN) 600 MG tablet Take 1 tablet (600 mg total) by mouth 3 (three) times daily.    GLUCOSAMINE HCL/CHONDR APODACA A NA (OSTEO BI-FLEX ORAL) Take by mouth once daily.    multivitamin (ONE DAILY MULTIVITAMIN) per tablet Take 1 tablet by mouth once daily.    omeprazole (PRILOSEC) 40 MG capsule TAKE 1 CAPSULE DAILY    oxyCODONE-acetaminophen (PERCOCET) 5-325 mg per tablet Take 1 tablet by mouth every 4 to 6 hours as needed for Pain.    sertraline (ZOLOFT) 100 MG tablet Take 1 tablet (100 mg total) by mouth once daily.    tiZANidine (ZANAFLEX) 4 MG tablet Take 1 tablet (4 mg total) by mouth every 8 (eight) hours as needed.     No current facility-administered medications for this visit.      Review of patient's allergies indicates:   Allergen Reactions    Dermabond [tissue  glues] Rash    Adhesive      Paper tape usually ok- pulls skin off    Flagyl [metronidazole hcl]      confusion       Precautions: standard  Visit #: 8 of 12  PTA Visit #: 0  Time In: 10:30   Time Out: 11:35     Initial Evaluation Date: 12/5/18  POC Expiration: 12/31/18    Subjective     Pt reports that she drove to therapy today, therefore she could not take a pain pill prior to session.    Pain Scale: Soha rates pain at L knee on a scale of 0-10 to be 7 currently.    Objective   12/10/18 L knee extension measured at 0 degrees actively   12/10/18 L knee flexion measured at 97 actively and 114 passively    12/19/18 L knee flexion measured at 110 actively and 124 passively    Soha received individual therapeutic exercises to develop strength, endurance, ROM, flexibility and posture for 45 minutes including:    Bike  5 min-  Quad sets 30x  c/ 5 sec hold -  SLR 3 x10-  Heel slides 20x c/ 5 sec hold  Gastroc stretch wedge stretch 2 x 1 min-   Hamstring  stretch c/ strap 3 x 30sec-  SAQ c/ #2 ankle weight 2 x 10 -   Step ups on lvl 3 2 x 10  -  Shuttle 3c 3 x 10-  Sit<>stands 3 x 10  TKE maroon 3x10-  Standing hip abd 2x10-  Standing hip ext 2x10-  Clamshells 3x10 -  SL hip abd 3x10-  Swiss ball squats 3x10 NP    Soha received the following manual therapy techniques: Joint mobilizations were applied  for 10 minutes including:    Tibial anterior and posterior joint mobs  Contract relax technique to promote extension  NP  PROM knee flex  Tissue massage for scar mobilization      Cold pack applied to L knee for 10 minutes    Written Home Exercises Provided: at eval  Pt demo good understanding of the education provided. Soha demonstrated good return demonstration of activities.      Education provided re: importance of HEP  Soha verbalized good understanding of education provided.   No spiritual or educational barriers to learning provided    Assessment     Patient tolerated treatment well. Pt is continuing to make  progress towards goals. This is a 63 y.o. female referred to outpatient physical therapy and presents with a medical diagnosis of status post L total knee replacement and demonstrates limitations as described in the problem list. Pt prognosis is Good. Pt will continue to benefit from skilled outpatient physical therapy to address the deficits listed in the problem list, provide pt/family education and to maximize pt's level of independence in the home and community environment.     Medical necessity is demonstrated by the following IMPAIRMENTS/PROBLEMS:  1. Increased Pain  2. Decreased Segmental Mobility & Decreased ROM  3. Decreased Core & BLE strength  4. Decreased Flexibility BLE  5. Decreased Tolerance to Functional Activities     Pt's spiritual, cultural and educational needs considered and pt agreeable to plan of care and goals as stated below:      Anticipated Barriers for physical therapy: none     Short Term GOALS: 2 weeks. Pt agrees with goals set.  1. Patient demonstrates independence with HEP.   2. Patient demonstrates independence with Postural Awareness.   3. Patient demonstrates independence with body mechanics.      Long Term GOALS: 4 weeks. Pt agrees with goals set.  1. Patient demonstrates increased L knee ROM to WNL to improve tolerance to functional activities pain free.   2. Patient demonstrates increased strength BLE's to 4+/5 or greater to improve tolerance to functional activities pain free.   3. Patient demonstrates improved overall function per FOTO Knee Survey to 49% Limitation or less.     Plan     Continue with established Plan of Care towards PT goals.    Therapist: Bill Mancini, PT, DPT  12/21/2018

## 2018-12-24 ENCOUNTER — CLINICAL SUPPORT (OUTPATIENT)
Dept: REHABILITATION | Facility: HOSPITAL | Age: 63
End: 2018-12-24
Payer: COMMERCIAL

## 2018-12-24 DIAGNOSIS — Z96.652 STATUS POST TOTAL LEFT KNEE REPLACEMENT: Primary | ICD-10-CM

## 2018-12-24 PROCEDURE — 97140 MANUAL THERAPY 1/> REGIONS: CPT

## 2018-12-24 PROCEDURE — 97110 THERAPEUTIC EXERCISES: CPT

## 2018-12-24 NOTE — PROGRESS NOTES
Physical Therapy Daily Note         Name: Soha Wheatley  Clinic Number: [id number]  Diagnosis:   Encounter Diagnosis   Name Primary?    Status post total left knee replacement 10/31/2018 Yes     Physician: Alena Cuevas PA-C  Treatment Orders: PT Eval and Treat  Past Medical History:   Diagnosis Date    Allergy     Anxiety     Arthritis     BMI 45.0-49.9, adult     Chronic kidney disease     Depression     GERD (gastroesophageal reflux disease)     History of kidney stones     Kidney stones     Migraines     Morbid obesity     Obesity     Sleep apnea in adult     WEARS CPAP, DOESNT KNOW SETTINGS.     Current Outpatient Medications   Medication Sig    b complex vitamins tablet Take 1 tablet by mouth once daily.    cinnamon bark-chromium picolin 500-100 mg-mcg Cap Take by mouth.    cyanocobalamin 500 MCG tablet Take 500 mcg by mouth once daily.    diclofenac sodium (VOLTAREN) 1 % Gel Apply 2 g topically 4 (four) times daily.    docusate sodium (COLACE) 100 MG capsule Take 1 capsule (100 mg total) by mouth 2 (two) times daily as needed for Constipation.    gabapentin (NEURONTIN) 600 MG tablet Take 1 tablet (600 mg total) by mouth 3 (three) times daily.    GLUCOSAMINE HCL/CHONDR APODACA A NA (OSTEO BI-FLEX ORAL) Take by mouth once daily.    multivitamin (ONE DAILY MULTIVITAMIN) per tablet Take 1 tablet by mouth once daily.    omeprazole (PRILOSEC) 40 MG capsule TAKE 1 CAPSULE DAILY    oxyCODONE-acetaminophen (PERCOCET) 5-325 mg per tablet Take 1 tablet by mouth every 4 to 6 hours as needed for Pain.    sertraline (ZOLOFT) 100 MG tablet Take 1 tablet (100 mg total) by mouth once daily.    tiZANidine (ZANAFLEX) 4 MG tablet Take 1 tablet (4 mg total) by mouth every 8 (eight) hours as needed.     No current facility-administered medications for this visit.      Review of patient's allergies indicates:   Allergen Reactions    Dermabond [tissue  glues] Rash    Adhesive      Paper tape usually ok- pulls skin off    Flagyl [metronidazole hcl]      confusion       Precautions: standard  Visit #: 9 of 12  PTA Visit #: 0  Time In: 11:30  Time Out: 12:37     Initial Evaluation Date: 12/5/18  POC Expiration: 12/31/18    Subjective     Pt reports that she drove to therapy today, therefore she could not take a pain pill prior to session.    Pain Scale: Soha rates pain at L knee on a scale of 0-10 to be 7 currently.    Objective   12/10/18 L knee extension measured at 0 degrees actively   12/10/18 L knee flexion measured at 97 actively and 114 passively    12/19/18 L knee flexion measured at 110 actively and 124 passively    Soha received individual therapeutic exercises to develop strength, endurance, ROM, flexibility and posture for 45 minutes including:    Bike  5 min  Quad sets 30x  c/ 5 sec hold   SLR 3 x10  Heel slides 20x c/ 5 sec hold  Hamstring  stretch c/ strap 3 x 30sec  SAQ c/ #2 ankle weight 2 x 10   Clamshells 3x10   SL hip abd 3x10    Step ups on lvl 3 2 x 10    Shuttle 3c 3 x 10-  Sit<>stands 3 x 10  TKE maroon 3x10-   Standing hip abd 2x10  Standing hip ext 2x10  Swiss ball squats 3x10 NP    Gastroc stretch wedge stretch 2 x 1 min    Soha received the following manual therapy techniques: Joint mobilizations were applied  for 10 minutes including:    Tibial anterior and posterior joint mobs  Contract relax technique to promote extension  NP  PROM knee flex  Tissue massage for scar mobilization      Cold pack applied to L knee for 10 minutes    Written Home Exercises Provided: at eval  Pt demo good understanding of the education provided. Soha demonstrated good return demonstration of activities.      Education provided re: importance of HEP  Soha verbalized good understanding of education provided.   No spiritual or educational barriers to learning provided    Assessment     Patient tolerated treatment well. Pt is continuing to make progress  towards goals. Pt required less verbal cues while performing therex compared to previous sessions. This is a 63 y.o. female referred to outpatient physical therapy and presents with a medical diagnosis of status post L total knee replacement and demonstrates limitations as described in the problem list. Pt prognosis is Good. Pt will continue to benefit from skilled outpatient physical therapy to address the deficits listed in the problem list, provide pt/family education and to maximize pt's level of independence in the home and community environment.     Medical necessity is demonstrated by the following IMPAIRMENTS/PROBLEMS:  1. Increased Pain  2. Decreased Segmental Mobility & Decreased ROM  3. Decreased Core & BLE strength  4. Decreased Flexibility BLE  5. Decreased Tolerance to Functional Activities     Pt's spiritual, cultural and educational needs considered and pt agreeable to plan of care and goals as stated below:      Anticipated Barriers for physical therapy: none     Short Term GOALS: 2 weeks. Pt agrees with goals set.  1. Patient demonstrates independence with HEP.   2. Patient demonstrates independence with Postural Awareness.   3. Patient demonstrates independence with body mechanics.      Long Term GOALS: 4 weeks. Pt agrees with goals set.  1. Patient demonstrates increased L knee ROM to WNL to improve tolerance to functional activities pain free.   2. Patient demonstrates increased strength BLE's to 4+/5 or greater to improve tolerance to functional activities pain free.   3. Patient demonstrates improved overall function per FOTO Knee Survey to 49% Limitation or less.     Plan     Continue with established Plan of Care towards PT goals.    Therapist: Bill Mancini, PT, DPT  12/24/2018

## 2018-12-26 ENCOUNTER — CLINICAL SUPPORT (OUTPATIENT)
Dept: REHABILITATION | Facility: HOSPITAL | Age: 63
End: 2018-12-26
Payer: COMMERCIAL

## 2018-12-26 DIAGNOSIS — Z96.652 STATUS POST TOTAL LEFT KNEE REPLACEMENT: Primary | ICD-10-CM

## 2018-12-26 PROCEDURE — 97110 THERAPEUTIC EXERCISES: CPT

## 2018-12-26 PROCEDURE — 97140 MANUAL THERAPY 1/> REGIONS: CPT

## 2018-12-26 NOTE — PROGRESS NOTES
Physical Therapy Daily Note         Name: Soha Wheatley  Clinic Number: [id number]  Diagnosis:   Encounter Diagnosis   Name Primary?    Status post total left knee replacement 10/31/2018 Yes     Physician: Alena Cuevas PA-C  Treatment Orders: PT Eval and Treat  Past Medical History:   Diagnosis Date    Allergy     Anxiety     Arthritis     BMI 45.0-49.9, adult     Chronic kidney disease     Depression     GERD (gastroesophageal reflux disease)     History of kidney stones     Kidney stones     Migraines     Morbid obesity     Obesity     Sleep apnea in adult     WEARS CPAP, DOESNT KNOW SETTINGS.     Current Outpatient Medications   Medication Sig    b complex vitamins tablet Take 1 tablet by mouth once daily.    cinnamon bark-chromium picolin 500-100 mg-mcg Cap Take by mouth.    cyanocobalamin 500 MCG tablet Take 500 mcg by mouth once daily.    diclofenac sodium (VOLTAREN) 1 % Gel Apply 2 g topically 4 (four) times daily.    docusate sodium (COLACE) 100 MG capsule Take 1 capsule (100 mg total) by mouth 2 (two) times daily as needed for Constipation.    gabapentin (NEURONTIN) 600 MG tablet Take 1 tablet (600 mg total) by mouth 3 (three) times daily.    GLUCOSAMINE HCL/CHONDR APODACA A NA (OSTEO BI-FLEX ORAL) Take by mouth once daily.    multivitamin (ONE DAILY MULTIVITAMIN) per tablet Take 1 tablet by mouth once daily.    omeprazole (PRILOSEC) 40 MG capsule TAKE 1 CAPSULE DAILY    oxyCODONE-acetaminophen (PERCOCET) 5-325 mg per tablet Take 1 tablet by mouth every 4 to 6 hours as needed for Pain.    sertraline (ZOLOFT) 100 MG tablet Take 1 tablet (100 mg total) by mouth once daily.    tiZANidine (ZANAFLEX) 4 MG tablet Take 1 tablet (4 mg total) by mouth every 8 (eight) hours as needed.     No current facility-administered medications for this visit.      Review of patient's allergies indicates:   Allergen Reactions    Dermabond [tissue  glues] Rash    Adhesive      Paper tape usually ok- pulls skin off    Flagyl [metronidazole hcl]      confusion       Precautions: standard  Visit #: 10 of 12  PTA Visit #: 0  Time In: 11:15  Time Out: 12:20     Initial Evaluation Date: 12/5/18  POC Expiration: 12/31/18    Subjective     Pt reports that she is feeling pretty good today.  Pain Scale: Soha rates pain at L knee on a scale of 0-10 to be 3 currently.    Objective   12/10/18 L knee extension measured at 0 degrees actively   12/10/18 L knee flexion measured at 97 actively and 114 passively    12/19/18 L knee flexion measured at 110 actively and 124 passively    Soha received individual therapeutic exercises to develop strength, endurance, ROM, flexibility and posture for 45 minutes including:    Bike  5 min-  Quad sets 30x  c/ 5 sec hold -  SLR c/ 1# ankle weights 3 x10-  Heel slides 20x c/ 5 sec holdNP  Hamstring  stretch c/ strap 3 x 30secNP  SAQ c/ #2 ankle weight 2 x 10 -  Clamshells 3x10 NP  SL hip abd 3x10NP    Step ups on lvl 3 2 x 10  -  Shuttle 4c 3 x 10-  Sit<>stands 3 x 10  TKE maroon 3x10-   Standing hip abd 2x10-  Standing hip ext 2x10-  Gastroc stretch wedge stretch 2 x 1 min-   Heel raises 3 x 10-  Swiss ball wall squats 2x10 -    Soha received the following manual therapy techniques: Joint mobilizations were applied  for 10 minutes including:    Tibial anterior and posterior joint mobs  Contract relax technique to promote extension  NP  PROM knee flex  Tissue massage for scar mobilization      Cold pack applied to L knee for 10 minutes    Written Home Exercises Provided: at eval  Pt demo good understanding of the education provided. Soha demonstrated good return demonstration of activities.      Education provided re: importance of HEP  Soha verbalized good understanding of education provided.   No spiritual or educational barriers to learning provided    Assessment     PT added resistance to multiple exercises and added wall squats  and heel raises to exercise program.Patient tolerated treatment well. Pt is continuing to make progress towards goals. Pt required less verbal cues while performing therex compared to previous sessions. This is a 63 y.o. female referred to outpatient physical therapy and presents with a medical diagnosis of status post L total knee replacement and demonstrates limitations as described in the problem list. Pt prognosis is Good. Pt will continue to benefit from skilled outpatient physical therapy to address the deficits listed in the problem list, provide pt/family education and to maximize pt's level of independence in the home and community environment.     Medical necessity is demonstrated by the following IMPAIRMENTS/PROBLEMS:  1. Increased Pain  2. Decreased Segmental Mobility & Decreased ROM  3. Decreased Core & BLE strength  4. Decreased Flexibility BLE  5. Decreased Tolerance to Functional Activities     Pt's spiritual, cultural and educational needs considered and pt agreeable to plan of care and goals as stated below:      Anticipated Barriers for physical therapy: none     Short Term GOALS: 2 weeks. Pt agrees with goals set.  1. Patient demonstrates independence with HEP.   2. Patient demonstrates independence with Postural Awareness.   3. Patient demonstrates independence with body mechanics.      Long Term GOALS: 4 weeks. Pt agrees with goals set.  1. Patient demonstrates increased L knee ROM to WNL to improve tolerance to functional activities pain free.   2. Patient demonstrates increased strength BLE's to 4+/5 or greater to improve tolerance to functional activities pain free.   3. Patient demonstrates improved overall function per FOTO Knee Survey to 49% Limitation or less.     Plan     Continue with established Plan of Care towards PT goals.    Therapist: Bill Mancini, PT, DPT  12/26/2018

## 2018-12-28 ENCOUNTER — CLINICAL SUPPORT (OUTPATIENT)
Dept: REHABILITATION | Facility: HOSPITAL | Age: 63
End: 2018-12-28
Payer: COMMERCIAL

## 2018-12-28 DIAGNOSIS — Z96.652 S/P TKR (TOTAL KNEE REPLACEMENT), LEFT: Primary | ICD-10-CM

## 2018-12-28 PROCEDURE — 97110 THERAPEUTIC EXERCISES: CPT

## 2018-12-28 PROCEDURE — 97140 MANUAL THERAPY 1/> REGIONS: CPT

## 2018-12-28 NOTE — PROGRESS NOTES
Physical Therapy Daily Note         Name: Soha Wheatley  Clinic Number: [id number]  Diagnosis:   Encounter Diagnosis   Name Primary?    S/P TKR (total knee replacement), left Yes     Physician: Alena Cuevas PA-C  Treatment Orders: PT Eval and Treat  Past Medical History:   Diagnosis Date    Allergy     Anxiety     Arthritis     BMI 45.0-49.9, adult     Chronic kidney disease     Depression     GERD (gastroesophageal reflux disease)     History of kidney stones     Kidney stones     Migraines     Morbid obesity     Obesity     Sleep apnea in adult     WEARS CPAP, DOESNT KNOW SETTINGS.     Current Outpatient Medications   Medication Sig    b complex vitamins tablet Take 1 tablet by mouth once daily.    cinnamon bark-chromium picolin 500-100 mg-mcg Cap Take by mouth.    cyanocobalamin 500 MCG tablet Take 500 mcg by mouth once daily.    diclofenac sodium (VOLTAREN) 1 % Gel Apply 2 g topically 4 (four) times daily.    docusate sodium (COLACE) 100 MG capsule Take 1 capsule (100 mg total) by mouth 2 (two) times daily as needed for Constipation.    gabapentin (NEURONTIN) 600 MG tablet Take 1 tablet (600 mg total) by mouth 3 (three) times daily.    GLUCOSAMINE HCL/CHONDR APODACA A NA (OSTEO BI-FLEX ORAL) Take by mouth once daily.    multivitamin (ONE DAILY MULTIVITAMIN) per tablet Take 1 tablet by mouth once daily.    omeprazole (PRILOSEC) 40 MG capsule TAKE 1 CAPSULE DAILY    oxyCODONE-acetaminophen (PERCOCET) 5-325 mg per tablet Take 1 tablet by mouth every 4 to 6 hours as needed for Pain.    sertraline (ZOLOFT) 100 MG tablet Take 1 tablet (100 mg total) by mouth once daily.    tiZANidine (ZANAFLEX) 4 MG tablet Take 1 tablet (4 mg total) by mouth every 8 (eight) hours as needed.     No current facility-administered medications for this visit.      Review of patient's allergies indicates:   Allergen Reactions    Dermabond [tissue glues] Rash     Adhesive      Paper tape usually ok- pulls skin off    Flagyl [metronidazole hcl]      confusion     Precautions: standard  Visit #: 11 of 12  PTA Visit #: 0  Time In: 9:56   Time Out: 11:00   Initial Evaluation Date: 12/5/18  POC Expiration: 12/31/18    Subjective     Pt reports she's stiff this morning due to the colder weather. Her back is stiff this morning too.   Pain Scale: Soha rates pain at L knee on a scale of 0-10 to be 4 currently.    Objective   12/10/18 L knee extension measured at 0 degrees actively   12/10/18 L knee flexion measured at 97 actively and 114 passively  12/19/18 L knee flexion measured at 110 actively and 124 passively    Soha received individual therapeutic exercises to develop strength, endurance, ROM, flexibility and posture for 44 minutes including:  Bike  5 min-  Quad sets 30x  c/ 5 sec hold -  SLR c/ 1# ankle weights 3 x10-  Heel slides 20x c/ 5 sec holdNP  Hamstring  stretch c/ strap 3 x 30sec  SAQ c/ #2 ankle weight 2 x 10 -  Clamshells 3x10   SL hip abd 3x10NP  Step ups on lvl 3 2 x 10  -  Shuttle 4c 3 x 10-  Sit<>stands 2 x 10- chair with foam  TKE maroon 3x10-   Standing hip abd 2x10-  Standing hip ext 2x10-   Gastroc stretch wedge stretch 2 x 1 min-   Heel raises 3 x 10-  Swiss ball wall squats 2x10 -  Front lunge 2x10 L    Soha received the following manual therapy techniques: Joint mobilizations were applied for 10 minutes including:  Tibial anterior and posterior joint mobs  Contract relax technique to promote extension  NP  PROM knee flex  Tissue massage for scar mobilization      Cold pack applied to L knee for 10 minutes    Written Home Exercises Provided: at eval  Pt demo good understanding of the education provided. Soha demonstrated good return demonstration of activities.     Education provided re: cont hep  Soha verbalized good understanding of education provided.   No spiritual or educational barriers to learning provided    Assessment     Patient  tolerated treatment well with no adverse effects. Added lunges this visit with good tolerance. Pt attempted STS from lower surface this visit with some increased difficulty, only able to perform 2 sets. Pt required cuing to keep feet from externally rotating as she performed STS. Pt is continuing to make good progress towards goals. Re-assess goals next visit.   This is a 63 y.o. female referred to outpatient physical therapy and presents with a medical diagnosis of s/p L TKR and demonstrates limitations as described in the problem list. Pt prognosis is Good. Pt will continue to benefit from skilled outpatient physical therapy to address the deficits listed in the problem list, provide pt/family education and to maximize pt's level of independence in the home and community environment.     Medical necessity is demonstrated by the following IMPAIRMENTS/PROBLEMS:  1. Increased Pain  2. Decreased Segmental Mobility & Decreased ROM  3. Decreased Core & BLE strength  4. Decreased Flexibility BLE  5. Decreased Tolerance to Functional Activities     Pt's spiritual, cultural and educational needs considered and pt agreeable to plan of care and goals as stated below:      Anticipated Barriers for physical therapy: none     Short Term GOALS: 2 weeks. Pt agrees with goals set.  1. Patient demonstrates independence with HEP.   2. Patient demonstrates independence with Postural Awareness.   3. Patient demonstrates independence with body mechanics.      Long Term GOALS: 4 weeks. Pt agrees with goals set.  1. Patient demonstrates increased L knee ROM to WNL to improve tolerance to functional activities pain free.   2. Patient demonstrates increased strength BLE's to 4+/5 or greater to improve tolerance to functional activities pain free.   3. Patient demonstrates improved overall function per FOTO Knee Survey to 49% Limitation or less.        Plan     Continue with established Plan of Care towards PT goals.    Therapist: Jeanne  Ramila, PT, DPT  12/28/2018

## 2018-12-31 ENCOUNTER — CLINICAL SUPPORT (OUTPATIENT)
Dept: REHABILITATION | Facility: HOSPITAL | Age: 63
End: 2018-12-31
Payer: COMMERCIAL

## 2018-12-31 DIAGNOSIS — Z96.652 S/P TKR (TOTAL KNEE REPLACEMENT), LEFT: Primary | ICD-10-CM

## 2018-12-31 PROCEDURE — 97110 THERAPEUTIC EXERCISES: CPT

## 2018-12-31 PROCEDURE — 97140 MANUAL THERAPY 1/> REGIONS: CPT

## 2018-12-31 NOTE — PROGRESS NOTES
Physical Therapy Daily Note         Name: Soha Wheatley  Clinic Number: [id number]  Diagnosis:   Encounter Diagnosis   Name Primary?    S/P TKR (total knee replacement), left Yes     Physician: Alena Cuevas PA-C  Treatment Orders: PT Eval and Treat  Past Medical History:   Diagnosis Date    Allergy     Anxiety     Arthritis     BMI 45.0-49.9, adult     Chronic kidney disease     Depression     GERD (gastroesophageal reflux disease)     History of kidney stones     Kidney stones     Migraines     Morbid obesity     Obesity     Sleep apnea in adult     WEARS CPAP, DOESNT KNOW SETTINGS.     Current Outpatient Medications   Medication Sig    b complex vitamins tablet Take 1 tablet by mouth once daily.    cinnamon bark-chromium picolin 500-100 mg-mcg Cap Take by mouth.    cyanocobalamin 500 MCG tablet Take 500 mcg by mouth once daily.    diclofenac sodium (VOLTAREN) 1 % Gel Apply 2 g topically 4 (four) times daily.    docusate sodium (COLACE) 100 MG capsule Take 1 capsule (100 mg total) by mouth 2 (two) times daily as needed for Constipation.    gabapentin (NEURONTIN) 600 MG tablet Take 1 tablet (600 mg total) by mouth 3 (three) times daily.    GLUCOSAMINE HCL/CHONDR APODACA A NA (OSTEO BI-FLEX ORAL) Take by mouth once daily.    multivitamin (ONE DAILY MULTIVITAMIN) per tablet Take 1 tablet by mouth once daily.    omeprazole (PRILOSEC) 40 MG capsule TAKE 1 CAPSULE DAILY    oxyCODONE-acetaminophen (PERCOCET) 5-325 mg per tablet Take 1 tablet by mouth every 4 to 6 hours as needed for Pain.    sertraline (ZOLOFT) 100 MG tablet Take 1 tablet (100 mg total) by mouth once daily.    tiZANidine (ZANAFLEX) 4 MG tablet Take 1 tablet (4 mg total) by mouth every 8 (eight) hours as needed.     No current facility-administered medications for this visit.      Review of patient's allergies indicates:   Allergen Reactions    Dermabond [tissue glues] Rash     Adhesive      Paper tape usually ok- pulls skin off    Flagyl [metronidazole hcl]      confusion       Precautions: standard  Visit #: 12 of 12  PTA Visit #: 0  Time In: 8:55  Time Out: 9:04   Initial Evaluation Date: 12/5/18  POC Expiration: 12/31/18, 1/28/19    Subjective     Pt reports she's not feeling too good today. Its feeling heavy, stiff, and sore today. She did not do that much yesterday that would have aggravated it.   Pain Scale: Soha rates pain at L knee on a scale of 0-10 to be 6 currently.    Objective     12/10/18 L knee extension measured at 0 degrees actively   12/10/18 L knee flexion measured at 97 actively and 114 passively  12/19/18 L knee flexion measured at 110 actively and 124 passively    Initial Range of Motion: Knee (degrees)    Left Right   Flexion: 85 (108 PROM) 128   Extension -2 (0 PROM) 0      Re-eval Range of Motion: Knee (degrees)    Left Right   Flexion: 118 (prom 124) 128   Extension 0 0      Initial Strength: Knee    Left Right   Quadriceps 3+/5 4+/5   Hamstrings 3+/5 4+/5      Re-eval: Strength: Knee    Left Right   Quadriceps 4+/5 5/5   Hamstrings 4+/5 4+/5      Initial Strength: Hip     Left Right   Iliopsoas 4/5 4+/5   PGM 4/5 4+/5   IR 4/5 4+/5   ER 4/5 4+/5   Ext 4/5 4+/5      Re-eval Strength: Hip     Left Right   Iliopsoas 4/5 4+/5   PGM 4+/5 4+/5   IR 4+/5 4+/5   ER 4/5 4+/5   Ext 4+/5 4+/5       Soha received individual therapeutic exercises to develop strength, endurance, ROM, flexibility and posture for 44 minutes including:  Bike  5 min-  Quad sets 30x  c/ 5 sec hold -  SLR c/ 1# ankle weights 3 x10-  Heel slides 20x c/ 5 sec holdNP  Hamstring  stretch c/ strap 3 x 30sec  SAQ c/ #2 ankle weight 2 x 10 -  Clamshells 3x10 RTB  SL hip abd 3x10  Prone extension 2x10  Step ups on lvl 3 2 x 10  -  Shuttle 4c 3 x 10-  Sit<>stands 2 x 10- chair with foam  TKE maroon 3x10-   Standing hip abd 2x10-  Standing hip ext 2x10-   Gastroc stretch wedge stretch 2 x 1 min-    Heel raises 3 x 10-  Swiss ball wall squats 2x10 -  Front lunge 2x10 L     Soha received the following manual therapy techniques: Joint mobilizations were applied for 12 minutes including:  Tibial anterior and posterior joint mobs  Patellar mobs  Contract relax technique to promote extension  NP  PROM knee flex  Tissue massage for scar mobilization      Written Home Exercises Provided: at eval  Pt demo good understanding of the education provided. Soha demonstrated good return demonstration of activities.     Education provided re: cont hep  Soha verbalized good understanding of education provided.   No spiritual or educational barriers to learning provided    Functional Limitations Reports - G Codes  Category: mobility  Tool: FOTO Knee Survey  Initial Score: 66% Limitation  12/31/18 : 51% limitation    Assessment     Patient tolerated treatment well without adverse effects. Pt with some relief of pressure in knee with jt mobs. Pt demos continued improvements in performance of there-ex. Pt with difficulty initiating STS this visit. RTB around feet to prevent ER when coming upright. Plan to work on initiation mechanics next visit.  Added prone hip extension with good tolerance. Re-assessment of strength and ROM with good improvements, but has not yet reached goal status. Pt continuing to make good progress towards goals.This is a 63 y.o. female referred to outpatient physical therapy and presents with a medical diagnosis of s/p L TKR and demonstrates limitations as described in the problem list. Pt prognosis is Good. Pt will continue to benefit from skilled outpatient physical therapy to address the deficits listed in the problem list, provide pt/family education and to maximize pt's level of independence in the home and community environment.     Medical necessity is demonstrated by the following IMPAIRMENTS/PROBLEMS:  1. Increased Pain  2. Decreased Segmental Mobility & Decreased ROM  3. Decreased Core & BLE  strength  4. Decreased Flexibility BLE  5. Decreased Tolerance to Functional Activities     Pt's spiritual, cultural and educational needs considered and pt agreeable to plan of care and goals as stated below:      Anticipated Barriers for physical therapy: none     Short Term GOALS: 2 weeks. Pt agrees with goals set.  1. Patient demonstrates independence with HEP. -met 12/31/18  2. Patient demonstrates independence with Postural Awareness. -in progress  3. Patient demonstrates independence with body mechanics. -in progress      Long Term GOALS: 4 weeks. Pt agrees with goals set.  1. Patient demonstrates increased L knee ROM to WNL to improve tolerance to functional activities pain free. -in progress  2. Patient demonstrates increased strength BLE's to 4+/5 or greater to improve tolerance to functional activities pain free. In progress  3. Patient demonstrates improved overall function per FOTO Knee Survey to 49% Limitation or less.   -in progress  Plan   Continue Outpatient physical therapy 1-3 times weekly to include: pt ed, hep, therapeutic exercises, neuromuscular re-education/ balance exercises, joint mobilizations, and modalities prn. Cont PT for  4 weeks. Pt may be seen by PTA as part of the rehabilitation team.     Therapist: Jeanne Mcgrath, PT, DPT  12/31/2018

## 2019-01-02 ENCOUNTER — TELEPHONE (OUTPATIENT)
Dept: ORTHOPEDICS | Facility: CLINIC | Age: 64
End: 2019-01-02

## 2019-01-02 NOTE — TELEPHONE ENCOUNTER
----- Message from Kiara Kent sent at 1/2/2019 11:51 AM CST -----  Contact: Self  Pt is needing a note, that she is release to go back to work pt stated it can be added to the mychart or mailed, pt can be reached @ [phone]       WE  Spoke  She chose Jan 7 as the return to work day    Letter done & sent

## 2019-01-03 ENCOUNTER — CLINICAL SUPPORT (OUTPATIENT)
Dept: REHABILITATION | Facility: HOSPITAL | Age: 64
End: 2019-01-03
Payer: COMMERCIAL

## 2019-01-03 ENCOUNTER — TELEPHONE (OUTPATIENT)
Dept: ORTHOPEDICS | Facility: CLINIC | Age: 64
End: 2019-01-03

## 2019-01-03 PROCEDURE — 97110 THERAPEUTIC EXERCISES: CPT

## 2019-01-03 NOTE — PROGRESS NOTES
Physical Therapy Daily Note         Name: Soha Wheatley  Clinic Number: [id number]  Diagnosis:   No diagnosis found.  Physician: Alena Cuevas PA-C  Treatment Orders: PT Eval and Treat  Past Medical History:   Diagnosis Date    Allergy     Anxiety     Arthritis     BMI 45.0-49.9, adult     Chronic kidney disease     Depression     GERD (gastroesophageal reflux disease)     History of kidney stones     Kidney stones     Migraines     Morbid obesity     Obesity     Sleep apnea in adult     WEARS CPAP, DOESNT KNOW SETTINGS.     Current Outpatient Medications   Medication Sig    b complex vitamins tablet Take 1 tablet by mouth once daily.    cinnamon bark-chromium picolin 500-100 mg-mcg Cap Take by mouth.    cyanocobalamin 500 MCG tablet Take 500 mcg by mouth once daily.    diclofenac sodium (VOLTAREN) 1 % Gel Apply 2 g topically 4 (four) times daily.    docusate sodium (COLACE) 100 MG capsule Take 1 capsule (100 mg total) by mouth 2 (two) times daily as needed for Constipation.    gabapentin (NEURONTIN) 600 MG tablet Take 1 tablet (600 mg total) by mouth 3 (three) times daily.    GLUCOSAMINE HCL/CHONDR APODACA A NA (OSTEO BI-FLEX ORAL) Take by mouth once daily.    multivitamin (ONE DAILY MULTIVITAMIN) per tablet Take 1 tablet by mouth once daily.    omeprazole (PRILOSEC) 40 MG capsule TAKE 1 CAPSULE DAILY    oxyCODONE-acetaminophen (PERCOCET) 5-325 mg per tablet Take 1 tablet by mouth every 4 to 6 hours as needed for Pain.    sertraline (ZOLOFT) 100 MG tablet Take 1 tablet (100 mg total) by mouth once daily.    tiZANidine (ZANAFLEX) 4 MG tablet Take 1 tablet (4 mg total) by mouth every 8 (eight) hours as needed.     No current facility-administered medications for this visit.      Review of patient's allergies indicates:   Allergen Reactions    Dermabond [tissue glues] Rash    Adhesive      Paper tape usually ok- pulls skin off    Flagyl  [metronidazole hcl]      confusion       Precautions: standard  Visit #: 1 of 12  PTA Visit #: 1  Time In: 10:25  Time Out: 11:42  Initial Evaluation Date: 12/5/18  POC Expiration: 12/31/18, 1/28/19    Subjective     Pt reports: some pain today  Pain Scale: Soha rates pain at L knee on a scale of 0-10 to be 5 currently.    Objective      Re-eval Range of Motion: Knee (degrees)    Left Right   Flexion: 118 (prom 124) 128   Extension 0 0            Soha received individual therapeutic exercises to develop strength, endurance, ROM, flexibility and posture for 52 minutes including:  Bike  5 min-  Quad sets 30x  c/ 5 sec hold -  SLR c/ 1# ankle weights 3 x10-  Heel slides 20x c/ 5 sec holdNP  Hamstring  stretch c/ strap 3x 30 sec  SAQ c/ #2 ankle weight 3j x 10 -  Clamshells 3x10 RTB  SL hip abd 3x10 #1  Prone extension 2x10 #1    Step ups on lvl 3 2 x 10  -  Shuttle 4c 3 x 10-  Sit<>stands 3 x 10- chair with foam  TKE maroon 3x10-   Standing hip abd 3x10- RTB  Standing hip ext 3x10- RTB   Gastroc stretch wedge stretch 2 x 1 min-   Heel raises 3 x 10-   Swiss ball wall squats 2x10 -  Front lunge 2x10 L     Soha received the following manual therapy techniques: Joint mobilizations were applied for 5 minutes including:  Tibial anterior and posterior joint mobs- NP  Patellar mobs  Contract relax technique to promote extension  NP  PROM knee flex  Tissue massage for scar mobilization      Written Home Exercises Provided: at eval  Pt demo good understanding of the education provided. Soha demonstrated good return demonstration of activities.     Education provided re: cont hep  Soha verbalized good understanding of education provided.   No spiritual or educational barriers to learning provided    Functional Limitations Reports - G Codes  Category: mobility  Tool: FOTO Knee Survey  Initial Score: 66% Limitation  12/31/18 : 51% limitation    Assessment     Patient tolerated treatment well without adverse effects. Attempted  L ecc heel raises but unable to perform 2* L knee pain. Advanced some quan as above. Some swelling L knee. Advance pt as edgardo next.  Pt continuing to make good progress towards goals.This is a 63 y.o. female referred to outpatient physical therapy and presents with a medical diagnosis of s/p L TKR and demonstrates limitations as described in the problem list. Pt prognosis is Good. Pt will continue to benefit from skilled outpatient physical therapy to address the deficits listed in the problem list, provide pt/family education and to maximize pt's level of independence in the home and community environment.     Medical necessity is demonstrated by the following IMPAIRMENTS/PROBLEMS:  1. Increased Pain  2. Decreased Segmental Mobility & Decreased ROM  3. Decreased Core & BLE strength  4. Decreased Flexibility BLE  5. Decreased Tolerance to Functional Activities     Pt's spiritual, cultural and educational needs considered and pt agreeable to plan of care and goals as stated below:      Anticipated Barriers for physical therapy: none     Short Term GOALS: 2 weeks. Pt agrees with goals set.  1. Patient demonstrates independence with HEP. -met 12/31/18  2. Patient demonstrates independence with Postural Awareness. -in progress  3. Patient demonstrates independence with body mechanics. -in progress      Long Term GOALS: 4 weeks. Pt agrees with goals set.  1. Patient demonstrates increased L knee ROM to WNL to improve tolerance to functional activities pain free. -in progress  2. Patient demonstrates increased strength BLE's to 4+/5 or greater to improve tolerance to functional activities pain free. In progress  3. Patient demonstrates improved overall function per FOTO Knee Survey to 49% Limitation or less.   -in progress  Plan   Continue Outpatient physical therapy     Therapist: Kiara Bermudez PTA,   1/3/2019

## 2019-01-03 NOTE — TELEPHONE ENCOUNTER
Spoke to patient. Answered all questions regarding alternative medications. Will continue with Tylenol as she would like to drive. May take pain medication at night.

## 2019-01-06 DIAGNOSIS — M25.569 KNEE PAIN, UNSPECIFIED CHRONICITY, UNSPECIFIED LATERALITY: ICD-10-CM

## 2019-01-06 RX ORDER — OMEPRAZOLE 40 MG/1
CAPSULE, DELAYED RELEASE ORAL
Qty: 30 CAPSULE | Refills: 0 | Status: SHIPPED | OUTPATIENT
Start: 2019-01-06 | End: 2019-04-01 | Stop reason: SDUPTHER

## 2019-01-07 ENCOUNTER — CLINICAL SUPPORT (OUTPATIENT)
Dept: REHABILITATION | Facility: HOSPITAL | Age: 64
End: 2019-01-07
Payer: COMMERCIAL

## 2019-01-07 DIAGNOSIS — Z96.652 S/P TKR (TOTAL KNEE REPLACEMENT), LEFT: Primary | ICD-10-CM

## 2019-01-07 PROCEDURE — 97140 MANUAL THERAPY 1/> REGIONS: CPT

## 2019-01-07 PROCEDURE — 97110 THERAPEUTIC EXERCISES: CPT

## 2019-01-07 NOTE — PROGRESS NOTES
Physical Therapy Daily Note         Name: Soha Wheatley  Clinic Number: [id number]  Diagnosis:   Encounter Diagnosis   Name Primary?    S/P TKR (total knee replacement), left Yes     Physician: Alena Cuevas PA-C  Treatment Orders: PT Eval and Treat  Past Medical History:   Diagnosis Date    Allergy     Anxiety     Arthritis     BMI 45.0-49.9, adult     Chronic kidney disease     Depression     GERD (gastroesophageal reflux disease)     History of kidney stones     Kidney stones     Migraines     Morbid obesity     Obesity     Sleep apnea in adult     WEARS CPAP, DOESNT KNOW SETTINGS.     Current Outpatient Medications   Medication Sig    b complex vitamins tablet Take 1 tablet by mouth once daily.    cinnamon bark-chromium picolin 500-100 mg-mcg Cap Take by mouth.    cyanocobalamin 500 MCG tablet Take 500 mcg by mouth once daily.    diclofenac sodium (VOLTAREN) 1 % Gel Apply 2 g topically 4 (four) times daily.    docusate sodium (COLACE) 100 MG capsule Take 1 capsule (100 mg total) by mouth 2 (two) times daily as needed for Constipation.    gabapentin (NEURONTIN) 600 MG tablet Take 1 tablet (600 mg total) by mouth 3 (three) times daily.    GLUCOSAMINE HCL/CHONDR APODACA A NA (OSTEO BI-FLEX ORAL) Take by mouth once daily.    multivitamin (ONE DAILY MULTIVITAMIN) per tablet Take 1 tablet by mouth once daily.    omeprazole (PRILOSEC) 40 MG capsule TAKE 1 CAPSULE DAILY    oxyCODONE-acetaminophen (PERCOCET) 5-325 mg per tablet Take 1 tablet by mouth every 4 to 6 hours as needed for Pain.    sertraline (ZOLOFT) 100 MG tablet Take 1 tablet (100 mg total) by mouth once daily.    tiZANidine (ZANAFLEX) 4 MG tablet Take 1 tablet (4 mg total) by mouth every 8 (eight) hours as needed.     No current facility-administered medications for this visit.      Review of patient's allergies indicates:   Allergen Reactions    Dermabond [tissue glues] Rash     Adhesive      Paper tape usually ok- pulls skin off    Flagyl [metronidazole hcl]      confusion       Precautions: standard  Visit #: 2 of 12  PTA Visit #: 1  Time In: 4:50  Time Out: 5:45  Initial Evaluation Date: 12/5/18  POC Expiration: 12/31/18, 1/28/19    Subjective     Pt reports: some pain today  Pain Scale: Soha rates pain at L knee on a scale of 0-10 to be 5 currently.    Objective      Re-eval Range of Motion: Knee (degrees)    Left Right   Flexion: 118 (prom 124) 128   Extension 0 0            Soha received individual therapeutic exercises to develop strength, endurance, ROM, flexibility and posture for 30 minutes including:  Bike  5 min-  Quad sets 30x  c/ 5 sec hold -  SLR c/ 1# ankle weights 3 x10-  Heel slides 20x c/ 5 sec holdNP  Hamstring  stretch c/ strap 3x 30 sec-NP  SAQ c/ #2 ankle weight 3j x 10 -  Clamshells 3x10 RTB  SL hip abd 3x10 #1  Prone extension 2x10 #1    Step ups on lvl 3 2 x 10  -  Shuttle 4c 3 x 10-  Sit<>stands 3 x 10- chair with foam  TKE maroon 3x10-   Standing hip abd 3x10- RTB  Standing hip ext 3x10- RTB   Gastroc stretch wedge stretch 2 x 1 min-   Heel raises 3 x 10-   Swiss ball wall squats 2x10 -  Front lunge 2x10 L   Standing HS stretch 5 sec hold x20  Step up and over level 3 step. 10x    Soha received the following manual therapy techniques: Joint mobilizations were applied for 15 minutes including:  Tibial anterior and posterior joint mobs- NP  Patellar mobs  Contract relax technique to promote extension  NP  PROM knee flex  Tissue massage for scar mobilization      Written Home Exercises Provided: at eval  Pt demo good understanding of the education provided. Soha demonstrated good return demonstration of activities.     Education provided re: cont hep  Soha verbalized good understanding of education provided.   No spiritual or educational barriers to learning provided    Functional Limitations Reports - G Codes  Category: mobility  Tool: FOTO Knee  Survey  Initial Score: 66% Limitation  12/31/18 : 51% limitation    Assessment     Patient tolerated treatment well without adverse effects.Completed manuals in prone position. She was able to complete 130 degrees of PROM following manuals. Advance pt as edgardo next.  Pt continuing to make good progress towards goals.This is a 63 y.o. female referred to outpatient physical therapy and presents with a medical diagnosis of s/p L TKR and demonstrates limitations as described in the problem list. Pt prognosis is Good. Pt will continue to benefit from skilled outpatient physical therapy to address the deficits listed in the problem list, provide pt/family education and to maximize pt's level of independence in the home and community environment.     Medical necessity is demonstrated by the following IMPAIRMENTS/PROBLEMS:  1. Increased Pain  2. Decreased Segmental Mobility & Decreased ROM  3. Decreased Core & BLE strength  4. Decreased Flexibility BLE  5. Decreased Tolerance to Functional Activities     Pt's spiritual, cultural and educational needs considered and pt agreeable to plan of care and goals as stated below:      Anticipated Barriers for physical therapy: none     Short Term GOALS: 2 weeks. Pt agrees with goals set.  1. Patient demonstrates independence with HEP. -met 12/31/18  2. Patient demonstrates independence with Postural Awareness. -in progress  3. Patient demonstrates independence with body mechanics. -in progress      Long Term GOALS: 4 weeks. Pt agrees with goals set.  1. Patient demonstrates increased L knee ROM to WNL to improve tolerance to functional activities pain free. -in progress  2. Patient demonstrates increased strength BLE's to 4+/5 or greater to improve tolerance to functional activities pain free. In progress  3. Patient demonstrates improved overall function per FOTO Knee Survey to 49% Limitation or less.   -in progress  Plan   Continue Outpatient physical therapy     Therapist: Earl  Reggie, PT,   1/7/2019

## 2019-01-09 ENCOUNTER — CLINICAL SUPPORT (OUTPATIENT)
Dept: REHABILITATION | Facility: HOSPITAL | Age: 64
End: 2019-01-09
Payer: COMMERCIAL

## 2019-01-09 PROCEDURE — 97110 THERAPEUTIC EXERCISES: CPT

## 2019-01-09 NOTE — PROGRESS NOTES
Physical Therapy Daily Note         Name: Soha Wheatley  Clinic Number: [id number]  Diagnosis:   No diagnosis found.  Physician: Alena Cuevas PA-C  Treatment Orders: PT Eval and Treat  Past Medical History:   Diagnosis Date    Allergy     Anxiety     Arthritis     BMI 45.0-49.9, adult     Chronic kidney disease     Depression     GERD (gastroesophageal reflux disease)     History of kidney stones     Kidney stones     Migraines     Morbid obesity     Obesity     Sleep apnea in adult     WEARS CPAP, DOESNT KNOW SETTINGS.     Current Outpatient Medications   Medication Sig    b complex vitamins tablet Take 1 tablet by mouth once daily.    cinnamon bark-chromium picolin 500-100 mg-mcg Cap Take by mouth.    cyanocobalamin 500 MCG tablet Take 500 mcg by mouth once daily.    diclofenac sodium (VOLTAREN) 1 % Gel Apply 2 g topically 4 (four) times daily.    docusate sodium (COLACE) 100 MG capsule Take 1 capsule (100 mg total) by mouth 2 (two) times daily as needed for Constipation.    gabapentin (NEURONTIN) 600 MG tablet Take 1 tablet (600 mg total) by mouth 3 (three) times daily.    GLUCOSAMINE HCL/CHONDR APODACA A NA (OSTEO BI-FLEX ORAL) Take by mouth once daily.    multivitamin (ONE DAILY MULTIVITAMIN) per tablet Take 1 tablet by mouth once daily.    omeprazole (PRILOSEC) 40 MG capsule TAKE 1 CAPSULE DAILY    oxyCODONE-acetaminophen (PERCOCET) 5-325 mg per tablet Take 1 tablet by mouth every 4 to 6 hours as needed for Pain.    sertraline (ZOLOFT) 100 MG tablet Take 1 tablet (100 mg total) by mouth once daily.    tiZANidine (ZANAFLEX) 4 MG tablet Take 1 tablet (4 mg total) by mouth every 8 (eight) hours as needed.     No current facility-administered medications for this visit.      Review of patient's allergies indicates:   Allergen Reactions    Dermabond [tissue glues] Rash    Adhesive      Paper tape usually ok- pulls skin off    Flagyl  "[metronidazole hcl]      confusion       Precautions: standard  Visit #: 3 of 12  PTA Visit #: 1  Time In: 2:00  Time Out: 3:18  Initial Evaluation Date: 12/5/18  POC Expiration: 12/31/18, 1/28/19    Subjective     Pt reports: knee hurts  Pain Scale: Soha rates pain at L knee on a scale of 0-10 to be 6 currently.    Objective      Re-eval Range of Motion: Knee (degrees)    Left Right   Flexion: 118 (prom 124) 128   Extension 0 0            Soha received individual therapeutic exercises to develop strength, endurance, ROM, flexibility and posture for 55 minutes including:  Bike  5 min-  Quad sets 30x  c/ 5 sec hold -  SLR c/ 1# ankle weights 3 x10-  Heel slides 20x c/ 5 sec holdNP  Hamstring  stretch c/ strap 3x 30 sec-NP  SAQ c/ #2 ankle weight 3j x 10 -  Clamshells 3x10 RTB  SL hip abd 3x10 #1  Prone extension 2x10 #1  Bridges 2x10    Step ups on lvl 3 2 x 10  -  Shuttle 4c 3 x 10-  Sit<>stands 3 x 10- high/low 21'  TKE maroon 3x10-   Steamboats RTB 2x10  Gastroc stretch wedge stretch 2 x 1 min-   Heel raises 3 x 10-  On airex  Swiss ball wall squats 2x10 -hold 2"  Front lunge 2x10 L   Standing HS stretch 5 sec hold x20  Step up and over level 3 step. 10x  crabwalks 1 lap    Soha received the following manual therapy techniques: Joint mobilizations were applied for 5 minutes including:  Tibial anterior and posterior joint mobs- NP  Patellar mobs- NT  Contract relax technique to promote extension  NP  PROM knee flex  Tissue massage for scar mobilization  -NT    Written Home Exercises Provided: at eval  Pt demo good understanding of the education provided. Soha demonstrated good return demonstration of activities.     Education provided re: cont hep  Soha verbalized good understanding of education provided.   No spiritual or educational barriers to learning provided    Functional Limitations Reports - G Codes  Category: mobility  Tool: FOTO Knee Survey  Initial Score: 66% Limitation  12/31/18 : 51% " limitation    Assessment     Patient tolerated treatment well without adverse effects.advanced pt as above. Cont to improve w/ L LE strength and ROM. Pt continuing to make good progress towards goals.This is a 63 y.o. female referred to outpatient physical therapy and presents with a medical diagnosis of s/p L TKR and demonstrates limitations as described in the problem list. Pt prognosis is Good. Pt will continue to benefit from skilled outpatient physical therapy to address the deficits listed in the problem list, provide pt/family education and to maximize pt's level of independence in the home and community environment.     Medical necessity is demonstrated by the following IMPAIRMENTS/PROBLEMS:  1. Increased Pain  2. Decreased Segmental Mobility & Decreased ROM  3. Decreased Core & BLE strength  4. Decreased Flexibility BLE  5. Decreased Tolerance to Functional Activities     Pt's spiritual, cultural and educational needs considered and pt agreeable to plan of care and goals as stated below:      Anticipated Barriers for physical therapy: none     Short Term GOALS: 2 weeks. Pt agrees with goals set.  1. Patient demonstrates independence with HEP. -met 12/31/18  2. Patient demonstrates independence with Postural Awareness. -in progress  3. Patient demonstrates independence with body mechanics. -in progress      Long Term GOALS: 4 weeks. Pt agrees with goals set.  1. Patient demonstrates increased L knee ROM to WNL to improve tolerance to functional activities pain free. -in progress  2. Patient demonstrates increased strength BLE's to 4+/5 or greater to improve tolerance to functional activities pain free. In progress  3. Patient demonstrates improved overall function per FOTO Knee Survey to 49% Limitation or less.   -in progress  Plan   Continue Outpatient physical therapy     Therapist: Kiara Bermudez PTA,   1/9/2019

## 2019-01-15 ENCOUNTER — CLINICAL SUPPORT (OUTPATIENT)
Dept: REHABILITATION | Facility: HOSPITAL | Age: 64
End: 2019-01-15
Payer: COMMERCIAL

## 2019-01-15 PROCEDURE — 97110 THERAPEUTIC EXERCISES: CPT

## 2019-01-15 NOTE — PROGRESS NOTES
Physical Therapy Daily Note         Name: Soha Wheatley  Clinic Number: [id number]  Diagnosis:   No diagnosis found.  Physician: Alena Cuevas PA-C  Treatment Orders: PT Eval and Treat  Past Medical History:   Diagnosis Date    Allergy     Anxiety     Arthritis     BMI 45.0-49.9, adult     Chronic kidney disease     Depression     GERD (gastroesophageal reflux disease)     History of kidney stones     Kidney stones     Migraines     Morbid obesity     Obesity     Sleep apnea in adult     WEARS CPAP, DOESNT KNOW SETTINGS.     Current Outpatient Medications   Medication Sig    b complex vitamins tablet Take 1 tablet by mouth once daily.    cinnamon bark-chromium picolin 500-100 mg-mcg Cap Take by mouth.    cyanocobalamin 500 MCG tablet Take 500 mcg by mouth once daily.    diclofenac sodium (VOLTAREN) 1 % Gel Apply 2 g topically 4 (four) times daily.    docusate sodium (COLACE) 100 MG capsule Take 1 capsule (100 mg total) by mouth 2 (two) times daily as needed for Constipation.    gabapentin (NEURONTIN) 600 MG tablet Take 1 tablet (600 mg total) by mouth 3 (three) times daily.    GLUCOSAMINE HCL/CHONDR APODACA A NA (OSTEO BI-FLEX ORAL) Take by mouth once daily.    multivitamin (ONE DAILY MULTIVITAMIN) per tablet Take 1 tablet by mouth once daily.    omeprazole (PRILOSEC) 40 MG capsule TAKE 1 CAPSULE DAILY    oxyCODONE-acetaminophen (PERCOCET) 5-325 mg per tablet Take 1 tablet by mouth every 4 to 6 hours as needed for Pain.    sertraline (ZOLOFT) 100 MG tablet Take 1 tablet (100 mg total) by mouth once daily.    tiZANidine (ZANAFLEX) 4 MG tablet Take 1 tablet (4 mg total) by mouth every 8 (eight) hours as needed.     No current facility-administered medications for this visit.      Review of patient's allergies indicates:   Allergen Reactions    Dermabond [tissue glues] Rash    Adhesive      Paper tape usually ok- pulls skin off    Flagyl  "[metronidazole hcl]      confusion       Precautions: standard  Visit #: 4 of 12  PTA Visit #: 2  Time In: 1:23  Time Out: 2:50  Initial Evaluation Date: 12/5/18  POC Expiration: 12/31/18, 1/28/19    Subjective     Pt reports: R knee hurts and feels heavy  Pain Scale: Soha rates pain at L knee on a scale of 0-10 to be 6 currently.    Objective      Re-eval Range of Motion: Knee (degrees)    Left Right   Flexion: 125 PROM 128   Extension 0 0            Soha received individual therapeutic exercises to develop strength, endurance, ROM, flexibility and posture for 70 minutes including:  Bike  5 min-  Quad sets 30x  c/ 5 sec hold -  SLR c/ 1# ankle weights 3 x10-  Heel slides 20x c/ 5 sec holdNP  Hamstring  stretch c/ strap 3x 30 sec-NP  SAQ c/ #3 ankle weight 3j x 10 -  Clamshells 3x10 RTB  SL hip abd 3x10 #1  Prone extension 2x10 #1  Bridges 2x10    Step ups on lvl 3 2 x 10  -  Shuttle 4c 3 x 10-  Sit<>stands 3 x 10- high/low 21'  TKE maroon 3x10-   Steamboats RTB 2x10  Gastroc stretch wedge stretch 2 x 1 min-   Heel raises 3 x 10-  On airex  Swiss ball wall squats 2x10 -hold 2"  Front lunge 2x10 L   Standing HS stretch 20 sec hold x5  Step up and over level 3 step. 10x  crabwalks 1 lap    Soha received the following manual therapy techniques: Joint mobilizations were applied for 5 minutes including:  Tibial anterior and posterior joint mobs- NP  Patellar mobs- NT  Contract relax technique to promote extension  NP  PROM knee flex  Tissue massage for scar mobilization  -NT    Written Home Exercises Provided: at eval  Pt demo good understanding of the education provided. Soha demonstrated good return demonstration of activities.     Education provided re: cont hep  Soha verbalized good understanding of education provided.   No spiritual or educational barriers to learning provided    Functional Limitations Reports - G Codes  Category: mobility  Tool: FOTO Knee Survey  Initial Score: 66% Limitation  12/31/18 : 51% " limitation    Assessment     Patient tolerated treatment well without adverse effects.advanced pt as above. L knee PROM has improved. Pt challenged w/ quan w/ fatigue at end of session. This is a 63 y.o. female referred to outpatient physical therapy and presents with a medical diagnosis of s/p L TKR and demonstrates limitations as described in the problem list. Pt prognosis is Good. Pt will continue to benefit from skilled outpatient physical therapy to address the deficits listed in the problem list, provide pt/family education and to maximize pt's level of independence in the home and community environment.     Medical necessity is demonstrated by the following IMPAIRMENTS/PROBLEMS:  1. Increased Pain  2. Decreased Segmental Mobility & Decreased ROM  3. Decreased Core & BLE strength  4. Decreased Flexibility BLE  5. Decreased Tolerance to Functional Activities     Pt's spiritual, cultural and educational needs considered and pt agreeable to plan of care and goals as stated below:      Anticipated Barriers for physical therapy: none     Short Term GOALS: 2 weeks. Pt agrees with goals set.  1. Patient demonstrates independence with HEP. -met 12/31/18  2. Patient demonstrates independence with Postural Awareness. -in progress  3. Patient demonstrates independence with body mechanics. -in progress      Long Term GOALS: 4 weeks. Pt agrees with goals set.  1. Patient demonstrates increased L knee ROM to WNL to improve tolerance to functional activities pain free. -in progress  2. Patient demonstrates increased strength BLE's to 4+/5 or greater to improve tolerance to functional activities pain free. In progress  3. Patient demonstrates improved overall function per FOTO Knee Survey to 49% Limitation or less.   -in progress  Plan   Continue Outpatient physical therapy     Therapist: Kiara Bermudez, MAX,   1/15/2019

## 2019-01-17 ENCOUNTER — CLINICAL SUPPORT (OUTPATIENT)
Dept: REHABILITATION | Facility: HOSPITAL | Age: 64
End: 2019-01-17
Payer: COMMERCIAL

## 2019-01-17 DIAGNOSIS — Z96.652 S/P TKR (TOTAL KNEE REPLACEMENT), LEFT: Primary | ICD-10-CM

## 2019-01-17 PROCEDURE — 97110 THERAPEUTIC EXERCISES: CPT

## 2019-01-17 PROCEDURE — 97140 MANUAL THERAPY 1/> REGIONS: CPT

## 2019-01-17 NOTE — PROGRESS NOTES
Physical Therapy Daily Note         Name: Soha Wheatley  Clinic Number: [id number]  Diagnosis:   Encounter Diagnosis   Name Primary?    S/P TKR (total knee replacement), left Yes     Physician: Alena Cuevas PA-C  Treatment Orders: PT Eval and Treat  Past Medical History:   Diagnosis Date    Allergy     Anxiety     Arthritis     BMI 45.0-49.9, adult     Chronic kidney disease     Depression     GERD (gastroesophageal reflux disease)     History of kidney stones     Kidney stones     Migraines     Morbid obesity     Obesity     Sleep apnea in adult     WEARS CPAP, DOESNT KNOW SETTINGS.     Current Outpatient Medications   Medication Sig    b complex vitamins tablet Take 1 tablet by mouth once daily.    cinnamon bark-chromium picolin 500-100 mg-mcg Cap Take by mouth.    cyanocobalamin 500 MCG tablet Take 500 mcg by mouth once daily.    diclofenac sodium (VOLTAREN) 1 % Gel Apply 2 g topically 4 (four) times daily.    docusate sodium (COLACE) 100 MG capsule Take 1 capsule (100 mg total) by mouth 2 (two) times daily as needed for Constipation.    gabapentin (NEURONTIN) 600 MG tablet Take 1 tablet (600 mg total) by mouth 3 (three) times daily.    GLUCOSAMINE HCL/CHONDR APODACA A NA (OSTEO BI-FLEX ORAL) Take by mouth once daily.    multivitamin (ONE DAILY MULTIVITAMIN) per tablet Take 1 tablet by mouth once daily.    omeprazole (PRILOSEC) 40 MG capsule TAKE 1 CAPSULE DAILY    oxyCODONE-acetaminophen (PERCOCET) 5-325 mg per tablet Take 1 tablet by mouth every 4 to 6 hours as needed for Pain.    sertraline (ZOLOFT) 100 MG tablet Take 1 tablet (100 mg total) by mouth once daily.    tiZANidine (ZANAFLEX) 4 MG tablet Take 1 tablet (4 mg total) by mouth every 8 (eight) hours as needed.     No current facility-administered medications for this visit.      Review of patient's allergies indicates:   Allergen Reactions    Dermabond [tissue glues] Rash  "   Adhesive      Paper tape usually ok- pulls skin off    Flagyl [metronidazole hcl]      confusion       Precautions: standard  Visit #: 5 of 12  PTA Visit #: 0  Time In: 15:47  Time Out: 16:00  Initial Evaluation Date: 12/5/18  POC Expiration: 12/31/18, 1/28/19    Subjective     Pt reports: that her knee is feeling better today.  Pain Scale: Soha rates pain at L knee on a scale of 0-10 to be 4 currently.    Objective      Re-eval Range of Motion: Knee (degrees)    Left Right   Flexion: 125 PROM 128   Extension 0 0            Soha received individual therapeutic exercises to develop strength, endurance, ROM, flexibility and posture for 50  minutes including:  Bike  5 min  Quad sets 30x  c/ 5 sec hold   SLR c/ 1# ankle weights 3 x10  Heel slides 20x c/ 5 sec hold NP  Hamstring  stretch c/ strap 3x 30 sec-NP  SAQ c/ #3 ankle weight 3 x 10   Clamshells 3x10 RTB-  SL hip abd 3x10 #1  Prone extension 2x10 #1  Bridges 2x10    Step ups on lvl 3 2 x 10    Shuttle 4c 3 x 10  Sit<>stands 3 x 10- high/low 21'-  TKE maroon 3x10-    Steamboats RTB 2x10  Gastroc stretch wedge stretch 2 x 1 min  Heel raises 3 x 10-  On airex  Swiss ball wall squats 2x10 -hold 2"  Front lunge 2x10 L -  Standing HS stretch 20 sec hold x5  Step up and over level 3 step. 10x  crabwalks 1 lap    Soha received the following manual therapy techniques: Joint mobilizations were applied for 13 minutes including:  Tibial anterior and posterior joint mobs- NP  Patellar mobs- NT  Contract relax technique to promote extension  NP  PROM knee flex  Tissue massage for scar mobilization  -NT    Cold pack to L knee for 10 minutes    Written Home Exercises Provided: at eval  Pt demo good understanding of the education provided. Soha demonstrated good return demonstration of activities.     Education provided re: cont hep  Soha verbalized good understanding of education provided.   No spiritual or educational barriers to learning provided    Functional " Limitations Reports - G Codes  Category: mobility  Tool: FOTO Knee Survey  Initial Score: 66% Limitation  12/31/18 : 51% limitation    Assessment     Patient tolerated treatment well without adverse effects.advanced pt as above. L knee AROM has improved to 118 degrees.. Pt challenged w/ quan w/ fatigue at end of session. This is a 63 y.o. female referred to outpatient physical therapy and presents with a medical diagnosis of s/p L TKR and demonstrates limitations as described in the problem list. Pt prognosis is Good. Pt will continue to benefit from skilled outpatient physical therapy to address the deficits listed in the problem list, provide pt/family education and to maximize pt's level of independence in the home and community environment.     Medical necessity is demonstrated by the following IMPAIRMENTS/PROBLEMS:  1. Increased Pain  2. Decreased Segmental Mobility & Decreased ROM  3. Decreased Core & BLE strength  4. Decreased Flexibility BLE  5. Decreased Tolerance to Functional Activities     Pt's spiritual, cultural and educational needs considered and pt agreeable to plan of care and goals as stated below:      Anticipated Barriers for physical therapy: none     Short Term GOALS: 2 weeks. Pt agrees with goals set.  1. Patient demonstrates independence with HEP. -met 12/31/18  2. Patient demonstrates independence with Postural Awareness. -in progress  3. Patient demonstrates independence with body mechanics. -in progress      Long Term GOALS: 4 weeks. Pt agrees with goals set.  1. Patient demonstrates increased L knee ROM to WNL to improve tolerance to functional activities pain free. -in progress  2. Patient demonstrates increased strength BLE's to 4+/5 or greater to improve tolerance to functional activities pain free. In progress  3. Patient demonstrates improved overall function per FOTO Knee Survey to 49% Limitation or less.   -in progress  Plan   Continue Outpatient physical therapy     Therapist:  Bill Mancini, PT, DPT  1/17/2019

## 2019-01-21 ENCOUNTER — CLINICAL SUPPORT (OUTPATIENT)
Dept: REHABILITATION | Facility: HOSPITAL | Age: 64
End: 2019-01-21
Payer: COMMERCIAL

## 2019-01-21 DIAGNOSIS — Z96.652 S/P TKR (TOTAL KNEE REPLACEMENT), LEFT: Primary | ICD-10-CM

## 2019-01-21 PROCEDURE — 97110 THERAPEUTIC EXERCISES: CPT

## 2019-01-21 NOTE — PROGRESS NOTES
Physical Therapy Daily Note     Name: Soha Wheatley  Clinic Number: [id number]  Diagnosis:   Encounter Diagnosis   Name Primary?    S/P TKR (total knee replacement), left Yes     Physician: Alena Cuevas PA-C  Treatment Orders: PT Eval and Treat  Past Medical History:   Diagnosis Date    Allergy     Anxiety     Arthritis     BMI 45.0-49.9, adult     Chronic kidney disease     Depression     GERD (gastroesophageal reflux disease)     History of kidney stones     Kidney stones     Migraines     Morbid obesity     Obesity     Sleep apnea in adult     WEARS CPAP, DOESNT KNOW SETTINGS.     Current Outpatient Medications   Medication Sig    b complex vitamins tablet Take 1 tablet by mouth once daily.    cinnamon bark-chromium picolin 500-100 mg-mcg Cap Take by mouth.    cyanocobalamin 500 MCG tablet Take 500 mcg by mouth once daily.    diclofenac sodium (VOLTAREN) 1 % Gel Apply 2 g topically 4 (four) times daily.    docusate sodium (COLACE) 100 MG capsule Take 1 capsule (100 mg total) by mouth 2 (two) times daily as needed for Constipation.    gabapentin (NEURONTIN) 600 MG tablet Take 1 tablet (600 mg total) by mouth 3 (three) times daily.    GLUCOSAMINE HCL/CHONDR APODACA A NA (OSTEO BI-FLEX ORAL) Take by mouth once daily.    multivitamin (ONE DAILY MULTIVITAMIN) per tablet Take 1 tablet by mouth once daily.    omeprazole (PRILOSEC) 40 MG capsule TAKE 1 CAPSULE DAILY    oxyCODONE-acetaminophen (PERCOCET) 5-325 mg per tablet Take 1 tablet by mouth every 4 to 6 hours as needed for Pain.    sertraline (ZOLOFT) 100 MG tablet Take 1 tablet (100 mg total) by mouth once daily.    tiZANidine (ZANAFLEX) 4 MG tablet Take 1 tablet (4 mg total) by mouth every 8 (eight) hours as needed.     No current facility-administered medications for this visit.      Review of patient's allergies indicates:   Allergen Reactions    Dermabond [tissue glues] Rash     "Adhesive      Paper tape usually ok- pulls skin off    Flagyl [metronidazole hcl]      confusion       Precautions: standard  Visit #: 6 of 12  PTA Visit #: 0  Time In: 12:05  Time Out: 1:00  Initial Evaluation Date: 12/5/18  POC Expiration: 12/31/18, 1/28/19    Subjective     Pt reports she's feeling better than last week. Pt says she was able to walk around the mall for an hour over the weekend.   Pain Scale: Soha rates pain at L knee on a scale of 0-10 to be 3 currently.    Objective   Re-eval Range of Motion: Knee (degrees)    Left Right   Flexion: 125 PROM 128   Extension 0 0       Soha received individual therapeutic exercises to develop strength, endurance, ROM, flexibility and posture for 45 minutes including:  Bike  5 min  Quad sets 30x  c/ 5 sec hold   SLR c/ 1# ankle weights 3 x10  Heel slides 20x c/ 5 sec hold NP  Hamstring  stretch c/ strap 3x 30 sec-NP  SAQ c/ #3 ankle weight 3 x 10   Clamshells 3x10 RTB-  SL hip abd 3x10 #1  Prone extension 2x10 #1  Bridges 2x10     Step ups on lvl 3 2 x 10    Tap downs with R foot from L3 2x10  Shuttle 4c 3 x 10  Sit<>stands 3 x 10- high/low 21'-  TKE maroon 3x10-    Steamboats RTB 2x10  Gastroc stretch wedge stretch 2 x 1 min  Heel raises 3 x 10-  On airex  Swiss ball wall squats 2x10 -hold 2"  Front lunge 2x10 L -  Standing HS stretch 20 sec hold x5  Step up and over level 3 step. 10x  crabwalks 2 lap  SLS 2x30    Soha received the following manual therapy techniques: Joint mobilizations were applied for 00 minutes including: NP  Tibial anterior and posterior joint mobs- NP  Patellar mobs- NT  Contract relax technique to promote extension  NP  PROM knee flex  Tissue massage for scar mobilization  -NT    Cold pack to L knee for 10 minutes     Written Home Exercises Provided: at eval  Pt demo good understanding of the education provided. Soha demonstrated good return demonstration of activities.     Education provided re: cont hep  Soha verbalized good " understanding of education provided.   No spiritual or educational barriers to learning provided    Functional Limitations Reports - G Codes  Category: mobility  Tool: FOTO Knee Survey  Initial Score: 66% Limitation  12/31/18 : 51% limitation  Assessment     Patient tolerated treatment well without adverse effects. Emphasis on standing exercises this visit. Pt continuing to work on eccentric control of quadriceps. Progressing well towards goals. This is a 63 y.o. female referred to outpatient physical therapy and presents with a medical diagnosis of s/p L TKA and demonstrates limitations as described in the problem list. Pt prognosis is Good. Pt will continue to benefit from skilled outpatient physical therapy to address the deficits listed in the problem list, provide pt/family education and to maximize pt's level of independence in the home and community environment.     Medical necessity is demonstrated by the following IMPAIRMENTS/PROBLEMS:  1. Increased Pain  2. Decreased Segmental Mobility & Decreased ROM  3. Decreased Core & BLE strength  4. Decreased Flexibility BLE  5. Decreased Tolerance to Functional Activities     Pt's spiritual, cultural and educational needs considered and pt agreeable to plan of care and goals as stated below:      Anticipated Barriers for physical therapy: none     Short Term GOALS: 2 weeks. Pt agrees with goals set.  1. Patient demonstrates independence with HEP. -met 12/31/18  2. Patient demonstrates independence with Postural Awareness. -in progress  3. Patient demonstrates independence with body mechanics. -in progress      Long Term GOALS: 4 weeks. Pt agrees with goals set.  1. Patient demonstrates increased L knee ROM to WNL to improve tolerance to functional activities pain free. -in progress  2. Patient demonstrates increased strength BLE's to 4+/5 or greater to improve tolerance to functional activities pain free. In progress  3. Patient demonstrates improved overall  function per FOTO Knee Survey to 49% Limitation or less.   -in progress  Plan     Continue with established Plan of Care towards PT goals.    Therapist: Jeanne Mcgrath, PT, DPT  1/21/2019

## 2019-01-23 ENCOUNTER — CLINICAL SUPPORT (OUTPATIENT)
Dept: REHABILITATION | Facility: HOSPITAL | Age: 64
End: 2019-01-23
Payer: COMMERCIAL

## 2019-01-23 PROCEDURE — 97110 THERAPEUTIC EXERCISES: CPT

## 2019-01-23 NOTE — PROGRESS NOTES
Physical Therapy Daily Note     Name: Soha Wheatley  Clinic Number: [id number]  Diagnosis:   No diagnosis found.  Physician: Alena Cuevas PA-C  Treatment Orders: PT Eval and Treat  Past Medical History:   Diagnosis Date    Allergy     Anxiety     Arthritis     BMI 45.0-49.9, adult     Chronic kidney disease     Depression     GERD (gastroesophageal reflux disease)     History of kidney stones     Kidney stones     Migraines     Morbid obesity     Obesity     Sleep apnea in adult     WEARS CPAP, DOESNT KNOW SETTINGS.     Current Outpatient Medications   Medication Sig    b complex vitamins tablet Take 1 tablet by mouth once daily.    cinnamon bark-chromium picolin 500-100 mg-mcg Cap Take by mouth.    cyanocobalamin 500 MCG tablet Take 500 mcg by mouth once daily.    diclofenac sodium (VOLTAREN) 1 % Gel Apply 2 g topically 4 (four) times daily.    docusate sodium (COLACE) 100 MG capsule Take 1 capsule (100 mg total) by mouth 2 (two) times daily as needed for Constipation.    gabapentin (NEURONTIN) 600 MG tablet Take 1 tablet (600 mg total) by mouth 3 (three) times daily.    GLUCOSAMINE HCL/CHONDR APODACA A NA (OSTEO BI-FLEX ORAL) Take by mouth once daily.    multivitamin (ONE DAILY MULTIVITAMIN) per tablet Take 1 tablet by mouth once daily.    omeprazole (PRILOSEC) 40 MG capsule TAKE 1 CAPSULE DAILY    oxyCODONE-acetaminophen (PERCOCET) 5-325 mg per tablet Take 1 tablet by mouth every 4 to 6 hours as needed for Pain.    sertraline (ZOLOFT) 100 MG tablet Take 1 tablet (100 mg total) by mouth once daily.    tiZANidine (ZANAFLEX) 4 MG tablet Take 1 tablet (4 mg total) by mouth every 8 (eight) hours as needed.     No current facility-administered medications for this visit.      Review of patient's allergies indicates:   Allergen Reactions    Dermabond [tissue glues] Rash    Adhesive      Paper tape usually ok- pulls skin off    Flagyl  "[metronidazole hcl]      confusion       Precautions: standard  Visit #: 7 of 12  PTA Visit #: 1  Time In: 3:30  Time Out: 4:40  Initial Evaluation Date: 12/5/18  POC Expiration: 12/31/18, 1/28/19    Subjective     Pt reports knee feels heavy and tight  Pain Scale: Soha rates pain at L knee on a scale of 0-10 to be 3 currently.    Objective   Re-eval Range of Motion: Knee (degrees)    Left Right   Flexion: 125 PROM 128   Extension 0 0       Soha received individual therapeutic exercises to develop strength, endurance, ROM, flexibility and posture for 60 minutes including:  Bike  5 min  Quad sets 30x  c/ 5 sec hold 0NT  SLR c/ 1# ankle weights 3 x10 w/ QS  Heel slides 20x c/ 5 sec hold   Hamstring  stretch c/ strap 3x 30 sec-NP  SAQ c/ #3 ankle weight 3 x 10 -NT  Clamshells 3x10 RTB-  SL hip abd 3x10 #1  Prone extension 2x10 #1  Bridges 2x10     Step ups on lvl 3 2 x 10    Tap downs with R foot from L3 2x10  Shuttle 2c 3 x 10 L only  Sit<>stands 3 x 10- high/low 21'-  TKE maroon 3x10-    Steamboats RTB 2x10  Gastroc stretch wedge stretch 2 x 1 min  Heel raises 3 x 10-  On airex  Swiss ball wall squats 2x10 -hold 2"  Front lunge 2x10 L -  Standing HS stretch 20 sec hold x5  Step up and over level 3 step. 10x  crabwalks 2 lap  SLS 2x30    Soha received the following manual therapy techniques: Joint mobilizations were applied for 00 minutes including: NP  Tibial anterior and posterior joint mobs- NP  Patellar mobs- NT  Contract relax technique to promote extension  NP  PROM knee flex  Tissue massage for scar mobilization  -NT    Cold pack to L knee for 10 minutes     Written Home Exercises Provided: at eval  Pt demo good understanding of the education provided. Soha demonstrated good return demonstration of activities.     Education provided re: cont hep  Soha verbalized good understanding of education provided.   No spiritual or educational barriers to learning provided    Functional Limitations Reports - G " Codes  Category: mobility  Tool: FOTO Knee Survey  Initial Score: 66% Limitation  12/31/18 : 51% limitation  Assessment     Patient tolerated treatment well without adverse effects. Pt experiencing decreased pain.cont to progress toward PT goals. This is a 63 y.o. female referred to outpatient physical therapy and presents with a medical diagnosis of s/p L TKA and demonstrates limitations as described in the problem list. Pt prognosis is Good. Pt will continue to benefit from skilled outpatient physical therapy to address the deficits listed in the problem list, provide pt/family education and to maximize pt's level of independence in the home and community environment.     Medical necessity is demonstrated by the following IMPAIRMENTS/PROBLEMS:  1. Increased Pain  2. Decreased Segmental Mobility & Decreased ROM  3. Decreased Core & BLE strength  4. Decreased Flexibility BLE  5. Decreased Tolerance to Functional Activities     Pt's spiritual, cultural and educational needs considered and pt agreeable to plan of care and goals as stated below:      Anticipated Barriers for physical therapy: none     Short Term GOALS: 2 weeks. Pt agrees with goals set.  1. Patient demonstrates independence with HEP. -met 12/31/18  2. Patient demonstrates independence with Postural Awareness. -in progress  3. Patient demonstrates independence with body mechanics. -in progress      Long Term GOALS: 4 weeks. Pt agrees with goals set.  1. Patient demonstrates increased L knee ROM to WNL to improve tolerance to functional activities pain free. -in progress  2. Patient demonstrates increased strength BLE's to 4+/5 or greater to improve tolerance to functional activities pain free. In progress  3. Patient demonstrates improved overall function per FOTO Knee Survey to 49% Limitation or less.   -in progress  Plan     Continue with established Plan of Care towards PT goals.    Therapist: Kiara Bermudez PTA, DPT  1/23/2019

## 2019-01-28 ENCOUNTER — CLINICAL SUPPORT (OUTPATIENT)
Dept: REHABILITATION | Facility: HOSPITAL | Age: 64
End: 2019-01-28
Payer: COMMERCIAL

## 2019-01-28 DIAGNOSIS — Z96.652 S/P TKR (TOTAL KNEE REPLACEMENT), LEFT: Primary | ICD-10-CM

## 2019-01-28 PROCEDURE — 97110 THERAPEUTIC EXERCISES: CPT

## 2019-01-28 PROCEDURE — 97140 MANUAL THERAPY 1/> REGIONS: CPT

## 2019-01-28 NOTE — PROGRESS NOTES
Physical Therapy Daily Note/ Progress note         Name: Soha Wheatley  Clinic Number: [id number]  Diagnosis:   Encounter Diagnosis   Name Primary?    S/P TKR (total knee replacement), left Yes     Physician: Alena Cuevas PA-C  Treatment Orders: PT Eval and Treat  Past Medical History:   Diagnosis Date    Allergy     Anxiety     Arthritis     BMI 45.0-49.9, adult     Chronic kidney disease     Depression     GERD (gastroesophageal reflux disease)     History of kidney stones     Kidney stones     Migraines     Morbid obesity     Obesity     Sleep apnea in adult     WEARS CPAP, DOESNT KNOW SETTINGS.     Current Outpatient Medications   Medication Sig    b complex vitamins tablet Take 1 tablet by mouth once daily.    cinnamon bark-chromium picolin 500-100 mg-mcg Cap Take by mouth.    cyanocobalamin 500 MCG tablet Take 500 mcg by mouth once daily.    diclofenac sodium (VOLTAREN) 1 % Gel Apply 2 g topically 4 (four) times daily.    docusate sodium (COLACE) 100 MG capsule Take 1 capsule (100 mg total) by mouth 2 (two) times daily as needed for Constipation.    gabapentin (NEURONTIN) 600 MG tablet Take 1 tablet (600 mg total) by mouth 3 (three) times daily.    GLUCOSAMINE HCL/CHONDR APODACA A NA (OSTEO BI-FLEX ORAL) Take by mouth once daily.    multivitamin (ONE DAILY MULTIVITAMIN) per tablet Take 1 tablet by mouth once daily.    omeprazole (PRILOSEC) 40 MG capsule TAKE 1 CAPSULE DAILY    oxyCODONE-acetaminophen (PERCOCET) 5-325 mg per tablet Take 1 tablet by mouth every 4 to 6 hours as needed for Pain.    sertraline (ZOLOFT) 100 MG tablet Take 1 tablet (100 mg total) by mouth once daily.    tiZANidine (ZANAFLEX) 4 MG tablet Take 1 tablet (4 mg total) by mouth every 8 (eight) hours as needed.     No current facility-administered medications for this visit.      Review of patient's allergies indicates:   Allergen Reactions    Dermabond  "[tissue glues] Rash    Adhesive      Paper tape usually ok- pulls skin off    Flagyl [metronidazole hcl]      confusion       Precautions: standard  Visit #: 8 of 12  PTA Visit #: 1  Time In: 2:00  Time Out: 3:00  Initial Evaluation Date: 12/5/18  POC Expiration: 12/31/18, 1/28/19, 2/25/19    Subjective     Pt reports her knee is getting better but feels heavy today. She feels like she is able to move better overall, but it does vary a little with the weather.   Pain Scale: Soha rates pain at L knee on a scale of 0-10 to be 3 currently.    Objective   Initial Range of Motion: Knee (degrees)    Left Right   Flexion: 85 (108 PROM) 128   Extension -2 (0 PROM) 0     Re-eval Range of Motion: Knee (degrees)    Left Right   Flexion: 126 PROM 128   Extension 0 0      Initial Strength: Knee    Left Right   Quadriceps 3+/5 4+/5   Hamstrings 3+/5 4+/5    Hip     Left Right   Iliopsoas 4/5 4+/5   PGM 4/5 4+/5   IR 4/5 4+/5   ER 4/5 4+/5   Ext 4/5 4+/5        Re-eval: Strength: Knee    Left Right   Quadriceps 4+/5 5/5   Hamstrings 5/5 4+/5   Hip     Left Right   Iliopsoas 4+/5 4+/5   PGM 4+/5 5/5   IR 4+/5 5/5   ER 4/5* 5/5   Ext 4+/5 5/5       Soha received individual therapeutic exercises to develop strength, endurance, ROM, flexibility and posture for 40 minutes including:  Bike  5 min  Quad sets 30x  c/ 5 sec hold 0NT  SLR c/ 3# ankle weights 3 x10 w/ QS  Heel slides 20x c/ 5 sec hold NP  Hamstring  stretch c/ strap 3x 30 sec-NP  SAQ c/ #3 ankle weight 3 x 10 -NT  Clamshells 2x10 GTB-  SL hip abd 3x10 #3  Prone extension 2x10 #2  Bridges 3x10     Step ups on lvl 3 2 x 10    Tap downs with R foot from L3 2x10  Shuttle 2c 3 x 10 L only  Sit<>stands 3 x 10- high/low 21'-  TKE maroon 3x10-    Steamboats RTB 2x10  Gastroc stretch wedge stretch 2 x 1 min  Heel raises 3 x 10-  On airex  Swiss ball wall squats 2x10 -hold 2"  Front lunge 2x10 L -  Standing HS stretch 20 sec hold x5  Step up and over level 3 step. 10x  crabwalks 2 " lap  SLS 2x30    Soha received the following manual therapy techniques: Joint mobilizations were applied for 10 minutes including:  Tibial anterior and posterior joint mobs-   Patellar mobs- NT  Contract relax technique to promote extension  NP  PROM knee flex  Tissue massage for scar mobilization  -NT     Cold pack to L knee for 10 minutes    Written Home Exercises Provided: at eval  Pt demo good understanding of the education provided. Soha demonstrated good return demonstration of activities.      Education provided re: cont hep  Soha verbalized good understanding of education provided.   No spiritual or educational barriers to learning provided     Functional Limitations Reports - G Codes  Category: mobility  Tool: FOTO Knee Survey  Initial Score: 66% Limitation  12/31/18 : 51% limitation  1/28/19: 45% limitation    Assessment   This is a 63 y.o. female referred to outpatient physical therapy and presents with a medical diagnosis of s/p L TKA and demonstrates limitations as described in the problem list. Patient tolerated treatment well without adverse effects. Re-assessment this visit with improvements in strength and ROM, but I feel pt has not yet met max potential. Pt has met 4/6 goals at this time. Pt would benefit from further skilled PT to continue to address strength deficits and motor control of L LE and improve pt confidence in transition to independent HEP. Pt prognosis is Good. Pt is in agreement with this plan. Plan to continue therapy for 4 weeks.     Medical necessity is demonstrated by the following IMPAIRMENTS/PROBLEMS:  1. Increased Pain  2. Decreased Segmental Mobility & Decreased ROM  3. Decreased Core & BLE strength  4. Decreased Flexibility BLE  5. Decreased Tolerance to Functional Activities     Pt's spiritual, cultural and educational needs considered and pt agreeable to plan of care and goals as stated below:      Anticipated Barriers for physical therapy: none     Short Term GOALS:  2 weeks. Pt agrees with goals set.  1. Patient demonstrates independence with HEP. -met 12/31/18  2. Patient demonstrates independence with Postural Awareness. -Met 1/28/19  3. Patient demonstrates independence with body mechanics. -in progress      Long Term GOALS: 4 weeks. Pt agrees with goals set.  1. Patient demonstrates increased L knee ROM to WNL to improve tolerance to functional activities pain free. -in progress  2. Patient demonstrates increased strength BLE's to 4+/5 or greater to improve tolerance to functional activities pain free.-MET 1/28/19  3. Patient demonstrates improved overall function per FOTO Knee Survey to 49% Limitation or less.   Met 1/28/19    New Goals:1/28/29 4 weeks (2/25/19)  1. Patient demonstrates increased strength BLE's to 5/5 or greater to improve tolerance to functional activities pain free.  2. Pt will demo good quadriceps motor control with step down.     Plan     Continue Outpatient physical therapy 1-3 times weekly to include: pt ed, hep, therapeutic exercises, neuromuscular re-education/ balance exercises, joint mobilizations, and modalities prn. Cont PT for  4 weeks. Pt may be seen by PTA as part of the rehabilitation team.     Therapist: Jeanne Mcgrath, PT, DPT  1/28/2019

## 2019-01-30 ENCOUNTER — CLINICAL SUPPORT (OUTPATIENT)
Dept: REHABILITATION | Facility: HOSPITAL | Age: 64
End: 2019-01-30
Payer: COMMERCIAL

## 2019-01-30 DIAGNOSIS — Z96.652 S/P TKR (TOTAL KNEE REPLACEMENT), LEFT: Primary | ICD-10-CM

## 2019-01-30 PROCEDURE — 97110 THERAPEUTIC EXERCISES: CPT

## 2019-01-30 PROCEDURE — 97140 MANUAL THERAPY 1/> REGIONS: CPT

## 2019-01-30 NOTE — PROGRESS NOTES
Physical Therapy Daily Note         Name: Soha Wheatley  Clinic Number: [id number]  Diagnosis:   Encounter Diagnosis   Name Primary?    S/P TKR (total knee replacement), left Yes     Physician: Alena Cuevas PA-C  Treatment Orders: PT Eval and Treat  Past Medical History:   Diagnosis Date    Allergy     Anxiety     Arthritis     BMI 45.0-49.9, adult     Chronic kidney disease     Depression     GERD (gastroesophageal reflux disease)     History of kidney stones     Kidney stones     Migraines     Morbid obesity     Obesity     Sleep apnea in adult     WEARS CPAP, DOESNT KNOW SETTINGS.     Current Outpatient Medications   Medication Sig    b complex vitamins tablet Take 1 tablet by mouth once daily.    cinnamon bark-chromium picolin 500-100 mg-mcg Cap Take by mouth.    cyanocobalamin 500 MCG tablet Take 500 mcg by mouth once daily.    diclofenac sodium (VOLTAREN) 1 % Gel Apply 2 g topically 4 (four) times daily.    docusate sodium (COLACE) 100 MG capsule Take 1 capsule (100 mg total) by mouth 2 (two) times daily as needed for Constipation.    gabapentin (NEURONTIN) 600 MG tablet Take 1 tablet (600 mg total) by mouth 3 (three) times daily.    GLUCOSAMINE HCL/CHONDR APODACA A NA (OSTEO BI-FLEX ORAL) Take by mouth once daily.    multivitamin (ONE DAILY MULTIVITAMIN) per tablet Take 1 tablet by mouth once daily.    omeprazole (PRILOSEC) 40 MG capsule TAKE 1 CAPSULE DAILY    oxyCODONE-acetaminophen (PERCOCET) 5-325 mg per tablet Take 1 tablet by mouth every 4 to 6 hours as needed for Pain.    sertraline (ZOLOFT) 100 MG tablet Take 1 tablet (100 mg total) by mouth once daily.    tiZANidine (ZANAFLEX) 4 MG tablet Take 1 tablet (4 mg total) by mouth every 8 (eight) hours as needed.     No current facility-administered medications for this visit.      Review of patient's allergies indicates:   Allergen Reactions    Dermabond [tissue glues] Rash  "   Adhesive      Paper tape usually ok- pulls skin off    Flagyl [metronidazole hcl]      confusion       Precautions: standard  Visit #:9 of 12  PTA Visit #: 1  Time In: 2:25  Time Out: 3:30  Initial Evaluation Date: 12/5/18  POC Expiration: 12/31/18, 1/28/19, 2/25/19    Subjective     Pt reports some tightness in knees today. She thinks its due to the cold. She reports she still has difficulty standing for long periods.   Pain Scale: Soha rates pain at L knee on a scale of 0-10 to be 1 currently.    Objective     Soha received individual therapeutic exercises to develop strength, endurance, ROM, flexibility and posture for 47 minutes including:  Bike  5 min  Quad sets 30x  c/ 5 sec hold NT  SLR c/ 3# ankle weights 3 x10 w/ QS  Heel slides 20x c/ 5 sec hold NP  Hamstring stretch c/ strap 3x 30 sec-NP  SAQ c/ #3 ankle weight 3 x 10 -NT  Clamshells 2x10 GTB-  SL hip abd 3x10 #3  Prone extension 2x10 #2  Bridges 2x10- staggered stance     Step ups on lvl 3 2 x 10    Tap downs with R foot from L3 2x10  Shuttle 2c 3 x 10 L only  Sit<>stands 3 x 10- high/low 21'-  TKE maroon 3x10-    TKE step downs  L2 2x10  TRX squats 3x10  Steamboats RTB 2x10  Gastroc stretch wedge stretch 2 x 1 min  Heel raises 3 x 10-  On airex  Swiss ball wall squats 2x10 -hold 2" NP  Front lunge 1x10 B -  Lateral lunges 1x10 B  Standing HS stretch 20 sec hold x5 NP  Step up and over level 3 step. 10x NP  crabwalks 2 lap  SLS 2x30 NP    Soha received the following manual therapy techniques: Joint mobilizations were applied for 8 minutes including:  Tibial anterior and posterior joint mobs-   Patellar mobs- NT  Contract relax technique to promote extension  NP  PROM knee flex  Tissue massage for scar mobilization  -NT     Cold pack to L knee for 10 minutes     Written Home Exercises Provided: at eval  Pt demo good understanding of the education provided. Soha demonstrated good return demonstration of activities.     Education provided re: cont " HEP  Soha verbalized good understanding of education provided.   No spiritual or educational barriers to learning provided    Functional Limitations Reports - G Codes  Category: mobility  Tool: FOTO Knee Survey  Initial Score: 66% Limitation  12/31/18 : 51% limitation  1/28/19: 45% limitation  Assessment     Patient tolerated treatment well without adverse effects. Progressed bridges to staggered stance and added lateral lunges, TRX squats, and TKE with step down with good tolerance. Pt is making good progress. Continue to assist in building pt confidence in L LE. This is a 63 y.o. female referred to outpatient physical therapy and presents with a medical diagnosis of s/p L TKR and demonstrates limitations as described in the problem list. Pt prognosis is Good. Pt will continue to benefit from skilled outpatient physical therapy to address the deficits listed in the problem list, provide pt/family education and to maximize pt's level of independence in the home and community environment.     Medical necessity is demonstrated by the following IMPAIRMENTS/PROBLEMS:  1. Increased Pain  2. Decreased Segmental Mobility & Decreased ROM  3. Decreased Core & BLE strength  4. Decreased Flexibility BLE  5. Decreased Tolerance to Functional Activities     Pt's spiritual, cultural and educational needs considered and pt agreeable to plan of care and goals as stated below:      Anticipated Barriers for physical therapy: none     Short Term GOALS: 2 weeks. Pt agrees with goals set.  1. Patient demonstrates independence with HEP. -met 12/31/18  2. Patient demonstrates independence with Postural Awareness. -Met 1/28/19  3. Patient demonstrates independence with body mechanics. -in progress      Long Term GOALS: 4 weeks. Pt agrees with goals set.  1. Patient demonstrates increased L knee ROM to WNL to improve tolerance to functional activities pain free. -in progress  2. Patient demonstrates increased strength BLE's to 4+/5 or  greater to improve tolerance to functional activities pain free.-MET 1/28/19  3. Patient demonstrates improved overall function per FOTO Knee Survey to 49% Limitation or less.   Met 1/28/19     New Goals:1/28/29 4 weeks (2/25/19)  1. Patient demonstrates increased strength BLE's to 5/5 or greater to improve tolerance to functional activities pain free.  2. Pt will demo good quadriceps motor control with step down.      Plan     Continue with established Plan of Care towards PT goals.    Therapist: Jeanne Mcgrath, PT, DPT  1/30/2019

## 2019-01-31 ENCOUNTER — OFFICE VISIT (OUTPATIENT)
Dept: ORTHOPEDICS | Facility: CLINIC | Age: 64
End: 2019-01-31
Payer: COMMERCIAL

## 2019-01-31 VITALS — BODY MASS INDEX: 31.8 KG/M2 | HEIGHT: 70 IN | WEIGHT: 222.13 LBS

## 2019-01-31 DIAGNOSIS — Z96.652 STATUS POST TOTAL LEFT KNEE REPLACEMENT: Primary | ICD-10-CM

## 2019-01-31 PROCEDURE — 99999 PR PBB SHADOW E&M-EST. PATIENT-LVL III: ICD-10-PCS | Mod: PBBFAC,,, | Performed by: ORTHOPAEDIC SURGERY

## 2019-01-31 PROCEDURE — 99024 POSTOP FOLLOW-UP VISIT: CPT | Mod: S$GLB,,, | Performed by: ORTHOPAEDIC SURGERY

## 2019-01-31 PROCEDURE — 99024 PR POST-OP FOLLOW-UP VISIT: ICD-10-PCS | Mod: S$GLB,,, | Performed by: ORTHOPAEDIC SURGERY

## 2019-01-31 PROCEDURE — 99999 PR PBB SHADOW E&M-EST. PATIENT-LVL III: CPT | Mod: PBBFAC,,, | Performed by: ORTHOPAEDIC SURGERY

## 2019-01-31 RX ORDER — MELOXICAM 15 MG/1
TABLET ORAL
COMMUNITY
Start: 2019-01-04 | End: 2019-04-16 | Stop reason: SDUPTHER

## 2019-01-31 NOTE — PROGRESS NOTES
Patient is here today for 12 week PO FU of her TKA on 10/31/18. She is progressing well.      We will allow her to return as needed for repeat radiographs and certainly for any other questions.    xrays are  reviewed today with good alignment.    We will check repeat radiographs in 1 year.

## 2019-02-05 ENCOUNTER — CLINICAL SUPPORT (OUTPATIENT)
Dept: REHABILITATION | Facility: HOSPITAL | Age: 64
End: 2019-02-05
Payer: COMMERCIAL

## 2019-02-05 DIAGNOSIS — Z96.652 S/P TKR (TOTAL KNEE REPLACEMENT), LEFT: Primary | ICD-10-CM

## 2019-02-05 PROCEDURE — 97140 MANUAL THERAPY 1/> REGIONS: CPT

## 2019-02-05 PROCEDURE — 97110 THERAPEUTIC EXERCISES: CPT

## 2019-02-05 NOTE — PROGRESS NOTES
Physical Therapy Daily Note         Name: Soha Wheatley  Clinic Number: [id number]  Diagnosis:   Encounter Diagnosis   Name Primary?    S/P TKR (total knee replacement), left Yes     Physician: Alena Cuevas PA-C  Treatment Orders: PT Eval and Treat  Past Medical History:   Diagnosis Date    Allergy     Anxiety     Arthritis     BMI 45.0-49.9, adult     Chronic kidney disease     Depression     GERD (gastroesophageal reflux disease)     History of kidney stones     Kidney stones     Migraines     Morbid obesity     Obesity     Sleep apnea in adult     WEARS CPAP, DOESNT KNOW SETTINGS.     Current Outpatient Medications   Medication Sig    b complex vitamins tablet Take 1 tablet by mouth once daily.    cinnamon bark-chromium picolin 500-100 mg-mcg Cap Take by mouth.    cyanocobalamin 500 MCG tablet Take 500 mcg by mouth once daily.    diclofenac sodium (VOLTAREN) 1 % Gel Apply 2 g topically 4 (four) times daily.    docusate sodium (COLACE) 100 MG capsule Take 1 capsule (100 mg total) by mouth 2 (two) times daily as needed for Constipation.    gabapentin (NEURONTIN) 600 MG tablet Take 1 tablet (600 mg total) by mouth 3 (three) times daily.    GLUCOSAMINE HCL/CHONDR APODACA A NA (OSTEO BI-FLEX ORAL) Take by mouth once daily.    meloxicam (MOBIC) 15 MG tablet     multivitamin (ONE DAILY MULTIVITAMIN) per tablet Take 1 tablet by mouth once daily.    omeprazole (PRILOSEC) 40 MG capsule TAKE 1 CAPSULE DAILY    oxyCODONE-acetaminophen (PERCOCET) 5-325 mg per tablet Take 1 tablet by mouth every 4 to 6 hours as needed for Pain.    sertraline (ZOLOFT) 100 MG tablet Take 1 tablet (100 mg total) by mouth once daily.    tiZANidine (ZANAFLEX) 4 MG tablet Take 1 tablet (4 mg total) by mouth every 8 (eight) hours as needed.     No current facility-administered medications for this visit.      Review of patient's allergies indicates:   Allergen Reactions  "   Dermabond [tissue glues] Rash    Adhesive      Paper tape usually ok- pulls skin off    Flagyl [metronidazole hcl]      confusion       Precautions: standard  Visit #:11 of 12  PTA Visit #: 1  Time In: 1:20pm  Time Out: 2:25pm  Initial Evaluation Date: 12/5/18  POC Expiration: 12/31/18, 1/28/19, 2/25/19    Subjective     Pt reports some tightness in B knees today. She has been getting a "sticking" pain L knee /c rolling in bed   Pain Scale: Soha rates pain at L knee on a scale of 0-10 to be 4 currently.    Objective     Soha received individual therapeutic exercises to develop strength, endurance, ROM, flexibility and posture for 47 minutes including:  Bike  8 min  Quad sets 30x  c/ 5 sec hold NT  SLR c/ 3# ankle weights 3 x10 w/ QS  Heel slides 20x c/ 5 sec hold NP  Hamstring stretch c/ strap 3x 30 sec-NP  SAQ c/ #3 ankle weight 3 x 10 -NT  Clamshells 3x10 GTB-  SL hip abd 3x10 #3 NP  Prone extension 2x10 #2 NP       Step ups on lvl 3  3x10  Tap downs with R foot from L3 3x10  Shuttle 2c 3 x 10 L only NP  Sit<>stands 3 x 10- high/low 21'- NP  TKE maroon 3x10-  NP  TKE step downs  L2 2x10- NP  TRX squats 3x10  Steamboats RTB 3x10  Gastroc stretch wedge stretch 2 x 1 min  Heel raises 3 x 10-  On airex  Swiss ball wall squats 2x10 -hold 2" NP  Front lunge 1x10 B -NP  Lateral lunges 1x10 B NP  Standing HS stretch 20 sec hold x5 NP  Step up and over level 3 step. 10x NP  crabwalks 2 lap NP  SLS 2x30 NP    Soha received the following manual therapy techniques: Kinesiotape was applied for 8 minutes including:  Tibial anterior and posterior joint mobs- NT  Patellar mobs- L knee   Contract relax technique to promote extension  NP  PROM knee flex NT  Tissue massage for scar mobilization  -NT  Kinesio-tape L lateral patella for patellar stabilization      Cold pack to L knee for 10 minutes     Written Home Exercises Provided: at eval  Pt demo good understanding of the education provided. Soha demonstrated good " return demonstration of activities.     Education provided re: cont HEP  Soha verbalized good understanding of education provided.   No spiritual or educational barriers to learning provided    Functional Limitations Reports - G Codes  Category: mobility  Tool: FOTO Knee Survey  Initial Score: 66% Limitation  12/31/18 : 51% limitation  1/28/19: 45% limitation  Assessment     Patient tolerated treatment well without adverse effects.  Pt is making good progress /c progression of B LE strength & endurance for decreased pain /c community mobility. This is a 63 y.o. female referred to outpatient physical therapy and presents with a medical diagnosis of s/p L TKR and demonstrates limitations as described in the problem list. Pt prognosis is Good. Pt will continue to benefit from skilled outpatient physical therapy to address the deficits listed in the problem list, provide pt/family education and to maximize pt's level of independence in the home and community environment.     Medical necessity is demonstrated by the following IMPAIRMENTS/PROBLEMS:  1. Increased Pain  2. Decreased Segmental Mobility & Decreased ROM  3. Decreased Core & BLE strength  4. Decreased Flexibility BLE  5. Decreased Tolerance to Functional Activities     Pt's spiritual, cultural and educational needs considered and pt agreeable to plan of care and goals as stated below:      Anticipated Barriers for physical therapy: none     Short Term GOALS: 2 weeks. Pt agrees with goals set.  1. Patient demonstrates independence with HEP. -met 12/31/18  2. Patient demonstrates independence with Postural Awareness. -Met 1/28/19  3. Patient demonstrates independence with body mechanics. -in progress      Long Term GOALS: 4 weeks. Pt agrees with goals set.  1. Patient demonstrates increased L knee ROM to WNL to improve tolerance to functional activities pain free. -in progress  2. Patient demonstrates increased strength BLE's to 4+/5 or greater to improve  tolerance to functional activities pain free.-MET 1/28/19  3. Patient demonstrates improved overall function per FOTO Knee Survey to 49% Limitation or less.   Met 1/28/19     New Goals:1/28/29 4 weeks (2/25/19)  1. Patient demonstrates increased strength BLE's to 5/5 or greater to improve tolerance to functional activities pain free.  2. Pt will demo good quadriceps motor control with step down.      Plan     Continue with established Plan of Care towards PT goals.    Therapist: Grace Rutherford, PT, DPT  2/5/2019

## 2019-02-07 ENCOUNTER — CLINICAL SUPPORT (OUTPATIENT)
Dept: REHABILITATION | Facility: HOSPITAL | Age: 64
End: 2019-02-07
Payer: COMMERCIAL

## 2019-02-07 PROCEDURE — 97110 THERAPEUTIC EXERCISES: CPT

## 2019-02-07 NOTE — PROGRESS NOTES
Physical Therapy Daily Note         Name: Soha Wheatley  Clinic Number: [id number]  Diagnosis:   No diagnosis found.  Physician: Alena Cuevas PA-C  Treatment Orders: PT Eval and Treat  Past Medical History:   Diagnosis Date    Allergy     Anxiety     Arthritis     BMI 45.0-49.9, adult     Chronic kidney disease     Depression     GERD (gastroesophageal reflux disease)     History of kidney stones     Kidney stones     Migraines     Morbid obesity     Obesity     Sleep apnea in adult     WEARS CPAP, DOESNT KNOW SETTINGS.     Current Outpatient Medications   Medication Sig    b complex vitamins tablet Take 1 tablet by mouth once daily.    cinnamon bark-chromium picolin 500-100 mg-mcg Cap Take by mouth.    cyanocobalamin 500 MCG tablet Take 500 mcg by mouth once daily.    diclofenac sodium (VOLTAREN) 1 % Gel Apply 2 g topically 4 (four) times daily.    docusate sodium (COLACE) 100 MG capsule Take 1 capsule (100 mg total) by mouth 2 (two) times daily as needed for Constipation.    gabapentin (NEURONTIN) 600 MG tablet Take 1 tablet (600 mg total) by mouth 3 (three) times daily.    GLUCOSAMINE HCL/CHONDR APODACA A NA (OSTEO BI-FLEX ORAL) Take by mouth once daily.    meloxicam (MOBIC) 15 MG tablet     multivitamin (ONE DAILY MULTIVITAMIN) per tablet Take 1 tablet by mouth once daily.    omeprazole (PRILOSEC) 40 MG capsule TAKE 1 CAPSULE DAILY    oxyCODONE-acetaminophen (PERCOCET) 5-325 mg per tablet Take 1 tablet by mouth every 4 to 6 hours as needed for Pain.    sertraline (ZOLOFT) 100 MG tablet Take 1 tablet (100 mg total) by mouth once daily.    tiZANidine (ZANAFLEX) 4 MG tablet Take 1 tablet (4 mg total) by mouth every 8 (eight) hours as needed.     No current facility-administered medications for this visit.      Review of patient's allergies indicates:   Allergen Reactions    Dermabond [tissue glues] Rash    Adhesive      Paper tape  usually ok- pulls skin off    Flagyl [metronidazole hcl]      confusion       Precautions: standard  Visit #:11 of 12  PTA Visit #: 2  Time In: 1:27 pm  Time Out: 2:27 pm  Initial Evaluation Date: 12/5/18  POC Expiration: 12/31/18, 1/28/19, 2/25/19    Subjective     Pt reports knee is alright. Tape didn't work  Pain Scale: Soha rates pain at L knee on a scale of 0-10 to be 4 currently.    Objective     Soha received individual therapeutic exercises to develop strength, endurance, ROM, flexibility and posture for 50 minutes including:  Bike  5 min  Quad sets 30x  c/ 5 sec hold NT  SLR c/ 3# ankle weights 3 x10 w/ QS  Heel slides 20x c/ 5 sec hold NP  Hamstring stretch c/ strap 3x 30 sec-NP  SAQ c/ #3 ankle weight 3 x 10   Clamshells 3x10 GTB-  SL hip abd 3x10 #3  Prone extension 2x10 #3        Step ups on lvl 3  3x10 w/ knee FL of R  Tap downs with R foot from L3 3x10  Shuttle 2c 3 x 10 L only   Sit<>stands 3 x 10- high/low 21'- NP  TRX squats 3x10  Steamboats GTB 3x10 on airex  Gastroc stretch wedge stretch 2 x 1 min  Heel raises 3 x 10-  On airex  Front lunge 1x10 B on bosu  Lateral lunges 1x10 B   Step up and over level 3 step. 10x NP  crabwalks 2 lap NP  SLS 2x30 NP  March on rebounder w/ SLS 20x    Soha received the following manual therapy techniques: Kinesiotape was applied for 0 minutes including:  Tibial anterior and posterior joint mobs- NT  Patellar mobs- L knee   Contract relax technique to promote extension  NP  PROM knee flex NT  Tissue massage for scar mobilization  -NT  Kinesio-tape L lateral patella for patellar stabilization      Cold pack to L knee for 10 minutes     Written Home Exercises Provided: at eval  Pt demo good understanding of the education provided. Soha demonstrated good return demonstration of activities.     Education provided re: cont HEP  Soha verbalized good understanding of education provided.   No spiritual or educational barriers to learning provided    Functional  Limitations Reports - G Codes  Category: mobility  Tool: FOTO Knee Survey  Initial Score: 66% Limitation  12/31/18 : 51% limitation  1/28/19: 45% limitation  Assessment     Patient tolerated treatment well without adverse effects.  Next session is the last session on poc. Re-eval next session. Advanced pt as above. Pt prognosis is Good. Pt will continue to benefit from skilled outpatient physical therapy to address the deficits listed in the problem list, provide pt/family education and to maximize pt's level of independence in the home and community environment.     Medical necessity is demonstrated by the following IMPAIRMENTS/PROBLEMS:  1. Increased Pain  2. Decreased Segmental Mobility & Decreased ROM  3. Decreased Core & BLE strength  4. Decreased Flexibility BLE  5. Decreased Tolerance to Functional Activities     Pt's spiritual, cultural and educational needs considered and pt agreeable to plan of care and goals as stated below:      Anticipated Barriers for physical therapy: none     Short Term GOALS: 2 weeks. Pt agrees with goals set.  1. Patient demonstrates independence with HEP. -met 12/31/18  2. Patient demonstrates independence with Postural Awareness. -Met 1/28/19  3. Patient demonstrates independence with body mechanics. -in progress      Long Term GOALS: 4 weeks. Pt agrees with goals set.  1. Patient demonstrates increased L knee ROM to WNL to improve tolerance to functional activities pain free. -in progress  2. Patient demonstrates increased strength BLE's to 4+/5 or greater to improve tolerance to functional activities pain free.-MET 1/28/19  3. Patient demonstrates improved overall function per FOTO Knee Survey to 49% Limitation or less.   Met 1/28/19     New Goals:1/28/29 4 weeks (2/25/19)  1. Patient demonstrates increased strength BLE's to 5/5 or greater to improve tolerance to functional activities pain free.  2. Pt will demo good quadriceps motor control with step down.      Plan      Continue with established Plan of Care towards PT goals.    Therapist: Kiara Bermudez PTA, DPT  2/7/2019

## 2019-02-11 ENCOUNTER — CLINICAL SUPPORT (OUTPATIENT)
Dept: REHABILITATION | Facility: HOSPITAL | Age: 64
End: 2019-02-11
Payer: COMMERCIAL

## 2019-02-11 DIAGNOSIS — Z96.652 S/P TKR (TOTAL KNEE REPLACEMENT), LEFT: Primary | ICD-10-CM

## 2019-02-11 PROCEDURE — 97110 THERAPEUTIC EXERCISES: CPT

## 2019-02-11 NOTE — PROGRESS NOTES
Physical Therapy Daily Note/Progress Note         Name: Soha Wheatley  Clinic Number: [id number]  Diagnosis:   No diagnosis found.  Physician: Alena Cuevas PA-C  Treatment Orders: PT Eval and Treat  Past Medical History:   Diagnosis Date    Allergy     Anxiety     Arthritis     BMI 45.0-49.9, adult     Chronic kidney disease     Depression     GERD (gastroesophageal reflux disease)     History of kidney stones     Kidney stones     Migraines     Morbid obesity     Obesity     Sleep apnea in adult     WEARS CPAP, DOESNT KNOW SETTINGS.     Current Outpatient Medications   Medication Sig    b complex vitamins tablet Take 1 tablet by mouth once daily.    cinnamon bark-chromium picolin 500-100 mg-mcg Cap Take by mouth.    cyanocobalamin 500 MCG tablet Take 500 mcg by mouth once daily.    diclofenac sodium (VOLTAREN) 1 % Gel Apply 2 g topically 4 (four) times daily.    docusate sodium (COLACE) 100 MG capsule Take 1 capsule (100 mg total) by mouth 2 (two) times daily as needed for Constipation.    gabapentin (NEURONTIN) 600 MG tablet Take 1 tablet (600 mg total) by mouth 3 (three) times daily.    GLUCOSAMINE HCL/CHONDR APODACA A NA (OSTEO BI-FLEX ORAL) Take by mouth once daily.    meloxicam (MOBIC) 15 MG tablet     multivitamin (ONE DAILY MULTIVITAMIN) per tablet Take 1 tablet by mouth once daily.    omeprazole (PRILOSEC) 40 MG capsule TAKE 1 CAPSULE DAILY    oxyCODONE-acetaminophen (PERCOCET) 5-325 mg per tablet Take 1 tablet by mouth every 4 to 6 hours as needed for Pain.    sertraline (ZOLOFT) 100 MG tablet Take 1 tablet (100 mg total) by mouth once daily.    tiZANidine (ZANAFLEX) 4 MG tablet Take 1 tablet (4 mg total) by mouth every 8 (eight) hours as needed.     No current facility-administered medications for this visit.      Review of patient's allergies indicates:   Allergen Reactions    Dermabond [tissue glues] Rash    Adhesive       Paper tape usually ok- pulls skin off    Flagyl [metronidazole hcl]      confusion       Precautions: standard  Visit #:12 of 12  PTA Visit #: 2  Time In: 1:27 pm  Time Out: 2:27 pm  Initial Evaluation Date: 12/5/18  POC Expiration: 12/31/18, 1/28/19, 2/25/19    Subjective     Pt reports knee is alright. Tape didn't work  Pain Scale: Soha rates pain at L knee on a scale of 0-10 to be 4 currently.    Objective     Range of Motion: Knee (degrees)    Left Right   Flexion: 120 (126 PROM) 128   Extension  (0 PROM) 0      Strength: Knee    Left Right   Quadriceps 4+/5 4+/5   Hamstrings 4+/5 4+/5         Strength: Hip     Left Right   Iliopsoas 4+/5 4+/5   PGM 4+/5 4+/5   IR 4+/5 4+/5   ER 4/+5 4+/5   Ext 4+/5 4+/5          Soha received individual therapeutic exercises to develop strength, endurance, ROM, flexibility and posture for 50 minutes including:  Bike  5 min  Quad sets 30x  c/ 5 sec hold NT  SLR c/ 3# ankle weights 3 x10 w/ QS  Heel slides 20x c/ 5 sec hold NP  Hamstring stretch c/ strap 3x 30 sec-NP  SAQ c/ #3 ankle weight 3 x 10   Clamshells 3x10 GTB-  SL hip abd 3x10 #3  Prone extension 2x10 #3        Step ups on lvl 3  3x10 w/ knee FL of R  Tap downs with R foot from L3 3x10  Shuttle 2c 3 x 10 L only   Sit<>stands 3 x 10- high/low 21'- NP  TRX squats 3x10  Steamboats GTB 3x10 on airex  Gastroc stretch wedge stretch 2 x 1 min  Heel raises 3 x 10-  On airex  Front lunge 1x10 B on bosu  Lateral lunges 1x10 B   Step up and over level 3 step. 10x NP  crabwalks 2 lap NP  SLS 2x30 NP        Soha received the following manual therapy techniques: Kinesiotape was applied for 0 minutes including:  Tibial anterior and posterior joint mobs- NT  Patellar mobs- L knee   Contract relax technique to promote extension  NP  PROM knee flex NT  Tissue massage for scar mobilization  -NT  Kinesio-tape L lateral patella for patellar stabilization      Cold pack to L knee for 10 minutes     Written Home Exercises Provided: at  pawan  Pt demo good understanding of the education provided. Soha demonstrated good return demonstration of activities.     Education provided re: cont HEP  Soha verbalized good understanding of education provided.   No spiritual or educational barriers to learning provided    Functional Limitations Reports - G Codes  Category: mobility  Tool: FOTO Knee Survey  Initial Score: 66% Limitation  12/31/18 : 51% limitation  1/28/19: 45% limitation  Assessment     Patient tolerated treatment well without adverse effects.  Patient still demonstrating some weakness in the L LE during functional movements. ROM measured at 126 degrees of flexion with PROM and pain noted. Still has difficulty with standing long periods of time (Anything over 15-20 is tiring). She reports it is most difficult while standing at work. Advanced pt as above. Pt prognosis is Good. Pt will continue to benefit from skilled outpatient physical therapy to address the deficits listed in the problem list, provide pt/family education and to maximize pt's level of independence in the home and community environment. Request another 4 weeks of PT in order to regain functional mobility and improve ability to stand at work without needing to rest.      Medical necessity is demonstrated by the following IMPAIRMENTS/PROBLEMS:  1. Increased Pain  2. Decreased Segmental Mobility & Decreased ROM  3. Decreased Core & BLE strength  4. Decreased Flexibility BLE  5. Decreased Tolerance to Functional Activities     Pt's spiritual, cultural and educational needs considered and pt agreeable to plan of care and goals as stated below:      Anticipated Barriers for physical therapy: none     Short Term GOALS: 2 weeks. Pt agrees with goals set.  1. Patient demonstrates independence with HEP. -met 12/31/18  2. Patient demonstrates independence with Postural Awareness. -Met 1/28/19  3. Patient demonstrates independence with body mechanics. -in progress      Long Term GOALS: 4  weeks. Pt agrees with goals set.  1. Patient demonstrates increased L knee ROM to WNL to improve tolerance to functional activities pain free. -in progress  2. Patient demonstrates increased strength BLE's to 4+/5 or greater to improve tolerance to functional activities pain free.-MET 1/28/19  3. Patient demonstrates improved overall function per FOTO Knee Survey to 49% Limitation or less.   Met 1/28/19     New Goals:1/28/29 4 weeks (2/25/19)  1. Patient demonstrates increased strength BLE's to 5/5 or greater to improve tolerance to functional activities pain free.  2. Pt will demo good quadriceps motor control with step down.      Plan     Continue with established Plan of Care towards PT goals.    Therapist: Earl Paredes, PT, DPT  2/11/2019     Agree with plan. ROSA Cuevas PA-C 2/12/2019

## 2019-02-15 ENCOUNTER — OFFICE VISIT (OUTPATIENT)
Dept: INTERNAL MEDICINE | Facility: CLINIC | Age: 64
End: 2019-02-15
Payer: COMMERCIAL

## 2019-02-15 VITALS
WEIGHT: 221.13 LBS | BODY MASS INDEX: 31.66 KG/M2 | DIASTOLIC BLOOD PRESSURE: 62 MMHG | HEART RATE: 70 BPM | HEIGHT: 70 IN | OXYGEN SATURATION: 98 % | SYSTOLIC BLOOD PRESSURE: 116 MMHG | TEMPERATURE: 98 F

## 2019-02-15 DIAGNOSIS — M54.50 ACUTE LEFT-SIDED LOW BACK PAIN WITHOUT SCIATICA: ICD-10-CM

## 2019-02-15 DIAGNOSIS — M62.838 MUSCLE SPASM: Primary | ICD-10-CM

## 2019-02-15 PROCEDURE — 99999 PR PBB SHADOW E&M-EST. PATIENT-LVL III: CPT | Mod: PBBFAC,,, | Performed by: INTERNAL MEDICINE

## 2019-02-15 PROCEDURE — 3008F PR BODY MASS INDEX (BMI) DOCUMENTED: ICD-10-PCS | Mod: CPTII,S$GLB,, | Performed by: INTERNAL MEDICINE

## 2019-02-15 PROCEDURE — 3008F BODY MASS INDEX DOCD: CPT | Mod: CPTII,S$GLB,, | Performed by: INTERNAL MEDICINE

## 2019-02-15 PROCEDURE — 99213 OFFICE O/P EST LOW 20 MIN: CPT | Mod: S$GLB,,, | Performed by: INTERNAL MEDICINE

## 2019-02-15 PROCEDURE — 99213 PR OFFICE/OUTPT VISIT, EST, LEVL III, 20-29 MIN: ICD-10-PCS | Mod: S$GLB,,, | Performed by: INTERNAL MEDICINE

## 2019-02-15 PROCEDURE — 99999 PR PBB SHADOW E&M-EST. PATIENT-LVL III: ICD-10-PCS | Mod: PBBFAC,,, | Performed by: INTERNAL MEDICINE

## 2019-02-15 RX ORDER — CYCLOBENZAPRINE HCL 10 MG
10 TABLET ORAL 3 TIMES DAILY PRN
Qty: 30 TABLET | Refills: 1 | Status: SHIPPED | OUTPATIENT
Start: 2019-02-15 | End: 2019-02-25

## 2019-02-15 NOTE — PROGRESS NOTES
Physical Therapy Daily Note         Name: Soha Wheatley  Clinic Number: [id number]  Diagnosis:   No diagnosis found.  Physician: Alena Cuevas PA-C  Treatment Orders: PT Eval and Treat  Past Medical History:   Diagnosis Date    Allergy     Anxiety     Arthritis     BMI 45.0-49.9, adult     Chronic kidney disease     Depression     GERD (gastroesophageal reflux disease)     History of kidney stones     Kidney stones     Migraines     Morbid obesity     Obesity     Sleep apnea in adult     WEARS CPAP, DOESNT KNOW SETTINGS.     Current Outpatient Medications   Medication Sig    b complex vitamins tablet Take 1 tablet by mouth once daily.    cinnamon bark-chromium picolin 500-100 mg-mcg Cap Take by mouth.    cyanocobalamin 500 MCG tablet Take 500 mcg by mouth once daily.    diclofenac sodium (VOLTAREN) 1 % Gel Apply 2 g topically 4 (four) times daily.    docusate sodium (COLACE) 100 MG capsule Take 1 capsule (100 mg total) by mouth 2 (two) times daily as needed for Constipation.    gabapentin (NEURONTIN) 600 MG tablet Take 1 tablet (600 mg total) by mouth 3 (three) times daily.    GLUCOSAMINE HCL/CHONDR APODACA A NA (OSTEO BI-FLEX ORAL) Take by mouth once daily.    meloxicam (MOBIC) 15 MG tablet     multivitamin (ONE DAILY MULTIVITAMIN) per tablet Take 1 tablet by mouth once daily.    omeprazole (PRILOSEC) 40 MG capsule TAKE 1 CAPSULE DAILY    oxyCODONE-acetaminophen (PERCOCET) 5-325 mg per tablet Take 1 tablet by mouth every 4 to 6 hours as needed for Pain.    sertraline (ZOLOFT) 100 MG tablet Take 1 tablet (100 mg total) by mouth once daily.    tiZANidine (ZANAFLEX) 4 MG tablet Take 1 tablet (4 mg total) by mouth every 8 (eight) hours as needed.     No current facility-administered medications for this visit.      Review of patient's allergies indicates:   Allergen Reactions    Dermabond [tissue glues] Rash    Adhesive      Paper tape  usually ok- pulls skin off    Flagyl [metronidazole hcl]      confusion       Precautions: standard  Visit #:13 of 20  PTA Visit #: 2  Time In: ***  Time Out: ***  Initial Evaluation Date: 12/5/18  POC Expiration: 12/31/18, 1/28/19, 2/25/19    Subjective     Pt reports knee is alright. Tape didn't work  Pain Scale: Soha rates pain at L knee on a scale of 0-10 to be 4 currently.    Objective     Range of Motion: Knee (degrees)    Left Right   Flexion: 120 (126 PROM) 128   Extension  (0 PROM) 0      Strength: Knee    Left Right   Quadriceps 4+/5 4+/5   Hamstrings 4+/5 4+/5         Strength: Hip     Left Right   Iliopsoas 4+/5 4+/5   PGM 4+/5 4+/5   IR 4+/5 4+/5   ER 4/+5 4+/5   Ext 4+/5 4+/5          Soha received individual therapeutic exercises to develop strength, endurance, ROM, flexibility and posture for *** minutes including:  Bike  5 min  Quad sets 30x  c/ 5 sec hold NT  SLR c/ 3# ankle weights 3 x10 w/ QS  Heel slides 20x c/ 5 sec hold NP  Hamstring stretch c/ strap 3x 30 sec-NP  SAQ c/ #3 ankle weight 3 x 10   Clamshells 3x10 GTB-  SL hip abd 3x10 #3  Prone extension 2x10 #3        Step ups on lvl 3  3x10 w/ knee FL of R  Tap downs with R foot from L3 3x10  Shuttle 2c 3 x 10 L only   Sit<>stands 3 x 10- high/low 21'- NP  TRX squats 3x10  Steamboats GTB 3x10 on airex  Gastroc stretch wedge stretch 2 x 1 min  Heel raises 3 x 10-  On airex  Front lunge 1x10 B on bosu  Lateral lunges 1x10 B   Step up and over level 3 step. 10x NP  crabwalks 2 lap NP  SLS 2x30 NP        Soha received the following manual therapy techniques: Kinesiotape was applied for 0 minutes including:  Tibial anterior and posterior joint mobs- NT  Patellar mobs- L knee   Contract relax technique to promote extension  NP  PROM knee flex NT  Tissue massage for scar mobilization  -NT  Kinesio-tape L lateral patella for patellar stabilization      Cold pack to L knee for 10 minutes     Written Home Exercises Provided: at eval  Pt demo good  understanding of the education provided. Soha demonstrated good return demonstration of activities.     Education provided re: cont HEP  Soha verbalized good understanding of education provided.   No spiritual or educational barriers to learning provided    Functional Limitations Reports - G Codes  Category: mobility  Tool: FOTO Knee Survey  Initial Score: 66% Limitation  12/31/18 : 51% limitation  1/28/19: 45% limitation  Assessment     Patient tolerated treatment well without adverse effects.  Patient still demonstrating some weakness in the L LE during functional movements. ROM measured at 126 degrees of flexion with PROM and pain noted. Still has difficulty with standing long periods of time (Anything over 15-20 is tiring). She reports it is most difficult while standing at work. Advanced pt as above. Pt prognosis is Good. Pt will continue to benefit from skilled outpatient physical therapy to address the deficits listed in the problem list, provide pt/family education and to maximize pt's level of independence in the home and community environment. Request another 4 weeks of PT in order to regain functional mobility and improve ability to stand at work without needing to rest.      Medical necessity is demonstrated by the following IMPAIRMENTS/PROBLEMS:  1. Increased Pain  2. Decreased Segmental Mobility & Decreased ROM  3. Decreased Core & BLE strength  4. Decreased Flexibility BLE  5. Decreased Tolerance to Functional Activities     Pt's spiritual, cultural and educational needs considered and pt agreeable to plan of care and goals as stated below:      Anticipated Barriers for physical therapy: none     Short Term GOALS: 2 weeks. Pt agrees with goals set.  1. Patient demonstrates independence with HEP. -met 12/31/18  2. Patient demonstrates independence with Postural Awareness. -Met 1/28/19  3. Patient demonstrates independence with body mechanics. -in progress      Long Term GOALS: 4 weeks. Pt agrees with  goals set.  1. Patient demonstrates increased L knee ROM to WNL to improve tolerance to functional activities pain free. -in progress  2. Patient demonstrates increased strength BLE's to 4+/5 or greater to improve tolerance to functional activities pain free.-MET 1/28/19  3. Patient demonstrates improved overall function per FOTO Knee Survey to 49% Limitation or less.   Met 1/28/19     New Goals:1/28/29 4 weeks (2/25/19)  1. Patient demonstrates increased strength BLE's to 5/5 or greater to improve tolerance to functional activities pain free.  2. Pt will demo good quadriceps motor control with step down.      Plan     Continue with established Plan of Care towards PT goals.    Therapist: Grace Rutherford, PT, DPT  2/15/2019     Agree with plan. ROSA Cuevas PA-C 2/12/2019

## 2019-02-15 NOTE — PROGRESS NOTES
Subjective:       Patient ID: Soha Wheatley is a 63 y.o. female.    Chief Complaint: Back Pain (left side )    Patient with bilateral knee replacements complains of left sided low back pain after walking more than twice her usual [place] last [place].  No falls.  Was wearing tennis shoes      Review of Systems   Constitutional: Negative for activity change, chills, fatigue and fever.   HENT: Negative for congestion, ear pain, nosebleeds, postnasal drip, sinus pressure and sore throat.    Eyes: Negative.  Negative for visual disturbance.   Respiratory: Negative for cough, chest tightness, shortness of breath and wheezing.    Cardiovascular: Negative for chest pain.   Gastrointestinal: Negative for abdominal pain, diarrhea, nausea and vomiting.   Genitourinary: Negative for difficulty urinating, dysuria, frequency and urgency.   Musculoskeletal: Negative for arthralgias and neck stiffness.   Skin: Negative for rash.   Neurological: Negative for dizziness, weakness and headaches.   Psychiatric/Behavioral: Negative for sleep disturbance. The patient is not nervous/anxious.        Objective:      Physical Exam   Musculoskeletal:        Arms:      Assessment:       1. Muscle spasm    2. Acute left-sided low back pain without sciatica        Plan:   Soha was seen today for back pain.    Diagnoses and all orders for this visit:    Muscle spasm  -     Ambulatory consult to Physical Therapy    Acute left-sided low back pain without sciatica  -     Ambulatory consult to Physical Therapy    Other orders  -     cyclobenzaprine (FLEXERIL) 10 MG tablet; Take 1 tablet (10 mg total) by mouth 3 (three) times daily as needed for Muscle spasms.

## 2019-02-18 ENCOUNTER — CLINICAL SUPPORT (OUTPATIENT)
Dept: REHABILITATION | Facility: HOSPITAL | Age: 64
End: 2019-02-18
Payer: COMMERCIAL

## 2019-02-18 DIAGNOSIS — Z96.652 S/P TKR (TOTAL KNEE REPLACEMENT), LEFT: Primary | ICD-10-CM

## 2019-02-18 PROCEDURE — 97110 THERAPEUTIC EXERCISES: CPT

## 2019-02-18 PROCEDURE — 97014 ELECTRIC STIMULATION THERAPY: CPT

## 2019-02-18 NOTE — PROGRESS NOTES
Physical Therapy Daily Note         Name: Soha Wheatley  Clinic Number: [id number]  Diagnosis:   Encounter Diagnosis   Name Primary?    S/P TKR (total knee replacement), left Yes     Physician: Alena Cuevas PA-C  Treatment Orders: PT Eval and Treat  Past Medical History:   Diagnosis Date    Allergy     Anxiety     Arthritis     BMI 45.0-49.9, adult     Chronic kidney disease     Depression     GERD (gastroesophageal reflux disease)     History of kidney stones     Kidney stones     Migraines     Morbid obesity     Obesity     Sleep apnea in adult     WEARS CPAP, DOESNT KNOW SETTINGS.     Current Outpatient Medications   Medication Sig    b complex vitamins tablet Take 1 tablet by mouth once daily.    cinnamon bark-chromium picolin 500-100 mg-mcg Cap Take by mouth.    cyanocobalamin 500 MCG tablet Take 500 mcg by mouth once daily.    cyclobenzaprine (FLEXERIL) 10 MG tablet Take 1 tablet (10 mg total) by mouth 3 (three) times daily as needed for Muscle spasms.    diclofenac sodium (VOLTAREN) 1 % Gel Apply 2 g topically 4 (four) times daily.    docusate sodium (COLACE) 100 MG capsule Take 1 capsule (100 mg total) by mouth 2 (two) times daily as needed for Constipation.    gabapentin (NEURONTIN) 600 MG tablet Take 1 tablet (600 mg total) by mouth 3 (three) times daily.    GLUCOSAMINE HCL/CHONDR APODACA A NA (OSTEO BI-FLEX ORAL) Take by mouth once daily.    meloxicam (MOBIC) 15 MG tablet     multivitamin (ONE DAILY MULTIVITAMIN) per tablet Take 1 tablet by mouth once daily.    omeprazole (PRILOSEC) 40 MG capsule TAKE 1 CAPSULE DAILY    oxyCODONE-acetaminophen (PERCOCET) 5-325 mg per tablet Take 1 tablet by mouth every 4 to 6 hours as needed for Pain.    sertraline (ZOLOFT) 100 MG tablet Take 1 tablet (100 mg total) by mouth once daily.    tiZANidine (ZANAFLEX) 4 MG tablet Take 1 tablet (4 mg total) by mouth every 8 (eight) hours as needed.      No current facility-administered medications for this visit.      Review of patient's allergies indicates:   Allergen Reactions    Dermabond [tissue glues] Rash    Adhesive      Paper tape usually ok- pulls skin off    Flagyl [metronidazole hcl]      confusion       Precautions: standard  Visit #:13 of 20  PTA Visit #: 2  Time In: 3:00pm  Time Out: 4:00pm  Initial Evaluation Date: 12/5/18  POC Expiration: 12/31/18, 1/28/19, 2/25/19    Subjective     Pt reports: L knee stiffness. Pt has new order for acute LBP, requests wait to finish episode of care for knee before treating LBP.   Pain Scale: Soha rates pain at L knee on a scale of 0-10 to be 0 currently.    Objective     Soha received individual therapeutic exercises to develop strength, endurance, ROM, flexibility and posture for 50 minutes including:  Bike  5 min (seated)   Quad sets 30x  c/ 5 sec hold NT  SLR c/ 3# ankle weights 3 x10 w/ QS NP  Heel slides 20x c/ 5 sec hold NP  Hamstring stretch c/ strap 3x 30 sec-NP  SAQ c/ #3 ankle weight 3 x 10 NP  Clamshells 3x10 GTB- NP  SL hip abd 3x10 #3 NP  Prone extension 2x10 #3  NP  Prone quad strap stretch 2x1'     Step ups on lvl 3  3x10 w/ knee FL of R  Tap downs with R foot from L3 3x10  Shuttle 2c 3 x 10 L only   Shuttle 3c 3x10   Sit<>stands 3 x 10- high/low 21'-   TRX squats 3x10 NP  Steamboats GTB 3x10 on airex NP  Gastroc stretch wedge stretch 2 x 1 min  Heel raises 3 x 10-  On airex NP  Front lunge 1x10 B on bosu NP  Lateral lunges 1x10 B  NP  Step up and over level 3 step. 10x NP  crabwalks 2 lap NP  SLS 2x30 NP  March on rebounder w/ SLS 20x NP  Prone on elbows - HEP  Prone press-ups -HEP     Soha received the following manual therapy techniques: Kinesiotape was applied for 0 minutes including:  Tibial anterior and posterior joint mobs- NT  Patellar mobs- L knee   Contract relax technique to promote extension  NP  PROM knee flex NT  Tissue massage for scar mobilization  -NT  Kinesio-tape L  lateral patella for patellar stabilization      Cold pack to L knee for 10 minutes  NP      Soha received the following supervised modalities after being cleared for contradictions: TENS:  Soha received TENS electrical stimulation for pain to the L lumbar paraspinals. Pt received continuous mode at a rate of  80 pps for 10 minutes. Soha tolerated treatment well without any adverse effects.     Soha received hot pack for 10 minutes to increase circulation and promote tissue healing.      Written Home Exercises Provided: prone press-ups (see AVS)   Pt demo good understanding of the education provided. Soha demonstrated good return demonstration of activities.     Education provided re: cont HEP  Soha verbalized good understanding of education provided.   No spiritual or educational barriers to learning provided    Functional Limitations Reports - G Codes  Category: mobility  Tool: FOTO Knee Survey  Initial Score: 66% Limitation  12/31/18 : 51% limitation  1/28/19: 45% limitation  Assessment     Patient tolerated treatment well without adverse effects. Unable to progress B LE strength training due to new onset LBP, no change in pain /c prone exercises. Plan to eval LBP when pt D/C episode of L knee pain. Pt prognosis is Good. Pt will continue to benefit from skilled outpatient physical therapy to address the deficits listed in the problem list, provide pt/family education and to maximize pt's level of independence in the home and community environment.     Medical necessity is demonstrated by the following IMPAIRMENTS/PROBLEMS:  1. Increased Pain  2. Decreased Segmental Mobility & Decreased ROM  3. Decreased Core & BLE strength  4. Decreased Flexibility BLE  5. Decreased Tolerance to Functional Activities     Pt's spiritual, cultural and educational needs considered and pt agreeable to plan of care and goals as stated below:      Anticipated Barriers for physical therapy: none     Short Term GOALS: 2 weeks. Pt  agrees with goals set.  1. Patient demonstrates independence with HEP. -met 12/31/18  2. Patient demonstrates independence with Postural Awareness. -Met 1/28/19  3. Patient demonstrates independence with body mechanics. -in progress      Long Term GOALS: 4 weeks. Pt agrees with goals set.  1. Patient demonstrates increased L knee ROM to WNL to improve tolerance to functional activities pain free. -in progress  2. Patient demonstrates increased strength BLE's to 4+/5 or greater to improve tolerance to functional activities pain free.-MET 1/28/19  3. Patient demonstrates improved overall function per FOTO Knee Survey to 49% Limitation or less.   Met 1/28/19     New Goals:1/28/29 4 weeks (2/25/19)  1. Patient demonstrates increased strength BLE's to 5/5 or greater to improve tolerance to functional activities pain free.  2. Pt will demo good quadriceps motor control with step down.      Plan     Continue with established Plan of Care towards PT goals.    Therapist: Grace Rutherford, PT, DPT  2/18/2019

## 2019-02-18 NOTE — PROGRESS NOTES
[facility]  Physical Therapy Initial Evaluation    Name: Soha Wheatley  Clinic Number: [id number]    Therapy Diagnosis: No diagnosis found.  Physician: Alena Cuevas PA-C    Physician Orders: {AMB PT KNEE ORDERS:95373} ***  Medical Diagnosis: ***  Evaluation Date: 2019  Authorization Period Expiration: ***  Plan of Care Certification Period: ***  Visit # / Visits authorized: ***/ ***    Time In: ***  Time Out: ***  Total Billable Time: *** minutes    Precautions: {IP WOUND PRECAUTIONS OHS:01767}    Subjective   Date of onset: ***  History of current condition - Soha reports: ***       Medical History:   Past Medical History:   Diagnosis Date    Allergy     Anxiety     Arthritis     BMI 45.0-49.9, adult     Chronic kidney disease     Depression     GERD (gastroesophageal reflux disease)     History of kidney stones     Kidney stones     Migraines     Morbid obesity     Obesity     Sleep apnea in adult     WEARS CPAP, DOESNT KNOW SETTINGS.       Surgical History:   Soha Wheatley  has a past surgical history that includes  section (); Knee arthrodesis (); Cholecystectomy; Ureteroscopy; Gastrectomy; Knee cartilage surgery (Left, ); Tonsillectomy (); Total knee arthroplasty (Right, 2018); and Total knee arthroplasty (Left, 10/31/2018).    Medications:   Soha has a current medication list which includes the following prescription(s): b complex vitamins, cinnamon bark-chromium picolin, cyanocobalamin, cyclobenzaprine, diclofenac sodium, docusate sodium, gabapentin, glucosamine/chondr mccurdy a sod, meloxicam, multivitamin, omeprazole, oxycodone-acetaminophen, sertraline, and tizanidine.    Allergies:   Review of patient's allergies indicates:   Allergen Reactions    Dermabond [tissue glues] Rash    Adhesive      Paper tape usually ok- pulls skin off    Flagyl [metronidazole hcl]      confusion        Imaging, {Mri/ctscan/bone  "scan:78267}: ***    Prior Therapy: None for current symptoms ***  Social History: *** {LIVES WITH:07177}  Occupation: ***  Prior Level of Function: ***  Current Level of Function: ***    Pain:  Current {0-10:19896::"0"}/10, worst {0-10:20507::"0"}/10, best {0-10:20507::"0"}/10   Location: {RIGHT LEFT BILATERAL:04967} {LOCATION ON BODY:96352}  Aggravating Factors: {Causes; Pain:58864}  Easing Factors: {Pain (activities that relieve):12788}    Pts goals: ***    Objective     ***      CMS Impairment/Limitation/Restriction for FOTO *** Survey    Therapist reviewed FOTO scores for Soha Wheatley on 2/18/2019.   FOTO documents entered into [employer] - see Media section.    Limitation Score: ***%  Category: {Blank single:87485::"Other","Self Care","Body Position","Carrying","Mobility"}    Current : {G Codes:59432}  Goal: {G Codes:51459}  Discharge: {G Codes:01871}         TREATMENT   Treatment Time In: ***  Treatment Time Out: ***  Total Treatment time separate from Evaluation time:***    Soha received therapeutic exercises to develop {AMB PT PROGRESS OBJECTIVE:00052} for *** minutes including:  ***    Soha received the following manual therapy techniques: {AMB PT PROGRESS MANUAL THERAPY:02265} were applied to the: *** for {5-30:04675} minutes, including:  ***    Soha participated in neuromuscular re-education activities to improve: {AMB PT PROGRESS NEURO RE-ED:22702} for *** minutes. The following activities were included:  ***    Soha participated in dynamic functional therapeutic activities to improve functional performance for ***  minutes, including:  ***    Soha participated in gait training to improve functional mobility and safety for ***  minutes, including:  ***  Soha received the following direct contact modalities after being cleared for contraindications: {AMB PT PROGRESS DIRECT CONTACT MODES:41999}    Soha received the following supervised modalities after being cleared for contradictions: {AMB PT " "SUPERVISED MODES:25368}    Soha received hot pack for *** minutes to ***.    Soha received cold pack for *** minutes to ***.      Home Exercises and Patient Education Provided    Education provided re: Importance of ice and use of HEP to manage symptoms. ***    Written Home Exercises Provided: ***.  Exercises were reviewed and Soha was able to demonstrate them prior to the end of the session.   Pt received a written copy of exercises to perform at home. Soha demonstrated {Desc; good/fair/poor:97488} understanding of the education provided.     See EMR under {Blank single:50600::"Media","Patient Instructions"} for exercises provided {Blank single:87474::"2/18/2019","prior visit"}.  Assessment   Soha is a 63 y.o. female referred to outpatient Physical Therapy with a medical diagnosis of ***. Pt presents with ***    Pt prognosis is {REHAB PROGNOSIS OHS:36037}.   Pt will benefit from skilled outpatient Physical Therapy to address the deficits stated above and in the chart below, provide pt/family education, and to maximize pt's level of independence.     Plan of care discussed with patient: {YES:47144}  Pt's spiritual, cultural and educational needs considered and patient is agreeable to the plan of care and goals as stated below:     Anticipated Barriers for therapy:  ***    Medical Necessity is demonstrated by the following  History  Co-morbidities and personal factors that may impact the plan of care Co-morbidities:   {Co-morbidities:00209}    Personal Factors:   {Personal Factors:65754}     {Desc; low/moderate/high:834039}   Examination  Body Structures and Functions, activity limitations and participation restrictions that may impact the plan of care Body Regions:   {Body Regions:37789}    Body Systems:    {Body Systems:51957}    Participation Restrictions:   ***    Activity limitations:   Learning and applying knowledge  {Learning and applying knowledge:40158}    General Tasks and Commands  {Gen tasks and " "commands:44924}    Communication  {Communication:11268}    Mobility  {Mobility:16451}    Self care  {Self Care:72162}    Domestic Life  {Domestic Life:57606}    Interactions/Relationships  {Interactions/Relationships:01452}    Life Areas  {Life Areas:19495}    Community and Social Life  {Community/Social Life:63559}         {Desc; low/moderate/high:414215}   Clinical Presentation {Clinical Presentation :20543} {Desc; low/moderate/high:441821}   Decision Making/ Complexity Score: {Desc; low/moderate/high:579313}     Goals:  Short Term Goals: *** weeks     Long Term Goals: *** weeks     Plan   Certification Period/Plan of care expiration: 2/18/2019 to ***.    Outpatient Physical Therapy {NUMBERS 1-5:62065} times weekly for {0-10:96623::"0"} weeks to include the following interventions: {TX PLAN:76846}.  Pt may be seen by PTA as part of the rehabilitation team.     Grace Rutherford, PT    "

## 2019-02-21 ENCOUNTER — CLINICAL SUPPORT (OUTPATIENT)
Dept: REHABILITATION | Facility: HOSPITAL | Age: 64
End: 2019-02-21
Payer: COMMERCIAL

## 2019-02-21 DIAGNOSIS — Z96.652 STATUS POST TOTAL LEFT KNEE REPLACEMENT: Chronic | ICD-10-CM

## 2019-02-21 DIAGNOSIS — Z96.652 S/P TKR (TOTAL KNEE REPLACEMENT), LEFT: Primary | ICD-10-CM

## 2019-02-21 PROCEDURE — 97110 THERAPEUTIC EXERCISES: CPT

## 2019-02-21 NOTE — PROGRESS NOTES
Physical Therapy Daily Note         Name: Soha Wheatley  Clinic Number: [id number]  Diagnosis:   No diagnosis found.  Physician: Alena Cuevas PA-C  Treatment Orders: PT Eval and Treat  Past Medical History:   Diagnosis Date    Allergy     Anxiety     Arthritis     BMI 45.0-49.9, adult     Chronic kidney disease     Depression     GERD (gastroesophageal reflux disease)     History of kidney stones     Kidney stones     Migraines     Morbid obesity     Obesity     Sleep apnea in adult     WEARS CPAP, DOESNT KNOW SETTINGS.     Current Outpatient Medications   Medication Sig    b complex vitamins tablet Take 1 tablet by mouth once daily.    cinnamon bark-chromium picolin 500-100 mg-mcg Cap Take by mouth.    cyanocobalamin 500 MCG tablet Take 500 mcg by mouth once daily.    cyclobenzaprine (FLEXERIL) 10 MG tablet Take 1 tablet (10 mg total) by mouth 3 (three) times daily as needed for Muscle spasms.    diclofenac sodium (VOLTAREN) 1 % Gel Apply 2 g topically 4 (four) times daily.    docusate sodium (COLACE) 100 MG capsule Take 1 capsule (100 mg total) by mouth 2 (two) times daily as needed for Constipation.    gabapentin (NEURONTIN) 600 MG tablet Take 1 tablet (600 mg total) by mouth 3 (three) times daily.    GLUCOSAMINE HCL/CHONDR APODACA A NA (OSTEO BI-FLEX ORAL) Take by mouth once daily.    meloxicam (MOBIC) 15 MG tablet     multivitamin (ONE DAILY MULTIVITAMIN) per tablet Take 1 tablet by mouth once daily.    omeprazole (PRILOSEC) 40 MG capsule TAKE 1 CAPSULE DAILY    oxyCODONE-acetaminophen (PERCOCET) 5-325 mg per tablet Take 1 tablet by mouth every 4 to 6 hours as needed for Pain.    sertraline (ZOLOFT) 100 MG tablet Take 1 tablet (100 mg total) by mouth once daily.    tiZANidine (ZANAFLEX) 4 MG tablet Take 1 tablet (4 mg total) by mouth every 8 (eight) hours as needed.     No current facility-administered medications for this visit.       Review of patient's allergies indicates:   Allergen Reactions    Dermabond [tissue glues] Rash    Adhesive      Paper tape usually ok- pulls skin off    Flagyl [metronidazole hcl]      confusion       Precautions: standard  Visit #:13 of 20  PTA Visit #: 2  Time In: 3:00pm  Time Out: 4:00pm  Initial Evaluation Date: 12/5/18  POC Expiration: 12/31/18, 1/28/19, 2/25/19    Subjective     Pt reports: L knee Has been more sore as well as LBP.   Pain Scale: Soha rates pain at L knee on a scale of 0-10 to be 5 currently.    Objective     Soha received individual therapeutic exercises to develop strength, endurance, ROM, flexibility and posture for 50 minutes including:  Bike  5 min (seated)   Quad sets 30x  c/ 5 sec hold NT  SLR c/ 3# ankle weights 3 x10 w/ QS NP  Heel slides 20x c/ 5 sec hold NP  Hamstring stretch c/ strap 3x 30 sec-NP  SAQ c/ #3 ankle weight 3 x 10 NP  Clamshells 3x10 GTB- NP  SL hip abd 3x10 #3 NP  Prone extension 2x10 #3  NP  Prone quad strap stretch 2x1'     Step ups on lvl 3  3x10 w/ knee FL of R  Tap downs with R foot from L3 3x10  Shuttle 2c 3 x 10 L only   Shuttle 3c 3x10   Sit<>stands 3 x 10- high/low 21'-   TRX squats 3x10 NP  Steamboats GTB 3x10 on airex NP  Gastroc stretch wedge stretch 2 x 1 min  Heel raises 3 x 10-  On airex NP  Front lunge 1x10 B on bosu NP  Lateral lunges 1x10 B  NP  Step up and over level 3 step. 10x NP  crabwalks 2 lap NP  SLS 2x30 NP  March on rebounder w/ SLS 20x NP  Prone on elbows - HEP  Prone press-ups -HEP     Knee protocol- ABRAHAM     Soha received the following manual therapy techniques: Kinesiotape was applied for 0 minutes including:  Tibial anterior and posterior joint mobs- NT  Patellar mobs- L knee   Contract relax technique to promote extension  NP  PROM knee flex NT  Tissue massage for scar mobilization  -NT  Kinesio-tape L lateral patella for patellar stabilization      Cold pack to L knee for 10 minutes  With HI-Volt stim at 80 V      Soha  received the following supervised modalities after being cleared for contradictions: TENS:  Soha received TENS electrical stimulation for pain to the L knee . Pt received continuous mode at a rate of  80 pps for 10 minutes. Soha tolerated treatment well without any adverse effects.     Soha received hot pack for 10 minutes to increase circulation and promote tissue healing.      Written Home Exercises Provided: prone press-ups (see AVS)   Pt demo good understanding of the education provided. Soha demonstrated good return demonstration of activities.     Education provided re: cont HEP  Soha verbalized good understanding of education provided.   No spiritual or educational barriers to learning provided    Functional Limitations Reports - G Codes  Category: mobility  Tool: FOTO Knee Survey  Initial Score: 66% Limitation  12/31/18 : 51% limitation  1/28/19: 45% limitation  Assessment   Added in heat protocol with heat on posterior knee and ice on the anterior knee for 15 min. Patient states it helped the knee feel less stiff but still had some pain in the knee.     Patient tolerated treatment well without adverse effects. Unable to progress B LE strength training due to new onset LBP, no change in pain /c prone exercises. Plan to eval LBP when pt D/C episode of L knee pain. Pt prognosis is Good. Pt will continue to benefit from skilled outpatient physical therapy to address the deficits listed in the problem list, provide pt/family education and to maximize pt's level of independence in the home and community environment.     Medical necessity is demonstrated by the following IMPAIRMENTS/PROBLEMS:  1. Increased Pain  2. Decreased Segmental Mobility & Decreased ROM  3. Decreased Core & BLE strength  4. Decreased Flexibility BLE  5. Decreased Tolerance to Functional Activities     Pt's spiritual, cultural and educational needs considered and pt agreeable to plan of care and goals as stated below:      Anticipated  Barriers for physical therapy: none     Short Term GOALS: 2 weeks. Pt agrees with goals set.  1. Patient demonstrates independence with HEP. -met 12/31/18  2. Patient demonstrates independence with Postural Awareness. -Met 1/28/19  3. Patient demonstrates independence with body mechanics. -in progress      Long Term GOALS: 4 weeks. Pt agrees with goals set.  1. Patient demonstrates increased L knee ROM to WNL to improve tolerance to functional activities pain free. -in progress  2. Patient demonstrates increased strength BLE's to 4+/5 or greater to improve tolerance to functional activities pain free.-MET 1/28/19  3. Patient demonstrates improved overall function per FOTO Knee Survey to 49% Limitation or less.   Met 1/28/19     New Goals:1/28/29 4 weeks (2/25/19)  1. Patient demonstrates increased strength BLE's to 5/5 or greater to improve tolerance to functional activities pain free.  2. Pt will demo good quadriceps motor control with step down.      Plan     Continue with established Plan of Care towards PT goals.    Therapist: Earl Paredes, PT, DPT  2/21/2019

## 2019-02-26 ENCOUNTER — CLINICAL SUPPORT (OUTPATIENT)
Dept: REHABILITATION | Facility: HOSPITAL | Age: 64
End: 2019-02-26
Payer: COMMERCIAL

## 2019-02-26 PROCEDURE — 97110 THERAPEUTIC EXERCISES: CPT

## 2019-02-26 NOTE — PROGRESS NOTES
Physical Therapy Daily Note         Name: Soha Wheatley  Clinic Number: [id number]  Diagnosis:   No diagnosis found.  Physician: Alena Cuevas PA-C  Treatment Orders: PT Eval and Treat  Past Medical History:   Diagnosis Date    Allergy     Anxiety     Arthritis     BMI 45.0-49.9, adult     Chronic kidney disease     Depression     GERD (gastroesophageal reflux disease)     History of kidney stones     Kidney stones     Migraines     Morbid obesity     Obesity     Sleep apnea in adult     WEARS CPAP, DOESNT KNOW SETTINGS.     Current Outpatient Medications   Medication Sig    b complex vitamins tablet Take 1 tablet by mouth once daily.    cinnamon bark-chromium picolin 500-100 mg-mcg Cap Take by mouth.    cyanocobalamin 500 MCG tablet Take 500 mcg by mouth once daily.    diclofenac sodium (VOLTAREN) 1 % Gel Apply 2 g topically 4 (four) times daily.    docusate sodium (COLACE) 100 MG capsule Take 1 capsule (100 mg total) by mouth 2 (two) times daily as needed for Constipation.    gabapentin (NEURONTIN) 600 MG tablet Take 1 tablet (600 mg total) by mouth 3 (three) times daily.    GLUCOSAMINE HCL/CHONDR APODACA A NA (OSTEO BI-FLEX ORAL) Take by mouth once daily.    meloxicam (MOBIC) 15 MG tablet     multivitamin (ONE DAILY MULTIVITAMIN) per tablet Take 1 tablet by mouth once daily.    omeprazole (PRILOSEC) 40 MG capsule TAKE 1 CAPSULE DAILY    oxyCODONE-acetaminophen (PERCOCET) 5-325 mg per tablet Take 1 tablet by mouth every 4 to 6 hours as needed for Pain.    sertraline (ZOLOFT) 100 MG tablet Take 1 tablet (100 mg total) by mouth once daily.    tiZANidine (ZANAFLEX) 4 MG tablet Take 1 tablet (4 mg total) by mouth every 8 (eight) hours as needed.     No current facility-administered medications for this visit.      Review of patient's allergies indicates:   Allergen Reactions    Dermabond [tissue glues] Rash    Adhesive      Paper tape  usually ok- pulls skin off    Flagyl [metronidazole hcl]      confusion       Precautions: standard  Visit #:14 of 20  PTA Visit #: 1  Time In: 2:00 pm  Time Out: 3:05 pm  Initial Evaluation Date: 12/5/18  POC Expiration: 12/31/18, 1/28/19, 2/25/19    Subjective     Pt reports: L knee and LB feeling better. Knee still keeps me up at night  Pain Scale: Soha rates pain at L knee on a scale of 0-10 to be 4 currently.    Objective     Soha received individual therapeutic exercises to develop strength, endurance, ROM, flexibility and posture for 55 minutes including:  Bike  5 min    Quad sets 30x  c/ 5 sec hold NT  SLR c/ 3# ankle weights 3 x10 w/ QS NP  Heel slides 20x c/ 5 sec hold NP  Hamstring stretch c/ strap 3x 30 sec-NP  SAQ c/ #3 ankle weight 3 x 10 NP  Clamshells 3x10 GTB- NP  SL hip abd 3x10 #3 NP  Prone extension 2x10 #3  NP  Prone quad strap stretch 2x1'     Step ups on lvl 3  3x10 w/ knee FL of R  Tap downs with R foot from L3 3x10   Shuttle 2c 3 x 10 L only   Shuttle 3c 3x10   Sit<>stands 3 x 10- high/low 21'-   TRX squats 3x10 NP  Steamboats GTB 3x10 on airex NP  Gastroc stretch wedge stretch 2 x 1 min  Heel raises 3 x 10-  On airex NP  Front lunge 2x10 B on bosu   Lateral lunges 2x10 B    Step up and over level 3 step. 10x   crabwalks 2 lap NP  SLS 2x30 NP  March on rebounder w/ SLS 20x   Prone on elbows -NT  Prone press-ups -HEP     Knee protocol- ABRAHAM     Soha received the following manual therapy techniques: Kinesiotape was applied for 0 minutes including:  Tibial anterior and posterior joint mobs- NT  Patellar mobs- L knee   Contract relax technique to promote extension  NP  PROM knee flex NT  Tissue massage for scar mobilization  -NT  Kinesio-tape L lateral patella for patellar stabilization      Cold pack to L knee for 10 minutes  With HI-Volt stim at 80 V      Soha received the following supervised modalities after being cleared for contradictions: TENS:  Soha received TENS electrical  stimulation for pain to the L knee . Pt received continuous mode at a rate of  80 pps for 10 minutes. Soha tolerated treatment well without any adverse effects.     Soha received hot pack for 10 minutes to increase circulation and promote tissue healing.      Written Home Exercises Provided: prone press-ups (see AVS)   Pt demo good understanding of the education provided. Soha demonstrated good return demonstration of activities.     Education provided re: cont HEP  Soha verbalized good understanding of education provided.   No spiritual or educational barriers to learning provided    Functional Limitations Reports - G Codes  Category: mobility  Tool: FOTO Knee Survey  Initial Score: 66% Limitation  12/31/18 : 51% limitation  1/28/19: 45% limitation  Assessment   Added in heat protocol with heat on posterior knee and ice on the anterior knee for 15 min. Knee protocol helps loosen knee up. Performed after bike. Able to perform STS from lower height on high/low. Decreased knee pain after session. Cont to progress as edgardo.  Patient tolerated treatment well without adverse effects. Unable to progress B LE strength training due to new onset LBP, no change in pain /c prone exercises. Plan to eval LBP when pt D/C episode of L knee pain. Pt prognosis is Good. Pt will continue to benefit from skilled outpatient physical therapy to address the deficits listed in the problem list, provide pt/family education and to maximize pt's level of independence in the home and community environment.     Medical necessity is demonstrated by the following IMPAIRMENTS/PROBLEMS:  1. Increased Pain  2. Decreased Segmental Mobility & Decreased ROM  3. Decreased Core & BLE strength  4. Decreased Flexibility BLE  5. Decreased Tolerance to Functional Activities     Pt's spiritual, cultural and educational needs considered and pt agreeable to plan of care and goals as stated below:      Anticipated Barriers for physical therapy: none     Short  Term GOALS: 2 weeks. Pt agrees with goals set.  1. Patient demonstrates independence with HEP. -met 12/31/18  2. Patient demonstrates independence with Postural Awareness. -Met 1/28/19  3. Patient demonstrates independence with body mechanics. -in progress      Long Term GOALS: 4 weeks. Pt agrees with goals set.  1. Patient demonstrates increased L knee ROM to WNL to improve tolerance to functional activities pain free. -in progress  2. Patient demonstrates increased strength BLE's to 4+/5 or greater to improve tolerance to functional activities pain free.-MET 1/28/19  3. Patient demonstrates improved overall function per FOTO Knee Survey to 49% Limitation or less.   Met 1/28/19     New Goals:1/28/29 4 weeks (2/25/19)  1. Patient demonstrates increased strength BLE's to 5/5 or greater to improve tolerance to functional activities pain free.  2. Pt will demo good quadriceps motor control with step down.      Plan     Continue with established Plan of Care towards PT goals.    Therapist: Kiara Bermudez PTA, DPT  2/26/2019

## 2019-02-28 ENCOUNTER — CLINICAL SUPPORT (OUTPATIENT)
Dept: REHABILITATION | Facility: HOSPITAL | Age: 64
End: 2019-02-28
Payer: COMMERCIAL

## 2019-02-28 PROCEDURE — 97110 THERAPEUTIC EXERCISES: CPT

## 2019-02-28 NOTE — PROGRESS NOTES
Physical Therapy Daily Note         Name: Soha Wheatley  Clinic Number: [id number]  Diagnosis:   No diagnosis found.  Physician: Alena Cuevas PA-C  Treatment Orders: PT Eval and Treat  Past Medical History:   Diagnosis Date    Allergy     Anxiety     Arthritis     BMI 45.0-49.9, adult     Chronic kidney disease     Depression     GERD (gastroesophageal reflux disease)     History of kidney stones     Kidney stones     Migraines     Morbid obesity     Obesity     Sleep apnea in adult     WEARS CPAP, DOESNT KNOW SETTINGS.     Current Outpatient Medications   Medication Sig    b complex vitamins tablet Take 1 tablet by mouth once daily.    cinnamon bark-chromium picolin 500-100 mg-mcg Cap Take by mouth.    cyanocobalamin 500 MCG tablet Take 500 mcg by mouth once daily.    diclofenac sodium (VOLTAREN) 1 % Gel Apply 2 g topically 4 (four) times daily.    docusate sodium (COLACE) 100 MG capsule Take 1 capsule (100 mg total) by mouth 2 (two) times daily as needed for Constipation.    gabapentin (NEURONTIN) 600 MG tablet Take 1 tablet (600 mg total) by mouth 3 (three) times daily.    GLUCOSAMINE HCL/CHONDR APODACA A NA (OSTEO BI-FLEX ORAL) Take by mouth once daily.    meloxicam (MOBIC) 15 MG tablet     multivitamin (ONE DAILY MULTIVITAMIN) per tablet Take 1 tablet by mouth once daily.    omeprazole (PRILOSEC) 40 MG capsule TAKE 1 CAPSULE DAILY    oxyCODONE-acetaminophen (PERCOCET) 5-325 mg per tablet Take 1 tablet by mouth every 4 to 6 hours as needed for Pain.    sertraline (ZOLOFT) 100 MG tablet Take 1 tablet (100 mg total) by mouth once daily.    tiZANidine (ZANAFLEX) 4 MG tablet Take 1 tablet (4 mg total) by mouth every 8 (eight) hours as needed.     No current facility-administered medications for this visit.      Review of patient's allergies indicates:   Allergen Reactions    Dermabond [tissue glues] Rash    Adhesive      Paper tape  usually ok- pulls skin off    Flagyl [metronidazole hcl]      confusion       Precautions: standard  Visit #:15 of 20  PTA Visit #: 2  Time In: 1:50 pm  Time Out: 2:45 pm  Initial Evaluation Date: 12/5/18  POC Expiration: 12/31/18, 1/28/19, 2/25/19    Subjective     Pt reports: B knees stiff  Pain Scale: Soha rates pain at L knee on a scale of 0-10 to be 4 currently.    Objective     Soha received individual therapeutic exercises to develop strength, endurance, ROM, flexibility and posture for 40 minutes including:  Bike  5 min    Quad sets 30x  c/ 5 sec hold NT  SLR c/ 3# ankle weights 3 x10 w/ QS NP  Heel slides 20x c/ 5 sec hold NP  Hamstring stretch c/ strap 3x 30 sec-NP  SAQ c/ #3 ankle weight 3 x 10 NP  Clamshells 3x10 GTB- NP  SL hip abd 3x10 #3 NP  Prone extension 2x10 #3  NP  Prone quad strap stretch 2x1'     Step ups on lvl 3  3x10 w/ knee FL of R  Tap downs with R foot from L2 3x10   Shuttle 2c 3 x 10 L only   Shuttle 4c 3x10   Sit<>stands 3 x 10- high/low 21'-   TRX squats 3x10 NP  Steamboats GTB 3x10 on airex NP  Gastroc stretch wedge stretch 2 x 1 min  Heel raises 3 x 10-  On airex NP  Front lunge 2x10 B on bosu   Lateral lunges 3x10 B    Step up and over level 3 step. 10x 2  crabwalks 2 lap NP  SLS 2x30 NP  March on rebounder w/ SLS 30x   Prone on elbows -NT  Prone press-ups -HEP     Knee protocol- ABRAHAM -NT    Soha received the following manual therapy techniques: Kinesiotape was applied for 0 minutes including:  Tibial anterior and posterior joint mobs- NT  Patellar mobs- L knee   Contract relax technique to promote extension  NP  PROM knee flex NT  Tissue massage for scar mobilization  -NT  Kinesio-tape L lateral patella for patellar stabilization -NT     Cold pack to L knee for 10 minutes        Soha received the following supervised modalities after being cleared for contradictions: TENS:  Soha received TENS electrical stimulation for pain to the L knee . Pt received continuous mode at a  rate of  80 pps for 10 minutes. Soha tolerated treatment well without any adverse effects. -NT    Soha received hot pack for 10 minutes to increase circulation and promote tissue healing.      Written Home Exercises Provided: prone press-ups (see AVS)   Pt demo good understanding of the education provided. Soha demonstrated good return demonstration of activities.     Education provided re: cont HEP  Soha verbalized good understanding of education provided.   No spiritual or educational barriers to learning provided    Functional Limitations Reports - G Codes  Category: mobility  Tool: FOTO Knee Survey  Initial Score: 66% Limitation  12/31/18 : 51% limitation  1/28/19: 45% limitation  Assessment   PT edgardo quan well w/o adverse reaction. Cot to improve w/ L LE strength and stability. Increased shuttle  B band to 4c. VC needed to keep hips level w/ lat step down. Able to perform with good mechanics w/ lvl2 step. Pt prognosis is Good. Pt will continue to benefit from skilled outpatient physical therapy to address the deficits listed in the problem list, provide pt/family education and to maximize pt's level of independence in the home and community environment.     Medical necessity is demonstrated by the following IMPAIRMENTS/PROBLEMS:  1. Increased Pain  2. Decreased Segmental Mobility & Decreased ROM  3. Decreased Core & BLE strength  4. Decreased Flexibility BLE  5. Decreased Tolerance to Functional Activities     Pt's spiritual, cultural and educational needs considered and pt agreeable to plan of care and goals as stated below:      Anticipated Barriers for physical therapy: none     Short Term GOALS: 2 weeks. Pt agrees with goals set.  1. Patient demonstrates independence with HEP. -met 12/31/18  2. Patient demonstrates independence with Postural Awareness. -Met 1/28/19  3. Patient demonstrates independence with body mechanics. -in progress      Long Term GOALS: 4 weeks. Pt agrees with goals set.  1. Patient  demonstrates increased L knee ROM to WNL to improve tolerance to functional activities pain free. -in progress  2. Patient demonstrates increased strength BLE's to 4+/5 or greater to improve tolerance to functional activities pain free.-MET 1/28/19  3. Patient demonstrates improved overall function per FOTO Knee Survey to 49% Limitation or less.   Met 1/28/19     New Goals:1/28/29 4 weeks (2/25/19)  1. Patient demonstrates increased strength BLE's to 5/5 or greater to improve tolerance to functional activities pain free.  2. Pt will demo good quadriceps motor control with step down.      Plan     Continue with established Plan of Care towards PT goals.    Therapist: Kiara Bermudez PTA, DPT  2/28/2019

## 2019-03-04 ENCOUNTER — CLINICAL SUPPORT (OUTPATIENT)
Dept: REHABILITATION | Facility: HOSPITAL | Age: 64
End: 2019-03-04
Payer: COMMERCIAL

## 2019-03-04 PROCEDURE — 97110 THERAPEUTIC EXERCISES: CPT

## 2019-03-04 NOTE — PROGRESS NOTES
Physical Therapy Daily Note         Name: Soha Wheatley  Clinic Number: [id number]  Diagnosis:   No diagnosis found.  Physician: Alena Cuevas PA-C  Treatment Orders: PT Eval and Treat  Past Medical History:   Diagnosis Date    Allergy     Anxiety     Arthritis     BMI 45.0-49.9, adult     Chronic kidney disease     Depression     GERD (gastroesophageal reflux disease)     History of kidney stones     Kidney stones     Migraines     Morbid obesity     Obesity     Sleep apnea in adult     WEARS CPAP, DOESNT KNOW SETTINGS.     Current Outpatient Medications   Medication Sig    b complex vitamins tablet Take 1 tablet by mouth once daily.    cinnamon bark-chromium picolin 500-100 mg-mcg Cap Take by mouth.    cyanocobalamin 500 MCG tablet Take 500 mcg by mouth once daily.    diclofenac sodium (VOLTAREN) 1 % Gel Apply 2 g topically 4 (four) times daily.    docusate sodium (COLACE) 100 MG capsule Take 1 capsule (100 mg total) by mouth 2 (two) times daily as needed for Constipation.    gabapentin (NEURONTIN) 600 MG tablet Take 1 tablet (600 mg total) by mouth 3 (three) times daily.    GLUCOSAMINE HCL/CHONDR APODACA A NA (OSTEO BI-FLEX ORAL) Take by mouth once daily.    meloxicam (MOBIC) 15 MG tablet     multivitamin (ONE DAILY MULTIVITAMIN) per tablet Take 1 tablet by mouth once daily.    omeprazole (PRILOSEC) 40 MG capsule TAKE 1 CAPSULE DAILY    oxyCODONE-acetaminophen (PERCOCET) 5-325 mg per tablet Take 1 tablet by mouth every 4 to 6 hours as needed for Pain.    sertraline (ZOLOFT) 100 MG tablet Take 1 tablet (100 mg total) by mouth once daily.    tiZANidine (ZANAFLEX) 4 MG tablet Take 1 tablet (4 mg total) by mouth every 8 (eight) hours as needed.     No current facility-administered medications for this visit.      Review of patient's allergies indicates:   Allergen Reactions    Dermabond [tissue glues] Rash    Adhesive      Paper tape  usually ok- pulls skin off    Flagyl [metronidazole hcl]      confusion       Precautions: standard  Visit #:16 of 20  PTA Visit #: 3  Time In: 11:17 am  Time Out: 12:00 pm  Initial Evaluation Date: 12/5/18  POC Expiration: 12/31/18, 1/28/19, 2/25/19    Subjective     Pt reports: little tight and swollen  Pain Scale: Soha rates pain at L knee on a scale of 0-10 to be 2 currently.    Objective     Soha received individual therapeutic exercises to develop strength, endurance, ROM, flexibility and posture for 30 minutes including:  Bike  5 min  lvl3  Quad sets 30x  c/ 5 sec hold NT  SLR c/ 3# ankle weights 3 x10 w/ QS NP  Heel slides 20x c/ 5 sec hold NP  Hamstring stretch c/ strap 3x 30 sec-NP  SAQ c/ #3 ankle weight 3 x 10 NP  Clamshells 3x10 GTB- NP  SL hip abd 3x10 #3 NP  Prone extension 2x10 #3  NP  Prone quad strap stretch 2x1'     Step ups on lvl 3  3x10 w/ knee FL of R  Tap downs with R foot from L2 3x10   Shuttle 2c 3 x 10 L only   Shuttle 4c 3x10   Sit<>stands 3 x 10- high/low 21'-   TRX squats 3x10 NP  Steamboats GTB 3x10 on airex NP  Gastroc stretch wedge stretch 2 x 1 min  Heel raises 3 x 10-  On airex NP  Front lunge 2x10 B on bosu   Lateral lunges 3x10 B    Step up and over level 3 step. 10x 2  crabwalks 2 lap NP  SLS 2x30 NP  March on rebounder w/ SLS 30x   Prone on elbows -NT  Prone press-ups -HEP     Knee protocol- ABRAHAM -NT    Soha received the following manual therapy techniques: Kinesiotape was applied for 0 minutes including:  Tibial anterior and posterior joint mobs- NT  Patellar mobs- L knee   Contract relax technique to promote extension  NP  PROM knee flex NT  Tissue massage for scar mobilization  -NT  Kinesio-tape L lateral patella for patellar stabilization -NT     Cold pack to L knee for 10 minutes        Soha received the following supervised modalities after being cleared for contradictions: TENS:  Soha received TENS electrical stimulation for pain to the L knee . Pt received  continuous mode at a rate of  80 pps for 10 minutes. Soha tolerated treatment well without any adverse effects. -NT    Soha received hot pack for 10 minutes to increase circulation and promote tissue healing.      Written Home Exercises Provided: prone press-ups (see AVS)   Pt demo good understanding of the education provided. Soha demonstrated good return demonstration of activities.     Education provided re: cont HEP  Soha verbalized good understanding of education provided.   No spiritual or educational barriers to learning provided    Functional Limitations Reports - G Codes  Category: mobility  Tool: FOTO Knee Survey  Initial Score: 66% Limitation  12/31/18 : 51% limitation  1/28/19: 45% limitation  Assessment   PT edgardo quan well w/o adverse reaction. Pt was 15 min late for appt 2* traffic. Cont to be challenged w/  Stability w/ L LE w/ quan. Conts to progress towards goals.   Pt prognosis is Good. Pt will continue to benefit from skilled outpatient physical therapy to address the deficits listed in the problem list, provide pt/family education and to maximize pt's level of independence in the home and community environment.     Medical necessity is demonstrated by the following IMPAIRMENTS/PROBLEMS:  1. Increased Pain  2. Decreased Segmental Mobility & Decreased ROM  3. Decreased Core & BLE strength  4. Decreased Flexibility BLE  5. Decreased Tolerance to Functional Activities     Pt's spiritual, cultural and educational needs considered and pt agreeable to plan of care and goals as stated below:      Anticipated Barriers for physical therapy: none     Short Term GOALS: 2 weeks. Pt agrees with goals set.  1. Patient demonstrates independence with HEP. -met 12/31/18  2. Patient demonstrates independence with Postural Awareness. -Met 1/28/19  3. Patient demonstrates independence with body mechanics. -in progress      Long Term GOALS: 4 weeks. Pt agrees with goals set.  1. Patient demonstrates increased L knee  ROM to WNL to improve tolerance to functional activities pain free. -in progress  2. Patient demonstrates increased strength BLE's to 4+/5 or greater to improve tolerance to functional activities pain free.-MET 1/28/19  3. Patient demonstrates improved overall function per FOTO Knee Survey to 49% Limitation or less.   Met 1/28/19     New Goals:1/28/29 4 weeks (2/25/19)  1. Patient demonstrates increased strength BLE's to 5/5 or greater to improve tolerance to functional activities pain free.  2. Pt will demo good quadriceps motor control with step down.      Plan     Continue with established Plan of Care towards PT goals.    Therapist: Kiara Bermudez PTA, DPT  3/4/2019

## 2019-03-11 ENCOUNTER — CLINICAL SUPPORT (OUTPATIENT)
Dept: REHABILITATION | Facility: HOSPITAL | Age: 64
End: 2019-03-11
Payer: COMMERCIAL

## 2019-03-11 DIAGNOSIS — Z96.652 S/P TKR (TOTAL KNEE REPLACEMENT), LEFT: Primary | ICD-10-CM

## 2019-03-11 PROCEDURE — 97110 THERAPEUTIC EXERCISES: CPT

## 2019-03-11 NOTE — PROGRESS NOTES
Physical Therapy Daily Note         Name: Soha Wheatley  Clinic Number: [id number]  Diagnosis:   Encounter Diagnosis   Name Primary?    S/P TKR (total knee replacement), left Yes     Physician: Alena Cuevas PA-C  Treatment Orders: PT Eval and Treat  Past Medical History:   Diagnosis Date    Allergy     Anxiety     Arthritis     BMI 45.0-49.9, adult     Chronic kidney disease     Depression     GERD (gastroesophageal reflux disease)     History of kidney stones     Kidney stones     Migraines     Morbid obesity     Obesity     Sleep apnea in adult     WEARS CPAP, DOESNT KNOW SETTINGS.     Current Outpatient Medications   Medication Sig    b complex vitamins tablet Take 1 tablet by mouth once daily.    cinnamon bark-chromium picolin 500-100 mg-mcg Cap Take by mouth.    cyanocobalamin 500 MCG tablet Take 500 mcg by mouth once daily.    diclofenac sodium (VOLTAREN) 1 % Gel Apply 2 g topically 4 (four) times daily.    docusate sodium (COLACE) 100 MG capsule Take 1 capsule (100 mg total) by mouth 2 (two) times daily as needed for Constipation.    gabapentin (NEURONTIN) 600 MG tablet Take 1 tablet (600 mg total) by mouth 3 (three) times daily.    GLUCOSAMINE HCL/CHONDR APODACA A NA (OSTEO BI-FLEX ORAL) Take by mouth once daily.    meloxicam (MOBIC) 15 MG tablet     multivitamin (ONE DAILY MULTIVITAMIN) per tablet Take 1 tablet by mouth once daily.    omeprazole (PRILOSEC) 40 MG capsule TAKE 1 CAPSULE DAILY    oxyCODONE-acetaminophen (PERCOCET) 5-325 mg per tablet Take 1 tablet by mouth every 4 to 6 hours as needed for Pain.    sertraline (ZOLOFT) 100 MG tablet Take 1 tablet (100 mg total) by mouth once daily.    tiZANidine (ZANAFLEX) 4 MG tablet Take 1 tablet (4 mg total) by mouth every 8 (eight) hours as needed.     No current facility-administered medications for this visit.      Review of patient's allergies indicates:   Allergen Reactions     Dermabond [tissue glues] Rash    Adhesive      Paper tape usually ok- pulls skin off    Flagyl [metronidazole hcl]      confusion       Precautions: standard  Visit #:16 of 20  PTA Visit #: 3  Time In: 1:52 am  Time Out: 2:50 pm  Initial Evaluation Date: 12/5/18  POC Expiration: 12/31/18, 1/28/19, 2/25/19, 3/25/19    Subjective     Pt reports: Both legs are feeling a little tight.   Pain Scale: Soha rates pain at L knee on a scale of 0-10 to be 2 currently.    Objective     Soha received individual therapeutic exercises to develop strength, endurance, ROM, flexibility and posture for 40 minutes including:  Bike  5 min  lvl3  Quad sets 30x  c/ 5 sec hold NT  SLR c/ 3# ankle weights 3 x10 w/ QS NP  Heel slides 20x c/ 5 sec hold NP  Hamstring stretch c/ strap 3x 30 sec-NP  SAQ c/ #3 ankle weight 3 x 10 NP  Clamshells 3x10 GTB- NP  SL hip abd 3x10 #3 NP  Prone extension 2x10 #3  NP  Prone quad strap stretch 2x1'     Step ups on lvl 3  3x10 w/ knee FL of R  Tap downs with R foot from L2 3x10   Shuttle 2c 3 x 10 L only   Shuttle 4c 3x10   Sit<>stands 3 x 10- high/low 21'-   TRX squats 3x10   TRX posterior Lunge  Steamboats GTB 3x10 on airex NP  Gastroc stretch wedge stretch 2 x 1 min  Heel raises 3 x 10-  On airex NP  Front lunge 2x10 B on bosu   Lateral lunges 3x10 B    Step up and over level 3 step. 10x 2  crabwalks 2 lap NP  SLS 2x30 NP  March on rebounder w/ SLS 30x   Prone on elbows -NT  Prone press-ups -HEP     Knee protocol- ABRAHAM -NT    Soha received the following manual therapy techniques: Kinesiotape was applied for 0 minutes including:  Tibial anterior and posterior joint mobs- NT  Patellar mobs- L knee   Contract relax technique to promote extension  NP  PROM knee flex NT  Tissue massage for scar mobilization  -NT  Kinesio-tape L lateral patella for patellar stabilization -NT     Cold pack to L knee for 10 minutes        Soha received the following supervised modalities after being cleared for  contradictions: TENS:  Soha received TENS electrical stimulation for pain to the L knee . Pt received continuous mode at a rate of  80 pps for 10 minutes. Soha tolerated treatment well without any adverse effects. -NT    Soha received hot pack for 10 minutes to increase circulation and promote tissue healing.      Written Home Exercises Provided: prone press-ups (see AVS)   Pt demo good understanding of the education provided. Soha demonstrated good return demonstration of activities.     Education provided re: cont HEP  Soha verbalized good understanding of education provided.   No spiritual or educational barriers to learning provided    Functional Limitations Reports - G Codes  Category: mobility  Tool: FOTO Knee Survey  Initial Score: 66% Limitation  12/31/18 : 51% limitation  1/28/19: 45% limitation  Assessment   PT edgardo quan well w/o adverse reaction. Pt was 15 min late for appt 2* traffic. Cont to be challenged w/  Stability w/ L LE w/ quan. L knee PROM measured at 0-125 degrees today. Conts to progress towards goals.   Pt prognosis is Good. Pt will continue to benefit from skilled outpatient physical therapy to address the deficits listed in the problem list, provide pt/family education and to maximize pt's level of independence in the home and community environment.     Medical necessity is demonstrated by the following IMPAIRMENTS/PROBLEMS:  1. Increased Pain  2. Decreased Segmental Mobility & Decreased ROM  3. Decreased Core & BLE strength  4. Decreased Flexibility BLE  5. Decreased Tolerance to Functional Activities     Pt's spiritual, cultural and educational needs considered and pt agreeable to plan of care and goals as stated below:      Anticipated Barriers for physical therapy: none     Short Term GOALS: 2 weeks. Pt agrees with goals set.  1. Patient demonstrates independence with HEP. -met 12/31/18  2. Patient demonstrates independence with Postural Awareness. -Met 1/28/19  3. Patient  demonstrates independence with body mechanics. -in progress      Long Term GOALS: 4 weeks. Pt agrees with goals set.  1. Patient demonstrates increased L knee ROM to WNL to improve tolerance to functional activities pain free. -in progress  2. Patient demonstrates increased strength BLE's to 4+/5 or greater to improve tolerance to functional activities pain free.-MET 1/28/19  3. Patient demonstrates improved overall function per FOTO Knee Survey to 49% Limitation or less.   Met 1/28/19     New Goals:1/28/29 4 weeks (2/25/19)  1. Patient demonstrates increased strength BLE's to 5/5 or greater to improve tolerance to functional activities pain free.  2. Pt will demo good quadriceps motor control with step down.      Plan     Continue with established Plan of Care towards PT goals.    Therapist: Earl Paredes, PT, DPT  3/11/2019

## 2019-03-14 ENCOUNTER — CLINICAL SUPPORT (OUTPATIENT)
Dept: REHABILITATION | Facility: HOSPITAL | Age: 64
End: 2019-03-14
Payer: COMMERCIAL

## 2019-03-14 DIAGNOSIS — Z96.652 S/P TKR (TOTAL KNEE REPLACEMENT), LEFT: Primary | ICD-10-CM

## 2019-03-14 DIAGNOSIS — Z96.652 STATUS POST TOTAL LEFT KNEE REPLACEMENT: Chronic | ICD-10-CM

## 2019-03-14 PROCEDURE — 97110 THERAPEUTIC EXERCISES: CPT

## 2019-03-14 NOTE — PROGRESS NOTES
Physical Therapy Progress Note         Name: Soha Wheatley  Clinic Number: [id number]  Diagnosis:   No diagnosis found.  Physician: Alena Cuevas PA-C  Treatment Orders: PT Eval and Treat  Past Medical History:   Diagnosis Date    Allergy     Anxiety     Arthritis     BMI 45.0-49.9, adult     Chronic kidney disease     Depression     GERD (gastroesophageal reflux disease)     History of kidney stones     Kidney stones     Migraines     Morbid obesity     Obesity     Sleep apnea in adult     WEARS CPAP, DOESNT KNOW SETTINGS.     Current Outpatient Medications   Medication Sig    b complex vitamins tablet Take 1 tablet by mouth once daily.    cinnamon bark-chromium picolin 500-100 mg-mcg Cap Take by mouth.    cyanocobalamin 500 MCG tablet Take 500 mcg by mouth once daily.    diclofenac sodium (VOLTAREN) 1 % Gel Apply 2 g topically 4 (four) times daily.    docusate sodium (COLACE) 100 MG capsule Take 1 capsule (100 mg total) by mouth 2 (two) times daily as needed for Constipation.    gabapentin (NEURONTIN) 600 MG tablet Take 1 tablet (600 mg total) by mouth 3 (three) times daily.    GLUCOSAMINE HCL/CHONDR APODACA A NA (OSTEO BI-FLEX ORAL) Take by mouth once daily.    meloxicam (MOBIC) 15 MG tablet     multivitamin (ONE DAILY MULTIVITAMIN) per tablet Take 1 tablet by mouth once daily.    omeprazole (PRILOSEC) 40 MG capsule TAKE 1 CAPSULE DAILY    oxyCODONE-acetaminophen (PERCOCET) 5-325 mg per tablet Take 1 tablet by mouth every 4 to 6 hours as needed for Pain.    sertraline (ZOLOFT) 100 MG tablet Take 1 tablet (100 mg total) by mouth once daily.    tiZANidine (ZANAFLEX) 4 MG tablet Take 1 tablet (4 mg total) by mouth every 8 (eight) hours as needed.     No current facility-administered medications for this visit.      Review of patient's allergies indicates:   Allergen Reactions    Dermabond [tissue glues] Rash    Adhesive      Paper tape  usually ok- pulls skin off    Flagyl [metronidazole hcl]      confusion       Precautions: standard  Visit #:19 of 20  PTA Visit #: 3  Time In: 2:00 am  Time Out: 3:00  pm  Initial Evaluation Date: 12/5/18  POC Expiration: 12/31/18, 1/28/19, 2/25/19, 3/25/19    Subjective     Pt reports: Both legs are feeling a little tight.   Pain Scale: Soha rates pain at L knee on a scale of 0-10 to be 2 currently.    Objective     Soha received individual therapeutic exercises to develop strength, endurance, ROM, flexibility and posture for 40 minutes including:  Bike  5 min  lvl3  Quad sets 30x  c/ 5 sec hold NT  SLR c/ 3# ankle weights 3 x10 w/ QS NP  Heel slides 20x c/ 5 sec hold NP  Hamstring stretch c/ strap 3x 30 sec-NP  SAQ c/ #3 ankle weight 3 x 10 NP  Clamshells 3x10 GTB- NP  SL hip abd 3x10 #3 NP  Prone extension 2x10 #3  NP  Prone quad strap stretch 2x1'     Step ups on lvl 3  3x10 w/ knee FL of R  Tap downs with R foot from L2 3x10   Shuttle 2c 3 x 10 L only   Shuttle 4c 3x10   Sit<>stands 3 x 10- high/low 21'-   TRX squats 3x10   TRX posterior Lunge  Steamboats GTB 3x10 on airex NP  Gastroc stretch wedge stretch 2 x 1 min  Heel raises 3 x 10-  On airex NP  Front lunge 2x10 B on bosu   Lateral lunges 3x10 B    Step up and over level 3 step. 10x 2  crabwalks 2 lap NP  SLS 2x30 NP  March on rebounder w/ SLS 30x   Prone on elbows -NT  Prone press-ups -HEP     Knee protocol- ABRAHAM -NT  ROM L knee 0-125 degrees     Soha received the following manual therapy techniques: Kinesiotape was applied for 0 minutes including:  Tibial anterior and posterior joint mobs- NT  Patellar mobs- L knee   Contract relax technique to promote extension  NP  PROM knee flex NT  Tissue massage for scar mobilization  -NT  Kinesio-tape L lateral patella for patellar stabilization -NT     Cold pack to L knee for 10 minutes        Soha received the following supervised modalities after being cleared for contradictions: TENS:  Soha received  TENS electrical stimulation for pain to the L knee . Pt received continuous mode at a rate of  80 pps for 10 minutes. Soha tolerated treatment well without any adverse effects. -NT    Soha received hot pack for 10 minutes to increase circulation and promote tissue healing.      Written Home Exercises Provided: prone press-ups (see AVS)   Pt demo good understanding of the education provided. Soha demonstrated good return demonstration of activities.     Education provided re: cont HEP  Soha verbalized good understanding of education provided.   No spiritual or educational barriers to learning provided    Functional Limitations Reports - G Codes  Category: mobility  Tool: FOTO Knee Survey  Initial Score: 66% Limitation  12/31/18 : 51% limitation  1/28/19: 45% limitation  Assessment       Patient has made good progress with ROM. Functional strength still seems to be an issue and she feels as though she does not feel comfortable doing all house work activities even though they have improved. She could benefit from continued PT at this time in order to improve functional strength and activity tolerance for 2 more weeks and then DC to HEP.      Pt prognosis is Good. Pt will continue to benefit from skilled outpatient physical therapy to address the deficits listed in the problem list, provide pt/family education and to maximize pt's level of independence in the home and community environment.     Medical necessity is demonstrated by the following IMPAIRMENTS/PROBLEMS:  1. Increased Pain  2. Decreased Segmental Mobility & Decreased ROM  3. Decreased Core & BLE strength  4. Decreased Flexibility BLE  5. Decreased Tolerance to Functional Activities     Pt's spiritual, cultural and educational needs considered and pt agreeable to plan of care and goals as stated below:      Anticipated Barriers for physical therapy: none     Short Term GOALS: 2 weeks. Pt agrees with goals set.  1. Patient demonstrates independence with  HEP. -met 12/31/18  2. Patient demonstrates independence with Postural Awareness. -Met 1/28/19  3. Patient demonstrates independence with body mechanics. -in progress      Long Term GOALS: 4 weeks. Pt agrees with goals set.  1. Patient demonstrates increased L knee ROM to WNL to improve tolerance to functional activities pain free. -in progress  2. Patient demonstrates increased strength BLE's to 4+/5 or greater to improve tolerance to functional activities pain free.-MET 1/28/19  3. Patient demonstrates improved overall function per FOTO Knee Survey to 49% Limitation or less.   Met 1/28/19     New Goals:1/28/29 4 weeks (2/25/19)  1. Patient demonstrates increased strength BLE's to 5/5 or greater to improve tolerance to functional activities pain free. (progressing not MET)   2. Pt will demo good quadriceps motor control with step down. (progressing not MET)      Plan     Continue with established Plan of Care towards PT goals.    Therapist: Earl Paredes, PT, DPT  3/14/2019     Alena Cuevas PA-C 3/14/2019

## 2019-03-19 ENCOUNTER — CLINICAL SUPPORT (OUTPATIENT)
Dept: REHABILITATION | Facility: HOSPITAL | Age: 64
End: 2019-03-19
Payer: COMMERCIAL

## 2019-03-19 DIAGNOSIS — Z96.652 STATUS POST TOTAL LEFT KNEE REPLACEMENT: Primary | ICD-10-CM

## 2019-03-19 PROCEDURE — 97110 THERAPEUTIC EXERCISES: CPT

## 2019-03-19 PROCEDURE — 97140 MANUAL THERAPY 1/> REGIONS: CPT

## 2019-03-19 NOTE — PROGRESS NOTES
Physical Therapy Daily Note/ d/c         Name: Soha Wheatley  Clinic Number: [id number]  Diagnosis:   Encounter Diagnosis   Name Primary?    Status post total left knee replacement 10/31/2018 Yes     Physician: Alena Cuevas PA-C  Treatment Orders: PT Eval and Treat  Past Medical History:   Diagnosis Date    Allergy     Anxiety     Arthritis     BMI 45.0-49.9, adult     Chronic kidney disease     Depression     GERD (gastroesophageal reflux disease)     History of kidney stones     Kidney stones     Migraines     Morbid obesity     Obesity     Sleep apnea in adult     WEARS CPAP, DOESNT KNOW SETTINGS.     Current Outpatient Medications   Medication Sig    b complex vitamins tablet Take 1 tablet by mouth once daily.    cinnamon bark-chromium picolin 500-100 mg-mcg Cap Take by mouth.    cyanocobalamin 500 MCG tablet Take 500 mcg by mouth once daily.    diclofenac sodium (VOLTAREN) 1 % Gel Apply 2 g topically 4 (four) times daily.    docusate sodium (COLACE) 100 MG capsule Take 1 capsule (100 mg total) by mouth 2 (two) times daily as needed for Constipation.    gabapentin (NEURONTIN) 600 MG tablet Take 1 tablet (600 mg total) by mouth 3 (three) times daily.    GLUCOSAMINE HCL/CHONDR APODACA A NA (OSTEO BI-FLEX ORAL) Take by mouth once daily.    meloxicam (MOBIC) 15 MG tablet     multivitamin (ONE DAILY MULTIVITAMIN) per tablet Take 1 tablet by mouth once daily.    omeprazole (PRILOSEC) 40 MG capsule TAKE 1 CAPSULE DAILY    oxyCODONE-acetaminophen (PERCOCET) 5-325 mg per tablet Take 1 tablet by mouth every 4 to 6 hours as needed for Pain.    sertraline (ZOLOFT) 100 MG tablet Take 1 tablet (100 mg total) by mouth once daily.    tiZANidine (ZANAFLEX) 4 MG tablet Take 1 tablet (4 mg total) by mouth every 8 (eight) hours as needed.     No current facility-administered medications for this visit.      Review of patient's allergies indicates:    Allergen Reactions    Dermabond [tissue glues] Rash    Adhesive      Paper tape usually ok- pulls skin off    Flagyl [metronidazole hcl]      confusion        Precautions: standard  Visit #: 20 of 20  PTA Visit #: 3  Time In: 2:04 pm  Time Out: 3:05  pm  Initial Evaluation Date: 12/5/18  POC Expiration: 12/31/18, 1/28/19, 2/25/19, 3/25/19    Subjective     Pt reports her left leg is hurting and a little swollen today, possibly due to the cold. She said she was limping this morning.  She reports increase in clicking recently.   Pain Scale: Soha rates pain at L knee on a scale of 0-10 to be 4 currently.    Objective     Range of Motion: Knee (degrees)     Left Right   Flexion: 124 (126 PROM) 128   Extension  (0 PROM) 0      Strength: Knee    Left Right   Quadriceps 5/5 5/5   Hamstrings 5/5 5/5         Strength: Hip     Left Right   Iliopsoas 4+/5 4+/5   PGM 5/5 5/5   IR 4+/5 5/5   ER 4+/5* 4+/5   Ext 4+/5 5/5         Soha received individual therapeutic exercises to develop strength, endurance, ROM, flexibility, posture and core stabilization for 43 minutes including:  Bike  5 min  lvl3    Prone quad strap stretch 2x1'      Step ups on lvl 3  3x10 w/ knee FL of R  Tap downs with R foot from L2 3x10   Shuttle 2c 3 x 10 L only   Shuttle 4c 3x10   Sit<>stands 3 x 10- high/low 21'-   TRX squats 3x10   TRX posterior Lunge NP  Steamboats GTB 3x10 on airex   Gastroc stretch wedge stretch 2 x 1 min  Heel raises 3 x 10-  On airex NP  Front lunge 2x10 B on bosu NP  Lateral lunges 3x10 B  NP  Step up and over level 3 step. 10x 2  crabwalks 2 lap OTB  SLS 2x30 NP  March on rebounder w/ SLS 30x     Prone on elbows -NT  Prone press-ups -HEP   Knee protocol- ABRAHAM -NT  ROM L knee 0-125 degrees   Quad sets 30x  c/ 5 sec hold NT  SLR c/ 3# ankle weights 3 x10 w/ QS NP  Heel slides 20x c/ 5 sec hold NP  Hamstring stretch c/ strap 3x 30 sec-NP  SAQ c/ #3 ankle weight 3 x 10 NP  Clamshells 3x10 GTB- NP  SL hip abd 3x10 #3  NP  Prone extension 2x10 #3  NP       Soha received the following manual therapy techniques: Joint mobilizations were applied for 8 minutes including:  Tibial anterior and posterior joint mobs- NT  Patellar mobs- L knee   Contract relax technique to promote extension  NP  PROM knee flex   Tissue massage for scar mobilization  -NT  Kinesio-tape L lateral patella for patellar stabilization -NT     Cold pack to L knee for 10 minutes      Soha received the following supervised modalities after being cleared for contradictions: TENS:  Soha received TENS electrical stimulation for pain to the L knee . Pt received continuous mode at a rate of  80 pps for 10 minutes. Soha tolerated treatment well without any adverse effects. -NT     Soha received hot pack for 10 minutes to increase circulation and promote tissue healing.    Written Home Exercises Provided: at eval and last assessment  Pt demo good understanding of the education provided. Soha demonstrated good return demonstration of activities.     Education provided re: Cont HEP independently  Soha verbalized good understanding of education provided.   No spiritual or educational barriers to learning provided    Functional Limitations Reports - G Codes  Category: mobility  Tool: FOTO Knee Survey  Initial Score: 66% Limitation  12/31/18 : 51% limitation  1/28/19: 45% limitation  Assessment   This is a 63 y.o. female referred to outpatient physical therapy and presents with a medical diagnosis of s/p TKR. Patient tolerated treatment well without adverse effects. Pt demos improvement in strength and ROM WFL upon reassessment.  Pt is able to stand/walk 15-20 min comfortably, able to tolerate an hr with pain. Pt has made good progress with therapy and is ready to be d/c to HEP. Pt prognosis is Good. Pt will no longer benefit from skilled outpatient physical therapy. Pt instructed to continue with HEP and is a member of [employer] and agrees   Medical necessity is  demonstrated by the following IMPAIRMENTS/PROBLEMS:  1. Increased Pain  2. Decreased Segmental Mobility & Decreased ROM  3. Decreased Core & BLE strength  4. Decreased Flexibility BLE  5. Decreased Tolerance to Functional Activities     Pt's spiritual, cultural and educational needs considered and pt agreeable to plan of care and goals as stated below:      Anticipated Barriers for physical therapy: none     Short Term GOALS: 2 weeks. Pt agrees with goals set.  1. Patient demonstrates independence with HEP. -met 12/31/18  2. Patient demonstrates independence with Postural Awareness. -Met 1/28/19  3. Patient demonstrates independence with body mechanics. -MET       Long Term GOALS: 4 weeks. Pt agrees with goals set.  1. Patient demonstrates increased L knee ROM to WNL to improve tolerance to functional activities pain free. -MET, WFL  2. Patient demonstrates increased strength BLE's to 4+/5 or greater to improve tolerance to functional activities pain free.-MET 1/28/19  3. Patient demonstrates improved overall function per FOTO Knee Survey to 49% Limitation or less.   Met 1/28/19     New Goals:1/28/29 4 weeks (2/25/19)  1. Patient demonstrates increased strength BLE's to 5/5 or greater to improve tolerance to functional activities pain free. (progressing well, not MET)   2. Pt will demo good quadriceps motor control with step down. (MET)      Plan     Continue with established Plan of Care towards PT goals.    Therapist: Jeanne Mcgrath, PT, DPT  3/19/2019

## 2019-03-29 ENCOUNTER — PATIENT MESSAGE (OUTPATIENT)
Dept: INTERNAL MEDICINE | Facility: CLINIC | Age: 64
End: 2019-03-29

## 2019-03-29 ENCOUNTER — PATIENT MESSAGE (OUTPATIENT)
Dept: SPINE | Facility: CLINIC | Age: 64
End: 2019-03-29

## 2019-03-29 DIAGNOSIS — M25.569 KNEE PAIN, UNSPECIFIED CHRONICITY, UNSPECIFIED LATERALITY: ICD-10-CM

## 2019-04-01 DIAGNOSIS — M54.2 CERVICALGIA: ICD-10-CM

## 2019-04-01 DIAGNOSIS — M54.12 BRACHIAL NEURITIS OR RADICULITIS: ICD-10-CM

## 2019-04-01 DIAGNOSIS — M47.812 CERVICAL SPONDYLOSIS WITHOUT MYELOPATHY: ICD-10-CM

## 2019-04-01 DIAGNOSIS — M50.30 DEGENERATION OF CERVICAL INTERVERTEBRAL DISC: ICD-10-CM

## 2019-04-01 RX ORDER — OMEPRAZOLE 40 MG/1
40 CAPSULE, DELAYED RELEASE ORAL DAILY
Qty: 30 CAPSULE | Refills: 0 | Status: SHIPPED | OUTPATIENT
Start: 2019-04-01 | End: 2019-09-06 | Stop reason: SDUPTHER

## 2019-04-02 ENCOUNTER — OFFICE VISIT (OUTPATIENT)
Dept: ORTHOPEDICS | Facility: CLINIC | Age: 64
End: 2019-04-02
Payer: COMMERCIAL

## 2019-04-02 ENCOUNTER — HOSPITAL ENCOUNTER (OUTPATIENT)
Dept: RADIOLOGY | Facility: HOSPITAL | Age: 64
Discharge: HOME | End: 2019-04-02
Attending: PHYSICIAN ASSISTANT
Payer: COMMERCIAL

## 2019-04-02 ENCOUNTER — PATIENT MESSAGE (OUTPATIENT)
Dept: SPINE | Facility: CLINIC | Age: 64
End: 2019-04-02

## 2019-04-02 VITALS — BODY MASS INDEX: 31.42 KG/M2 | WEIGHT: 219.44 LBS | HEIGHT: 70 IN

## 2019-04-02 DIAGNOSIS — Z96.652 STATUS POST TOTAL LEFT KNEE REPLACEMENT: Primary | ICD-10-CM

## 2019-04-02 DIAGNOSIS — Z96.652 STATUS POST TOTAL LEFT KNEE REPLACEMENT: ICD-10-CM

## 2019-04-02 DIAGNOSIS — M25.562 LEFT KNEE PAIN, UNSPECIFIED CHRONICITY: ICD-10-CM

## 2019-04-02 PROCEDURE — 3008F BODY MASS INDEX DOCD: CPT | Mod: CPTII,S$GLB,, | Performed by: PHYSICIAN ASSISTANT

## 2019-04-02 PROCEDURE — 99213 OFFICE O/P EST LOW 20 MIN: CPT | Mod: S$GLB,,, | Performed by: PHYSICIAN ASSISTANT

## 2019-04-02 PROCEDURE — 99213 PR OFFICE/OUTPT VISIT, EST, LEVL III, 20-29 MIN: ICD-10-PCS | Mod: S$GLB,,, | Performed by: PHYSICIAN ASSISTANT

## 2019-04-02 PROCEDURE — 73560 X-RAY EXAM OF KNEE 1 OR 2: CPT | Mod: 26,59,LT, | Performed by: RADIOLOGY

## 2019-04-02 PROCEDURE — 73560 X-RAY EXAM OF KNEE 1 OR 2: CPT | Mod: TC,LT

## 2019-04-02 PROCEDURE — 3008F PR BODY MASS INDEX (BMI) DOCUMENTED: ICD-10-PCS | Mod: CPTII,S$GLB,, | Performed by: PHYSICIAN ASSISTANT

## 2019-04-02 PROCEDURE — 99999 PR PBB SHADOW E&M-EST. PATIENT-LVL III: CPT | Mod: PBBFAC,,, | Performed by: PHYSICIAN ASSISTANT

## 2019-04-02 PROCEDURE — 73562 X-RAY EXAM OF KNEE 3: CPT | Mod: 26,LT,, | Performed by: RADIOLOGY

## 2019-04-02 PROCEDURE — 73562 XR KNEE ORTHO LEFT: ICD-10-PCS | Mod: 26,LT,, | Performed by: RADIOLOGY

## 2019-04-02 PROCEDURE — 99999 PR PBB SHADOW E&M-EST. PATIENT-LVL III: ICD-10-PCS | Mod: PBBFAC,,, | Performed by: PHYSICIAN ASSISTANT

## 2019-04-02 PROCEDURE — 73560 XR KNEE ORTHO LEFT: ICD-10-PCS | Mod: 26,59,LT, | Performed by: RADIOLOGY

## 2019-04-02 PROCEDURE — 73562 X-RAY EXAM OF KNEE 3: CPT | Mod: TC,LT

## 2019-04-02 RX ORDER — TIZANIDINE 4 MG/1
4 TABLET ORAL EVERY 8 HOURS PRN
Qty: 270 TABLET | Refills: 0 | Status: SHIPPED | OUTPATIENT
Start: 2019-04-02 | End: 2020-01-22 | Stop reason: SDUPTHER

## 2019-04-02 NOTE — PROGRESS NOTES
"  SUBJECTIVE:     Chief Complaint : left knee pain    History of Present Illness:  Soha Wheatley is a 63 y.o. female seen in clinic today with a chief complaint of left knee pain. Pt had L TKA 5 months ago by Dr. [facility] (10/18/2018). She is having pain in lateral aspect of knee. She has finished formal PT and is working out at the gym. She is doing leg presses, squats, and multiple other lower body weight lifting exercises. She has pain when ambulating that increases when descending stairs. She admits to some swelling and an occasional clicking/popping sensation. She had R TKA 5/2018 and has no right knee pain. She denies hip/groin pain. She is taking Mobic and Tylenol tid.     Past Medical History:   Diagnosis Date    Allergy     Anxiety     Arthritis     BMI 45.0-49.9, adult     Chronic kidney disease     Depression     GERD (gastroesophageal reflux disease)     History of kidney stones     Kidney stones     Migraines     Morbid obesity     Obesity     Sleep apnea in adult     WEARS CPAP, DOESNT KNOW SETTINGS.       Review of Systems:  Constitutional: no fever or chills  ENT: no nasal congestion or sore throat  Respiratory: no cough or shortness of breath  Cardiovascular: no chest pain or palpitations  Gastrointestinal: no nausea or vomiting, tolerating diet    OBJECTIVE:     PHYSICAL EXAM:  Height 5' 10" (1.778 m), weight 99.6 kg (219 lb 7.5 oz).   General Appearance: WDWN, NAD  Gait: Normal  Neuro/Psych: Mood & affect appropriate  Lungs: Respirations equal and unlabored.   CV: 2+ bilateral upper and lower extremity pulses.   Skin: Intact throughout LE  Extremities: No LE edema    Right Knee Exam  Range of Motion:0-120 active   Effusion:none  Condition of skin:intact and + scar  Location of tenderness:None   Strength:5 of 5 quadriceps strength and 5 of 5 hamstring strength  Stability:stable to testing    Left Knee Exam  Range of Motion:0-120 active   Effusion:not significant and yes  Condition " of skin:intact and + scar  Location of tenderness:Medial joint line & lateral femoral condyle  Strength:5 of 5 quadriceps strength and 5 of 5 hamstring strength  Stability:stable to testing    Left Hip Examination: no pain with PROM     RADIOGRAPHS: AP, lateral and merchant knee x-rays ordered and images reviewed today by me reveal well fixed prostheses bilateral knee. There is small lucency in lateral femoral condyle possibly representing stress fracture    ASSESSMENT/PLAN:   Left knee pain 5 months s/p L TKA  Stress fracture  Dr. [facility] interviewed and examined patient today and agrees with plan.   - Rest  - Continue Tylenol prn for pain  - F/u in 6 weeks with new xray

## 2019-04-04 ENCOUNTER — PATIENT MESSAGE (OUTPATIENT)
Dept: ADMINISTRATIVE | Facility: OTHER | Age: 64
End: 2019-04-04

## 2019-04-16 RX ORDER — MELOXICAM 15 MG/1
15 TABLET ORAL DAILY
Qty: 90 TABLET | Refills: 0 | Status: SHIPPED | OUTPATIENT
Start: 2019-04-16 | End: 2019-07-02 | Stop reason: SDUPTHER

## 2019-04-16 NOTE — PROGRESS NOTES
Pt of Alena Cuevas who is currently out of the office. She is requesting a refill of mobic through [facility]. Rx sent as requested.

## 2019-05-09 ENCOUNTER — OFFICE VISIT (OUTPATIENT)
Dept: ORTHOPEDICS | Facility: CLINIC | Age: 64
End: 2019-05-09
Payer: COMMERCIAL

## 2019-05-09 ENCOUNTER — HOSPITAL ENCOUNTER (OUTPATIENT)
Dept: RADIOLOGY | Facility: HOSPITAL | Age: 64
Discharge: HOME | End: 2019-05-09
Attending: ORTHOPAEDIC SURGERY
Payer: COMMERCIAL

## 2019-05-09 VITALS — HEIGHT: 70 IN | WEIGHT: 219 LBS | BODY MASS INDEX: 31.35 KG/M2

## 2019-05-09 DIAGNOSIS — Z96.653 H/O TOTAL KNEE REPLACEMENT, BILATERAL: ICD-10-CM

## 2019-05-09 DIAGNOSIS — Z96.653 H/O TOTAL KNEE REPLACEMENT, BILATERAL: Primary | ICD-10-CM

## 2019-05-09 PROCEDURE — 73562 X-RAY EXAM OF KNEE 3: CPT | Mod: 26,,, | Performed by: RADIOLOGY

## 2019-05-09 PROCEDURE — 99213 PR OFFICE/OUTPT VISIT, EST, LEVL III, 20-29 MIN: ICD-10-PCS | Mod: S$GLB,,, | Performed by: ORTHOPAEDIC SURGERY

## 2019-05-09 PROCEDURE — 99213 OFFICE O/P EST LOW 20 MIN: CPT | Mod: S$GLB,,, | Performed by: ORTHOPAEDIC SURGERY

## 2019-05-09 PROCEDURE — 3008F BODY MASS INDEX DOCD: CPT | Mod: CPTII,S$GLB,, | Performed by: ORTHOPAEDIC SURGERY

## 2019-05-09 PROCEDURE — 73562 X-RAY EXAM OF KNEE 3: CPT | Mod: TC,50

## 2019-05-09 PROCEDURE — 99999 PR PBB SHADOW E&M-EST. PATIENT-LVL III: CPT | Mod: PBBFAC,,, | Performed by: ORTHOPAEDIC SURGERY

## 2019-05-09 PROCEDURE — 99999 PR PBB SHADOW E&M-EST. PATIENT-LVL III: ICD-10-PCS | Mod: PBBFAC,,, | Performed by: ORTHOPAEDIC SURGERY

## 2019-05-09 PROCEDURE — 3008F PR BODY MASS INDEX (BMI) DOCUMENTED: ICD-10-PCS | Mod: CPTII,S$GLB,, | Performed by: ORTHOPAEDIC SURGERY

## 2019-05-09 PROCEDURE — 73562 XR KNEE ORTHO BILAT: ICD-10-PCS | Mod: 26,,, | Performed by: RADIOLOGY

## 2019-05-09 NOTE — PROGRESS NOTES
HPI:    Soha Wheatley is a 63 y.o. female who is here today for   Chief Complaint   Patient presents with    Right Knee - Post-op Evaluation    Left Knee - Pain   .  Right total knee done 05/23/2018 this is the 1 year follow-up in she is doing well with that knee.  Left knee she is having pain with a stress fracture of the lateral femoral condyle.  Her total knee was done in October and the stress fracture occurred probably in February.  She has been doing less strenuous exercises is feeling a little bit better.    Duration: 12 months  Intensity: mild  Character of pain: achy  Location: She reports that the pain is predominately  intermediate    Past Medical History:   Diagnosis Date    Allergy     Anxiety     Arthritis     BMI 45.0-49.9, adult     Chronic kidney disease     Depression     GERD (gastroesophageal reflux disease)     History of kidney stones     Kidney stones     Migraines     Morbid obesity     Obesity     Sleep apnea in adult     WEARS CPAP, DOESNT KNOW SETTINGS.          Current Outpatient Medications:     b complex vitamins tablet, Take 1 tablet by mouth once daily., Disp: , Rfl:     cinnamon bark-chromium picolin 500-100 mg-mcg Cap, Take by mouth., Disp: , Rfl:     cyanocobalamin 500 MCG tablet, Take 500 mcg by mouth once daily., Disp: , Rfl:     gabapentin (NEURONTIN) 600 MG tablet, Take 1 tablet (600 mg total) by mouth 3 (three) times daily., Disp: 270 tablet, Rfl: 4    GLUCOSAMINE HCL/CHONDR APODACA A NA (OSTEO BI-FLEX ORAL), Take by mouth once daily., Disp: , Rfl:     meloxicam (MOBIC) 15 MG tablet, Take 1 tablet (15 mg total) by mouth once daily., Disp: 90 tablet, Rfl: 0    multivitamin (ONE DAILY MULTIVITAMIN) per tablet, Take 1 tablet by mouth once daily., Disp: , Rfl:     omeprazole (PRILOSEC) 40 MG capsule, Take 1 capsule (40 mg total) by mouth once daily., Disp: 30 capsule, Rfl: 0    sertraline (ZOLOFT) 100 MG tablet, Take 1 tablet (100 mg total) by mouth once  "daily., Disp: 90 tablet, Rfl: 3    tiZANidine (ZANAFLEX) 4 MG tablet, Take 1 tablet (4 mg total) by mouth every 8 (eight) hours as needed., Disp: 270 tablet, Rfl: 0    diclofenac sodium (VOLTAREN) 1 % Gel, Apply 2 g topically 4 (four) times daily., Disp: 1 Tube, Rfl: 1    docusate sodium (COLACE) 100 MG capsule, Take 1 capsule (100 mg total) by mouth 2 (two) times daily as needed for Constipation., Disp: 60 capsule, Rfl: 0    oxyCODONE-acetaminophen (PERCOCET) 5-325 mg per tablet, Take 1 tablet by mouth every 4 to 6 hours as needed for Pain., Disp: 40 tablet, Rfl: 0     Review of patient's allergies indicates:   Allergen Reactions    Dermabond [tissue glues] Rash    Adhesive      Paper tape usually ok- pulls skin off    Flagyl [metronidazole hcl]      confusion        ROS  Constitutional: Negative for fever, Negative for weight loss  HENT Negative for congestion  Cardiovascular: Negative chest pain  Respiratory: Negative Shortness of breath  Heme: Negative excessive bleeding  Skin:NegativeItching, Negative breakdown  Musculoskeletal:Negative for back pain, Positive for joint pain, Negative muscle pain, Negative muscle weakness  Neurological: Negative for numbness and paresthesias   Psychiatric/Behavioral: Negative altered mental status, Negative for depression    Physical Exam:   Ht 5' 10" (1.778 m)   Wt 99.3 kg (219 lb)   BMI 31.42 kg/m²   General appearance: This is a well-developed, Well nourished female No obvious acute distress.  Psychiatric: normal mood and affect;  pleasant and conversant; judgment and thought content normal  Gait is coordinated. Patient has antalgic gait to the left  Cardiovascular: There are no swelling or varicosities present.   Respiratory: normal respiratory effort   Lymphatic: no adenopathy   Neurologic: alert and oriented to person, place, and time   Deep Tendon Reflexes are normal;  Coordination and Balance: Normal   Musculoskeletal   Neck    ROM shows normal flexion and " extension and lateral rotation    Palpation: Non-tender    Stability is normal    Strength is normal    Skin is normal without masses and lesions    Sensation is intact to light touch   Back    ROM showsabnormal flexion, extension    and  rotation    Palpation shows no masses    Stability is normal    Strength to flexion and extension well maintained    Core strength is diminished    Skin shows no rashes or cafe au lait spots;     Sensation is intact to light touch    Right Knee  Swelling None  TendernessNone  Range of Motion: 120    Left Knee: Swelling None  TendernessNone  Range of Motion: 115    Radiograph   Both implants in excellent position no signs of loosening or failure.  The small cortical irregularity in the lateral femoral condyle appears to be healed.    Assessment:  Stable total knees.    Plan:  Patient may resume activities as tolerated.  Will have the patient back in 2 months to repeat AP and lateral nonweightbearing left knee.

## 2019-07-02 ENCOUNTER — OFFICE VISIT (OUTPATIENT)
Dept: ORTHOPEDICS | Facility: CLINIC | Age: 64
End: 2019-07-02
Payer: COMMERCIAL

## 2019-07-02 ENCOUNTER — HOSPITAL ENCOUNTER (OUTPATIENT)
Dept: RADIOLOGY | Facility: HOSPITAL | Age: 64
Discharge: HOME | End: 2019-07-02
Attending: ORTHOPAEDIC SURGERY
Payer: COMMERCIAL

## 2019-07-02 VITALS
DIASTOLIC BLOOD PRESSURE: 67 MMHG | SYSTOLIC BLOOD PRESSURE: 108 MMHG | HEART RATE: 69 BPM | BODY MASS INDEX: 31.32 KG/M2 | HEIGHT: 70 IN | WEIGHT: 218.81 LBS

## 2019-07-02 DIAGNOSIS — M84.30XD STRESS FRACTURE WITH ROUTINE HEALING, UNSPECIFIED SITE, SUBSEQUENT ENCOUNTER: Primary | ICD-10-CM

## 2019-07-02 DIAGNOSIS — M84.30XD STRESS FRACTURE WITH ROUTINE HEALING, UNSPECIFIED SITE, SUBSEQUENT ENCOUNTER: ICD-10-CM

## 2019-07-02 PROCEDURE — 3008F BODY MASS INDEX DOCD: CPT | Mod: CPTII,S$GLB,, | Performed by: ORTHOPAEDIC SURGERY

## 2019-07-02 PROCEDURE — 99213 PR OFFICE/OUTPT VISIT, EST, LEVL III, 20-29 MIN: ICD-10-PCS | Mod: S$GLB,,, | Performed by: ORTHOPAEDIC SURGERY

## 2019-07-02 PROCEDURE — 73560 XR KNEE 1 OR 2 VIEW LEFT: ICD-10-PCS | Mod: 26,LT,, | Performed by: RADIOLOGY

## 2019-07-02 PROCEDURE — 99999 PR PBB SHADOW E&M-EST. PATIENT-LVL III: ICD-10-PCS | Mod: PBBFAC,,, | Performed by: ORTHOPAEDIC SURGERY

## 2019-07-02 PROCEDURE — 99999 PR PBB SHADOW E&M-EST. PATIENT-LVL III: CPT | Mod: PBBFAC,,, | Performed by: ORTHOPAEDIC SURGERY

## 2019-07-02 PROCEDURE — 73560 X-RAY EXAM OF KNEE 1 OR 2: CPT | Mod: TC,LT

## 2019-07-02 PROCEDURE — 73560 X-RAY EXAM OF KNEE 1 OR 2: CPT | Mod: 26,LT,, | Performed by: RADIOLOGY

## 2019-07-02 PROCEDURE — 3008F PR BODY MASS INDEX (BMI) DOCUMENTED: ICD-10-PCS | Mod: CPTII,S$GLB,, | Performed by: ORTHOPAEDIC SURGERY

## 2019-07-02 PROCEDURE — 99213 OFFICE O/P EST LOW 20 MIN: CPT | Mod: S$GLB,,, | Performed by: ORTHOPAEDIC SURGERY

## 2019-07-02 RX ORDER — ACETAMINOPHEN 325 MG/1
325 TABLET ORAL EVERY 6 HOURS PRN
COMMUNITY
End: 2021-02-23 | Stop reason: HOSPADM

## 2019-07-02 RX ORDER — MELOXICAM 15 MG/1
15 TABLET ORAL DAILY
Qty: 90 TABLET | Refills: 1 | Status: SHIPPED | OUTPATIENT
Start: 2019-07-02 | End: 2019-11-26 | Stop reason: SDUPTHER

## 2019-07-02 NOTE — PROGRESS NOTES
HPI:    Soha Wheatley is a 63 y.o. female who is here today for   Chief Complaint   Patient presents with    Left Knee - Pain   .  Status post left total knee 10/2018.  Patient had lateral pain I suspect possible stress fracture.  Patient says pain has gotten much better.    Duration: 2 months  Intensity: mild  Character of pain: achy  Location: She reports that the pain is predominately  intermediate    Past Medical History:   Diagnosis Date    Allergy     Anxiety     Arthritis     BMI 45.0-49.9, adult     Chronic kidney disease     Depression     GERD (gastroesophageal reflux disease)     History of kidney stones     Kidney stones     Migraines     Morbid obesity     Obesity     Sleep apnea in adult     WEARS CPAP, DOESNT KNOW SETTINGS.          Current Outpatient Medications:     acetaminophen (TYLENOL) 325 MG tablet, Take 325 mg by mouth every 6 (six) hours as needed for Pain., Disp: , Rfl:     b complex vitamins tablet, Take 1 tablet by mouth once daily., Disp: , Rfl:     cinnamon bark-chromium picolin 500-100 mg-mcg Cap, Take by mouth., Disp: , Rfl:     cyanocobalamin 500 MCG tablet, Take 500 mcg by mouth once daily., Disp: , Rfl:     gabapentin (NEURONTIN) 600 MG tablet, Take 1 tablet (600 mg total) by mouth 3 (three) times daily., Disp: 270 tablet, Rfl: 4    GLUCOSAMINE HCL/CHONDR APODACA A NA (OSTEO BI-FLEX ORAL), Take by mouth once daily., Disp: , Rfl:     meloxicam (MOBIC) 15 MG tablet, Take 1 tablet (15 mg total) by mouth once daily., Disp: 90 tablet, Rfl: 1    multivitamin (ONE DAILY MULTIVITAMIN) per tablet, Take 1 tablet by mouth once daily., Disp: , Rfl:     omeprazole (PRILOSEC) 40 MG capsule, Take 1 capsule (40 mg total) by mouth once daily., Disp: 30 capsule, Rfl: 0    sertraline (ZOLOFT) 100 MG tablet, Take 1 tablet (100 mg total) by mouth once daily., Disp: 90 tablet, Rfl: 3    tiZANidine (ZANAFLEX) 4 MG tablet, Take 1 tablet (4 mg total) by mouth every 8 (eight) hours as  "needed., Disp: 270 tablet, Rfl: 0    diclofenac sodium (VOLTAREN) 1 % Gel, Apply 2 g topically 4 (four) times daily., Disp: 1 Tube, Rfl: 1    docusate sodium (COLACE) 100 MG capsule, Take 1 capsule (100 mg total) by mouth 2 (two) times daily as needed for Constipation., Disp: 60 capsule, Rfl: 0    oxyCODONE-acetaminophen (PERCOCET) 5-325 mg per tablet, Take 1 tablet by mouth every 4 to 6 hours as needed for Pain., Disp: 40 tablet, Rfl: 0     Review of patient's allergies indicates:   Allergen Reactions    Dermabond [tissue glues] Rash    Adhesive      Paper tape usually ok- pulls skin off    Flagyl [metronidazole hcl]      confusion        ROS  Constitutional: Negative for fever, Negative for weight loss  HENT Negative for congestion  Cardiovascular: Negative chest pain  Respiratory: Negative Shortness of breath  Heme: Negative excessive bleeding  Skin:NegativeItching, Negative breakdown  Musculoskeletal:Negative for back pain, Positive for joint pain, Negative muscle pain, Negative muscle weakness  Neurological: Negative for numbness and paresthesias   Psychiatric/Behavioral: Negative altered mental status, Negative for depression    Physical Exam:   /67 (BP Location: Left arm, Patient Position: Sitting, BP Method: Medium (Automatic))   Pulse 69   Ht 5' 10" (1.778 m)   Wt 99.2 kg (218 lb 12.9 oz)   BMI 31.40 kg/m²   General appearance: This is a well-developed, Well nourished female No obvious acute distress.  Psychiatric: normal mood and affect;  pleasant and conversant; judgment and thought content normal  Gait is coordinated.   Cardiovascular: There are no swelling or varicosities present.   Respiratory: normal respiratory effort   Lymphatic: no adenopathy   Neurologic: alert and oriented to person, place, and time   Deep Tendon Reflexes are normal;  Coordination and Balance: Normal   Musculoskeletal   Neck    ROM shows normal flexion and extension and lateral rotation    Palpation: " Non-tender    Stability is normal    Strength is normal    Skin is normal without masses and lesions    Sensation is intact to light touch   Back    ROM showsnormal flexion, extension    and  rotation    Palpation shows no masses    Stability is normal    Strength to flexion and extension well maintained    Core strength is diminished    Skin shows no rashes or cafe au lait spots;     Sensation is intact to light touch    Right Knee  Swelling None  TendernessNone  Range of Motion: 120    Left Knee: Swelling None  TendernessNone  Range of Motion: 120    Radiograph   Implant in excellent position no signs of loosening or failure.  No fractures visible.    Assessment:  Resolved left knee pain.    Plan:  Resume normal activities.

## 2019-07-11 NOTE — ANESTHESIA POSTPROCEDURE EVALUATION
"Anesthesia Post Evaluation    Patient: Soha Wheatley    Procedure(s) Performed: Procedure(s) (LRB):  REPLACEMENT-KNEE-TOTAL (Right)    Final Anesthesia Type: spinal  Patient location during evaluation: PACU  Patient participation: Yes- Able to Participate  Level of consciousness: awake and alert  Post-procedure vital signs: reviewed and stable  Pain management: adequate  Airway patency: patent  PONV status at discharge: No PONV  Anesthetic complications: no      Cardiovascular status: blood pressure returned to baseline  Respiratory status: unassisted  Hydration status: euvolemic  Follow-up not needed.        Visit Vitals  BP (!) 104/58   Pulse (!) 55   Temp 36.5 °C (97.7 °F) (Oral)   Resp 11   Ht 5' 10" (1.778 m)   Wt 115.2 kg (254 lb)   SpO2 100%   Breastfeeding? No   BMI 36.45 kg/m²       Pain/Alfredo Score: Pain Assessment Performed: Yes (5/23/2018 12:56 PM)  Presence of Pain: complains of pain/discomfort (5/23/2018 12:56 PM)  Pain Rating Prior to Med Admin: 8 (5/23/2018 11:39 AM)  Alfredo Score: 10 (5/23/2018 12:56 PM)      "
"Anesthesia Post Evaluation    Patient: Soha Wheatley    Procedure(s) Performed: Procedure(s) (LRB):  REPLACEMENT-KNEE-TOTAL (Right)    Final Anesthesia Type: spinal  Patient location during evaluation: PACU  Patient participation: Yes- Able to Participate  Level of consciousness: awake and alert and oriented  Post-procedure vital signs: reviewed and stable  Pain management: adequate  Airway patency: patent  PONV status at discharge: No PONV  Anesthetic complications: no      Cardiovascular status: blood pressure returned to baseline  Respiratory status: unassisted and spontaneous ventilation  Hydration status: euvolemic  Follow-up not needed.        Visit Vitals  BP (!) 103/54   Pulse 61   Temp 36.5 °C (97.7 °F) (Oral)   Resp (!) 23   Ht 5' 10" (1.778 m)   Wt 115.2 kg (254 lb)   SpO2 99%   Breastfeeding? No   BMI 36.45 kg/m²       Pain/Alfredo Score: Pain Assessment Performed: Yes (5/23/2018 12:56 PM)  Presence of Pain: complains of pain/discomfort (5/23/2018 12:56 PM)  Pain Rating Prior to Med Admin: 8 (5/23/2018 11:39 AM)  Alfredo Score: 10 (5/23/2018 12:56 PM)      "
1

## 2019-09-06 ENCOUNTER — CLINICAL SUPPORT (OUTPATIENT)
Dept: INTERNAL MEDICINE | Facility: CLINIC | Age: 64
End: 2019-09-06
Payer: COMMERCIAL

## 2019-09-06 ENCOUNTER — OFFICE VISIT (OUTPATIENT)
Dept: INTERNAL MEDICINE | Facility: CLINIC | Age: 64
End: 2019-09-06
Payer: COMMERCIAL

## 2019-09-06 ENCOUNTER — LAB VISIT (OUTPATIENT)
Dept: LAB | Facility: HOSPITAL | Age: 64
End: 2019-09-06
Attending: INTERNAL MEDICINE
Payer: COMMERCIAL

## 2019-09-06 VITALS
OXYGEN SATURATION: 99 % | SYSTOLIC BLOOD PRESSURE: 114 MMHG | HEART RATE: 69 BPM | DIASTOLIC BLOOD PRESSURE: 64 MMHG | HEIGHT: 70 IN | BODY MASS INDEX: 31.62 KG/M2 | WEIGHT: 220.88 LBS

## 2019-09-06 DIAGNOSIS — F41.9 ANXIETY: Primary | ICD-10-CM

## 2019-09-06 DIAGNOSIS — F41.9 ANXIETY: ICD-10-CM

## 2019-09-06 DIAGNOSIS — Z12.31 ENCOUNTER FOR SCREENING MAMMOGRAM FOR BREAST CANCER: ICD-10-CM

## 2019-09-06 DIAGNOSIS — M25.569 KNEE PAIN, UNSPECIFIED CHRONICITY, UNSPECIFIED LATERALITY: ICD-10-CM

## 2019-09-06 DIAGNOSIS — Z98.84 BARIATRIC SURGERY STATUS: ICD-10-CM

## 2019-09-06 DIAGNOSIS — Z12.11 ENCOUNTER FOR SCREENING COLONOSCOPY: ICD-10-CM

## 2019-09-06 DIAGNOSIS — Z82.49 FAMILY HISTORY OF EARLY CAD: ICD-10-CM

## 2019-09-06 PROBLEM — R00.2 PALPITATIONS: Status: RESOLVED | Noted: 2017-06-01 | Resolved: 2019-09-06

## 2019-09-06 PROBLEM — M17.12 PRIMARY OSTEOARTHRITIS OF LEFT KNEE: Status: RESOLVED | Noted: 2018-10-31 | Resolved: 2019-09-06

## 2019-09-06 PROBLEM — R60.9 EDEMA: Status: RESOLVED | Noted: 2018-05-09 | Resolved: 2019-09-06

## 2019-09-06 PROBLEM — M17.11 PRIMARY OSTEOARTHRITIS OF RIGHT KNEE: Status: RESOLVED | Noted: 2018-05-23 | Resolved: 2019-09-06

## 2019-09-06 LAB
ALBUMIN SERPL BCP-MCNC: 4.2 G/DL (ref 3.5–5.2)
ALP SERPL-CCNC: 60 U/L (ref 55–135)
ALT SERPL W/O P-5'-P-CCNC: 15 U/L (ref 10–44)
ANION GAP SERPL CALC-SCNC: 5 MMOL/L (ref 8–16)
AST SERPL-CCNC: 16 U/L (ref 10–40)
BASOPHILS # BLD AUTO: 0.02 K/UL (ref 0–0.2)
BASOPHILS NFR BLD: 0.3 % (ref 0–1.9)
BILIRUB SERPL-MCNC: 0.3 MG/DL (ref 0.1–1)
BUN SERPL-MCNC: 25 MG/DL (ref 8–23)
CALCIUM SERPL-MCNC: 10 MG/DL (ref 8.7–10.5)
CHLORIDE SERPL-SCNC: 103 MMOL/L (ref 95–110)
CHOLEST SERPL-MCNC: 224 MG/DL (ref 120–199)
CHOLEST/HDLC SERPL: 3.1 {RATIO} (ref 2–5)
CO2 SERPL-SCNC: 34 MMOL/L (ref 23–29)
CREAT SERPL-MCNC: 0.8 MG/DL (ref 0.5–1.4)
DIFFERENTIAL METHOD: ABNORMAL
EOSINOPHIL # BLD AUTO: 0.1 K/UL (ref 0–0.5)
EOSINOPHIL NFR BLD: 1.8 % (ref 0–8)
ERYTHROCYTE [DISTWIDTH] IN BLOOD BY AUTOMATED COUNT: 13.2 % (ref 11.5–14.5)
EST. GFR  (AFRICAN AMERICAN): >60 ML/MIN/1.73 M^2
EST. GFR  (NON AFRICAN AMERICAN): >60 ML/MIN/1.73 M^2
GLUCOSE SERPL-MCNC: 98 MG/DL (ref 70–110)
HCT VFR BLD AUTO: 37 % (ref 37–48.5)
HDLC SERPL-MCNC: 72 MG/DL (ref 40–75)
HDLC SERPL: 32.1 % (ref 20–50)
HGB BLD-MCNC: 11.8 G/DL (ref 12–16)
LDLC SERPL CALC-MCNC: 121.6 MG/DL (ref 63–159)
LYMPHOCYTES # BLD AUTO: 2.7 K/UL (ref 1–4.8)
LYMPHOCYTES NFR BLD: 39 % (ref 18–48)
MCH RBC QN AUTO: 30.7 PG (ref 27–31)
MCHC RBC AUTO-ENTMCNC: 31.9 G/DL (ref 32–36)
MCV RBC AUTO: 96 FL (ref 82–98)
MONOCYTES # BLD AUTO: 0.7 K/UL (ref 0.3–1)
MONOCYTES NFR BLD: 10.1 % (ref 4–15)
NEUTROPHILS # BLD AUTO: 3.3 K/UL (ref 1.8–7.7)
NEUTROPHILS NFR BLD: 48.8 % (ref 38–73)
NONHDLC SERPL-MCNC: 152 MG/DL
PLATELET # BLD AUTO: 212 K/UL (ref 150–350)
PMV BLD AUTO: 9.8 FL (ref 9.2–12.9)
POTASSIUM SERPL-SCNC: 4.6 MMOL/L (ref 3.5–5.1)
PROT SERPL-MCNC: 7.1 G/DL (ref 6–8.4)
RBC # BLD AUTO: 3.84 M/UL (ref 4–5.4)
SODIUM SERPL-SCNC: 142 MMOL/L (ref 136–145)
TRIGL SERPL-MCNC: 152 MG/DL (ref 30–150)
WBC # BLD AUTO: 6.84 K/UL (ref 3.9–12.7)

## 2019-09-06 PROCEDURE — 99999 PR PBB SHADOW E&M-EST. PATIENT-LVL III: CPT | Mod: PBBFAC,,, | Performed by: INTERNAL MEDICINE

## 2019-09-06 PROCEDURE — 90686 IIV4 VACC NO PRSV 0.5 ML IM: CPT | Mod: S$GLB,,, | Performed by: INTERNAL MEDICINE

## 2019-09-06 PROCEDURE — 99999 PR PBB SHADOW E&M-EST. PATIENT-LVL III: ICD-10-PCS | Mod: PBBFAC,,, | Performed by: INTERNAL MEDICINE

## 2019-09-06 PROCEDURE — 80061 LIPID PANEL: CPT

## 2019-09-06 PROCEDURE — 36415 COLL VENOUS BLD VENIPUNCTURE: CPT

## 2019-09-06 PROCEDURE — 99999 PR PBB SHADOW E&M-EST. PATIENT-LVL I: ICD-10-PCS | Mod: PBBFAC,,,

## 2019-09-06 PROCEDURE — 85025 COMPLETE CBC W/AUTO DIFF WBC: CPT

## 2019-09-06 PROCEDURE — 99396 PREV VISIT EST AGE 40-64: CPT | Mod: 25,S$GLB,, | Performed by: INTERNAL MEDICINE

## 2019-09-06 PROCEDURE — 90686 FLU VACCINE (QUAD) GREATER THAN OR EQUAL TO 3YO PRESERVATIVE FREE IM: ICD-10-PCS | Mod: S$GLB,,, | Performed by: INTERNAL MEDICINE

## 2019-09-06 PROCEDURE — 90471 FLU VACCINE (QUAD) GREATER THAN OR EQUAL TO 3YO PRESERVATIVE FREE IM: ICD-10-PCS | Mod: S$GLB,,, | Performed by: INTERNAL MEDICINE

## 2019-09-06 PROCEDURE — 99999 PR PBB SHADOW E&M-EST. PATIENT-LVL I: CPT | Mod: PBBFAC,,,

## 2019-09-06 PROCEDURE — 90471 IMMUNIZATION ADMIN: CPT | Mod: S$GLB,,, | Performed by: INTERNAL MEDICINE

## 2019-09-06 PROCEDURE — 80053 COMPREHEN METABOLIC PANEL: CPT

## 2019-09-06 PROCEDURE — 99396 PR PREVENTIVE VISIT,EST,40-64: ICD-10-PCS | Mod: 25,S$GLB,, | Performed by: INTERNAL MEDICINE

## 2019-09-06 RX ORDER — OMEPRAZOLE 40 MG/1
40 CAPSULE, DELAYED RELEASE ORAL DAILY
Qty: 90 CAPSULE | Refills: 3 | Status: SHIPPED | OUTPATIENT
Start: 2019-09-06 | End: 2020-08-31

## 2019-09-06 RX ORDER — SERTRALINE HYDROCHLORIDE 100 MG/1
100 TABLET, FILM COATED ORAL DAILY
Qty: 90 TABLET | Refills: 3 | Status: SHIPPED | OUTPATIENT
Start: 2019-09-06 | End: 2020-08-17

## 2019-09-06 NOTE — PROGRESS NOTES
"Soha Wheatley is a 63  y.o. female who  has a past medical history of Allergy; Anxiety; Arthritis; obesity,  Chronic kidney disease; Depression; GERD (gastroesophageal reflux disease); History of kidney stones;   Migraines;  And  Sleep apnea in adult., presenting for follow up.      Patient had laproscopic gastric sleeve surgery on 12/29/2017. Surgery had no complications. Scars are healing well. She has lost over 80 lbs since the surgery. She is taking all prescribed vitamins and following instructions on diet. She is struggling with drinking her 8 glasses of water a day, right now is drinking about 4-6. She recognizes she needs to separate when she drinks from when she eats in order to increase her oral intake. She has a  hx of kidney stones.   She has lost 15 # since I  Saw her last year.          S/p bilateral total knee replacement in 2018.  Her knees have been bothing her.  .SHe had a stress fracture in her left knee -  She is walking more. She is taking tylenol and mobic for her knee pain.           Patient has new onset back pain. She was recently babysitting her grand daughter who is 13 lbs and was picking her up and putting her down throughout the day, causing this pain.      Review Of Systems:  Review of Systems   Constitutional: Positive for weight loss. Negative for chills and fever.   Eyes: Negative for blurred vision.   Cardiovascular: Negative for chest pain and palpitations.   Gastrointestinal:  . Negative for abdominal pain, constipation, heartburn, nausea and vomiting.   Musculoskeletal: Negative for back pain and joint pain.   Skin: Negative for itching and rash.              Objective      /64 (BP Location: Left arm, Patient Position: Sitting, BP Method: Large (Manual))   Pulse 69   Ht 5' 10" (1.778 m)   Wt 100.2 kg (220 lb 14.4 oz)   SpO2 99%   BMI 31.70 kg/m²                   Physical Exam:  Physical Exam   Constitutional: She appears well-developed and well-nourished.   HEENT: "  PERRL. Hearing is good.  Oral- good dentition.      Cardiovascular: Normal rate, regular rhythm and normal heart sounds.    Pulmonary/Chest: Effort normal and breath sounds normal.   Abdominal: Soft. Bowel sounds are normal. There is       Well healed scars  From her knee replacement            ASSESSMENT/PLAN:      Plan     1. Obesity --S/p gastric sleeve  - f/u is scheduled with bariatrics once a year in Dec- about to head back to the gym.   Discussed appropriate weight loss-      2. Osteoarthritis  - s.p bilateral knee replacement.        Will set up form mmg and flu and shingrix

## 2019-11-04 RX ORDER — GABAPENTIN 600 MG/1
600 TABLET ORAL 3 TIMES DAILY
Qty: 270 TABLET | Refills: 4 | Status: SHIPPED | OUTPATIENT
Start: 2019-11-04 | End: 2021-01-05 | Stop reason: SDUPTHER

## 2019-11-24 ENCOUNTER — PATIENT OUTREACH (OUTPATIENT)
Dept: ADMINISTRATIVE | Facility: OTHER | Age: 64
End: 2019-11-24

## 2019-11-25 ENCOUNTER — TELEPHONE (OUTPATIENT)
Dept: ORTHOPEDICS | Facility: CLINIC | Age: 64
End: 2019-11-25

## 2019-11-26 ENCOUNTER — OFFICE VISIT (OUTPATIENT)
Dept: ORTHOPEDICS | Facility: CLINIC | Age: 64
End: 2019-11-26
Payer: COMMERCIAL

## 2019-11-26 VITALS
HEART RATE: 64 BPM | WEIGHT: 220.13 LBS | DIASTOLIC BLOOD PRESSURE: 61 MMHG | BODY MASS INDEX: 31.51 KG/M2 | SYSTOLIC BLOOD PRESSURE: 108 MMHG | HEIGHT: 70 IN

## 2019-11-26 DIAGNOSIS — Z96.652 STATUS POST TOTAL LEFT KNEE REPLACEMENT: Primary | ICD-10-CM

## 2019-11-26 PROCEDURE — 99999 PR PBB SHADOW E&M-EST. PATIENT-LVL III: ICD-10-PCS | Mod: PBBFAC,,, | Performed by: ORTHOPAEDIC SURGERY

## 2019-11-26 PROCEDURE — 3008F BODY MASS INDEX DOCD: CPT | Mod: CPTII,S$GLB,, | Performed by: ORTHOPAEDIC SURGERY

## 2019-11-26 PROCEDURE — 99214 OFFICE O/P EST MOD 30 MIN: CPT | Mod: S$GLB,,, | Performed by: ORTHOPAEDIC SURGERY

## 2019-11-26 PROCEDURE — 3008F PR BODY MASS INDEX (BMI) DOCUMENTED: ICD-10-PCS | Mod: CPTII,S$GLB,, | Performed by: ORTHOPAEDIC SURGERY

## 2019-11-26 PROCEDURE — 99999 PR PBB SHADOW E&M-EST. PATIENT-LVL III: CPT | Mod: PBBFAC,,, | Performed by: ORTHOPAEDIC SURGERY

## 2019-11-26 PROCEDURE — 99214 PR OFFICE/OUTPT VISIT, EST, LEVL IV, 30-39 MIN: ICD-10-PCS | Mod: S$GLB,,, | Performed by: ORTHOPAEDIC SURGERY

## 2019-11-26 RX ORDER — MELOXICAM 15 MG/1
15 TABLET ORAL DAILY
Qty: 90 TABLET | Refills: 1 | Status: SHIPPED | OUTPATIENT
Start: 2019-11-26 | End: 2020-06-09

## 2019-11-26 NOTE — PROGRESS NOTES
HPI:    Soha Wheatley is a 63 y.o. female who is here today for   Chief Complaint   Patient presents with    Right Knee - Pain     Rt. tka pain # 5   .  Patient is here for 1 year follow-up on the left total knee 10/31/2018  That knees doing well.  The below knee is her right knee which was done 05/23/2018.  She has a new pain in that knee.    Duration: 2 months  Intensity: moderate  Character of pain: sharp  Location: She reports that the pain is predominately  Superior pole of the patella.    Past Medical History:   Diagnosis Date    Allergy     Anxiety     Arthritis     BMI 45.0-49.9, adult     Chronic kidney disease     Depression     GERD (gastroesophageal reflux disease)     History of kidney stones     Kidney stones     Migraines     Morbid obesity     Obesity     Sleep apnea in adult     WEARS CPAP, DOESNT KNOW SETTINGS.          Current Outpatient Medications:     acetaminophen (TYLENOL) 325 MG tablet, Take 325 mg by mouth every 6 (six) hours as needed for Pain., Disp: , Rfl:     b complex vitamins tablet, Take 1 tablet by mouth once daily., Disp: , Rfl:     cinnamon bark-chromium picolin 500-100 mg-mcg Cap, Take by mouth., Disp: , Rfl:     cyanocobalamin 500 MCG tablet, Take 500 mcg by mouth once daily., Disp: , Rfl:     diclofenac sodium (VOLTAREN) 1 % Gel, Apply 2 g topically 4 (four) times daily., Disp: 1 Tube, Rfl: 1    gabapentin (NEURONTIN) 600 MG tablet, Take 1 tablet (600 mg total) by mouth 3 (three) times daily., Disp: 270 tablet, Rfl: 4    GLUCOSAMINE HCL/CHONDR APODACA A NA (OSTEO BI-FLEX ORAL), Take by mouth once daily., Disp: , Rfl:     meloxicam (MOBIC) 15 MG tablet, Take 1 tablet (15 mg total) by mouth once daily., Disp: 90 tablet, Rfl: 1    multivitamin (ONE DAILY MULTIVITAMIN) per tablet, Take 1 tablet by mouth once daily., Disp: , Rfl:     omeprazole (PRILOSEC) 40 MG capsule, Take 1 capsule (40 mg total) by mouth once daily., Disp: 90 capsule, Rfl: 3    sertraline  "(ZOLOFT) 100 MG tablet, Take 1 tablet (100 mg total) by mouth once daily., Disp: 90 tablet, Rfl: 3    tiZANidine (ZANAFLEX) 4 MG tablet, Take 1 tablet (4 mg total) by mouth every 8 (eight) hours as needed., Disp: 270 tablet, Rfl: 0     Review of patient's allergies indicates:   Allergen Reactions    Dermabond [tissue glues] Rash    Adhesive      Paper tape usually ok- pulls skin off    Flagyl [metronidazole hcl]      confusion        ROS  Constitutional: Negative for fever, Negative for weight loss  HENT Negative for congestion  Cardiovascular: Negative chest pain  Respiratory: Negative Shortness of breath  Heme: Negative excessive bleeding  Skin:NegativeItching, Negative breakdown  Musculoskeletal:Negative for back pain, Positive for joint pain, Positive muscle pain, Positive muscle weakness  Neurological: Negative for numbness and paresthesias   Psychiatric/Behavioral: Negative altered mental status, Negative for depression    Physical Exam:   /61 (BP Location: Left arm, Patient Position: Sitting, BP Method: Large (Automatic))   Pulse 64   Ht 5' 10" (1.778 m)   Wt 99.9 kg (220 lb 2.1 oz)   BMI 31.59 kg/m²   General appearance: This is a well-developed, Well nourished female No obvious acute distress.  Psychiatric: normal mood and affect;  pleasant and conversant; judgment and thought content normal  Gait is coordinated. Patient has antalgic gait to the right  Cardiovascular: There are no swelling or varicosities present.   Respiratory: normal respiratory effort   Lymphatic: no adenopathy   Neurologic: alert and oriented to person, place, and time   Deep Tendon Reflexes are normal;  Coordination and Balance: Normal   Musculoskeletal   Neck    ROM shows normal flexion and extension and lateral rotation    Palpation: Non-tender    Stability is normal    Strength is normal    Skin is normal without masses and lesions    Sensation is intact to light touch   Back    ROM showsnormal flexion, extension "    and  rotation    Palpation shows no masses    Stability is normal    Strength to flexion and extension well maintained    Core strength is diminished    Skin shows no rashes or cafe au lait spots;     Sensation is intact to light touch    Right Knee  Swelling None  TendernessPatella  Range of Motion: 120    Left Knee: Swelling None  TendernessNone  Range of Motion: 120    Radiograph   X-rays taken a few months ago showed normal total knee arthroplasties.    Assessment:  Quadriceps tendinitis.    Plan:  Detail swat and deep knee bends.

## 2019-12-17 ENCOUNTER — OFFICE VISIT (OUTPATIENT)
Dept: INTERNAL MEDICINE | Facility: CLINIC | Age: 64
End: 2019-12-17
Payer: COMMERCIAL

## 2019-12-17 VITALS
TEMPERATURE: 98 F | DIASTOLIC BLOOD PRESSURE: 62 MMHG | BODY MASS INDEX: 31.75 KG/M2 | HEIGHT: 70 IN | WEIGHT: 221.81 LBS | SYSTOLIC BLOOD PRESSURE: 118 MMHG | HEART RATE: 64 BPM

## 2019-12-17 DIAGNOSIS — J32.9 SINUSITIS, UNSPECIFIED CHRONICITY, UNSPECIFIED LOCATION: Primary | ICD-10-CM

## 2019-12-17 PROCEDURE — 3008F BODY MASS INDEX DOCD: CPT | Mod: CPTII,S$GLB,, | Performed by: PHYSICIAN ASSISTANT

## 2019-12-17 PROCEDURE — 99999 PR PBB SHADOW E&M-EST. PATIENT-LVL IV: CPT | Mod: PBBFAC,,, | Performed by: PHYSICIAN ASSISTANT

## 2019-12-17 PROCEDURE — 3008F PR BODY MASS INDEX (BMI) DOCUMENTED: ICD-10-PCS | Mod: CPTII,S$GLB,, | Performed by: PHYSICIAN ASSISTANT

## 2019-12-17 PROCEDURE — 99213 PR OFFICE/OUTPT VISIT, EST, LEVL III, 20-29 MIN: ICD-10-PCS | Mod: S$GLB,,, | Performed by: PHYSICIAN ASSISTANT

## 2019-12-17 PROCEDURE — 99213 OFFICE O/P EST LOW 20 MIN: CPT | Mod: S$GLB,,, | Performed by: PHYSICIAN ASSISTANT

## 2019-12-17 PROCEDURE — 99999 PR PBB SHADOW E&M-EST. PATIENT-LVL IV: ICD-10-PCS | Mod: PBBFAC,,, | Performed by: PHYSICIAN ASSISTANT

## 2019-12-17 RX ORDER — PREDNISONE 10 MG/1
TABLET ORAL
Qty: 9 TABLET | Refills: 0 | Status: SHIPPED | OUTPATIENT
Start: 2019-12-17 | End: 2021-02-01

## 2019-12-17 RX ORDER — AMOXICILLIN AND CLAVULANATE POTASSIUM 875; 125 MG/1; MG/1
1 TABLET, FILM COATED ORAL EVERY 12 HOURS
Qty: 20 TABLET | Refills: 0 | Status: SHIPPED | OUTPATIENT
Start: 2019-12-17 | End: 2019-12-27

## 2019-12-17 NOTE — PROGRESS NOTES
Subjective:       Patient ID: Soha Wheatley is a 64 y.o. female.    Chief Complaint: Pressure Behind the Eyes; Otalgia (both); Nasal Congestion (x3days); and Headache (pain=6)    Patient presents with a five-day history of sinus pain, sinus pressure and severe sore throat.  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  She complains of subjective fever starting this morning but denies any measured temperature.  She denies any shortness of breath or wheezing.  She has been taking Tylenol Severe cold with some relief.  She denies any recent travel    Review of Systems   Constitutional: Negative for activity change, appetite change, chills, fatigue and fever.   HENT: Positive for congestion, postnasal drip, rhinorrhea and sore throat. Negative for ear discharge, ear pain, hearing loss, nosebleeds, trouble swallowing and voice change.    Eyes: Negative for discharge, redness and visual disturbance.   Respiratory: Positive for cough. Negative for chest tightness, shortness of breath and wheezing.    Cardiovascular: Negative for chest pain and leg swelling.   Gastrointestinal: Negative.    Musculoskeletal: Negative for neck pain.       Objective:      Physical Exam   Constitutional: She appears well-developed and well-nourished. No distress.   HENT:   Head: Normocephalic and atraumatic.   Right Ear: External ear normal.   Left Ear: External ear normal.   Mouth/Throat: Uvula is midline, oropharynx is clear and moist and mucous membranes are normal. No oral lesions. No uvula swelling. No oropharyngeal exudate, posterior oropharyngeal edema or posterior oropharyngeal erythema.   Eyes: Pupils are equal, round, and reactive to light. Conjunctivae and EOM are normal.   Neck: Normal range of motion. Neck supple. No thyromegaly present.   Cardiovascular: Normal rate, regular rhythm and normal heart sounds. Exam reveals no gallop and no friction rub.   No murmur  heard.  Pulmonary/Chest: Effort normal and breath sounds normal. No respiratory distress. She has no wheezes. She has no rales.   Abdominal: Soft. Bowel sounds are normal. There is no tenderness.   Skin: She is not diaphoretic.       Assessment:       1. Sinusitis, unspecified chronicity, unspecified location        Plan:       Soha was seen today for pressure behind the eyes, otalgia, nasal congestion and headache.    Diagnoses and all orders for this visit:    Sinusitis, unspecified chronicity, unspecified location  -     amoxicillin-clavulanate 875-125mg (AUGMENTIN) 875-125 mg per tablet; Take 1 tablet by mouth every 12 (twelve) hours. for 10 days  -     predniSONE (DELTASONE) 10 MG tablet; Take 2 pills a day for 3 days then 1 pill a day for 3 days

## 2020-01-22 DIAGNOSIS — M54.2 CERVICALGIA: ICD-10-CM

## 2020-01-22 DIAGNOSIS — M50.30 DEGENERATION OF CERVICAL INTERVERTEBRAL DISC: ICD-10-CM

## 2020-01-22 DIAGNOSIS — M54.12 BRACHIAL NEURITIS OR RADICULITIS: ICD-10-CM

## 2020-01-22 DIAGNOSIS — M47.812 CERVICAL SPONDYLOSIS WITHOUT MYELOPATHY: ICD-10-CM

## 2020-01-23 RX ORDER — TIZANIDINE 4 MG/1
4 TABLET ORAL EVERY 8 HOURS PRN
Qty: 270 TABLET | Refills: 0 | Status: SHIPPED | OUTPATIENT
Start: 2020-01-23 | End: 2020-11-10 | Stop reason: SDUPTHER

## 2020-07-28 ENCOUNTER — TELEPHONE (OUTPATIENT)
Dept: BARIATRICS | Facility: CLINIC | Age: 65
End: 2020-07-28

## 2020-07-28 ENCOUNTER — PATIENT MESSAGE (OUTPATIENT)
Dept: BARIATRICS | Facility: CLINIC | Age: 65
End: 2020-07-28

## 2020-07-28 DIAGNOSIS — Z98.84 S/P LAPAROSCOPIC SLEEVE GASTRECTOMY: Primary | ICD-10-CM

## 2020-07-28 DIAGNOSIS — R79.89 LOW VITAMIN D LEVEL: ICD-10-CM

## 2020-07-28 DIAGNOSIS — K21.9 GASTROESOPHAGEAL REFLUX DISEASE, ESOPHAGITIS PRESENCE NOT SPECIFIED: ICD-10-CM

## 2020-07-28 NOTE — TELEPHONE ENCOUNTER
Attempted to call pt to get visit changed to virtual no answer. LVM and messaged on portal.    Yumiko Boucher PA-C

## 2020-07-29 ENCOUNTER — PATIENT OUTREACH (OUTPATIENT)
Dept: ADMINISTRATIVE | Facility: OTHER | Age: 65
End: 2020-07-29

## 2020-08-12 ENCOUNTER — PATIENT OUTREACH (OUTPATIENT)
Dept: ADMINISTRATIVE | Facility: OTHER | Age: 65
End: 2020-08-12

## 2020-08-12 ENCOUNTER — OFFICE VISIT (OUTPATIENT)
Dept: BARIATRICS | Facility: CLINIC | Age: 65
End: 2020-08-12
Payer: COMMERCIAL

## 2020-08-12 ENCOUNTER — CLINICAL SUPPORT (OUTPATIENT)
Dept: BARIATRICS | Facility: CLINIC | Age: 65
End: 2020-08-12
Payer: COMMERCIAL

## 2020-08-12 VITALS
SYSTOLIC BLOOD PRESSURE: 144 MMHG | WEIGHT: 234.13 LBS | HEART RATE: 77 BPM | BODY MASS INDEX: 33.52 KG/M2 | DIASTOLIC BLOOD PRESSURE: 67 MMHG | HEIGHT: 70 IN

## 2020-08-12 DIAGNOSIS — E66.9 OBESITY (BMI 30-39.9): Primary | ICD-10-CM

## 2020-08-12 PROCEDURE — 99499 NO LOS: ICD-10-PCS | Mod: S$GLB,,, | Performed by: DIETITIAN, REGISTERED

## 2020-08-12 PROCEDURE — 99213 PR OFFICE/OUTPT VISIT, EST, LEVL III, 20-29 MIN: ICD-10-PCS | Mod: S$GLB,,, | Performed by: SURGERY

## 2020-08-12 PROCEDURE — 3008F BODY MASS INDEX DOCD: CPT | Mod: CPTII,S$GLB,, | Performed by: SURGERY

## 2020-08-12 PROCEDURE — 99999 PR PBB SHADOW E&M-EST. PATIENT-LVL IV: CPT | Mod: PBBFAC,,, | Performed by: SURGERY

## 2020-08-12 PROCEDURE — 99999 PR PBB SHADOW E&M-EST. PATIENT-LVL II: CPT | Mod: PBBFAC,,, | Performed by: DIETITIAN, REGISTERED

## 2020-08-12 PROCEDURE — 99999 PR PBB SHADOW E&M-EST. PATIENT-LVL II: ICD-10-PCS | Mod: PBBFAC,,, | Performed by: DIETITIAN, REGISTERED

## 2020-08-12 PROCEDURE — 99213 OFFICE O/P EST LOW 20 MIN: CPT | Mod: S$GLB,,, | Performed by: SURGERY

## 2020-08-12 PROCEDURE — 3008F PR BODY MASS INDEX (BMI) DOCUMENTED: ICD-10-PCS | Mod: CPTII,S$GLB,, | Performed by: SURGERY

## 2020-08-12 PROCEDURE — 99999 PR PBB SHADOW E&M-EST. PATIENT-LVL IV: ICD-10-PCS | Mod: PBBFAC,,, | Performed by: SURGERY

## 2020-08-12 PROCEDURE — 99499 UNLISTED E&M SERVICE: CPT | Mod: S$GLB,,, | Performed by: DIETITIAN, REGISTERED

## 2020-08-12 NOTE — PROGRESS NOTES
"  BARIATRIC POST-OPERATIVE FOLLOW UP:    HPI:  64 y.o. female presents for fu s/p sleeve in 2017.  She is doing well overall and has lost sig weight but she has seen some weigth gain in the last several months.  She is here to discuss getting back on track.  For the most part she know what to do, she just wanted to get back in the system for some support.  Her daughter has been at home from college and the food in the house has not been healthy.  She is excited to get the kitchen back to hers and get back on track.     Denies: nausea, vomiting, abdominal pain, changes in bowel movement pattern, fever, chills, dysphagia, chest pain, and shortness of breath.    PHYSICAL EXAM:  BP (!) 144/67   Pulse 77   Ht 5' 10" (1.778 m)   Wt 106.2 kg (234 lb 2.1 oz)   BMI 33.59 kg/m²   Presurgery Weight: 315 lbs  Excess Weight: 159  Weight History Current Weight Total Weight Loss EWL   12/6/2017 315 0 0   1/9/2018 286 29 0.18   1/30/2018 279 36 0.23   3/16/2018 265.14 49.86 0.31   6/18/2018 247.1 67.9 0.43   12/7/2018 224.3 90.7 0.57   8/12/2020 234 81 0.51       GENERAL: In NAD, A&O x3  ABDOMEN: Soft, non-tender, non-distended. Well-healing surgical scars are clean, dry, and intact without signs of infection or bleeding.  EXTREMITIES: No clubbing, cyanosis, or edema.      ASSESSMENT:  - Morbid obesity s/p sleeve gastrectomy  2.5 years ago.  - Great Weight loss, but has gained recently    PLAN:  - Emphasized the importance of regular exercise and adherence to bariatric diet to achieve maximum weight loss.  - Encouraged patient to begin OR continue regular exercise.  - Follow-up with dietician to reinforce diet, may need a few monthly visits to make sure she is back on track.  - Continue daily vitamins and medications.  - RTC in one year or sooner if needed.  - Call the office for any issues.  - Check labs today.  "

## 2020-08-12 NOTE — PROGRESS NOTES
NUTRITION NOTE    Referring Physician: Andrew Holloway M.D.  Reason for MNT Referral: Follow-up 2 years s/p Gastric Sleeve  Recent wt gain.  Pandemic and  had covid    PAST MEDICAL HISTORY:    Denies nausea, vomiting, constipation and diarrhea.  Reports problems, including weight regain.    Past Medical History:   Diagnosis Date    Allergy     Anxiety     Arthritis     BMI 45.0-49.9, adult     Chronic kidney disease     Depression     GERD (gastroesophageal reflux disease)     History of kidney stones     Kidney stones     Migraines     Morbid obesity     Obesity     Sleep apnea in adult     WEARS CPAP, DOESNT KNOW SETTINGS.       CLINICAL DATA:  64 y.o. female.    There were no vitals filed for this visit.    Current Weight: 234 lbs  BMI: 33.59  Total Weight Loss: 81 lbs  Excess Weight Loss: 51%    LABS:  no recent     CURRENT DIET:  Regular Diet.  Diet Recall: Pt has been off track with eating and not able describe with any specificity what she has been eating.  EXERCISE:  None since covid    VITAMINS / MINERALS:  Adherent at times with taking all her vitamins and minerals    ASSESSMENT:  Doing poorly overall.  Weight gain.  Excess calorie intake.  Inadequate protein intake.  Adequate fluid intake.  Following diet inappropriately.  Not exercising.  Inadequate vitamins & minerals.    BARIATRIC DIET DISCUSSION:  Instructed and provided written materials on bariatric diet plan.  Reinforced post-op nutrition guidelines.    PLAN / RECOMMENDATIONS:  May begin to incorporate raw vegetables, lettuce, unsalted nuts, and light popcorn as tolerated.  May begin to swallow whole pills as tolerated.  Back on track with diet plan.  Adjust diet by following regular bariatric meal plan of 800-1000 calories per day with  gms of protein per day.  Increase protein intake.  Maintain fluid intake.  Begin exercise.  Continue appropriate vitamins & minerals.  Adjust vitamins & minerals by take consistently  daily.    Return to clinic in 1 months.    SESSION TIME: 30 minutes

## 2020-08-12 NOTE — PROGRESS NOTES
LINKS immunization registry updated  Care Everywhere updated  Health Maintenance updated  Chart reviewed for overdue [facility] health maintenance testing  Chart/Media/DIS searched: outside mammogram report  Orders entered: N/A  Portal message sent to patient with scheduling link for mammogram 7/29/20

## 2020-08-12 NOTE — PATIENT INSTRUCTIONS
Bariatric Diet Grocery List      High in Protein:   ? Canned tuna or chicken (packed in water)  ? Lean ground turkey breast or ground round  ? Turkey or chicken (no skin)  ? Lean pork or beef   ? Scrambled, poached, or boiled eggs  ? Baked or broiled fish and seafood (not fried!)  ? Beans, edamame and lentils  ? Low fat deli meats: turkey, chicken, ham, roast beef  ? 1% or Skim Milk, Lactaid, or Soymilk  ? Low-fat cheese, cottage cheese, mozzarella string cheese, ricotta  ? Light yogurt, FF/SF frozen yogurt, custard, SF pudding  ? Protein drinks and protein bars with 0-4 grams sugar     Fruits, Vegetables and Snacks   - Green beans, broccoli, cauliflower, spinach, asparagus, carrots, lima beans, yellow squash, zucchini, etc.  - Apple, pineapple, peach, grapes, banana, watermelon, oranges, etc.  - Fruit canned in its own juice or in water (not in syrup)  - Raw veggies  - Lettuce: dark greens like spinach and Adeel  - Unsalted Nuts  - Delmar Links beef jerky     Fluids:   Skim/1% milk, Lactaid, Soymilk  Sugar-free beverages  (decaf and non-carbonated)  Decaf coffee & decaf tea   Water     Menu Plan: 800-1000 Calories;  grams of Protein    DAY 1     Breakfast  ½ cup 2% cottage cheese  ¼ cup fruit (no sugar added)    Snack  2% mozzarella string cheese  10 grapes    Lunch  2oz Lean hamburger or turkey karyn  1 slice low-fat cheese  ¼ cup green beans    Snack  200 calorie low-carb protein drink (4 grams sugar or less)    Dinner  2oz chicken thigh  ¼ cup cooked spinach     Snack  [facility] (15g protein)      DAY 2    Breakfast  1 egg with 1oz shredded cheddar cheese and 2T salsa    Snack  200 calorie low-carb protein drink (4 grams sugar or less)    Lunch  Lettuce Wraps: 2oz sliced turkey, 1 slice low-fat Swiss cheese, tomato, and mustard wrapped in a Adeel lettuce leaf    Snack  ½ cup low-fat cottage cheese  Pear cup (no sugar added)    Dinner  2oz baked fish  ½ cup cooked broccoli    Snack  Sugar-free pudding  cup      DAY 3    Breakfast   ½ cup low-fat ricotta cheese w/ Splenda to quirino  ½ scoop Vanilla protein powder   ¼ cup fresh fruit    Snack  2% string cheese  6 unsalted almonds    Lunch  Tuna/Chicken Salad: 2oz canned tuna/chicken, 1 egg white, and 1 tsp light lozano  Pineapple cup (no sugar added)    Snack  200 calorie low-carb protein drink (4 grams sugar or less)    Dinner  ½ baked pork chop   ¼ cup beans      DAY 4    Breakfast  200 calorie low-carb protein drink (4 grams sugar or less)    Snack  Boiled egg    Lunch  ½ cup grilled shrimp  Salad w/ 2 tbsp crumbled fat-free feta  1 tbsp light vinaigrette    Snack  200 calorie low-carb protein drink (4 grams sugar or less)    Dinner  ¾ cup red beans    Snack  Mini Babybell light      DAY 5    Breakfast  [place] pie: 3oz Greek yogurt, 1 tbsp Splenda, ½ individual pack Crystal Light lemonade. Top with ¼ cup chopped walnuts     Snack  3-4 lean ham or turkey slices, ¼ - ½ cup fruit    Lunch  Fiesta Chicken: 2oz canned chicken, 1oz shredded cheddar cheese, ¼ cup black beans  Top with 2 tbsp salsa and a small dollop light sour cream    Snack  200 calorie low-carb protein drink (4 grams sugar or less)    Dinner  Omelette: ¼ cup Egg Beaters, 4 large (1oz) shrimp, 1oz shredded low-fat cheese. Add bell pepper, onion, mushrooms, green onions, or salsa, optional.      DAY 6    Breakfast  1 safia or 2 links turkey sausage  ½ cup fruit    Snack  200 calorie low-carb protein drink (4 grams sugar or less)    Lunch  Grilled tilapia  Salad of baby spinach leaves with light dressing    Snack  200 calorie low-carb protein drink (4 grams sugar or less)    Dinner  Chicken thigh simmered in 98% fat free cream of mushroom soup  ½ cup cooked green beans  Meal Ideas for Regular Bariatric Diet  *Recipes and products available at [email or web address].com      Breakfast: (15-20g protein)    - Egg white omelet: 2 egg whites or ½ cup Egg Beaters. (Optional proteins: cheese, shrimp, black beans,  chicken, sliced turkey) (Optional veggies: tomatoes, salsa, spinach, mushrooms, onions, green peppers, or small slice avocado)     - Egg and sausage: 1 egg or ¼ cup Egg Beaters (any variety), with 1 safia or 2 links of Turkey sausage or Veggie breakfast sausage (Sift Science or Bvents)    - Crust-less breakfast quiche: To make a glass pie dish, mix 4oz part skim Ricotta, 1 cup skim milk, and 2 eggs as your base. Add protein: shredded cheese, sliced lean ham or turkey, turkey mir/sausage. Add veggies: tomato, onion, green onion, mushroom, green pepper, spinach, etc.    - Yogurt parfait: Mix 1 - 6oz container Dannon Light N Fit vanilla yogurt, with ¼ cup crushed unsalted nuts    - Cottage cheese and fruit: ½ cup part-skim cottage cheese or ricotta cheese topped with fresh fruit or sugar free preserves     - Yamilka Bagley's Vanilla Egg custard* (add 2 Tbsp instant coffee granules to make Cappuccino Custard*)    - Hi-Protein café latte (skim milk, decaf coffee, 1 scoop protein powder). Optional to add Sugar free syrup or extract flavoring.    - Breakfast Lox: spread fat free cream cheese on slices of smoked salmon. Serve over scrambled or egg over easy (sauteed with nonstick cookspray) OR on a cucumber slice    - Eggwhich: Scramble or cook 1 large egg over easy using nonstick cookspray. Place between 2 slices of Norwegian mir and low fat cheese.     Lunch: (20-30g protein)    - ½ cup Black bean soup (Homemade or Progresso), with ¼ cup shredded low-fat cheese. Top with chopped tomato or fresh salsa.     - Lean deli turkey breast and low-fat sliced cheese, mustard or light lozano to moisten, rolled up together, or wrapped in a Adeel lettuce leaf    - Chicken salad made from dinner leftovers, moisten with low-fat salad dressing or light lozano. Also try leftover salmon, shrimp, tuna or boiled eggs. Serve ½ cup over dark green salad    - Fat-free canned refried beans, topped with ¼ cup shredded low-fat cheese. Top with  chopped tomato or fresh salsa.     - Greek salad: Top mixed greens with 1-2oz grilled chicken, tomatoes, red onions, 2-3 kalamata olives, and sprinkle lightly with feta cheese. Spritz with Balsamic vinegar to taste.     - Crust-less lunch quiche: To make a glass pie dish, mix 4oz part skim Ricotta, 1 cup skim milk, and 2 eggs as your base. Add protein: shredded cheese, sliced lean ham or turkey, shrimp, chicken. Add veggies: tomato, onion, green onion, mushroom, green pepper, spinach, artichoke, broccoli, etc.    - Pizza bake: spread a  carlos kanika mushroom with tomato sauce, low-fat shredded mozzarella and turkey pepperoni or [place] mir. Add any veggies. Roast for 10-15 minutes, until cheese melted.     - Cucumber crab bites: Spread ¼ cup crab dip (lump crabmeat + light cream cheese and green onions) over sliced cucumber.     - Chicken with light spinach and artichoke dip*: Puree in : 6oz cooked and drained spinach, 2 cloves garlic, 1 can cannelloni beans, ½ cup chopped green onions, 1 can drained artichoke hearts (not marinated in oil), lemon juice and basil. Mix in 2oz chopped up chicken.    Supper: (20-30g protein)    - Serve grilled fish over dark green salad tossed with low-fat dressing, served with grilled asparagus peterson     - Rotisserie chicken salad: served with sliced strawberries, walnuts, fat-free feta cheese crumbles and 1 tbsp Changs Own Light Raspberry [place] Vinaigrette    - Shrimp cocktail: Dip cold boiled shrimp in homemade low-sugar cocktail sauce (1/2 cup Wolf One Carb ketchup, 2 tbsp horseradish, 1/4 tsp hot sauce, 1 tsp Worcestershire sauce, 1 tbsp freshly-squeezed lemon juice). Serve with dark green salad, walnuts, and crumbled blue cheese drizzled with olive oil and Balsamic vinegar    - Tuna Melt: Spread tuna salad onto 2 thick slices of tomato. Top with low-fat cheese and broil until cheese is melted. May also be made with chicken salad of shrimp salad. [place]  with different types of cheeses.    - Chicken or beef fajitas (no tortilla, rice, beans, chips). Top meat and veggies w/ fresh salsa, fat free sour cream.     - Homemade low-fat Chili using extra lean ground beef or ground turkey. Top with shredded cheese and salsa as desired. May add dollop fat-free sour cream if desired    - Chicken parmesan: Top chicken breast w/ low sugar marinara sauce, mozzarella cheese and bake until chicken reaches 165*.  Serve w/ spaghetti SQUASH or English cut green beans    - Dinner Omelet with shrimp or chicken and onion, green peppers and chives.    - No noodle lasagna: Use sliced zucchini or eggplant in place of noodles.  Layer with part skim ricotta cheese and low sugar meat sauce (use very lean ground beef or ground turkey).    - Mexican chicken bake: Bake chunks of chicken breast or thigh with taco seasoning, Pace brand enchilada sauce, green onions and low-fat cheese. Serve with ¼ cup black beans or fat free refried beans topped with chopped tomatoes or salsa.    - Maurice frozen meatballs, simmered in Classico Marinara sauce. Different flavors of salsa or spaghetti sauce create different dishes! Sprinkle with parmesan cheese. Serve with grilled or steamed veggies, or a dark green salad.    - Simmer boneless skinless chicken thigh chunks in Classico Marinara sauce or roasted salsa until tender with chopped onion, bell pepper, garlic, mushrooms, spinach, etc.     - Hamburger or veggie burger, without the bun, dressed the way you like. Served with grilled or steamed veggies.    - Eggplant parmesan: Bake slices of eggplant at 350 degrees for 15 minutes. Layer tomato sauce, sliced eggplant and low-fat mozzarella cheese in a baking dish and cover with foil. Bake 30-40 more minutes or until bubbly. Uncover and bake at 400 degrees for about 15 more minutes, or until top is slightly crisp.    - Fish tacos: grilled/baked white fish, wrapped in Adeel lettuce leaf, topped with salsa,  shredded low-fat cheese, and light coleslaw.    - Chicken wanda: Sprinkle chicken w/ 1 tsp of hidden [employer] ranch dip mix. Then grill chicken and top with black beans, salsa and 1 tsp fat free sour cream.     - Cauliflower pizza crust: Use cauliflower as crust (see recipe on pinterest, no flour!). Top w/ low fat cheese, turkey pepperoni and veggies and bake again    - chicken or turkey crust pizza: use ground chicken or turkey instead of cauliflower, spread in [place] and bake at 350 for about 20-30 minutes(may want to add garlic, black pepper, oregano and other herbs to ground meat mixture).  Remove and top w/ low fat cheese, turkey pepperoni and veggies and bake again for another 10 minutes or until cheese is browned.     Snacks: (100-200 calories; >5g protein)    - 1 low-fat cheese stick with 8 cherry tomatoes or 1 serving fresh fruit  - 4 thin slices fat-free turkey breast and 1 slice low-fat cheese  - 4 thin slices fat-free honey ham with wedge of melon  - 6-8 edamame pods (equivalent to about 1/4 cup edamame without pods).   - 1/4 cup unsalted nuts with ½ cup fruit  - 6-oz container Dannon Light n Fit vanilla yogurt, topped with 1oz unsalted nuts         - apple, celery or baby carrots spread with 2 Tbsp PB2  - apple slices with 1 oz slice low-fat cheese  - Apple slices dipped in 2 Tbsp of PB2  - celery, cucumber, bell pepper or baby carrots dipped in ¼ cup hummus bean spread or light spinach and artichoke dip (*recipe in lunch section)  - celery, cucumber, baby carrots dipped in high protein greek yogurt (Mix 16 oz plain greek yogurt + 1 packet of hidden [place] ranch dip mix)  - Delmar Links Beef Steak - 14g protein! (similar to beef jerky)  - 2 wedges L[place] - Light Herb & Garlic Cheese with sliced cucumber or green bell pepper  - 1/2 cup low-fat cottage cheese with ¼ cup fruit or ¼ cup salsa  - RTD Protein drinks: Atkins, Low Carb Slim Fast, EAS light, Muscle Milk Light, etc.  - Homemade Protein  drinks: [facility] Soy95, Isopure, Nectar, UNJURY, Whey Gourmet, etc. Mix 1 scoop powder with 8oz skim/1% milk or light soymilk.  - Protein bars: Atkins, EAS, Pure Protein, Think Thin, Detour, etc. Must have 0-4 grams sugar - Read the label.    Takeout Options: No more than twice/week  Deli - Salads (no pasta or rice), meats, cheeses. Roasted chicken. Lox (salmon)    Mexican - Platters which don't include tortillas, chips, or rice. Go easy on the beans. Example: Fajitas without the tortillas. Ask the  not to bring chips to the table if they are too tempting.    Greek - Meat or fish and vegetable, but no bread or rice. Including hummus, baba ganoush, etc, is OK. Most sit-down Greek restaurants can provide you with cucumber slices for dipping instead of alberto bread.    Fast Food (Avoid as much as possible) - Salads (no croutons and limit salad dressing to 2 tbsp), grilled chicken sandwich without the bun and ask for no lozano. Mary Ellens low fat chili or Taco Bell pintos and cheese.    BBQ - The meats are fine if you ask for sauces on the side, but most of the traditional side dishes are loaded with carbs. Víctor slaw, baked beans and BBQ sauce are typically made with sugar.    Chinese - Nothing deep-fried, no rice or noodles. Many Chinese sauces have starch and sugar in them, so you'll have to use your judgement. If you find that these sauces trigger cravings, or cause Dumping, you can ask for the sauce to be made without sugar or just use soy sauce.

## 2020-08-14 ENCOUNTER — LAB VISIT (OUTPATIENT)
Dept: LAB | Facility: HOSPITAL | Age: 65
End: 2020-08-14
Attending: SURGERY
Payer: COMMERCIAL

## 2020-08-14 DIAGNOSIS — Z98.84 S/P LAPAROSCOPIC SLEEVE GASTRECTOMY: ICD-10-CM

## 2020-08-14 DIAGNOSIS — K21.9 GASTROESOPHAGEAL REFLUX DISEASE, ESOPHAGITIS PRESENCE NOT SPECIFIED: ICD-10-CM

## 2020-08-14 DIAGNOSIS — R79.89 LOW VITAMIN D LEVEL: ICD-10-CM

## 2020-08-14 LAB
25(OH)D3+25(OH)D2 SERPL-MCNC: 46 NG/ML (ref 30–96)
ALBUMIN SERPL BCP-MCNC: 4.2 G/DL (ref 3.5–5.2)
ALP SERPL-CCNC: 68 U/L (ref 55–135)
ALT SERPL W/O P-5'-P-CCNC: 15 U/L (ref 10–44)
ANION GAP SERPL CALC-SCNC: 7 MMOL/L (ref 8–16)
AST SERPL-CCNC: 18 U/L (ref 10–40)
BASOPHILS # BLD AUTO: 0.02 K/UL (ref 0–0.2)
BASOPHILS NFR BLD: 0.4 % (ref 0–1.9)
BILIRUB SERPL-MCNC: 0.4 MG/DL (ref 0.1–1)
BUN SERPL-MCNC: 23 MG/DL (ref 8–23)
CALCIUM SERPL-MCNC: 10.2 MG/DL (ref 8.7–10.5)
CHLORIDE SERPL-SCNC: 104 MMOL/L (ref 95–110)
CHOLEST SERPL-MCNC: 250 MG/DL (ref 120–199)
CHOLEST/HDLC SERPL: 3.5 {RATIO} (ref 2–5)
CO2 SERPL-SCNC: 30 MMOL/L (ref 23–29)
CREAT SERPL-MCNC: 0.8 MG/DL (ref 0.5–1.4)
DIFFERENTIAL METHOD: ABNORMAL
EOSINOPHIL # BLD AUTO: 0.1 K/UL (ref 0–0.5)
EOSINOPHIL NFR BLD: 1.6 % (ref 0–8)
ERYTHROCYTE [DISTWIDTH] IN BLOOD BY AUTOMATED COUNT: 12.5 % (ref 11.5–14.5)
EST. GFR  (AFRICAN AMERICAN): >60 ML/MIN/1.73 M^2
EST. GFR  (NON AFRICAN AMERICAN): >60 ML/MIN/1.73 M^2
GLUCOSE SERPL-MCNC: 94 MG/DL (ref 70–110)
HCT VFR BLD AUTO: 40.7 % (ref 37–48.5)
HDLC SERPL-MCNC: 72 MG/DL (ref 40–75)
HDLC SERPL: 28.8 % (ref 20–50)
HGB BLD-MCNC: 12.2 G/DL (ref 12–16)
IMM GRANULOCYTES # BLD AUTO: 0.01 K/UL (ref 0–0.04)
IMM GRANULOCYTES NFR BLD AUTO: 0.2 % (ref 0–0.5)
IRON SERPL-MCNC: 72 UG/DL (ref 30–160)
LDLC SERPL CALC-MCNC: 158.6 MG/DL (ref 63–159)
LYMPHOCYTES # BLD AUTO: 2.2 K/UL (ref 1–4.8)
LYMPHOCYTES NFR BLD: 39.2 % (ref 18–48)
MCH RBC QN AUTO: 29.9 PG (ref 27–31)
MCHC RBC AUTO-ENTMCNC: 30 G/DL (ref 32–36)
MCV RBC AUTO: 100 FL (ref 82–98)
MONOCYTES # BLD AUTO: 0.5 K/UL (ref 0.3–1)
MONOCYTES NFR BLD: 8.7 % (ref 4–15)
NEUTROPHILS # BLD AUTO: 2.8 K/UL (ref 1.8–7.7)
NEUTROPHILS NFR BLD: 49.9 % (ref 38–73)
NONHDLC SERPL-MCNC: 178 MG/DL
NRBC BLD-RTO: 0 /100 WBC
PLATELET # BLD AUTO: 200 K/UL (ref 150–350)
PMV BLD AUTO: 10.5 FL (ref 9.2–12.9)
POTASSIUM SERPL-SCNC: 4.6 MMOL/L (ref 3.5–5.1)
PROT SERPL-MCNC: 7.1 G/DL (ref 6–8.4)
RBC # BLD AUTO: 4.08 M/UL (ref 4–5.4)
SATURATED IRON: 21 % (ref 20–50)
SODIUM SERPL-SCNC: 141 MMOL/L (ref 136–145)
TOTAL IRON BINDING CAPACITY: 337 UG/DL (ref 250–450)
TRANSFERRIN SERPL-MCNC: 228 MG/DL (ref 200–375)
TRIGL SERPL-MCNC: 97 MG/DL (ref 30–150)
VIT B12 SERPL-MCNC: >2000 PG/ML (ref 210–950)
WBC # BLD AUTO: 5.53 K/UL (ref 3.9–12.7)

## 2020-08-14 PROCEDURE — 80053 COMPREHEN METABOLIC PANEL: CPT

## 2020-08-14 PROCEDURE — 83540 ASSAY OF IRON: CPT

## 2020-08-14 PROCEDURE — 85025 COMPLETE CBC W/AUTO DIFF WBC: CPT

## 2020-08-14 PROCEDURE — 80061 LIPID PANEL: CPT

## 2020-08-14 PROCEDURE — 82306 VITAMIN D 25 HYDROXY: CPT

## 2020-08-14 PROCEDURE — 84425 ASSAY OF VITAMIN B-1: CPT

## 2020-08-14 PROCEDURE — 36415 COLL VENOUS BLD VENIPUNCTURE: CPT

## 2020-08-14 PROCEDURE — 82607 VITAMIN B-12: CPT

## 2020-08-18 LAB — VIT B1 BLD-MCNC: 63 UG/L (ref 38–122)

## 2020-09-16 ENCOUNTER — CLINICAL SUPPORT (OUTPATIENT)
Dept: BARIATRICS | Facility: CLINIC | Age: 65
End: 2020-09-16
Payer: COMMERCIAL

## 2020-09-16 VITALS — BODY MASS INDEX: 33.72 KG/M2 | WEIGHT: 235 LBS

## 2020-09-16 DIAGNOSIS — Z98.84 BARIATRIC SURGERY STATUS: ICD-10-CM

## 2020-09-16 DIAGNOSIS — E66.9 OBESITY (BMI 30-39.9): ICD-10-CM

## 2020-09-16 DIAGNOSIS — Z71.3 DIETARY COUNSELING: ICD-10-CM

## 2020-09-16 PROCEDURE — 97803 MED NUTRITION INDIV SUBSEQ: CPT | Mod: S$GLB,,, | Performed by: DIETITIAN, REGISTERED

## 2020-09-16 PROCEDURE — 99499 NO LOS: ICD-10-PCS | Mod: S$GLB,,, | Performed by: DIETITIAN, REGISTERED

## 2020-09-16 PROCEDURE — 97803 PR MED NUTR THER, SUBSQ, INDIV, EA 15 MIN: ICD-10-PCS | Mod: S$GLB,,, | Performed by: DIETITIAN, REGISTERED

## 2020-09-16 PROCEDURE — 99499 UNLISTED E&M SERVICE: CPT | Mod: S$GLB,,, | Performed by: DIETITIAN, REGISTERED
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
## 2020-10-26 ENCOUNTER — PATIENT OUTREACH (OUTPATIENT)
Dept: ADMINISTRATIVE | Facility: OTHER | Age: 65
End: 2020-10-26

## 2020-10-26 ENCOUNTER — TELEPHONE (OUTPATIENT)
Dept: ORTHOPEDICS | Facility: CLINIC | Age: 65
End: 2020-10-26

## 2020-10-26 DIAGNOSIS — Z96.653 TOTAL KNEE REPLACEMENT STATUS, BILATERAL: Primary | ICD-10-CM

## 2020-10-26 DIAGNOSIS — M25.551 HIP PAIN, RIGHT: ICD-10-CM

## 2020-10-27 ENCOUNTER — HOSPITAL ENCOUNTER (OUTPATIENT)
Dept: RADIOLOGY | Facility: HOSPITAL | Age: 65
Discharge: HOME | End: 2020-10-27
Attending: ORTHOPAEDIC SURGERY
Payer: COMMERCIAL

## 2020-10-27 ENCOUNTER — OFFICE VISIT (OUTPATIENT)
Dept: ORTHOPEDICS | Facility: CLINIC | Age: 65
End: 2020-10-27
Payer: COMMERCIAL

## 2020-10-27 VITALS — HEIGHT: 70 IN | WEIGHT: 235.13 LBS | BODY MASS INDEX: 33.66 KG/M2

## 2020-10-27 DIAGNOSIS — M16.11 PRIMARY OSTEOARTHRITIS OF RIGHT HIP: Primary | ICD-10-CM

## 2020-10-27 DIAGNOSIS — M25.551 HIP PAIN, RIGHT: ICD-10-CM

## 2020-10-27 DIAGNOSIS — Z96.653 TOTAL KNEE REPLACEMENT STATUS, BILATERAL: ICD-10-CM

## 2020-10-27 PROCEDURE — 3008F PR BODY MASS INDEX (BMI) DOCUMENTED: ICD-10-PCS | Mod: CPTII,S$GLB,, | Performed by: ORTHOPAEDIC SURGERY

## 2020-10-27 PROCEDURE — 3008F BODY MASS INDEX DOCD: CPT | Mod: CPTII,S$GLB,, | Performed by: ORTHOPAEDIC SURGERY

## 2020-10-27 PROCEDURE — 73560 X-RAY EXAM OF KNEE 1 OR 2: CPT | Mod: 26,RT,, | Performed by: RADIOLOGY

## 2020-10-27 PROCEDURE — 73562 X-RAY EXAM OF KNEE 3: CPT | Mod: TC,LT

## 2020-10-27 PROCEDURE — 73562 X-RAY EXAM OF KNEE 3: CPT | Mod: 26,LT,, | Performed by: RADIOLOGY

## 2020-10-27 PROCEDURE — 73502 X-RAY EXAM HIP UNI 2-3 VIEWS: CPT | Mod: TC,RT

## 2020-10-27 PROCEDURE — 99999 PR PBB SHADOW E&M-EST. PATIENT-LVL III: ICD-10-PCS | Mod: PBBFAC,,, | Performed by: ORTHOPAEDIC SURGERY

## 2020-10-27 PROCEDURE — 73502 X-RAY EXAM HIP UNI 2-3 VIEWS: CPT | Mod: 26,RT,, | Performed by: RADIOLOGY

## 2020-10-27 PROCEDURE — 73560 XR KNEE ORTHO LEFT: ICD-10-PCS | Mod: 26,RT,, | Performed by: RADIOLOGY

## 2020-10-27 PROCEDURE — 73560 X-RAY EXAM OF KNEE 1 OR 2: CPT | Mod: TC,RT,59

## 2020-10-27 PROCEDURE — 73562 XR KNEE ORTHO LEFT: ICD-10-PCS | Mod: 26,LT,, | Performed by: RADIOLOGY

## 2020-10-27 PROCEDURE — 99999 PR PBB SHADOW E&M-EST. PATIENT-LVL III: CPT | Mod: PBBFAC,,, | Performed by: ORTHOPAEDIC SURGERY

## 2020-10-27 PROCEDURE — 73502 XR HIP 2 VIEW RIGHT: ICD-10-PCS | Mod: 26,RT,, | Performed by: RADIOLOGY

## 2020-10-27 PROCEDURE — 99214 OFFICE O/P EST MOD 30 MIN: CPT | Mod: S$GLB,,, | Performed by: ORTHOPAEDIC SURGERY

## 2020-10-27 PROCEDURE — 99214 PR OFFICE/OUTPT VISIT, EST, LEVL IV, 30-39 MIN: ICD-10-PCS | Mod: S$GLB,,, | Performed by: ORTHOPAEDIC SURGERY

## 2020-10-27 NOTE — PROGRESS NOTES
HPI:    Patient is here today for a chief complaint of right hip pain.   Patient is status post right total knee done 05/23/2018.  Right knee had difficulty getting full range of motion.  There was a slight flexion contracture that has pretty much resolved.  Left knee is doing well but has crepitance.  Patient complains of mild aches and pains and functional issues.  Her major problem is the right hip pain.    Duration: 6 months  Intensity: severe  Character of pain: sharp  Location: She reports that the pain is positive  For groin pain.    Past Medical History:   Diagnosis Date    Allergy     Anxiety     Arthritis     BMI 45.0-49.9, adult     Chronic kidney disease     Depression     GERD (gastroesophageal reflux disease)     History of kidney stones     Kidney stones     Migraines     Morbid obesity     Obesity     Sleep apnea in adult     WEARS CPAP, DOESNT KNOW SETTINGS.          Current Outpatient Medications:     acetaminophen (TYLENOL) 325 MG tablet, Take 325 mg by mouth every 6 (six) hours as needed for Pain., Disp: , Rfl:     b complex vitamins tablet, Take 1 tablet by mouth once daily., Disp: , Rfl:     cinnamon bark-chromium picolin 500-100 mg-mcg Cap, Take by mouth., Disp: , Rfl:     cyanocobalamin 500 MCG tablet, Take 500 mcg by mouth once daily., Disp: , Rfl:     gabapentin (NEURONTIN) 600 MG tablet, Take 1 tablet (600 mg total) by mouth 3 (three) times daily., Disp: 270 tablet, Rfl: 4    GLUCOSAMINE HCL/CHONDR APODACA A NA (OSTEO BI-FLEX ORAL), Take by mouth once daily., Disp: , Rfl:     multivitamin (ONE DAILY MULTIVITAMIN) per tablet, Take 1 tablet by mouth once daily., Disp: , Rfl:     omeprazole (PRILOSEC) 40 MG capsule, TAKE 1 CAPSULE DAILY, Disp: 90 capsule, Rfl: 3    sertraline (ZOLOFT) 100 MG tablet, TAKE 1 TABLET DAILY, Disp: 90 tablet, Rfl: 3    tiZANidine (ZANAFLEX) 4 MG tablet, Take 1 tablet (4 mg total) by mouth every 8 (eight) hours as needed., Disp: 270 tablet, Rfl:  "0    diclofenac sodium (VOLTAREN) 1 % Gel, Apply 2 g topically 4 (four) times daily., Disp: 1 Tube, Rfl: 1    flu vacc [id number] 6mos up,PF, (FLUARIX QUAD [id number], PF,) 60 mcg (15 mcg x 4)/0.5 mL Syrg, adminster, Disp: 0.5 mL, Rfl: 0    meloxicam (MOBIC) 15 MG tablet, TAKE 1 TABLET DAILY, Disp: 90 tablet, Rfl: 3    predniSONE (DELTASONE) 10 MG tablet, Take 2 pills a day for 3 days then 1 pill a day for 3 days, Disp: 9 tablet, Rfl: 0     Review of patient's allergies indicates:   Allergen Reactions    Dermabond [tissue glues] Rash    Adhesive      Paper tape usually ok- pulls skin off    Flagyl [metronidazole hcl]      confusion        Exam:  Ht 5' 10" (1.778 m)   Wt 106.7 kg (235 lb 1.9 oz)   BMI 33.74 kg/m²   Vital signs are stable.  Patient is alert and oriented x3.  Patient has some tenderness and difficulty elevating the right shoulder.  No motor or sensory deficits of the upper extremity.  Abdomen soft and benign.  Lumbar spine has decreased range of motion has lost some of its lumbar lordosis.  Right hip has functional range of motion but has pain with internal rotation.  Motor strength and sensation intact to lower extremities.    Right knee has slight 5 degree flexion contracture.  Otherwise good range of motion of both knees.  There is mild crepitance with patellar tracking.  Patellas are tracking well.    Sensation motor strength intact to lower extremities.    Radiograph   Total knees are in excellent position no signs of loosening or failure.  Right hip show significant osteoarthritis and loss of the superior lateral joint space.    Plan:  Patient may continue with activities with her knees.  There is just mild crepitance and soft tissue irritation.  The right hip is the main problem.  I have advised that she will need a he hip replacement at some point.    "

## 2020-10-27 NOTE — PROGRESS NOTES
Patient's chart was reviewed for overdue RAMIREZ topics.  Media/DIS reviewed for outside mammogram, previously tasked.  Immunizations reconciled.    Orders placed:n/a  Tasked appts:n/a  Labs Linked:n/a

## 2020-11-04 ENCOUNTER — PATIENT MESSAGE (OUTPATIENT)
Dept: NEUROSURGERY | Facility: CLINIC | Age: 65
End: 2020-11-04

## 2020-11-04 DIAGNOSIS — M50.30 DEGENERATION OF CERVICAL INTERVERTEBRAL DISC: ICD-10-CM

## 2020-11-04 DIAGNOSIS — M47.812 CERVICAL SPONDYLOSIS WITHOUT MYELOPATHY: ICD-10-CM

## 2020-11-04 DIAGNOSIS — M54.12 BRACHIAL NEURITIS OR RADICULITIS: ICD-10-CM

## 2020-11-04 DIAGNOSIS — M54.2 CERVICALGIA: ICD-10-CM

## 2020-11-05 ENCOUNTER — PATIENT MESSAGE (OUTPATIENT)
Dept: NEUROSURGERY | Facility: CLINIC | Age: 65
End: 2020-11-05

## 2020-11-05 ENCOUNTER — TELEPHONE (OUTPATIENT)
Dept: NEUROSURGERY | Facility: CLINIC | Age: 65
End: 2020-11-05

## 2020-11-05 RX ORDER — TIZANIDINE 4 MG/1
4 TABLET ORAL EVERY 8 HOURS PRN
Qty: 270 TABLET | Refills: 0 | OUTPATIENT
Start: 2020-11-05

## 2020-11-05 NOTE — TELEPHONE ENCOUNTER
Sent pt a msg through the portal about scheduling an appt with Jeanne in order to receive Rx requested refill.

## 2020-11-06 ENCOUNTER — PATIENT MESSAGE (OUTPATIENT)
Dept: NEUROSURGERY | Facility: CLINIC | Age: 65
End: 2020-11-06

## 2020-11-06 DIAGNOSIS — Z12.31 OTHER SCREENING MAMMOGRAM: ICD-10-CM

## 2020-11-10 ENCOUNTER — TELEPHONE (OUTPATIENT)
Dept: NEUROSURGERY | Facility: CLINIC | Age: 65
End: 2020-11-10

## 2020-11-10 ENCOUNTER — OFFICE VISIT (OUTPATIENT)
Dept: NEUROSURGERY | Facility: CLINIC | Age: 65
End: 2020-11-10
Payer: COMMERCIAL

## 2020-11-10 ENCOUNTER — HOSPITAL ENCOUNTER (OUTPATIENT)
Dept: RADIOLOGY | Facility: HOSPITAL | Age: 65
Discharge: HOME | End: 2020-11-10
Attending: PHYSICIAN ASSISTANT
Payer: COMMERCIAL

## 2020-11-10 VITALS — HEART RATE: 75 BPM | SYSTOLIC BLOOD PRESSURE: 110 MMHG | DIASTOLIC BLOOD PRESSURE: 69 MMHG

## 2020-11-10 DIAGNOSIS — R52 PAIN: Primary | ICD-10-CM

## 2020-11-10 DIAGNOSIS — M54.2 NECK PAIN: Primary | ICD-10-CM

## 2020-11-10 DIAGNOSIS — R52 PAIN: ICD-10-CM

## 2020-11-10 DIAGNOSIS — M47.812 CERVICAL SPONDYLOSIS WITHOUT MYELOPATHY: ICD-10-CM

## 2020-11-10 DIAGNOSIS — M50.30 DDD (DEGENERATIVE DISC DISEASE), CERVICAL: ICD-10-CM

## 2020-11-10 PROCEDURE — 99214 PR OFFICE/OUTPT VISIT, EST, LEVL IV, 30-39 MIN: ICD-10-PCS | Mod: S$GLB,,, | Performed by: PHYSICIAN ASSISTANT

## 2020-11-10 PROCEDURE — 72050 X-RAY EXAM NECK SPINE 4/5VWS: CPT | Mod: 26,,, | Performed by: RADIOLOGY

## 2020-11-10 PROCEDURE — 99999 PR PBB SHADOW E&M-EST. PATIENT-LVL III: CPT | Mod: PBBFAC,,, | Performed by: PHYSICIAN ASSISTANT

## 2020-11-10 PROCEDURE — 99999 PR PBB SHADOW E&M-EST. PATIENT-LVL III: ICD-10-PCS | Mod: PBBFAC,,, | Performed by: PHYSICIAN ASSISTANT

## 2020-11-10 PROCEDURE — 72050 XR CERVICAL SPINE AP LAT WITH FLEX EXTEN: ICD-10-PCS | Mod: 26,,, | Performed by: RADIOLOGY

## 2020-11-10 PROCEDURE — 99214 OFFICE O/P EST MOD 30 MIN: CPT | Mod: S$GLB,,, | Performed by: PHYSICIAN ASSISTANT

## 2020-11-10 PROCEDURE — 72050 X-RAY EXAM NECK SPINE 4/5VWS: CPT | Mod: TC,PN

## 2020-11-10 RX ORDER — TIZANIDINE 4 MG/1
4 TABLET ORAL EVERY 8 HOURS PRN
Qty: 270 TABLET | Refills: 0 | Status: SHIPPED | OUTPATIENT
Start: 2020-11-10 | End: 2021-08-16 | Stop reason: SDUPTHER

## 2020-11-10 NOTE — PROGRESS NOTES
Subjective:     Patient ID:  Soha Wheatley is a 64 y.o. female.    Lui    Chief Complaint: Neck pain and right hand tingling    HPI    Soha Wheatley is a 64 y.o. female who presents for follow up.  She has mild neck pain on and off.  Some tingling in the right pinky, ring, and middle if she misses a dose of the gabapentin.      Some difficulty swallowing.    She takes gabapentin TID and zanaflex once a day.  These medications help.    Patient denies any recent accidents or trauma, no saddle anesthesias, and no bowel or bladder incontinence.    Patient has difficulty with balance or gait, no difficulty tying shoes or buttoning clothes, is not dropping things, does not have difficulty opening containers, and has had no change in handwriting.    Review of Systems:  Constitution: Negative for chills, fever, night sweats and weight loss.   Musculoskeletal: Negative for falls.   Gastrointestinal: Negative for bowel incontinence, nausea and vomiting.   Genitourinary: Negative for bladder incontinence.   Neurological: Positive for disturbances in coordination and loss of balance.   Positive for headache and depression     Objective:      Vitals:    11/10/20 0907   BP: 110/69   Pulse: 75   PainSc:   1         Physical Exam:      General:  Soha Wheatley is well-developed, well-nourished, appears stated age, in no acute distress, alert and oriented to person, place, and time.    Pulmonary/Chest:  Respiratory effort normal  Abdominal: Exhibits no distension  Psychiatric:  Normal mood and affect.  Behavior is normal.  Judgement and thought content normal      Musculoskeletal:    Patient is not able to walk heel to toe due to neck pain.    Cervical ROM:   No pain in cervical spine flexion, left rotation, and right rotation, left lateral bending, and right lateral bending.  Mild pain in extension.    Cervical Spine Inspection:  Normal with no surgical scars with no visible rashes.    Cervical Spine Palpation:  No  tenderness to cervical spine palpation.      Neurological:    Muscle strength against resistance:     Right Left   Deltoid  5 / 5 5 / 5   Biceps  5 / 5 5 / 5   Triceps 5 / 5 5 / 5   Wrist flexion  5 / 5 5 / 5   Wrist extension 5 / 5 5 / 5   Finger abduction 5 / 5 5 / 5   Thumb opposition 5 / 5 5 / 5   Handgrip 5 / 5 5 / 5   Hip flexion  5 / 5 5 / 5   Hip extension 5 / 5 5 / 5   Hip abduction 5 / 5 5 / 5   Hip adduction 5/ 5 5 / 5   Knee extension  5 / 5 5 / 5   Knee flexion  5 / 5 5 / 5   Dorsiflexion  5 / 5 5 / 5   EHL  5 / 5 5 / 5   Plantar flexion  5 / 5 5 / 5   Inversion of the feet 5 / 5 5 / 5   Eversion of the feet 5 / 5 5 / 5       Reflexes:     Right Left   Triceps 2+ 2+   Biceps 2+ 2+   Brachioradialis 2+ 2+   Patellar 2+ 2+   Achilles 2+ 2+     Babinski: Negative bilaterally  Clonus:  Negative bilaterally  Baum: Negative bilaterally    On gross examination of the bilateral lower extremities, patient has full painfree ROM with no signs of clubbing, cyanosis, edema, and weakness.      XRAY Results:     Narrative & Impression     EXAMINATION:  XR CERVICAL SPINE AP LAT WITH FLEX EXTEN     CLINICAL HISTORY:  Pain, unspecified     TECHNIQUE:  Three views of the cervical spine plus flexion and extension views were performed.     COMPARISON:  12/03/2015     FINDINGS:  C7-T1 is not visualized.  Within this limitation, there is focal kyphosis at C6-C7.  No radiographic evidence of instability on flexion and extension views.  Vertebral body heights are maintained.  Severe degenerative changes are present at C5-C6 that have progressed when compared to 2015.  More moderate degenerative changes, including large anterior osteophytes, are present at C3-C4 and C4-C5 that have progressed slightly when compared to 2015.  Moderate degenerative changes are also present at C6-C7 that are unchanged.  The facet joints are maintained.  The soft tissues are unremarkable.     Impression:     1. Severe C5-C6 degenerative disc  disease as has progressed when compared to 2015.  2. Moderate degenerative changes at C3-C4 and C4-C5 that have progressed slightly when compared to 2015.  Again seen are large anterior osteophytes at these levels.  3. Moderate degenerative changes at C6-C7 there are unchanged.        Electronically signed by: Candelaria Artis  Date:                                            11/10/2020  Time:                                           09:38           Assessment:          1. Neck pain    2. Cervical spondylosis without myelopathy    3. DDD (degenerative disc disease), cervical            Plan:          Orders Placed This Encounter    tiZANidine (ZANAFLEX) 4 MG tablet     Cervical DDD/spondylosis and anterior osteophytes have progressed since xrays in 2015    -She has some difficulty swallowing but does not bother her too much.  She was told to monitor this and to let me know if it gets worse  -Refilled tizanidine  -Will refill gabapentin when needed  -FU PRN      Follow-Up:  Follow up if symptoms worsen or fail to improve. If there are any questions prior to this, the patient was instructed to contact the office.       Jeanne Crooks, [facility], PA-C  Neurosurgery  [facility]

## 2020-12-21 ENCOUNTER — PATIENT OUTREACH (OUTPATIENT)
Dept: ADMINISTRATIVE | Facility: OTHER | Age: 65
End: 2020-12-21

## 2020-12-21 NOTE — PROGRESS NOTES
LINKS immunization registry not responding  Care Everywhere updated  Health Maintenance updated  DIS/Chart reviewed for overdue Proactive [facility] ([facility]) health maintenance testing (CRS, Breast Ca, Diabetic Eye Exam)   Orders entered:N/A  Portal message sent to patient with scheduling link for mammogram  7/29/20 and 12/21/20

## 2020-12-28 ENCOUNTER — OFFICE VISIT (OUTPATIENT)
Dept: BARIATRICS | Facility: CLINIC | Age: 65
End: 2020-12-28
Payer: COMMERCIAL

## 2020-12-28 VITALS
HEIGHT: 70 IN | SYSTOLIC BLOOD PRESSURE: 119 MMHG | HEART RATE: 79 BPM | DIASTOLIC BLOOD PRESSURE: 60 MMHG | RESPIRATION RATE: 16 BRPM | BODY MASS INDEX: 33.81 KG/M2 | WEIGHT: 236.13 LBS | OXYGEN SATURATION: 98 %

## 2020-12-28 DIAGNOSIS — R63.5 WEIGHT GAIN: ICD-10-CM

## 2020-12-28 DIAGNOSIS — Z98.84 S/P LAPAROSCOPIC SLEEVE GASTRECTOMY: ICD-10-CM

## 2020-12-28 DIAGNOSIS — Z98.84 BARIATRIC SURGERY STATUS: Primary | ICD-10-CM

## 2020-12-28 PROCEDURE — 99213 OFFICE O/P EST LOW 20 MIN: CPT | Mod: S$GLB,,, | Performed by: PHYSICIAN ASSISTANT

## 2020-12-28 PROCEDURE — 3008F PR BODY MASS INDEX (BMI) DOCUMENTED: ICD-10-PCS | Mod: CPTII,S$GLB,, | Performed by: PHYSICIAN ASSISTANT

## 2020-12-28 PROCEDURE — 99213 PR OFFICE/OUTPT VISIT, EST, LEVL III, 20-29 MIN: ICD-10-PCS | Mod: S$GLB,,, | Performed by: PHYSICIAN ASSISTANT

## 2020-12-28 PROCEDURE — 99999 PR PBB SHADOW E&M-EST. PATIENT-LVL III: CPT | Mod: PBBFAC,,, | Performed by: PHYSICIAN ASSISTANT

## 2020-12-28 PROCEDURE — 99999 PR PBB SHADOW E&M-EST. PATIENT-LVL III: ICD-10-PCS | Mod: PBBFAC,,, | Performed by: PHYSICIAN ASSISTANT

## 2020-12-28 PROCEDURE — 3008F BODY MASS INDEX DOCD: CPT | Mod: CPTII,S$GLB,, | Performed by: PHYSICIAN ASSISTANT

## 2020-12-28 NOTE — PATIENT INSTRUCTIONS
REBOOT DIET  High Protein Liquid Diet to Modified Liquid Diet  *Remember to always get in  grams of protein daily*    Sugar Free clear liquids allowed in unlimited amounts (H20, Crystal light, SF jello, low sodium broth, Powerade Zero, Caffeine free tea, Diet V8 splash, etc)       For 3 days you should drink 4: 25-30 gram protein shakes (must have 4 g of sugar or less) to obtain between  grams of protein.         On Day 4 you will replace 1 protein shake with 1 high protein foods.  Therefore you will be drinking 3 protein shakes with 1 high protein foods. You will do this for 1 week.     After 1 week on 3 shakes and 1 high protein foods, you will replace another shake with 1-2 high protein foods.  Therefore you will be drinking 2 protein shakes with 3 high protein foods.  You will do this until you meet your desired goal.     High Protein Foods Include:  Canned tuna or chicken (packed in water)  Lean ground turkey breast or ground round  Turkey or chicken (no skin); cooked tender and cut in small pieces  [place] chicken meatballs  Lean pork or beef (cook in crock pot until very tender; cut in small pieces  Scrambled, poached, or boiled eggs  Baked, broiled, grilled or boiled fish and seafood (not fried!)  Silken tofu, Edamame (soybeans)  Beans, hummus and lentils  Lean deli meats (turkey and chicken breast, ham, roast beef)  1% or Skim Milk, Lactaid, or Soymilk  Low-fat or fat-free cottage cheese, soft cheese, mozzarella string cheese, or ricotta   Light yogurt, Greek yogurt, SF pudding      Avoid all items below:  High fat milk (whole, 2%)  Butter, margarine, oil, mayonnaise  Sour cream, cream cheese, salad dressing  Ice Cream Cakes, cookies, pies, desserts, candy  Luncheon meats (bologna, salami, chopped ham)  Corn Granola/cereal with nuts  Shredded Coconut  Sausage, [place] Gravy Fried Foods  White and wheat Bread, Rice, Pasta Cereals (including grits, oatmeal)  Crackers, Pretzels, Chips, Granola  Corn, Popcorn, Peas  White Potatoes, Sweet potatoes Flour and corn tortillas  Sugary drinks  Carbonated drinks  Alcohol

## 2021-01-04 ENCOUNTER — PATIENT MESSAGE (OUTPATIENT)
Dept: ADMINISTRATIVE | Facility: HOSPITAL | Age: 66
End: 2021-01-04

## 2021-01-04 ENCOUNTER — PATIENT MESSAGE (OUTPATIENT)
Dept: NEUROSURGERY | Facility: CLINIC | Age: 66
End: 2021-01-04

## 2021-01-05 ENCOUNTER — PATIENT MESSAGE (OUTPATIENT)
Dept: NEUROSURGERY | Facility: CLINIC | Age: 66
End: 2021-01-05

## 2021-01-05 RX ORDER — GABAPENTIN 600 MG/1
600 TABLET ORAL 3 TIMES DAILY
Qty: 270 TABLET | Refills: 4 | Status: SHIPPED | OUTPATIENT
Start: 2021-01-05 | End: 2022-02-15 | Stop reason: SDUPTHER

## 2021-01-05 RX ORDER — GABAPENTIN 600 MG/1
600 TABLET ORAL 3 TIMES DAILY
Qty: 270 TABLET | Refills: 4 | Status: SHIPPED | OUTPATIENT
Start: 2021-01-05 | End: 2021-01-05 | Stop reason: SDUPTHER

## 2021-01-06 ENCOUNTER — TELEPHONE (OUTPATIENT)
Dept: NEUROSURGERY | Facility: CLINIC | Age: 66
End: 2021-01-06

## 2021-01-07 ENCOUNTER — PATIENT OUTREACH (OUTPATIENT)
Dept: ADMINISTRATIVE | Facility: HOSPITAL | Age: 66
End: 2021-01-07

## 2021-01-07 ENCOUNTER — PATIENT MESSAGE (OUTPATIENT)
Dept: ADMINISTRATIVE | Facility: HOSPITAL | Age: 66
End: 2021-01-07

## 2021-01-07 DIAGNOSIS — Z12.4 ENCOUNTER FOR PAPANICOLAOU SMEAR FOR CERVICAL CANCER SCREENING: Primary | ICD-10-CM

## 2021-02-01 ENCOUNTER — OFFICE VISIT (OUTPATIENT)
Dept: INTERNAL MEDICINE | Facility: CLINIC | Age: 66
End: 2021-02-01
Payer: COMMERCIAL

## 2021-02-01 VITALS
WEIGHT: 234.38 LBS | BODY MASS INDEX: 33.55 KG/M2 | DIASTOLIC BLOOD PRESSURE: 62 MMHG | SYSTOLIC BLOOD PRESSURE: 112 MMHG | HEART RATE: 63 BPM | HEIGHT: 70 IN

## 2021-02-01 DIAGNOSIS — E66.9 OBESITY (BMI 30-39.9): ICD-10-CM

## 2021-02-01 DIAGNOSIS — Z96.651 STATUS POST TOTAL RIGHT KNEE REPLACEMENT: Primary | ICD-10-CM

## 2021-02-01 DIAGNOSIS — Z96.652 STATUS POST TOTAL LEFT KNEE REPLACEMENT: Chronic | ICD-10-CM

## 2021-02-01 DIAGNOSIS — Z82.49 FAMILY HISTORY OF EARLY CAD: ICD-10-CM

## 2021-02-01 DIAGNOSIS — Z98.84 BARIATRIC SURGERY STATUS: ICD-10-CM

## 2021-02-01 PROBLEM — M17.0 PRIMARY OSTEOARTHRITIS OF BOTH KNEES: Status: RESOLVED | Noted: 2018-02-22 | Resolved: 2021-02-01

## 2021-02-01 PROCEDURE — 99999 PR PBB SHADOW E&M-EST. PATIENT-LVL III: CPT | Mod: PBBFAC,,, | Performed by: INTERNAL MEDICINE

## 2021-02-01 PROCEDURE — 99213 OFFICE O/P EST LOW 20 MIN: CPT | Mod: S$GLB,,, | Performed by: INTERNAL MEDICINE

## 2021-02-01 PROCEDURE — 99213 PR OFFICE/OUTPT VISIT, EST, LEVL III, 20-29 MIN: ICD-10-PCS | Mod: S$GLB,,, | Performed by: INTERNAL MEDICINE

## 2021-02-01 PROCEDURE — 99999 PR PBB SHADOW E&M-EST. PATIENT-LVL III: ICD-10-PCS | Mod: PBBFAC,,, | Performed by: INTERNAL MEDICINE

## 2021-02-02 ENCOUNTER — OFFICE VISIT (OUTPATIENT)
Dept: ORTHOPEDICS | Facility: CLINIC | Age: 66
End: 2021-02-02
Payer: COMMERCIAL

## 2021-02-02 ENCOUNTER — PATIENT OUTREACH (OUTPATIENT)
Dept: ADMINISTRATIVE | Facility: OTHER | Age: 66
End: 2021-02-02

## 2021-02-02 VITALS — BODY MASS INDEX: 33.53 KG/M2 | HEIGHT: 70 IN | WEIGHT: 234.25 LBS

## 2021-02-02 DIAGNOSIS — M16.11 PRIMARY OSTEOARTHRITIS OF RIGHT HIP: Primary | ICD-10-CM

## 2021-02-02 PROCEDURE — 99214 OFFICE O/P EST MOD 30 MIN: CPT | Mod: S$GLB,,, | Performed by: ORTHOPAEDIC SURGERY

## 2021-02-02 PROCEDURE — 3008F PR BODY MASS INDEX (BMI) DOCUMENTED: ICD-10-PCS | Mod: CPTII,S$GLB,, | Performed by: ORTHOPAEDIC SURGERY

## 2021-02-02 PROCEDURE — 99999 PR PBB SHADOW E&M-EST. PATIENT-LVL III: ICD-10-PCS | Mod: PBBFAC,,, | Performed by: ORTHOPAEDIC SURGERY

## 2021-02-02 PROCEDURE — 1125F PR PAIN SEVERITY QUANTIFIED, PAIN PRESENT: ICD-10-PCS | Mod: S$GLB,,, | Performed by: ORTHOPAEDIC SURGERY

## 2021-02-02 PROCEDURE — 1125F AMNT PAIN NOTED PAIN PRSNT: CPT | Mod: S$GLB,,, | Performed by: ORTHOPAEDIC SURGERY

## 2021-02-02 PROCEDURE — 99214 PR OFFICE/OUTPT VISIT, EST, LEVL IV, 30-39 MIN: ICD-10-PCS | Mod: S$GLB,,, | Performed by: ORTHOPAEDIC SURGERY

## 2021-02-02 PROCEDURE — 99999 PR PBB SHADOW E&M-EST. PATIENT-LVL III: CPT | Mod: PBBFAC,,, | Performed by: ORTHOPAEDIC SURGERY

## 2021-02-02 PROCEDURE — 3008F BODY MASS INDEX DOCD: CPT | Mod: CPTII,S$GLB,, | Performed by: ORTHOPAEDIC SURGERY

## 2021-02-03 ENCOUNTER — TELEPHONE (OUTPATIENT)
Dept: ORTHOPEDICS | Facility: CLINIC | Age: 66
End: 2021-02-03

## 2021-02-03 ENCOUNTER — PATIENT MESSAGE (OUTPATIENT)
Dept: ADMINISTRATIVE | Facility: OTHER | Age: 66
End: 2021-02-03

## 2021-02-03 DIAGNOSIS — Z01.818 PRE-OP TESTING: Primary | ICD-10-CM

## 2021-02-03 DIAGNOSIS — M16.11 PRIMARY OSTEOARTHRITIS OF RIGHT HIP: Primary | ICD-10-CM

## 2021-02-03 DIAGNOSIS — M16.10 UNILATERAL PRIMARY OSTEOARTHRITIS, UNSPECIFIED HIP: ICD-10-CM

## 2021-02-04 ENCOUNTER — TELEPHONE (OUTPATIENT)
Dept: PREADMISSION TESTING | Facility: HOSPITAL | Age: 66
End: 2021-02-04

## 2021-02-04 DIAGNOSIS — Z01.818 PRE-OP TESTING: Primary | ICD-10-CM

## 2021-02-04 DIAGNOSIS — M25.551 PAIN IN JOINT OF RIGHT HIP: ICD-10-CM

## 2021-02-08 ENCOUNTER — PATIENT MESSAGE (OUTPATIENT)
Dept: SURGERY | Facility: HOSPITAL | Age: 66
End: 2021-02-08

## 2021-02-08 ENCOUNTER — PATIENT MESSAGE (OUTPATIENT)
Dept: ORTHOPEDICS | Facility: CLINIC | Age: 66
End: 2021-02-08

## 2021-02-09 ENCOUNTER — PATIENT MESSAGE (OUTPATIENT)
Dept: ORTHOPEDICS | Facility: CLINIC | Age: 66
End: 2021-02-09

## 2021-02-09 ENCOUNTER — OFFICE VISIT (OUTPATIENT)
Dept: DERMATOLOGY | Facility: CLINIC | Age: 66
End: 2021-02-09
Payer: COMMERCIAL

## 2021-02-09 ENCOUNTER — PATIENT MESSAGE (OUTPATIENT)
Dept: SURGERY | Facility: HOSPITAL | Age: 66
End: 2021-02-09

## 2021-02-09 DIAGNOSIS — L23.1 ALLERGIC CONTACT DERMATITIS DUE TO ADHESIVES: Primary | ICD-10-CM

## 2021-02-09 PROCEDURE — 99999 PR PBB SHADOW E&M-EST. PATIENT-LVL II: CPT | Mod: PBBFAC,,,

## 2021-02-09 PROCEDURE — 99203 OFFICE O/P NEW LOW 30 MIN: CPT | Mod: S$GLB,,, | Performed by: DERMATOLOGY

## 2021-02-09 PROCEDURE — 99999 PR PBB SHADOW E&M-EST. PATIENT-LVL II: ICD-10-PCS | Mod: PBBFAC,,,

## 2021-02-09 PROCEDURE — 99203 PR OFFICE/OUTPT VISIT, NEW, LEVL III, 30-44 MIN: ICD-10-PCS | Mod: S$GLB,,, | Performed by: DERMATOLOGY

## 2021-02-15 ENCOUNTER — HOSPITAL ENCOUNTER (OUTPATIENT)
Dept: RADIOLOGY | Facility: HOSPITAL | Age: 66
Discharge: HOME | End: 2021-02-15
Attending: PHYSICIAN ASSISTANT
Payer: COMMERCIAL

## 2021-02-15 ENCOUNTER — OFFICE VISIT (OUTPATIENT)
Dept: INTERNAL MEDICINE | Facility: CLINIC | Age: 66
End: 2021-02-15
Payer: COMMERCIAL

## 2021-02-15 ENCOUNTER — OFFICE VISIT (OUTPATIENT)
Dept: ORTHOPEDICS | Facility: CLINIC | Age: 66
End: 2021-02-15
Payer: COMMERCIAL

## 2021-02-15 ENCOUNTER — HOSPITAL ENCOUNTER (OUTPATIENT)
Dept: RADIOLOGY | Facility: HOSPITAL | Age: 66
Discharge: HOME | End: 2021-02-15
Attending: ORTHOPAEDIC SURGERY
Payer: COMMERCIAL

## 2021-02-15 VITALS
DIASTOLIC BLOOD PRESSURE: 60 MMHG | TEMPERATURE: 98 F | SYSTOLIC BLOOD PRESSURE: 104 MMHG | WEIGHT: 231.5 LBS | HEIGHT: 70 IN | BODY MASS INDEX: 33.14 KG/M2 | HEART RATE: 67 BPM

## 2021-02-15 VITALS
BODY MASS INDEX: 33.5 KG/M2 | SYSTOLIC BLOOD PRESSURE: 138 MMHG | WEIGHT: 234 LBS | OXYGEN SATURATION: 96 % | TEMPERATURE: 98 F | DIASTOLIC BLOOD PRESSURE: 61 MMHG | HEART RATE: 68 BPM | HEIGHT: 70 IN

## 2021-02-15 DIAGNOSIS — M16.11 PRIMARY OSTEOARTHRITIS OF RIGHT HIP: Primary | ICD-10-CM

## 2021-02-15 DIAGNOSIS — F32.A DEPRESSION, UNSPECIFIED DEPRESSION TYPE: ICD-10-CM

## 2021-02-15 DIAGNOSIS — M25.551 RIGHT HIP PAIN: ICD-10-CM

## 2021-02-15 DIAGNOSIS — E66.9 OBESITY (BMI 30-39.9): ICD-10-CM

## 2021-02-15 DIAGNOSIS — G89.29 CHRONIC PAIN OF RIGHT KNEE: ICD-10-CM

## 2021-02-15 DIAGNOSIS — K21.9 GASTROESOPHAGEAL REFLUX DISEASE, UNSPECIFIED WHETHER ESOPHAGITIS PRESENT: ICD-10-CM

## 2021-02-15 DIAGNOSIS — N20.0 CALCULUS OF RIGHT KIDNEY: ICD-10-CM

## 2021-02-15 DIAGNOSIS — Z98.84 BARIATRIC SURGERY STATUS: ICD-10-CM

## 2021-02-15 DIAGNOSIS — G54.2 CERVICAL NEUROPATHY: ICD-10-CM

## 2021-02-15 DIAGNOSIS — G47.30 SLEEP APNEA IN ADULT: ICD-10-CM

## 2021-02-15 DIAGNOSIS — M16.11 PRIMARY OSTEOARTHRITIS OF RIGHT HIP: ICD-10-CM

## 2021-02-15 DIAGNOSIS — M25.561 CHRONIC PAIN OF RIGHT KNEE: ICD-10-CM

## 2021-02-15 DIAGNOSIS — M16.10 UNILATERAL PRIMARY OSTEOARTHRITIS, UNSPECIFIED HIP: ICD-10-CM

## 2021-02-15 PROCEDURE — 73700 CT LOWER EXTREMITY W/O DYE: CPT | Mod: TC,RT

## 2021-02-15 PROCEDURE — 99499 NO LOS: ICD-10-PCS | Mod: S$GLB,,, | Performed by: PHYSICIAN ASSISTANT

## 2021-02-15 PROCEDURE — 73502 XR HIP 2 VIEW RIGHT: ICD-10-PCS | Mod: 26,RT,, | Performed by: RADIOLOGY

## 2021-02-15 PROCEDURE — 73700 CT HIP WITHOUT CONTRAST RIGHT: ICD-10-PCS | Mod: 26,RT,, | Performed by: RADIOLOGY

## 2021-02-15 PROCEDURE — 99999 PR PBB SHADOW E&M-EST. PATIENT-LVL III: CPT | Mod: PBBFAC,,,

## 2021-02-15 PROCEDURE — 99499 UNLISTED E&M SERVICE: CPT | Mod: S$GLB,,, | Performed by: PHYSICIAN ASSISTANT

## 2021-02-15 PROCEDURE — 99214 PR OFFICE/OUTPT VISIT, EST, LEVL IV, 30-39 MIN: ICD-10-PCS | Mod: S$GLB,,, | Performed by: HOSPITALIST

## 2021-02-15 PROCEDURE — 3008F BODY MASS INDEX DOCD: CPT | Mod: CPTII,S$GLB,, | Performed by: HOSPITALIST

## 2021-02-15 PROCEDURE — 99999 PR PBB SHADOW E&M-EST. PATIENT-LVL IV: ICD-10-PCS | Mod: PBBFAC,,, | Performed by: PHYSICIAN ASSISTANT

## 2021-02-15 PROCEDURE — 1125F AMNT PAIN NOTED PAIN PRSNT: CPT | Mod: S$GLB,,, | Performed by: PHYSICIAN ASSISTANT

## 2021-02-15 PROCEDURE — 73700 CT LOWER EXTREMITY W/O DYE: CPT | Mod: 26,RT,, | Performed by: RADIOLOGY

## 2021-02-15 PROCEDURE — 99999 PR PBB SHADOW E&M-EST. PATIENT-LVL III: ICD-10-PCS | Mod: PBBFAC,,,

## 2021-02-15 PROCEDURE — 3008F PR BODY MASS INDEX (BMI) DOCUMENTED: ICD-10-PCS | Mod: CPTII,S$GLB,, | Performed by: HOSPITALIST

## 2021-02-15 PROCEDURE — 99214 OFFICE O/P EST MOD 30 MIN: CPT | Mod: S$GLB,,, | Performed by: HOSPITALIST

## 2021-02-15 PROCEDURE — 3008F BODY MASS INDEX DOCD: CPT | Mod: CPTII,S$GLB,, | Performed by: PHYSICIAN ASSISTANT

## 2021-02-15 PROCEDURE — 73502 X-RAY EXAM HIP UNI 2-3 VIEWS: CPT | Mod: 26,RT,, | Performed by: RADIOLOGY

## 2021-02-15 PROCEDURE — 3008F PR BODY MASS INDEX (BMI) DOCUMENTED: ICD-10-PCS | Mod: CPTII,S$GLB,, | Performed by: PHYSICIAN ASSISTANT

## 2021-02-15 PROCEDURE — 99999 PR PBB SHADOW E&M-EST. PATIENT-LVL IV: CPT | Mod: PBBFAC,,, | Performed by: PHYSICIAN ASSISTANT

## 2021-02-15 PROCEDURE — 73502 X-RAY EXAM HIP UNI 2-3 VIEWS: CPT | Mod: TC,RT

## 2021-02-15 PROCEDURE — 1125F PR PAIN SEVERITY QUANTIFIED, PAIN PRESENT: ICD-10-PCS | Mod: S$GLB,,, | Performed by: PHYSICIAN ASSISTANT

## 2021-02-15 RX ORDER — SODIUM CHLORIDE 9 MG/ML
INJECTION, SOLUTION INTRAVENOUS CONTINUOUS
Status: CANCELLED | OUTPATIENT
Start: 2021-02-15 | End: 2021-02-16

## 2021-02-15 RX ORDER — CEFAZOLIN SODIUM 2 G/50ML
2 SOLUTION INTRAVENOUS
Status: CANCELLED | OUTPATIENT
Start: 2021-02-15

## 2021-02-15 RX ORDER — ACETAMINOPHEN 500 MG
1000 TABLET ORAL EVERY 6 HOURS
Status: CANCELLED | OUTPATIENT
Start: 2021-02-15 | End: 2021-02-17

## 2021-02-15 RX ORDER — OXYCODONE HYDROCHLORIDE 5 MG/1
10 TABLET ORAL
Status: CANCELLED | OUTPATIENT
Start: 2021-02-15

## 2021-02-15 RX ORDER — FAMOTIDINE 20 MG/1
20 TABLET, FILM COATED ORAL 2 TIMES DAILY
Status: CANCELLED | OUTPATIENT
Start: 2021-02-15

## 2021-02-15 RX ORDER — CEFAZOLIN SODIUM 2 G/50ML
2 SOLUTION INTRAVENOUS
Status: CANCELLED | OUTPATIENT
Start: 2021-02-15 | End: 2021-02-16

## 2021-02-15 RX ORDER — MUPIROCIN 20 MG/G
1 OINTMENT TOPICAL 2 TIMES DAILY
Status: CANCELLED | OUTPATIENT
Start: 2021-02-15 | End: 2021-02-20

## 2021-02-15 RX ORDER — POLYETHYLENE GLYCOL 3350 17 G/17G
17 POWDER, FOR SOLUTION ORAL DAILY
Status: CANCELLED | OUTPATIENT
Start: 2021-02-15

## 2021-02-15 RX ORDER — LIDOCAINE HYDROCHLORIDE 10 MG/ML
1 INJECTION, SOLUTION EPIDURAL; INFILTRATION; INTRACAUDAL; PERINEURAL
Status: CANCELLED | OUTPATIENT
Start: 2021-02-15

## 2021-02-15 RX ORDER — ONDANSETRON 2 MG/ML
4 INJECTION INTRAMUSCULAR; INTRAVENOUS EVERY 8 HOURS PRN
Status: CANCELLED | OUTPATIENT
Start: 2021-02-15

## 2021-02-15 RX ORDER — NALOXONE HCL 0.4 MG/ML
0.02 VIAL (ML) INJECTION
Status: CANCELLED | OUTPATIENT
Start: 2021-02-15

## 2021-02-15 RX ORDER — TALC
6 POWDER (GRAM) TOPICAL NIGHTLY PRN
Status: CANCELLED | OUTPATIENT
Start: 2021-02-15

## 2021-02-15 RX ORDER — ACETAMINOPHEN 500 MG
1000 TABLET ORAL
Status: CANCELLED | OUTPATIENT
Start: 2021-02-15

## 2021-02-15 RX ORDER — ASPIRIN 81 MG/1
81 TABLET ORAL 2 TIMES DAILY
Status: CANCELLED | OUTPATIENT
Start: 2021-02-15

## 2021-02-15 RX ORDER — SODIUM CHLORIDE 9 MG/ML
INJECTION, SOLUTION INTRAVENOUS
Status: CANCELLED | OUTPATIENT
Start: 2021-02-15

## 2021-02-15 RX ORDER — AMOXICILLIN 250 MG
1 CAPSULE ORAL 2 TIMES DAILY
Status: CANCELLED | OUTPATIENT
Start: 2021-02-15

## 2021-02-15 RX ORDER — PREGABALIN 75 MG/1
75 CAPSULE ORAL
Status: CANCELLED | OUTPATIENT
Start: 2021-02-15

## 2021-02-15 RX ORDER — FENTANYL CITRATE 50 UG/ML
25 INJECTION, SOLUTION INTRAMUSCULAR; INTRAVENOUS EVERY 5 MIN PRN
Status: CANCELLED | OUTPATIENT
Start: 2021-02-15

## 2021-02-15 RX ORDER — BISACODYL 10 MG
10 SUPPOSITORY, RECTAL RECTAL EVERY 12 HOURS PRN
Status: CANCELLED | OUTPATIENT
Start: 2021-02-15

## 2021-02-15 RX ORDER — MUPIROCIN 20 MG/G
1 OINTMENT TOPICAL
Status: CANCELLED | OUTPATIENT
Start: 2021-02-15

## 2021-02-15 RX ORDER — OXYCODONE HYDROCHLORIDE 5 MG/1
5 TABLET ORAL
Status: CANCELLED | OUTPATIENT
Start: 2021-02-15

## 2021-02-15 RX ORDER — MORPHINE SULFATE 2 MG/ML
2 INJECTION, SOLUTION INTRAMUSCULAR; INTRAVENOUS
Status: CANCELLED | OUTPATIENT
Start: 2021-02-15

## 2021-02-15 RX ORDER — PREGABALIN 75 MG/1
75 CAPSULE ORAL NIGHTLY
Status: CANCELLED | OUTPATIENT
Start: 2021-02-15

## 2021-02-22 ENCOUNTER — LAB VISIT (OUTPATIENT)
Dept: INTERNAL MEDICINE | Facility: CLINIC | Age: 66
End: 2021-02-22
Payer: COMMERCIAL

## 2021-02-22 DIAGNOSIS — Z01.818 PRE-OP TESTING: ICD-10-CM

## 2021-02-22 PROCEDURE — U0003 INFECTIOUS AGENT DETECTION BY NUCLEIC ACID (DNA OR RNA); SEVERE ACUTE RESPIRATORY SYNDROME CORONAVIRUS 2 (SARS-COV-2) (CORONAVIRUS DISEASE [COVID-19]), AMPLIFIED PROBE TECHNIQUE, MAKING USE OF HIGH THROUGHPUT TECHNOLOGIES AS DESCRIBED BY CMS-2020-01-R: HCPCS

## 2021-02-22 PROCEDURE — U0005 INFEC AGEN DETEC AMPLI PROBE: HCPCS

## 2021-02-23 ENCOUNTER — TELEPHONE (OUTPATIENT)
Dept: PHARMACY | Facility: CLINIC | Age: 66
End: 2021-02-23

## 2021-02-23 LAB — SARS-COV-2 RNA RESP QL NAA+PROBE: NOT DETECTED

## 2021-02-23 RX ORDER — DOCUSATE SODIUM 100 MG/1
100 CAPSULE, LIQUID FILLED ORAL 2 TIMES DAILY PRN
Qty: 60 CAPSULE | Refills: 0 | Status: SHIPPED | OUTPATIENT
Start: 2021-02-23 | End: 2023-03-02

## 2021-02-23 RX ORDER — ASPIRIN 81 MG/1
81 TABLET ORAL 2 TIMES DAILY
Qty: 60 TABLET | Refills: 0 | Status: SHIPPED | OUTPATIENT
Start: 2021-02-23 | End: 2021-06-17

## 2021-02-23 RX ORDER — DEXTROMETHORPHAN HYDROBROMIDE, GUAIFENESIN 5; 100 MG/5ML; MG/5ML
650 LIQUID ORAL EVERY 8 HOURS PRN
Qty: 120 TABLET | Refills: 0 | Status: SHIPPED | OUTPATIENT
Start: 2021-02-23 | End: 2023-03-02

## 2021-02-23 RX ORDER — CELECOXIB 200 MG/1
200 CAPSULE ORAL DAILY
Qty: 30 CAPSULE | Refills: 0 | Status: SHIPPED | OUTPATIENT
Start: 2021-02-23 | End: 2021-03-04

## 2021-02-23 RX ORDER — OXYCODONE HYDROCHLORIDE 5 MG/1
TABLET ORAL
Qty: 30 TABLET | Refills: 0 | Status: SHIPPED | OUTPATIENT
Start: 2021-02-23 | End: 2021-03-04 | Stop reason: SDUPTHER

## 2021-02-24 ENCOUNTER — PATIENT MESSAGE (OUTPATIENT)
Dept: ADMINISTRATIVE | Facility: OTHER | Age: 66
End: 2021-02-24

## 2021-02-24 ENCOUNTER — TELEPHONE (OUTPATIENT)
Dept: ORTHOPEDICS | Facility: CLINIC | Age: 66
End: 2021-02-24

## 2021-02-24 ENCOUNTER — ANESTHESIA EVENT (OUTPATIENT)
Dept: SURGERY | Facility: HOSPITAL | Age: 66
End: 2021-02-24
Payer: COMMERCIAL

## 2021-02-25 ENCOUNTER — ANESTHESIA (OUTPATIENT)
Dept: SURGERY | Facility: HOSPITAL | Age: 66
End: 2021-02-25
Payer: COMMERCIAL

## 2021-02-25 ENCOUNTER — HOSPITAL ENCOUNTER (OUTPATIENT)
Facility: HOSPITAL | Age: 66
Discharge: HOME | End: 2021-02-26
Attending: ORTHOPAEDIC SURGERY | Admitting: ORTHOPAEDIC SURGERY
Payer: COMMERCIAL

## 2021-02-25 DIAGNOSIS — M16.11 PRIMARY OSTEOARTHRITIS OF RIGHT HIP: ICD-10-CM

## 2021-02-25 PROCEDURE — 94799 UNLISTED PULMONARY SVC/PX: CPT

## 2021-02-25 PROCEDURE — 63600175 PHARM REV CODE 636 W HCPCS: Performed by: ANESTHESIOLOGY

## 2021-02-25 PROCEDURE — 27200750 HC INSULATED NEEDLE/ STIMUPLEX: Performed by: ANESTHESIOLOGY

## 2021-02-25 PROCEDURE — 64447 PR NERVE BLOCK INJ, ANES/STEROID, FEMORAL, INCL IMAG GUIDANCE: ICD-10-PCS | Mod: 59,RT,, | Performed by: ANESTHESIOLOGY

## 2021-02-25 PROCEDURE — 99900035 HC TECH TIME PER 15 MIN (STAT)

## 2021-02-25 PROCEDURE — 64450 PR NERVE BLOCK INJ, ANES/STEROID, OTHER PERIPHERAL: ICD-10-PCS | Mod: 59,RT,, | Performed by: ANESTHESIOLOGY

## 2021-02-25 PROCEDURE — 97165 OT EVAL LOW COMPLEX 30 MIN: CPT

## 2021-02-25 PROCEDURE — 94761 N-INVAS EAR/PLS OXIMETRY MLT: CPT

## 2021-02-25 PROCEDURE — 97535 SELF CARE MNGMENT TRAINING: CPT

## 2021-02-25 PROCEDURE — 71000039 HC RECOVERY, EACH ADD'L HOUR: Performed by: ORTHOPAEDIC SURGERY

## 2021-02-25 PROCEDURE — 64447 NJX AA&/STRD FEMORAL NRV IMG: CPT | Performed by: STUDENT IN AN ORGANIZED HEALTH CARE EDUCATION/TRAINING PROGRAM

## 2021-02-25 PROCEDURE — C1713 ANCHOR/SCREW BN/BN,TIS/BN: HCPCS | Performed by: ORTHOPAEDIC SURGERY

## 2021-02-25 PROCEDURE — 97116 GAIT TRAINING THERAPY: CPT

## 2021-02-25 PROCEDURE — 25000003 PHARM REV CODE 250: Performed by: NURSE ANESTHETIST, CERTIFIED REGISTERED

## 2021-02-25 PROCEDURE — C1776 JOINT DEVICE (IMPLANTABLE): HCPCS | Performed by: ORTHOPAEDIC SURGERY

## 2021-02-25 PROCEDURE — 37000008 HC ANESTHESIA 1ST 15 MINUTES: Performed by: ORTHOPAEDIC SURGERY

## 2021-02-25 PROCEDURE — 63600175 PHARM REV CODE 636 W HCPCS: Performed by: STUDENT IN AN ORGANIZED HEALTH CARE EDUCATION/TRAINING PROGRAM

## 2021-02-25 PROCEDURE — 25000003 PHARM REV CODE 250: Performed by: ANESTHESIOLOGY

## 2021-02-25 PROCEDURE — 36000711: Performed by: ORTHOPAEDIC SURGERY

## 2021-02-25 PROCEDURE — 25000003 PHARM REV CODE 250: Performed by: PHYSICIAN ASSISTANT

## 2021-02-25 PROCEDURE — 20985 PR CPTR-ASST SURGICAL NAVIGATION IMAGE-LESS: ICD-10-PCS | Mod: ,,, | Performed by: ORTHOPAEDIC SURGERY

## 2021-02-25 PROCEDURE — D9220A PRA ANESTHESIA: ICD-10-PCS | Mod: ,,, | Performed by: NURSE ANESTHETIST, CERTIFIED REGISTERED

## 2021-02-25 PROCEDURE — 76942 ECHO GUIDE FOR BIOPSY: CPT | Performed by: STUDENT IN AN ORGANIZED HEALTH CARE EDUCATION/TRAINING PROGRAM

## 2021-02-25 PROCEDURE — 20985 CPTR-ASST DIR MS PX: CPT | Mod: ,,, | Performed by: ORTHOPAEDIC SURGERY

## 2021-02-25 PROCEDURE — D9220A PRA ANESTHESIA: ICD-10-PCS | Mod: ,,, | Performed by: ANESTHESIOLOGY

## 2021-02-25 PROCEDURE — 25000003 PHARM REV CODE 250: Performed by: STUDENT IN AN ORGANIZED HEALTH CARE EDUCATION/TRAINING PROGRAM

## 2021-02-25 PROCEDURE — 76942 PR U/S GUIDANCE FOR NEEDLE GUIDANCE: ICD-10-PCS | Mod: 26,,, | Performed by: ANESTHESIOLOGY

## 2021-02-25 PROCEDURE — 63600175 PHARM REV CODE 636 W HCPCS: Performed by: NURSE ANESTHETIST, CERTIFIED REGISTERED

## 2021-02-25 PROCEDURE — 27130 PR TOTAL HIP ARTHROPLASTY: ICD-10-PCS | Mod: RT,,, | Performed by: ORTHOPAEDIC SURGERY

## 2021-02-25 PROCEDURE — S0020 INJECTION, BUPIVICAINE HYDRO: HCPCS | Performed by: ANESTHESIOLOGY

## 2021-02-25 PROCEDURE — D9220A PRA ANESTHESIA: Mod: ,,, | Performed by: ANESTHESIOLOGY

## 2021-02-25 PROCEDURE — 63600175 PHARM REV CODE 636 W HCPCS: Performed by: PHYSICIAN ASSISTANT

## 2021-02-25 PROCEDURE — 64447 NJX AA&/STRD FEMORAL NRV IMG: CPT | Mod: 59,RT,, | Performed by: ANESTHESIOLOGY

## 2021-02-25 PROCEDURE — 27201423 OPTIME MED/SURG SUP & DEVICES STERILE SUPPLY: Performed by: ORTHOPAEDIC SURGERY

## 2021-02-25 PROCEDURE — 76942 ECHO GUIDE FOR BIOPSY: CPT | Mod: 26,,, | Performed by: ANESTHESIOLOGY

## 2021-02-25 PROCEDURE — 37000009 HC ANESTHESIA EA ADD 15 MINS: Performed by: ORTHOPAEDIC SURGERY

## 2021-02-25 PROCEDURE — 27100025 HC TUBING, SET FLUID WARMER: Performed by: ANESTHESIOLOGY

## 2021-02-25 PROCEDURE — C1751 CATH, INF, PER/CENT/MIDLINE: HCPCS | Performed by: ANESTHESIOLOGY

## 2021-02-25 PROCEDURE — 64450 NJX AA&/STRD OTHER PN/BRANCH: CPT | Mod: 59,RT,, | Performed by: ANESTHESIOLOGY

## 2021-02-25 PROCEDURE — 97161 PT EVAL LOW COMPLEX 20 MIN: CPT

## 2021-02-25 PROCEDURE — 36000710: Performed by: ORTHOPAEDIC SURGERY

## 2021-02-25 PROCEDURE — 71000033 HC RECOVERY, INTIAL HOUR: Performed by: ORTHOPAEDIC SURGERY

## 2021-02-25 PROCEDURE — 27130 TOTAL HIP ARTHROPLASTY: CPT | Mod: RT,,, | Performed by: ORTHOPAEDIC SURGERY

## 2021-02-25 PROCEDURE — D9220A PRA ANESTHESIA: Mod: ,,, | Performed by: NURSE ANESTHETIST, CERTIFIED REGISTERED

## 2021-02-25 DEVICE — SCREW TRIDENT II LP HEX 6.5X30: Type: IMPLANTABLE DEVICE | Site: HIP | Status: FUNCTIONAL

## 2021-02-25 DEVICE — PIN BONE 4 X 170MM STERILE: Type: IMPLANTABLE DEVICE | Site: HIP | Status: FUNCTIONAL

## 2021-02-25 DEVICE — SHELL TRIDENT II ACET E 54MM: Type: IMPLANTABLE DEVICE | Site: HIP | Status: FUNCTIONAL

## 2021-02-25 DEVICE — STEM FEM ACCOLADE2 SZ6 35X111: Type: IMPLANTABLE DEVICE | Site: HIP | Status: FUNCTIONAL

## 2021-02-25 DEVICE — HEAD FEMORAL V40 +7.5X36MM: Type: IMPLANTABLE DEVICE | Site: HIP | Status: FUNCTIONAL

## 2021-02-25 DEVICE — INSERT ACE 0DEG 36MM SZ E X3: Type: IMPLANTABLE DEVICE | Site: HIP | Status: FUNCTIONAL

## 2021-02-25 RX ORDER — SODIUM CHLORIDE 9 MG/ML
INJECTION, SOLUTION INTRAVENOUS
Status: COMPLETED | OUTPATIENT
Start: 2021-02-25 | End: 2021-02-25

## 2021-02-25 RX ORDER — TRANEXAMIC ACID 100 MG/ML
1000 INJECTION, SOLUTION INTRAVENOUS
Status: DISCONTINUED | OUTPATIENT
Start: 2021-02-25 | End: 2021-02-25 | Stop reason: HOSPADM

## 2021-02-25 RX ORDER — ONDANSETRON 2 MG/ML
INJECTION INTRAMUSCULAR; INTRAVENOUS
Status: DISCONTINUED | OUTPATIENT
Start: 2021-02-25 | End: 2021-02-25

## 2021-02-25 RX ORDER — MIDAZOLAM HYDROCHLORIDE 1 MG/ML
0.5 INJECTION INTRAMUSCULAR; INTRAVENOUS
Status: DISCONTINUED | OUTPATIENT
Start: 2021-02-25 | End: 2021-02-25 | Stop reason: HOSPADM

## 2021-02-25 RX ORDER — OXYCODONE HYDROCHLORIDE 10 MG/1
10 TABLET ORAL
Status: DISCONTINUED | OUTPATIENT
Start: 2021-02-25 | End: 2021-02-26 | Stop reason: HOSPADM

## 2021-02-25 RX ORDER — FAMOTIDINE 20 MG/1
20 TABLET, FILM COATED ORAL 2 TIMES DAILY
Status: DISCONTINUED | OUTPATIENT
Start: 2021-02-25 | End: 2021-02-26 | Stop reason: HOSPADM

## 2021-02-25 RX ORDER — SODIUM CHLORIDE 0.9 % (FLUSH) 0.9 %
3 SYRINGE (ML) INJECTION EVERY 6 HOURS
Status: DISCONTINUED | OUTPATIENT
Start: 2021-02-25 | End: 2021-02-25 | Stop reason: HOSPADM

## 2021-02-25 RX ORDER — EPHEDRINE SULFATE 50 MG/ML
INJECTION, SOLUTION INTRAVENOUS
Status: DISCONTINUED | OUTPATIENT
Start: 2021-02-25 | End: 2021-02-25

## 2021-02-25 RX ORDER — POLYETHYLENE GLYCOL 3350 17 G/17G
17 POWDER, FOR SOLUTION ORAL DAILY
Status: DISCONTINUED | OUTPATIENT
Start: 2021-02-25 | End: 2021-02-26 | Stop reason: HOSPADM

## 2021-02-25 RX ORDER — CEFAZOLIN SODIUM 1 G/3ML
2 INJECTION, POWDER, FOR SOLUTION INTRAMUSCULAR; INTRAVENOUS
Status: COMPLETED | OUTPATIENT
Start: 2021-02-25 | End: 2021-02-25

## 2021-02-25 RX ORDER — MORPHINE SULFATE 2 MG/ML
2 INJECTION, SOLUTION INTRAMUSCULAR; INTRAVENOUS
Status: DISCONTINUED | OUTPATIENT
Start: 2021-02-25 | End: 2021-02-26 | Stop reason: HOSPADM

## 2021-02-25 RX ORDER — FAMOTIDINE 20 MG/1
20 TABLET, FILM COATED ORAL 2 TIMES DAILY
Status: DISCONTINUED | OUTPATIENT
Start: 2021-02-25 | End: 2021-02-25

## 2021-02-25 RX ORDER — OXYCODONE HYDROCHLORIDE 5 MG/1
5 TABLET ORAL
Status: DISCONTINUED | OUTPATIENT
Start: 2021-02-25 | End: 2021-02-26 | Stop reason: HOSPADM

## 2021-02-25 RX ORDER — NALOXONE HCL 0.4 MG/ML
0.02 VIAL (ML) INJECTION
Status: DISCONTINUED | OUTPATIENT
Start: 2021-02-25 | End: 2021-02-26 | Stop reason: HOSPADM

## 2021-02-25 RX ORDER — AMOXICILLIN 250 MG
1 CAPSULE ORAL 2 TIMES DAILY
Status: DISCONTINUED | OUTPATIENT
Start: 2021-02-25 | End: 2021-02-26 | Stop reason: HOSPADM

## 2021-02-25 RX ORDER — TALC
6 POWDER (GRAM) TOPICAL NIGHTLY PRN
Status: DISCONTINUED | OUTPATIENT
Start: 2021-02-25 | End: 2021-02-25 | Stop reason: HOSPADM

## 2021-02-25 RX ORDER — BUPIVACAINE HYDROCHLORIDE 7.5 MG/ML
INJECTION, SOLUTION EPIDURAL; RETROBULBAR
Status: COMPLETED | OUTPATIENT
Start: 2021-02-25 | End: 2021-02-25

## 2021-02-25 RX ORDER — FENTANYL CITRATE 50 UG/ML
25 INJECTION, SOLUTION INTRAMUSCULAR; INTRAVENOUS EVERY 5 MIN PRN
Status: DISCONTINUED | OUTPATIENT
Start: 2021-02-25 | End: 2021-02-25 | Stop reason: HOSPADM

## 2021-02-25 RX ORDER — KETAMINE HCL IN 0.9 % NACL 50 MG/5 ML
SYRINGE (ML) INTRAVENOUS
Status: DISCONTINUED | OUTPATIENT
Start: 2021-02-25 | End: 2021-02-25

## 2021-02-25 RX ORDER — MUPIROCIN 20 MG/G
1 OINTMENT TOPICAL
Status: COMPLETED | OUTPATIENT
Start: 2021-02-25 | End: 2021-02-25

## 2021-02-25 RX ORDER — METHOCARBAMOL 500 MG/1
1000 TABLET, FILM COATED ORAL 4 TIMES DAILY
Status: DISCONTINUED | OUTPATIENT
Start: 2021-02-25 | End: 2021-02-26 | Stop reason: HOSPADM

## 2021-02-25 RX ORDER — ONDANSETRON 2 MG/ML
4 INJECTION INTRAMUSCULAR; INTRAVENOUS EVERY 8 HOURS PRN
Status: DISCONTINUED | OUTPATIENT
Start: 2021-02-25 | End: 2021-02-26 | Stop reason: HOSPADM

## 2021-02-25 RX ORDER — PROPOFOL 10 MG/ML
VIAL (ML) INTRAVENOUS
Status: DISCONTINUED | OUTPATIENT
Start: 2021-02-25 | End: 2021-02-25

## 2021-02-25 RX ORDER — ASPIRIN 81 MG/1
81 TABLET ORAL 2 TIMES DAILY
Status: DISCONTINUED | OUTPATIENT
Start: 2021-02-25 | End: 2021-02-26 | Stop reason: HOSPADM

## 2021-02-25 RX ORDER — MUPIROCIN 20 MG/G
1 OINTMENT TOPICAL 2 TIMES DAILY
Status: DISCONTINUED | OUTPATIENT
Start: 2021-02-25 | End: 2021-02-26 | Stop reason: HOSPADM

## 2021-02-25 RX ORDER — ACETAMINOPHEN 500 MG
1000 TABLET ORAL
Status: COMPLETED | OUTPATIENT
Start: 2021-02-25 | End: 2021-02-25

## 2021-02-25 RX ORDER — LIDOCAINE HYDROCHLORIDE 20 MG/ML
INJECTION INTRAVENOUS
Status: DISCONTINUED | OUTPATIENT
Start: 2021-02-25 | End: 2021-02-25

## 2021-02-25 RX ORDER — BISACODYL 10 MG
10 SUPPOSITORY, RECTAL RECTAL EVERY 12 HOURS PRN
Status: DISCONTINUED | OUTPATIENT
Start: 2021-02-25 | End: 2021-02-26 | Stop reason: HOSPADM

## 2021-02-25 RX ORDER — PREGABALIN 75 MG/1
75 CAPSULE ORAL
Status: COMPLETED | OUTPATIENT
Start: 2021-02-25 | End: 2021-02-25

## 2021-02-25 RX ORDER — PROPOFOL 10 MG/ML
VIAL (ML) INTRAVENOUS CONTINUOUS PRN
Status: DISCONTINUED | OUTPATIENT
Start: 2021-02-25 | End: 2021-02-25

## 2021-02-25 RX ORDER — ACETAMINOPHEN 500 MG
1000 TABLET ORAL EVERY 6 HOURS
Status: DISCONTINUED | OUTPATIENT
Start: 2021-02-25 | End: 2021-02-26 | Stop reason: HOSPADM

## 2021-02-25 RX ORDER — DEXMEDETOMIDINE HYDROCHLORIDE 100 UG/ML
INJECTION, SOLUTION INTRAVENOUS
Status: DISCONTINUED | OUTPATIENT
Start: 2021-02-25 | End: 2021-02-25

## 2021-02-25 RX ORDER — TIZANIDINE 2 MG/1
4 TABLET ORAL EVERY 8 HOURS PRN
Status: DISCONTINUED | OUTPATIENT
Start: 2021-02-25 | End: 2021-02-25

## 2021-02-25 RX ORDER — FAMOTIDINE 10 MG/ML
INJECTION INTRAVENOUS
Status: DISCONTINUED | OUTPATIENT
Start: 2021-02-25 | End: 2021-02-25

## 2021-02-25 RX ORDER — MIDAZOLAM HYDROCHLORIDE 1 MG/ML
INJECTION, SOLUTION INTRAMUSCULAR; INTRAVENOUS
Status: DISCONTINUED | OUTPATIENT
Start: 2021-02-25 | End: 2021-02-25

## 2021-02-25 RX ORDER — CEFAZOLIN SODIUM 1 G/3ML
2 INJECTION, POWDER, FOR SOLUTION INTRAMUSCULAR; INTRAVENOUS
Status: COMPLETED | OUTPATIENT
Start: 2021-02-25 | End: 2021-02-26

## 2021-02-25 RX ORDER — SERTRALINE HYDROCHLORIDE 100 MG/1
100 TABLET, FILM COATED ORAL DAILY
Status: DISCONTINUED | OUTPATIENT
Start: 2021-02-25 | End: 2021-02-26 | Stop reason: HOSPADM

## 2021-02-25 RX ORDER — PREGABALIN 75 MG/1
75 CAPSULE ORAL NIGHTLY
Status: DISCONTINUED | OUTPATIENT
Start: 2021-02-25 | End: 2021-02-25

## 2021-02-25 RX ORDER — DEXAMETHASONE SODIUM PHOSPHATE 4 MG/ML
INJECTION, SOLUTION INTRA-ARTICULAR; INTRALESIONAL; INTRAMUSCULAR; INTRAVENOUS; SOFT TISSUE
Status: DISCONTINUED | OUTPATIENT
Start: 2021-02-25 | End: 2021-02-25

## 2021-02-25 RX ORDER — POLYETHYLENE GLYCOL 3350 17 G/17G
17 POWDER, FOR SOLUTION ORAL DAILY
Status: DISCONTINUED | OUTPATIENT
Start: 2021-02-25 | End: 2021-02-25

## 2021-02-25 RX ORDER — SODIUM CHLORIDE 9 MG/ML
INJECTION, SOLUTION INTRAVENOUS CONTINUOUS
Status: DISCONTINUED | OUTPATIENT
Start: 2021-02-25 | End: 2021-02-26 | Stop reason: HOSPADM

## 2021-02-25 RX ORDER — TRANEXAMIC ACID 100 MG/ML
1000 INJECTION, SOLUTION INTRAVENOUS
Status: COMPLETED | OUTPATIENT
Start: 2021-02-25 | End: 2021-02-25

## 2021-02-25 RX ORDER — DEXTROMETHORPHAN HYDROBROMIDE, GUAIFENESIN 5; 100 MG/5ML; MG/5ML
1300 LIQUID ORAL EVERY 8 HOURS
Status: ON HOLD | COMMUNITY
End: 2021-02-25 | Stop reason: HOSPADM

## 2021-02-25 RX ORDER — FENTANYL CITRATE 50 UG/ML
INJECTION, SOLUTION INTRAMUSCULAR; INTRAVENOUS
Status: DISCONTINUED | OUTPATIENT
Start: 2021-02-25 | End: 2021-02-25

## 2021-02-25 RX ORDER — LIDOCAINE HYDROCHLORIDE 10 MG/ML
1 INJECTION, SOLUTION EPIDURAL; INFILTRATION; INTRACAUDAL; PERINEURAL
Status: DISCONTINUED | OUTPATIENT
Start: 2021-02-25 | End: 2021-02-25 | Stop reason: HOSPADM

## 2021-02-25 RX ORDER — GABAPENTIN 300 MG/1
600 CAPSULE ORAL 3 TIMES DAILY
Status: DISCONTINUED | OUTPATIENT
Start: 2021-02-25 | End: 2021-02-26 | Stop reason: HOSPADM

## 2021-02-25 RX ADMIN — PROPOFOL 100 MCG/KG/MIN: 10 INJECTION, EMULSION INTRAVENOUS at 07:02

## 2021-02-25 RX ADMIN — BUPIVACAINE HYDROCHLORIDE 10 ML: 7.5 INJECTION, SOLUTION EPIDURAL; RETROBULBAR at 06:02

## 2021-02-25 RX ADMIN — MIDAZOLAM HYDROCHLORIDE 2 MG: 1 INJECTION, SOLUTION INTRAMUSCULAR; INTRAVENOUS at 06:02

## 2021-02-25 RX ADMIN — EPHEDRINE SULFATE 10 MG: 50 INJECTION INTRAVENOUS at 07:02

## 2021-02-25 RX ADMIN — ONDANSETRON 4 MG: 2 INJECTION, SOLUTION INTRAMUSCULAR; INTRAVENOUS at 09:02

## 2021-02-25 RX ADMIN — Medication 20 MG: at 07:02

## 2021-02-25 RX ADMIN — SODIUM CHLORIDE: 0.9 INJECTION, SOLUTION INTRAVENOUS at 06:02

## 2021-02-25 RX ADMIN — FENTANYL CITRATE 25 MCG: 50 INJECTION, SOLUTION INTRAMUSCULAR; INTRAVENOUS at 10:02

## 2021-02-25 RX ADMIN — Medication 10 MG: at 07:02

## 2021-02-25 RX ADMIN — EPHEDRINE SULFATE 10 MG: 50 INJECTION INTRAVENOUS at 08:02

## 2021-02-25 RX ADMIN — DEXMEDETOMIDINE HYDROCHLORIDE 8 MCG: 100 INJECTION, SOLUTION, CONCENTRATE INTRAVENOUS at 07:02

## 2021-02-25 RX ADMIN — MUPIROCIN 1 G: 20 OINTMENT TOPICAL at 12:02

## 2021-02-25 RX ADMIN — FENTANYL CITRATE 25 MCG: 50 INJECTION, SOLUTION INTRAMUSCULAR; INTRAVENOUS at 07:02

## 2021-02-25 RX ADMIN — MUPIROCIN 1 G: 20 OINTMENT TOPICAL at 06:02

## 2021-02-25 RX ADMIN — CEFAZOLIN 2 G: 330 INJECTION, POWDER, FOR SOLUTION INTRAMUSCULAR; INTRAVENOUS at 03:02

## 2021-02-25 RX ADMIN — DEXAMETHASONE SODIUM PHOSPHATE 8 MG: 4 INJECTION, SOLUTION INTRAMUSCULAR; INTRAVENOUS at 07:02

## 2021-02-25 RX ADMIN — MIDAZOLAM HYDROCHLORIDE 1 MG: 1 INJECTION, SOLUTION INTRAMUSCULAR; INTRAVENOUS at 07:02

## 2021-02-25 RX ADMIN — FENTANYL CITRATE 50 MCG: 50 INJECTION INTRAMUSCULAR; INTRAVENOUS at 06:02

## 2021-02-25 RX ADMIN — OXYCODONE HYDROCHLORIDE 10 MG: 10 TABLET ORAL at 09:02

## 2021-02-25 RX ADMIN — ACETAMINOPHEN 1000 MG: 500 TABLET ORAL at 06:02

## 2021-02-25 RX ADMIN — OXYCODONE HYDROCHLORIDE 10 MG: 10 TABLET ORAL at 01:02

## 2021-02-25 RX ADMIN — FAMOTIDINE 20 MG: 10 INJECTION, SOLUTION INTRAVENOUS at 07:02

## 2021-02-25 RX ADMIN — LIDOCAINE HYDROCHLORIDE 20 MG: 20 INJECTION, SOLUTION INTRAVENOUS at 07:02

## 2021-02-25 RX ADMIN — GABAPENTIN 600 MG: 300 CAPSULE ORAL at 03:02

## 2021-02-25 RX ADMIN — CEFAZOLIN 2 G: 330 INJECTION, POWDER, FOR SOLUTION INTRAMUSCULAR; INTRAVENOUS at 07:02

## 2021-02-25 RX ADMIN — METHOCARBAMOL TABLETS 1000 MG: 500 TABLET, COATED ORAL at 09:02

## 2021-02-25 RX ADMIN — PROPOFOL 20 MG: 10 INJECTION, EMULSION INTRAVENOUS at 07:02

## 2021-02-25 RX ADMIN — MUPIROCIN 1 G: 20 OINTMENT TOPICAL at 09:02

## 2021-02-25 RX ADMIN — FENTANYL CITRATE 50 MCG: 50 INJECTION, SOLUTION INTRAMUSCULAR; INTRAVENOUS at 09:02

## 2021-02-25 RX ADMIN — SODIUM CHLORIDE: 0.9 INJECTION, SOLUTION INTRAVENOUS at 03:02

## 2021-02-25 RX ADMIN — DEXMEDETOMIDINE HYDROCHLORIDE 4 MCG: 100 INJECTION, SOLUTION, CONCENTRATE INTRAVENOUS at 07:02

## 2021-02-25 RX ADMIN — ACETAMINOPHEN 1000 MG: 500 TABLET ORAL at 12:02

## 2021-02-25 RX ADMIN — EPHEDRINE SULFATE 10 MG: 50 INJECTION INTRAVENOUS at 09:02

## 2021-02-25 RX ADMIN — LIDOCAINE HYDROCHLORIDE 40 MG: 20 INJECTION, SOLUTION INTRAVENOUS at 07:02

## 2021-02-25 RX ADMIN — MIDAZOLAM HYDROCHLORIDE 0.5 MG: 1 INJECTION, SOLUTION INTRAMUSCULAR; INTRAVENOUS at 07:02

## 2021-02-25 RX ADMIN — TRANEXAMIC ACID 1000 MG: 100 INJECTION, SOLUTION INTRAVENOUS at 08:02

## 2021-02-25 RX ADMIN — VANCOMYCIN HYDROCHLORIDE 1500 MG: 1.5 INJECTION, POWDER, LYOPHILIZED, FOR SOLUTION INTRAVENOUS at 06:02

## 2021-02-25 RX ADMIN — METHOCARBAMOL TABLETS 1000 MG: 500 TABLET, COATED ORAL at 06:02

## 2021-02-25 RX ADMIN — BUPIVACAINE HYDROCHLORIDE 5 ML: 7.5 INJECTION, SOLUTION EPIDURAL; RETROBULBAR at 06:02

## 2021-02-25 RX ADMIN — MEPIVACAINE HYDROCHLORIDE 3 ML: 15 INJECTION, SOLUTION EPIDURAL; INFILTRATION at 07:02

## 2021-02-25 RX ADMIN — FAMOTIDINE 20 MG: 20 TABLET, FILM COATED ORAL at 09:02

## 2021-02-25 RX ADMIN — TRANEXAMIC ACID 1000 MG: 100 INJECTION, SOLUTION INTRAVENOUS at 07:02

## 2021-02-25 RX ADMIN — FENTANYL CITRATE 25 MCG: 50 INJECTION, SOLUTION INTRAMUSCULAR; INTRAVENOUS at 09:02

## 2021-02-25 RX ADMIN — METHOCARBAMOL TABLETS 1000 MG: 500 TABLET, COATED ORAL at 10:02

## 2021-02-25 RX ADMIN — SERTRALINE HYDROCHLORIDE 100 MG: 100 TABLET ORAL at 09:02

## 2021-02-25 RX ADMIN — PREGABALIN 75 MG: 75 CAPSULE ORAL at 06:02

## 2021-02-25 RX ADMIN — MIDAZOLAM HYDROCHLORIDE 0.5 MG: 1 INJECTION, SOLUTION INTRAMUSCULAR; INTRAVENOUS at 09:02

## 2021-02-25 RX ADMIN — METHOCARBAMOL TABLETS 1000 MG: 500 TABLET, COATED ORAL at 01:02

## 2021-02-25 RX ADMIN — ASPIRIN 81 MG: 81 TABLET, COATED ORAL at 09:02

## 2021-02-25 RX ADMIN — DOCUSATE SODIUM 50MG AND SENNOSIDES 8.6MG 1 TABLET: 8.6; 5 TABLET, FILM COATED ORAL at 09:02

## 2021-02-25 RX ADMIN — GABAPENTIN 600 MG: 300 CAPSULE ORAL at 09:02

## 2021-02-26 ENCOUNTER — CLINICAL SUPPORT (OUTPATIENT)
Dept: ORTHOPEDICS | Facility: CLINIC | Age: 66
End: 2021-02-26
Payer: COMMERCIAL

## 2021-02-26 VITALS
SYSTOLIC BLOOD PRESSURE: 101 MMHG | DIASTOLIC BLOOD PRESSURE: 53 MMHG | HEIGHT: 70 IN | BODY MASS INDEX: 33.5 KG/M2 | OXYGEN SATURATION: 95 % | RESPIRATION RATE: 17 BRPM | WEIGHT: 234 LBS | TEMPERATURE: 96 F | HEART RATE: 61 BPM

## 2021-02-26 PROBLEM — Z96.641 STATUS POST TOTAL REPLACEMENT OF RIGHT HIP: Status: ACTIVE | Noted: 2021-02-26

## 2021-02-26 LAB
BUN SERPL-MCNC: 14 MG/DL (ref 6–30)
CHLORIDE SERPL-SCNC: 104 MMOL/L (ref 95–110)
CREAT SERPL-MCNC: 0.6 MG/DL (ref 0.5–1.4)
GLUCOSE SERPL-MCNC: 111 MG/DL (ref 70–110)
HCT VFR BLD CALC: 29 %PCV (ref 36–54)
POC IONIZED CALCIUM: 1.21 MMOL/L (ref 1.06–1.42)
POC TCO2 (MEASURED): 28 MMOL/L (ref 23–29)
POTASSIUM BLD-SCNC: 4.4 MMOL/L (ref 3.5–5.1)
SAMPLE: ABNORMAL
SODIUM BLD-SCNC: 143 MMOL/L (ref 136–145)

## 2021-02-26 PROCEDURE — 63600175 PHARM REV CODE 636 W HCPCS: Performed by: PHYSICIAN ASSISTANT

## 2021-02-26 PROCEDURE — 99900035 HC TECH TIME PER 15 MIN (STAT)

## 2021-02-26 PROCEDURE — 97116 GAIT TRAINING THERAPY: CPT

## 2021-02-26 PROCEDURE — 25000003 PHARM REV CODE 250: Performed by: PHYSICIAN ASSISTANT

## 2021-02-26 PROCEDURE — 82565 ASSAY OF CREATININE: CPT

## 2021-02-26 PROCEDURE — 25000003 PHARM REV CODE 250: Performed by: ANESTHESIOLOGY

## 2021-02-26 PROCEDURE — 82330 ASSAY OF CALCIUM: CPT

## 2021-02-26 PROCEDURE — 99212 PR OFFICE/OUTPT VISIT, EST, LEVL II, 10-19 MIN: ICD-10-PCS | Mod: ,,, | Performed by: NURSE PRACTITIONER

## 2021-02-26 PROCEDURE — 97535 SELF CARE MNGMENT TRAINING: CPT

## 2021-02-26 PROCEDURE — 99212 OFFICE O/P EST SF 10 MIN: CPT | Mod: ,,, | Performed by: NURSE PRACTITIONER

## 2021-02-26 PROCEDURE — 94761 N-INVAS EAR/PLS OXIMETRY MLT: CPT

## 2021-02-26 PROCEDURE — 85014 HEMATOCRIT: CPT

## 2021-02-26 PROCEDURE — 84132 ASSAY OF SERUM POTASSIUM: CPT

## 2021-02-26 PROCEDURE — 97110 THERAPEUTIC EXERCISES: CPT

## 2021-02-26 PROCEDURE — 84295 ASSAY OF SERUM SODIUM: CPT

## 2021-02-26 PROCEDURE — 25000003 PHARM REV CODE 250: Performed by: STUDENT IN AN ORGANIZED HEALTH CARE EDUCATION/TRAINING PROGRAM

## 2021-02-26 RX ADMIN — DOCUSATE SODIUM 50MG AND SENNOSIDES 8.6MG 1 TABLET: 8.6; 5 TABLET, FILM COATED ORAL at 08:02

## 2021-02-26 RX ADMIN — ACETAMINOPHEN 1000 MG: 500 TABLET ORAL at 05:02

## 2021-02-26 RX ADMIN — OXYCODONE HYDROCHLORIDE 10 MG: 10 TABLET ORAL at 07:02

## 2021-02-26 RX ADMIN — POLYETHYLENE GLYCOL 3350 17 G: 17 POWDER, FOR SOLUTION ORAL at 08:02

## 2021-02-26 RX ADMIN — CEFAZOLIN 2 G: 330 INJECTION, POWDER, FOR SOLUTION INTRAMUSCULAR; INTRAVENOUS at 12:02

## 2021-02-26 RX ADMIN — ACETAMINOPHEN 1000 MG: 500 TABLET ORAL at 12:02

## 2021-02-26 RX ADMIN — ASPIRIN 81 MG: 81 TABLET, COATED ORAL at 08:02

## 2021-02-26 RX ADMIN — FAMOTIDINE 20 MG: 20 TABLET, FILM COATED ORAL at 08:02

## 2021-02-26 RX ADMIN — METHOCARBAMOL TABLETS 1000 MG: 500 TABLET, COATED ORAL at 08:02

## 2021-02-26 RX ADMIN — OXYCODONE HYDROCHLORIDE 10 MG: 10 TABLET ORAL at 03:02

## 2021-02-26 RX ADMIN — MUPIROCIN 1 G: 20 OINTMENT TOPICAL at 08:02

## 2021-02-26 RX ADMIN — GABAPENTIN 600 MG: 300 CAPSULE ORAL at 08:02

## 2021-02-26 RX ADMIN — OXYCODONE HYDROCHLORIDE 10 MG: 10 TABLET ORAL at 10:02

## 2021-02-27 PROCEDURE — G0180 PR HOME HEALTH MD CERTIFICATION: ICD-10-PCS | Mod: ,,, | Performed by: ORTHOPAEDIC SURGERY

## 2021-02-27 PROCEDURE — G0180 MD CERTIFICATION HHA PATIENT: HCPCS | Mod: ,,, | Performed by: ORTHOPAEDIC SURGERY

## 2021-03-01 ENCOUNTER — PATIENT MESSAGE (OUTPATIENT)
Dept: ORTHOPEDICS | Facility: CLINIC | Age: 66
End: 2021-03-01

## 2021-03-02 ENCOUNTER — TELEPHONE (OUTPATIENT)
Dept: ORTHOPEDICS | Facility: CLINIC | Age: 66
End: 2021-03-02

## 2021-03-04 ENCOUNTER — OFFICE VISIT (OUTPATIENT)
Dept: ORTHOPEDICS | Facility: CLINIC | Age: 66
End: 2021-03-04
Payer: COMMERCIAL

## 2021-03-04 VITALS — WEIGHT: 234 LBS | BODY MASS INDEX: 33.5 KG/M2 | HEIGHT: 70 IN

## 2021-03-04 DIAGNOSIS — Z96.641 STATUS POST TOTAL HIP REPLACEMENT, RIGHT: Primary | ICD-10-CM

## 2021-03-04 PROCEDURE — 3008F PR BODY MASS INDEX (BMI) DOCUMENTED: ICD-10-PCS | Mod: CPTII,S$GLB,, | Performed by: PHYSICIAN ASSISTANT

## 2021-03-04 PROCEDURE — 99999 PR PBB SHADOW E&M-EST. PATIENT-LVL III: ICD-10-PCS | Mod: PBBFAC,,, | Performed by: PHYSICIAN ASSISTANT

## 2021-03-04 PROCEDURE — 3008F BODY MASS INDEX DOCD: CPT | Mod: CPTII,S$GLB,, | Performed by: PHYSICIAN ASSISTANT

## 2021-03-04 PROCEDURE — 99024 POSTOP FOLLOW-UP VISIT: CPT | Mod: S$GLB,,, | Performed by: PHYSICIAN ASSISTANT

## 2021-03-04 PROCEDURE — 1125F PR PAIN SEVERITY QUANTIFIED, PAIN PRESENT: ICD-10-PCS | Mod: S$GLB,,, | Performed by: PHYSICIAN ASSISTANT

## 2021-03-04 PROCEDURE — 1125F AMNT PAIN NOTED PAIN PRSNT: CPT | Mod: S$GLB,,, | Performed by: PHYSICIAN ASSISTANT

## 2021-03-04 PROCEDURE — 99999 PR PBB SHADOW E&M-EST. PATIENT-LVL III: CPT | Mod: PBBFAC,,, | Performed by: PHYSICIAN ASSISTANT

## 2021-03-04 PROCEDURE — 99024 PR POST-OP FOLLOW-UP VISIT: ICD-10-PCS | Mod: S$GLB,,, | Performed by: PHYSICIAN ASSISTANT

## 2021-03-04 RX ORDER — OXYCODONE HYDROCHLORIDE 5 MG/1
TABLET ORAL
Qty: 30 TABLET | Refills: 0 | Status: SHIPPED | OUTPATIENT
Start: 2021-03-04 | End: 2021-03-11 | Stop reason: SDUPTHER

## 2021-03-10 ENCOUNTER — EXTERNAL HOME HEALTH (OUTPATIENT)
Dept: HOME HEALTH SERVICES | Facility: HOSPITAL | Age: 66
End: 2021-03-10
Payer: COMMERCIAL

## 2021-03-11 ENCOUNTER — OFFICE VISIT (OUTPATIENT)
Dept: ORTHOPEDICS | Facility: CLINIC | Age: 66
End: 2021-03-11
Payer: COMMERCIAL

## 2021-03-11 VITALS — WEIGHT: 234 LBS | HEIGHT: 70 IN | BODY MASS INDEX: 33.5 KG/M2

## 2021-03-11 DIAGNOSIS — Z96.641 STATUS POST TOTAL HIP REPLACEMENT, RIGHT: Primary | ICD-10-CM

## 2021-03-11 PROCEDURE — 1125F AMNT PAIN NOTED PAIN PRSNT: CPT | Mod: S$GLB,,, | Performed by: PHYSICIAN ASSISTANT

## 2021-03-11 PROCEDURE — 99024 PR POST-OP FOLLOW-UP VISIT: ICD-10-PCS | Mod: S$GLB,,, | Performed by: PHYSICIAN ASSISTANT

## 2021-03-11 PROCEDURE — 1125F PR PAIN SEVERITY QUANTIFIED, PAIN PRESENT: ICD-10-PCS | Mod: S$GLB,,, | Performed by: PHYSICIAN ASSISTANT

## 2021-03-11 PROCEDURE — 99999 PR PBB SHADOW E&M-EST. PATIENT-LVL III: ICD-10-PCS | Mod: PBBFAC,,, | Performed by: PHYSICIAN ASSISTANT

## 2021-03-11 PROCEDURE — 3008F BODY MASS INDEX DOCD: CPT | Mod: CPTII,S$GLB,, | Performed by: PHYSICIAN ASSISTANT

## 2021-03-11 PROCEDURE — 3008F PR BODY MASS INDEX (BMI) DOCUMENTED: ICD-10-PCS | Mod: CPTII,S$GLB,, | Performed by: PHYSICIAN ASSISTANT

## 2021-03-11 PROCEDURE — 99024 POSTOP FOLLOW-UP VISIT: CPT | Mod: S$GLB,,, | Performed by: PHYSICIAN ASSISTANT

## 2021-03-11 PROCEDURE — 99999 PR PBB SHADOW E&M-EST. PATIENT-LVL III: CPT | Mod: PBBFAC,,, | Performed by: PHYSICIAN ASSISTANT

## 2021-03-11 RX ORDER — OXYCODONE HYDROCHLORIDE 5 MG/1
TABLET ORAL
Qty: 28 TABLET | Refills: 0 | Status: SHIPPED | OUTPATIENT
Start: 2021-03-11 | End: 2021-09-03

## 2021-03-16 ENCOUNTER — PATIENT MESSAGE (OUTPATIENT)
Dept: ORTHOPEDICS | Facility: CLINIC | Age: 66
End: 2021-03-16

## 2021-03-23 ENCOUNTER — PATIENT OUTREACH (OUTPATIENT)
Dept: ADMINISTRATIVE | Facility: HOSPITAL | Age: 66
End: 2021-03-23

## 2021-03-23 ENCOUNTER — PATIENT MESSAGE (OUTPATIENT)
Dept: ADMINISTRATIVE | Facility: HOSPITAL | Age: 66
End: 2021-03-23

## 2021-03-31 DIAGNOSIS — Z96.641 STATUS POST TOTAL HIP REPLACEMENT, RIGHT: ICD-10-CM

## 2021-03-31 RX ORDER — OXYCODONE HYDROCHLORIDE 5 MG/1
TABLET ORAL
Qty: 28 TABLET | Refills: 0 | Status: CANCELLED | OUTPATIENT
Start: 2021-03-31

## 2021-04-01 ENCOUNTER — PATIENT MESSAGE (OUTPATIENT)
Dept: ORTHOPEDICS | Facility: CLINIC | Age: 66
End: 2021-04-01

## 2021-04-01 RX ORDER — HYDROCODONE BITARTRATE AND ACETAMINOPHEN 10; 325 MG/1; MG/1
1 TABLET ORAL EVERY 8 HOURS PRN
Qty: 21 TABLET | Refills: 0 | Status: SHIPPED | OUTPATIENT
Start: 2021-04-01 | End: 2021-04-08

## 2021-04-01 RX ORDER — HYDROCODONE BITARTRATE AND ACETAMINOPHEN 10; 325 MG/1; MG/1
1 TABLET ORAL EVERY 8 HOURS PRN
Qty: 21 TABLET | Refills: 0 | Status: SHIPPED | OUTPATIENT
Start: 2021-04-01 | End: 2021-04-01 | Stop reason: SDUPTHER

## 2021-04-05 ENCOUNTER — PATIENT MESSAGE (OUTPATIENT)
Dept: ADMINISTRATIVE | Facility: HOSPITAL | Age: 66
End: 2021-04-05

## 2021-04-06 DIAGNOSIS — Z96.641 STATUS POST TOTAL HIP REPLACEMENT, RIGHT: Primary | ICD-10-CM

## 2021-04-08 ENCOUNTER — HOSPITAL ENCOUNTER (OUTPATIENT)
Dept: RADIOLOGY | Facility: HOSPITAL | Age: 66
Discharge: HOME | End: 2021-04-08
Attending: PHYSICIAN ASSISTANT
Payer: COMMERCIAL

## 2021-04-08 ENCOUNTER — PATIENT MESSAGE (OUTPATIENT)
Dept: ADMINISTRATIVE | Facility: OTHER | Age: 66
End: 2021-04-08

## 2021-04-08 ENCOUNTER — OFFICE VISIT (OUTPATIENT)
Dept: ORTHOPEDICS | Facility: CLINIC | Age: 66
End: 2021-04-08
Payer: COMMERCIAL

## 2021-04-08 VITALS — HEIGHT: 70 IN | BODY MASS INDEX: 33.46 KG/M2 | WEIGHT: 233.69 LBS

## 2021-04-08 DIAGNOSIS — Z96.641 STATUS POST TOTAL HIP REPLACEMENT, RIGHT: ICD-10-CM

## 2021-04-08 DIAGNOSIS — Z96.641 STATUS POST TOTAL HIP REPLACEMENT, RIGHT: Primary | ICD-10-CM

## 2021-04-08 PROCEDURE — 3008F PR BODY MASS INDEX (BMI) DOCUMENTED: ICD-10-PCS | Mod: CPTII,S$GLB,, | Performed by: PHYSICIAN ASSISTANT

## 2021-04-08 PROCEDURE — 1125F AMNT PAIN NOTED PAIN PRSNT: CPT | Mod: S$GLB,,, | Performed by: PHYSICIAN ASSISTANT

## 2021-04-08 PROCEDURE — 3008F BODY MASS INDEX DOCD: CPT | Mod: CPTII,S$GLB,, | Performed by: PHYSICIAN ASSISTANT

## 2021-04-08 PROCEDURE — 73502 X-RAY EXAM HIP UNI 2-3 VIEWS: CPT | Mod: 26,RT,, | Performed by: RADIOLOGY

## 2021-04-08 PROCEDURE — 99999 PR PBB SHADOW E&M-EST. PATIENT-LVL III: CPT | Mod: PBBFAC,,, | Performed by: PHYSICIAN ASSISTANT

## 2021-04-08 PROCEDURE — 99999 PR PBB SHADOW E&M-EST. PATIENT-LVL III: ICD-10-PCS | Mod: PBBFAC,,, | Performed by: PHYSICIAN ASSISTANT

## 2021-04-08 PROCEDURE — 73502 X-RAY EXAM HIP UNI 2-3 VIEWS: CPT | Mod: TC,RT

## 2021-04-08 PROCEDURE — 99024 PR POST-OP FOLLOW-UP VISIT: ICD-10-PCS | Mod: S$GLB,,, | Performed by: PHYSICIAN ASSISTANT

## 2021-04-08 PROCEDURE — 73502 XR HIP 2 VIEW RIGHT: ICD-10-PCS | Mod: 26,RT,, | Performed by: RADIOLOGY

## 2021-04-08 PROCEDURE — 1125F PR PAIN SEVERITY QUANTIFIED, PAIN PRESENT: ICD-10-PCS | Mod: S$GLB,,, | Performed by: PHYSICIAN ASSISTANT

## 2021-04-08 PROCEDURE — 99024 POSTOP FOLLOW-UP VISIT: CPT | Mod: S$GLB,,, | Performed by: PHYSICIAN ASSISTANT

## 2021-04-15 ENCOUNTER — PATIENT MESSAGE (OUTPATIENT)
Dept: RESEARCH | Facility: HOSPITAL | Age: 66
End: 2021-04-15

## 2021-04-29 ENCOUNTER — PATIENT MESSAGE (OUTPATIENT)
Dept: ADMINISTRATIVE | Facility: HOSPITAL | Age: 66
End: 2021-04-29

## 2021-04-29 ENCOUNTER — PATIENT OUTREACH (OUTPATIENT)
Dept: ADMINISTRATIVE | Facility: HOSPITAL | Age: 66
End: 2021-04-29

## 2021-04-30 ENCOUNTER — PATIENT OUTREACH (OUTPATIENT)
Dept: ADMINISTRATIVE | Facility: OTHER | Age: 66
End: 2021-04-30

## 2021-05-04 ENCOUNTER — HOSPITAL ENCOUNTER (OUTPATIENT)
Dept: RADIOLOGY | Facility: HOSPITAL | Age: 66
Discharge: HOME | End: 2021-05-04
Attending: ORTHOPAEDIC SURGERY
Payer: COMMERCIAL

## 2021-05-04 ENCOUNTER — OFFICE VISIT (OUTPATIENT)
Dept: SPORTS MEDICINE | Facility: CLINIC | Age: 66
End: 2021-05-04
Payer: COMMERCIAL

## 2021-05-04 VITALS
WEIGHT: 233 LBS | HEIGHT: 70 IN | BODY MASS INDEX: 33.36 KG/M2 | SYSTOLIC BLOOD PRESSURE: 115 MMHG | HEART RATE: 74 BPM | DIASTOLIC BLOOD PRESSURE: 73 MMHG

## 2021-05-04 DIAGNOSIS — M25.511 RIGHT SHOULDER PAIN, UNSPECIFIED CHRONICITY: Primary | ICD-10-CM

## 2021-05-04 DIAGNOSIS — M25.511 RIGHT SHOULDER PAIN, UNSPECIFIED CHRONICITY: ICD-10-CM

## 2021-05-04 DIAGNOSIS — M25.511 ACUTE PAIN OF RIGHT SHOULDER: ICD-10-CM

## 2021-05-04 PROCEDURE — 99999 PR PBB SHADOW E&M-EST. PATIENT-LVL III: CPT | Mod: PBBFAC,,, | Performed by: ORTHOPAEDIC SURGERY

## 2021-05-04 PROCEDURE — 99204 PR OFFICE/OUTPT VISIT, NEW, LEVL IV, 45-59 MIN: ICD-10-PCS | Mod: S$GLB,,, | Performed by: ORTHOPAEDIC SURGERY

## 2021-05-04 PROCEDURE — 73030 X-RAY EXAM OF SHOULDER: CPT | Mod: 26,RT,, | Performed by: RADIOLOGY

## 2021-05-04 PROCEDURE — 99999 PR PBB SHADOW E&M-EST. PATIENT-LVL III: ICD-10-PCS | Mod: PBBFAC,,, | Performed by: ORTHOPAEDIC SURGERY

## 2021-05-04 PROCEDURE — 1125F AMNT PAIN NOTED PAIN PRSNT: CPT | Mod: S$GLB,,, | Performed by: ORTHOPAEDIC SURGERY

## 2021-05-04 PROCEDURE — 73030 XR SHOULDER COMPLETE 2 OR MORE VIEWS RIGHT: ICD-10-PCS | Mod: 26,RT,, | Performed by: RADIOLOGY

## 2021-05-04 PROCEDURE — 99204 OFFICE O/P NEW MOD 45 MIN: CPT | Mod: S$GLB,,, | Performed by: ORTHOPAEDIC SURGERY

## 2021-05-04 PROCEDURE — 3008F BODY MASS INDEX DOCD: CPT | Mod: CPTII,S$GLB,, | Performed by: ORTHOPAEDIC SURGERY

## 2021-05-04 PROCEDURE — 73030 X-RAY EXAM OF SHOULDER: CPT | Mod: TC,RT

## 2021-05-04 PROCEDURE — 3008F PR BODY MASS INDEX (BMI) DOCUMENTED: ICD-10-PCS | Mod: CPTII,S$GLB,, | Performed by: ORTHOPAEDIC SURGERY

## 2021-05-04 PROCEDURE — 1125F PR PAIN SEVERITY QUANTIFIED, PAIN PRESENT: ICD-10-PCS | Mod: S$GLB,,, | Performed by: ORTHOPAEDIC SURGERY

## 2021-05-12 ENCOUNTER — PATIENT MESSAGE (OUTPATIENT)
Dept: INTERNAL MEDICINE | Facility: CLINIC | Age: 66
End: 2021-05-12

## 2021-05-12 DIAGNOSIS — F41.9 ANXIETY: ICD-10-CM

## 2021-05-12 RX ORDER — SERTRALINE HYDROCHLORIDE 100 MG/1
100 TABLET, FILM COATED ORAL DAILY
Qty: 90 TABLET | Refills: 3 | Status: SHIPPED | OUTPATIENT
Start: 2021-05-12 | End: 2022-07-14

## 2021-05-14 ENCOUNTER — HOSPITAL ENCOUNTER (OUTPATIENT)
Dept: RADIOLOGY | Facility: HOSPITAL | Age: 66
Discharge: HOME | End: 2021-05-14
Attending: STUDENT IN AN ORGANIZED HEALTH CARE EDUCATION/TRAINING PROGRAM
Payer: COMMERCIAL

## 2021-05-14 DIAGNOSIS — M25.511 ACUTE PAIN OF RIGHT SHOULDER: ICD-10-CM

## 2021-05-14 PROCEDURE — 73221 MRI SHOULDER WITHOUT CONTRAST RIGHT: ICD-10-PCS | Mod: 26,RT,, | Performed by: RADIOLOGY

## 2021-05-14 PROCEDURE — 73221 MRI JOINT UPR EXTREM W/O DYE: CPT | Mod: 26,RT,, | Performed by: RADIOLOGY

## 2021-05-14 PROCEDURE — 73221 MRI JOINT UPR EXTREM W/O DYE: CPT | Mod: TC,RT

## 2021-05-17 ENCOUNTER — OFFICE VISIT (OUTPATIENT)
Dept: ORTHOPEDICS | Facility: CLINIC | Age: 66
End: 2021-05-17
Payer: COMMERCIAL

## 2021-05-17 ENCOUNTER — PATIENT MESSAGE (OUTPATIENT)
Dept: ADMINISTRATIVE | Facility: OTHER | Age: 66
End: 2021-05-17

## 2021-05-17 VITALS — BODY MASS INDEX: 33.6 KG/M2 | HEIGHT: 70 IN | WEIGHT: 234.69 LBS

## 2021-05-17 DIAGNOSIS — Z96.641 STATUS POST TOTAL HIP REPLACEMENT, RIGHT: Primary | ICD-10-CM

## 2021-05-17 PROCEDURE — 3008F BODY MASS INDEX DOCD: CPT | Mod: CPTII,S$GLB,, | Performed by: ORTHOPAEDIC SURGERY

## 2021-05-17 PROCEDURE — 1125F PR PAIN SEVERITY QUANTIFIED, PAIN PRESENT: ICD-10-PCS | Mod: S$GLB,,, | Performed by: ORTHOPAEDIC SURGERY

## 2021-05-17 PROCEDURE — 3008F PR BODY MASS INDEX (BMI) DOCUMENTED: ICD-10-PCS | Mod: CPTII,S$GLB,, | Performed by: ORTHOPAEDIC SURGERY

## 2021-05-17 PROCEDURE — 99999 PR PBB SHADOW E&M-EST. PATIENT-LVL III: ICD-10-PCS | Mod: PBBFAC,,, | Performed by: ORTHOPAEDIC SURGERY

## 2021-05-17 PROCEDURE — 99999 PR PBB SHADOW E&M-EST. PATIENT-LVL III: CPT | Mod: PBBFAC,,, | Performed by: ORTHOPAEDIC SURGERY

## 2021-05-17 PROCEDURE — 99024 POSTOP FOLLOW-UP VISIT: CPT | Mod: S$GLB,,, | Performed by: ORTHOPAEDIC SURGERY

## 2021-05-17 PROCEDURE — 1125F AMNT PAIN NOTED PAIN PRSNT: CPT | Mod: S$GLB,,, | Performed by: ORTHOPAEDIC SURGERY

## 2021-05-17 PROCEDURE — 99024 PR POST-OP FOLLOW-UP VISIT: ICD-10-PCS | Mod: S$GLB,,, | Performed by: ORTHOPAEDIC SURGERY

## 2021-05-18 ENCOUNTER — OFFICE VISIT (OUTPATIENT)
Dept: SPORTS MEDICINE | Facility: CLINIC | Age: 66
End: 2021-05-18
Payer: COMMERCIAL

## 2021-05-18 VITALS
HEIGHT: 70 IN | WEIGHT: 235 LBS | BODY MASS INDEX: 33.64 KG/M2 | HEART RATE: 69 BPM | DIASTOLIC BLOOD PRESSURE: 61 MMHG | SYSTOLIC BLOOD PRESSURE: 115 MMHG

## 2021-05-18 DIAGNOSIS — M75.121 NONTRAUMATIC COMPLETE TEAR OF RIGHT ROTATOR CUFF: Primary | ICD-10-CM

## 2021-05-18 DIAGNOSIS — Z01.818 PRE-OP TESTING: ICD-10-CM

## 2021-05-18 PROCEDURE — 99214 OFFICE O/P EST MOD 30 MIN: CPT | Mod: S$GLB,,, | Performed by: ORTHOPAEDIC SURGERY

## 2021-05-18 PROCEDURE — 1126F AMNT PAIN NOTED NONE PRSNT: CPT | Mod: S$GLB,,, | Performed by: ORTHOPAEDIC SURGERY

## 2021-05-18 PROCEDURE — 99999 PR PBB SHADOW E&M-EST. PATIENT-LVL III: CPT | Mod: PBBFAC,,, | Performed by: ORTHOPAEDIC SURGERY

## 2021-05-18 PROCEDURE — 3008F PR BODY MASS INDEX (BMI) DOCUMENTED: ICD-10-PCS | Mod: CPTII,S$GLB,, | Performed by: ORTHOPAEDIC SURGERY

## 2021-05-18 PROCEDURE — 99999 PR PBB SHADOW E&M-EST. PATIENT-LVL III: ICD-10-PCS | Mod: PBBFAC,,, | Performed by: ORTHOPAEDIC SURGERY

## 2021-05-18 PROCEDURE — 1126F PR PAIN SEVERITY QUANTIFIED, NO PAIN PRESENT: ICD-10-PCS | Mod: S$GLB,,, | Performed by: ORTHOPAEDIC SURGERY

## 2021-05-18 PROCEDURE — 3008F BODY MASS INDEX DOCD: CPT | Mod: CPTII,S$GLB,, | Performed by: ORTHOPAEDIC SURGERY

## 2021-05-18 PROCEDURE — 99214 PR OFFICE/OUTPT VISIT, EST, LEVL IV, 30-39 MIN: ICD-10-PCS | Mod: S$GLB,,, | Performed by: ORTHOPAEDIC SURGERY

## 2021-05-21 ENCOUNTER — PATIENT OUTREACH (OUTPATIENT)
Dept: ADMINISTRATIVE | Facility: HOSPITAL | Age: 66
End: 2021-05-21

## 2021-05-26 ENCOUNTER — PATIENT MESSAGE (OUTPATIENT)
Dept: SPORTS MEDICINE | Facility: CLINIC | Age: 66
End: 2021-05-26

## 2021-05-26 ENCOUNTER — TELEPHONE (OUTPATIENT)
Dept: SPORTS MEDICINE | Facility: CLINIC | Age: 66
End: 2021-05-26

## 2021-05-26 DIAGNOSIS — M19.011 ARTHRITIS OF RIGHT ACROMIOCLAVICULAR JOINT: ICD-10-CM

## 2021-05-26 DIAGNOSIS — M75.21 BICEPS TENDINITIS OF RIGHT UPPER EXTREMITY: ICD-10-CM

## 2021-05-26 DIAGNOSIS — M75.101 NONTRAUMATIC TEAR OF RIGHT ROTATOR CUFF, UNSPECIFIED TEAR EXTENT: Primary | ICD-10-CM

## 2021-06-01 ENCOUNTER — TELEPHONE (OUTPATIENT)
Dept: SPORTS MEDICINE | Facility: CLINIC | Age: 66
End: 2021-06-01

## 2021-06-01 ENCOUNTER — PATIENT MESSAGE (OUTPATIENT)
Dept: SURGERY | Facility: HOSPITAL | Age: 66
End: 2021-06-01

## 2021-06-08 ENCOUNTER — OFFICE VISIT (OUTPATIENT)
Dept: INTERNAL MEDICINE | Facility: CLINIC | Age: 66
End: 2021-06-08
Payer: COMMERCIAL

## 2021-06-08 ENCOUNTER — LAB VISIT (OUTPATIENT)
Dept: LAB | Facility: HOSPITAL | Age: 66
End: 2021-06-08
Attending: INTERNAL MEDICINE
Payer: COMMERCIAL

## 2021-06-08 ENCOUNTER — HOSPITAL ENCOUNTER (OUTPATIENT)
Dept: CARDIOLOGY | Facility: CLINIC | Age: 66
Discharge: HOME | End: 2021-06-08
Payer: COMMERCIAL

## 2021-06-08 VITALS
WEIGHT: 237 LBS | DIASTOLIC BLOOD PRESSURE: 64 MMHG | HEIGHT: 70 IN | BODY MASS INDEX: 33.93 KG/M2 | OXYGEN SATURATION: 97 % | HEART RATE: 77 BPM | SYSTOLIC BLOOD PRESSURE: 122 MMHG

## 2021-06-08 DIAGNOSIS — Z01.818 PRE-OP EXAM: ICD-10-CM

## 2021-06-08 DIAGNOSIS — Z96.652 STATUS POST TOTAL LEFT KNEE REPLACEMENT: ICD-10-CM

## 2021-06-08 DIAGNOSIS — M75.121 NONTRAUMATIC COMPLETE TEAR OF RIGHT ROTATOR CUFF: ICD-10-CM

## 2021-06-08 DIAGNOSIS — E78.5 HYPERLIPIDEMIA, UNSPECIFIED HYPERLIPIDEMIA TYPE: ICD-10-CM

## 2021-06-08 DIAGNOSIS — E66.9 OBESITY (BMI 30-39.9): ICD-10-CM

## 2021-06-08 DIAGNOSIS — Z98.84 BARIATRIC SURGERY STATUS: ICD-10-CM

## 2021-06-08 DIAGNOSIS — Z11.4 ENCOUNTER FOR SCREENING FOR HIV: Primary | ICD-10-CM

## 2021-06-08 DIAGNOSIS — Z96.641 STATUS POST TOTAL REPLACEMENT OF RIGHT HIP: ICD-10-CM

## 2021-06-08 DIAGNOSIS — Z96.651 STATUS POST TOTAL RIGHT KNEE REPLACEMENT: ICD-10-CM

## 2021-06-08 DIAGNOSIS — Z11.4 ENCOUNTER FOR SCREENING FOR HIV: ICD-10-CM

## 2021-06-08 LAB
ALBUMIN SERPL BCP-MCNC: 4.2 G/DL (ref 3.5–5.2)
ALP SERPL-CCNC: 77 U/L (ref 55–135)
ALT SERPL W/O P-5'-P-CCNC: 18 U/L (ref 10–44)
ANION GAP SERPL CALC-SCNC: 10 MMOL/L (ref 8–16)
AST SERPL-CCNC: 20 U/L (ref 10–40)
BASOPHILS # BLD AUTO: 0.03 K/UL (ref 0–0.2)
BASOPHILS NFR BLD: 0.6 % (ref 0–1.9)
BILIRUB SERPL-MCNC: 0.5 MG/DL (ref 0.1–1)
BUN SERPL-MCNC: 18 MG/DL (ref 8–23)
CALCIUM SERPL-MCNC: 10.7 MG/DL (ref 8.7–10.5)
CHLORIDE SERPL-SCNC: 103 MMOL/L (ref 95–110)
CHOLEST SERPL-MCNC: 246 MG/DL (ref 120–199)
CHOLEST/HDLC SERPL: 3.4 {RATIO} (ref 2–5)
CO2 SERPL-SCNC: 29 MMOL/L (ref 23–29)
CREAT SERPL-MCNC: 0.9 MG/DL (ref 0.5–1.4)
DIFFERENTIAL METHOD: ABNORMAL
EOSINOPHIL # BLD AUTO: 0.1 K/UL (ref 0–0.5)
EOSINOPHIL NFR BLD: 1.9 % (ref 0–8)
ERYTHROCYTE [DISTWIDTH] IN BLOOD BY AUTOMATED COUNT: 13.9 % (ref 11.5–14.5)
EST. GFR  (AFRICAN AMERICAN): >60 ML/MIN/1.73 M^2
EST. GFR  (NON AFRICAN AMERICAN): >60 ML/MIN/1.73 M^2
GLUCOSE SERPL-MCNC: 95 MG/DL (ref 70–110)
HCT VFR BLD AUTO: 40.7 % (ref 37–48.5)
HDLC SERPL-MCNC: 72 MG/DL (ref 40–75)
HDLC SERPL: 29.3 % (ref 20–50)
HGB BLD-MCNC: 12.5 G/DL (ref 12–16)
IMM GRANULOCYTES # BLD AUTO: 0.01 K/UL (ref 0–0.04)
IMM GRANULOCYTES NFR BLD AUTO: 0.2 % (ref 0–0.5)
INR PPP: 0.9 (ref 0.8–1.2)
LDLC SERPL CALC-MCNC: 145 MG/DL (ref 63–159)
LYMPHOCYTES # BLD AUTO: 1.9 K/UL (ref 1–4.8)
LYMPHOCYTES NFR BLD: 35.7 % (ref 18–48)
MCH RBC QN AUTO: 28.7 PG (ref 27–31)
MCHC RBC AUTO-ENTMCNC: 30.7 G/DL (ref 32–36)
MCV RBC AUTO: 93 FL (ref 82–98)
MONOCYTES # BLD AUTO: 0.6 K/UL (ref 0.3–1)
MONOCYTES NFR BLD: 10.5 % (ref 4–15)
NEUTROPHILS # BLD AUTO: 2.7 K/UL (ref 1.8–7.7)
NEUTROPHILS NFR BLD: 51.1 % (ref 38–73)
NONHDLC SERPL-MCNC: 174 MG/DL
NRBC BLD-RTO: 0 /100 WBC
PLATELET # BLD AUTO: 217 K/UL (ref 150–450)
PMV BLD AUTO: 10.3 FL (ref 9.2–12.9)
POTASSIUM SERPL-SCNC: 4.7 MMOL/L (ref 3.5–5.1)
PROT SERPL-MCNC: 7.4 G/DL (ref 6–8.4)
PROTHROMBIN TIME: 10.3 SEC (ref 9–12.5)
RBC # BLD AUTO: 4.36 M/UL (ref 4–5.4)
SODIUM SERPL-SCNC: 142 MMOL/L (ref 136–145)
TRIGL SERPL-MCNC: 145 MG/DL (ref 30–150)
WBC # BLD AUTO: 5.26 K/UL (ref 3.9–12.7)

## 2021-06-08 PROCEDURE — 3008F PR BODY MASS INDEX (BMI) DOCUMENTED: ICD-10-PCS | Mod: CPTII,S$GLB,, | Performed by: INTERNAL MEDICINE

## 2021-06-08 PROCEDURE — 36415 COLL VENOUS BLD VENIPUNCTURE: CPT | Performed by: INTERNAL MEDICINE

## 2021-06-08 PROCEDURE — 80061 LIPID PANEL: CPT | Performed by: INTERNAL MEDICINE

## 2021-06-08 PROCEDURE — 99999 PR PBB SHADOW E&M-EST. PATIENT-LVL III: ICD-10-PCS | Mod: PBBFAC,,, | Performed by: INTERNAL MEDICINE

## 2021-06-08 PROCEDURE — 99999 PR PBB SHADOW E&M-EST. PATIENT-LVL III: CPT | Mod: PBBFAC,,, | Performed by: INTERNAL MEDICINE

## 2021-06-08 PROCEDURE — 85025 COMPLETE CBC W/AUTO DIFF WBC: CPT | Performed by: INTERNAL MEDICINE

## 2021-06-08 PROCEDURE — 1125F PR PAIN SEVERITY QUANTIFIED, PAIN PRESENT: ICD-10-PCS | Mod: S$GLB,,, | Performed by: INTERNAL MEDICINE

## 2021-06-08 PROCEDURE — 99214 PR OFFICE/OUTPT VISIT, EST, LEVL IV, 30-39 MIN: ICD-10-PCS | Mod: S$GLB,,, | Performed by: INTERNAL MEDICINE

## 2021-06-08 PROCEDURE — 85610 PROTHROMBIN TIME: CPT | Performed by: INTERNAL MEDICINE

## 2021-06-08 PROCEDURE — 93010 ELECTROCARDIOGRAM REPORT: CPT | Mod: S$GLB,,, | Performed by: INTERNAL MEDICINE

## 2021-06-08 PROCEDURE — 3008F BODY MASS INDEX DOCD: CPT | Mod: CPTII,S$GLB,, | Performed by: INTERNAL MEDICINE

## 2021-06-08 PROCEDURE — 86703 HIV-1/HIV-2 1 RESULT ANTBDY: CPT | Performed by: INTERNAL MEDICINE

## 2021-06-08 PROCEDURE — 93010 EKG 12-LEAD: ICD-10-PCS | Mod: S$GLB,,, | Performed by: INTERNAL MEDICINE

## 2021-06-08 PROCEDURE — 99214 OFFICE O/P EST MOD 30 MIN: CPT | Mod: S$GLB,,, | Performed by: INTERNAL MEDICINE

## 2021-06-08 PROCEDURE — 1125F AMNT PAIN NOTED PAIN PRSNT: CPT | Mod: S$GLB,,, | Performed by: INTERNAL MEDICINE

## 2021-06-08 PROCEDURE — 80053 COMPREHEN METABOLIC PANEL: CPT | Performed by: INTERNAL MEDICINE

## 2021-06-08 PROCEDURE — 93005 ELECTROCARDIOGRAM TRACING: CPT | Mod: S$GLB,,, | Performed by: INTERNAL MEDICINE

## 2021-06-08 PROCEDURE — 93005 EKG 12-LEAD: ICD-10-PCS | Mod: S$GLB,,, | Performed by: INTERNAL MEDICINE

## 2021-06-09 LAB — HIV 1+2 AB+HIV1 P24 AG SERPL QL IA: NEGATIVE

## 2021-06-16 ENCOUNTER — PATIENT OUTREACH (OUTPATIENT)
Dept: ADMINISTRATIVE | Facility: OTHER | Age: 66
End: 2021-06-16

## 2021-06-17 ENCOUNTER — OFFICE VISIT (OUTPATIENT)
Dept: SPORTS MEDICINE | Facility: CLINIC | Age: 66
End: 2021-06-17
Payer: COMMERCIAL

## 2021-06-17 VITALS
HEART RATE: 69 BPM | DIASTOLIC BLOOD PRESSURE: 59 MMHG | WEIGHT: 235 LBS | HEIGHT: 70 IN | SYSTOLIC BLOOD PRESSURE: 113 MMHG | BODY MASS INDEX: 33.64 KG/M2

## 2021-06-17 DIAGNOSIS — M75.101 NONTRAUMATIC TEAR OF RIGHT ROTATOR CUFF, UNSPECIFIED TEAR EXTENT: Primary | ICD-10-CM

## 2021-06-17 PROCEDURE — 99499 UNLISTED E&M SERVICE: CPT | Mod: S$GLB,,, | Performed by: PHYSICIAN ASSISTANT

## 2021-06-17 PROCEDURE — 99999 PR PBB SHADOW E&M-EST. PATIENT-LVL III: CPT | Mod: PBBFAC,,, | Performed by: PHYSICIAN ASSISTANT

## 2021-06-17 PROCEDURE — 1125F AMNT PAIN NOTED PAIN PRSNT: CPT | Mod: S$GLB,,, | Performed by: PHYSICIAN ASSISTANT

## 2021-06-17 PROCEDURE — 99999 PR PBB SHADOW E&M-EST. PATIENT-LVL III: ICD-10-PCS | Mod: PBBFAC,,, | Performed by: PHYSICIAN ASSISTANT

## 2021-06-17 PROCEDURE — 3008F BODY MASS INDEX DOCD: CPT | Mod: CPTII,S$GLB,, | Performed by: PHYSICIAN ASSISTANT

## 2021-06-17 PROCEDURE — 1125F PR PAIN SEVERITY QUANTIFIED, PAIN PRESENT: ICD-10-PCS | Mod: S$GLB,,, | Performed by: PHYSICIAN ASSISTANT

## 2021-06-17 PROCEDURE — 99499 NO LOS: ICD-10-PCS | Mod: S$GLB,,, | Performed by: PHYSICIAN ASSISTANT

## 2021-06-17 PROCEDURE — 3008F PR BODY MASS INDEX (BMI) DOCUMENTED: ICD-10-PCS | Mod: CPTII,S$GLB,, | Performed by: PHYSICIAN ASSISTANT

## 2021-06-17 RX ORDER — OXYCODONE AND ACETAMINOPHEN 10; 325 MG/1; MG/1
1 TABLET ORAL EVERY 6 HOURS PRN
Qty: 28 TABLET | Refills: 0 | Status: SHIPPED | OUTPATIENT
Start: 2021-06-17 | End: 2021-06-28 | Stop reason: SDUPTHER

## 2021-06-17 RX ORDER — TRAMADOL HYDROCHLORIDE 50 MG/1
50 TABLET ORAL EVERY 6 HOURS PRN
Qty: 28 TABLET | Refills: 0 | Status: SHIPPED | OUTPATIENT
Start: 2021-06-17 | End: 2021-06-28 | Stop reason: SDUPTHER

## 2021-06-17 RX ORDER — PROMETHAZINE HYDROCHLORIDE 25 MG/1
25 TABLET ORAL EVERY 6 HOURS PRN
Qty: 20 TABLET | Refills: 0 | Status: SHIPPED | OUTPATIENT
Start: 2021-06-17 | End: 2022-05-11

## 2021-06-17 RX ORDER — SODIUM CHLORIDE 9 MG/ML
INJECTION, SOLUTION INTRAVENOUS CONTINUOUS
Status: CANCELLED | OUTPATIENT
Start: 2021-06-17

## 2021-06-17 RX ORDER — NAPROXEN SODIUM 220 MG/1
81 TABLET, FILM COATED ORAL DAILY
Qty: 28 TABLET | Refills: 0 | Status: SHIPPED | OUTPATIENT
Start: 2021-06-17 | End: 2022-05-11

## 2021-06-17 RX ORDER — CEFAZOLIN SODIUM 2 G/50ML
2 SOLUTION INTRAVENOUS
Status: CANCELLED | OUTPATIENT
Start: 2021-06-17

## 2021-06-20 ENCOUNTER — LAB VISIT (OUTPATIENT)
Dept: SPORTS MEDICINE | Facility: CLINIC | Age: 66
End: 2021-06-20
Payer: COMMERCIAL

## 2021-06-20 DIAGNOSIS — Z01.818 PRE-OP TESTING: ICD-10-CM

## 2021-06-20 PROCEDURE — U0003 INFECTIOUS AGENT DETECTION BY NUCLEIC ACID (DNA OR RNA); SEVERE ACUTE RESPIRATORY SYNDROME CORONAVIRUS 2 (SARS-COV-2) (CORONAVIRUS DISEASE [COVID-19]), AMPLIFIED PROBE TECHNIQUE, MAKING USE OF HIGH THROUGHPUT TECHNOLOGIES AS DESCRIBED BY CMS-2020-01-R: HCPCS | Performed by: ORTHOPAEDIC SURGERY

## 2021-06-20 PROCEDURE — U0005 INFEC AGEN DETEC AMPLI PROBE: HCPCS | Performed by: ORTHOPAEDIC SURGERY

## 2021-06-21 LAB — SARS-COV-2 RNA RESP QL NAA+PROBE: NOT DETECTED

## 2021-06-22 ENCOUNTER — ANESTHESIA EVENT (OUTPATIENT)
Dept: SURGERY | Facility: HOSPITAL | Age: 66
End: 2021-06-22
Payer: COMMERCIAL

## 2021-06-22 ENCOUNTER — TELEPHONE (OUTPATIENT)
Dept: SPORTS MEDICINE | Facility: CLINIC | Age: 66
End: 2021-06-22

## 2021-06-23 ENCOUNTER — HOSPITAL ENCOUNTER (OUTPATIENT)
Facility: HOSPITAL | Age: 66
Discharge: HOME | End: 2021-06-23
Attending: ORTHOPAEDIC SURGERY | Admitting: ORTHOPAEDIC SURGERY
Payer: COMMERCIAL

## 2021-06-23 ENCOUNTER — ANESTHESIA (OUTPATIENT)
Dept: SURGERY | Facility: HOSPITAL | Age: 66
End: 2021-06-23
Payer: COMMERCIAL

## 2021-06-23 DIAGNOSIS — M75.101 NONTRAUMATIC TEAR OF RIGHT ROTATOR CUFF, UNSPECIFIED TEAR EXTENT: Primary | ICD-10-CM

## 2021-06-23 PROCEDURE — 36000710: Performed by: ORTHOPAEDIC SURGERY

## 2021-06-23 PROCEDURE — 29827 PR SHLDR ARTHROSCOP,SURG,W/ROTAT CUFF REPR: ICD-10-PCS | Mod: RT,,, | Performed by: ORTHOPAEDIC SURGERY

## 2021-06-23 PROCEDURE — D9220A PRA ANESTHESIA: ICD-10-PCS | Mod: ,,, | Performed by: NURSE ANESTHETIST, CERTIFIED REGISTERED

## 2021-06-23 PROCEDURE — 76942 ECHO GUIDE FOR BIOPSY: CPT | Mod: 26,,, | Performed by: ANESTHESIOLOGY

## 2021-06-23 PROCEDURE — 63600175 PHARM REV CODE 636 W HCPCS: Performed by: STUDENT IN AN ORGANIZED HEALTH CARE EDUCATION/TRAINING PROGRAM

## 2021-06-23 PROCEDURE — 23430 PR REPAIR BICEPS LONG TENDON: ICD-10-PCS | Mod: 51,RT,, | Performed by: ORTHOPAEDIC SURGERY

## 2021-06-23 PROCEDURE — 29826 PR SHLDR ARTHROSCOP,PART ACROMIOPLAS: ICD-10-PCS | Mod: RT,,, | Performed by: ORTHOPAEDIC SURGERY

## 2021-06-23 PROCEDURE — 63600175 PHARM REV CODE 636 W HCPCS: Performed by: ANESTHESIOLOGY

## 2021-06-23 PROCEDURE — 25000003 PHARM REV CODE 250: Performed by: STUDENT IN AN ORGANIZED HEALTH CARE EDUCATION/TRAINING PROGRAM

## 2021-06-23 PROCEDURE — C1713 ANCHOR/SCREW BN/BN,TIS/BN: HCPCS | Performed by: ORTHOPAEDIC SURGERY

## 2021-06-23 PROCEDURE — 99900035 HC TECH TIME PER 15 MIN (STAT)

## 2021-06-23 PROCEDURE — 25000003 PHARM REV CODE 250: Performed by: ANESTHESIOLOGY

## 2021-06-23 PROCEDURE — 37000009 HC ANESTHESIA EA ADD 15 MINS: Performed by: ORTHOPAEDIC SURGERY

## 2021-06-23 PROCEDURE — 29826 SHO ARTHRS SRG DECOMPRESSION: CPT | Mod: RT,,, | Performed by: ORTHOPAEDIC SURGERY

## 2021-06-23 PROCEDURE — 29824 SHO ARTHRS SRG DSTL CLAVICLC: CPT | Mod: 51,RT,, | Performed by: ORTHOPAEDIC SURGERY

## 2021-06-23 PROCEDURE — 27201423 OPTIME MED/SURG SUP & DEVICES STERILE SUPPLY: Performed by: ORTHOPAEDIC SURGERY

## 2021-06-23 PROCEDURE — 63600175 PHARM REV CODE 636 W HCPCS: Performed by: NURSE ANESTHETIST, CERTIFIED REGISTERED

## 2021-06-23 PROCEDURE — 64416 NJX AA&/STRD BRCH PL NFS IMG: CPT | Mod: 59,RT,, | Performed by: ANESTHESIOLOGY

## 2021-06-23 PROCEDURE — D9220A PRA ANESTHESIA: ICD-10-PCS | Mod: ,,, | Performed by: ANESTHESIOLOGY

## 2021-06-23 PROCEDURE — 29824 PR SHLDR ARTHROSCOP,SURG,DIS CLAVICULECTOMY: ICD-10-PCS | Mod: 51,RT,, | Performed by: ORTHOPAEDIC SURGERY

## 2021-06-23 PROCEDURE — 94761 N-INVAS EAR/PLS OXIMETRY MLT: CPT

## 2021-06-23 PROCEDURE — 37000008 HC ANESTHESIA 1ST 15 MINUTES: Performed by: ORTHOPAEDIC SURGERY

## 2021-06-23 PROCEDURE — 36000711: Performed by: ORTHOPAEDIC SURGERY

## 2021-06-23 PROCEDURE — D9220A PRA ANESTHESIA: Mod: ,,, | Performed by: NURSE ANESTHETIST, CERTIFIED REGISTERED

## 2021-06-23 PROCEDURE — 29827 SHO ARTHRS SRG RT8TR CUF RPR: CPT | Mod: RT,,, | Performed by: ORTHOPAEDIC SURGERY

## 2021-06-23 PROCEDURE — 25000003 PHARM REV CODE 250: Performed by: NURSE ANESTHETIST, CERTIFIED REGISTERED

## 2021-06-23 PROCEDURE — D9220A PRA ANESTHESIA: Mod: ,,, | Performed by: ANESTHESIOLOGY

## 2021-06-23 PROCEDURE — 64416 PR NERVE BLOCK INJ, ANES/STEROID, BRACHIAL PLEXUS, CONT INFUSION, INCL IMAG GUIDANCE: ICD-10-PCS | Mod: 59,RT,, | Performed by: ANESTHESIOLOGY

## 2021-06-23 PROCEDURE — 71000039 HC RECOVERY, EACH ADD'L HOUR: Performed by: ORTHOPAEDIC SURGERY

## 2021-06-23 PROCEDURE — 64416 NJX AA&/STRD BRCH PL NFS IMG: CPT | Performed by: ANESTHESIOLOGY

## 2021-06-23 PROCEDURE — 71000033 HC RECOVERY, INTIAL HOUR: Performed by: ORTHOPAEDIC SURGERY

## 2021-06-23 PROCEDURE — C1751 CATH, INF, PER/CENT/MIDLINE: HCPCS | Performed by: ANESTHESIOLOGY

## 2021-06-23 PROCEDURE — 63600175 PHARM REV CODE 636 W HCPCS: Performed by: ORTHOPAEDIC SURGERY

## 2021-06-23 PROCEDURE — 76942 PR U/S GUIDANCE FOR NEEDLE GUIDANCE: ICD-10-PCS | Mod: 26,,, | Performed by: ANESTHESIOLOGY

## 2021-06-23 PROCEDURE — 63600175 PHARM REV CODE 636 W HCPCS: Performed by: PHYSICIAN ASSISTANT

## 2021-06-23 PROCEDURE — 25000003 PHARM REV CODE 250: Performed by: PHYSICIAN ASSISTANT

## 2021-06-23 PROCEDURE — 23430 REPAIR BICEPS TENDON: CPT | Mod: 51,RT,, | Performed by: ORTHOPAEDIC SURGERY

## 2021-06-23 PROCEDURE — 71000015 HC POSTOP RECOV 1ST HR: Performed by: ORTHOPAEDIC SURGERY

## 2021-06-23 DEVICE — ANCHOR FORKED TIP 7MM PEEI: Type: IMPLANTABLE DEVICE | Site: SHOULDER | Status: FUNCTIONAL

## 2021-06-23 DEVICE — ANCHOR HEALIX 5.5 ADV BR3: Type: IMPLANTABLE DEVICE | Site: SHOULDER | Status: FUNCTIONAL

## 2021-06-23 RX ORDER — FENTANYL CITRATE 50 UG/ML
25 INJECTION, SOLUTION INTRAMUSCULAR; INTRAVENOUS EVERY 5 MIN PRN
Status: COMPLETED | OUTPATIENT
Start: 2021-06-23 | End: 2021-06-23

## 2021-06-23 RX ORDER — ROPIVACAINE HYDROCHLORIDE 2 MG/ML
INJECTION, SOLUTION EPIDURAL; INFILTRATION; PERINEURAL CONTINUOUS
Status: DISCONTINUED | OUTPATIENT
Start: 2021-06-23 | End: 2022-05-11

## 2021-06-23 RX ORDER — ONDANSETRON 2 MG/ML
INJECTION INTRAMUSCULAR; INTRAVENOUS
Status: DISCONTINUED | OUTPATIENT
Start: 2021-06-23 | End: 2021-06-23

## 2021-06-23 RX ORDER — KETAMINE HCL IN 0.9 % NACL 50 MG/5 ML
SYRINGE (ML) INTRAVENOUS
Status: DISCONTINUED | OUTPATIENT
Start: 2021-06-23 | End: 2021-06-23

## 2021-06-23 RX ORDER — EPINEPHRINE 1 MG/ML
INJECTION, SOLUTION INTRACARDIAC; INTRAMUSCULAR; INTRAVENOUS; SUBCUTANEOUS
Status: DISCONTINUED | OUTPATIENT
Start: 2021-06-23 | End: 2021-06-23 | Stop reason: HOSPADM

## 2021-06-23 RX ORDER — NEOSTIGMINE METHYLSULFATE 1 MG/ML
INJECTION, SOLUTION INTRAVENOUS
Status: DISCONTINUED | OUTPATIENT
Start: 2021-06-23 | End: 2021-06-23

## 2021-06-23 RX ORDER — PROPOFOL 10 MG/ML
VIAL (ML) INTRAVENOUS
Status: DISCONTINUED | OUTPATIENT
Start: 2021-06-23 | End: 2021-06-23

## 2021-06-23 RX ORDER — CARBOXYMETHYLCELLULOSE SODIUM 10 MG/ML
GEL OPHTHALMIC
Status: DISCONTINUED | OUTPATIENT
Start: 2021-06-23 | End: 2021-06-23

## 2021-06-23 RX ORDER — OXYCODONE HYDROCHLORIDE 5 MG/1
5 TABLET ORAL
Status: DISCONTINUED | OUTPATIENT
Start: 2021-06-23 | End: 2021-06-23 | Stop reason: HOSPADM

## 2021-06-23 RX ORDER — FAMOTIDINE 10 MG/ML
INJECTION INTRAVENOUS
Status: DISCONTINUED | OUTPATIENT
Start: 2021-06-23 | End: 2021-06-23

## 2021-06-23 RX ORDER — LIDOCAINE HYDROCHLORIDE 20 MG/ML
INJECTION INTRAVENOUS
Status: DISCONTINUED | OUTPATIENT
Start: 2021-06-23 | End: 2021-06-23

## 2021-06-23 RX ORDER — EPHEDRINE SULFATE 50 MG/ML
INJECTION, SOLUTION INTRAVENOUS
Status: DISCONTINUED | OUTPATIENT
Start: 2021-06-23 | End: 2021-06-23

## 2021-06-23 RX ORDER — CEFAZOLIN SODIUM 1 G/3ML
2 INJECTION, POWDER, FOR SOLUTION INTRAMUSCULAR; INTRAVENOUS
Status: COMPLETED | OUTPATIENT
Start: 2021-06-23 | End: 2021-06-23

## 2021-06-23 RX ORDER — DEXAMETHASONE SODIUM PHOSPHATE 4 MG/ML
INJECTION, SOLUTION INTRA-ARTICULAR; INTRALESIONAL; INTRAMUSCULAR; INTRAVENOUS; SOFT TISSUE
Status: DISCONTINUED | OUTPATIENT
Start: 2021-06-23 | End: 2021-06-23

## 2021-06-23 RX ORDER — ROCURONIUM BROMIDE 10 MG/ML
INJECTION, SOLUTION INTRAVENOUS
Status: DISCONTINUED | OUTPATIENT
Start: 2021-06-23 | End: 2021-06-23

## 2021-06-23 RX ORDER — PHENYLEPHRINE HYDROCHLORIDE 10 MG/ML
INJECTION INTRAVENOUS
Status: DISCONTINUED | OUTPATIENT
Start: 2021-06-23 | End: 2021-06-23

## 2021-06-23 RX ORDER — SODIUM CHLORIDE 9 MG/ML
INJECTION, SOLUTION INTRAVENOUS CONTINUOUS
Status: DISCONTINUED | OUTPATIENT
Start: 2021-06-23 | End: 2021-06-23 | Stop reason: HOSPADM

## 2021-06-23 RX ORDER — MIDAZOLAM HYDROCHLORIDE 1 MG/ML
0.5 INJECTION INTRAMUSCULAR; INTRAVENOUS
Status: DISCONTINUED | OUTPATIENT
Start: 2021-06-23 | End: 2022-05-11

## 2021-06-23 RX ORDER — FENTANYL CITRATE 50 UG/ML
25 INJECTION, SOLUTION INTRAMUSCULAR; INTRAVENOUS EVERY 5 MIN PRN
Status: DISCONTINUED | OUTPATIENT
Start: 2021-06-23 | End: 2022-05-11

## 2021-06-23 RX ORDER — ROPIVACAINE HYDROCHLORIDE 5 MG/ML
INJECTION, SOLUTION EPIDURAL; INFILTRATION; PERINEURAL
Status: COMPLETED | OUTPATIENT
Start: 2021-06-23 | End: 2021-06-23

## 2021-06-23 RX ORDER — ACETAMINOPHEN 500 MG
1000 TABLET ORAL
Status: COMPLETED | OUTPATIENT
Start: 2021-06-23 | End: 2021-06-23

## 2021-06-23 RX ADMIN — GLYCOPYRROLATE 0.4 MG: 0.2 INJECTION, SOLUTION INTRAMUSCULAR; INTRAVITREAL at 09:06

## 2021-06-23 RX ADMIN — ACETAMINOPHEN 1000 MG: 500 TABLET ORAL at 06:06

## 2021-06-23 RX ADMIN — Medication 25 MG: at 07:06

## 2021-06-23 RX ADMIN — FENTANYL CITRATE 25 MCG: 50 INJECTION INTRAMUSCULAR; INTRAVENOUS at 11:06

## 2021-06-23 RX ADMIN — EPHEDRINE SULFATE 15 MG: 50 INJECTION INTRAVENOUS at 07:06

## 2021-06-23 RX ADMIN — MIDAZOLAM 2 MG: 1 INJECTION INTRAMUSCULAR; INTRAVENOUS at 06:06

## 2021-06-23 RX ADMIN — PHENYLEPHRINE HYDROCHLORIDE 100 MCG: 10 INJECTION INTRAVENOUS at 07:06

## 2021-06-23 RX ADMIN — NEOSTIGMINE METHYLSULFATE 3 MG: 1 INJECTION INTRAVENOUS at 09:06

## 2021-06-23 RX ADMIN — PROPOFOL 190 MG: 10 INJECTION, EMULSION INTRAVENOUS at 07:06

## 2021-06-23 RX ADMIN — ROPIVACAINE HYDROCHLORIDE 8 ML: 5 INJECTION, SOLUTION EPIDURAL; INFILTRATION; PERINEURAL at 06:06

## 2021-06-23 RX ADMIN — LIDOCAINE HYDROCHLORIDE 100 MG: 20 INJECTION, SOLUTION INTRAVENOUS at 07:06

## 2021-06-23 RX ADMIN — ROCURONIUM BROMIDE 50 MG: 10 INJECTION, SOLUTION INTRAVENOUS at 07:06

## 2021-06-23 RX ADMIN — EPHEDRINE SULFATE 10 MG: 50 INJECTION INTRAVENOUS at 08:06

## 2021-06-23 RX ADMIN — PROPOFOL 30 MG: 10 INJECTION, EMULSION INTRAVENOUS at 09:06

## 2021-06-23 RX ADMIN — FAMOTIDINE 20 MG: 10 INJECTION, SOLUTION INTRAVENOUS at 07:06

## 2021-06-23 RX ADMIN — DEXAMETHASONE SODIUM PHOSPHATE 8 MG: 4 INJECTION, SOLUTION INTRAMUSCULAR; INTRAVENOUS at 07:06

## 2021-06-23 RX ADMIN — OXYCODONE 5 MG: 5 TABLET ORAL at 11:06

## 2021-06-23 RX ADMIN — PHENYLEPHRINE HYDROCHLORIDE 50 MCG: 10 INJECTION INTRAVENOUS at 07:06

## 2021-06-23 RX ADMIN — CEFAZOLIN 2 G: 330 INJECTION, POWDER, FOR SOLUTION INTRAMUSCULAR; INTRAVENOUS at 07:06

## 2021-06-23 RX ADMIN — ONDANSETRON 4 MG: 2 INJECTION, SOLUTION INTRAMUSCULAR; INTRAVENOUS at 09:06

## 2021-06-23 RX ADMIN — EPHEDRINE SULFATE 5 MG: 50 INJECTION INTRAVENOUS at 08:06

## 2021-06-23 RX ADMIN — Medication: at 10:06

## 2021-06-23 RX ADMIN — SODIUM CHLORIDE: 0.9 INJECTION, SOLUTION INTRAVENOUS at 06:06

## 2021-06-23 RX ADMIN — CARBOXYMETHYLCELLULOSE SODIUM 4 DROP: 10 GEL OPHTHALMIC at 07:06

## 2021-06-24 VITALS
WEIGHT: 235 LBS | TEMPERATURE: 97 F | RESPIRATION RATE: 45 BRPM | OXYGEN SATURATION: 100 % | SYSTOLIC BLOOD PRESSURE: 129 MMHG | HEIGHT: 70 IN | BODY MASS INDEX: 33.64 KG/M2 | HEART RATE: 73 BPM | DIASTOLIC BLOOD PRESSURE: 62 MMHG

## 2021-06-26 ENCOUNTER — NURSE TRIAGE (OUTPATIENT)
Dept: ADMINISTRATIVE | Facility: CLINIC | Age: 66
End: 2021-06-26

## 2021-06-28 ENCOUNTER — CLINICAL SUPPORT (OUTPATIENT)
Dept: REHABILITATION | Facility: HOSPITAL | Age: 66
End: 2021-06-28
Attending: PHYSICIAN ASSISTANT
Payer: COMMERCIAL

## 2021-06-28 DIAGNOSIS — M75.101 NONTRAUMATIC TEAR OF RIGHT ROTATOR CUFF, UNSPECIFIED TEAR EXTENT: ICD-10-CM

## 2021-06-28 DIAGNOSIS — M62.81 MUSCLE WEAKNESS OF RIGHT UPPER EXTREMITY: ICD-10-CM

## 2021-06-28 DIAGNOSIS — M25.611 DECREASED RIGHT SHOULDER RANGE OF MOTION: ICD-10-CM

## 2021-06-28 PROCEDURE — 97110 THERAPEUTIC EXERCISES: CPT

## 2021-06-28 PROCEDURE — 97161 PT EVAL LOW COMPLEX 20 MIN: CPT

## 2021-06-28 PROCEDURE — 97140 MANUAL THERAPY 1/> REGIONS: CPT

## 2021-06-29 RX ORDER — TRAMADOL HYDROCHLORIDE 50 MG/1
50 TABLET ORAL EVERY 6 HOURS PRN
Qty: 28 TABLET | Refills: 0 | Status: SHIPPED | OUTPATIENT
Start: 2021-06-29 | End: 2021-07-08 | Stop reason: SDUPTHER

## 2021-06-29 RX ORDER — OXYCODONE AND ACETAMINOPHEN 10; 325 MG/1; MG/1
1 TABLET ORAL EVERY 6 HOURS PRN
Qty: 28 TABLET | Refills: 0 | Status: SHIPPED | OUTPATIENT
Start: 2021-06-29 | End: 2021-07-08

## 2021-06-30 ENCOUNTER — TELEPHONE (OUTPATIENT)
Dept: SPORTS MEDICINE | Facility: CLINIC | Age: 66
End: 2021-06-30

## 2021-07-06 ENCOUNTER — PATIENT MESSAGE (OUTPATIENT)
Dept: ADMINISTRATIVE | Facility: HOSPITAL | Age: 66
End: 2021-07-06

## 2021-07-08 ENCOUNTER — CLINICAL SUPPORT (OUTPATIENT)
Dept: REHABILITATION | Facility: HOSPITAL | Age: 66
End: 2021-07-08
Attending: PHYSICIAN ASSISTANT
Payer: COMMERCIAL

## 2021-07-08 ENCOUNTER — OFFICE VISIT (OUTPATIENT)
Dept: SPORTS MEDICINE | Facility: CLINIC | Age: 66
End: 2021-07-08
Payer: COMMERCIAL

## 2021-07-08 VITALS
HEIGHT: 70 IN | DIASTOLIC BLOOD PRESSURE: 62 MMHG | SYSTOLIC BLOOD PRESSURE: 115 MMHG | HEART RATE: 60 BPM | BODY MASS INDEX: 34.07 KG/M2 | WEIGHT: 238 LBS

## 2021-07-08 DIAGNOSIS — M25.511 ACUTE POSTOPERATIVE PAIN OF RIGHT SHOULDER: ICD-10-CM

## 2021-07-08 DIAGNOSIS — M25.611 DECREASED RIGHT SHOULDER RANGE OF MOTION: ICD-10-CM

## 2021-07-08 DIAGNOSIS — M62.81 MUSCLE WEAKNESS OF RIGHT UPPER EXTREMITY: ICD-10-CM

## 2021-07-08 DIAGNOSIS — Z98.890 S/P ROTATOR CUFF REPAIR: Primary | ICD-10-CM

## 2021-07-08 DIAGNOSIS — G89.18 ACUTE POSTOPERATIVE PAIN OF RIGHT SHOULDER: ICD-10-CM

## 2021-07-08 PROCEDURE — 99999 PR PBB SHADOW E&M-EST. PATIENT-LVL III: CPT | Mod: PBBFAC,,, | Performed by: PHYSICIAN ASSISTANT

## 2021-07-08 PROCEDURE — 3008F BODY MASS INDEX DOCD: CPT | Mod: CPTII,S$GLB,, | Performed by: PHYSICIAN ASSISTANT

## 2021-07-08 PROCEDURE — 99024 POSTOP FOLLOW-UP VISIT: CPT | Mod: S$GLB,,, | Performed by: PHYSICIAN ASSISTANT

## 2021-07-08 PROCEDURE — 1125F AMNT PAIN NOTED PAIN PRSNT: CPT | Mod: S$GLB,,, | Performed by: PHYSICIAN ASSISTANT

## 2021-07-08 PROCEDURE — 97140 MANUAL THERAPY 1/> REGIONS: CPT

## 2021-07-08 PROCEDURE — 97110 THERAPEUTIC EXERCISES: CPT

## 2021-07-08 PROCEDURE — 99024 PR POST-OP FOLLOW-UP VISIT: ICD-10-PCS | Mod: S$GLB,,, | Performed by: PHYSICIAN ASSISTANT

## 2021-07-08 PROCEDURE — 99999 PR PBB SHADOW E&M-EST. PATIENT-LVL III: ICD-10-PCS | Mod: PBBFAC,,, | Performed by: PHYSICIAN ASSISTANT

## 2021-07-08 PROCEDURE — 3008F PR BODY MASS INDEX (BMI) DOCUMENTED: ICD-10-PCS | Mod: CPTII,S$GLB,, | Performed by: PHYSICIAN ASSISTANT

## 2021-07-08 PROCEDURE — 1125F PR PAIN SEVERITY QUANTIFIED, PAIN PRESENT: ICD-10-PCS | Mod: S$GLB,,, | Performed by: PHYSICIAN ASSISTANT

## 2021-07-08 RX ORDER — OXYCODONE AND ACETAMINOPHEN 7.5; 325 MG/1; MG/1
1 TABLET ORAL EVERY 8 HOURS PRN
Qty: 21 TABLET | Refills: 0 | Status: SHIPPED | OUTPATIENT
Start: 2021-07-10 | End: 2021-07-26

## 2021-07-08 RX ORDER — TRAMADOL HYDROCHLORIDE 50 MG/1
50 TABLET ORAL EVERY 6 HOURS PRN
Qty: 28 TABLET | Refills: 0 | Status: SHIPPED | OUTPATIENT
Start: 2021-07-10 | End: 2021-07-26 | Stop reason: SDUPTHER

## 2021-07-15 ENCOUNTER — CLINICAL SUPPORT (OUTPATIENT)
Dept: REHABILITATION | Facility: HOSPITAL | Age: 66
End: 2021-07-15
Attending: PHYSICIAN ASSISTANT
Payer: COMMERCIAL

## 2021-07-15 DIAGNOSIS — M62.81 MUSCLE WEAKNESS OF RIGHT UPPER EXTREMITY: ICD-10-CM

## 2021-07-15 DIAGNOSIS — M25.611 DECREASED RIGHT SHOULDER RANGE OF MOTION: ICD-10-CM

## 2021-07-15 PROCEDURE — 97110 THERAPEUTIC EXERCISES: CPT

## 2021-07-15 PROCEDURE — 97140 MANUAL THERAPY 1/> REGIONS: CPT

## 2021-07-19 ENCOUNTER — CLINICAL SUPPORT (OUTPATIENT)
Dept: REHABILITATION | Facility: HOSPITAL | Age: 66
End: 2021-07-19
Attending: PHYSICIAN ASSISTANT
Payer: COMMERCIAL

## 2021-07-19 DIAGNOSIS — M25.611 DECREASED RIGHT SHOULDER RANGE OF MOTION: ICD-10-CM

## 2021-07-19 DIAGNOSIS — M62.81 MUSCLE WEAKNESS OF RIGHT UPPER EXTREMITY: ICD-10-CM

## 2021-07-19 PROCEDURE — 97110 THERAPEUTIC EXERCISES: CPT

## 2021-07-19 PROCEDURE — 97112 NEUROMUSCULAR REEDUCATION: CPT

## 2021-07-26 ENCOUNTER — CLINICAL SUPPORT (OUTPATIENT)
Dept: REHABILITATION | Facility: HOSPITAL | Age: 66
End: 2021-07-26
Attending: PHYSICIAN ASSISTANT
Payer: COMMERCIAL

## 2021-07-26 DIAGNOSIS — M25.611 DECREASED RIGHT SHOULDER RANGE OF MOTION: ICD-10-CM

## 2021-07-26 DIAGNOSIS — Z98.890 S/P ROTATOR CUFF REPAIR: ICD-10-CM

## 2021-07-26 DIAGNOSIS — M62.81 MUSCLE WEAKNESS OF RIGHT UPPER EXTREMITY: ICD-10-CM

## 2021-07-26 DIAGNOSIS — G89.18 ACUTE POSTOPERATIVE PAIN OF RIGHT SHOULDER: ICD-10-CM

## 2021-07-26 DIAGNOSIS — M25.511 ACUTE POSTOPERATIVE PAIN OF RIGHT SHOULDER: ICD-10-CM

## 2021-07-26 PROCEDURE — 97140 MANUAL THERAPY 1/> REGIONS: CPT

## 2021-07-26 PROCEDURE — 97110 THERAPEUTIC EXERCISES: CPT

## 2021-07-26 RX ORDER — OXYCODONE AND ACETAMINOPHEN 7.5; 325 MG/1; MG/1
1 TABLET ORAL EVERY 8 HOURS PRN
Qty: 21 TABLET | Refills: 0 | Status: CANCELLED | OUTPATIENT
Start: 2021-07-26

## 2021-07-26 RX ORDER — TRAMADOL HYDROCHLORIDE 50 MG/1
50 TABLET ORAL EVERY 6 HOURS PRN
Qty: 28 TABLET | Refills: 0 | Status: SHIPPED | OUTPATIENT
Start: 2021-07-26 | End: 2021-08-16 | Stop reason: SDUPTHER

## 2021-07-26 RX ORDER — OXYCODONE AND ACETAMINOPHEN 5; 325 MG/1; MG/1
1 TABLET ORAL EVERY 8 HOURS PRN
Qty: 21 TABLET | Refills: 0 | Status: SHIPPED | OUTPATIENT
Start: 2021-07-26 | End: 2021-09-03

## 2021-07-27 RX ORDER — GABAPENTIN 600 MG/1
600 TABLET ORAL 3 TIMES DAILY
Qty: 270 TABLET | Refills: 4 | Status: CANCELLED | OUTPATIENT
Start: 2021-07-27 | End: 2022-07-27

## 2021-08-02 ENCOUNTER — CLINICAL SUPPORT (OUTPATIENT)
Dept: REHABILITATION | Facility: HOSPITAL | Age: 66
End: 2021-08-02
Attending: PHYSICIAN ASSISTANT
Payer: COMMERCIAL

## 2021-08-02 DIAGNOSIS — M25.611 DECREASED RIGHT SHOULDER RANGE OF MOTION: ICD-10-CM

## 2021-08-02 DIAGNOSIS — M62.81 MUSCLE WEAKNESS OF RIGHT UPPER EXTREMITY: ICD-10-CM

## 2021-08-02 PROCEDURE — 97140 MANUAL THERAPY 1/> REGIONS: CPT

## 2021-08-02 PROCEDURE — 97110 THERAPEUTIC EXERCISES: CPT

## 2021-08-03 ENCOUNTER — OFFICE VISIT (OUTPATIENT)
Dept: SPORTS MEDICINE | Facility: CLINIC | Age: 66
End: 2021-08-03
Payer: COMMERCIAL

## 2021-08-03 VITALS
BODY MASS INDEX: 33.79 KG/M2 | TEMPERATURE: 98 F | SYSTOLIC BLOOD PRESSURE: 103 MMHG | HEIGHT: 70 IN | WEIGHT: 236 LBS | HEART RATE: 69 BPM | DIASTOLIC BLOOD PRESSURE: 69 MMHG

## 2021-08-03 DIAGNOSIS — Z98.890 S/P ROTATOR CUFF REPAIR: Primary | ICD-10-CM

## 2021-08-03 PROCEDURE — 99024 PR POST-OP FOLLOW-UP VISIT: ICD-10-PCS | Mod: S$GLB,,, | Performed by: ORTHOPAEDIC SURGERY

## 2021-08-03 PROCEDURE — 3074F PR MOST RECENT SYSTOLIC BLOOD PRESSURE < 130 MM HG: ICD-10-PCS | Mod: CPTII,S$GLB,, | Performed by: ORTHOPAEDIC SURGERY

## 2021-08-03 PROCEDURE — 1125F PR PAIN SEVERITY QUANTIFIED, PAIN PRESENT: ICD-10-PCS | Mod: CPTII,S$GLB,, | Performed by: ORTHOPAEDIC SURGERY

## 2021-08-03 PROCEDURE — 99999 PR PBB SHADOW E&M-EST. PATIENT-LVL III: ICD-10-PCS | Mod: PBBFAC,,, | Performed by: ORTHOPAEDIC SURGERY

## 2021-08-03 PROCEDURE — 1160F PR REVIEW ALL MEDS BY PRESCRIBER/CLIN PHARMACIST DOCUMENTED: ICD-10-PCS | Mod: CPTII,S$GLB,, | Performed by: ORTHOPAEDIC SURGERY

## 2021-08-03 PROCEDURE — 1160F RVW MEDS BY RX/DR IN RCRD: CPT | Mod: CPTII,S$GLB,, | Performed by: ORTHOPAEDIC SURGERY

## 2021-08-03 PROCEDURE — 3078F DIAST BP <80 MM HG: CPT | Mod: CPTII,S$GLB,, | Performed by: ORTHOPAEDIC SURGERY

## 2021-08-03 PROCEDURE — 99024 POSTOP FOLLOW-UP VISIT: CPT | Mod: S$GLB,,, | Performed by: ORTHOPAEDIC SURGERY

## 2021-08-03 PROCEDURE — 3008F BODY MASS INDEX DOCD: CPT | Mod: CPTII,S$GLB,, | Performed by: ORTHOPAEDIC SURGERY

## 2021-08-03 PROCEDURE — 3008F PR BODY MASS INDEX (BMI) DOCUMENTED: ICD-10-PCS | Mod: CPTII,S$GLB,, | Performed by: ORTHOPAEDIC SURGERY

## 2021-08-03 PROCEDURE — 3074F SYST BP LT 130 MM HG: CPT | Mod: CPTII,S$GLB,, | Performed by: ORTHOPAEDIC SURGERY

## 2021-08-03 PROCEDURE — 1125F AMNT PAIN NOTED PAIN PRSNT: CPT | Mod: CPTII,S$GLB,, | Performed by: ORTHOPAEDIC SURGERY

## 2021-08-03 PROCEDURE — 99999 PR PBB SHADOW E&M-EST. PATIENT-LVL III: CPT | Mod: PBBFAC,,, | Performed by: ORTHOPAEDIC SURGERY

## 2021-08-03 PROCEDURE — 1159F PR MEDICATION LIST DOCUMENTED IN MEDICAL RECORD: ICD-10-PCS | Mod: CPTII,S$GLB,, | Performed by: ORTHOPAEDIC SURGERY

## 2021-08-03 PROCEDURE — 3078F PR MOST RECENT DIASTOLIC BLOOD PRESSURE < 80 MM HG: ICD-10-PCS | Mod: CPTII,S$GLB,, | Performed by: ORTHOPAEDIC SURGERY

## 2021-08-03 PROCEDURE — 1159F MED LIST DOCD IN RCRD: CPT | Mod: CPTII,S$GLB,, | Performed by: ORTHOPAEDIC SURGERY

## 2021-08-09 ENCOUNTER — CLINICAL SUPPORT (OUTPATIENT)
Dept: REHABILITATION | Facility: HOSPITAL | Age: 66
End: 2021-08-09
Attending: PHYSICIAN ASSISTANT
Payer: COMMERCIAL

## 2021-08-09 DIAGNOSIS — M62.81 MUSCLE WEAKNESS OF RIGHT UPPER EXTREMITY: ICD-10-CM

## 2021-08-09 DIAGNOSIS — M25.611 DECREASED RIGHT SHOULDER RANGE OF MOTION: ICD-10-CM

## 2021-08-09 PROCEDURE — 97140 MANUAL THERAPY 1/> REGIONS: CPT

## 2021-08-09 PROCEDURE — 97112 NEUROMUSCULAR REEDUCATION: CPT

## 2021-08-09 PROCEDURE — 97110 THERAPEUTIC EXERCISES: CPT

## 2021-08-12 ENCOUNTER — CLINICAL SUPPORT (OUTPATIENT)
Dept: REHABILITATION | Facility: HOSPITAL | Age: 66
End: 2021-08-12
Attending: PHYSICIAN ASSISTANT
Payer: COMMERCIAL

## 2021-08-12 DIAGNOSIS — M62.81 MUSCLE WEAKNESS OF RIGHT UPPER EXTREMITY: ICD-10-CM

## 2021-08-12 DIAGNOSIS — M75.121 NONTRAUMATIC COMPLETE TEAR OF RIGHT ROTATOR CUFF: Primary | ICD-10-CM

## 2021-08-12 DIAGNOSIS — M25.611 DECREASED RIGHT SHOULDER RANGE OF MOTION: ICD-10-CM

## 2021-08-12 PROCEDURE — 97140 MANUAL THERAPY 1/> REGIONS: CPT

## 2021-08-12 PROCEDURE — 97110 THERAPEUTIC EXERCISES: CPT

## 2021-08-12 PROCEDURE — 97112 NEUROMUSCULAR REEDUCATION: CPT

## 2021-08-16 ENCOUNTER — CLINICAL SUPPORT (OUTPATIENT)
Dept: REHABILITATION | Facility: HOSPITAL | Age: 66
End: 2021-08-16
Attending: PHYSICIAN ASSISTANT
Payer: COMMERCIAL

## 2021-08-16 DIAGNOSIS — M25.611 DECREASED RIGHT SHOULDER RANGE OF MOTION: ICD-10-CM

## 2021-08-16 DIAGNOSIS — M47.812 CERVICAL SPONDYLOSIS WITHOUT MYELOPATHY: ICD-10-CM

## 2021-08-16 DIAGNOSIS — Z98.890 S/P ROTATOR CUFF REPAIR: ICD-10-CM

## 2021-08-16 DIAGNOSIS — M62.81 MUSCLE WEAKNESS OF RIGHT UPPER EXTREMITY: ICD-10-CM

## 2021-08-16 DIAGNOSIS — M25.511 ACUTE POSTOPERATIVE PAIN OF RIGHT SHOULDER: ICD-10-CM

## 2021-08-16 DIAGNOSIS — G89.18 ACUTE POSTOPERATIVE PAIN OF RIGHT SHOULDER: ICD-10-CM

## 2021-08-16 PROCEDURE — 97110 THERAPEUTIC EXERCISES: CPT

## 2021-08-16 PROCEDURE — 97140 MANUAL THERAPY 1/> REGIONS: CPT

## 2021-08-16 PROCEDURE — 97112 NEUROMUSCULAR REEDUCATION: CPT

## 2021-08-16 RX ORDER — TRAMADOL HYDROCHLORIDE 50 MG/1
50 TABLET ORAL EVERY 6 HOURS PRN
Qty: 28 TABLET | Refills: 0 | Status: CANCELLED | OUTPATIENT
Start: 2021-08-16

## 2021-08-16 RX ORDER — OXYCODONE AND ACETAMINOPHEN 5; 325 MG/1; MG/1
1 TABLET ORAL EVERY 8 HOURS PRN
Qty: 21 TABLET | Refills: 0 | Status: CANCELLED | OUTPATIENT
Start: 2021-08-16

## 2021-08-17 RX ORDER — TIZANIDINE 4 MG/1
4 TABLET ORAL EVERY 8 HOURS PRN
Qty: 270 TABLET | Refills: 0 | Status: SHIPPED | OUTPATIENT
Start: 2021-08-17 | End: 2022-04-20 | Stop reason: SDUPTHER

## 2021-08-18 ENCOUNTER — CLINICAL SUPPORT (OUTPATIENT)
Dept: REHABILITATION | Facility: HOSPITAL | Age: 66
End: 2021-08-18
Attending: PHYSICIAN ASSISTANT
Payer: COMMERCIAL

## 2021-08-18 DIAGNOSIS — Z98.890 S/P ROTATOR CUFF REPAIR: ICD-10-CM

## 2021-08-18 DIAGNOSIS — M25.611 DECREASED RIGHT SHOULDER RANGE OF MOTION: ICD-10-CM

## 2021-08-18 DIAGNOSIS — M62.81 MUSCLE WEAKNESS OF RIGHT UPPER EXTREMITY: ICD-10-CM

## 2021-08-18 DIAGNOSIS — G89.18 ACUTE POSTOPERATIVE PAIN OF RIGHT SHOULDER: ICD-10-CM

## 2021-08-18 DIAGNOSIS — M25.511 ACUTE POSTOPERATIVE PAIN OF RIGHT SHOULDER: ICD-10-CM

## 2021-08-18 PROCEDURE — 97110 THERAPEUTIC EXERCISES: CPT

## 2021-08-18 PROCEDURE — 97112 NEUROMUSCULAR REEDUCATION: CPT

## 2021-08-18 PROCEDURE — 97140 MANUAL THERAPY 1/> REGIONS: CPT

## 2021-08-18 RX ORDER — OXYCODONE AND ACETAMINOPHEN 5; 325 MG/1; MG/1
1 TABLET ORAL EVERY 8 HOURS PRN
Qty: 21 TABLET | Refills: 0 | Status: CANCELLED | OUTPATIENT
Start: 2021-08-16

## 2021-08-19 RX ORDER — HYDROCODONE BITARTRATE AND ACETAMINOPHEN 7.5; 325 MG/1; MG/1
1 TABLET ORAL EVERY 8 HOURS PRN
Qty: 21 TABLET | Refills: 0 | Status: SHIPPED | OUTPATIENT
Start: 2021-08-19 | End: 2021-09-03

## 2021-08-19 RX ORDER — TRAMADOL HYDROCHLORIDE 50 MG/1
50 TABLET ORAL EVERY 6 HOURS PRN
Qty: 28 TABLET | Refills: 0 | Status: SHIPPED | OUTPATIENT
Start: 2021-08-19 | End: 2021-09-03 | Stop reason: SDUPTHER

## 2021-08-21 ENCOUNTER — IMMUNIZATION (OUTPATIENT)
Dept: PRIMARY CARE CLINIC | Facility: CLINIC | Age: 66
End: 2021-08-21
Payer: COMMERCIAL

## 2021-08-21 DIAGNOSIS — Z23 NEED FOR VACCINATION: Primary | ICD-10-CM

## 2021-08-21 PROCEDURE — 91301 COVID-19, MRNA, LNP-S, PF, 100 MCG/0.5 ML DOSE VACCINE: ICD-10-PCS | Mod: S$GLB,,, | Performed by: INTERNAL MEDICINE

## 2021-08-21 PROCEDURE — 91301 COVID-19, MRNA, LNP-S, PF, 100 MCG/0.5 ML DOSE VACCINE: CPT | Mod: S$GLB,,, | Performed by: INTERNAL MEDICINE

## 2021-08-21 PROCEDURE — 0011A COVID-19, MRNA, LNP-S, PF, 100 MCG/0.5 ML DOSE VACCINE: CPT | Mod: CV19,S$GLB,, | Performed by: INTERNAL MEDICINE

## 2021-08-21 PROCEDURE — 0011A COVID-19, MRNA, LNP-S, PF, 100 MCG/0.5 ML DOSE VACCINE: ICD-10-PCS | Mod: CV19,S$GLB,, | Performed by: INTERNAL MEDICINE

## 2021-08-23 ENCOUNTER — CLINICAL SUPPORT (OUTPATIENT)
Dept: REHABILITATION | Facility: HOSPITAL | Age: 66
End: 2021-08-23
Payer: COMMERCIAL

## 2021-08-23 DIAGNOSIS — M25.611 DECREASED RIGHT SHOULDER RANGE OF MOTION: ICD-10-CM

## 2021-08-23 DIAGNOSIS — M62.81 MUSCLE WEAKNESS OF RIGHT UPPER EXTREMITY: ICD-10-CM

## 2021-08-23 PROCEDURE — 97140 MANUAL THERAPY 1/> REGIONS: CPT

## 2021-08-23 PROCEDURE — 97110 THERAPEUTIC EXERCISES: CPT

## 2021-08-23 PROCEDURE — 97112 NEUROMUSCULAR REEDUCATION: CPT

## 2021-08-26 ENCOUNTER — CLINICAL SUPPORT (OUTPATIENT)
Dept: REHABILITATION | Facility: HOSPITAL | Age: 66
End: 2021-08-26
Payer: COMMERCIAL

## 2021-08-26 DIAGNOSIS — M62.81 MUSCLE WEAKNESS OF RIGHT UPPER EXTREMITY: ICD-10-CM

## 2021-08-26 DIAGNOSIS — M25.611 DECREASED RIGHT SHOULDER RANGE OF MOTION: ICD-10-CM

## 2021-08-26 PROCEDURE — 97110 THERAPEUTIC EXERCISES: CPT

## 2021-08-26 PROCEDURE — 97112 NEUROMUSCULAR REEDUCATION: CPT

## 2021-08-26 PROCEDURE — 97140 MANUAL THERAPY 1/> REGIONS: CPT

## 2021-09-03 ENCOUNTER — PATIENT MESSAGE (OUTPATIENT)
Dept: SPORTS MEDICINE | Facility: CLINIC | Age: 66
End: 2021-09-03

## 2021-09-03 DIAGNOSIS — M25.511 ACUTE POSTOPERATIVE PAIN OF RIGHT SHOULDER: ICD-10-CM

## 2021-09-03 DIAGNOSIS — Z98.890 S/P ROTATOR CUFF REPAIR: ICD-10-CM

## 2021-09-03 DIAGNOSIS — G89.18 ACUTE POSTOPERATIVE PAIN OF RIGHT SHOULDER: ICD-10-CM

## 2021-09-03 RX ORDER — HYDROCODONE BITARTRATE AND ACETAMINOPHEN 7.5; 325 MG/1; MG/1
1 TABLET ORAL EVERY 8 HOURS PRN
Qty: 21 TABLET | Refills: 0 | Status: CANCELLED | OUTPATIENT
Start: 2021-09-03

## 2021-09-03 RX ORDER — TRAMADOL HYDROCHLORIDE 50 MG/1
50 TABLET ORAL EVERY 6 HOURS PRN
Qty: 28 TABLET | Refills: 0 | Status: SHIPPED | OUTPATIENT
Start: 2021-09-03 | End: 2022-05-11

## 2021-09-03 RX ORDER — HYDROCODONE BITARTRATE AND ACETAMINOPHEN 5; 325 MG/1; MG/1
1 TABLET ORAL EVERY 8 HOURS PRN
Qty: 20 TABLET | Refills: 0 | Status: SHIPPED | OUTPATIENT
Start: 2021-09-03 | End: 2022-05-11

## 2021-09-13 ENCOUNTER — CLINICAL SUPPORT (OUTPATIENT)
Dept: REHABILITATION | Facility: HOSPITAL | Age: 66
End: 2021-09-13
Payer: COMMERCIAL

## 2021-09-13 DIAGNOSIS — M62.81 MUSCLE WEAKNESS OF RIGHT UPPER EXTREMITY: ICD-10-CM

## 2021-09-13 DIAGNOSIS — M25.611 DECREASED RIGHT SHOULDER RANGE OF MOTION: ICD-10-CM

## 2021-09-13 PROCEDURE — 97140 MANUAL THERAPY 1/> REGIONS: CPT

## 2021-09-13 PROCEDURE — 97110 THERAPEUTIC EXERCISES: CPT

## 2021-09-16 ENCOUNTER — CLINICAL SUPPORT (OUTPATIENT)
Dept: REHABILITATION | Facility: HOSPITAL | Age: 66
End: 2021-09-16
Payer: COMMERCIAL

## 2021-09-16 DIAGNOSIS — M25.611 DECREASED RIGHT SHOULDER RANGE OF MOTION: ICD-10-CM

## 2021-09-16 DIAGNOSIS — M62.81 MUSCLE WEAKNESS OF RIGHT UPPER EXTREMITY: ICD-10-CM

## 2021-09-16 PROCEDURE — 97110 THERAPEUTIC EXERCISES: CPT

## 2021-09-16 PROCEDURE — 97112 NEUROMUSCULAR REEDUCATION: CPT

## 2021-09-16 PROCEDURE — 97140 MANUAL THERAPY 1/> REGIONS: CPT

## 2021-09-19 ENCOUNTER — IMMUNIZATION (OUTPATIENT)
Dept: PRIMARY CARE CLINIC | Facility: CLINIC | Age: 66
End: 2021-09-19
Payer: COMMERCIAL

## 2021-09-19 DIAGNOSIS — Z23 NEED FOR VACCINATION: Primary | ICD-10-CM

## 2021-09-19 PROCEDURE — 0012A COVID-19, MRNA, LNP-S, PF, 100 MCG/0.5 ML DOSE VACCINE: CPT | Mod: CV19,PBBFAC | Performed by: INTERNAL MEDICINE

## 2021-09-20 ENCOUNTER — CLINICAL SUPPORT (OUTPATIENT)
Dept: REHABILITATION | Facility: HOSPITAL | Age: 66
End: 2021-09-20
Payer: COMMERCIAL

## 2021-09-20 DIAGNOSIS — M62.81 MUSCLE WEAKNESS OF RIGHT UPPER EXTREMITY: ICD-10-CM

## 2021-09-20 DIAGNOSIS — M25.611 DECREASED RIGHT SHOULDER RANGE OF MOTION: ICD-10-CM

## 2021-09-20 PROCEDURE — 97110 THERAPEUTIC EXERCISES: CPT

## 2021-09-20 PROCEDURE — 97140 MANUAL THERAPY 1/> REGIONS: CPT

## 2021-09-20 PROCEDURE — 97112 NEUROMUSCULAR REEDUCATION: CPT

## 2021-09-23 ENCOUNTER — OFFICE VISIT (OUTPATIENT)
Dept: SPORTS MEDICINE | Facility: CLINIC | Age: 66
End: 2021-09-23
Payer: COMMERCIAL

## 2021-09-23 ENCOUNTER — CLINICAL SUPPORT (OUTPATIENT)
Dept: REHABILITATION | Facility: HOSPITAL | Age: 66
End: 2021-09-23
Payer: COMMERCIAL

## 2021-09-23 VITALS
SYSTOLIC BLOOD PRESSURE: 115 MMHG | HEART RATE: 70 BPM | DIASTOLIC BLOOD PRESSURE: 66 MMHG | HEIGHT: 70 IN | BODY MASS INDEX: 37.65 KG/M2 | WEIGHT: 263 LBS

## 2021-09-23 DIAGNOSIS — Z98.890 S/P ROTATOR CUFF REPAIR: Primary | ICD-10-CM

## 2021-09-23 DIAGNOSIS — M62.81 MUSCLE WEAKNESS OF RIGHT UPPER EXTREMITY: ICD-10-CM

## 2021-09-23 DIAGNOSIS — M25.611 DECREASED RIGHT SHOULDER RANGE OF MOTION: ICD-10-CM

## 2021-09-23 PROCEDURE — 1159F MED LIST DOCD IN RCRD: CPT | Mod: CPTII,S$GLB,, | Performed by: ORTHOPAEDIC SURGERY

## 2021-09-23 PROCEDURE — 99999 PR PBB SHADOW E&M-EST. PATIENT-LVL III: ICD-10-PCS | Mod: PBBFAC,,, | Performed by: ORTHOPAEDIC SURGERY

## 2021-09-23 PROCEDURE — 3078F DIAST BP <80 MM HG: CPT | Mod: CPTII,S$GLB,, | Performed by: ORTHOPAEDIC SURGERY

## 2021-09-23 PROCEDURE — 3074F PR MOST RECENT SYSTOLIC BLOOD PRESSURE < 130 MM HG: ICD-10-PCS | Mod: CPTII,S$GLB,, | Performed by: ORTHOPAEDIC SURGERY

## 2021-09-23 PROCEDURE — 3074F SYST BP LT 130 MM HG: CPT | Mod: CPTII,S$GLB,, | Performed by: ORTHOPAEDIC SURGERY

## 2021-09-23 PROCEDURE — 97112 NEUROMUSCULAR REEDUCATION: CPT

## 2021-09-23 PROCEDURE — 99999 PR PBB SHADOW E&M-EST. PATIENT-LVL III: CPT | Mod: PBBFAC,,, | Performed by: ORTHOPAEDIC SURGERY

## 2021-09-23 PROCEDURE — 3008F BODY MASS INDEX DOCD: CPT | Mod: CPTII,S$GLB,, | Performed by: ORTHOPAEDIC SURGERY

## 2021-09-23 PROCEDURE — 99024 PR POST-OP FOLLOW-UP VISIT: ICD-10-PCS | Mod: S$GLB,,, | Performed by: ORTHOPAEDIC SURGERY

## 2021-09-23 PROCEDURE — 1159F PR MEDICATION LIST DOCUMENTED IN MEDICAL RECORD: ICD-10-PCS | Mod: CPTII,S$GLB,, | Performed by: ORTHOPAEDIC SURGERY

## 2021-09-23 PROCEDURE — 3078F PR MOST RECENT DIASTOLIC BLOOD PRESSURE < 80 MM HG: ICD-10-PCS | Mod: CPTII,S$GLB,, | Performed by: ORTHOPAEDIC SURGERY

## 2021-09-23 PROCEDURE — 97110 THERAPEUTIC EXERCISES: CPT

## 2021-09-23 PROCEDURE — 3008F PR BODY MASS INDEX (BMI) DOCUMENTED: ICD-10-PCS | Mod: CPTII,S$GLB,, | Performed by: ORTHOPAEDIC SURGERY

## 2021-09-23 PROCEDURE — 99024 POSTOP FOLLOW-UP VISIT: CPT | Mod: S$GLB,,, | Performed by: ORTHOPAEDIC SURGERY

## 2021-09-27 ENCOUNTER — CLINICAL SUPPORT (OUTPATIENT)
Dept: REHABILITATION | Facility: HOSPITAL | Age: 66
End: 2021-09-27
Payer: COMMERCIAL

## 2021-09-27 DIAGNOSIS — M62.81 MUSCLE WEAKNESS OF RIGHT UPPER EXTREMITY: ICD-10-CM

## 2021-09-27 DIAGNOSIS — M25.611 DECREASED RIGHT SHOULDER RANGE OF MOTION: ICD-10-CM

## 2021-09-27 PROCEDURE — 97140 MANUAL THERAPY 1/> REGIONS: CPT

## 2021-09-27 PROCEDURE — 97112 NEUROMUSCULAR REEDUCATION: CPT

## 2021-09-27 PROCEDURE — 97110 THERAPEUTIC EXERCISES: CPT

## 2021-10-04 ENCOUNTER — PATIENT MESSAGE (OUTPATIENT)
Dept: ADMINISTRATIVE | Facility: HOSPITAL | Age: 66
End: 2021-10-04

## 2021-10-04 ENCOUNTER — CLINICAL SUPPORT (OUTPATIENT)
Dept: REHABILITATION | Facility: HOSPITAL | Age: 66
End: 2021-10-04
Payer: COMMERCIAL

## 2021-10-04 DIAGNOSIS — M62.81 MUSCLE WEAKNESS OF RIGHT UPPER EXTREMITY: ICD-10-CM

## 2021-10-04 DIAGNOSIS — M25.611 DECREASED RIGHT SHOULDER RANGE OF MOTION: ICD-10-CM

## 2021-10-04 PROCEDURE — 97110 THERAPEUTIC EXERCISES: CPT

## 2021-10-04 PROCEDURE — 97140 MANUAL THERAPY 1/> REGIONS: CPT

## 2021-10-04 PROCEDURE — 97112 NEUROMUSCULAR REEDUCATION: CPT

## 2021-10-05 ENCOUNTER — OFFICE VISIT (OUTPATIENT)
Dept: INTERNAL MEDICINE | Facility: CLINIC | Age: 66
End: 2021-10-05
Payer: COMMERCIAL

## 2021-10-05 DIAGNOSIS — R51.9 HEAD ACHE: ICD-10-CM

## 2021-10-05 DIAGNOSIS — J06.9 UPPER RESPIRATORY TRACT INFECTION, UNSPECIFIED TYPE: Primary | ICD-10-CM

## 2021-10-05 PROCEDURE — 99999 PR PBB SHADOW E&M-EST. PATIENT-LVL IV: ICD-10-PCS | Mod: PBBFAC,,, | Performed by: INTERNAL MEDICINE

## 2021-10-05 PROCEDURE — 99213 PR OFFICE/OUTPT VISIT, EST, LEVL III, 20-29 MIN: ICD-10-PCS | Mod: S$GLB,,, | Performed by: INTERNAL MEDICINE

## 2021-10-05 PROCEDURE — 3074F PR MOST RECENT SYSTOLIC BLOOD PRESSURE < 130 MM HG: ICD-10-PCS | Mod: CPTII,S$GLB,, | Performed by: INTERNAL MEDICINE

## 2021-10-05 PROCEDURE — 3078F PR MOST RECENT DIASTOLIC BLOOD PRESSURE < 80 MM HG: ICD-10-PCS | Mod: CPTII,S$GLB,, | Performed by: INTERNAL MEDICINE

## 2021-10-05 PROCEDURE — U0003 INFECTIOUS AGENT DETECTION BY NUCLEIC ACID (DNA OR RNA); SEVERE ACUTE RESPIRATORY SYNDROME CORONAVIRUS 2 (SARS-COV-2) (CORONAVIRUS DISEASE [COVID-19]), AMPLIFIED PROBE TECHNIQUE, MAKING USE OF HIGH THROUGHPUT TECHNOLOGIES AS DESCRIBED BY CMS-2020-01-R: HCPCS | Performed by: INTERNAL MEDICINE

## 2021-10-05 PROCEDURE — 3008F BODY MASS INDEX DOCD: CPT | Mod: CPTII,S$GLB,, | Performed by: INTERNAL MEDICINE

## 2021-10-05 PROCEDURE — 3074F SYST BP LT 130 MM HG: CPT | Mod: CPTII,S$GLB,, | Performed by: INTERNAL MEDICINE

## 2021-10-05 PROCEDURE — 1159F MED LIST DOCD IN RCRD: CPT | Mod: CPTII,S$GLB,, | Performed by: INTERNAL MEDICINE

## 2021-10-05 PROCEDURE — 99999 PR PBB SHADOW E&M-EST. PATIENT-LVL IV: CPT | Mod: PBBFAC,,, | Performed by: INTERNAL MEDICINE

## 2021-10-05 PROCEDURE — 99213 OFFICE O/P EST LOW 20 MIN: CPT | Mod: S$GLB,,, | Performed by: INTERNAL MEDICINE

## 2021-10-05 PROCEDURE — 3008F PR BODY MASS INDEX (BMI) DOCUMENTED: ICD-10-PCS | Mod: CPTII,S$GLB,, | Performed by: INTERNAL MEDICINE

## 2021-10-05 PROCEDURE — 1159F PR MEDICATION LIST DOCUMENTED IN MEDICAL RECORD: ICD-10-PCS | Mod: CPTII,S$GLB,, | Performed by: INTERNAL MEDICINE

## 2021-10-05 PROCEDURE — U0005 INFEC AGEN DETEC AMPLI PROBE: HCPCS | Performed by: INTERNAL MEDICINE

## 2021-10-05 PROCEDURE — 3078F DIAST BP <80 MM HG: CPT | Mod: CPTII,S$GLB,, | Performed by: INTERNAL MEDICINE

## 2021-10-05 RX ORDER — AMOXICILLIN 875 MG/1
875 TABLET, FILM COATED ORAL 2 TIMES DAILY
Qty: 14 TABLET | Refills: 0 | Status: SHIPPED | OUTPATIENT
Start: 2021-10-05 | End: 2021-10-12

## 2021-10-06 LAB
SARS-COV-2 RNA RESP QL NAA+PROBE: NOT DETECTED
SARS-COV-2- CYCLE NUMBER: NORMAL

## 2021-10-09 VITALS
HEART RATE: 68 BPM | WEIGHT: 236.75 LBS | HEIGHT: 70 IN | DIASTOLIC BLOOD PRESSURE: 62 MMHG | BODY MASS INDEX: 33.89 KG/M2 | TEMPERATURE: 99 F | OXYGEN SATURATION: 98 % | SYSTOLIC BLOOD PRESSURE: 100 MMHG

## 2021-10-11 ENCOUNTER — CLINICAL SUPPORT (OUTPATIENT)
Dept: REHABILITATION | Facility: HOSPITAL | Age: 66
End: 2021-10-11
Payer: COMMERCIAL

## 2021-10-11 DIAGNOSIS — M62.81 MUSCLE WEAKNESS OF RIGHT UPPER EXTREMITY: ICD-10-CM

## 2021-10-11 DIAGNOSIS — M25.611 DECREASED RIGHT SHOULDER RANGE OF MOTION: ICD-10-CM

## 2021-10-11 PROCEDURE — 97110 THERAPEUTIC EXERCISES: CPT

## 2021-10-11 PROCEDURE — 97112 NEUROMUSCULAR REEDUCATION: CPT

## 2021-10-11 PROCEDURE — 97140 MANUAL THERAPY 1/> REGIONS: CPT

## 2021-10-14 ENCOUNTER — CLINICAL SUPPORT (OUTPATIENT)
Dept: REHABILITATION | Facility: HOSPITAL | Age: 66
End: 2021-10-14
Payer: COMMERCIAL

## 2021-10-14 DIAGNOSIS — M62.81 MUSCLE WEAKNESS OF RIGHT UPPER EXTREMITY: ICD-10-CM

## 2021-10-14 DIAGNOSIS — M25.611 DECREASED RIGHT SHOULDER RANGE OF MOTION: ICD-10-CM

## 2021-10-14 PROCEDURE — 97112 NEUROMUSCULAR REEDUCATION: CPT

## 2021-10-14 PROCEDURE — 97110 THERAPEUTIC EXERCISES: CPT

## 2021-10-14 PROCEDURE — 97140 MANUAL THERAPY 1/> REGIONS: CPT

## 2021-10-18 ENCOUNTER — CLINICAL SUPPORT (OUTPATIENT)
Dept: REHABILITATION | Facility: HOSPITAL | Age: 66
End: 2021-10-18
Payer: COMMERCIAL

## 2021-10-18 DIAGNOSIS — M25.611 DECREASED RIGHT SHOULDER RANGE OF MOTION: ICD-10-CM

## 2021-10-18 DIAGNOSIS — M62.81 MUSCLE WEAKNESS OF RIGHT UPPER EXTREMITY: ICD-10-CM

## 2021-10-18 PROCEDURE — 97110 THERAPEUTIC EXERCISES: CPT

## 2021-10-18 PROCEDURE — 97112 NEUROMUSCULAR REEDUCATION: CPT

## 2021-10-21 ENCOUNTER — CLINICAL SUPPORT (OUTPATIENT)
Dept: REHABILITATION | Facility: HOSPITAL | Age: 66
End: 2021-10-21
Payer: COMMERCIAL

## 2021-10-21 DIAGNOSIS — M25.611 DECREASED RIGHT SHOULDER RANGE OF MOTION: ICD-10-CM

## 2021-10-21 DIAGNOSIS — M62.81 MUSCLE WEAKNESS OF RIGHT UPPER EXTREMITY: ICD-10-CM

## 2021-10-21 PROCEDURE — 97140 MANUAL THERAPY 1/> REGIONS: CPT

## 2021-10-21 PROCEDURE — 97110 THERAPEUTIC EXERCISES: CPT

## 2021-10-25 ENCOUNTER — CLINICAL SUPPORT (OUTPATIENT)
Dept: REHABILITATION | Facility: HOSPITAL | Age: 66
End: 2021-10-25
Payer: COMMERCIAL

## 2021-10-25 DIAGNOSIS — M62.81 MUSCLE WEAKNESS OF RIGHT UPPER EXTREMITY: ICD-10-CM

## 2021-10-25 DIAGNOSIS — M25.611 DECREASED RIGHT SHOULDER RANGE OF MOTION: ICD-10-CM

## 2021-10-25 PROCEDURE — 97140 MANUAL THERAPY 1/> REGIONS: CPT

## 2021-10-25 PROCEDURE — 97110 THERAPEUTIC EXERCISES: CPT

## 2021-10-27 ENCOUNTER — PATIENT OUTREACH (OUTPATIENT)
Dept: ADMINISTRATIVE | Facility: OTHER | Age: 66
End: 2021-10-27
Payer: COMMERCIAL

## 2021-10-27 ENCOUNTER — CLINICAL SUPPORT (OUTPATIENT)
Dept: REHABILITATION | Facility: HOSPITAL | Age: 66
End: 2021-10-27
Payer: COMMERCIAL

## 2021-10-27 DIAGNOSIS — M25.611 DECREASED RIGHT SHOULDER RANGE OF MOTION: ICD-10-CM

## 2021-10-27 DIAGNOSIS — M62.81 MUSCLE WEAKNESS OF RIGHT UPPER EXTREMITY: ICD-10-CM

## 2021-10-27 PROCEDURE — 97110 THERAPEUTIC EXERCISES: CPT

## 2021-10-27 PROCEDURE — 97140 MANUAL THERAPY 1/> REGIONS: CPT

## 2021-10-27 PROCEDURE — 97112 NEUROMUSCULAR REEDUCATION: CPT

## 2021-10-28 ENCOUNTER — OFFICE VISIT (OUTPATIENT)
Dept: SPORTS MEDICINE | Facility: CLINIC | Age: 66
End: 2021-10-28
Payer: COMMERCIAL

## 2021-10-28 VITALS
DIASTOLIC BLOOD PRESSURE: 63 MMHG | HEART RATE: 69 BPM | HEIGHT: 70 IN | WEIGHT: 236 LBS | SYSTOLIC BLOOD PRESSURE: 106 MMHG | BODY MASS INDEX: 33.79 KG/M2

## 2021-10-28 DIAGNOSIS — Z98.890 S/P ROTATOR CUFF REPAIR: Primary | ICD-10-CM

## 2021-10-28 PROCEDURE — 20610 DRAIN/INJ JOINT/BURSA W/O US: CPT | Mod: RT,S$GLB,, | Performed by: ORTHOPAEDIC SURGERY

## 2021-10-28 PROCEDURE — 99999 PR PBB SHADOW E&M-EST. PATIENT-LVL II: ICD-10-PCS | Mod: PBBFAC,,, | Performed by: ORTHOPAEDIC SURGERY

## 2021-10-28 PROCEDURE — 3074F PR MOST RECENT SYSTOLIC BLOOD PRESSURE < 130 MM HG: ICD-10-PCS | Mod: CPTII,S$GLB,, | Performed by: ORTHOPAEDIC SURGERY

## 2021-10-28 PROCEDURE — 3008F BODY MASS INDEX DOCD: CPT | Mod: CPTII,S$GLB,, | Performed by: ORTHOPAEDIC SURGERY

## 2021-10-28 PROCEDURE — 20610 LARGE JOINT ASPIRATION/INJECTION: R SUBACROMIAL BURSA: ICD-10-PCS | Mod: RT,S$GLB,, | Performed by: ORTHOPAEDIC SURGERY

## 2021-10-28 PROCEDURE — 99214 PR OFFICE/OUTPT VISIT, EST, LEVL IV, 30-39 MIN: ICD-10-PCS | Mod: 25,S$GLB,, | Performed by: ORTHOPAEDIC SURGERY

## 2021-10-28 PROCEDURE — 3078F DIAST BP <80 MM HG: CPT | Mod: CPTII,S$GLB,, | Performed by: ORTHOPAEDIC SURGERY

## 2021-10-28 PROCEDURE — 3078F PR MOST RECENT DIASTOLIC BLOOD PRESSURE < 80 MM HG: ICD-10-PCS | Mod: CPTII,S$GLB,, | Performed by: ORTHOPAEDIC SURGERY

## 2021-10-28 PROCEDURE — 99214 OFFICE O/P EST MOD 30 MIN: CPT | Mod: 25,S$GLB,, | Performed by: ORTHOPAEDIC SURGERY

## 2021-10-28 PROCEDURE — 99999 PR PBB SHADOW E&M-EST. PATIENT-LVL II: CPT | Mod: PBBFAC,,, | Performed by: ORTHOPAEDIC SURGERY

## 2021-10-28 PROCEDURE — 3074F SYST BP LT 130 MM HG: CPT | Mod: CPTII,S$GLB,, | Performed by: ORTHOPAEDIC SURGERY

## 2021-10-28 PROCEDURE — 3008F PR BODY MASS INDEX (BMI) DOCUMENTED: ICD-10-PCS | Mod: CPTII,S$GLB,, | Performed by: ORTHOPAEDIC SURGERY

## 2021-10-28 RX ORDER — TRIAMCINOLONE ACETONIDE 40 MG/ML
60 INJECTION, SUSPENSION INTRA-ARTICULAR; INTRAMUSCULAR
Status: DISCONTINUED | OUTPATIENT
Start: 2021-10-28 | End: 2021-10-28 | Stop reason: HOSPADM

## 2021-10-28 RX ADMIN — TRIAMCINOLONE ACETONIDE 60 MG: 40 INJECTION, SUSPENSION INTRA-ARTICULAR; INTRAMUSCULAR at 10:10

## 2021-11-01 ENCOUNTER — CLINICAL SUPPORT (OUTPATIENT)
Dept: REHABILITATION | Facility: HOSPITAL | Age: 66
End: 2021-11-01
Payer: COMMERCIAL

## 2021-11-01 DIAGNOSIS — M62.81 MUSCLE WEAKNESS OF RIGHT UPPER EXTREMITY: ICD-10-CM

## 2021-11-01 DIAGNOSIS — M25.611 DECREASED RIGHT SHOULDER RANGE OF MOTION: ICD-10-CM

## 2021-11-01 PROCEDURE — 97110 THERAPEUTIC EXERCISES: CPT

## 2021-11-01 PROCEDURE — 97140 MANUAL THERAPY 1/> REGIONS: CPT

## 2021-11-04 ENCOUNTER — CLINICAL SUPPORT (OUTPATIENT)
Dept: REHABILITATION | Facility: HOSPITAL | Age: 66
End: 2021-11-04
Payer: COMMERCIAL

## 2021-11-04 DIAGNOSIS — M25.611 DECREASED RIGHT SHOULDER RANGE OF MOTION: ICD-10-CM

## 2021-11-04 DIAGNOSIS — M62.81 MUSCLE WEAKNESS OF RIGHT UPPER EXTREMITY: ICD-10-CM

## 2021-11-04 PROCEDURE — 97110 THERAPEUTIC EXERCISES: CPT

## 2021-11-04 PROCEDURE — 97140 MANUAL THERAPY 1/> REGIONS: CPT

## 2021-11-08 ENCOUNTER — CLINICAL SUPPORT (OUTPATIENT)
Dept: REHABILITATION | Facility: HOSPITAL | Age: 66
End: 2021-11-08
Payer: COMMERCIAL

## 2021-11-08 DIAGNOSIS — M62.81 MUSCLE WEAKNESS OF RIGHT UPPER EXTREMITY: ICD-10-CM

## 2021-11-08 DIAGNOSIS — M25.611 DECREASED RIGHT SHOULDER RANGE OF MOTION: ICD-10-CM

## 2021-11-08 PROCEDURE — 97110 THERAPEUTIC EXERCISES: CPT

## 2021-11-08 PROCEDURE — 97140 MANUAL THERAPY 1/> REGIONS: CPT

## 2021-11-11 ENCOUNTER — CLINICAL SUPPORT (OUTPATIENT)
Dept: REHABILITATION | Facility: HOSPITAL | Age: 66
End: 2021-11-11
Payer: COMMERCIAL

## 2021-11-11 DIAGNOSIS — M25.611 DECREASED RIGHT SHOULDER RANGE OF MOTION: ICD-10-CM

## 2021-11-11 DIAGNOSIS — M62.81 MUSCLE WEAKNESS OF RIGHT UPPER EXTREMITY: ICD-10-CM

## 2021-11-11 PROCEDURE — 97110 THERAPEUTIC EXERCISES: CPT

## 2021-11-11 PROCEDURE — 97140 MANUAL THERAPY 1/> REGIONS: CPT

## 2021-11-15 ENCOUNTER — CLINICAL SUPPORT (OUTPATIENT)
Dept: REHABILITATION | Facility: HOSPITAL | Age: 66
End: 2021-11-15
Payer: COMMERCIAL

## 2021-11-15 DIAGNOSIS — M25.611 DECREASED RIGHT SHOULDER RANGE OF MOTION: ICD-10-CM

## 2021-11-15 DIAGNOSIS — M62.81 MUSCLE WEAKNESS OF RIGHT UPPER EXTREMITY: ICD-10-CM

## 2021-11-15 PROCEDURE — 97110 THERAPEUTIC EXERCISES: CPT

## 2021-11-15 PROCEDURE — 97140 MANUAL THERAPY 1/> REGIONS: CPT

## 2021-11-18 ENCOUNTER — CLINICAL SUPPORT (OUTPATIENT)
Dept: REHABILITATION | Facility: HOSPITAL | Age: 66
End: 2021-11-18
Payer: COMMERCIAL

## 2021-11-18 DIAGNOSIS — M25.611 DECREASED RIGHT SHOULDER RANGE OF MOTION: ICD-10-CM

## 2021-11-18 DIAGNOSIS — M62.81 MUSCLE WEAKNESS OF RIGHT UPPER EXTREMITY: ICD-10-CM

## 2021-11-18 PROCEDURE — 97110 THERAPEUTIC EXERCISES: CPT

## 2021-11-18 PROCEDURE — 97112 NEUROMUSCULAR REEDUCATION: CPT

## 2021-11-18 PROCEDURE — 97140 MANUAL THERAPY 1/> REGIONS: CPT

## 2021-11-23 ENCOUNTER — CLINICAL SUPPORT (OUTPATIENT)
Dept: REHABILITATION | Facility: HOSPITAL | Age: 66
End: 2021-11-23
Payer: COMMERCIAL

## 2021-11-23 DIAGNOSIS — M25.611 DECREASED RIGHT SHOULDER RANGE OF MOTION: ICD-10-CM

## 2021-11-23 DIAGNOSIS — M62.81 MUSCLE WEAKNESS OF RIGHT UPPER EXTREMITY: ICD-10-CM

## 2021-11-23 PROCEDURE — 97112 NEUROMUSCULAR REEDUCATION: CPT

## 2021-11-23 PROCEDURE — 97110 THERAPEUTIC EXERCISES: CPT

## 2021-11-23 PROCEDURE — 97140 MANUAL THERAPY 1/> REGIONS: CPT

## 2021-11-24 ENCOUNTER — TELEPHONE (OUTPATIENT)
Dept: INTERNAL MEDICINE | Facility: CLINIC | Age: 66
End: 2021-11-24
Payer: COMMERCIAL

## 2021-11-26 ENCOUNTER — CLINICAL SUPPORT (OUTPATIENT)
Dept: REHABILITATION | Facility: HOSPITAL | Age: 66
End: 2021-11-26
Payer: COMMERCIAL

## 2021-11-26 DIAGNOSIS — M25.611 DECREASED RIGHT SHOULDER RANGE OF MOTION: ICD-10-CM

## 2021-11-26 DIAGNOSIS — M62.81 MUSCLE WEAKNESS OF RIGHT UPPER EXTREMITY: ICD-10-CM

## 2021-11-26 PROCEDURE — 97112 NEUROMUSCULAR REEDUCATION: CPT

## 2021-11-26 PROCEDURE — 97110 THERAPEUTIC EXERCISES: CPT

## 2021-11-26 PROCEDURE — 97140 MANUAL THERAPY 1/> REGIONS: CPT

## 2021-11-30 ENCOUNTER — CLINICAL SUPPORT (OUTPATIENT)
Dept: REHABILITATION | Facility: HOSPITAL | Age: 66
End: 2021-11-30
Payer: COMMERCIAL

## 2021-11-30 DIAGNOSIS — M25.611 DECREASED RIGHT SHOULDER RANGE OF MOTION: ICD-10-CM

## 2021-11-30 DIAGNOSIS — M62.81 MUSCLE WEAKNESS OF RIGHT UPPER EXTREMITY: ICD-10-CM

## 2021-11-30 PROCEDURE — 97110 THERAPEUTIC EXERCISES: CPT

## 2021-11-30 PROCEDURE — 97140 MANUAL THERAPY 1/> REGIONS: CPT

## 2021-11-30 PROCEDURE — 97112 NEUROMUSCULAR REEDUCATION: CPT

## 2021-12-01 ENCOUNTER — CLINICAL SUPPORT (OUTPATIENT)
Dept: REHABILITATION | Facility: HOSPITAL | Age: 66
End: 2021-12-01
Payer: COMMERCIAL

## 2021-12-01 DIAGNOSIS — M62.81 MUSCLE WEAKNESS OF RIGHT UPPER EXTREMITY: ICD-10-CM

## 2021-12-01 DIAGNOSIS — M25.611 DECREASED RIGHT SHOULDER RANGE OF MOTION: ICD-10-CM

## 2021-12-01 PROCEDURE — 97140 MANUAL THERAPY 1/> REGIONS: CPT

## 2021-12-01 PROCEDURE — 97112 NEUROMUSCULAR REEDUCATION: CPT

## 2021-12-01 PROCEDURE — 97110 THERAPEUTIC EXERCISES: CPT

## 2021-12-07 ENCOUNTER — CLINICAL SUPPORT (OUTPATIENT)
Dept: REHABILITATION | Facility: HOSPITAL | Age: 66
End: 2021-12-07
Payer: COMMERCIAL

## 2021-12-07 DIAGNOSIS — M62.81 MUSCLE WEAKNESS OF RIGHT UPPER EXTREMITY: ICD-10-CM

## 2021-12-07 DIAGNOSIS — M25.611 DECREASED RIGHT SHOULDER RANGE OF MOTION: ICD-10-CM

## 2021-12-07 PROCEDURE — 97112 NEUROMUSCULAR REEDUCATION: CPT

## 2021-12-07 PROCEDURE — 97110 THERAPEUTIC EXERCISES: CPT

## 2021-12-07 PROCEDURE — 97140 MANUAL THERAPY 1/> REGIONS: CPT

## 2021-12-10 ENCOUNTER — OFFICE VISIT (OUTPATIENT)
Dept: INTERNAL MEDICINE | Facility: CLINIC | Age: 66
End: 2021-12-10
Payer: COMMERCIAL

## 2021-12-10 ENCOUNTER — CLINICAL SUPPORT (OUTPATIENT)
Dept: REHABILITATION | Facility: HOSPITAL | Age: 66
End: 2021-12-10
Payer: COMMERCIAL

## 2021-12-10 VITALS
WEIGHT: 237 LBS | SYSTOLIC BLOOD PRESSURE: 117 MMHG | HEIGHT: 70 IN | DIASTOLIC BLOOD PRESSURE: 70 MMHG | OXYGEN SATURATION: 98 % | HEART RATE: 68 BPM | BODY MASS INDEX: 33.93 KG/M2

## 2021-12-10 DIAGNOSIS — M25.611 DECREASED RIGHT SHOULDER RANGE OF MOTION: ICD-10-CM

## 2021-12-10 DIAGNOSIS — Z20.822 ENCOUNTER FOR LABORATORY TESTING FOR COVID-19 VIRUS: ICD-10-CM

## 2021-12-10 DIAGNOSIS — M62.81 MUSCLE WEAKNESS OF RIGHT UPPER EXTREMITY: ICD-10-CM

## 2021-12-10 PROCEDURE — 99999 PR PBB SHADOW E&M-EST. PATIENT-LVL IV: ICD-10-PCS | Mod: PBBFAC,,, | Performed by: INTERNAL MEDICINE

## 2021-12-10 PROCEDURE — 99999 PR PBB SHADOW E&M-EST. PATIENT-LVL IV: CPT | Mod: PBBFAC,,, | Performed by: INTERNAL MEDICINE

## 2021-12-10 PROCEDURE — 99214 PR OFFICE/OUTPT VISIT, EST, LEVL IV, 30-39 MIN: ICD-10-PCS | Mod: S$GLB,,, | Performed by: INTERNAL MEDICINE

## 2021-12-10 PROCEDURE — 99214 OFFICE O/P EST MOD 30 MIN: CPT | Mod: S$GLB,,, | Performed by: INTERNAL MEDICINE

## 2021-12-10 PROCEDURE — 97112 NEUROMUSCULAR REEDUCATION: CPT

## 2021-12-10 PROCEDURE — 97110 THERAPEUTIC EXERCISES: CPT

## 2021-12-13 ENCOUNTER — PATIENT MESSAGE (OUTPATIENT)
Dept: REHABILITATION | Facility: HOSPITAL | Age: 66
End: 2021-12-13

## 2021-12-13 ENCOUNTER — CLINICAL SUPPORT (OUTPATIENT)
Dept: REHABILITATION | Facility: HOSPITAL | Age: 66
End: 2021-12-13
Payer: COMMERCIAL

## 2021-12-13 DIAGNOSIS — M62.81 MUSCLE WEAKNESS OF RIGHT UPPER EXTREMITY: ICD-10-CM

## 2021-12-13 DIAGNOSIS — M25.611 DECREASED RIGHT SHOULDER RANGE OF MOTION: ICD-10-CM

## 2021-12-13 PROCEDURE — 97112 NEUROMUSCULAR REEDUCATION: CPT

## 2021-12-13 PROCEDURE — 97110 THERAPEUTIC EXERCISES: CPT

## 2021-12-14 ENCOUNTER — PATIENT OUTREACH (OUTPATIENT)
Dept: ADMINISTRATIVE | Facility: OTHER | Age: 66
End: 2021-12-14
Payer: COMMERCIAL

## 2021-12-15 ENCOUNTER — CLINICAL SUPPORT (OUTPATIENT)
Dept: REHABILITATION | Facility: HOSPITAL | Age: 66
End: 2021-12-15
Payer: COMMERCIAL

## 2021-12-15 DIAGNOSIS — M25.611 DECREASED RIGHT SHOULDER RANGE OF MOTION: ICD-10-CM

## 2021-12-15 DIAGNOSIS — M62.81 MUSCLE WEAKNESS OF RIGHT UPPER EXTREMITY: ICD-10-CM

## 2021-12-15 PROCEDURE — 97110 THERAPEUTIC EXERCISES: CPT

## 2021-12-15 PROCEDURE — 97112 NEUROMUSCULAR REEDUCATION: CPT

## 2021-12-20 ENCOUNTER — CLINICAL SUPPORT (OUTPATIENT)
Dept: REHABILITATION | Facility: HOSPITAL | Age: 66
End: 2021-12-20
Payer: COMMERCIAL

## 2021-12-20 DIAGNOSIS — M25.611 DECREASED RIGHT SHOULDER RANGE OF MOTION: ICD-10-CM

## 2021-12-20 DIAGNOSIS — M62.81 MUSCLE WEAKNESS OF RIGHT UPPER EXTREMITY: ICD-10-CM

## 2021-12-20 PROCEDURE — 97110 THERAPEUTIC EXERCISES: CPT

## 2021-12-20 PROCEDURE — 97140 MANUAL THERAPY 1/> REGIONS: CPT

## 2021-12-20 PROCEDURE — 97112 NEUROMUSCULAR REEDUCATION: CPT

## 2021-12-21 ENCOUNTER — OFFICE VISIT (OUTPATIENT)
Dept: SPORTS MEDICINE | Facility: CLINIC | Age: 66
End: 2021-12-21
Payer: COMMERCIAL

## 2021-12-21 VITALS
HEIGHT: 70 IN | SYSTOLIC BLOOD PRESSURE: 117 MMHG | WEIGHT: 236 LBS | DIASTOLIC BLOOD PRESSURE: 59 MMHG | BODY MASS INDEX: 33.79 KG/M2 | HEART RATE: 63 BPM

## 2021-12-21 DIAGNOSIS — Z98.890 S/P ROTATOR CUFF REPAIR: Primary | ICD-10-CM

## 2021-12-21 PROCEDURE — 99999 PR PBB SHADOW E&M-EST. PATIENT-LVL III: ICD-10-PCS | Mod: PBBFAC,,, | Performed by: ORTHOPAEDIC SURGERY

## 2021-12-21 PROCEDURE — 99999 PR PBB SHADOW E&M-EST. PATIENT-LVL III: CPT | Mod: PBBFAC,,, | Performed by: ORTHOPAEDIC SURGERY

## 2021-12-21 PROCEDURE — 99214 OFFICE O/P EST MOD 30 MIN: CPT | Mod: S$GLB,,, | Performed by: ORTHOPAEDIC SURGERY

## 2021-12-21 PROCEDURE — 99214 PR OFFICE/OUTPT VISIT, EST, LEVL IV, 30-39 MIN: ICD-10-PCS | Mod: S$GLB,,, | Performed by: ORTHOPAEDIC SURGERY

## 2021-12-22 ENCOUNTER — CLINICAL SUPPORT (OUTPATIENT)
Dept: REHABILITATION | Facility: HOSPITAL | Age: 66
End: 2021-12-22
Payer: COMMERCIAL

## 2021-12-22 DIAGNOSIS — M62.81 MUSCLE WEAKNESS OF RIGHT UPPER EXTREMITY: ICD-10-CM

## 2021-12-22 DIAGNOSIS — M25.611 DECREASED RIGHT SHOULDER RANGE OF MOTION: ICD-10-CM

## 2021-12-22 PROCEDURE — 97140 MANUAL THERAPY 1/> REGIONS: CPT | Mod: CQ

## 2021-12-22 PROCEDURE — 97110 THERAPEUTIC EXERCISES: CPT | Mod: CQ

## 2021-12-22 PROCEDURE — 97112 NEUROMUSCULAR REEDUCATION: CPT | Mod: CQ

## 2022-01-03 ENCOUNTER — CLINICAL SUPPORT (OUTPATIENT)
Dept: REHABILITATION | Facility: HOSPITAL | Age: 67
End: 2022-01-03
Payer: COMMERCIAL

## 2022-01-03 DIAGNOSIS — M62.81 MUSCLE WEAKNESS OF RIGHT UPPER EXTREMITY: ICD-10-CM

## 2022-01-03 DIAGNOSIS — M25.611 DECREASED RIGHT SHOULDER RANGE OF MOTION: ICD-10-CM

## 2022-01-03 PROCEDURE — 97110 THERAPEUTIC EXERCISES: CPT

## 2022-01-03 PROCEDURE — 97140 MANUAL THERAPY 1/> REGIONS: CPT

## 2022-01-03 PROCEDURE — 97112 NEUROMUSCULAR REEDUCATION: CPT

## 2022-01-03 NOTE — PROGRESS NOTES
"  Physical Therapy Daily Treatment Note     Name: Soha Wheatley  Clinic Number: [id number]    Therapy Diagnosis:   Encounter Diagnoses   Name Primary?    Decreased right shoulder range of motion     Muscle weakness of right upper extremity      Physician: Darryl Zamora PA-C    Visit Date: 1/3/2022  Physician Orders: PT Eval and Treat  Medical Diagnosis from Referral: M75.101 (ICD-10-CM) - Nontraumatic tear of right rotator cuff, unspecified tear extent  Evaluation Date: 6/28/2021  Authorization Period Expiration: 12/31/2021  Plan of Care Expiration: 12/31/2021  Visit # / Visits authorized: 1 / 20 (41 total)    Time In: 0905  Time Out: 0959  Total Billable Time: 54 minutes    Precautions: Standard     Postop plan for the patient is to follow the large size rotator cuff repair protocol.      DOS: 6/23/2021    Subjective     Pt reports: "My shoulder is feeling really good and relaxed."    She was compliant with home exercise program.  Response to previous treatment: Increased AROM/PROM.   Functional change: increased pain with OH reaching     Pain: 0/10  Location: R shoulder     Objective     Daily Measurements:      9/13/21  Shoulder Passive Range of Motion:   Shoulder Right   Flexion    130   ER at 90    40      9/20/21  ER at 45 abduction: 45         09/23/2021  Shoulder Passive Range of Motion:   Shoulder Right   Flexion    135   ER at 90    50         10/11/21  Shoulder Passive Range of Motion:   Shoulder Right   Flexion    150   ER at 90    55      10/14/21  Shoulder Passive Range of Motion:   Shoulder Right   Flexion    152   ER at 90    57      10/21/21  Shoulder Passive Range of Motion: taken after LLLD stretches with heat and manual therapy mobilizations   Shoulder Right   Flexion    155   ER at 90    60         10/25/2021  Patient able to tolerate R shoulder flexion to ~100-110 deg against gravity for 2-4 seconds      11/4/2021 - PROM  Shoulder Right   Flexion    155   ER     62      11/8/2021  AROM " "R shoulder flexion 150 (L side is 165)     11/11/2021  PROM R shoulder external rotation 65 deg  PROM R shoulder flexion 160     11/23/21:        Shoulder Active Range of Motion:   Shoulder Right Left   Flexion    Pre-Tx: 125     Post-Tx: 135 140   ER at 0    40 50      Shoulder Passive Range of Motion:   Shoulder Right Left   Flexion    160 165   ER at 30    50 60       12/10/21:  MMT L Shoulder  ER at 0: 4-/5  Scaption at 90: 3+/5    1/3/2021:    Shoulder Active Range of Motion:   Shoulder Right Left   Flexion    150    160   ER at 0    50 55       Daily Treatment       Soha received therapeutic exercises to develop strength, endurance, ROM and flexibility for 20 minutes including:  UBE 4'/4' for increased ROM, mm strength/endurance and circulation   Supine cane ER AAROM x30  Supine UE (B) Flexion YTB at wrists 3x12  S/L ER 3# 3x12    Soha received the following manual therapy techniques: were applied to the: L shoulder for 9 minutes, including:  Posterior/inferior humeral glides, PROM all planes, oscillation   Re-assessment    Soha participated in neuromuscular re-education activities to improve: Coordination, Kinesthetic, Sense, Proprioception and Motor Control for 25 minutes. The following activities were included:  SA Wall slide 2x12  Prone LT Lvl 1 Re-Ed 3x8  Prone T MT Re-ed 3x8  SA Table top Press 3x5x5" holds          Home Exercises and Patient Education Provided     Education provided:   - Reviewed HEP.     Written Home Exercises Provided: Patient instructed to cont prior HEP.  Exercises were reviewed and Soha was able to demonstrate them prior to the end of the session.  Soha demonstrated good  understanding of the education provided.     See EMR under patient instructions for exercises given.     Assessment   Patient jack's continued improvement in AROM. Current emphasis is on improving motor control and strengthening of periscapular musculature to allow for optimal scapulohumeral rhythm and " continued cuff strengthening. Patient demo's excellent carry over of maintained PROM of R shoulder.      Soha is progressing well towards her goals.     Pt will continue to benefit from skilled outpatient physical therapy to address the deficits listed in the problem list box on initial evaluation, provide pt/family education and to maximize pt's level of independence in the home and community environment. Pt prognosis is Good.     Pt's spiritual, cultural and educational needs considered and pt agreeable to plan of care and goals.    Anticipated barriers to physical therapy: None    Goals:  Short Term Goals: 8 weeks  1. Pt will be compliant with HEP 50% of prescribed amount. (Met)  2. The pt to demo improvement in R shoulder PROM to by 80% of the L shoulder. (Met)  3.  The pt to demo tolerance to being out of the sling for 24 hours with pain <2/10 (Met)    Long Term Goals: 24 weeks   1. Pt will be compliant with % of prescribed amount. (Met)  2. The pt to improvement in AROM of R shoulder within 80% of L shoulder to improve tolerance to OH movement. (met)  3. The pt to  Demo at least 4+/5 of RTC muscle testing to demo improvement in tolerance to activity  4. The pt to tolerate lifting 5# overhead to improve tolerance to ADLs and work related activities   5. The pt will report full participation in ADLs and IADLs without restrictions related to R Shoulder.       Plan     Continue per POC 2x/week. Continue with cuff strengthening in elevation and progress mobility interventions as appropriate.     Artur Patel, PT, DPT

## 2022-01-06 ENCOUNTER — CLINICAL SUPPORT (OUTPATIENT)
Dept: REHABILITATION | Facility: HOSPITAL | Age: 67
End: 2022-01-06
Payer: COMMERCIAL

## 2022-01-06 DIAGNOSIS — M62.81 MUSCLE WEAKNESS OF RIGHT UPPER EXTREMITY: ICD-10-CM

## 2022-01-06 DIAGNOSIS — M25.611 DECREASED RIGHT SHOULDER RANGE OF MOTION: ICD-10-CM

## 2022-01-06 PROCEDURE — 97140 MANUAL THERAPY 1/> REGIONS: CPT

## 2022-01-06 PROCEDURE — 97110 THERAPEUTIC EXERCISES: CPT

## 2022-01-06 PROCEDURE — 97112 NEUROMUSCULAR REEDUCATION: CPT

## 2022-01-06 NOTE — PROGRESS NOTES
Physical Therapy Daily Treatment Note     Name: Soha Wheatley  Clinic Number: [id number]    Therapy Diagnosis:   No diagnosis found.  Physician: Darryl Zamora PA-C    Visit Date: 1/6/2022  Physician Orders: PT Eval and Treat  Medical Diagnosis from Referral: M75.101 (ICD-10-CM) - Nontraumatic tear of right rotator cuff, unspecified tear extent  Evaluation Date: 6/28/2021  Authorization Period Expiration: 12/31/2021  Plan of Care Expiration: 12/31/2021  Visit # / Visits authorized: 2 / 20 (42 total)    Time In: 1102  Time Out: 1201  Total Billable Time: 54 minutes    Precautions: Standard     Postop plan for the patient is to follow the large size rotator cuff repair protocol.      DOS: 6/23/2021    Subjective     Pt reports: her shoulder is feeling better today, but admits she did not complete her HEP on her cruise.     She was  compliant with home exercise program.  Response to previous treatment: Increased AROM/PROM.   Functional change: increased pain with OH reaching     Pain: 0/10  Location: R shoulder     Objective     Daily Measurements:      9/13/21  Shoulder Passive Range of Motion:   Shoulder Right   Flexion    130   ER at 90    40      9/20/21  ER at 45 abduction: 45         09/23/2021  Shoulder Passive Range of Motion:   Shoulder Right   Flexion    135   ER at 90    50         10/11/21  Shoulder Passive Range of Motion:   Shoulder Right   Flexion    150   ER at 90    55      10/14/21  Shoulder Passive Range of Motion:   Shoulder Right   Flexion    152   ER at 90    57      10/21/21  Shoulder Passive Range of Motion: taken after LLLD stretches with heat and manual therapy mobilizations   Shoulder Right   Flexion    155   ER at 90    60         10/25/2021  Patient able to tolerate R shoulder flexion to ~100-110 deg against gravity for 2-4 seconds      11/4/2021 - PROM  Shoulder Right   Flexion    155   ER     62      11/8/2021  AROM R shoulder flexion 150 (L side is 165)     11/11/2021  PROM R  shoulder external rotation 65 deg  PROM R shoulder flexion 160     11/23/21:        Shoulder Active Range of Motion:   Shoulder Right Left   Flexion    Pre-Tx: 125     Post-Tx: 135 140   ER at 0    40 50      Shoulder Passive Range of Motion:   Shoulder Right Left   Flexion    160 165   ER at 30    50 60       12/10/21:  MMT L Shoulder  ER at 0: 4-/5  Scaption at 90: 3+/5    1/3/2021:    Shoulder Active Range of Motion:   Shoulder Right Left   Flexion    150    160   ER at 0    50 55       Daily Treatment       Soha received therapeutic exercises to develop strength, endurance, ROM and flexibility for 25 minutes including:  LLLD GH ER w/#3 x 5 minutes    Supine cane ER AAROM x30  Supine UE (B) Flexion YTB at wrists 3x12  S/L ER 3# 3x12  LT isometric walk outs with [place] TB 3x12    Soha received the following manual therapy techniques: were applied to the: L shoulder for 9 minutes, including:  Posterior/inferior humeral glides, PROM all planes, oscillation   Re-assessment    Soha participated in neuromuscular re-education activities to improve: Coordination, Kinesthetic, Sense, Proprioception and Motor Control for 20 minutes. The following activities were included:  SA Wall slide 2x12  Prone LT Lvl 2 Re-Ed 3x8  Prone T MT Re-ed 3x8          Home Exercises and Patient Education Provided     Education provided:   - Reviewed HEP.     Written Home Exercises Provided: Patient instructed to cont prior HEP.  Exercises were reviewed and Soha was able to demonstrate them prior to the end of the session.  Soha demonstrated good  understanding of the education provided.     See EMR under patient instructions for exercises given.     Assessment     Continues to show improvements with AROM able to reach above shoulder level just below full range. Emphasis continues to be placed on appropriate motor control of the scapula during shoulder elevation and overhead activities. She continues to maintain range at her shoulder  suggestive of compliance to HEP and enhanced muscle performance.        Soha is progressing well towards her goals.     Pt will continue to benefit from skilled outpatient physical therapy to address the deficits listed in the problem list box on initial evaluation, provide pt/family education and to maximize pt's level of independence in the home and community environment. Pt prognosis is Good.     Pt's spiritual, cultural and educational needs considered and pt agreeable to plan of care and goals.    Anticipated barriers to physical therapy: None    Goals:  Short Term Goals: 8 weeks  1. Pt will be compliant with HEP 50% of prescribed amount. (Met)  2. The pt to demo improvement in R shoulder PROM to by 80% of the L shoulder. (Met)  3.  The pt to demo tolerance to being out of the sling for 24 hours with pain <2/10 (Met)    Long Term Goals: 24 weeks   1. Pt will be compliant with % of prescribed amount. (Met)  2. The pt to improvement in AROM of R shoulder within 80% of L shoulder to improve tolerance to OH movement. (met)  3. The pt to  Demo at least 4+/5 of RTC muscle testing to demo improvement in tolerance to activity  4. The pt to tolerate lifting 5# overhead to improve tolerance to ADLs and work related activities   5. The pt will report full participation in ADLs and IADLs without restrictions related to R Shoulder.       Plan     Continue per POC 2x/week. Continue with cuff strengthening in elevation and progress mobility interventions as appropriate.       Artur Patel, PT, DPT    Co-treated with Alena Penaloza, SPT-3    I certify that I was present in the room directing the student in service delivery and guiding them using my skilled judgment. As the co-signing therapist I have reviewed the students documentation and am responsible for the treatment, assessment, and plan.

## 2022-01-10 ENCOUNTER — CLINICAL SUPPORT (OUTPATIENT)
Dept: REHABILITATION | Facility: HOSPITAL | Age: 67
End: 2022-01-10
Payer: COMMERCIAL

## 2022-01-10 DIAGNOSIS — M62.81 MUSCLE WEAKNESS OF RIGHT UPPER EXTREMITY: ICD-10-CM

## 2022-01-10 DIAGNOSIS — M25.611 DECREASED RIGHT SHOULDER RANGE OF MOTION: ICD-10-CM

## 2022-01-10 PROCEDURE — 97110 THERAPEUTIC EXERCISES: CPT

## 2022-01-10 PROCEDURE — 97112 NEUROMUSCULAR REEDUCATION: CPT

## 2022-01-10 PROCEDURE — 97140 MANUAL THERAPY 1/> REGIONS: CPT

## 2022-01-10 NOTE — PROGRESS NOTES
Physical Therapy Daily Treatment Note     Name: Soha Wheatley  Clinic Number: [id number]    Therapy Diagnosis:   Encounter Diagnoses   Name Primary?    Decreased right shoulder range of motion     Muscle weakness of right upper extremity      Physician: Darryl Zamora PA-C    Visit Date: 1/10/2022  Physician Orders: PT Eval and Treat  Medical Diagnosis from Referral: M75.101 (ICD-10-CM) - Nontraumatic tear of right rotator cuff, unspecified tear extent  Evaluation Date: 6/28/2021  Authorization Period Expiration: 12/31/2021  Plan of Care Expiration: 12/31/2021  Visit # / Visits authorized: 3 / 20 (42 total)    Time In: 0856  Time Out: 1010  Total Billable Time: 56 minutes    Precautions: Standard     Postop plan for the patient is to follow the large size rotator cuff repair protocol.      DOS: 6/23/2021    Subjective     Pt reports: She is having a little more general joint soreness throughout her body due to the weather she believes. She is noting she can hold her arm overhead longer now.     She was  compliant with home exercise program.  Response to previous treatment: Increased AROM/PROM.   Functional change: increased pain with OH reaching     Pain: 0/10  Location: R shoulder     Objective     Daily Measurements:      9/13/21  Shoulder Passive Range of Motion:   Shoulder Right   Flexion    130   ER at 90    40      9/20/21  ER at 45 abduction: 45         09/23/2021  Shoulder Passive Range of Motion:   Shoulder Right   Flexion    135   ER at 90    50         10/11/21  Shoulder Passive Range of Motion:   Shoulder Right   Flexion    150   ER at 90    55      10/14/21  Shoulder Passive Range of Motion:   Shoulder Right   Flexion    152   ER at 90    57      10/21/21  Shoulder Passive Range of Motion: taken after LLLD stretches with heat and manual therapy mobilizations   Shoulder Right   Flexion    155   ER at 90    60         10/25/2021  Patient able to tolerate R shoulder flexion to ~100-110 deg against  gravity for 2-4 seconds      11/4/2021 - PROM  Shoulder Right   Flexion    155   ER     62      11/8/2021  AROM R shoulder flexion 150 (L side is 165)     11/11/2021  PROM R shoulder external rotation 65 deg  PROM R shoulder flexion 160     11/23/21:        Shoulder Active Range of Motion:   Shoulder Right Left   Flexion    Pre-Tx: 125     Post-Tx: 135 140   ER at 0    40 50      Shoulder Passive Range of Motion:   Shoulder Right Left   Flexion    160 165   ER at 30    50 60       12/10/21:  MMT L Shoulder  ER at 0: 4-/5  Scaption at 90: 3+/5    1/3/2021:    Shoulder Active Range of Motion:   Shoulder Right Left   Flexion    150    160   ER at 0    50 55       Daily Treatment       Soha received therapeutic exercises to develop strength, endurance, ROM and flexibility for 29 minutes including:  Supine cane ER AAROM x30  Supine UE (B) TD's YTB at wrists 3x12  S/L ER 3# 3x12  LT isometric walk outs with [place] TB 3x12    Soha received the following manual therapy techniques: were applied to the: L shoulder for 12 minutes, including:  Posterior/inferior humeral glides, PROM all planes, oscillation   Hold/relax post cuff release w/PT  Re-assessment    Soha participated in neuromuscular re-education activities to improve: Coordination, Kinesthetic, Sense, Proprioception and Motor Control for 23 minutes. The following activities were included:  SA Table slide w/LT lift off 4x5  LT setting with UE lift off at table. 2x10  UE flexion at mirror for visual feedback for UT compensation 2x10    NT:  Prone LT Lvl 2 Re-Ed 3x8  Prone T MT Re-ed 3x8          Home Exercises and Patient Education Provided     Education provided:   - Reviewed HEP.     Written Home Exercises Provided: Patient instructed to cont prior HEP.  Exercises were reviewed and Soha was able to demonstrate them prior to the end of the session.  Soha demonstrated good  understanding of the education provided.     See EMR under patient instructions for  exercises given.     Assessment     Pt continued to demo mild UT dominant patter with UE elevation which improved with today's progressions focusing on LT and SA control and activation. Patient now demo's 90% symmetry with AROM elevation and was able to maintain 85 degrees ER at 90 degrees abduction with PROM today. Interventions progressed as described above to reflect her current level of function.      Soha is progressing well towards her goals.     Pt will continue to benefit from skilled outpatient physical therapy to address the deficits listed in the problem list box on initial evaluation, provide pt/family education and to maximize pt's level of independence in the home and community environment. Pt prognosis is Good.     Pt's spiritual, cultural and educational needs considered and pt agreeable to plan of care and goals.    Anticipated barriers to physical therapy: None    Goals:  Short Term Goals: 8 weeks  1. Pt will be compliant with HEP 50% of prescribed amount. (Met)  2. The pt to demo improvement in R shoulder PROM to by 80% of the L shoulder. (Met)  3.  The pt to demo tolerance to being out of the sling for 24 hours with pain <2/10 (Met)    Long Term Goals: 24 weeks   1. Pt will be compliant with % of prescribed amount. (Met)  2. The pt to improvement in AROM of R shoulder within 80% of L shoulder to improve tolerance to OH movement. (met)  3. The pt to  Demo at least 4+/5 of RTC muscle testing to demo improvement in tolerance to activity  4. The pt to tolerate lifting 5# overhead to improve tolerance to ADLs and work related activities   5. The pt will report full participation in ADLs and IADLs without restrictions related to R Shoulder.       Plan     Re-assess pt ability to maintain ER PROM next visit and consider weaning from LLLD ER stretching if appropriate. Will progress scapular re-education as appropriate.       Artur Patel, PT, DPT    Co-treated with Alena Penaloza, SPT-3    I  certify that I was present in the room directing the student in service delivery and guiding them using my skilled judgment. As the co-signing therapist I have reviewed the students documentation and am responsible for the treatment, assessment, and plan.

## 2022-01-13 ENCOUNTER — CLINICAL SUPPORT (OUTPATIENT)
Dept: REHABILITATION | Facility: HOSPITAL | Age: 67
End: 2022-01-13
Payer: COMMERCIAL

## 2022-01-13 DIAGNOSIS — M25.611 DECREASED RIGHT SHOULDER RANGE OF MOTION: ICD-10-CM

## 2022-01-13 DIAGNOSIS — M62.81 MUSCLE WEAKNESS OF RIGHT UPPER EXTREMITY: ICD-10-CM

## 2022-01-13 PROCEDURE — 97140 MANUAL THERAPY 1/> REGIONS: CPT

## 2022-01-13 PROCEDURE — 97110 THERAPEUTIC EXERCISES: CPT

## 2022-01-13 PROCEDURE — 97112 NEUROMUSCULAR REEDUCATION: CPT

## 2022-01-13 NOTE — PROGRESS NOTES
"  Physical Therapy Daily Treatment Note     Name: Soha Wheatley  Clinic Number: [id number]    Therapy Diagnosis:   Encounter Diagnoses   Name Primary?    Decreased right shoulder range of motion     Muscle weakness of right upper extremity      Physician: Darryl Zamora PA-C    Visit Date: 1/13/2022  Physician Orders: PT Eval and Treat  Medical Diagnosis from Referral: M75.101 (ICD-10-CM) - Nontraumatic tear of right rotator cuff, unspecified tear extent  Evaluation Date: 6/28/2021  Authorization Period Expiration: 12/31/2021  Plan of Care Expiration: 12/31/2021  Visit # / Visits authorized: 4 / 20 (44 total)    Time In: 0758  Time Out:   Total Billable Time:  minutes    Precautions: Standard     Postop plan for the patient is to follow the large size rotator cuff repair protocol.      DOS: 6/23/2021    Subjective     Pt reports: "I closed the tail gait on my SUV for the first time yesterday and it didn't hurt."    She was  compliant with home exercise program.  Response to previous treatment: Increased AROM/PROM.   Functional change: increased pain with OH reaching     Pain: 0/10  Location: R shoulder     Objective     Daily Measurements:      9/13/21  Shoulder Passive Range of Motion:   Shoulder Right   Flexion    130   ER at 90    40      9/20/21  ER at 45 abduction: 45         09/23/2021  Shoulder Passive Range of Motion:   Shoulder Right   Flexion    135   ER at 90    50         10/11/21  Shoulder Passive Range of Motion:   Shoulder Right   Flexion    150   ER at 90    55      10/14/21  Shoulder Passive Range of Motion:   Shoulder Right   Flexion    152   ER at 90    57      10/21/21  Shoulder Passive Range of Motion: taken after LLLD stretches with heat and manual therapy mobilizations   Shoulder Right   Flexion    155   ER at 90    60         10/25/2021  Patient able to tolerate R shoulder flexion to ~100-110 deg against gravity for 2-4 seconds      11/4/2021 - PROM  Shoulder Right   Flexion    155 "   ER     62      11/8/2021  AROM R shoulder flexion 150 (L side is 165)     11/11/2021  PROM R shoulder external rotation 65 deg  PROM R shoulder flexion 160     11/23/21:        Shoulder Active Range of Motion:   Shoulder Right Left   Flexion    Pre-Tx: 125     Post-Tx: 135 140   ER at 0    40 50      Shoulder Passive Range of Motion:   Shoulder Right Left   Flexion    160 165   ER at 30    50 60       12/10/21:  MMT L Shoulder  ER at 0: 4-/5  Scaption at 90: 3+/5    1/3/2021:    Shoulder Active Range of Motion:   Shoulder Right Left   Flexion    150    160   ER at 0    50 55       Daily Treatment       Soha received therapeutic exercises to develop strength, endurance, ROM and flexibility for 29 minutes including:  UBE FWD/BWD x8 minutes   Supine cane ER AAROM x30  Supine UE (B) TD's YTB at wrists 3x12  S/L ER 3# 3x12  LT isometric walk outs with [place] TB 3x12    Soha received the following manual therapy techniques: were applied to the: L shoulder for 12 minutes, including:  Posterior/inferior humeral glides, PROM all planes, oscillation   Hold/relax post cuff release w/PT  Re-assessment    Soha participated in neuromuscular re-education activities to improve: Coordination, Kinesthetic, Sense, Proprioception and Motor Control for 23 minutes. The following activities were included:  Prone LT Lvl 1 re-ed [id number]   Prone MT Lvl 1 re-ed [id number]   SA Table slide w/LT lift off 2x10  UE flexion at mirror for visual feedback for UT compensation 2x10      Home Exercises and Patient Education Provided     Education provided:   - Reviewed HEP.     Written Home Exercises Provided: Patient instructed to cont prior HEP.  Exercises were reviewed and Soha was able to demonstrate them prior to the end of the session.  Soha demonstrated good  understanding of the education provided.     See EMR under patient instructions for exercises given.     Assessment     Patient tolerated exercises today further targeting LT, MT, and  SA today well with no adverse effects reported. She continues to require cueing for decreased UT initiation with UE elevation, but responds well and quickly adjust movement pattern. She was able to maintain UE elevation and ER AROM/PROM, which improved throughout session. She reports feeling good following treatment session, and is pleased with her progress.      Soha is progressing well towards her goals.     Pt will continue to benefit from skilled outpatient physical therapy to address the deficits listed in the problem list box on initial evaluation, provide pt/family education and to maximize pt's level of independence in the home and community environment. Pt prognosis is Good.     Pt's spiritual, cultural and educational needs considered and pt agreeable to plan of care and goals.    Anticipated barriers to physical therapy: None    Goals:  Short Term Goals: 8 weeks  1. Pt will be compliant with HEP 50% of prescribed amount. (Met)  2. The pt to demo improvement in R shoulder PROM to by 80% of the L shoulder. (Met)  3.  The pt to demo tolerance to being out of the sling for 24 hours with pain <2/10 (Met)    Long Term Goals: 24 weeks   1. Pt will be compliant with % of prescribed amount. (Met)  2. The pt to improvement in AROM of R shoulder within 80% of L shoulder to improve tolerance to OH movement. (met)  3. The pt to  Demo at least 4+/5 of RTC muscle testing to demo improvement in tolerance to activity  4. The pt to tolerate lifting 5# overhead to improve tolerance to ADLs and work related activities   5. The pt will report full participation in ADLs and IADLs without restrictions related to R Shoulder.       Plan     Re-assess pt ability to maintain ER PROM next visit and consider weaning from LLLD ER stretching if appropriate. Will progress scapular re-education as appropriate.       Artur Patel, PT, DPT    Co-treated with Alena Penaloza, SPT-3    I certify that I was present in the room  directing the student in service delivery and guiding them using my skilled judgment. As the co-signing therapist I have reviewed the students documentation and am responsible for the treatment, assessment, and plan.

## 2022-01-18 ENCOUNTER — CLINICAL SUPPORT (OUTPATIENT)
Dept: REHABILITATION | Facility: HOSPITAL | Age: 67
End: 2022-01-18
Payer: COMMERCIAL

## 2022-01-18 ENCOUNTER — PATIENT MESSAGE (OUTPATIENT)
Dept: ADMINISTRATIVE | Facility: HOSPITAL | Age: 67
End: 2022-01-18
Payer: COMMERCIAL

## 2022-01-18 DIAGNOSIS — M25.611 DECREASED RIGHT SHOULDER RANGE OF MOTION: ICD-10-CM

## 2022-01-18 DIAGNOSIS — M62.81 MUSCLE WEAKNESS OF RIGHT UPPER EXTREMITY: ICD-10-CM

## 2022-01-18 PROCEDURE — 97110 THERAPEUTIC EXERCISES: CPT

## 2022-01-18 PROCEDURE — 97112 NEUROMUSCULAR REEDUCATION: CPT

## 2022-01-18 NOTE — PROGRESS NOTES
Physical Therapy Daily Treatment Note     Name: Soha Wheatley  Clinic Number: [id number]    Therapy Diagnosis:   Encounter Diagnoses   Name Primary?    Decreased right shoulder range of motion     Muscle weakness of right upper extremity      Physician: Darryl Zamora PA-C    Visit Date: 1/18/2022  Physician Orders: PT Eval and Treat  Medical Diagnosis from Referral: M75.101 (ICD-10-CM) - Nontraumatic tear of right rotator cuff, unspecified tear extent  Evaluation Date: 6/28/2021  Authorization Period Expiration: 12/31/2021  Plan of Care Expiration: 12/31/2021  Visit # / Visits authorized: 4 / 20 (44 total)    Time In: 0955  Time Out: 1105  Total Billable Time: 55 minutes    Precautions: Standard     Postop plan for the patient is to follow the large size rotator cuff repair protocol.      DOS: 6/23/2021    Subjective     Pt reports: Her shoulder is a little sore but she has been using it more to do activity such as cooking and cleaning this weekend.     She was  compliant with home exercise program.  Response to previous treatment: Increased AROM/PROM.   Functional change: increased pain with OH reaching     Pain: 0/10  Location: R shoulder     Objective     Daily Measurements:      9/13/21  Shoulder Passive Range of Motion:   Shoulder Right   Flexion    130   ER at 90    40      9/20/21  ER at 45 abduction: 45         09/23/2021  Shoulder Passive Range of Motion:   Shoulder Right   Flexion    135   ER at 90    50         10/11/21  Shoulder Passive Range of Motion:   Shoulder Right   Flexion    150   ER at 90    55      10/14/21  Shoulder Passive Range of Motion:   Shoulder Right   Flexion    152   ER at 90    57      10/21/21  Shoulder Passive Range of Motion: taken after LLLD stretches with heat and manual therapy mobilizations   Shoulder Right   Flexion    155   ER at 90    60         10/25/2021  Patient able to tolerate R shoulder flexion to ~100-110 deg against gravity for 2-4 seconds      11/4/2021 -  "PROM  Shoulder Right   Flexion    155   ER     62      11/8/2021  AROM R shoulder flexion 150 (L side is 165)     11/11/2021  PROM R shoulder external rotation 65 deg  PROM R shoulder flexion 160     11/23/21:        Shoulder Active Range of Motion:   Shoulder Right Left   Flexion    Pre-Tx: 125     Post-Tx: 135 140   ER at 0    40 50      Shoulder Passive Range of Motion:   Shoulder Right Left   Flexion    160 165   ER at 30    50 60       12/10/21:  MMT L Shoulder  ER at 0: 4-/5  Scaption at 90: 3+/5    1/3/2021:    Shoulder Active Range of Motion:   Shoulder Right Left   Flexion    150    160   ER at 0    50 55       Daily Treatment       Soha received therapeutic exercises to develop strength, endurance, ROM and flexibility for 34 minutes including:  UBE FWD/BWD x8 minutes   Supine cane ER AAROM x30  Supine UE (B) TD's YTB at wrists 3x12  S/L ER 3# 3x12  Prayer Lat str 3x30"      Soha received the following manual therapy techniques: were applied to the: L shoulder for 6 minutes, including:  Posterior/inferior humeral glides, PROM all planes, oscillation   Hold/relax post cuff release w/PT  Re-assessment      Soha participated in neuromuscular re-education activities to improve: Coordination, Kinesthetic, Sense, Proprioception and Motor Control for 25 minutes. The following activities were included:  LT Setting w/flexion, UE supported 3x10  SA Table slide w/LT lift off 2x10  Assisted UE flexion UT re-education w/cable 10# 2x12        Home Exercises and Patient Education Provided     Education provided:   - Reviewed HEP.     Written Home Exercises Provided: Patient instructed to cont prior HEP.  Exercises were reviewed and Soha was able to demonstrate them prior to the end of the session.  Soha demonstrated good  understanding of the education provided.     See EMR under patient instructions for exercises given.     Assessment     Patient continues to demo difficulty avoiding UT/levator compensation with UE " elevation. Periscapular re-education modified to good effect with patient jack'ing improved scapular biomechanics following today's interventions. Patient jack's good carryover of ER PROM from previous visit.      Soha is progressing well towards her goals.     Pt will continue to benefit from skilled outpatient physical therapy to address the deficits listed in the problem list box on initial evaluation, provide pt/family education and to maximize pt's level of independence in the home and community environment. Pt prognosis is Good.     Pt's spiritual, cultural and educational needs considered and pt agreeable to plan of care and goals.    Anticipated barriers to physical therapy: None    Goals:  Short Term Goals: 8 weeks  1. Pt will be compliant with HEP 50% of prescribed amount. (Met)  2. The pt to demo improvement in R shoulder PROM to by 80% of the L shoulder. (Met)  3.  The pt to demo tolerance to being out of the sling for 24 hours with pain <2/10 (Met)    Long Term Goals: 24 weeks   1. Pt will be compliant with % of prescribed amount. (Met)  2. The pt to improvement in AROM of R shoulder within 80% of L shoulder to improve tolerance to OH movement. (met)  3. The pt to  Demo at least 4+/5 of RTC muscle testing to demo improvement in tolerance to activity  4. The pt to tolerate lifting 5# overhead to improve tolerance to ADLs and work related activities   5. The pt will report full participation in ADLs and IADLs without restrictions related to R Shoulder.       Plan     Continue with emphasis of improving scapular control to improve scapulohumeral biomechanics and restore normalized function of the UE.       Artur Patel, PT, DPT    Co-treated with Alena Penaloza, SPT-3    I certify that I was present in the room directing the student in service delivery and guiding them using my skilled judgment. As the co-signing therapist I have reviewed the students documentation and am responsible for the  treatment, assessment, and plan.

## 2022-01-19 ENCOUNTER — LAB VISIT (OUTPATIENT)
Dept: PRIMARY CARE CLINIC | Facility: CLINIC | Age: 67
End: 2022-01-19
Payer: COMMERCIAL

## 2022-01-19 DIAGNOSIS — Z20.822 CONTACT WITH AND (SUSPECTED) EXPOSURE TO COVID-19: ICD-10-CM

## 2022-01-19 LAB
CTP QC/QA: YES
SARS-COV-2 AG RESP QL IA.RAPID: NEGATIVE

## 2022-01-19 PROCEDURE — 87811 SARS-COV-2 COVID19 W/OPTIC: CPT

## 2022-01-20 ENCOUNTER — CLINICAL SUPPORT (OUTPATIENT)
Dept: REHABILITATION | Facility: HOSPITAL | Age: 67
End: 2022-01-20
Payer: COMMERCIAL

## 2022-01-20 DIAGNOSIS — M62.81 MUSCLE WEAKNESS OF RIGHT UPPER EXTREMITY: ICD-10-CM

## 2022-01-20 DIAGNOSIS — M25.611 DECREASED RIGHT SHOULDER RANGE OF MOTION: ICD-10-CM

## 2022-01-20 PROCEDURE — 97110 THERAPEUTIC EXERCISES: CPT

## 2022-01-20 PROCEDURE — 97112 NEUROMUSCULAR REEDUCATION: CPT

## 2022-01-20 PROCEDURE — 97140 MANUAL THERAPY 1/> REGIONS: CPT

## 2022-01-20 NOTE — PROGRESS NOTES
Physical Therapy Daily Treatment Note     Name: Soha Wheatley  Clinic Number: [id number]    Therapy Diagnosis:   Encounter Diagnoses   Name Primary?    Decreased right shoulder range of motion     Muscle weakness of right upper extremity      Physician: Darryl Zamora PA-C    Visit Date: 1/20/2022  Physician Orders: PT Eval and Treat  Medical Diagnosis from Referral: M75.101 (ICD-10-CM) - Nontraumatic tear of right rotator cuff, unspecified tear extent  Evaluation Date: 6/28/2021  Authorization Period Expiration: 12/31/2021  Plan of Care Expiration: 12/31/2021  Visit # / Visits authorized: 6 / 20 (45 total)    Time In: 1000  Time Out: 1105  Total Billable Time: 55 minutes    Precautions: Standard     Postop plan for the patient is to follow the large size rotator cuff repair protocol.      DOS: 6/23/2021    Subjective     Pt reports: Her shoulder is a little sore and stiff today from increased work at home and cooking recently. She reports using her shoulder more often lately.     She was  compliant with home exercise program.  Response to previous treatment: Increased AROM/PROM.   Functional change: increased pain with OH reaching     Pain: 0/10  Location: R shoulder     Objective     Daily Measurements:      9/13/21  Shoulder Passive Range of Motion:   Shoulder Right   Flexion    130   ER at 90    40      9/20/21  ER at 45 abduction: 45         09/23/2021  Shoulder Passive Range of Motion:   Shoulder Right   Flexion    135   ER at 90    50         10/11/21  Shoulder Passive Range of Motion:   Shoulder Right   Flexion    150   ER at 90    55      10/14/21  Shoulder Passive Range of Motion:   Shoulder Right   Flexion    152   ER at 90    57      10/21/21  Shoulder Passive Range of Motion: taken after LLLD stretches with heat and manual therapy mobilizations   Shoulder Right   Flexion    155   ER at 90    60         10/25/2021  Patient able to tolerate R shoulder flexion to ~100-110 deg against gravity for  "2-4 seconds      11/4/2021 - PROM  Shoulder Right   Flexion    155   ER     62      11/8/2021  AROM R shoulder flexion 150 (L side is 165)     11/11/2021  PROM R shoulder external rotation 65 deg  PROM R shoulder flexion 160     11/23/21:        Shoulder Active Range of Motion:   Shoulder Right Left   Flexion    Pre-Tx: 125     Post-Tx: 135 140   ER at 0    40 50      Shoulder Passive Range of Motion:   Shoulder Right Left   Flexion    160 165   ER at 30    50 60       12/10/21:  MMT L Shoulder  ER at 0: 4-/5  Scaption at 90: 3+/5    1/3/2021:    Shoulder Active Range of Motion:   Shoulder Right Left   Flexion    150    160   ER at 0    50 55       Daily Treatment       Soha received therapeutic exercises to develop strength, endurance, ROM and flexibility for 30 minutes including:  SA Wall slides 3x10  Supine UE (B) TD's YTB at wrists 3x12  Prayer Lat str 3x30" on stool   90/90 wall supported ER 3x10      Soha received the following manual therapy techniques: were applied to the: L shoulder for 10 minutes, including:  Posterior/inferior humeral glides, PROM all planes, oscillation   Hold/relax post cuff release w/PT  Hold/relax Lat stretch release w/PT  Re-assessment      Soha participated in neuromuscular re-education activities to improve: Coordination, Kinesthetic, Sense, Proprioception and Motor Control for 15 minutes. The following activities were included:   PNF S/L shoulder elevation and scapular assist w/PT x5 minutes   PNF S/L shoulder elevation and scapular AAROM w/PT x5 mintues   S/L shoulder elevation w/ dowel assist 2x15        Home Exercises and Patient Education Provided     Education provided:   - Reviewed HEP.     Written Home Exercises Provided: Patient instructed to cont prior HEP.  Exercises were reviewed and Soha was able to demonstrate them prior to the end of the session.  Soha demonstrated good  understanding of the education provided.     See EMR under patient instructions for " exercises given.     Assessment     Patient demonstrated great carryover from previous treatments with active shoulder elevation ~150 degrees with no adverse effects. She continues to present with difficulty with wall slides with lift off suggestive of decreased RTC stability above 90 degrees. She continues to show improvements with avoiding UT/levator compensation with UE elevation. She reports feeling better following session able to achieve ~160 of active shoulder flexion.      Soha is progressing well towards her goals.     Pt will continue to benefit from skilled outpatient physical therapy to address the deficits listed in the problem list box on initial evaluation, provide pt/family education and to maximize pt's level of independence in the home and community environment. Pt prognosis is Good.     Pt's spiritual, cultural and educational needs considered and pt agreeable to plan of care and goals.    Anticipated barriers to physical therapy: None    Goals:  Short Term Goals: 8 weeks  1. Pt will be compliant with HEP 50% of prescribed amount. (Met)  2. The pt to demo improvement in R shoulder PROM to by 80% of the L shoulder. (Met)  3.  The pt to demo tolerance to being out of the sling for 24 hours with pain <2/10 (Met)    Long Term Goals: 24 weeks   1. Pt will be compliant with % of prescribed amount. (Met)  2. The pt to improvement in AROM of R shoulder within 80% of L shoulder to improve tolerance to OH movement. (met)  3. The pt to  Demo at least 4+/5 of RTC muscle testing to demo improvement in tolerance to activity  4. The pt to tolerate lifting 5# overhead to improve tolerance to ADLs and work related activities   5. The pt will report full participation in ADLs and IADLs without restrictions related to R Shoulder.       Plan     Continue with emphasis of improving scapular control to improve scapulohumeral biomechanics and restore normalized function of the UE.       Artur Patel, PT,  DPT    Co-treated with Alena Penaloza, SPT-3    I certify that I was present in the room directing the student in service delivery and guiding them using my skilled judgment. As the co-signing therapist I have reviewed the students documentation and am responsible for the treatment, assessment, and plan.

## 2022-01-24 ENCOUNTER — CLINICAL SUPPORT (OUTPATIENT)
Dept: REHABILITATION | Facility: HOSPITAL | Age: 67
End: 2022-01-24
Payer: COMMERCIAL

## 2022-01-24 DIAGNOSIS — M62.81 MUSCLE WEAKNESS OF RIGHT UPPER EXTREMITY: ICD-10-CM

## 2022-01-24 DIAGNOSIS — M25.611 DECREASED RIGHT SHOULDER RANGE OF MOTION: ICD-10-CM

## 2022-01-24 PROCEDURE — 97110 THERAPEUTIC EXERCISES: CPT

## 2022-01-24 PROCEDURE — 97140 MANUAL THERAPY 1/> REGIONS: CPT

## 2022-01-24 PROCEDURE — 97112 NEUROMUSCULAR REEDUCATION: CPT

## 2022-01-24 NOTE — PROGRESS NOTES
Physical Therapy Daily Treatment Note     Name: Soha Wheatley  Clinic Number: [id number]    Therapy Diagnosis:   Encounter Diagnoses   Name Primary?    Decreased right shoulder range of motion     Muscle weakness of right upper extremity      Physician: Darryl Zamora PA-C    Visit Date: 1/24/2022  Physician Orders: PT Eval and Treat  Medical Diagnosis from Referral: M75.101 (ICD-10-CM) - Nontraumatic tear of right rotator cuff, unspecified tear extent  Evaluation Date: 6/28/2021  Authorization Period Expiration: 12/31/2021  Plan of Care Expiration: 12/31/2021  Visit # / Visits authorized: 7 / 20 (47 total)    Time In: 0858  Time Out: 1001  Total Billable Time: 54 minutes    Precautions: Standard     Postop plan for the patient is to follow the large size rotator cuff repair protocol.      DOS: 6/23/2021    Subjective     Pt reports: her shoulder is a little sore after a weekend spent cooking more than usual. She reports being able to use it more causing a little increased stiffness.     She was compliant with home exercise program.  Response to previous treatment: Increased AROM/PROM.   Functional change: increased pain with OH reaching     Pain: 0/10  Location: R shoulder     Objective     Daily Measurements:      9/13/21  Shoulder Passive Range of Motion:   Shoulder Right   Flexion    130   ER at 90    40      9/20/21  ER at 45 abduction: 45         09/23/2021  Shoulder Passive Range of Motion:   Shoulder Right   Flexion    135   ER at 90    50         10/11/21  Shoulder Passive Range of Motion:   Shoulder Right   Flexion    150   ER at 90    55      10/14/21  Shoulder Passive Range of Motion:   Shoulder Right   Flexion    152   ER at 90    57      10/21/21  Shoulder Passive Range of Motion: taken after LLLD stretches with heat and manual therapy mobilizations   Shoulder Right   Flexion    155   ER at 90    60         10/25/2021  Patient able to tolerate R shoulder flexion to ~100-110 deg against gravity  "for 2-4 seconds      11/4/2021 - PROM  Shoulder Right   Flexion    155   ER     62      11/8/2021  AROM R shoulder flexion 150 (L side is 165)     11/11/2021  PROM R shoulder external rotation 65 deg  PROM R shoulder flexion 160     11/23/21:        Shoulder Active Range of Motion:   Shoulder Right Left   Flexion    Pre-Tx: 125     Post-Tx: 135 140   ER at 0    40 50      Shoulder Passive Range of Motion:   Shoulder Right Left   Flexion    160 165   ER at 30    50 60       12/10/21:  MMT L Shoulder  ER at 0: 4-/5  Scaption at 90: 3+/5    1/3/2021:    Shoulder Active Range of Motion:   Shoulder Right Left   Flexion    150    160   ER at 0    50 55       1/24/2021:    Shoulder Active Range of Motion:   Shoulder Right Left   Flexion    165    165   ER at 0    55 60       Daily Treatment       Soha received therapeutic exercises to develop strength, endurance, ROM and flexibility for 34 minutes including:  SA Wall slides 3x10  Supine UE (B) TD's YTB at wrists 3x12  Prayer Lat str 4x30" on stool   90/90 wall supported ER 3x10  LT step backs w/ green TB 3x10      Soha received the following manual therapy techniques: were applied to the: L shoulder for 8 minutes, including:  Posterior/inferior humeral glides, PROM all planes, oscillation   Hold/relax Lat stretch release w/PT  Re-assessment      Soha participated in neuromuscular re-education activities to improve: Coordination, Kinesthetic, Sense, Proprioception and Motor Control for 20 minutes. The following activities were included:   PNF S/L shoulder elevation and scapular assist w/PT x5 minutes   PNF S/L shoulder elevation and scapular AAROM w/PT x5 mintues   S/L shoulder elevation w/ dowel assist 2x15        Home Exercises and Patient Education Provided     Education provided:   - Reviewed HEP.     Written Home Exercises Provided: Patient instructed to cont prior HEP.  Exercises were reviewed and Soha was able to demonstrate them prior to the end of the session.  " Soha demonstrated good  understanding of the education provided.     See EMR under patient instructions for exercises given.     Assessment     Soha demonstrates good carryover with shoulder flexion after the weekend able to maintain ~160. She demonstrated significant improvements with shoulder elevation following Lat stretching and manual techniques able to achieve ~165 actively. She continues to present with difficulty with wall slides and lift off, but shows improvements in LT control with step backs. She reports feeling tired following exercises today.      Soha is progressing well towards her goals.     Pt will continue to benefit from skilled outpatient physical therapy to address the deficits listed in the problem list box on initial evaluation, provide pt/family education and to maximize pt's level of independence in the home and community environment. Pt prognosis is Good.     Pt's spiritual, cultural and educational needs considered and pt agreeable to plan of care and goals.    Anticipated barriers to physical therapy: None    Goals:  Short Term Goals: 8 weeks  1. Pt will be compliant with HEP 50% of prescribed amount. (Met)  2. The pt to demo improvement in R shoulder PROM to by 80% of the L shoulder. (Met)  3.  The pt to demo tolerance to being out of the sling for 24 hours with pain <2/10 (Met)    Long Term Goals: 24 weeks   1. Pt will be compliant with % of prescribed amount. (Met)  2. The pt to improvement in AROM of R shoulder within 80% of L shoulder to improve tolerance to OH movement. (met)  3. The pt to  Demo at least 4+/5 of RTC muscle testing to demo improvement in tolerance to activity  4. The pt to tolerate lifting 5# overhead to improve tolerance to ADLs and work related activities   5. The pt will report full participation in ADLs and IADLs without restrictions related to R Shoulder.       Plan     Continue with emphasis of improving scapular control to improve scapulohumeral  biomechanics and restore normalized function of the UE.       Artur Patel, PT, DPT    Co-treated with Alena Penaloza, SPT-3    I certify that I was present in the room directing the student in service delivery and guiding them using my skilled judgment. As the co-signing therapist I have reviewed the students documentation and am responsible for the treatment, assessment, and plan.

## 2022-01-26 DIAGNOSIS — Z12.31 OTHER SCREENING MAMMOGRAM: ICD-10-CM

## 2022-01-27 ENCOUNTER — CLINICAL SUPPORT (OUTPATIENT)
Dept: REHABILITATION | Facility: HOSPITAL | Age: 67
End: 2022-01-27
Payer: COMMERCIAL

## 2022-01-27 DIAGNOSIS — M62.81 MUSCLE WEAKNESS OF RIGHT UPPER EXTREMITY: ICD-10-CM

## 2022-01-27 DIAGNOSIS — M25.611 DECREASED RIGHT SHOULDER RANGE OF MOTION: ICD-10-CM

## 2022-01-27 PROCEDURE — 97110 THERAPEUTIC EXERCISES: CPT

## 2022-01-27 PROCEDURE — 97140 MANUAL THERAPY 1/> REGIONS: CPT

## 2022-01-27 NOTE — PROGRESS NOTES
Physical Therapy Daily Treatment Note     Name: Soha Wheatley  Clinic Number: [id number]    Therapy Diagnosis:   Encounter Diagnoses   Name Primary?    Decreased right shoulder range of motion     Muscle weakness of right upper extremity      Physician: Darryl Zamora PA-C    Visit Date: 1/27/2022  Physician Orders: PT Eval and Treat  Medical Diagnosis from Referral: M75.101 (ICD-10-CM) - Nontraumatic tear of right rotator cuff, unspecified tear extent  Evaluation Date: 6/28/2021  Authorization Period Expiration: 12/31/2021  Plan of Care Expiration: 12/31/2021  Visit # / Visits authorized: 8 / 20 (48 total)    Time In: 1004  Time Out: 1107  Total Billable Time: 54 minutes    Precautions: Standard     Postop plan for the patient is to follow the large size rotator cuff repair protocol.      DOS: 6/23/2021    Subjective     Pt reports: her shoulder is feeling about the same since previous treatment session. Reports feeling more irritable and stiff secondary to lack of sleep last night.     She was compliant with home exercise program.  Response to previous treatment: Increased AROM/PROM.   Functional change: increased pain with OH reaching     Pain: 0/10  Location: R shoulder     Objective     Daily Measurements:      9/13/21  Shoulder Passive Range of Motion:   Shoulder Right   Flexion    130   ER at 90    40      9/20/21  ER at 45 abduction: 45         09/23/2021  Shoulder Passive Range of Motion:   Shoulder Right   Flexion    135   ER at 90    50         10/11/21  Shoulder Passive Range of Motion:   Shoulder Right   Flexion    150   ER at 90    55      10/14/21  Shoulder Passive Range of Motion:   Shoulder Right   Flexion    152   ER at 90    57      10/21/21  Shoulder Passive Range of Motion: taken after LLLD stretches with heat and manual therapy mobilizations   Shoulder Right   Flexion    155   ER at 90    60         10/25/2021  Patient able to tolerate R shoulder flexion to ~100-110 deg against gravity  "for 2-4 seconds      2021 - PROM  Shoulder Right   Flexion    155   ER     62      2021  AROM R shoulder flexion 150 (L side is 165)     2021  PROM R shoulder external rotation 65 deg  PROM R shoulder flexion 160     21:        Shoulder Active Range of Motion:   Shoulder Right Left   Flexion    Pre-Tx: 125     Post-Tx: 135 140   ER at 0    40 50      Shoulder Passive Range of Motion:   Shoulder Right Left   Flexion    160 165   ER at 30    50 60       12/10/21:  MMT L Shoulder  ER at 0: 4-/5  Scaption at 90: 3+/5    1/3/2021:    Shoulder Active Range of Motion:   Shoulder Right Left   Flexion    150    160   ER at 0    50 55       2021:    Shoulder Active Range of Motion:   Shoulder Right Left   Flexion    165    165   ER at 0    55 60       Daily Treatment       Soha received therapeutic exercises to develop strength, endurance, ROM and flexibility for 48 minutes includin/4 UBE for ROM and increased blood flow for pain relief   SA Wall slides 3x10  Supine UE (B) TD's YTB at wrists 3x12  Prayer Lat str 4x30" on stool   Seated UE flexion cable assist #7 3x12   S/L ER 3#  3x12  Prone I's 1# 3x12    Soha received the following manual therapy techniques: were applied to the: L shoulder for 10 minutes, including:  Posterior/inferior humeral glides, PROM all planes, oscillation   Hold/relax Lat stretch release w/PT  Re-assessment    Soha participated in neuromuscular re-education activities to improve: Coordination, Kinesthetic, Sense, Proprioception and Motor Control for 00 minutes. The following activities were included:         Home Exercises and Patient Education Provided     Education provided:   - Reviewed HEP.     Written Home Exercises Provided: Patient instructed to cont prior HEP.  Exercises were reviewed and Soha was able to demonstrate them prior to the end of the session.  Soha demonstrated good  understanding of the education provided.     See EMR under patient " instructions for exercises given.     Assessment     Soha demonstrates good carryover with shoulder flexion after the weekend able to maintain ~160 despite increased irritability secondary to lack of sleep. She continues to present with difficulty maintaining appropriate muscle contractions during prone Is, but with cuing accurately corrects suggesting impairments in muscle performance. She reports feeling tired, but better following session.      Soha is progressing well towards her goals.     Pt will continue to benefit from skilled outpatient physical therapy to address the deficits listed in the problem list box on initial evaluation, provide pt/family education and to maximize pt's level of independence in the home and community environment. Pt prognosis is Good.     Pt's spiritual, cultural and educational needs considered and pt agreeable to plan of care and goals.    Anticipated barriers to physical therapy: None    Goals:  Short Term Goals: 8 weeks  1. Pt will be compliant with HEP 50% of prescribed amount. (Met)  2. The pt to demo improvement in R shoulder PROM to by 80% of the L shoulder. (Met)  3.  The pt to demo tolerance to being out of the sling for 24 hours with pain <2/10 (Met)    Long Term Goals: 24 weeks   1. Pt will be compliant with % of prescribed amount. (Met)  2. The pt to improvement in AROM of R shoulder within 80% of L shoulder to improve tolerance to OH movement. (met)  3. The pt to  Demo at least 4+/5 of RTC muscle testing to demo improvement in tolerance to activity  4. The pt to tolerate lifting 5# overhead to improve tolerance to ADLs and work related activities   5. The pt will report full participation in ADLs and IADLs without restrictions related to R Shoulder.       Plan     Continue with emphasis of improving scapular control to improve scapulohumeral biomechanics and restore normalized function of the UE.       Artur Patel, PT, DPT    Co-treated with Alena Penaloza,  SPT-3    I certify that I was present in the room directing the student in service delivery and guiding them using my skilled judgment. As the co-signing therapist I have reviewed the students documentation and am responsible for the treatment, assessment, and plan.

## 2022-01-31 ENCOUNTER — CLINICAL SUPPORT (OUTPATIENT)
Dept: REHABILITATION | Facility: HOSPITAL | Age: 67
End: 2022-01-31
Payer: COMMERCIAL

## 2022-01-31 DIAGNOSIS — M25.611 DECREASED RIGHT SHOULDER RANGE OF MOTION: ICD-10-CM

## 2022-01-31 DIAGNOSIS — M62.81 MUSCLE WEAKNESS OF RIGHT UPPER EXTREMITY: ICD-10-CM

## 2022-01-31 PROCEDURE — 97140 MANUAL THERAPY 1/> REGIONS: CPT

## 2022-01-31 PROCEDURE — 97110 THERAPEUTIC EXERCISES: CPT

## 2022-01-31 NOTE — PROGRESS NOTES
Physical Therapy Daily Treatment Note     Name: Soha Wheatley  Clinic Number: [id number]    Therapy Diagnosis:   Encounter Diagnoses   Name Primary?    Decreased right shoulder range of motion     Muscle weakness of right upper extremity      Physician: Darryl Zamora PA-C    Visit Date: 1/31/2022  Physician Orders: PT Eval and Treat  Medical Diagnosis from Referral: M75.101 (ICD-10-CM) - Nontraumatic tear of right rotator cuff, unspecified tear extent  Evaluation Date: 6/28/2021  Authorization Period Expiration: 12/31/2021  Plan of Care Expiration: 12/31/2021  Visit # / Visits authorized: 9 / 20 (49 total)    Time In: 1300  Time Out: 1404  Total Billable Time: 46 minutes    Precautions: Standard     Postop plan for the patient is to follow the large size rotator cuff repair protocol.      DOS: 6/23/2021    Subjective     Pt reports: her shoulder is feeling about the same since previous treatment session. Reports feeling more irritable and stiff secondary to lack of sleep last night.     She was compliant with home exercise program.  Response to previous treatment: Increased AROM/PROM.   Functional change: increased pain with OH reaching     Pain: 0/10  Location: R shoulder     Objective     Daily Measurements:      9/13/21  Shoulder Passive Range of Motion:   Shoulder Right   Flexion    130   ER at 90    40      9/20/21  ER at 45 abduction: 45         09/23/2021  Shoulder Passive Range of Motion:   Shoulder Right   Flexion    135   ER at 90    50         10/11/21  Shoulder Passive Range of Motion:   Shoulder Right   Flexion    150   ER at 90    55      10/14/21  Shoulder Passive Range of Motion:   Shoulder Right   Flexion    152   ER at 90    57      10/21/21  Shoulder Passive Range of Motion: taken after LLLD stretches with heat and manual therapy mobilizations   Shoulder Right   Flexion    155   ER at 90    60         10/25/2021  Patient able to tolerate R shoulder flexion to ~100-110 deg against gravity  "for 2-4 seconds      2021 - PROM  Shoulder Right   Flexion    155   ER     62      2021  AROM R shoulder flexion 150 (L side is 165)     2021  PROM R shoulder external rotation 65 deg  PROM R shoulder flexion 160     21:        Shoulder Active Range of Motion:   Shoulder Right Left   Flexion    Pre-Tx: 125     Post-Tx: 135 140   ER at 0    40 50      Shoulder Passive Range of Motion:   Shoulder Right Left   Flexion    160 165   ER at 30    50 60       12/10/21:  MMT L Shoulder  ER at 0: 4-/5  Scaption at 90: 3+/5    1/3/2021:    Shoulder Active Range of Motion:   Shoulder Right Left   Flexion    150    160   ER at 0    50 55       2021:    Shoulder Active Range of Motion:   Shoulder Right Left   Flexion    165    165   ER at 0    55 60       Daily Treatment       Soha received therapeutic exercises to develop strength, endurance, ROM and flexibility for 42 minutes includin/4 UBE for ROM and increased blood flow for pain relief   Supine UE (B) TD's YTB at wrists 3x12  Prayer Lat str 4x30" on stool   Seated UE flexion cable assist #10 3x12   S/L ER 3#  3x12  Prone I's 1# 3x12  Standing Cane ER str at 0 x20    Soha received the following manual therapy techniques: were applied to the: L shoulder for 15 minutes, including:  Posterior/inferior humeral glides, PROM all planes, oscillation   Hold/relax Lat stretch release w/PT  Re-assessment    Soha participated in neuromuscular re-education activities to improve: Coordination, Kinesthetic, Sense, Proprioception and Motor Control for 00 minutes. The following activities were included:         Home Exercises and Patient Education Provided     Education provided:   - Reviewed HEP.     Written Home Exercises Provided: Patient instructed to cont prior HEP.  Exercises were reviewed and Soha was able to demonstrate them prior to the end of the session.  Soha demonstrated good  understanding of the education provided.     See EMR under patient " instructions for exercises given.     Assessment     Soha demonstrates improvements in OH motion, able to maintain ~160 with decreased UT initiation. She continues to present with limited active ER, but improvements were shown with standing ER exercises. She tolerated progressions made with weight during prone Is, able to maintain contraction better with little cueing necessary.        Soha is progressing well towards her goals.     Pt will continue to benefit from skilled outpatient physical therapy to address the deficits listed in the problem list box on initial evaluation, provide pt/family education and to maximize pt's level of independence in the home and community environment. Pt prognosis is Good.     Pt's spiritual, cultural and educational needs considered and pt agreeable to plan of care and goals.    Anticipated barriers to physical therapy: None    Goals:  Short Term Goals: 8 weeks  1. Pt will be compliant with HEP 50% of prescribed amount. (Met)  2. The pt to demo improvement in R shoulder PROM to by 80% of the L shoulder. (Met)  3.  The pt to demo tolerance to being out of the sling for 24 hours with pain <2/10 (Met)    Long Term Goals: 24 weeks   1. Pt will be compliant with % of prescribed amount. (Met)  2. The pt to improvement in AROM of R shoulder within 80% of L shoulder to improve tolerance to OH movement. (met)  3. The pt to  Demo at least 4+/5 of RTC muscle testing to demo improvement in tolerance to activity  4. The pt to tolerate lifting 5# overhead to improve tolerance to ADLs and work related activities   5. The pt will report full participation in ADLs and IADLs without restrictions related to R Shoulder.       Plan     Continue with emphasis of improving scapular control to improve scapulohumeral biomechanics and restore normalized function of the UE.       Artur Patel, PT, DPT    Co-treated with Alena Penaloza, SPT-3    I certify that I was present in the room directing the  student in service delivery and guiding them using my skilled judgment. As the co-signing therapist I have reviewed the students documentation and am responsible for the treatment, assessment, and plan.

## 2022-02-03 ENCOUNTER — CLINICAL SUPPORT (OUTPATIENT)
Dept: REHABILITATION | Facility: HOSPITAL | Age: 67
End: 2022-02-03
Payer: COMMERCIAL

## 2022-02-03 DIAGNOSIS — M25.611 DECREASED RIGHT SHOULDER RANGE OF MOTION: ICD-10-CM

## 2022-02-03 DIAGNOSIS — M62.81 MUSCLE WEAKNESS OF RIGHT UPPER EXTREMITY: ICD-10-CM

## 2022-02-03 PROCEDURE — 97112 NEUROMUSCULAR REEDUCATION: CPT

## 2022-02-03 PROCEDURE — 97110 THERAPEUTIC EXERCISES: CPT

## 2022-02-03 PROCEDURE — 97140 MANUAL THERAPY 1/> REGIONS: CPT

## 2022-02-03 NOTE — PROGRESS NOTES
Physical Therapy Daily Treatment Note     Name: Soha Wheatley  Clinic Number: [id number]    Therapy Diagnosis:   Encounter Diagnoses   Name Primary?    Decreased right shoulder range of motion     Muscle weakness of right upper extremity      Physician: Darryl Zamora PA-C    Visit Date: 2/3/2022  Physician Orders: PT Eval and Treat  Medical Diagnosis from Referral: M75.101 (ICD-10-CM) - Nontraumatic tear of right rotator cuff, unspecified tear extent  Evaluation Date: 6/28/2021  Authorization Period Expiration: 12/31/2021  Plan of Care Expiration: 12/31/2021  Visit # / Visits authorized: 10 / 20 (50 total)    Time In: 1101  Time Out: 1203  Total Billable Time: 56 minutes    Precautions: Standard     Postop plan for the patient is to follow the large size rotator cuff repair protocol.      DOS: 6/23/2021    Subjective     Pt reports: Her shoulder feels a little stiff due to the cold weather.     She was compliant with home exercise program.  Response to previous treatment: Increased AROM/PROM.   Functional change: increased pain with OH reaching     Pain: 0/10  Location: R shoulder     Objective     Daily Measurements:      9/13/21  Shoulder Passive Range of Motion:   Shoulder Right   Flexion    130   ER at 90    40      9/20/21  ER at 45 abduction: 45         09/23/2021  Shoulder Passive Range of Motion:   Shoulder Right   Flexion    135   ER at 90    50         10/11/21  Shoulder Passive Range of Motion:   Shoulder Right   Flexion    150   ER at 90    55      10/14/21  Shoulder Passive Range of Motion:   Shoulder Right   Flexion    152   ER at 90    57      10/21/21  Shoulder Passive Range of Motion: taken after LLLD stretches with heat and manual therapy mobilizations   Shoulder Right   Flexion    155   ER at 90    60         10/25/2021  Patient able to tolerate R shoulder flexion to ~100-110 deg against gravity for 2-4 seconds      11/4/2021 - PROM  Shoulder Right   Flexion    155   ER     62  "     11/8/2021  AROM R shoulder flexion 150 (L side is 165)     11/11/2021  PROM R shoulder external rotation 65 deg  PROM R shoulder flexion 160     11/23/21:        Shoulder Active Range of Motion:   Shoulder Right Left   Flexion    Pre-Tx: 125     Post-Tx: 135 140   ER at 0    40 50      Shoulder Passive Range of Motion:   Shoulder Right Left   Flexion    160 165   ER at 30    50 60       12/10/21:  MMT L Shoulder  ER at 0: 4-/5  Scaption at 90: 3+/5    1/3/2021:    Shoulder Active Range of Motion:   Shoulder Right Left   Flexion    150    160   ER at 0    50 55       1/24/2021:    Shoulder Active Range of Motion:   Shoulder Right Left   Flexion    165    165   ER at 0    55 60       Daily Treatment       Soha received therapeutic exercises to develop strength, endurance, ROM and flexibility for 37 minutes including:  Standing I/Y/T 2x10 ea.   S/L ER 3# 3x15  Prone Ext 4# 3x12  Supine Gravity assisted Flexion 2# 2x12  Seated Lat stretch 3x30"    Soha received the following manual therapy techniques: were applied to the: L shoulder for 17 minutes, including:  Posterior/inferior humeral glides, PROM all planes, oscillation   Re-assessment  Manual Cuff facilitation w/PT  Inferior GH glide III-IV at ER and 90 degrees abduction and EROM flexion     Soha participated in neuromuscular re-education activities to improve: Coordination, Kinesthetic, Sense, Proprioception and Motor Control for 8 minutes. The following activities were included:   SA Wall slides 2# cuff 3x10        Home Exercises and Patient Education Provided     Education provided:   - Reviewed HEP.     Written Home Exercises Provided: Patient instructed to cont prior HEP.  Exercises were reviewed and Soha was able to demonstrate them prior to the end of the session.  Soha demonstrated good  understanding of the education provided.     See EMR under patient instructions for exercises given.     Assessment     Pt demo'd increased stiffness and slight " loss of PROM flexion compared to previous visit. This improved to full PROM flexion to 170 degrees with mild tightness reported following manual techniques. Intro'd deltoid strengthening as patient demo's ability to perform forward/scaption/lateral raises without deltoid overpowering the cuff today.      Soha is progressing well towards her goals.     Pt will continue to benefit from skilled outpatient physical therapy to address the deficits listed in the problem list box on initial evaluation, provide pt/family education and to maximize pt's level of independence in the home and community environment. Pt prognosis is Good.     Pt's spiritual, cultural and educational needs considered and pt agreeable to plan of care and goals.    Anticipated barriers to physical therapy: None    Goals:  Short Term Goals: 8 weeks  1. Pt will be compliant with HEP 50% of prescribed amount. (Met)  2. The pt to demo improvement in R shoulder PROM to by 80% of the L shoulder. (Met)  3.  The pt to demo tolerance to being out of the sling for 24 hours with pain <2/10 (Met)    Long Term Goals: 24 weeks   1. Pt will be compliant with % of prescribed amount. (Met)  2. The pt to improvement in AROM of R shoulder within 80% of L shoulder to improve tolerance to OH movement. (met)  3. The pt to  Demo at least 4+/5 of RTC muscle testing to demo improvement in tolerance to activity  4. The pt to tolerate lifting 5# overhead to improve tolerance to ADLs and work related activities   5. The pt will report full participation in ADLs and IADLs without restrictions related to R Shoulder.       Plan     Continue per massive cuff repair protocol. Assess for strengthening progression next week.       Artur Patel, PT, DPT    Co-treated with Alena Penaloza, SPT-3    I certify that I was present in the room directing the student in service delivery and guiding them using my skilled judgment. As the co-signing therapist I have reviewed the  students documentation and am responsible for the treatment, assessment, and plan.

## 2022-02-07 ENCOUNTER — CLINICAL SUPPORT (OUTPATIENT)
Dept: REHABILITATION | Facility: HOSPITAL | Age: 67
End: 2022-02-07
Payer: COMMERCIAL

## 2022-02-07 DIAGNOSIS — M62.81 MUSCLE WEAKNESS OF RIGHT UPPER EXTREMITY: ICD-10-CM

## 2022-02-07 DIAGNOSIS — M25.611 DECREASED RIGHT SHOULDER RANGE OF MOTION: ICD-10-CM

## 2022-02-07 PROCEDURE — 97110 THERAPEUTIC EXERCISES: CPT

## 2022-02-07 PROCEDURE — 97140 MANUAL THERAPY 1/> REGIONS: CPT

## 2022-02-07 PROCEDURE — 97112 NEUROMUSCULAR REEDUCATION: CPT

## 2022-02-07 NOTE — PROGRESS NOTES
Physical Therapy Daily Treatment Note     Name: Soha Wheatley  Clinic Number: [id number]    Therapy Diagnosis:   Encounter Diagnoses   Name Primary?    Decreased right shoulder range of motion     Muscle weakness of right upper extremity      Physician: Darryl Zamora PA-C    Visit Date: 2/7/2022  Physician Orders: PT Eval and Treat  Medical Diagnosis from Referral: M75.101 (ICD-10-CM) - Nontraumatic tear of right rotator cuff, unspecified tear extent  Evaluation Date: 6/28/2021  Authorization Period Expiration: 12/31/2021  Plan of Care Expiration: 12/31/2021  Visit # / Visits authorized: 11 / 20 (51 total)    Time In: 0950  Time Out: 1058  Total Billable Time: 54 minutes    Precautions: Standard     Postop plan for the patient is to follow the large size rotator cuff repair protocol.      DOS: 6/23/2021    Subjective     Pt reports: Her shoulder feels a little stiff due to the cold weather.     She was compliant with home exercise program.  Response to previous treatment: Increased AROM/PROM.   Functional change: increased pain with OH reaching     Pain: 0/10  Location: R shoulder     Objective     Daily Measurements:      9/13/21  Shoulder Passive Range of Motion:   Shoulder Right   Flexion    130   ER at 90    40      9/20/21  ER at 45 abduction: 45         09/23/2021  Shoulder Passive Range of Motion:   Shoulder Right   Flexion    135   ER at 90    50         10/11/21  Shoulder Passive Range of Motion:   Shoulder Right   Flexion    150   ER at 90    55      10/14/21  Shoulder Passive Range of Motion:   Shoulder Right   Flexion    152   ER at 90    57      10/21/21  Shoulder Passive Range of Motion: taken after LLLD stretches with heat and manual therapy mobilizations   Shoulder Right   Flexion    155   ER at 90    60         10/25/2021  Patient able to tolerate R shoulder flexion to ~100-110 deg against gravity for 2-4 seconds      11/4/2021 - PROM  Shoulder Right   Flexion    155   ER     62  "     11/8/2021  AROM R shoulder flexion 150 (L side is 165)     11/11/2021  PROM R shoulder external rotation 65 deg  PROM R shoulder flexion 160     11/23/21:        Shoulder Active Range of Motion:   Shoulder Right Left   Flexion    Pre-Tx: 125     Post-Tx: 135 140   ER at 0    40 50      Shoulder Passive Range of Motion:   Shoulder Right Left   Flexion    160 165   ER at 30    50 60       12/10/21:  MMT L Shoulder  ER at 0: 4-/5  Scaption at 90: 3+/5    1/3/2021:    Shoulder Active Range of Motion:   Shoulder Right Left   Flexion    150    160   ER at 0    50 55       1/24/2021:    Shoulder Active Range of Motion:   Shoulder Right Left   Flexion    165    165   ER at 0    55 60       Daily Treatment       Soha received therapeutic exercises to develop strength, endurance, ROM and flexibility for 35 minutes including:  Standing I/Y/T 3x10 ea.    S/L ER 3# 3x15  Prone Ext 4# 3x12  Supine Gravity assisted Flexion 2# 2x12  Seated Lat stretch 4x30"    Soha received the following manual therapy techniques: were applied to the: L shoulder for 17 minutes, including:  Posterior/inferior humeral glides, PROM all planes, oscillation   Re-assessment  Manual Cuff facilitation w/PT  Inferior GH glide III-IV at ER and 90 degrees abduction and EROM flexion     Soha participated in neuromuscular re-education activities to improve: Coordination, Kinesthetic, Sense, Proprioception and Motor Control for 8 minutes. The following activities were included:   SA Wall slides 2# cuff 3x10        Home Exercises and Patient Education Provided     Education provided:   - Reviewed HEP.     Written Home Exercises Provided: Patient instructed to cont prior HEP.  Exercises were reviewed and Soha was able to demonstrate them prior to the end of the session.  Soha demonstrated good  understanding of the education provided.     See EMR under patient instructions for exercises given.     Assessment     Soha demonstrated increased stiffness and " slight loss of PROM flexion over the weekend, but improvements were noted with warm up and pulleys. Continued to tolerate deltoid and periscapular strengthening with no adverse effects. She was able to tolerate progressions in weight today with shoulder extension suggestive of enhanced muscle performance.      Soha is progressing well towards her goals.     Pt will continue to benefit from skilled outpatient physical therapy to address the deficits listed in the problem list box on initial evaluation, provide pt/family education and to maximize pt's level of independence in the home and community environment. Pt prognosis is Good.     Pt's spiritual, cultural and educational needs considered and pt agreeable to plan of care and goals.    Anticipated barriers to physical therapy: None    Goals:  Short Term Goals: 8 weeks  1. Pt will be compliant with HEP 50% of prescribed amount. (Met)  2. The pt to demo improvement in R shoulder PROM to by 80% of the L shoulder. (Met)  3.  The pt to demo tolerance to being out of the sling for 24 hours with pain <2/10 (Met)    Long Term Goals: 24 weeks   1. Pt will be compliant with % of prescribed amount. (Met)  2. The pt to improvement in AROM of R shoulder within 80% of L shoulder to improve tolerance to OH movement. (met)  3. The pt to  Demo at least 4+/5 of RTC muscle testing to demo improvement in tolerance to activity  4. The pt to tolerate lifting 5# overhead to improve tolerance to ADLs and work related activities   5. The pt will report full participation in ADLs and IADLs without restrictions related to R Shoulder.       Plan     Continue per POC for massive cuff repair with emphasis on prime  and local stabilizer strengthening with progressions as appropriate. Will continue progressive loading with wall slides to improve ability to perform overhead tasks as tolerated by patient.       Artur Patel, PT, DPT    Co-treated with Alena Penaloza, SPT-3    I  certify that I was present in the room directing the student in service delivery and guiding them using my skilled judgment. As the co-signing therapist I have reviewed the students documentation and am responsible for the treatment, assessment, and plan.

## 2022-02-10 ENCOUNTER — CLINICAL SUPPORT (OUTPATIENT)
Dept: REHABILITATION | Facility: HOSPITAL | Age: 67
End: 2022-02-10
Payer: COMMERCIAL

## 2022-02-10 DIAGNOSIS — M25.611 DECREASED RIGHT SHOULDER RANGE OF MOTION: ICD-10-CM

## 2022-02-10 DIAGNOSIS — M62.81 MUSCLE WEAKNESS OF RIGHT UPPER EXTREMITY: ICD-10-CM

## 2022-02-10 PROCEDURE — 97112 NEUROMUSCULAR REEDUCATION: CPT

## 2022-02-10 PROCEDURE — 97110 THERAPEUTIC EXERCISES: CPT

## 2022-02-10 PROCEDURE — 97140 MANUAL THERAPY 1/> REGIONS: CPT

## 2022-02-10 NOTE — PROGRESS NOTES
Physical Therapy Daily Treatment Note     Name: Soha Wheatley  Clinic Number: [id number]    Therapy Diagnosis:   No diagnosis found.  Physician: Darryl Zamora PA-C    Visit Date: 2/10/2022  Physician Orders: PT Eval and Treat  Medical Diagnosis from Referral: M75.101 (ICD-10-CM) - Nontraumatic tear of right rotator cuff, unspecified tear extent  Evaluation Date: 6/28/2021  Authorization Period Expiration: 12/31/2021  Plan of Care Expiration: 12/31/2021  Visit # / Visits authorized: 12 / 20 (52 total)    Time In: 1101  Time Out: 1200  Total Billable Time: 59 minutes    Precautions: Standard     Postop plan for the patient is to follow the large size rotator cuff repair protocol.      DOS: 6/23/2021    Subjective     Pt reports: her shoulder is feeling better. She was able to blow dry her hair with no pain, which she was pleased with.     She was compliant with home exercise program.  Response to previous treatment: Increased AROM/PROM.   Functional change: increased pain with OH reaching     Pain: 0/10  Location: R shoulder     Objective     Daily Measurements:      9/13/21  Shoulder Passive Range of Motion:   Shoulder Right   Flexion    130   ER at 90    40      9/20/21  ER at 45 abduction: 45         09/23/2021  Shoulder Passive Range of Motion:   Shoulder Right   Flexion    135   ER at 90    50         10/11/21  Shoulder Passive Range of Motion:   Shoulder Right   Flexion    150   ER at 90    55      10/14/21  Shoulder Passive Range of Motion:   Shoulder Right   Flexion    152   ER at 90    57      10/21/21  Shoulder Passive Range of Motion: taken after LLLD stretches with heat and manual therapy mobilizations   Shoulder Right   Flexion    155   ER at 90    60         10/25/2021  Patient able to tolerate R shoulder flexion to ~100-110 deg against gravity for 2-4 seconds      11/4/2021 - PROM  Shoulder Right   Flexion    155   ER     62      11/8/2021  AROM R shoulder flexion 150 (L side is  165)     11/11/2021  PROM R shoulder external rotation 65 deg  PROM R shoulder flexion 160     11/23/21:        Shoulder Active Range of Motion:   Shoulder Right Left   Flexion    Pre-Tx: 125     Post-Tx: 135 140   ER at 0    40 50      Shoulder Passive Range of Motion:   Shoulder Right Left   Flexion    160 165   ER at 30    50 60       12/10/21:  MMT L Shoulder  ER at 0: 4-/5  Scaption at 90: 3+/5    1/3/2021:    Shoulder Active Range of Motion:   Shoulder Right Left   Flexion    150    160   ER at 0    50 55       1/24/2022:    Shoulder Active Range of Motion:   Shoulder Right Left   Flexion    165    165   ER at 0    55 60       Daily Treatment       Soha received therapeutic exercises to develop strength, endurance, ROM and flexibility for 35 minutes including:  YTB touchdowns 3x12   Supine Gravity assisted Flexion 2# 2x12  GTB LT step back 3x10   Standing 90/90 ER 3x10  Standing I/Y/T 3x10 ea.    S/L ER 3# 3x15      Soha received the following manual therapy techniques: were applied to the: L shoulder for 15 minutes, including:  Posterior/inferior humeral glides, PROM all planes, oscillation   Re-assessment  Manual Cuff facilitation w/PT  Inferior GH glide III-IV at ER and 90 degrees abduction and EROM flexion     Soha participated in neuromuscular re-education activities to improve: Coordination, Kinesthetic, Sense, Proprioception and Motor Control for 8 minutes. The following activities were included:   SA Wall slides 2# cuff 3x10        Home Exercises and Patient Education Provided     Education provided:   - Reviewed HEP.     Written Home Exercises Provided: Patient instructed to cont prior HEP.  Exercises were reviewed and Soha was able to demonstrate them prior to the end of the session.  Soha demonstrated good  understanding of the education provided.     See EMR under patient instructions for exercises given.     Assessment     Soha continues to demonstrate excellent carryover in shoulder flexion  and ER with decrease symptom provocation today. She demonstrates continual LT weakness unable to perform lift off today, but tolerated step backs well with no adverse effects. She was able to perform 90/90 ER with better mechanics today suggestive of increase muscle performance. Continues to tolerate RTC targeted strengthening with no complaints.      Soha is progressing well towards her goals.     Pt will continue to benefit from skilled outpatient physical therapy to address the deficits listed in the problem list box on initial evaluation, provide pt/family education and to maximize pt's level of independence in the home and community environment. Pt prognosis is Good.     Pt's spiritual, cultural and educational needs considered and pt agreeable to plan of care and goals.    Anticipated barriers to physical therapy: None    Goals:  Short Term Goals: 8 weeks  1. Pt will be compliant with HEP 50% of prescribed amount. (Met)  2. The pt to demo improvement in R shoulder PROM to by 80% of the L shoulder. (Met)  3.  The pt to demo tolerance to being out of the sling for 24 hours with pain <2/10 (Met)    Long Term Goals: 24 weeks   1. Pt will be compliant with % of prescribed amount. (Met)  2. The pt to improvement in AROM of R shoulder within 80% of L shoulder to improve tolerance to OH movement. (met)  3. The pt to  Demo at least 4+/5 of RTC muscle testing to demo improvement in tolerance to activity  4. The pt to tolerate lifting 5# overhead to improve tolerance to ADLs and work related activities   5. The pt will report full participation in ADLs and IADLs without restrictions related to R Shoulder.       Plan     Continue per POC for massive cuff repair with emphasis on prime  and local stabilizer strengthening with progressions as appropriate. Will continue progressive loading with wall slides to improve ability to perform overhead tasks as tolerated by patient.       Artur Patel, PT,  DPT    Co-treated with Alena Penaloza, SPT-3    I certify that I was present in the room directing the student in service delivery and guiding them using my skilled judgment. As the co-signing therapist I have reviewed the students documentation and am responsible for the treatment, assessment, and plan.

## 2022-02-14 ENCOUNTER — CLINICAL SUPPORT (OUTPATIENT)
Dept: REHABILITATION | Facility: HOSPITAL | Age: 67
End: 2022-02-14
Payer: COMMERCIAL

## 2022-02-14 DIAGNOSIS — M25.611 DECREASED RIGHT SHOULDER RANGE OF MOTION: ICD-10-CM

## 2022-02-14 DIAGNOSIS — M62.81 MUSCLE WEAKNESS OF RIGHT UPPER EXTREMITY: ICD-10-CM

## 2022-02-14 PROCEDURE — 97110 THERAPEUTIC EXERCISES: CPT

## 2022-02-14 PROCEDURE — 97140 MANUAL THERAPY 1/> REGIONS: CPT

## 2022-02-14 PROCEDURE — 97112 NEUROMUSCULAR REEDUCATION: CPT

## 2022-02-14 NOTE — PROGRESS NOTES
Physical Therapy Daily Treatment Note     Name: Soha Wheatley  Clinic Number: [id number]    Therapy Diagnosis:   Encounter Diagnoses   Name Primary?    Decreased right shoulder range of motion     Muscle weakness of right upper extremity      Physician: Darryl Zamora PA-C    Visit Date: 2/14/2022  Physician Orders: PT Eval and Treat  Medical Diagnosis from Referral: M75.101 (ICD-10-CM) - Nontraumatic tear of right rotator cuff, unspecified tear extent  Evaluation Date: 6/28/2021  Authorization Period Expiration: 12/31/2021  Plan of Care Expiration: 12/31/2021  Visit # / Visits authorized: 13 / 20 (53 total)    Time In: 1101  Time Out: 1159  Total Billable Time: 57 minutes    Precautions: Standard     Postop plan for the patient is to follow the large size rotator cuff repair protocol.      DOS: 6/23/2021    Subjective     Pt reports: Her shoulder is doing well. She is still working to get the last little bit of flexion. She reports some mild pain and tightness about her SC and AC region when performing repeated OH movements.     She was compliant with home exercise program.  Response to previous treatment: Increased AROM/PROM.   Functional change: N/A    Pain: 0/10  Location: R shoulder     Objective     Daily Measurements:      9/13/21  Shoulder Passive Range of Motion:   Shoulder Right   Flexion    130   ER at 90    40      9/20/21  ER at 45 abduction: 45         09/23/2021  Shoulder Passive Range of Motion:   Shoulder Right   Flexion    135   ER at 90    50         10/11/21  Shoulder Passive Range of Motion:   Shoulder Right   Flexion    150   ER at 90    55      10/14/21  Shoulder Passive Range of Motion:   Shoulder Right   Flexion    152   ER at 90    57      10/21/21  Shoulder Passive Range of Motion: taken after LLLD stretches with heat and manual therapy mobilizations   Shoulder Right   Flexion    155   ER at 90    60         10/25/2021  Patient able to tolerate R shoulder flexion to ~100-110 deg  against gravity for 2-4 seconds      11/4/2021 - PROM  Shoulder Right   Flexion    155   ER     62      11/8/2021  AROM R shoulder flexion 150 (L side is 165)     11/11/2021  PROM R shoulder external rotation 65 deg  PROM R shoulder flexion 160     11/23/21:        Shoulder Active Range of Motion:   Shoulder Right Left   Flexion    Pre-Tx: 125     Post-Tx: 135 140   ER at 0    40 50      Shoulder Passive Range of Motion:   Shoulder Right Left   Flexion    160 165   ER at 30    50 60       12/10/21:  MMT L Shoulder  ER at 0: 4-/5  Scaption at 90: 3+/5    1/3/2021:    Shoulder Active Range of Motion:   Shoulder Right Left   Flexion    150    160   ER at 0    50 55       1/24/2022:    Shoulder Active Range of Motion:   Shoulder Right Left   Flexion    165    165   ER at 0    55 60       Daily Treatment       Soha received therapeutic exercises to develop strength, endurance, ROM and flexibility for 25 minutes including:  UBE completed for 4/4 min forward & backward to increase ROM, endurance and decrease pain to improve tolerance to ADLs and age related activities.   S/L ER 3# 3x15  Standing I/Y/T 3x10 ea.  1#        Soha received the following manual therapy techniques: were applied to the: L shoulder for 15 minutes, including:  Posterior/inferior humeral glides, PROM all planes, oscillation   Re-assessment  Manual Cuff facilitation w/PT  Clavicular oscillations  AC joint mobs Grd III      Soha participated in neuromuscular re-education activities to improve: Coordination, Kinesthetic, Sense, Proprioception and Motor Control for 17 minutes. The following activities were included:   S/L Scapular PNF RI/Hold Relax 3x12   S/L OTB Resisted UE flexion w/LT activation 4x6        Home Exercises and Patient Education Provided     Education provided:   - Reviewed HEP.     Written Home Exercises Provided: Patient instructed to cont prior HEP.  Exercises were reviewed and Soha was able to demonstrate them prior to the end of  the session.  Soha demonstrated good  understanding of the education provided.     See EMR under patient instructions for exercises given.     Assessment     Capsular and accessory mobility of GH region continues to be maintained well with full PROM ER and flexion compared to contralateral side. Continued difficulty with functional EROM UE flexion with reports of mild SC and AC region discomfort and tightness. This improved following manual techniques.      Soha is progressing well towards her goals.     Pt will continue to benefit from skilled outpatient physical therapy to address the deficits listed in the problem list box on initial evaluation, provide pt/family education and to maximize pt's level of independence in the home and community environment. Pt prognosis is Good.     Pt's spiritual, cultural and educational needs considered and pt agreeable to plan of care and goals.    Anticipated barriers to physical therapy: None    Goals:  Short Term Goals: 8 weeks  1. Pt will be compliant with HEP 50% of prescribed amount. (Met)  2. The pt to demo improvement in R shoulder PROM to by 80% of the L shoulder. (Met)  3.  The pt to demo tolerance to being out of the sling for 24 hours with pain <2/10 (Met)    Long Term Goals: 24 weeks   1. Pt will be compliant with % of prescribed amount. (Met)  2. The pt to improvement in AROM of R shoulder within 80% of L shoulder to improve tolerance to OH movement. (met)  3. The pt to  Demo at least 4+/5 of RTC muscle testing to demo improvement in tolerance to activity  4. The pt to tolerate lifting 5# overhead to improve tolerance to ADLs and work related activities   5. The pt will report full participation in ADLs and IADLs without restrictions related to R Shoulder.       Plan     Continue per POC for massive cuff repair with emphasis on prime  and local stabilizer strengthening with progressions as appropriate. Will continue progressive loading with wall slides  to improve ability to perform overhead tasks as tolerated by patient.       Artur Patel, PT, DPT    Co-treated with Alena Penaloza, SPT-3    I certify that I was present in the room directing the student in service delivery and guiding them using my skilled judgment. As the co-signing therapist I have reviewed the students documentation and am responsible for the treatment, assessment, and plan.

## 2022-02-16 RX ORDER — GABAPENTIN 600 MG/1
600 TABLET ORAL 3 TIMES DAILY
Qty: 270 TABLET | Refills: 4 | Status: SHIPPED | OUTPATIENT
Start: 2022-02-16 | End: 2023-03-01 | Stop reason: SDUPTHER

## 2022-02-17 ENCOUNTER — CLINICAL SUPPORT (OUTPATIENT)
Dept: REHABILITATION | Facility: HOSPITAL | Age: 67
End: 2022-02-17
Attending: INTERNAL MEDICINE
Payer: COMMERCIAL

## 2022-02-17 DIAGNOSIS — M25.611 DECREASED RIGHT SHOULDER RANGE OF MOTION: ICD-10-CM

## 2022-02-17 DIAGNOSIS — M62.81 MUSCLE WEAKNESS OF RIGHT UPPER EXTREMITY: ICD-10-CM

## 2022-02-17 PROCEDURE — 97110 THERAPEUTIC EXERCISES: CPT

## 2022-02-17 PROCEDURE — 97140 MANUAL THERAPY 1/> REGIONS: CPT

## 2022-02-17 PROCEDURE — 97112 NEUROMUSCULAR REEDUCATION: CPT

## 2022-02-17 NOTE — PROGRESS NOTES
Physical Therapy Daily Treatment Note     Name: Soha Wheatley  Clinic Number: [id number]    Therapy Diagnosis:   Encounter Diagnoses   Name Primary?    Decreased right shoulder range of motion     Muscle weakness of right upper extremity      Physician: Darryl Zamora PA-C    Visit Date: 2/17/2022  Physician Orders: PT Eval and Treat  Medical Diagnosis from Referral: M75.101 (ICD-10-CM) - Nontraumatic tear of right rotator cuff, unspecified tear extent  Evaluation Date: 6/28/2021  Authorization Period Expiration: 12/31/2021  Plan of Care Expiration: 12/31/2021  Visit # / Visits authorized: 14 / 20 (54 total)    Time In: 1101  Time Out: 1159  Total Billable Time: 50 minutes    Precautions: Standard    Postop plan for the patient is to follow the large size rotator cuff repair protocol.      DOS: 6/23/2021    Subjective     Pt reports: some muscle soreness following previous session. She continues to feel stiffness at end range flexion.     She was compliant with home exercise program.  Response to previous treatment: Increased AROM/PROM.   Functional change: N/A    Pain: 0/10  Location: R shoulder     Objective     Daily Measurements:      9/13/21  Shoulder Passive Range of Motion:   Shoulder Right   Flexion    130   ER at 90    40      9/20/21  ER at 45 abduction: 45         09/23/2021  Shoulder Passive Range of Motion:   Shoulder Right   Flexion    135   ER at 90    50         10/11/21  Shoulder Passive Range of Motion:   Shoulder Right   Flexion    150   ER at 90    55      10/14/21  Shoulder Passive Range of Motion:   Shoulder Right   Flexion    152   ER at 90    57      10/21/21  Shoulder Passive Range of Motion: taken after LLLD stretches with heat and manual therapy mobilizations   Shoulder Right   Flexion    155   ER at 90    60         10/25/2021  Patient able to tolerate R shoulder flexion to ~100-110 deg against gravity for 2-4 seconds      11/4/2021 - PROM  Shoulder Right   Flexion    155   ER      62      11/8/2021  AROM R shoulder flexion 150 (L side is 165)     11/11/2021  PROM R shoulder external rotation 65 deg  PROM R shoulder flexion 160     11/23/21:        Shoulder Active Range of Motion:   Shoulder Right Left   Flexion    Pre-Tx: 125     Post-Tx: 135 140   ER at 0    40 50      Shoulder Passive Range of Motion:   Shoulder Right Left   Flexion    160 165   ER at 30    50 60       12/10/21:  MMT L Shoulder  ER at 0: 4-/5  Scaption at 90: 3+/5    1/3/2021:    Shoulder Active Range of Motion:   Shoulder Right Left   Flexion    150    160   ER at 0    50 55       1/24/2022:    Shoulder Active Range of Motion:   Shoulder Right Left   Flexion    165    165   ER at 0    55 60       Daily Treatment       Soha received therapeutic exercises to develop strength, endurance, ROM and flexibility for 21 minutes including:  UBE completed for 4/4 min forward & backward to increase ROM, endurance and decrease pain to improve tolerance to ADLs and age related activities.   GTB LT step backs 2x10  S/L ER 3# 3x10  Prone ext 2# 3x10      Soha received the following manual therapy techniques: were applied to the: L shoulder for 12 minutes, including:  Posterior/inferior humeral glides, PROM all planes, oscillation   Re-assessment  Manual Cuff facilitation w/PT  Clavicular oscillations  AC joint mobs Grd III      Soha participated in neuromuscular re-education activities to improve: Coordination, Kinesthetic, Sense, Proprioception and Motor Control for 21 minutes. The following activities were included:   S/L Scapular PNF RI/Hold Relax 3x12   S/L OTB Resisted UE flexion w/LT activation 4x6  SA wall slides with tactile cues 3x12         Home Exercises and Patient Education Provided     Education provided:   - Reviewed HEP.     Written Home Exercises Provided: Patient instructed to cont prior HEP.  Exercises were reviewed and Soha was able to demonstrate them prior to the end of the session.  Soha demonstrated good   understanding of the education provided.     See EMR under patient instructions for exercises given.     Assessment     Soha continues to present with stiffness in end range UE elevation, but improvements were seen following manual and OH targeted exercises. She continues to demonstrate difficulty with wall slides requiring cueing to decrease UT/deltiod involvement. She continues to tolerate periscapular strengthening well with no adverse effects.     Soha is progressing well towards her goals.     Pt will continue to benefit from skilled outpatient physical therapy to address the deficits listed in the problem list box on initial evaluation, provide pt/family education and to maximize pt's level of independence in the home and community environment. Pt prognosis is Good.     Pt's spiritual, cultural and educational needs considered and pt agreeable to plan of care and goals.    Anticipated barriers to physical therapy: None    Goals:  Short Term Goals: 8 weeks  1. Pt will be compliant with HEP 50% of prescribed amount. (Met)  2. The pt to demo improvement in R shoulder PROM to by 80% of the L shoulder. (Met)  3.  The pt to demo tolerance to being out of the sling for 24 hours with pain <2/10 (Met)    Long Term Goals: 24 weeks   1. Pt will be compliant with % of prescribed amount. (Met)  2. The pt to improvement in AROM of R shoulder within 80% of L shoulder to improve tolerance to OH movement. (met)  3. The pt to  Demo at least 4+/5 of RTC muscle testing to demo improvement in tolerance to activity  4. The pt to tolerate lifting 5# overhead to improve tolerance to ADLs and work related activities   5. The pt will report full participation in ADLs and IADLs without restrictions related to R Shoulder.       Plan     Continue per POC for massive cuff repair with emphasis on prime  and local stabilizer strengthening with progressions as appropriate. Continue to target and progress exercises targeting cuff  stabilization and strength to restore ROM and function.      Artur Patel, PT, DPT    Co-treated with Alena Penaloza, SPT-3    I certify that I was present in the room directing the student in service delivery and guiding them using my skilled judgment. As the co-signing therapist I have reviewed the students documentation and am responsible for the treatment, assessment, and plan.

## 2022-02-21 ENCOUNTER — CLINICAL SUPPORT (OUTPATIENT)
Dept: REHABILITATION | Facility: HOSPITAL | Age: 67
End: 2022-02-21
Payer: COMMERCIAL

## 2022-02-21 DIAGNOSIS — M62.81 MUSCLE WEAKNESS OF RIGHT UPPER EXTREMITY: ICD-10-CM

## 2022-02-21 DIAGNOSIS — M25.611 DECREASED RIGHT SHOULDER RANGE OF MOTION: Primary | ICD-10-CM

## 2022-02-21 PROCEDURE — 97112 NEUROMUSCULAR REEDUCATION: CPT

## 2022-02-21 PROCEDURE — 97140 MANUAL THERAPY 1/> REGIONS: CPT

## 2022-02-21 PROCEDURE — 97110 THERAPEUTIC EXERCISES: CPT

## 2022-02-21 NOTE — PROGRESS NOTES
Physical Therapy Daily Treatment Note     Name: Soha Wheatley  Clinic Number: [id number]    Therapy Diagnosis:   Encounter Diagnoses   Name Primary?    Decreased right shoulder range of motion Yes    Muscle weakness of right upper extremity      Physician: Darryl Zamora PA-C    Visit Date: 2/21/2022  Physician Orders: PT Eval and Treat  Medical Diagnosis from Referral: M75.101 (ICD-10-CM) - Nontraumatic tear of right rotator cuff, unspecified tear extent  Evaluation Date: 6/28/2021  Authorization Period Expiration: 12/31/2021  Plan of Care Expiration: 12/31/2021  Visit # / Visits authorized: 14 / 20 (54 total)    Time In: 0859  Time Out: 1001  Total Billable Time:  53 minutes    Precautions: Standard    Postop plan for the patient is to follow the large size rotator cuff repair protocol.      DOS: 6/23/2021    Subjective     Pt reports: feeling good over the weekend. She continues to have some soreness at end range, but it is slowly getting better.     She was compliant with home exercise program.  Response to previous treatment: Increased AROM/PROM.   Functional change: N/A    Pain: 0/10  Location: R shoulder     Objective     Daily Measurements:      9/13/21  Shoulder Passive Range of Motion:   Shoulder Right   Flexion    130   ER at 90    40      9/20/21  ER at 45 abduction: 45         09/23/2021  Shoulder Passive Range of Motion:   Shoulder Right   Flexion    135   ER at 90    50         10/11/21  Shoulder Passive Range of Motion:   Shoulder Right   Flexion    150   ER at 90    55      10/14/21  Shoulder Passive Range of Motion:   Shoulder Right   Flexion    152   ER at 90    57      10/21/21  Shoulder Passive Range of Motion: taken after LLLD stretches with heat and manual therapy mobilizations   Shoulder Right   Flexion    155   ER at 90    60         10/25/2021  Patient able to tolerate R shoulder flexion to ~100-110 deg against gravity for 2-4 seconds      11/4/2021 - PROM  Shoulder Right    Flexion    155   ER     62      11/8/2021  AROM R shoulder flexion 150 (L side is 165)     11/11/2021  PROM R shoulder external rotation 65 deg  PROM R shoulder flexion 160     11/23/21:        Shoulder Active Range of Motion:   Shoulder Right Left   Flexion    Pre-Tx: 125     Post-Tx: 135 140   ER at 0    40 50      Shoulder Passive Range of Motion:   Shoulder Right Left   Flexion    160 165   ER at 30    50 60       12/10/21:  MMT L Shoulder  ER at 0: 4-/5  Scaption at 90: 3+/5    1/3/2021:    Shoulder Active Range of Motion:   Shoulder Right Left   Flexion    150    160   ER at 0    50 55       1/24/2022:    Shoulder Active Range of Motion:   Shoulder Right Left   Flexion    165    165   ER at 0    55 60       Daily Treatment       Soha received therapeutic exercises to develop strength, endurance, ROM and flexibility for 25 minutes including:  UBE completed for 4/4 min forward & backward to increase ROM, endurance and decrease pain to improve tolerance to ADLs and age related activities.   YTB TDs 3x10  GTB LT step backs 2x10  Ts/Ys/Is 1# 3x10 ea.  90/90 ER 3x10   S/L ER 3# 3x10      Soha received the following manual therapy techniques: were applied to the: L shoulder for 10 minutes, including:  Posterior/inferior humeral glides, PROM all planes, oscillation   Re-assessment  Manual Cuff facilitation w/PT      Soha participated in neuromuscular re-education activities to improve: Coordination, Kinesthetic, Sense, Proprioception and Motor Control for 18 minutes. The following activities were included:   S/L Scapular PNF RI/Hold Relax 3x12   S/L OTB Resisted UE flexion w/LT activation 4x6        Home Exercises and Patient Education Provided     Education provided:   - Reviewed HEP.     Written Home Exercises Provided: Patient instructed to cont prior HEP.  Exercises were reviewed and Soha was able to demonstrate them prior to the end of the session.  Soha demonstrated good  understanding of the education  provided.     See EMR under patient instructions for exercises given.     Assessment     Soha continues to present with great carryover of UE elevation and ER, but continues to c/o of end range stiffness. She continues to show improvements with manual techniques to decrease stiffness and increase ROM. She demonstrates improvements with UT/deltiod involvement during Ts/Ys/Is suggestive of enhanced muscle performance. She tolerated exercises targeting strength with no adverse effects.     Soha is progressing well towards her goals.     Pt will continue to benefit from skilled outpatient physical therapy to address the deficits listed in the problem list box on initial evaluation, provide pt/family education and to maximize pt's level of independence in the home and community environment. Pt prognosis is Good.     Pt's spiritual, cultural and educational needs considered and pt agreeable to plan of care and goals.    Anticipated barriers to physical therapy: None    Goals:  Short Term Goals: 8 weeks  1. Pt will be compliant with HEP 50% of prescribed amount. (Met)  2. The pt to demo improvement in R shoulder PROM to by 80% of the L shoulder. (Met)  3.  The pt to demo tolerance to being out of the sling for 24 hours with pain <2/10 (Met)    Long Term Goals: 24 weeks   1. Pt will be compliant with % of prescribed amount. (Met)  2. The pt to improvement in AROM of R shoulder within 80% of L shoulder to improve tolerance to OH movement. (met)  3. The pt to  Demo at least 4+/5 of RTC muscle testing to demo improvement in tolerance to activity  4. The pt to tolerate lifting 5# overhead to improve tolerance to ADLs and work related activities   5. The pt will report full participation in ADLs and IADLs without restrictions related to R Shoulder.       Plan     Continue per POC for massive cuff repair with emphasis on prime  and local stabilizer strengthening with progressions as appropriate. Continue to target  and progress exercises targeting cuff stabilization and strength to restore ROM and function.      Artur Patel, PT, DPT    Co-treated with Alena Penaloza, SPT-3    I certify that I was present in the room directing the student in service delivery and guiding them using my skilled judgment. As the co-signing therapist I have reviewed the students documentation and am responsible for the treatment, assessment, and plan.

## 2022-02-24 ENCOUNTER — CLINICAL SUPPORT (OUTPATIENT)
Dept: REHABILITATION | Facility: HOSPITAL | Age: 67
End: 2022-02-24
Payer: COMMERCIAL

## 2022-02-24 DIAGNOSIS — M25.611 DECREASED RIGHT SHOULDER RANGE OF MOTION: Primary | ICD-10-CM

## 2022-02-24 DIAGNOSIS — M62.81 MUSCLE WEAKNESS OF RIGHT UPPER EXTREMITY: ICD-10-CM

## 2022-02-24 PROCEDURE — 97140 MANUAL THERAPY 1/> REGIONS: CPT

## 2022-02-24 PROCEDURE — 97110 THERAPEUTIC EXERCISES: CPT

## 2022-02-24 PROCEDURE — 97112 NEUROMUSCULAR REEDUCATION: CPT

## 2022-02-24 NOTE — PROGRESS NOTES
Physical Therapy Daily Treatment Note     Name: Soha Wheatley  Clinic Number: [id number]    Therapy Diagnosis:   Encounter Diagnoses   Name Primary?    Decreased right shoulder range of motion Yes    Muscle weakness of right upper extremity      Physician: Darryl Zamora PA-C    Visit Date: 2/24/2022  Physician Orders: PT Eval and Treat  Medical Diagnosis from Referral: M75.101 (ICD-10-CM) - Nontraumatic tear of right rotator cuff, unspecified tear extent  Evaluation Date: 6/28/2021  Authorization Period Expiration: 12/31/2021  Plan of Care Expiration: 12/31/2021  Visit # / Visits authorized: 14 / 20 (54 total)    Time In: 1101  Time Out: 1159  Total Billable Time:  50 minutes    Precautions: Standard    Postop plan for the patient is to follow the large size rotator cuff repair protocol.      DOS: 6/23/2021    Subjective     Pt reports: feeling a bit sore and stiff today. She continues to report no pain, just stiffness at EROM flexion.     She was compliant with home exercise program.  Response to previous treatment: Increased AROM/PROM.   Functional change: N/A    Pain: 0/10  Location: R shoulder     Objective     Daily Measurements:      9/13/21  Shoulder Passive Range of Motion:   Shoulder Right   Flexion    130   ER at 90    40      9/20/21  ER at 45 abduction: 45         09/23/2021  Shoulder Passive Range of Motion:   Shoulder Right   Flexion    135   ER at 90    50         10/11/21  Shoulder Passive Range of Motion:   Shoulder Right   Flexion    150   ER at 90    55      10/14/21  Shoulder Passive Range of Motion:   Shoulder Right   Flexion    152   ER at 90    57      10/21/21  Shoulder Passive Range of Motion: taken after LLLD stretches with heat and manual therapy mobilizations   Shoulder Right   Flexion    155   ER at 90    60         10/25/2021  Patient able to tolerate R shoulder flexion to ~100-110 deg against gravity for 2-4 seconds      11/4/2021 - PROM  Shoulder Right   Flexion    155   ER      62      11/8/2021  AROM R shoulder flexion 150 (L side is 165)     11/11/2021  PROM R shoulder external rotation 65 deg  PROM R shoulder flexion 160     11/23/21:        Shoulder Active Range of Motion:   Shoulder Right Left   Flexion    Pre-Tx: 125     Post-Tx: 135 140   ER at 0    40 50      Shoulder Passive Range of Motion:   Shoulder Right Left   Flexion    160 165   ER at 30    50 60       12/10/21:  MMT L Shoulder  ER at 0: 4-/5  Scaption at 90: 3+/5    1/3/2021:    Shoulder Active Range of Motion:   Shoulder Right Left   Flexion    150    160   ER at 0    50 55       1/24/2022:    Shoulder Active Range of Motion:   Shoulder Right Left   Flexion    165    165   ER at 0    55 60       Daily Treatment       Soha received therapeutic exercises to develop strength, endurance, ROM and flexibility for 20 minutes including:    UBE completed for 4/4 min forward & backward to increase ROM, endurance and decrease pain to improve tolerance to ADLs and age related activities.   S/L ER 3# 3x10      Soha received the following manual therapy techniques: were applied to the: L shoulder for 10 minutes, including:  Posterior/inferior humeral glides, PROM all planes, oscillation   Re-assessment  Manual Cuff facilitation w/PT      Soha participated in neuromuscular re-education activities to improve: Coordination, Kinesthetic, Sense, Proprioception and Motor Control for 20 minutes. The following activities were included:   mTrigger training S/L Scapular PNF w/ cueing for UT x5 minutes   mTrigger LT table lift offs w/ cueing for UT x5 minutes         Home Exercises and Patient Education Provided     Education provided:   - Reviewed HEP.     Written Home Exercises Provided: Patient instructed to cont prior HEP.  Exercises were reviewed and Soha was able to demonstrate them prior to the end of the session.  Soha demonstrated good  understanding of the education provided.     See EMR under patient instructions for exercises  given.     Assessment     Soha continues to present with great carryover of UE elevation, but continues to initiate with UT. Following the use of mTrigger neuro-muscular re-education, significant improvements were seen with UE elevation where she was able to appropriately set her scapula. Session focused today on neuro-muscular re-ed to improve mechanics with UE elevation, which she tolerated well. She was able to reach ~150 of flexion with better mechanics following session.     Soha is progressing well towards her goals.     Pt will continue to benefit from skilled outpatient physical therapy to address the deficits listed in the problem list box on initial evaluation, provide pt/family education and to maximize pt's level of independence in the home and community environment. Pt prognosis is Good.     Pt's spiritual, cultural and educational needs considered and pt agreeable to plan of care and goals.    Anticipated barriers to physical therapy: None    Goals:  Short Term Goals: 8 weeks  1. Pt will be compliant with HEP 50% of prescribed amount. (Met)  2. The pt to demo improvement in R shoulder PROM to by 80% of the L shoulder. (Met)  3.  The pt to demo tolerance to being out of the sling for 24 hours with pain <2/10 (Met)    Long Term Goals: 24 weeks   1. Pt will be compliant with % of prescribed amount. (Met)  2. The pt to improvement in AROM of R shoulder within 80% of L shoulder to improve tolerance to OH movement. (met)  3. The pt to  Demo at least 4+/5 of RTC muscle testing to demo improvement in tolerance to activity  4. The pt to tolerate lifting 5# overhead to improve tolerance to ADLs and work related activities   5. The pt will report full participation in ADLs and IADLs without restrictions related to R Shoulder.       Plan     Continue per POC for massive cuff repair with emphasis on prime  and local stabilizer strengthening with progressions as appropriate. Continue michelle-re ed for UT  involvement. Continue cuff strengthening.       Artur Patel, PT, DPT    Co-treated with Alena Penaloza, SPT-3    I certify that I was present in the room directing the student in service delivery and guiding them using my skilled judgment. As the co-signing therapist I have reviewed the students documentation and am responsible for the treatment, assessment, and plan.

## 2022-03-03 ENCOUNTER — CLINICAL SUPPORT (OUTPATIENT)
Dept: REHABILITATION | Facility: HOSPITAL | Age: 67
End: 2022-03-03
Payer: COMMERCIAL

## 2022-03-03 DIAGNOSIS — M62.81 MUSCLE WEAKNESS OF RIGHT UPPER EXTREMITY: ICD-10-CM

## 2022-03-03 DIAGNOSIS — M25.611 DECREASED RIGHT SHOULDER RANGE OF MOTION: Primary | ICD-10-CM

## 2022-03-03 PROCEDURE — 97112 NEUROMUSCULAR REEDUCATION: CPT

## 2022-03-03 PROCEDURE — 97110 THERAPEUTIC EXERCISES: CPT

## 2022-03-03 NOTE — PROGRESS NOTES
Physical Therapy Daily Treatment Note     Name: Soha Wheatley  Clinic Number: [id number]    Therapy Diagnosis:   Encounter Diagnoses   Name Primary?    Decreased right shoulder range of motion Yes    Muscle weakness of right upper extremity      Physician: Darryl Zamora PA-C    Visit Date: 3/3/2022  Physician Orders: PT Eval and Treat  Medical Diagnosis from Referral: M75.101 (ICD-10-CM) - Nontraumatic tear of right rotator cuff, unspecified tear extent  Evaluation Date: 6/28/2021  Authorization Period Expiration: 12/31/2021  Plan of Care Expiration: 12/31/2021  Visit # / Visits authorized: 17 / 20 (57 total)    Time In: 0900  Time Out: 1005  Total Billable Time:  55 minutes    Precautions: Standard    Postop plan for the patient is to follow the large size rotator cuff repair protocol.      DOS: 6/23/2021    Subjective     Pt reports: No new complaints. She continues to report her shoulder just feels stiff.     She was compliant with home exercise program.  Response to previous treatment: Increased AROM/PROM.   Functional change: N/A    Pain: 0/10  Location: R shoulder     Objective     Daily Measurements:      9/13/21  Shoulder Passive Range of Motion:   Shoulder Right   Flexion    130   ER at 90    40      9/20/21  ER at 45 abduction: 45         09/23/2021  Shoulder Passive Range of Motion:   Shoulder Right   Flexion    135   ER at 90    50         10/11/21  Shoulder Passive Range of Motion:   Shoulder Right   Flexion    150   ER at 90    55      10/14/21  Shoulder Passive Range of Motion:   Shoulder Right   Flexion    152   ER at 90    57      10/21/21  Shoulder Passive Range of Motion: taken after LLLD stretches with heat and manual therapy mobilizations   Shoulder Right   Flexion    155   ER at 90    60         10/25/2021  Patient able to tolerate R shoulder flexion to ~100-110 deg against gravity for 2-4 seconds      11/4/2021 - PROM  Shoulder Right   Flexion    155   ER     62      11/8/2021  AROM  R shoulder flexion 150 (L side is 165)     11/11/2021  PROM R shoulder external rotation 65 deg  PROM R shoulder flexion 160     11/23/21:        Shoulder Active Range of Motion:   Shoulder Right Left   Flexion    Pre-Tx: 125     Post-Tx: 135 140   ER at 0    40 50      Shoulder Passive Range of Motion:   Shoulder Right Left   Flexion    160 165   ER at 30    50 60       12/10/21:  MMT L Shoulder  ER at 0: 4-/5  Scaption at 90: 3+/5    1/3/2021:    Shoulder Active Range of Motion:   Shoulder Right Left   Flexion    150    160   ER at 0    50 55       1/24/2022:    Shoulder Active Range of Motion:   Shoulder Right Left   Flexion    165    165   ER at 0    55 60       Daily Treatment       Soha received therapeutic exercises to develop strength, endurance, ROM and flexibility for 30 minutes including:    UBE completed for 4/4 min forward & backward to increase ROM, endurance and decrease pain to improve tolerance to ADLs and age related activities.   Pendulums: Sagittal, Frontal, CW, CCW x30 ea.   OTB ER 3x10  GTB LT Step Back 3x10      Soha received the following manual therapy techniques: were applied to the: L shoulder for 00 minutes, including:        Soha participated in neuromuscular re-education activities to improve: Coordination, Kinesthetic, Sense, Proprioception and Motor Control for 25 minutes. The following activities were included:   S/L Scapular PNF  SA ABC's 3x to light burn    mTrigger Biofeedback for UT/LT ratio:   - S/L [place] UE scaption 2x15  - SA Wall Slides 3x15           Home Exercises and Patient Education Provided     Education provided:   - Reviewed HEP.     Written Home Exercises Provided: Patient instructed to cont prior HEP.  Exercises were reviewed and Soha was able to demonstrate them prior to the end of the session.  Soha demonstrated good  understanding of the education provided.     See EMR under patient instructions for exercises given.     Assessment     Pt continues to demo  difficulty with UT overcompensation with UE elevation but is improving with biofeedback. Prescribed pendulums to provide gentle mobilization and distraction of the GH joint which Soha reported helped with perceived stiffness. Pt provided updated HEP to address her primary impairments at this time.     Soha is progressing well towards her goals.     Pt will continue to benefit from skilled outpatient physical therapy to address the deficits listed in the problem list box on initial evaluation, provide pt/family education and to maximize pt's level of independence in the home and community environment. Pt prognosis is Good.     Pt's spiritual, cultural and educational needs considered and pt agreeable to plan of care and goals.    Anticipated barriers to physical therapy: None    Goals:  Short Term Goals: 8 weeks  1. Pt will be compliant with HEP 50% of prescribed amount. (Met)  2. The pt to demo improvement in R shoulder PROM to by 80% of the L shoulder. (Met)  3.  The pt to demo tolerance to being out of the sling for 24 hours with pain <2/10 (Met)    Long Term Goals: 24 weeks   1. Pt will be compliant with % of prescribed amount. (Met)  2. The pt to improvement in AROM of R shoulder within 80% of L shoulder to improve tolerance to OH movement. (met)  3. The pt to  Demo at least 4+/5 of RTC muscle testing to demo improvement in tolerance to activity  4. The pt to tolerate lifting 5# overhead to improve tolerance to ADLs and work related activities   5. The pt will report full participation in ADLs and IADLs without restrictions related to R Shoulder.       Plan     Continue per POC for massive cuff repair with emphasis on prime  and local stabilizer strengthening with progressions as appropriate. Continue michelle-re ed for UT involvement. Continue cuff strengthening. Re-assess cuff strength over coming weeks.       Artur Patel, PT, DPT

## 2022-03-07 ENCOUNTER — PATIENT MESSAGE (OUTPATIENT)
Dept: BARIATRICS | Facility: CLINIC | Age: 67
End: 2022-03-07
Payer: COMMERCIAL

## 2022-03-08 ENCOUNTER — PATIENT OUTREACH (OUTPATIENT)
Dept: ADMINISTRATIVE | Facility: HOSPITAL | Age: 67
End: 2022-03-08
Payer: COMMERCIAL

## 2022-03-08 ENCOUNTER — PATIENT MESSAGE (OUTPATIENT)
Dept: ADMINISTRATIVE | Facility: HOSPITAL | Age: 67
End: 2022-03-08
Payer: COMMERCIAL

## 2022-03-08 NOTE — PROGRESS NOTES
Health Maintenance Due   Topic Date Due    Cervical Cancer Screening  Never done    Shingles Vaccine (1 of 2) Never done    Mammogram  05/08/2013    COVID-19 Vaccine (3 - Booster for Moderna series) 02/19/2022     Triggered LINKS.  Updated Care Everywhere.  Checked for outside lab results in [facility].  Portal message has been sent asking pt to schedule overdue HM.  Chart review completed.

## 2022-03-10 ENCOUNTER — CLINICAL SUPPORT (OUTPATIENT)
Dept: REHABILITATION | Facility: HOSPITAL | Age: 67
End: 2022-03-10
Payer: COMMERCIAL

## 2022-03-10 DIAGNOSIS — M62.81 MUSCLE WEAKNESS OF RIGHT UPPER EXTREMITY: ICD-10-CM

## 2022-03-10 DIAGNOSIS — M25.611 DECREASED RIGHT SHOULDER RANGE OF MOTION: Primary | ICD-10-CM

## 2022-03-10 PROCEDURE — 97110 THERAPEUTIC EXERCISES: CPT

## 2022-03-10 PROCEDURE — 97112 NEUROMUSCULAR REEDUCATION: CPT

## 2022-03-10 NOTE — PROGRESS NOTES
Physical Therapy Daily Treatment Note     Name: Soha Wheatley  Clinic Number: [id number]    Therapy Diagnosis:   Encounter Diagnoses   Name Primary?    Decreased right shoulder range of motion Yes    Muscle weakness of right upper extremity      Physician: Darryl Zamora PA-C    Visit Date: 3/10/2022  Physician Orders: PT Eval and Treat  Medical Diagnosis from Referral: M75.101 (ICD-10-CM) - Nontraumatic tear of right rotator cuff, unspecified tear extent  Evaluation Date: 6/28/2021  Authorization Period Expiration: 12/31/2021  Plan of Care Expiration: 12/31/2021  Visit # / Visits authorized: 18 / 20 (57 total)    Time In: 0852  Time Out: 0950  Total Billable Time:  55 minutes    Precautions: Standard    Postop plan for the patient is to follow the large size rotator cuff repair protocol.      DOS: 6/23/2021    Subjective     Pt reports: She has been doing her updated HEP and doing her pendulum swings 2x/day and notes her shoulder doesn't feel as stiff. She was able to retrieve some heavy bowls from the top shelf of her cabinet without any pain or difficulty. Reaching behind her back is still quite challenging and limited and something she would like to improve.     She was compliant with home exercise program.  Response to previous treatment: Increased AROM/PROM.   Functional change: N/A    Pain: 0/10  Location: R shoulder     Objective     Daily Measurements:      9/13/21  Shoulder Passive Range of Motion:   Shoulder Right   Flexion    130   ER at 90    40      9/20/21  ER at 45 abduction: 45         09/23/2021  Shoulder Passive Range of Motion:   Shoulder Right   Flexion    135   ER at 90    50         10/11/21  Shoulder Passive Range of Motion:   Shoulder Right   Flexion    150   ER at 90    55      10/14/21  Shoulder Passive Range of Motion:   Shoulder Right   Flexion    152   ER at 90    57      10/21/21  Shoulder Passive Range of Motion: taken after LLLD stretches with heat and manual therapy  mobilizations   Shoulder Right   Flexion    155   ER at 90    60         10/25/2021  Patient able to tolerate R shoulder flexion to ~100-110 deg against gravity for 2-4 seconds      11/4/2021 - PROM  Shoulder Right   Flexion    155   ER     62      11/8/2021  AROM R shoulder flexion 150 (L side is 165)     11/11/2021  PROM R shoulder external rotation 65 deg  PROM R shoulder flexion 160     11/23/21:        Shoulder Active Range of Motion:   Shoulder Right Left   Flexion    Pre-Tx: 125     Post-Tx: 135 140   ER at 0    40 50      Shoulder Passive Range of Motion:   Shoulder Right Left   Flexion    160 165   ER at 30    50 60       12/10/21:  MMT L Shoulder  ER at 0: 4-/5  [place] at 90: 3+/5    1/3/2021:    Shoulder Active Range of Motion:   Shoulder Right Left   Flexion    150    160   ER at 0    50 55       1/24/2022:    Shoulder Active Range of Motion:   Shoulder Right Left   Flexion    165    165   ER at 0    55 60         Daily Treatment       Soha received therapeutic exercises to develop strength, endurance, ROM and flexibility for 38 minutes including:  UBE completed for 4/4 min forward & backward to increase ROM, endurance and decrease pain to improve tolerance to ADLs and age related activities.  Supine LLLD IR str x5 mins 2#   S/L [place] Assisted IR 2x12   SA Bear Hug [place] TB 3x10       Soha received the following manual therapy techniques: were applied to the: L shoulder for 4 minutes, including:  Re-assessment including MMT        Soha participated in neuromuscular re-education activities to improve: Coordination, Kinesthetic, Sense, Proprioception and Motor Control for 16 minutes. The following activities were included:   S/L Scapular PNF w/LT At EROM 2x12   PNF D2 PTB 4x5               Home Exercises and Patient Education Provided     Education provided:   - Reviewed HEP.     Written Home Exercises Provided: Patient instructed to cont prior HEP.  Exercises were reviewed and Soha was able to  demonstrate them prior to the end of the session.  Soha demonstrated good  understanding of the education provided.     See EMR under patient instructions for exercises given.     Assessment     PROM to within 95% of uninvinolved side with exception of IR which was limited to 55 degrees compared to 70 degrees. This improved notably with LLLD IR stretching and followed with gravity assisted IR to improve pt's ability to perform functional tasks behind her back. Progressed dynamic motor control and strengthening to include PNF/diagonal planes. Pt initially needed cues to avoid UT compensation but greatly improved over previous treatment. MMT of cuff musculature all 4+/5 except with resisted scaption, so interventions prescribed to address this impairment including targeted deltoid strengthening.     Soha is progressing well towards her goals.     Pt will continue to benefit from skilled outpatient physical therapy to address the deficits listed in the problem list box on initial evaluation, provide pt/family education and to maximize pt's level of independence in the home and community environment. Pt prognosis is Good.     Pt's spiritual, cultural and educational needs considered and pt agreeable to plan of care and goals.    Anticipated barriers to physical therapy: None    Goals:  Short Term Goals: 8 weeks  1. Pt will be compliant with HEP 50% of prescribed amount. (Met)  2. The pt to demo improvement in R shoulder PROM to by 80% of the L shoulder. (Met)  3.  The pt to demo tolerance to being out of the sling for 24 hours with pain <2/10 (Met)    Long Term Goals: 24 weeks   1. Pt will be compliant with % of prescribed amount. (Met)  2. The pt to improvement in AROM of R shoulder within 80% of L shoulder to improve tolerance to OH movement. (met)  3. The pt to  Demo at least 4+/5 of RTC muscle testing to demo improvement in tolerance to activity. (Met)  4. The pt to tolerate lifting 5# overhead to improve  tolerance to ADLs and work related activities   5. The pt will report full participation in ADLs and IADLs without restrictions related to R Shoulder.       Plan     Continue per POC for massive cuff repair with emphasis on prime  and local stabilizer strengthening with progressions as appropriate. Continue michelle-re ed for UT involvement. Continue cuff strengthening. Re-assess cuff strength over coming weeks.       Artur Patel, PT, DPT

## 2022-03-16 ENCOUNTER — PATIENT MESSAGE (OUTPATIENT)
Dept: ADMINISTRATIVE | Facility: HOSPITAL | Age: 67
End: 2022-03-16
Payer: COMMERCIAL

## 2022-03-17 ENCOUNTER — CLINICAL SUPPORT (OUTPATIENT)
Dept: REHABILITATION | Facility: HOSPITAL | Age: 67
End: 2022-03-17
Payer: COMMERCIAL

## 2022-03-17 DIAGNOSIS — M62.81 MUSCLE WEAKNESS OF RIGHT UPPER EXTREMITY: ICD-10-CM

## 2022-03-17 DIAGNOSIS — M25.611 DECREASED RIGHT SHOULDER RANGE OF MOTION: Primary | ICD-10-CM

## 2022-03-17 PROCEDURE — 97110 THERAPEUTIC EXERCISES: CPT

## 2022-03-17 PROCEDURE — 97112 NEUROMUSCULAR REEDUCATION: CPT

## 2022-03-17 PROCEDURE — 97140 MANUAL THERAPY 1/> REGIONS: CPT

## 2022-03-17 NOTE — PROGRESS NOTES
Physical Therapy Daily Treatment Note     Name: Soha Wheatley  Clinic Number: [id number]    Therapy Diagnosis:   Encounter Diagnoses   Name Primary?    Decreased right shoulder range of motion Yes    Muscle weakness of right upper extremity      Physician: Darryl Zamora PA-C    Visit Date: 3/17/2022  Physician Orders: PT Eval and Treat  Medical Diagnosis from Referral: M75.101 (ICD-10-CM) - Nontraumatic tear of right rotator cuff, unspecified tear extent  Evaluation Date: 6/28/2021  Authorization Period Expiration: 12/31/2021  Plan of Care Expiration: 12/31/2021  Visit # / Visits authorized: 19 / 20 (59 total)    Time In: 1000  Time Out: 1054  Total Billable Time:  54 minutes    Precautions: Standard    Postop plan for the patient is to follow the large size rotator cuff repair protocol.      DOS: 6/23/2021    Subjective     Pt reports: She has been able to use her press machine and it is getting easier. She feels she is slowly improving.     She was compliant with home exercise program.  Response to previous treatment: Increased AROM/PROM.   Functional change: N/A    Pain: 0/10  Location: R shoulder     Objective     Daily Measurements:     NT      Daily Treatment       Soha received therapeutic exercises to develop strength, endurance, ROM and flexibility for 30 minutes including:  UBE completed for 4/4 min forward & backward to increase ROM, endurance and decrease pain to improve tolerance to ADLs and age related activities.  Supine LLLD ER str x5 mins 2#   S/L [place] Assisted IR 2x12   SA Bear Hug [place] TB 3x10       Soha received the following manual therapy techniques: were applied to the: L shoulder for 8 minutes, including:  Re-assessment including MMT        Soha participated in neuromuscular re-education activities to improve: Coordination, Kinesthetic, Sense, Proprioception and Motor Control for 16 minutes. The following activities were included:   S/L Scapular PNF w/LT At EROM 2x12   90/90  ER Step out in scapular plane 5x4 PTB              Home Exercises and Patient Education Provided     Education provided:   - Reviewed HEP.     Written Home Exercises Provided: Patient instructed to cont prior HEP.  Exercises were reviewed and Soha was able to demonstrate them prior to the end of the session.  Soha demonstrated good  understanding of the education provided.     See EMR under patient instructions for exercises given.     Assessment     Pt demo'd slight loss of ER at 90 which quickly returned with LLLD ER str. Improved functional IR today. Progressed functional cuff strengthening with 90/90 ER step out ot improve ability to perform overhead tasks which was tolerated without adverse response and demo'd appropriate technique.     Soha is progressing well towards her goals.     Pt will continue to benefit from skilled outpatient physical therapy to address the deficits listed in the problem list box on initial evaluation, provide pt/family education and to maximize pt's level of independence in the home and community environment. Pt prognosis is Good.     Pt's spiritual, cultural and educational needs considered and pt agreeable to plan of care and goals.    Anticipated barriers to physical therapy: None    Goals:  Short Term Goals: 8 weeks  1. Pt will be compliant with HEP 50% of prescribed amount. (Met)  2. The pt to demo improvement in R shoulder PROM to by 80% of the L shoulder. (Met)  3.  The pt to demo tolerance to being out of the sling for 24 hours with pain <2/10 (Met)    Long Term Goals: 24 weeks   1. Pt will be compliant with % of prescribed amount. (Met)  2. The pt to improvement in AROM of R shoulder within 80% of L shoulder to improve tolerance to OH movement. (met)  3. The pt to  Demo at least 4+/5 of RTC muscle testing to demo improvement in tolerance to activity. (Met)  4. The pt to tolerate lifting 5# overhead to improve tolerance to ADLs and work related activities   5. The pt  will report full participation in ADLs and IADLs without restrictions related to R Shoulder.       Plan     Continue per POC for massive cuff repair with emphasis on prime  and local stabilizer strengthening with progressions as appropriate. Continue michelle-re ed for UT involvement. Continue cuff strengthening. Re-assess cuff strength over coming weeks.       Artur Patel, PT, DPT

## 2022-03-21 ENCOUNTER — PATIENT OUTREACH (OUTPATIENT)
Dept: ADMINISTRATIVE | Facility: OTHER | Age: 67
End: 2022-03-21
Payer: COMMERCIAL

## 2022-03-21 NOTE — PROGRESS NOTES
Care Everywhere: updated  Immunization: updated  Health Maintenance: updated  Media Review: review for outside mammogram report   Legacy Review:   DIS: no updated mammogram report on file   Order placed:   Upcoming appts:  EFAX:  Task Tickets:Mammogram scheduling ticket sent to patient's portal on 3.16.2022  Referrals:

## 2022-03-22 ENCOUNTER — OFFICE VISIT (OUTPATIENT)
Dept: SPORTS MEDICINE | Facility: CLINIC | Age: 67
End: 2022-03-22
Payer: COMMERCIAL

## 2022-03-22 VITALS
BODY MASS INDEX: 34.07 KG/M2 | WEIGHT: 238 LBS | HEIGHT: 70 IN | HEART RATE: 71 BPM | DIASTOLIC BLOOD PRESSURE: 57 MMHG | SYSTOLIC BLOOD PRESSURE: 111 MMHG

## 2022-03-22 DIAGNOSIS — Z98.890 S/P ROTATOR CUFF REPAIR: Primary | ICD-10-CM

## 2022-03-22 PROCEDURE — 99999 PR PBB SHADOW E&M-EST. PATIENT-LVL III: ICD-10-PCS | Mod: PBBFAC,,, | Performed by: ORTHOPAEDIC SURGERY

## 2022-03-22 PROCEDURE — 3008F BODY MASS INDEX DOCD: CPT | Mod: CPTII,S$GLB,, | Performed by: ORTHOPAEDIC SURGERY

## 2022-03-22 PROCEDURE — 3078F PR MOST RECENT DIASTOLIC BLOOD PRESSURE < 80 MM HG: ICD-10-PCS | Mod: CPTII,S$GLB,, | Performed by: ORTHOPAEDIC SURGERY

## 2022-03-22 PROCEDURE — 1159F MED LIST DOCD IN RCRD: CPT | Mod: CPTII,S$GLB,, | Performed by: ORTHOPAEDIC SURGERY

## 2022-03-22 PROCEDURE — 3074F SYST BP LT 130 MM HG: CPT | Mod: CPTII,S$GLB,, | Performed by: ORTHOPAEDIC SURGERY

## 2022-03-22 PROCEDURE — 3074F PR MOST RECENT SYSTOLIC BLOOD PRESSURE < 130 MM HG: ICD-10-PCS | Mod: CPTII,S$GLB,, | Performed by: ORTHOPAEDIC SURGERY

## 2022-03-22 PROCEDURE — 3008F PR BODY MASS INDEX (BMI) DOCUMENTED: ICD-10-PCS | Mod: CPTII,S$GLB,, | Performed by: ORTHOPAEDIC SURGERY

## 2022-03-22 PROCEDURE — 99999 PR PBB SHADOW E&M-EST. PATIENT-LVL III: CPT | Mod: PBBFAC,,, | Performed by: ORTHOPAEDIC SURGERY

## 2022-03-22 PROCEDURE — 99214 PR OFFICE/OUTPT VISIT, EST, LEVL IV, 30-39 MIN: ICD-10-PCS | Mod: 25,S$GLB,, | Performed by: ORTHOPAEDIC SURGERY

## 2022-03-22 PROCEDURE — 99214 OFFICE O/P EST MOD 30 MIN: CPT | Mod: 25,S$GLB,, | Performed by: ORTHOPAEDIC SURGERY

## 2022-03-22 PROCEDURE — 20610 DRAIN/INJ JOINT/BURSA W/O US: CPT | Mod: RT,S$GLB,, | Performed by: ORTHOPAEDIC SURGERY

## 2022-03-22 PROCEDURE — 20610 LARGE JOINT ASPIRATION/INJECTION: R SUBACROMIAL BURSA: ICD-10-PCS | Mod: RT,S$GLB,, | Performed by: ORTHOPAEDIC SURGERY

## 2022-03-22 PROCEDURE — 1159F PR MEDICATION LIST DOCUMENTED IN MEDICAL RECORD: ICD-10-PCS | Mod: CPTII,S$GLB,, | Performed by: ORTHOPAEDIC SURGERY

## 2022-03-22 PROCEDURE — 3078F DIAST BP <80 MM HG: CPT | Mod: CPTII,S$GLB,, | Performed by: ORTHOPAEDIC SURGERY

## 2022-03-22 RX ORDER — TRIAMCINOLONE ACETONIDE 40 MG/ML
60 INJECTION, SUSPENSION INTRA-ARTICULAR; INTRAMUSCULAR
Status: DISCONTINUED | OUTPATIENT
Start: 2022-03-22 | End: 2022-03-22 | Stop reason: HOSPADM

## 2022-03-22 RX ADMIN — TRIAMCINOLONE ACETONIDE 60 MG: 40 INJECTION, SUSPENSION INTRA-ARTICULAR; INTRAMUSCULAR at 10:03

## 2022-03-22 NOTE — PROCEDURES
Large Joint Aspiration/Injection: R subacromial bursa    Date/Time: 3/22/2022 10:00 AM  Performed by: Papo Garcia MD  Authorized by: Papo Garcia MD     Consent Done?:  Yes (Verbal)  Indications:  Pain  Site marked: the procedure site was marked    Timeout: prior to procedure the correct patient, procedure, and site was verified    Prep: patient was prepped and draped in usual sterile fashion      Details:  Needle Size:  22 G  Ultrasonic Guidance for needle placement?: No    Approach:  Posterior  Location:  Shoulder  Site:  R subacromial bursa  Medications:  60 mg triamcinolone acetonide 40 mg/mL  Patient tolerance:  Patient tolerated the procedure well with no immediate complications

## 2022-03-22 NOTE — PROGRESS NOTES
"CC: Right shoulder post op 9 months     Patient is here for her 9 months post op appointment s/p below and is doing okay. Patient has been attended PT at [facility] for last month and reports some weakness and pain that comes and goes.   Patient received a CSI on 10/28/21 and reports moderate relief.       DATE OF SURGERY:  6/23/2021      PREOPERATIVE DIAGNOSES:   1. Right shoulder rotator cuff tear.   2. Right shoulder AC joint arthritis  3. Right shoulder labral tear / biceps tendinopathy  4. Right shoulder adhesive capsulitis     POSTOPERATIVE DIAGNOSES:   1. Right shoulder rotator cuff tear.   2. Right shoulder AC joint arthritis  3. Right shoulder labral tear / biceps tendinopathy  4. Right shoulder adhesive capsulitis     PROCEDURE:   1. Right shoulder arthroscopic rotator cuff repair.   2. Right shoulder open subpectoral biceps tenodesis  3. Right shoulder arthroscopic distal clavicle excision  4. Right shoulder arthroscopic subacromial decompression.   5. Right shoulder arthroscopic debridement labrum  6. Right shoulder arthroscopic lysis of adhesions     SURGEON: Papo Garcia M.D.     Pain well tolerated on pain medication  Sling in place  No issues reported    Review of Systems   Constitution: Negative. Negative for chills, fever and night sweats.    Cardiovascular: Negative for chest pain and syncope.   Respiratory: Negative for cough and shortness of breath.    Gastrointestinal: Negative for abdominal pain and bowel incontinence.      PE:    BP (!) 111/57   Pulse 71   Ht 5' 10" (1.778 m)   Wt 108 kg (238 lb)   BMI 34.15 kg/m²      Right shoulder    Incision healed  No sign of infection  Swelling resolved  Compartments soft  Neurovascular status intact in extremity    PROM  Forward elevation 120 degrees  External rotation 30 degrees    Assessment:  9 monthss s/p for arthroscopic rotator cuff repair, subacromial decompression, distal clavicle excision, lysis of adhesions, labral debridement, open " subpectoral biceps tenodesis    Plan:  1. Continue PT   2. F/u in 3 months  3. CSI of right shoulder today.        All questions were answered. Instructed patient to call with questions or concerns in the interim.     Medical Dictation software was used during the dictation of portions or the entirety of this medical record.  Phonetic or grammatic errors may exist due to the use of this software. For clarification, refer to the author of the document.

## 2022-03-23 ENCOUNTER — CLINICAL SUPPORT (OUTPATIENT)
Dept: REHABILITATION | Facility: HOSPITAL | Age: 67
End: 2022-03-23
Payer: COMMERCIAL

## 2022-03-23 DIAGNOSIS — M25.611 DECREASED RIGHT SHOULDER RANGE OF MOTION: Primary | ICD-10-CM

## 2022-03-23 DIAGNOSIS — M62.81 MUSCLE WEAKNESS OF RIGHT UPPER EXTREMITY: ICD-10-CM

## 2022-03-23 PROCEDURE — 97110 THERAPEUTIC EXERCISES: CPT

## 2022-03-23 NOTE — PROGRESS NOTES
"  Physical Therapy Daily Treatment Note     Name: Soha Wheatley  Clinic Number: [id number]    Therapy Diagnosis:   Encounter Diagnoses   Name Primary?    Decreased right shoulder range of motion Yes    Muscle weakness of right upper extremity      Physician: Darryl Zamora PA-C    Visit Date: 3/23/2022  Physician Orders: PT Eval and Treat  Medical Diagnosis from Referral: M75.101 (ICD-10-CM) - Nontraumatic tear of right rotator cuff, unspecified tear extent  Evaluation Date: 6/28/2021  Authorization Period Expiration: 12/31/2021  Plan of Care Expiration: 12/31/2021  Visit # / Visits authorized: 19 / 20 (59 total)    Time In: 1357  Time Out: 03075  Total Billable Time:  53 minutes    Precautions: Standard    Postop plan for the patient is to follow the large size rotator cuff repair protocol.      DOS: 6/23/2021    Subjective     Pt reports: She got another shot at her follow up with Dr. Garcia yesterday. He wants her to continue PT 3 more months.     She was compliant with home exercise program.  Response to previous treatment: Increased AROM/PROM.   Functional change: N/A    Pain: 0/10  Location: R shoulder     Objective     Daily Measurements:     NT      Daily Treatment       Soha received therapeutic exercises to develop strength, endurance, ROM and flexibility for 53 minutes including:  Pulleys x6 mins  Supine 3# Cane Flx Str 4x20"  Seated Table to slides to Full Flexion 2x20   SA Bear Hug [place] TB 3x10   S/L ER 3x12 3#  Prone Ext 3# 3x10  PNF D2 PTB 3x8       Soha received the following manual therapy techniques: were applied to the: L shoulder for 00 minutes, including:  Re-assessment including MMT            Home Exercises and Patient Education Provided     Education provided:   - Reviewed HEP.     Written Home Exercises Provided: Patient instructed to cont prior HEP.  Exercises were reviewed and Soha was able to demonstrate them prior to the end of the session.  Soha demonstrated good  " understanding of the education provided.     See EMR under patient instructions for exercises given.     Assessment     Pt responded well to EROM stretching to improvie shoulder flexion followed with EROM table slides. Continued with DS diagonal strengthening with improved technique and motor control.     Soha is progressing well towards her goals.     Pt will continue to benefit from skilled outpatient physical therapy to address the deficits listed in the problem list box on initial evaluation, provide pt/family education and to maximize pt's level of independence in the home and community environment. Pt prognosis is Good.     Pt's spiritual, cultural and educational needs considered and pt agreeable to plan of care and goals.    Anticipated barriers to physical therapy: None    Goals:  Short Term Goals: 8 weeks  1. Pt will be compliant with HEP 50% of prescribed amount. (Met)  2. The pt to demo improvement in R shoulder PROM to by 80% of the L shoulder. (Met)  3.  The pt to demo tolerance to being out of the sling for 24 hours with pain <2/10 (Met)    Long Term Goals: 24 weeks   1. Pt will be compliant with % of prescribed amount. (Met)  2. The pt to improvement in AROM of R shoulder within 80% of L shoulder to improve tolerance to OH movement. (met)  3. The pt to  Demo at least 4+/5 of RTC muscle testing to demo improvement in tolerance to activity. (Met)  4. The pt to tolerate lifting 5# overhead to improve tolerance to ADLs and work related activities   5. The pt will report full participation in ADLs and IADLs without restrictions related to R Shoulder.       Plan     Continue per POC for massive cuff repair with emphasis on prime  and local stabilizer strengthening with progressions as appropriate. Continue michelle-re ed for UT involvement. Continue cuff strengthening. Re-assess cuff strength over coming weeks.       Artur Patel, PT, DPT

## 2022-03-28 ENCOUNTER — CLINICAL SUPPORT (OUTPATIENT)
Dept: REHABILITATION | Facility: HOSPITAL | Age: 67
End: 2022-03-28
Payer: COMMERCIAL

## 2022-03-28 DIAGNOSIS — M62.81 MUSCLE WEAKNESS OF RIGHT UPPER EXTREMITY: ICD-10-CM

## 2022-03-28 DIAGNOSIS — M25.611 DECREASED RIGHT SHOULDER RANGE OF MOTION: Primary | ICD-10-CM

## 2022-03-28 PROCEDURE — 97110 THERAPEUTIC EXERCISES: CPT

## 2022-03-28 PROCEDURE — 97112 NEUROMUSCULAR REEDUCATION: CPT

## 2022-03-28 NOTE — PROGRESS NOTES
"  Physical Therapy Daily Treatment Note     Name: Soha Wheatley  Clinic Number: [id number]    Therapy Diagnosis:   Encounter Diagnoses   Name Primary?    Decreased right shoulder range of motion Yes    Muscle weakness of right upper extremity      Physician: Darryl Zamora PA-C    Visit Date: 3/28/2022  Physician Orders: PT Eval and Treat  Medical Diagnosis from Referral: M75.101 (ICD-10-CM) - Nontraumatic tear of right rotator cuff, unspecified tear extent  Evaluation Date: 6/28/2021  Authorization Period Expiration: 12/31/2021  Plan of Care Expiration: 12/31/2021  Visit # / Visits authorized: 20 / 20 (61 total)    Time In: 0957  Time Out: 1051  Total Billable Time:  45 minutes    Precautions: Standard    Postop plan for the patient is to follow the large size rotator cuff repair protocol.      DOS: 6/23/2021    Subjective     Pt reports: Shoulder is feeling good since last treatment. Feels a little looser     She was compliant with home exercise program.  Response to previous treatment: Increased AROM/PROM.   Functional change: N/A    Pain: 0/10  Location: R shoulder     Objective     Daily Measurements:     NT      Daily Treatment       Soha received therapeutic exercises to develop strength, endurance, ROM and flexibility for 32 minutes including:  Pulleys x6 mins  Supine 3# Cane Flx Str 4x20"  Seated Table to slides to Full Flexion 2x20   S/L ER 3x12 3#  Prone Ext 3# 3x12  PNF D2 PTB 3x8       Soha received the following manual therapy techniques: were applied to the: L shoulder for 00 minutes, including:        Soha participated in neuromuscular re-education activities to improve: Coordination, Kinesthetic, Sense, Proprioception and Motor Control for 18 minutes. The following activities were included:  SA Wall Slide 3x10  SA [place] Press 2x1 7.5#         Home Exercises and Patient Education Provided     Education provided:   - Reviewed HEP.     Written Home Exercises Provided: Patient instructed to " cont prior HEP.  Exercises were reviewed and Soha was able to demonstrate them prior to the end of the session.  Soha demonstrated good  understanding of the education provided.     See EMR under patient instructions for exercises given.     Assessment     Pt continues to demo improvement in functional IR to L3 today. Continued difficulty with UT compensation with overhead tasks but she also demo's this on her uninvolved side as well. She is not having pain with any interventions and continues to slowly demo improved motor control.     Soha is progressing well towards her goals.     Pt will continue to benefit from skilled outpatient physical therapy to address the deficits listed in the problem list box on initial evaluation, provide pt/family education and to maximize pt's level of independence in the home and community environment. Pt prognosis is Good.     Pt's spiritual, cultural and educational needs considered and pt agreeable to plan of care and goals.    Anticipated barriers to physical therapy: None    Goals:  Short Term Goals: 8 weeks  1. Pt will be compliant with HEP 50% of prescribed amount. (Met)  2. The pt to demo improvement in R shoulder PROM to by 80% of the L shoulder. (Met)  3.  The pt to demo tolerance to being out of the sling for 24 hours with pain <2/10 (Met)    Long Term Goals: 24 weeks   1. Pt will be compliant with % of prescribed amount. (Met)  2. The pt to improvement in AROM of R shoulder within 80% of L shoulder to improve tolerance to OH movement. (met)  3. The pt to  Demo at least 4+/5 of RTC muscle testing to demo improvement in tolerance to activity. (Met)  4. The pt to tolerate lifting 5# overhead to improve tolerance to ADLs and work related activities   5. The pt will report full participation in ADLs and IADLs without restrictions related to R Shoulder.       Plan     Continue per POC for massive cuff repair with emphasis on prime  and local stabilizer  strengthening with progressions as appropriate. Continue michelle-re ed for UT involvement. Continue cuff strengthening. Re-assess cuff strength over coming weeks.       Artur Patel, PT, DPT

## 2022-04-01 ENCOUNTER — CLINICAL SUPPORT (OUTPATIENT)
Dept: REHABILITATION | Facility: HOSPITAL | Age: 67
End: 2022-04-01
Payer: COMMERCIAL

## 2022-04-01 DIAGNOSIS — M25.611 DECREASED RIGHT SHOULDER RANGE OF MOTION: Primary | ICD-10-CM

## 2022-04-01 DIAGNOSIS — M62.81 MUSCLE WEAKNESS OF RIGHT UPPER EXTREMITY: ICD-10-CM

## 2022-04-01 PROCEDURE — 97110 THERAPEUTIC EXERCISES: CPT

## 2022-04-01 PROCEDURE — 97140 MANUAL THERAPY 1/> REGIONS: CPT

## 2022-04-01 PROCEDURE — 97112 NEUROMUSCULAR REEDUCATION: CPT

## 2022-04-01 NOTE — PROGRESS NOTES
"  Physical Therapy Daily Treatment Note     Name: oSha Wheatley  Clinic Number: [id number]    Therapy Diagnosis:   Encounter Diagnoses   Name Primary?    Decreased right shoulder range of motion Yes    Muscle weakness of right upper extremity      Physician: Darryl Zamora PA-C    Visit Date: 2022  Physician Orders: PT Eval and Treat  Medical Diagnosis from Referral: M75.101 (ICD-10-CM) - Nontraumatic tear of right rotator cuff, unspecified tear extent  Evaluation Date: 2021  Authorization Period Expiration: 2021  Plan of Care Expiration: 2021  Visit # / Visits authorized:  (62 total)    Time In: 08  Time Out: 08  Total Billable Time:  41 minutes    Precautions: Standard    Postop plan for the patient is to follow the large size rotator cuff repair protocol.      DOS: 2021    Subjective     Pt reports: Continued improvement. Still most difficulty with overhead activity.     She was compliant with home exercise program.  Response to previous treatment: Increased AROM/PROM.   Functional change: N/A    Pain: 0/10  Location: R shoulder     Objective     Daily Measurements:     NT      Daily Treatment       Soha received therapeutic exercises to develop strength, endurance, ROM and flexibility for 31 minutes including:  Pulleys x6 mins  Supine 3# Cane Flx Str 4x20"  90/90 Cane ER Str at wall 4x20"  Prone Ext 3# 3x12  Prone Row 10# 3x12      Soha received the following manual therapy techniques: were applied to the: L shoulder for 12 minutes, includin/90 ER stretching and joint mobilization in flexion   Re-assessment      Soha participated in neuromuscular re-education activities to improve: Coordination, Kinesthetic, Sense, Proprioception and Motor Control for 12 minutes. The following activities were included:  SA Wall Slide 3x10        Home Exercises and Patient Education Provided     Education provided:   - Reviewed HEP.     Written Home Exercises Provided: Patient " instructed to cont prior HEP.  Exercises were reviewed and Soha was able to demonstrate them prior to the end of the session.  Soha demonstrated good  understanding of the education provided.     See EMR under patient instructions for exercises given.     Assessment     Pt presents with slight restriction with 90/90 ER in flexion which is limiting EROM flexion. This improved with manual techniques as well as AAROM stretching intro'd today. Pt demo'd improved ability to maintain ER with SA wall slides and reported increased fatigue due to utilizing new ROM.     Soha is progressing well towards her goals.     Pt will continue to benefit from skilled outpatient physical therapy to address the deficits listed in the problem list box on initial evaluation, provide pt/family education and to maximize pt's level of independence in the home and community environment. Pt prognosis is Good.     Pt's spiritual, cultural and educational needs considered and pt agreeable to plan of care and goals.    Anticipated barriers to physical therapy: None    Goals:  Short Term Goals: 8 weeks  1. Pt will be compliant with HEP 50% of prescribed amount. (Met)  2. The pt to demo improvement in R shoulder PROM to by 80% of the L shoulder. (Met)  3.  The pt to demo tolerance to being out of the sling for 24 hours with pain <2/10 (Met)    Long Term Goals: 24 weeks   1. Pt will be compliant with % of prescribed amount. (Met)  2. The pt to improvement in AROM of R shoulder within 80% of L shoulder to improve tolerance to OH movement. (met)  3. The pt to  Demo at least 4+/5 of RTC muscle testing to demo improvement in tolerance to activity. (Met)  4. The pt to tolerate lifting 5# overhead to improve tolerance to ADLs and work related activities   5. The pt will report full participation in ADLs and IADLs without restrictions related to R Shoulder.       Plan     Continue per POC for massive cuff repair with emphasis on prime  and  local stabilizer strengthening with progressions as appropriate. Intro strengthening in newly acquired ER ROM as appropriate.       Artur Patel, PT, DPT

## 2022-04-07 ENCOUNTER — PATIENT MESSAGE (OUTPATIENT)
Dept: BARIATRICS | Facility: CLINIC | Age: 67
End: 2022-04-07
Payer: COMMERCIAL

## 2022-04-08 ENCOUNTER — CLINICAL SUPPORT (OUTPATIENT)
Dept: REHABILITATION | Facility: HOSPITAL | Age: 67
End: 2022-04-08
Payer: COMMERCIAL

## 2022-04-08 DIAGNOSIS — M62.81 MUSCLE WEAKNESS OF RIGHT UPPER EXTREMITY: ICD-10-CM

## 2022-04-08 DIAGNOSIS — M25.611 DECREASED RIGHT SHOULDER RANGE OF MOTION: Primary | ICD-10-CM

## 2022-04-08 PROCEDURE — 97112 NEUROMUSCULAR REEDUCATION: CPT

## 2022-04-08 PROCEDURE — 97140 MANUAL THERAPY 1/> REGIONS: CPT

## 2022-04-08 PROCEDURE — 97110 THERAPEUTIC EXERCISES: CPT

## 2022-04-08 NOTE — PROGRESS NOTES
"  Physical Therapy Daily Treatment Note     Name: Soha Wheatley  Clinic Number: [id number]    Therapy Diagnosis:   Encounter Diagnoses   Name Primary?    Decreased right shoulder range of motion Yes    Muscle weakness of right upper extremity      Physician: Darryl Zamora PA-C    Visit Date: 2022  Physician Orders: PT Eval and Treat  Medical Diagnosis from Referral: M75.101 (ICD-10-CM) - Nontraumatic tear of right rotator cuff, unspecified tear extent  Evaluation Date: 2021  Authorization Period Expiration: 2021  Plan of Care Expiration: 2021  Visit # / Visits authorized:  (63 total)    Time In: 0800  Time Out: 0854  Total Billable Time: 54 minutes    Precautions: Standard    Postop plan for the patient is to follow the large size rotator cuff repair protocol.      DOS: 2021    Subjective     Pt reports: Her shoulder is feeling less stiff in the morning. She feels like the new stretch has really helped. Her primary deficit is muscle endurance with overhead tasks.     She was compliant with home exercise program.  Response to previous treatment: Increased AROM/PROM.   Functional change: N/A    Pain: 0/10  Location: R shoulder     Objective     Daily Measurements:     NT      Daily Treatment       Soha received therapeutic exercises to develop strength, endurance, ROM and flexibility for 31 minutes including:  LLLD Inf glide w/UE prop x8 mins 5#  Pulleys x6 mins  Supine 3# Cane Flx Str 4x20"  Prone Ext 3# 3x12  Prone Row 10# 3x12      Soha received the following manual therapy techniques: were applied to the: L shoulder for 11 minutes, includin/90 ER stretching and joint mobilization in flexion   Post/Inf GH Grd IV Jt mobilization   Re-assessment      Soha participated in neuromuscular re-education activities to improve: Coordination, Kinesthetic, Sense, Proprioception and Motor Control for 12 minutes. The following activities were included:  SA Wall Slide 3x10 " 3#        Home Exercises and Patient Education Provided     Education provided:   - Reviewed HEP.     Written Home Exercises Provided: Patient instructed to cont prior HEP.  Exercises were reviewed and Soha was able to demonstrate them prior to the end of the session.  Soha demonstrated good  understanding of the education provided.     See EMR under patient instructions for exercises given.     Assessment     Improved ER at 90 degrees flexion today. Patient is responding well to recent exercise progressions. Progressed OH strengthening with addition of 3# cuff weight with wall slides which pt demo'd best mechanics observed to date with in the clinic today. Functional IR improved to L3 today.     Soha is progressing well towards her goals.     Pt will continue to benefit from skilled outpatient physical therapy to address the deficits listed in the problem list box on initial evaluation, provide pt/family education and to maximize pt's level of independence in the home and community environment. Pt prognosis is Good.     Pt's spiritual, cultural and educational needs considered and pt agreeable to plan of care and goals.    Anticipated barriers to physical therapy: None    Goals:  Short Term Goals: 8 weeks  1. Pt will be compliant with HEP 50% of prescribed amount. (Met)  2. The pt to demo improvement in R shoulder PROM to by 80% of the L shoulder. (Met)  3.  The pt to demo tolerance to being out of the sling for 24 hours with pain <2/10 (Met)    Long Term Goals: 24 weeks   1. Pt will be compliant with % of prescribed amount. (Met)  2. The pt to improvement in AROM of R shoulder within 80% of L shoulder to improve tolerance to OH movement. (met)  3. The pt to  Demo at least 4+/5 of RTC muscle testing to demo improvement in tolerance to activity. (Met)  4. The pt to tolerate lifting 5# overhead to improve tolerance to ADLs and work related activities   5. The pt will report full participation in ADLs and  IADLs without restrictions related to R Shoulder.       Plan     Continue per POC for massive cuff repair with emphasis on prime  and local stabilizer strengthening with progressions as appropriate. Intro strengthening in newly acquired ER ROM as appropriate.       Artur Patel, PT, DPT

## 2022-04-12 ENCOUNTER — PATIENT MESSAGE (OUTPATIENT)
Dept: BARIATRICS | Facility: CLINIC | Age: 67
End: 2022-04-12
Payer: COMMERCIAL

## 2022-04-14 ENCOUNTER — CLINICAL SUPPORT (OUTPATIENT)
Dept: REHABILITATION | Facility: HOSPITAL | Age: 67
End: 2022-04-14
Payer: COMMERCIAL

## 2022-04-14 DIAGNOSIS — M25.611 DECREASED RIGHT SHOULDER RANGE OF MOTION: Primary | ICD-10-CM

## 2022-04-14 DIAGNOSIS — M62.81 MUSCLE WEAKNESS OF RIGHT UPPER EXTREMITY: ICD-10-CM

## 2022-04-14 PROCEDURE — 97140 MANUAL THERAPY 1/> REGIONS: CPT

## 2022-04-14 PROCEDURE — 97112 NEUROMUSCULAR REEDUCATION: CPT

## 2022-04-14 PROCEDURE — 97110 THERAPEUTIC EXERCISES: CPT

## 2022-04-14 NOTE — PROGRESS NOTES
"  Physical Therapy Daily Treatment Note     Name: Soha Wheatley  Clinic Number: [id number]    Therapy Diagnosis:   Encounter Diagnoses   Name Primary?    Decreased right shoulder range of motion Yes    Muscle weakness of right upper extremity      Physician: Darryl Zamora PA-C    Visit Date: 2022  Physician Orders: PT Eval and Treat  Medical Diagnosis from Referral: M75.101 (ICD-10-CM) - Nontraumatic tear of right rotator cuff, unspecified tear extent  Evaluation Date: 2021  Authorization Period Expiration: 2021  Plan of Care Expiration: 2021  Visit # / Visits authorized:  (64 total)    Time In: 0900  Time Out: 1001  Total Billable Time: 59 minutes    Precautions: Standard    Postop plan for the patient is to follow the large size rotator cuff repair protocol.      DOS: 2021    Subjective     Pt reports: She is really starting to do more with her arm. Continues to note improved strength.     She was compliant with home exercise program.  Response to previous treatment: Increased AROM/PROM.   Functional change: N/A    Pain: 0/10  Location: R shoulder     Objective     Daily Measurements:     NT      Daily Treatment       Soah received therapeutic exercises to develop strength, endurance, ROM and flexibility for 36 minutes including:  LLLD Inf glide w/UE prop x8 mins 5#  Pulleys x6 mins  Supine 3# Cane Flx Str 4x20"  Prone Ext 3# 3x12  Seated Cable Pull to EROM distraction 13# 2x12  S/L Functional IR x30   90/90 PTB ER Step out 4x4    Soha received the following manual therapy techniques: were applied to the: L shoulder for 10 minutes, includin/90 ER stretching and joint mobilization in flexion   Post/Inf GH Grd IV Jt mobilization   Re-assessment      Soha participated in neuromuscular re-education activities to improve: Coordination, Kinesthetic, Sense, Proprioception and Motor Control for 13 minutes. The following activities were included:  SA Wall Slide w/ " eccentric step back 4x5 3#        Home Exercises and Patient Education Provided     Education provided:   - Reviewed HEP.     Written Home Exercises Provided: Patient instructed to cont prior HEP.  Exercises were reviewed and Soha was able to demonstrate them prior to the end of the session.  Soha demonstrated good  understanding of the education provided.     See EMR under patient instructions for exercises given.     Assessment     Progressed functional OH training today with eccentric lowering with SA wall slides. Pt noted increased difficulty and fatigue with this progression. She continues to demo improved scapular rotator balance and rhythm.    Soha is progressing well towards her goals.     Pt will continue to benefit from skilled outpatient physical therapy to address the deficits listed in the problem list box on initial evaluation, provide pt/family education and to maximize pt's level of independence in the home and community environment. Pt prognosis is Good.     Pt's spiritual, cultural and educational needs considered and pt agreeable to plan of care and goals.    Anticipated barriers to physical therapy: None    Goals:  Short Term Goals: 8 weeks  1. Pt will be compliant with HEP 50% of prescribed amount. (Met)  2. The pt to demo improvement in R shoulder PROM to by 80% of the L shoulder. (Met)  3.  The pt to demo tolerance to being out of the sling for 24 hours with pain <2/10 (Met)    Long Term Goals: 24 weeks   1. Pt will be compliant with % of prescribed amount. (Met)  2. The pt to improvement in AROM of R shoulder within 80% of L shoulder to improve tolerance to OH movement. (met)  3. The pt to  Demo at least 4+/5 of RTC muscle testing to demo improvement in tolerance to activity. (Met)  4. The pt to tolerate lifting 5# overhead to improve tolerance to ADLs and work related activities   5. The pt will report full participation in ADLs and IADLs without restrictions related to R Shoulder.        Plan     Continue per POC for massive cuff repair with emphasis on prime  and local stabilizer strengthening with progressions as appropriate. Intro strengthening in newly acquired ER ROM as appropriate.       Artur Patel, PT, DPT

## 2022-04-20 DIAGNOSIS — M47.812 CERVICAL SPONDYLOSIS WITHOUT MYELOPATHY: ICD-10-CM

## 2022-04-20 RX ORDER — TIZANIDINE 4 MG/1
4 TABLET ORAL EVERY 8 HOURS PRN
Qty: 30 TABLET | Refills: 0 | Status: SHIPPED | OUTPATIENT
Start: 2022-04-20 | End: 2022-06-07 | Stop reason: SDUPTHER

## 2022-04-21 ENCOUNTER — CLINICAL SUPPORT (OUTPATIENT)
Dept: REHABILITATION | Facility: HOSPITAL | Age: 67
End: 2022-04-21
Payer: COMMERCIAL

## 2022-04-21 DIAGNOSIS — M62.81 MUSCLE WEAKNESS OF RIGHT UPPER EXTREMITY: ICD-10-CM

## 2022-04-21 DIAGNOSIS — M25.611 DECREASED RIGHT SHOULDER RANGE OF MOTION: Primary | ICD-10-CM

## 2022-04-21 PROCEDURE — 97112 NEUROMUSCULAR REEDUCATION: CPT

## 2022-04-21 PROCEDURE — 97110 THERAPEUTIC EXERCISES: CPT

## 2022-04-21 PROCEDURE — 97140 MANUAL THERAPY 1/> REGIONS: CPT

## 2022-04-21 NOTE — PROGRESS NOTES
"  Physical Therapy Daily Treatment Note     Name: Soha Wheatley  Clinic Number: [id number]    Therapy Diagnosis:   Encounter Diagnoses   Name Primary?    Decreased right shoulder range of motion Yes    Muscle weakness of right upper extremity      Physician: Darryl Zamora PA-C    Visit Date: 2022  Physician Orders: PT Eval and Treat  Medical Diagnosis from Referral: M75.101 (ICD-10-CM) - Nontraumatic tear of right rotator cuff, unspecified tear extent  Evaluation Date: 2021  Authorization Period Expiration: 2021  Plan of Care Expiration: 2021  Visit # / Visits authorized:  (65 total)    Time In: 902  Time Out: 1007  Total Billable Time: 56 minutes    Precautions: Standard    Postop plan for the patient is to follow the large size rotator cuff repair protocol.      DOS: 2021    Subjective     Pt reports: She continues to increase her activity. She is most challenged right now with retrieving bigger dishes from the top shelf of her cabinets due to lack of strength.     She was compliant with home exercise program.  Response to previous treatment: Increased AROM/PROM.   Functional change: N/A    Pain: 0/10  Location: R shoulder     Objective     Daily Measurements:     NT      Daily Treatment       Soha received therapeutic exercises to develop strength, endurance, ROM and flexibility for 36 minutes including:  UBE completed for 4/4 min forward & backward to increase ROM, endurance and decrease pain to improve tolerance to ADLs and age related activities.   Pulleys x6 mins  Supine 3# Cane Flx Str 4x20"  LT GTB Step back 3x8  Full I/Y/T 2# 4x5ea.     Soha received the following manual therapy techniques: were applied to the: L shoulder for 9 minutes, includin/90 ER stretching and joint mobilization in flexion   Post/Inf GH Grd IV Jt mobilization   Re-assessment      Soha participated in neuromuscular re-education activities to improve: Coordination, Kinesthetic, Sense, " Proprioception and Motor Control for 11 minutes. The following activities were included:  SA Wall Slide w/ eccentric step back 4x5 3#        Home Exercises and Patient Education Provided     Education provided:   - Reviewed HEP.     Written Home Exercises Provided: Patient instructed to cont prior HEP.  Exercises were reviewed and Soha was able to demonstrate them prior to the end of the session.  Soha demonstrated good  understanding of the education provided.     See EMR under patient instructions for exercises given.     Assessment     Continued with prime  strengthening with I/Y/T's above shoulder height to improve strength and tolerance to functional tasks performed in these ranges. Soha continues to demo consistent progress from week to week. She demo'd improved eccentric control with lowering after lift off of wall slide.     Soha is progressing well towards her goals.     Pt will continue to benefit from skilled outpatient physical therapy to address the deficits listed in the problem list box on initial evaluation, provide pt/family education and to maximize pt's level of independence in the home and community environment. Pt prognosis is Good.     Pt's spiritual, cultural and educational needs considered and pt agreeable to plan of care and goals.    Anticipated barriers to physical therapy: None    Goals:  Short Term Goals: 8 weeks  1. Pt will be compliant with HEP 50% of prescribed amount. (Met)  2. The pt to demo improvement in R shoulder PROM to by 80% of the L shoulder. (Met)  3.  The pt to demo tolerance to being out of the sling for 24 hours with pain <2/10 (Met)    Long Term Goals: 24 weeks   1. Pt will be compliant with % of prescribed amount. (Met)  2. The pt to improvement in AROM of R shoulder within 80% of L shoulder to improve tolerance to OH movement. (met)  3. The pt to  Demo at least 4+/5 of RTC muscle testing to demo improvement in tolerance to activity. (Met)  4. The pt  to tolerate lifting 5# overhead to improve tolerance to ADLs and work related activities   5. The pt will report full participation in ADLs and IADLs without restrictions related to R Shoulder.       Plan     Continue per POC for massive cuff repair with emphasis on prime  and local stabilizer strengthening with progressions as appropriate. Intro strengthening in newly acquired ER ROM as appropriate.       Artur Patel, PT, DPT

## 2022-04-26 ENCOUNTER — CLINICAL SUPPORT (OUTPATIENT)
Dept: REHABILITATION | Facility: HOSPITAL | Age: 67
End: 2022-04-26
Payer: COMMERCIAL

## 2022-04-26 DIAGNOSIS — M25.611 DECREASED RIGHT SHOULDER RANGE OF MOTION: Primary | ICD-10-CM

## 2022-04-26 DIAGNOSIS — M62.81 MUSCLE WEAKNESS OF RIGHT UPPER EXTREMITY: ICD-10-CM

## 2022-04-26 PROCEDURE — 97530 THERAPEUTIC ACTIVITIES: CPT

## 2022-04-26 PROCEDURE — 97112 NEUROMUSCULAR REEDUCATION: CPT

## 2022-04-26 PROCEDURE — 97110 THERAPEUTIC EXERCISES: CPT

## 2022-04-26 NOTE — PROGRESS NOTES
"  Physical Therapy Daily Treatment Note     Name: Soha Wheatley  Clinic Number: [id number]    Therapy Diagnosis:   Encounter Diagnoses   Name Primary?    Decreased right shoulder range of motion Yes    Muscle weakness of right upper extremity      Physician: Darryl Zamora PA-C    Visit Date: 4/26/2022  Physician Orders: PT Eval and Treat  Medical Diagnosis from Referral: M75.101 (ICD-10-CM) - Nontraumatic tear of right rotator cuff, unspecified tear extent  Evaluation Date: 6/28/2021  Authorization Period Expiration: 12/31/2021  Plan of Care Expiration: 12/31/2021  Visit # / Visits authorized: 25 / 40 (66 total)    Time In: 1302  Time Out: 1403  Total Billable Time: 54 minutes    Precautions: Standard    Postop plan for the patient is to follow the large size rotator cuff repair protocol.      DOS: 6/23/2021    Subjective     Pt reports: She continues to increase her activity. She is most challenged right now with retrieving bigger dishes from the top shelf of her cabinets due to lack of strength.     She was compliant with home exercise program.  Response to previous treatment: Increased AROM/PROM.   Functional change: N/A    Pain: 0/10  Location: R shoulder     Objective     Daily Measurements:     NT      Daily Treatment       Soha received therapeutic exercises to develop strength, endurance, ROM and flexibility for 34 minutes including:   Pulleys x6 mins  Supine 3# Cane Flx Str 4x20"  LT GTB Step back 3x8  S/L ER 3x15 3#  Prone Ext 3x12 3#    Soha received the following manual therapy techniques: were applied to the: L shoulder for 00 minutes, including:        Soha participated in neuromuscular re-education activities to improve: Coordination, Kinesthetic, Sense, Proprioception and Motor Control for 10 minutes. The following activities were included:  SA Wall Slide w/ eccentric step back 4x5 3#    Soha participated in dynamic functional therapeutic activities to improve functional performance for " 10  minutes, includin/90 DB Carry <-> 60 ft 6x 2#        Home Exercises and Patient Education Provided     Education provided:   - Reviewed HEP.     Written Home Exercises Provided: Patient instructed to cont prior HEP.  Exercises were reviewed and Soha was able to demonstrate them prior to the end of the session.  Soha demonstrated good  understanding of the education provided.     See EMR under patient instructions for exercises given.     Assessment     Intro'd 90/90 DB Carry to improve functional capacity to perform tasks above shoulder height with ambulation. Pt has poor muscular endurance which should improve with continue progression of these type of tasks and consistent strengthening. Notably improved ER in flexion/scaption plane which has improved pt's ability to perform flexion to full ROM.     Soha is progressing well towards her goals.     Pt will continue to benefit from skilled outpatient physical therapy to address the deficits listed in the problem list box on initial evaluation, provide pt/family education and to maximize pt's level of independence in the home and community environment. Pt prognosis is Good.     Pt's spiritual, cultural and educational needs considered and pt agreeable to plan of care and goals.    Anticipated barriers to physical therapy: None    Goals:  Short Term Goals: 8 weeks  1. Pt will be compliant with HEP 50% of prescribed amount. (Met)  2. The pt to demo improvement in R shoulder PROM to by 80% of the L shoulder. (Met)  3.  The pt to demo tolerance to being out of the sling for 24 hours with pain <2/10 (Met)    Long Term Goals: 24 weeks   1. Pt will be compliant with % of prescribed amount. (Met)  2. The pt to improvement in AROM of R shoulder within 80% of L shoulder to improve tolerance to OH movement. (met)  3. The pt to  Demo at least 4+/5 of RTC muscle testing to demo improvement in tolerance to activity. (Met)  4. The pt to tolerate lifting 5#  overhead to improve tolerance to ADLs and work related activities   5. The pt will report full participation in ADLs and IADLs without restrictions related to R Shoulder.       Plan     Continue per POC for massive cuff repair with emphasis on prime  and local stabilizer strengthening with progressions as appropriate. Intro strengthening in newly acquired ER ROM as appropriate.       Artur Patel, PT, DPT

## 2022-04-27 ENCOUNTER — CLINICAL SUPPORT (OUTPATIENT)
Dept: REHABILITATION | Facility: HOSPITAL | Age: 67
End: 2022-04-27
Payer: COMMERCIAL

## 2022-04-27 DIAGNOSIS — M62.81 MUSCLE WEAKNESS OF RIGHT UPPER EXTREMITY: ICD-10-CM

## 2022-04-27 DIAGNOSIS — M25.611 DECREASED RIGHT SHOULDER RANGE OF MOTION: Primary | ICD-10-CM

## 2022-04-27 PROCEDURE — 97140 MANUAL THERAPY 1/> REGIONS: CPT

## 2022-04-27 PROCEDURE — 97110 THERAPEUTIC EXERCISES: CPT

## 2022-04-27 PROCEDURE — 97530 THERAPEUTIC ACTIVITIES: CPT

## 2022-04-27 NOTE — PROGRESS NOTES
"  Physical Therapy Daily Treatment Note     Name: Soha Wheatley  Clinic Number: [id number]    Therapy Diagnosis:   Encounter Diagnoses   Name Primary?    Decreased right shoulder range of motion Yes    Muscle weakness of right upper extremity      Physician: Darryl Zamora PA-C    Visit Date: 4/27/2022  Physician Orders: PT Eval and Treat  Medical Diagnosis from Referral: M75.101 (ICD-10-CM) - Nontraumatic tear of right rotator cuff, unspecified tear extent  Evaluation Date: 6/28/2021  Authorization Period Expiration: 12/31/2021  Plan of Care Expiration: 12/31/2021  Visit # / Visits authorized: 26 / 40 (67 total)    Time In: 1000  Time Out: 1059  Total Billable Time: 59 minutes    Precautions: Standard    Postop plan for the patient is to follow the large size rotator cuff repair protocol.      DOS: 6/23/2021    Subjective     Pt reports: She notes muscular fatigue and soreness from yesterday's treatment but no pain.      She was compliant with home exercise program.  Response to previous treatment: Increased AROM/PROM.   Functional change: N/A    Pain: 0/10  Location: R shoulder     Objective     Daily Measurements:     NT      Daily Treatment       Soha received therapeutic exercises to develop strength, endurance, ROM and flexibility for 36 minutes including:   UBE completed for 4/4 min forward & backward to increase ROM, endurance and decrease pain to improve tolerance to ADLs and age related activities.   Supine 3# Cane Flx Str 4x20"  Supine Cane ER Str 3x30"  S/L IR AROM 2x15  90/90 UE supported 3x12 2#      Soha received the following manual therapy techniques: were applied to the: L shoulder for 13 minutes, including:  PROM/Stretching  GH Grd IV Inf mob      Soha participated in neuromuscular re-education activities to improve: Coordination, Kinesthetic, Sense, Proprioception and Motor Control for 00 minutes. The following activities were included:      Soha participated in dynamic functional " therapeutic activities to improve functional performance for 10  minutes, including:  Ulises Carry 15# <-> 120 ft 5x    Not Performed:  90/90 DB Carry <-> 60 ft 6x 2#        Home Exercises and Patient Education Provided     Education provided:   - Reviewed HEP.     Written Home Exercises Provided: Patient instructed to cont prior HEP.  Exercises were reviewed and Soha was able to demonstrate them prior to the end of the session.  Soha demonstrated good  understanding of the education provided.     See EMR under patient instructions for exercises given.     Assessment     Emphasis of today's treatment was on motor control and ROM which continues progress well. Progressed functional training with farmer's carry with emphasis on shoulder girdle stability during ambulation. Soha continues to respond well to recent progressions and was observed lifting her purse into a shelf at shoulder height with her involved side which is a noted improvement in functional ability.     Soha is progressing well towards her goals.     Pt will continue to benefit from skilled outpatient physical therapy to address the deficits listed in the problem list box on initial evaluation, provide pt/family education and to maximize pt's level of independence in the home and community environment. Pt prognosis is Good.     Pt's spiritual, cultural and educational needs considered and pt agreeable to plan of care and goals.    Anticipated barriers to physical therapy: None    Goals:  Short Term Goals: 8 weeks  1. Pt will be compliant with HEP 50% of prescribed amount. (Met)  2. The pt to demo improvement in R shoulder PROM to by 80% of the L shoulder. (Met)  3.  The pt to demo tolerance to being out of the sling for 24 hours with pain <2/10 (Met)    Long Term Goals: 24 weeks   1. Pt will be compliant with % of prescribed amount. (Met)  2. The pt to improvement in AROM of R shoulder within 80% of L shoulder to improve tolerance to OH  movement. (met)  3. The pt to  Demo at least 4+/5 of RTC muscle testing to demo improvement in tolerance to activity. (Met)  4. The pt to tolerate lifting 5# overhead to improve tolerance to ADLs and work related activities   5. The pt will report full participation in ADLs and IADLs without restrictions related to R Shoulder.       Plan     Continue per POC for massive cuff repair with emphasis on prime  and local stabilizer strengthening with progressions as appropriate. Intro strengthening in newly acquired ER ROM as appropriate.       Artur Patel, PT, DPT

## 2022-05-03 ENCOUNTER — CLINICAL SUPPORT (OUTPATIENT)
Dept: REHABILITATION | Facility: HOSPITAL | Age: 67
End: 2022-05-03
Payer: COMMERCIAL

## 2022-05-03 DIAGNOSIS — M62.81 MUSCLE WEAKNESS OF RIGHT UPPER EXTREMITY: ICD-10-CM

## 2022-05-03 DIAGNOSIS — M25.611 DECREASED RIGHT SHOULDER RANGE OF MOTION: Primary | ICD-10-CM

## 2022-05-03 PROCEDURE — 97110 THERAPEUTIC EXERCISES: CPT

## 2022-05-03 PROCEDURE — 97530 THERAPEUTIC ACTIVITIES: CPT

## 2022-05-03 PROCEDURE — 97140 MANUAL THERAPY 1/> REGIONS: CPT

## 2022-05-03 NOTE — PROGRESS NOTES
"  Physical Therapy Daily Treatment Note     Name: Soha Wheatley  Clinic Number: [id number]    Therapy Diagnosis:   Encounter Diagnoses   Name Primary?    Decreased right shoulder range of motion Yes    Muscle weakness of right upper extremity      Physician: Darryl Zamora PA-C    Visit Date: 5/3/2022  Physician Orders: PT Eval and Treat  Medical Diagnosis from Referral: M75.101 (ICD-10-CM) - Nontraumatic tear of right rotator cuff, unspecified tear extent  Evaluation Date: 6/28/2021  Authorization Period Expiration: 12/31/2021  Plan of Care Expiration: 12/31/2021  Visit # / Visits authorized: 27 / 40 (68 total)    Time In: 0758  Time Out: 0905  Total Billable Time: 57 minutes    Precautions: Standard    Postop plan for the patient is to follow the large size rotator cuff repair protocol.      DOS: 6/23/2021    Subjective     Pt reports: Mild discomfort with EROM Flexion/Abduction    She was compliant with home exercise program.  Response to previous treatment: Increased AROM/PROM.   Functional change: N/A    Pain: 0/10  Location: R shoulder     Objective     Daily Measurements:     NT      Daily Treatment       Soha received therapeutic exercises to develop strength, endurance, ROM and flexibility for 36 minutes including:   UBE completed for 4/4 min forward & backward to increase ROM, endurance and decrease pain to improve tolerance to ADLs and age related activities.   LLLD Inferior GH Entriken UE Prop x6 mins 4#  S/L Thoracic rotation x10 ea.   Seated thoracic Ext 10x5" holds  90/90 UE supported 3x12 2#      Soha received the following manual therapy techniques: were applied to the: L shoulder for 13 minutes, including:  PROM/Stretching  GH Grd IV Inf mob  Supine thoracic HVLA      Soha participated in neuromuscular re-education activities to improve: Coordination, Kinesthetic, Sense, Proprioception and Motor Control for 00 minutes. The following activities were included:      Soha participated in " dynamic functional therapeutic activities to improve functional performance for 8  minutes, includin/90 DB Carry <-> 60 ft 6x 2#    Not Performed:  Montgomery Carry 15# <-> 120 ft 5x          Home Exercises and Patient Education Provided     Education provided:   - Reviewed HEP.     Written Home Exercises Provided: Patient instructed to cont prior HEP.  Exercises were reviewed and Soha was able to demonstrate them prior to the end of the session.  Soha demonstrated good  understanding of the education provided.     See EMR under patient instructions for exercises given.     Assessment     Soha presents with mild irritability with EROM Flexion/abduction which improved significantly with interventions to improve thoracic mobility and inferior GH accessory mobility. She demo'd full pain free AROM flexion and abduction following today's treatment. She is responding to strengthening progressions well.      Soha is progressing well towards her goals.     Pt will continue to benefit from skilled outpatient physical therapy to address the deficits listed in the problem list box on initial evaluation, provide pt/family education and to maximize pt's level of independence in the home and community environment. Pt prognosis is Good.     Pt's spiritual, cultural and educational needs considered and pt agreeable to plan of care and goals.    Anticipated barriers to physical therapy: None    Goals:  Short Term Goals: 8 weeks  1. Pt will be compliant with HEP 50% of prescribed amount. (Met)  2. The pt to demo improvement in R shoulder PROM to by 80% of the L shoulder. (Met)  3.  The pt to demo tolerance to being out of the sling for 24 hours with pain <2/10 (Met)    Long Term Goals: 24 weeks   1. Pt will be compliant with % of prescribed amount. (Met)  2. The pt to improvement in AROM of R shoulder within 80% of L shoulder to improve tolerance to OH movement. (met)  3. The pt to  Demo at least 4+/5 of RTC muscle  testing to demo improvement in tolerance to activity. (Met)  4. The pt to tolerate lifting 5# overhead to improve tolerance to ADLs and work related activities   5. The pt will report full participation in ADLs and IADLs without restrictions related to R Shoulder.       Plan     Continue per POC for massive cuff repair with emphasis on prime  and local stabilizer strengthening with progressions as appropriate. Intro strengthening in newly acquired ER ROM as appropriate.       Artur Patel, PT, DPT

## 2022-05-05 ENCOUNTER — PATIENT MESSAGE (OUTPATIENT)
Dept: BARIATRICS | Facility: CLINIC | Age: 67
End: 2022-05-05
Payer: COMMERCIAL

## 2022-05-06 ENCOUNTER — CLINICAL SUPPORT (OUTPATIENT)
Dept: REHABILITATION | Facility: HOSPITAL | Age: 67
End: 2022-05-06
Payer: COMMERCIAL

## 2022-05-06 DIAGNOSIS — M25.611 DECREASED RIGHT SHOULDER RANGE OF MOTION: Primary | ICD-10-CM

## 2022-05-06 DIAGNOSIS — M62.81 MUSCLE WEAKNESS OF RIGHT UPPER EXTREMITY: ICD-10-CM

## 2022-05-06 PROCEDURE — 97110 THERAPEUTIC EXERCISES: CPT

## 2022-05-06 PROCEDURE — 97112 NEUROMUSCULAR REEDUCATION: CPT

## 2022-05-06 PROCEDURE — 97530 THERAPEUTIC ACTIVITIES: CPT

## 2022-05-06 NOTE — PROGRESS NOTES
"  Physical Therapy Daily Treatment Note     Name: Soha Wheatley  Clinic Number: [id number]    Therapy Diagnosis:   Encounter Diagnoses   Name Primary?    Decreased right shoulder range of motion Yes    Muscle weakness of right upper extremity      Physician: Darryl Zamora PA-C    Visit Date: 5/6/2022  Physician Orders: PT Eval and Treat  Medical Diagnosis from Referral: M75.101 (ICD-10-CM) - Nontraumatic tear of right rotator cuff, unspecified tear extent  Evaluation Date: 6/28/2021  Authorization Period Expiration: 12/31/2021  Plan of Care Expiration: 12/31/2021  Visit # / Visits authorized: 28 / 40 (69 total)    Time In: 1002  Time Out: 1059  Total Billable Time: 57 minutes    Precautions: Standard    Postop plan for the patient is to follow the large size rotator cuff repair protocol.      DOS: 6/23/2021    Subjective     Pt reports: Pain free ROM today.     She was compliant with home exercise program.  Response to previous treatment: Increased AROM/PROM.   Functional change: N/A    Pain: 0/10  Location: R shoulder     Objective     Daily Measurements:     NT      Daily Treatment       Soha received therapeutic exercises to develop strength, endurance, ROM and flexibility for 36 minutes including:   UBE completed for 4/4 min forward & backward to increase ROM, endurance and decrease pain to improve tolerance to ADLs and age related activities.   OTB ER Step out 3x10  PTB IR Step out 3x10   S/L Thoracic rotation x10 ea.   Seated thoracic Ext 10x5" holds  90/90 UE supported 3x12 2#  Standing I/Y/T 1# 3x10       Soha received the following manual therapy techniques: were applied to the: L shoulder for 00 minutes, including:        Soha participated in neuromuscular re-education activities to improve: Coordination, Kinesthetic, Sense, Proprioception and Motor Control for 13 minutes. The following activities were included:  SA Wall slide w/LT Lift 4# 4x5    Soha participated in dynamic functional " therapeutic activities to improve functional performance for 8  minutes, includin/90 DB Carry <-> 60 ft 6x 2#    Not Performed:  Montgomery Carry 15# <-> 120 ft 5x          Home Exercises and Patient Education Provided     Education provided:   - Reviewed HEP.     Written Home Exercises Provided: Patient instructed to cont prior HEP.  Exercises were reviewed and Soha was able to demonstrate them prior to the end of the session.  Soha demonstrated good  understanding of the education provided.     See EMR under patient instructions for exercises given.     Assessment     Soha presents with improvement with EROM flexion and abduction. Resumed OH strengthening and training. Pt continues to demo lack of muscular endurance which is slowly improving     Soha is progressing well towards her goals.     Pt will continue to benefit from skilled outpatient physical therapy to address the deficits listed in the problem list box on initial evaluation, provide pt/family education and to maximize pt's level of independence in the home and community environment. Pt prognosis is Good.     Pt's spiritual, cultural and educational needs considered and pt agreeable to plan of care and goals.    Anticipated barriers to physical therapy: None    Goals:  Short Term Goals: 8 weeks  1. Pt will be compliant with HEP 50% of prescribed amount. (Met)  2. The pt to demo improvement in R shoulder PROM to by 80% of the L shoulder. (Met)  3.  The pt to demo tolerance to being out of the sling for 24 hours with pain <2/10 (Met)    Long Term Goals: 24 weeks   1. Pt will be compliant with % of prescribed amount. (Met)  2. The pt to improvement in AROM of R shoulder within 80% of L shoulder to improve tolerance to OH movement. (met)  3. The pt to  Demo at least 4+/5 of RTC muscle testing to demo improvement in tolerance to activity. (Met)  4. The pt to tolerate lifting 5# overhead to improve tolerance to ADLs and work related  activities   5. The pt will report full participation in ADLs and IADLs without restrictions related to R Shoulder.       Plan     Continue per POC for massive cuff repair with emphasis on prime  and local stabilizer strengthening with progressions as appropriate. Intro strengthening in newly acquired ER ROM as appropriate.       Artur Patel, PT, DPT

## 2022-05-09 ENCOUNTER — CLINICAL SUPPORT (OUTPATIENT)
Dept: REHABILITATION | Facility: HOSPITAL | Age: 67
End: 2022-05-09
Payer: COMMERCIAL

## 2022-05-09 DIAGNOSIS — M25.611 DECREASED RIGHT SHOULDER RANGE OF MOTION: Primary | ICD-10-CM

## 2022-05-09 DIAGNOSIS — M62.81 MUSCLE WEAKNESS OF RIGHT UPPER EXTREMITY: ICD-10-CM

## 2022-05-09 PROCEDURE — 97112 NEUROMUSCULAR REEDUCATION: CPT

## 2022-05-09 PROCEDURE — 97110 THERAPEUTIC EXERCISES: CPT

## 2022-05-09 NOTE — PROGRESS NOTES
"  Physical Therapy Daily Treatment Note     Name: Soha Wheatley  Clinic Number: [id number]    Therapy Diagnosis:   Encounter Diagnoses   Name Primary?    Decreased right shoulder range of motion Yes    Muscle weakness of right upper extremity      Physician: Darryl Zamora PA-C    Visit Date: 5/9/2022  Physician Orders: PT Eval and Treat  Medical Diagnosis from Referral: M75.101 (ICD-10-CM) - Nontraumatic tear of right rotator cuff, unspecified tear extent  Evaluation Date: 6/28/2021  Authorization Period Expiration: 12/31/2021  Plan of Care Expiration: 12/31/2021  Visit # / Visits authorized: 29 / 40 (70 total)    Time In: 1003  Time Out: 1058  Total Billable Time: 55 minutes    Precautions: Standard    Postop plan for the patient is to follow the large size rotator cuff repair protocol.      DOS: 6/23/2021    Subjective     Pt reports: She had a busy weekend and she just has some mild general soreness. Otherwise feeling good.    She was compliant with home exercise program.  Response to previous treatment: Increased AROM/PROM.   Functional change: N/A    Pain: 0/10  Location: R shoulder     Objective     Daily Measurements:     NT      Daily Treatment       Soha received therapeutic exercises to develop strength, endurance, ROM and flexibility for 44 minutes including:   UBE completed for 4/4 min forward & backward to increase ROM, endurance and decrease pain to improve tolerance to ADLs and age related activities.   Pulleys x6 mins  Seated thoracic extension in chair x10  S/L Open Book Thoracic ext x10 ea.   Supine 90/90 ER str in scapular plane 10x10"  S/L ER 3# 3x12  Prone Row on table 3x10 10#      Soha received the following manual therapy techniques: were applied to the: L shoulder for 00 minutes, including:         Soha participated in neuromuscular re-education activities to improve: Coordination, Kinesthetic, Sense, Proprioception and Motor Control for 9 minutes. The following activities were " included:  SA Wall slide w/LT Lift 4# 4x5    Soha participated in dynamic functional therapeutic activities to improve functional performance for 00  minutes, including:    Not Performed:  Montgomery Carry 15# <-> 120 ft 5x  90/90 DB Carry <-> 60 ft 6x 2#        Home Exercises and Patient Education Provided     Education provided:   - Reviewed HEP.     Written Home Exercises Provided: Patient instructed to cont prior HEP.  Exercises were reviewed and Soha was able to demonstrate them prior to the end of the session.  Soha demonstrated good  understanding of the education provided.     See EMR under patient instructions for exercises given.     Assessment     Decreased irritability with AROM elevation today. Emphasized scapular control and ROM today due to patient's reports of soreness. Pt continues to make an unremarkable recovery at this time and is progressing consistently toward DC.       Soha is progressing well towards her goals.     Pt will continue to benefit from skilled outpatient physical therapy to address the deficits listed in the problem list box on initial evaluation, provide pt/family education and to maximize pt's level of independence in the home and community environment. Pt prognosis is Good.     Pt's spiritual, cultural and educational needs considered and pt agreeable to plan of care and goals.    Anticipated barriers to physical therapy: None    Goals:  Short Term Goals: 8 weeks  1. Pt will be compliant with HEP 50% of prescribed amount. (Met)  2. The pt to demo improvement in R shoulder PROM to by 80% of the L shoulder. (Met)  3.  The pt to demo tolerance to being out of the sling for 24 hours with pain <2/10 (Met)    Long Term Goals: 24 weeks   1. Pt will be compliant with % of prescribed amount. (Met)  2. The pt to improvement in AROM of R shoulder within 80% of L shoulder to improve tolerance to OH movement. (met)  3. The pt to  Demo at least 4+/5 of RTC muscle testing to demo  improvement in tolerance to activity. (Met)  4. The pt to tolerate lifting 5# overhead to improve tolerance to ADLs and work related activities   5. The pt will report full participation in ADLs and IADLs without restrictions related to R Shoulder.       Plan     Continue per POC for massive cuff repair with emphasis on prime  and local stabilizer strengthening with progressions as appropriate. Intro strengthening in newly acquired ER ROM as appropriate.       Artur Patel, PT, DPT

## 2022-05-10 ENCOUNTER — PATIENT MESSAGE (OUTPATIENT)
Dept: BARIATRICS | Facility: CLINIC | Age: 67
End: 2022-05-10
Payer: COMMERCIAL

## 2022-05-11 ENCOUNTER — TELEPHONE (OUTPATIENT)
Dept: INTERNAL MEDICINE | Facility: CLINIC | Age: 67
End: 2022-05-11
Payer: COMMERCIAL

## 2022-05-11 ENCOUNTER — OFFICE VISIT (OUTPATIENT)
Dept: INTERNAL MEDICINE | Facility: CLINIC | Age: 67
End: 2022-05-11
Payer: COMMERCIAL

## 2022-05-11 VITALS
WEIGHT: 242.31 LBS | TEMPERATURE: 97 F | HEIGHT: 70 IN | RESPIRATION RATE: 16 BRPM | SYSTOLIC BLOOD PRESSURE: 112 MMHG | DIASTOLIC BLOOD PRESSURE: 68 MMHG | BODY MASS INDEX: 34.69 KG/M2

## 2022-05-11 DIAGNOSIS — R42 ACUTE SEVERE VERTIGO: Primary | ICD-10-CM

## 2022-05-11 DIAGNOSIS — R42 DIZZINESS OF UNKNOWN CAUSE: Primary | ICD-10-CM

## 2022-05-11 PROCEDURE — 1160F PR REVIEW ALL MEDS BY PRESCRIBER/CLIN PHARMACIST DOCUMENTED: ICD-10-PCS | Mod: CPTII,95,, | Performed by: NURSE PRACTITIONER

## 2022-05-11 PROCEDURE — 1159F PR MEDICATION LIST DOCUMENTED IN MEDICAL RECORD: ICD-10-PCS | Mod: CPTII,95,, | Performed by: NURSE PRACTITIONER

## 2022-05-11 PROCEDURE — 3008F BODY MASS INDEX DOCD: CPT | Mod: CPTII,S$GLB,, | Performed by: FAMILY MEDICINE

## 2022-05-11 PROCEDURE — 1160F RVW MEDS BY RX/DR IN RCRD: CPT | Mod: CPTII,95,, | Performed by: NURSE PRACTITIONER

## 2022-05-11 PROCEDURE — 3074F SYST BP LT 130 MM HG: CPT | Mod: CPTII,S$GLB,, | Performed by: FAMILY MEDICINE

## 2022-05-11 PROCEDURE — 1159F MED LIST DOCD IN RCRD: CPT | Mod: CPTII,95,, | Performed by: NURSE PRACTITIONER

## 2022-05-11 PROCEDURE — 99213 PR OFFICE/OUTPT VISIT, EST, LEVL III, 20-29 MIN: ICD-10-PCS | Mod: S$GLB,,, | Performed by: FAMILY MEDICINE

## 2022-05-11 PROCEDURE — 3008F PR BODY MASS INDEX (BMI) DOCUMENTED: ICD-10-PCS | Mod: CPTII,S$GLB,, | Performed by: FAMILY MEDICINE

## 2022-05-11 PROCEDURE — 3078F PR MOST RECENT DIASTOLIC BLOOD PRESSURE < 80 MM HG: ICD-10-PCS | Mod: CPTII,S$GLB,, | Performed by: FAMILY MEDICINE

## 2022-05-11 PROCEDURE — 3074F PR MOST RECENT SYSTOLIC BLOOD PRESSURE < 130 MM HG: ICD-10-PCS | Mod: CPTII,S$GLB,, | Performed by: FAMILY MEDICINE

## 2022-05-11 PROCEDURE — 99999 PR PBB SHADOW E&M-EST. PATIENT-LVL IV: CPT | Mod: PBBFAC,,, | Performed by: FAMILY MEDICINE

## 2022-05-11 PROCEDURE — 99499 NO LOS: ICD-10-PCS | Mod: 95,,, | Performed by: NURSE PRACTITIONER

## 2022-05-11 PROCEDURE — 99213 OFFICE O/P EST LOW 20 MIN: CPT | Mod: S$GLB,,, | Performed by: FAMILY MEDICINE

## 2022-05-11 PROCEDURE — 3078F DIAST BP <80 MM HG: CPT | Mod: CPTII,S$GLB,, | Performed by: FAMILY MEDICINE

## 2022-05-11 PROCEDURE — 1159F PR MEDICATION LIST DOCUMENTED IN MEDICAL RECORD: ICD-10-PCS | Mod: CPTII,S$GLB,, | Performed by: FAMILY MEDICINE

## 2022-05-11 PROCEDURE — 1159F MED LIST DOCD IN RCRD: CPT | Mod: CPTII,S$GLB,, | Performed by: FAMILY MEDICINE

## 2022-05-11 PROCEDURE — 99999 PR PBB SHADOW E&M-EST. PATIENT-LVL IV: ICD-10-PCS | Mod: PBBFAC,,, | Performed by: FAMILY MEDICINE

## 2022-05-11 PROCEDURE — 99499 UNLISTED E&M SERVICE: CPT | Mod: 95,,, | Performed by: NURSE PRACTITIONER

## 2022-05-11 RX ORDER — MECLIZINE HYDROCHLORIDE 25 MG/1
25 TABLET ORAL 3 TIMES DAILY PRN
Qty: 90 TABLET | Refills: 0 | Status: SHIPPED | OUTPATIENT
Start: 2022-05-11 | End: 2023-03-02

## 2022-05-11 NOTE — PROGRESS NOTES
Subjective:       Patient ID: Soha Wheatley is a 66 y.o. female.    Chief Complaint: Dizziness    The patient location is: [place]  The chief complaint leading to consultation is: dizzy    Visit type: audiovisual    Face to Face time with patient: 12 minutes  20 minutes of total time spent on the encounter, which includes face to face time and non-face to face time preparing to see the patient (eg, review of tests), Obtaining and/or reviewing separately obtained history, Documenting clinical information in the electronic or other health record, Independently interpreting results (not separately reported) and communicating results to the patient/family/caregiver, or Care coordination (not separately reported).     Each patient to whom he or she provides medical services by telemedicine is:  (1) informed of the relationship between the physician and patient and the respective role of any other health care provider with respect to management of the patient; and (2) notified that he or she may decline to receive medical services by telemedicine and may withdraw from such care at any time.    Notes: Pt of Dr. Mallory, here virtually for complain of being dizzy. She has had this in the past for some time but this morning worsened. In the past has taken Zyrtec as it was attributed to allergies which has worked in the past. However this morning she was super dizzy when she woke up, took zyrtec, got up to go to the bathroom and almost fell. And she has been dizzy all day and it is not working. Took her BP and it was 115/58. She is afraid to get up due to the dizziness.    I have reviewed the HPI/ROS info pt entered in portal prior to visit today below         Dizziness:   Chronicity:  Recurrent  Onset:  More than 1 month ago  Pain Scale:  0/10  Duration:  5 minutes  Dizziness characteristics:  Lightheaded/impending faint and sensation of movement  Frequency of Spells:  Daily   Associated symptoms: ear congestion, ear  pain, nausea and light-headedness.no hearing loss, no fever, no headaches, no tinnitus, no vomiting, no diaphoresis, no aural fullness, no weakness, no visual disturbances, no syncope, no palpitations, no panic, no facial weakness, no slurred speech, no numbness in extremities and no chest pain.  Aggravated by:  Position changes and getting up  Treatments tried: zyrtec.  Improvements on treatment:  No relief   PMH includes: environmental allergies.    Review of Systems   Constitutional: Negative for activity change, appetite change, chills, diaphoresis, fatigue, fever and unexpected weight change.   HENT: Positive for ear pain. Negative for hearing loss, rhinorrhea, tinnitus and trouble swallowing.         As documented in HPI     Eyes: Negative for discharge and visual disturbance.   Respiratory: Negative for chest tightness, shortness of breath and wheezing.    Cardiovascular: Negative for chest pain, palpitations, leg swelling, syncope and claudication.   Gastrointestinal: Positive for nausea. Negative for abdominal pain, blood in stool, constipation, diarrhea, vomiting and reflux.        Indigestion Monday   Endocrine: Negative for polydipsia and polyuria.   Genitourinary: Negative for difficulty urinating, dysuria, hematuria and menstrual problem.   Musculoskeletal: Negative for arthralgias, joint swelling and neck pain.   Allergic/Immunologic: Positive for environmental allergies. Negative for food allergies and immunocompromised state.   Neurological: Positive for dizziness and light-headedness. Negative for weakness, numbness and headaches.        As documented in HPI     Hematological: Negative for adenopathy. Does not bruise/bleed easily.   Psychiatric/Behavioral: Negative for confusion, dysphoric mood and suicidal ideas.     Review of patient's allergies indicates:   Allergen Reactions    Dermabond [tissue glues] Rash    Adhesive      Paper tape usually ok- pulls skin off    Flagyl [metronidazole hcl]       confusion       Current Outpatient Medications:     acetaminophen (TYLENOL) 650 MG TbSR, Take 1 tablet (650 mg total) by mouth every 8 (eight) hours as needed., Disp: 120 tablet, Rfl: 0    b complex vitamins tablet, Take 1 tablet by mouth once daily., Disp: , Rfl:     cyanocobalamin 500 MCG tablet, Take 500 mcg by mouth once daily., Disp: , Rfl:     gabapentin (NEURONTIN) 600 MG tablet, Take 1 tablet (600 mg total) by mouth 3 (three) times daily., Disp: 270 tablet, Rfl: 4    multivitamin (THERAGRAN) per tablet, Take 1 tablet by mouth once daily., Disp: , Rfl:     omeprazole (PRILOSEC) 40 MG capsule, TAKE 1 CAPSULE DAILY, Disp: 90 capsule, Rfl: 3    sertraline (ZOLOFT) 100 MG tablet, Take 1 tablet (100 mg total) by mouth once daily., Disp: 90 tablet, Rfl: 3    tiZANidine (ZANAFLEX) 4 MG tablet, Take 1 tablet (4 mg total) by mouth every 8 (eight) hours as needed (muscle spasms)., Disp: 30 tablet, Rfl: 0    docusate sodium (COLACE) 100 MG capsule, Take 1 capsule (100 mg total) by mouth 2 (two) times daily as needed for Constipation., Disp: 60 capsule, Rfl: 0  No current facility-administered medications for this visit.    Patient Active Problem List   Diagnosis    Back pain    Chronic pain of right knee    Calculus of right kidney    Nephrolithiasis    Cervical neuropathy    Family history of early CAD    Anxiety    GERD (gastroesophageal reflux disease)    Bariatric surgery status- s/p lap sleeve  on 12/29/2017    Sleep apnea in adult    Status post total right knee replacement 5/23/2018    Adjustment disorder with depressed mood    Obesity (BMI 30-39.9)    Status post total left knee replacement 10/31/2018    Right hip pain    Depression    Primary osteoarthritis of right hip    Status post total replacement of right hip    Nontraumatic complete tear of right rotator cuff    Nontraumatic tear of right rotator cuff    Decreased right shoulder range of motion    Muscle weakness of  right upper extremity     Past Medical History:   Diagnosis Date    Anxiety     Depression     GERD (gastroesophageal reflux disease)     Obesity     Sleep apnea in adult     WEARS CPAP, DOESNT KNOW SETTINGS.     Past Surgical History:   Procedure Laterality Date    ARTHROSCOPIC DEBRIDEMENT OF SHOULDER Right 2021    Procedure: DEBRIDEMENT, SHOULDER, ARTHROSCOPIC;  Surgeon: Papo Garcia MD;  Location: [facility] OR;  Service: Orthopedics;  Laterality: Right;  labrum    ARTHROSCOPIC REPAIR OF ROTATOR CUFF OF SHOULDER Right 2021    Procedure: REPAIR, ROTATOR CUFF, ARTHROSCOPIC;  Surgeon: Papo Garcia MD;  Location: [facility] OR;  Service: Orthopedics;  Laterality: Right;  regional w/catheter (interscalene)     SECTION      CHOLECYSTECTOMY      DECOMPRESSION OF SUBACROMIAL SPACE Right 2021    Procedure: DECOMPRESSION, SUBACROMIAL SPACE;  Surgeon: Papo Garcia MD;  Location: [facility] OR;  Service: Orthopedics;  Laterality: Right;    DISTAL CLAVICLE EXCISION Right 2021    Procedure: EXCISION, CLAVICLE, DISTAL;  Surgeon: Papo Garcia MD;  Location: [facility] OR;  Service: Orthopedics;  Laterality: Right;    FIXATION OF TENDON Right 2021    Procedure: FIXATION, TENDON;  Surgeon: Papo Garcia MD;  Location: [facility] OR;  Service: Orthopedics;  Laterality: Right;    GASTRECTOMY      KNEE ARTHRODESIS      arthroscopy - Left knee for meniscus     KNEE CARTILAGE SURGERY Left     TONSILLECTOMY      TOTAL KNEE ARTHROPLASTY Right 2018    Procedure: REPLACEMENT-KNEE-TOTAL;  Surgeon: John L. Ochsner Jr., MD;  Location: [facility] OR [address] [place] [address];  Service: Orthopedics;  Laterality: Right;    TOTAL KNEE ARTHROPLASTY Left 10/31/2018    Procedure: REPLACEMENT-KNEE-TOTAL;  Surgeon: John L. Ochsner Jr., MD;  Location: [facility] OR 2ND FLR;  Service: Orthopedics;  Laterality: Left;    TOTAL REPLACEMENT OF HIP JOINT USING COMPUTER-ASSISTED NAVIGATION Right 2021     Procedure: ARTHROPLASTY, HIP, TOTAL, DELTA COMPUTER-ASSISTED NAVIGATION;  Surgeon: Lázaro Carlos MD;  Location: [facility];  Service: Orthopedics;  Laterality: Right;    URETEROSCOPY       Social History     Socioeconomic History    Marital status:      Spouse name: Angel    Number of children: 2   Occupational History     Employer: [employer]   Tobacco Use    Smoking status: Never Smoker    Smokeless tobacco: Never Used   Substance and Sexual Activity    Alcohol use: Yes     Comment: social    Drug use: No   Social History Narrative    House      Social Determinants of Health     Financial Resource Strain: Low Risk     Difficulty of Paying Living Expenses: Not hard at all   Food Insecurity: No Food Insecurity    Worried About Running Out of Food in the Last Year: Never true    Ran Out of Food in the Last Year: Never true   Transportation Needs: No Transportation Needs    Lack of Transportation (Medical): No    Lack of Transportation (Non-Medical): No   Physical Activity: Insufficiently Active    Days of Exercise per Week: 1 day    Minutes of Exercise per Session: 20 min   Stress: No Stress Concern Present    Feeling of Stress : Only a little   Social Connections: Unknown    Frequency of Social Gatherings with Friends and Family: Twice a week    Active Member of Clubs or Organizations: Patient refused    Attends Club or Organization Meetings: Patient refused    Marital Status:    Housing Stability: Unknown    Unable to Pay for Housing in the Last Year: No    Number of Places Lived in the Last Year: 1     Family History   Problem Relation Age of Onset    Cancer Mother         breast    Hyperlipidemia Father     Hypertension Father     VANDANA disease Maternal Grandmother     Heart disease Maternal Grandmother     Hyperlipidemia Maternal Grandmother     Colon cancer Maternal Grandmother     Pancreatic cancer Maternal Grandfather     Cancer Maternal Grandfather 88    Heart  disease Maternal Uncle     Heart attack Paternal Grandfather     Celiac disease Neg Hx     Cirrhosis Neg Hx     Colon polyps Neg Hx     Crohn's disease Neg Hx     Cystic fibrosis Neg Hx     Esophageal cancer Neg Hx     Hemochromatosis Neg Hx     Inflammatory bowel disease Neg Hx     Irritable bowel syndrome Neg Hx     Liver cancer Neg Hx     Liver disease Neg Hx     Rectal cancer Neg Hx     Stomach cancer Neg Hx     Ulcerative colitis Neg Hx     Silvestre's disease Neg Hx            Objective:      Physical Exam      Limited PE, seen virtually, no distress during video visit    Assessment:       Problem List Items Addressed This Visit    None     Visit Diagnoses     Dizziness of unknown cause    -  Primary          Plan:       Soha was seen today for dizziness.    Diagnoses and all orders for this visit:    Dizziness of unknown cause  Advised pt that complaint cannot be thoroughly assessed virtually so no charge for this visit she will need to come in as cause could be cardiac which would require and exam, possible EKG    We were able to schedule her with Dr. Crabtree for 3pm and her  is driving her to appt.     No charge for this visit.    Tammie Alvarez DNP, FNP-C

## 2022-05-11 NOTE — TELEPHONE ENCOUNTER
Provider logged on at 143pm, waiting on pt, will call if she is not on in a timely fashion    Tammie Alvarez DNP, FNP-C

## 2022-05-11 NOTE — PROGRESS NOTES
Subjective:       Patient ID: Soha Wheatley is a 66 y.o. female. PCP Andrew Malloyr MD     Chief Complaint: Dizziness and Ear Problem (Right )    Patient is here for severe acute vertigo and spinning, bouncing into walls at home.  Had a little vertigo a few weeks ago, started zyrtec for allergies, got better but sudden severe episode started int he past day and not going away though intensity may vary    Social History     Tobacco Use    Smoking status: Never Smoker    Smokeless tobacco: Never Used   Substance Use Topics    Alcohol use: Yes     Comment: social    Drug use: No       Family History   Problem Relation Age of Onset    Cancer Mother         breast    Hyperlipidemia Father     Hypertension Father     VANDANA disease Maternal Grandmother     Heart disease Maternal Grandmother     Hyperlipidemia Maternal Grandmother     Colon cancer Maternal Grandmother     Pancreatic cancer Maternal Grandfather     Cancer Maternal Grandfather 88    Heart disease Maternal Uncle     Heart attack Paternal Grandfather     Celiac disease Neg Hx     Cirrhosis Neg Hx     Colon polyps Neg Hx     Crohn's disease Neg Hx     Cystic fibrosis Neg Hx     Esophageal cancer Neg Hx     Hemochromatosis Neg Hx     Inflammatory bowel disease Neg Hx     Irritable bowel syndrome Neg Hx     Liver cancer Neg Hx     Liver disease Neg Hx     Rectal cancer Neg Hx     Stomach cancer Neg Hx     Ulcerative colitis Neg Hx     Silvestre's disease Neg Hx        Past Surgical History:   Procedure Laterality Date    ARTHROSCOPIC DEBRIDEMENT OF SHOULDER Right 6/23/2021    Procedure: DEBRIDEMENT, SHOULDER, ARTHROSCOPIC;  Surgeon: Papo Garcia MD;  Location: [facility] OR;  Service: Orthopedics;  Laterality: Right;  labrum    ARTHROSCOPIC REPAIR OF ROTATOR CUFF OF SHOULDER Right 6/23/2021    Procedure: REPAIR, ROTATOR CUFF, ARTHROSCOPIC;  Surgeon: Papo Garcia MD;  Location: [facility] OR;  Service: Orthopedics;  Laterality:  Right;  regional w/catheter (interscalene)     SECTION      CHOLECYSTECTOMY      DECOMPRESSION OF SUBACROMIAL SPACE Right 2021    Procedure: DECOMPRESSION, SUBACROMIAL SPACE;  Surgeon: Papo Garcia MD;  Location: [facility] OR;  Service: Orthopedics;  Laterality: Right;    DISTAL CLAVICLE EXCISION Right 2021    Procedure: EXCISION, CLAVICLE, DISTAL;  Surgeon: Papo Garcia MD;  Location: [facility] OR;  Service: Orthopedics;  Laterality: Right;    FIXATION OF TENDON Right 2021    Procedure: FIXATION, TENDON;  Surgeon: Papo Garcia MD;  Location: [facility] OR;  Service: Orthopedics;  Laterality: Right;    GASTRECTOMY      KNEE ARTHRODESIS      arthroscopy - Left knee for meniscus     KNEE CARTILAGE SURGERY Left     TONSILLECTOMY      TOTAL KNEE ARTHROPLASTY Right 2018    Procedure: REPLACEMENT-KNEE-TOTAL;  Surgeon: John L. Ochsner Jr., MD;  [facility];  Service: Orthopedics;  Laterality: Right;    TOTAL KNEE ARTHROPLASTY Left 10/31/2018    Procedure: REPLACEMENT-KNEE-TOTAL;  Surgeon: John L. Ochsner Jr., MD;  Location: [facility] OR [facility] FLR;  Service: Orthopedics;  Laterality: Left;    TOTAL REPLACEMENT OF HIP JOINT USING COMPUTER-ASSISTED NAVIGATION Right 2021    Procedure: ARTHROPLASTY, HIP, TOTAL, DELTA COMPUTER-ASSISTED NAVIGATION;  Surgeon: Lázaro Carlos MD;  Location: [facility] OR;  Service: Orthopedics;  Laterality: Right;    URETEROSCOPY         Patient Active Problem List   Diagnosis    Back pain    Chronic pain of right knee    Calculus of right kidney    Nephrolithiasis    Cervical neuropathy    Family history of early CAD    Anxiety    GERD (gastroesophageal reflux disease)    Bariatric surgery status- s/p lap sleeve  on 2017    Sleep apnea in adult    Status post total right knee replacement 2018    Adjustment disorder with depressed mood    Obesity (BMI 30-39.9)    Status post total left knee  replacement 10/31/2018    Right hip pain    Depression    Primary osteoarthritis of right hip    Status post total replacement of right hip    Nontraumatic complete tear of right rotator cuff    Nontraumatic tear of right rotator cuff    Decreased right shoulder range of motion    Muscle weakness of right upper extremity       Current Outpatient Medications on File Prior to Visit   Medication Sig Dispense Refill    acetaminophen (TYLENOL) 650 MG TbSR Take 1 tablet (650 mg total) by mouth every 8 (eight) hours as needed. 120 tablet 0    b complex vitamins tablet Take 1 tablet by mouth once daily.      cyanocobalamin 500 MCG tablet Take 500 mcg by mouth once daily.      docusate sodium (COLACE) 100 MG capsule Take 1 capsule (100 mg total) by mouth 2 (two) times daily as needed for Constipation. 60 capsule 0    gabapentin (NEURONTIN) 600 MG tablet Take 1 tablet (600 mg total) by mouth 3 (three) times daily. 270 tablet 4    multivitamin (THERAGRAN) per tablet Take 1 tablet by mouth once daily.      omeprazole (PRILOSEC) 40 MG capsule TAKE 1 CAPSULE DAILY 90 capsule 3    sertraline (ZOLOFT) 100 MG tablet Take 1 tablet (100 mg total) by mouth once daily. 90 tablet 3    tiZANidine (ZANAFLEX) 4 MG tablet Take 1 tablet (4 mg total) by mouth every 8 (eight) hours as needed (muscle spasms). 30 tablet 0    [DISCONTINUED] aspirin 81 MG Chew Take 1 tablet (81 mg total) by mouth once daily. (Patient not taking: Reported on 10/5/2021) 28 tablet 0    [DISCONTINUED] HYDROcodone-acetaminophen (NORCO) 5-325 mg per tablet Take 1 tablet by mouth every 8 (eight) hours as needed for Pain. (Patient not taking: Reported on 12/21/2021) 20 tablet 0    [DISCONTINUED] promethazine (PHENERGAN) 25 MG tablet Take 1 tablet (25 mg total) by mouth every 6 (six) hours as needed for Nausea. 20 tablet 0    [DISCONTINUED] traMADoL (ULTRAM) 50 mg tablet Take 1 tablet (50 mg total) by mouth every 6 (six) hours as needed for Pain.  "(Patient not taking: Reported on 12/21/2021) 28 tablet 0     Current Facility-Administered Medications on File Prior to Visit   Medication Dose Route Frequency Provider Last Rate Last Admin    [DISCONTINUED] fentaNYL 50 mcg/mL injection 25 mcg  25 mcg Intravenous Q5 Min PRN Cali Saenz MD        [DISCONTINUED] midazolam (VERSED) 1 mg/mL injection 0.5 mg  0.5 mg Intravenous PRN Cali Saenz MD   2 mg at 06/23/21 0635    [DISCONTINUED] ropivacaine 0.2% Nimbus PainPRO Pump infusion 500 ML   Perineural Continuous Cali Saenz MD   New Bag at 06/23/21 1049           Review of Systems   Constitutional: Negative for chills and fever.   HENT: Negative for ear pain.    Eyes: Negative for pain.   Respiratory: Negative for chest tightness.    Cardiovascular: Negative for chest pain.   Gastrointestinal: Negative for abdominal pain.   Genitourinary: Negative for flank pain.   Musculoskeletal: Negative for gait problem.   Neurological: Positive for dizziness. Negative for syncope.   Psychiatric/Behavioral: Negative for behavioral problems.       Objective:     /68 (BP Location: Right arm, Patient Position: Sitting, BP Method: Medium (Manual))   Temp 97 °F (36.1 °C) (Oral)   Resp 16   Ht 5' 10" (1.778 m)   Wt 109.9 kg (242 lb 4.6 oz)   BMI 34.76 kg/m²     Physical Exam  Vitals and nursing note reviewed.   Constitutional:       Appearance: She is well-developed.   HENT:      Head: Normocephalic and atraumatic.      Right Ear: Tympanic membrane, ear canal and external ear normal. There is no impacted cerumen.      Left Ear: Tympanic membrane, ear canal and external ear normal. There is no impacted cerumen.   Cardiovascular:      Rate and Rhythm: Normal rate.      Heart sounds: Normal heart sounds.   Pulmonary:      Effort: No respiratory distress.      Breath sounds: Normal breath sounds. No wheezing or rales.   Abdominal:      Palpations: Abdomen is soft.   Musculoskeletal: "      Cervical back: Neck supple.   Skin:     General: Skin is dry.   Neurological:      Mental Status: She is alert.   Psychiatric:         Behavior: Behavior normal.         Assessment:       1. Acute severe vertigo        Plan:       Soha was seen today for dizziness and ear problem.    Diagnoses and all orders for this visit:    Acute severe vertigo  -     meclizine (ANTIVERT) 25 mg tablet; Take 1 tablet (25 mg total) by mouth 3 (three) times daily as needed (vertigo). #90, no RF - told it is OK ot use q 2 hours if needed even if more than TID  -     Ambulatory referral/consult to ENT; Future - given how severe it is, will refer to ENT now rather than wait a month  -we reviewed Epley maneuver on youtube video and gave her the url for it for them to try at home

## 2022-05-12 ENCOUNTER — TELEPHONE (OUTPATIENT)
Dept: OTOLARYNGOLOGY | Facility: CLINIC | Age: 67
End: 2022-05-12
Payer: COMMERCIAL

## 2022-05-12 NOTE — TELEPHONE ENCOUNTER
----- Message from Trisha Marroquin RN sent at 5/12/2022  7:53 AM CDT -----  Contact: JEFE YATES [[id number]]  Please assist patient with scheduling. Thanks  ----- Message -----  From: Mani Moctezuma  Sent: 5/11/2022   9:33 AM CDT  To: [facility] Ent Clinical Staff    Type:  Same Day Appointment Request    Caller is requesting a same day appointment.  Caller declined first available appointment listed below.      Name of Caller:JEFE YATES [[id number]]    When is the first available appointment? 5/25    Symptoms: Vertigo, causing patient to vomit     Best Call Back Number: [phone] (mobile)    Additional Information:

## 2022-05-13 ENCOUNTER — OFFICE VISIT (OUTPATIENT)
Dept: OTOLARYNGOLOGY | Facility: CLINIC | Age: 67
End: 2022-05-13
Payer: COMMERCIAL

## 2022-05-13 ENCOUNTER — CLINICAL SUPPORT (OUTPATIENT)
Dept: AUDIOLOGY | Facility: CLINIC | Age: 67
End: 2022-05-13
Payer: COMMERCIAL

## 2022-05-13 VITALS — SYSTOLIC BLOOD PRESSURE: 114 MMHG | HEART RATE: 64 BPM | DIASTOLIC BLOOD PRESSURE: 65 MMHG

## 2022-05-13 DIAGNOSIS — R42 DIZZINESS: Primary | ICD-10-CM

## 2022-05-13 DIAGNOSIS — H90.3 SENSORINEURAL HEARING LOSS (SNHL) OF BOTH EARS: Primary | ICD-10-CM

## 2022-05-13 DIAGNOSIS — H81.11 BPPV (BENIGN PAROXYSMAL POSITIONAL VERTIGO), RIGHT: ICD-10-CM

## 2022-05-13 DIAGNOSIS — H90.3 SENSORINEURAL HEARING LOSS (SNHL) OF BOTH EARS: ICD-10-CM

## 2022-05-13 PROCEDURE — 3074F SYST BP LT 130 MM HG: CPT | Mod: CPTII,S$GLB,, | Performed by: NURSE PRACTITIONER

## 2022-05-13 PROCEDURE — 99999 PR PBB SHADOW E&M-EST. PATIENT-LVL II: ICD-10-PCS | Mod: PBBFAC,,,

## 2022-05-13 PROCEDURE — 1159F PR MEDICATION LIST DOCUMENTED IN MEDICAL RECORD: ICD-10-PCS | Mod: CPTII,S$GLB,, | Performed by: NURSE PRACTITIONER

## 2022-05-13 PROCEDURE — 92567 PR TYMPA2METRY: ICD-10-PCS | Mod: S$GLB,,, | Performed by: AUDIOLOGIST

## 2022-05-13 PROCEDURE — 3078F DIAST BP <80 MM HG: CPT | Mod: CPTII,S$GLB,, | Performed by: NURSE PRACTITIONER

## 2022-05-13 PROCEDURE — 92557 COMPREHENSIVE HEARING TEST: CPT | Mod: S$GLB,,, | Performed by: AUDIOLOGIST

## 2022-05-13 PROCEDURE — 92557 PR COMPREHENSIVE HEARING TEST: ICD-10-PCS | Mod: S$GLB,,, | Performed by: AUDIOLOGIST

## 2022-05-13 PROCEDURE — 99999 PR PBB SHADOW E&M-EST. PATIENT-LVL III: CPT | Mod: PBBFAC,,, | Performed by: NURSE PRACTITIONER

## 2022-05-13 PROCEDURE — 3074F PR MOST RECENT SYSTOLIC BLOOD PRESSURE < 130 MM HG: ICD-10-PCS | Mod: CPTII,S$GLB,, | Performed by: NURSE PRACTITIONER

## 2022-05-13 PROCEDURE — 92567 TYMPANOMETRY: CPT | Mod: S$GLB,,, | Performed by: AUDIOLOGIST

## 2022-05-13 PROCEDURE — 99204 PR OFFICE/OUTPT VISIT, NEW, LEVL IV, 45-59 MIN: ICD-10-PCS | Mod: S$GLB,,, | Performed by: NURSE PRACTITIONER

## 2022-05-13 PROCEDURE — 3078F PR MOST RECENT DIASTOLIC BLOOD PRESSURE < 80 MM HG: ICD-10-PCS | Mod: CPTII,S$GLB,, | Performed by: NURSE PRACTITIONER

## 2022-05-13 PROCEDURE — 99999 PR PBB SHADOW E&M-EST. PATIENT-LVL II: CPT | Mod: PBBFAC,,,

## 2022-05-13 PROCEDURE — 1159F MED LIST DOCD IN RCRD: CPT | Mod: CPTII,S$GLB,, | Performed by: NURSE PRACTITIONER

## 2022-05-13 PROCEDURE — 99999 PR PBB SHADOW E&M-EST. PATIENT-LVL III: ICD-10-PCS | Mod: PBBFAC,,, | Performed by: NURSE PRACTITIONER

## 2022-05-13 PROCEDURE — 99204 OFFICE O/P NEW MOD 45 MIN: CPT | Mod: S$GLB,,, | Performed by: NURSE PRACTITIONER

## 2022-05-13 NOTE — PROGRESS NOTES
"  Subjective:      Soha Wheatley is a 66 y.o. female who was self-referred for dizziness.    She reports the symptoms started about 1 month ago and are ongoing. The attacks occur daily and last a few minutes.  She has been taking Meclizine and doing "exercises" recommended by primary care and this has improved her symptoms.  She describes the dizziness as a room spinning sensation.  Positions that worsen symptoms include any motion, but bending over and turning to her side will trigger the spinning. Previous workup/treatments: none.  She denies any associated ear symptoms including: aural pressure, otalgia, otorrhea, tinnitus or hearing loss.  She denies any associated CNS symptoms including: confusion, dimming vision, drowsiness, headaches, paresthesias, personality change, speech change, unconsciousness and visual floaters.  She denies recent infections, head trauma or drug ingestion.  She denies a history of loud noise exposure.  She denies a family history of any dizzy disorders.    Past Medical History  She has a past medical history of Anxiety, Depression, GERD (gastroesophageal reflux disease), Obesity, and Sleep apnea in adult.    Past Surgical History  She has a past surgical history that includes  section (); Knee arthrodesis (); Cholecystectomy; Ureteroscopy; Gastrectomy; Knee cartilage surgery (Left, ); Tonsillectomy (); Total knee arthroplasty (Right, 2018); Total knee arthroplasty (Left, 10/31/2018); Total replacement of hip joint using computer-assisted navigation (Right, 2021); Arthroscopic repair of rotator cuff of shoulder (Right, 2021); Fixation of tendon (Right, 2021); Distal clavicle excision (Right, 2021); Decompression of subacromial space (Right, 2021); and Arthroscopic debridement of shoulder (Right, 2021).    Family History  Her family history includes Cancer in her mother; Cancer (age of onset: 88) in her maternal grandfather; " Colon cancer in her maternal grandmother; VANDANA disease in her maternal grandmother; Heart attack in her paternal grandfather; Heart disease in her maternal grandmother and maternal uncle; Hyperlipidemia in her father and maternal grandmother; Hypertension in her father; Pancreatic cancer in her maternal grandfather.    Social History  She reports that she has never smoked. She has never used smokeless tobacco. She reports current alcohol use. She reports that she does not use drugs.    Allergies  She is allergic to dermabond [tissue glues], adhesive, and flagyl [metronidazole hcl].    Medications  She has a current medication list which includes the following prescription(s): acetaminophen, b complex vitamins, cyanocobalamin, docusate sodium, gabapentin, meclizine, multivitamin, omeprazole, sertraline, and tizanidine.    Review of Systems     Constitutional: Negative for appetite change, chills, fatigue, fever and unexpected weight loss.      HENT: Positive for nosebleeds.      Eyes:  Positive for eye itching.     Respiratory:  Negative for cough, shortness of breath, sleep apnea, snoring and wheezing.      Cardiovascular:  Negative for chest pain, foot swelling, irregular heartbeat and swollen veins.     Gastrointestinal:  Positive for acid reflux, heartburn and vomiting.     Genitourinary: Negative for difficulty urinating, sexual problems and frequent urination.     Musc: Positive for neck pain.     Skin: Negative for rash.     Allergy: Positive for seasonal allergies.     Endocrine: Negative for cold intolerance and heat intolerance.      Neurological: Positive for dizziness and headaches.     Hematologic: Negative for bruises/bleeds easily and swollen glands.      Psychiatric: Negative for decreased concentration, depression, nervous/anxious and sleep disturbance.          Objective:   /65   Pulse 64      Constitutional:   She is oriented to person, place, and time. She appears well-developed and  well-nourished. She appears alert. She is cooperative.  Non-toxic appearance. She does not have a sickly appearance. She does not appear ill. Normal speech.      Head:  Normocephalic and atraumatic. Not macrocephalic and not microcephalic. Head is without raccoon's eyes, without Luna's sign, without abrasion, without laceration, without right periorbital erythema, without left periorbital erythema and without TMJ tenderness.     Ears:    Right Ear: No lacerations. No drainage, swelling or tenderness. No foreign bodies. No mastoid tenderness. Tympanic membrane is not injected, not scarred, not perforated, not erythematous, not retracted and not bulging. No middle ear effusion. No hemotympanum.   Left Ear: No lacerations. No drainage, swelling or tenderness. No foreign bodies. No mastoid tenderness. Tympanic membrane is not injected, not scarred, not perforated, not erythematous, not retracted and not bulging.  No middle ear effusion. No hemotympanum.     Psychiatric:   She has a normal mood and affect. Her speech is normal and behavior is normal.     Neurological:   She is alert and oriented to person, place, and time. Coordination and gait normal.     Avon-Hallpike Left: Negative for torsional and up-beating nystagmus    Jose Manuel-Hallpike Right: Positive for torsional and up-beating nystagmus    Tandem Gait: CNT    Heel to Shin: normal    Finger to nose & finger to finger test: No Dysmetria    Rapid alternating movements: No Dysdiadochokinesia    Romberg: Negative    Procedure    Epley maneuver performed in clinic    Data Reviewed    WBC (K/uL)   Date Value   06/08/2021 5.26     Platelets (K/uL)   Date Value   06/08/2021 217      Creatinine (mg/dL)   Date Value   06/08/2021 0.9     TSH (uIU/mL)   Date Value   08/16/2017 1.064     Glucose (mg/dL)   Date Value   06/08/2021 95     Hemoglobin A1C (%)   Date Value   12/06/2017 5.1       Audiogram    I independently reviewed the tracings of the complete audiometric evaluation  performed today.  I reviewed the audiogram with the patient as well.  Pertinent findings include binaural sloping HF sensorineural hearing loss with normal tymps.    Assessment:     1. Sensorineural hearing loss (SNHL) of both ears    2. BPPV (benign paroxysmal positional vertigo), right      Plan:     BPPV (benign paroxysmal positional vertigo), right        -     Patient's HPI and physical exam consistent with BPPV.  We discussed the pathology of BPPV including the displacement of crystals (canaliths) within the inner ear.  Epley maneuver can be performed in-office, but may require multiple treatments for success.         -     Right Canalith Repositioning procedure performed in clinic.        -     Provided her with at home Epley maneuver instructions.    Sensorineural hearing loss (SNHL) of both ears        -     Audiometric testing interpretation consistent with sensorineural hearing loss.  Discussed the etiology of SNHL.        -     Hearing conservation strongly recommended when in noisy environments.          -     Patient encouraged to return to clinic every year for audiometric testing.    Follow up in about 2 weeks (around 5/27/2022), or if symptoms worsen or fail to improve.

## 2022-05-13 NOTE — PROGRESS NOTES
5/13/2022    AUDIOLOGICAL EVALUATION:    Soha Wheatley was seen for an audiological evaluation on 5/13/2022 for dizziness and hearing loss.     Pure tone thresholds revealed normal hearing sensitivity through 2000 Hz and a mild-to-moderate high frequency sensorineural hearing loss bilaterally.   Speech reception thresholds were obtained at 5 dBHL for the right ear and 5 dBHL for the left ear.  Speech discrimination scores were obtained at 100% for the right ear and 100% for the left ear.    Tympanometry was within normal limits bilaterally indicating normal middle ear function.    Recommend:  1. Otologic evaluation.  2. Hearing protection in noise.  3. Annual hearing evaluation.

## 2022-05-16 ENCOUNTER — CLINICAL SUPPORT (OUTPATIENT)
Dept: REHABILITATION | Facility: HOSPITAL | Age: 67
End: 2022-05-16
Payer: COMMERCIAL

## 2022-05-16 DIAGNOSIS — M25.611 DECREASED RIGHT SHOULDER RANGE OF MOTION: Primary | ICD-10-CM

## 2022-05-16 DIAGNOSIS — M62.81 MUSCLE WEAKNESS OF RIGHT UPPER EXTREMITY: ICD-10-CM

## 2022-05-16 PROCEDURE — 97112 NEUROMUSCULAR REEDUCATION: CPT

## 2022-05-16 PROCEDURE — 97530 THERAPEUTIC ACTIVITIES: CPT

## 2022-05-16 PROCEDURE — 97110 THERAPEUTIC EXERCISES: CPT

## 2022-05-16 NOTE — PROGRESS NOTES
Physical Therapy Daily Treatment Note     Name: Soha Wheatley  Clinic Number: [id number]    Therapy Diagnosis:   Encounter Diagnoses   Name Primary?    Decreased right shoulder range of motion Yes    Muscle weakness of right upper extremity      Physician: Darryl Zamora PA-C    Visit Date: 5/16/2022  Physician Orders: PT Eval and Treat  Medical Diagnosis from Referral: M75.101 (ICD-10-CM) - Nontraumatic tear of right rotator cuff, unspecified tear extent  Evaluation Date: 6/28/2021  Authorization Period Expiration: 12/31/2021  Plan of Care Expiration: 12/31/2021  Visit # / Visits authorized: 30 / 40 (71 total)    Time In: 0949  Time Out: 1046  Total Billable Time: 56 minutes    Precautions: Standard    Postop plan for the patient is to follow the large size rotator cuff repair protocol.      DOS: 6/23/2021    Subjective     Pt reports: She had a busy weekend and she just has some mild general soreness. Otherwise feeling good.    She was compliant with home exercise program.  Response to previous treatment: Increased AROM/PROM.   Functional change: N/A    Pain: 0/10  Location: R shoulder     Objective     Daily Measurements:     NT      Daily Treatment       Soha received therapeutic exercises to develop strength, endurance, ROM and flexibility for 37 minutes including:   UBE completed for 4/4 min forward & backward to increase ROM, endurance and decrease pain to improve tolerance to ADLs and age related activities.   Seated thoracic extension in chair x10  S/L Open Book Thoracic ext x10 ea.   S/L ER 3# 3x12  Prone Row on table 3x10 10#  Prone LT Ext 3x10 3#      Soha received the following manual therapy techniques: were applied to the: L shoulder for 00 minutes, including:         Soha participated in neuromuscular re-education activities to improve: Coordination, Kinesthetic, Sense, Proprioception and Motor Control for 9 minutes. The following activities were included:  SA Wall slide w/LT Lift 4#  4x5    Soha participated in dynamic functional therapeutic activities to improve functional performance for 11 minutes, including:  [place] Carry 15# <-> 120 ft 5x          Home Exercises and Patient Education Provided     Education provided:   - Reviewed HEP.     Written Home Exercises Provided: Patient instructed to cont prior HEP.  Exercises were reviewed and Soha was able to demonstrate them prior to the end of the session.  Soha demonstrated good  understanding of the education provided.     See EMR under patient instructions for exercises given.     Assessment     Pt presents with full AROM pain free. Continued with targeted shoulder girdle strengthening with appropriate progressions. We discussed anticipated progressions to improve ability to lift and retrieve objects from top shelves which I anticipate progressing next visit.     Soha is progressing well towards her goals.     Pt will continue to benefit from skilled outpatient physical therapy to address the deficits listed in the problem list box on initial evaluation, provide pt/family education and to maximize pt's level of independence in the home and community environment. Pt prognosis is Good.     Pt's spiritual, cultural and educational needs considered and pt agreeable to plan of care and goals.    Anticipated barriers to physical therapy: None    Goals:  Short Term Goals: 8 weeks  1. Pt will be compliant with HEP 50% of prescribed amount. (Met)  2. The pt to demo improvement in R shoulder PROM to by 80% of the L shoulder. (Met)  3.  The pt to demo tolerance to being out of the sling for 24 hours with pain <2/10 (Met)    Long Term Goals: 24 weeks   1. Pt will be compliant with % of prescribed amount. (Met)  2. The pt to improvement in AROM of R shoulder within 80% of L shoulder to improve tolerance to OH movement. (met)  3. The pt to  Demo at least 4+/5 of RTC muscle testing to demo improvement in tolerance to activity. (Met)  4. The pt to  tolerate lifting 5# overhead to improve tolerance to ADLs and work related activities   5. The pt will report full participation in ADLs and IADLs without restrictions related to R Shoulder.       Plan     Continue per POC for massive cuff repair with emphasis on prime  and local stabilizer strengthening with progressions as appropriate. Intro strengthening in newly acquired ER ROM as appropriate.       Artur Patel, PT, DPT

## 2022-05-18 ENCOUNTER — CLINICAL SUPPORT (OUTPATIENT)
Dept: REHABILITATION | Facility: HOSPITAL | Age: 67
End: 2022-05-18
Payer: COMMERCIAL

## 2022-05-18 DIAGNOSIS — M62.81 MUSCLE WEAKNESS OF RIGHT UPPER EXTREMITY: ICD-10-CM

## 2022-05-18 DIAGNOSIS — M25.611 DECREASED RIGHT SHOULDER RANGE OF MOTION: Primary | ICD-10-CM

## 2022-05-18 PROCEDURE — 97112 NEUROMUSCULAR REEDUCATION: CPT

## 2022-05-18 PROCEDURE — 97110 THERAPEUTIC EXERCISES: CPT

## 2022-05-18 NOTE — PROGRESS NOTES
Physical Therapy Daily Treatment Note     Name: Soha Wheatley  Clinic Number: [id number]    Therapy Diagnosis:   Encounter Diagnoses   Name Primary?    Decreased right shoulder range of motion Yes    Muscle weakness of right upper extremity      Physician: Darryl Zamora PA-C    Visit Date: 5/18/2022  Physician Orders: PT Eval and Treat  Medical Diagnosis from Referral: M75.101 (ICD-10-CM) - Nontraumatic tear of right rotator cuff, unspecified tear extent  Evaluation Date: 6/28/2021  Authorization Period Expiration: 12/31/2021  Plan of Care Expiration: 12/31/2021  Visit # / Visits authorized: 31 / 40 (72 total)    Time In: 1055  Time Out: 1159  Total Billable Time: 60 minutes    Precautions: Standard    Postop plan for the patient is to follow the large size rotator cuff repair protocol.      DOS: 6/23/2021    Subjective     Pt reports: Her shoulder is feeling great. She is just a little fatigued does have some difficulty with mopping her floor.     She was compliant with home exercise program.  Response to previous treatment: Increased AROM/PROM.   Functional change: N/A    Pain: 0/10  Location: R shoulder     Objective     Daily Measurements:     NT      Daily Treatment       Soha received therapeutic exercises to develop strength, endurance, ROM and flexibility for 52 minutes including:   Pulleys x6 mins.   Seated thoracic extension in chair x10  S/L Open Book Thoracic ext x10 ea.   SA Bear Hugs PTB 2x12   Segmental Dumbell Ladder at Wall 2# 4x4   UE Supported teres Min. ER 3x10 3#  S/L ER 3# 3x12  Prone Row on table 3x10 10#  Prone LT Ext 3x10 3#      Soha received the following manual therapy techniques: were applied to the: L shoulder for 00 minutes, including:         Soha participated in neuromuscular re-education activities to improve: Coordination, Kinesthetic, Sense, Proprioception and Motor Control for 8 minutes. The following activities were included:  SA Wall slide w/LT Lift 4#  4x5      Soha participated in dynamic functional therapeutic activities to improve functional performance for 11 minutes, including:            Home Exercises and Patient Education Provided     Education provided:   - Reviewed HEP.     Written Home Exercises Provided: Patient instructed to cont prior HEP.  Exercises were reviewed and Soha was able to demonstrate them prior to the end of the session.  Soha demonstrated good  understanding of the education provided.     See EMR under patient instructions for exercises given.     Assessment     Pt is responding well to recent progressions with no adverse response and is maintaining full ROM. Progressed UE strengthening and functional training with OH tasks with segmental DB lifts at wall which was performed with good control but very fatiguing for shoulder musculature. Pt's primary limitation is decreased strength and muscular endurance, and functional task tolerance continues to improve as this progresses.     Soha is progressing well towards her goals.     Pt will continue to benefit from skilled outpatient physical therapy to address the deficits listed in the problem list box on initial evaluation, provide pt/family education and to maximize pt's level of independence in the home and community environment. Pt prognosis is Good.     Pt's spiritual, cultural and educational needs considered and pt agreeable to plan of care and goals.    Anticipated barriers to physical therapy: None    Goals:  Short Term Goals: 8 weeks  1. Pt will be compliant with HEP 50% of prescribed amount. (Met)  2. The pt to demo improvement in R shoulder PROM to by 80% of the L shoulder. (Met)  3.  The pt to demo tolerance to being out of the sling for 24 hours with pain <2/10 (Met)    Long Term Goals: 24 weeks   1. Pt will be compliant with % of prescribed amount. (Met)  2. The pt to improvement in AROM of R shoulder within 80% of L shoulder to improve tolerance to OH movement.  (met)  3. The pt to  Demo at least 4+/5 of RTC muscle testing to demo improvement in tolerance to activity. (Met)  4. The pt to tolerate lifting 5# overhead to improve tolerance to ADLs and work related activities   5. The pt will report full participation in ADLs and IADLs without restrictions related to R Shoulder.       Plan     Continue per POC for massive cuff repair with emphasis on prime  and local stabilizer strengthening with progressions as appropriate.       Artur Patel, PT, DPT

## 2022-05-24 ENCOUNTER — CLINICAL SUPPORT (OUTPATIENT)
Dept: REHABILITATION | Facility: HOSPITAL | Age: 67
End: 2022-05-24
Payer: COMMERCIAL

## 2022-05-24 DIAGNOSIS — M62.81 MUSCLE WEAKNESS OF RIGHT UPPER EXTREMITY: ICD-10-CM

## 2022-05-24 DIAGNOSIS — M25.611 DECREASED RIGHT SHOULDER RANGE OF MOTION: Primary | ICD-10-CM

## 2022-05-24 PROCEDURE — 97110 THERAPEUTIC EXERCISES: CPT

## 2022-05-24 PROCEDURE — 97112 NEUROMUSCULAR REEDUCATION: CPT

## 2022-05-24 NOTE — PROGRESS NOTES
Physical Therapy Daily Treatment Note     Name: Soha Wheatley  Clinic Number: [id number]    Therapy Diagnosis:   Encounter Diagnoses   Name Primary?    Decreased right shoulder range of motion Yes    Muscle weakness of right upper extremity      Physician: Darryl Zamora PA-C    Visit Date: 5/24/2022  Physician Orders: PT Eval and Treat  Medical Diagnosis from Referral: M75.101 (ICD-10-CM) - Nontraumatic tear of right rotator cuff, unspecified tear extent  Evaluation Date: 6/28/2021  Authorization Period Expiration: 12/31/2021  Plan of Care Expiration: 12/31/2021  Visit # / Visits authorized: 32 / 40 (73 total)    Time In: 0945  Time Out: 1039  Total Billable Time: 54 minutes    Precautions: Standard    Postop plan for the patient is to follow the large size rotator cuff repair protocol.      DOS: 6/23/2021    Subjective     Pt reports: Her shoulder is sore but is doing well.     She was compliant with home exercise program.  Response to previous treatment: Increased AROM/PROM.   Functional change: N/A    Pain: 0/10  Location: R shoulder     Objective     Daily Measurements:     NT      Daily Treatment       Soha received therapeutic exercises to develop strength, endurance, ROM and flexibility for 39 minutes including:   UBE completed for 4/4 min forward & backward to increase ROM, endurance and decrease pain to improve tolerance to ADLs and age related activities.   Seated thoracic extension in chair x10  S/L Open Book Thoracic ext x10 ea.   Segmental Dumbell Ladder at Wall 2# 4x5       Not Performed:  SA Bear Hugs PTB 2x12   UE Supported teres Min. ER 3x10 3#  S/L ER 3# 3x12  Prone Row on table 3x10 10#  Prone LT Ext 3x10 3#      Soha received the following manual therapy techniques: were applied to the: L shoulder for 00 minutes, including:         Soha participated in neuromuscular re-education activities to improve: Coordination, Kinesthetic, Sense, Proprioception and Motor Control for 15 minutes.  The following activities were included:  Seated Cable Pull down w/LT cue 3x15 10#  Standing I/Y/T At mirror for visual feedback for UT/levator compensation 3x5 2#      Soha participated in dynamic functional therapeutic activities to improve functional performance for 11 minutes, including:            Home Exercises and Patient Education Provided     Education provided:   - Reviewed HEP.     Written Home Exercises Provided: Patient instructed to cont prior HEP.  Exercises were reviewed and Soha was able to demonstrate them prior to the end of the session.  Soha demonstrated good  understanding of the education provided.     See EMR under patient instructions for exercises given.     Assessment     Pt initially reported SA discomfort with dumbbell wall ladder which improved following cues to avoid UT/deltoid dominant movement by pushing through her elbow which resolved her complaints indicating continued compensatory strategy with loaded movement above shoulder height. Her awareness and ability to correct for this has improved substantially.     Soha is progressing well towards her goals.     Pt will continue to benefit from skilled outpatient physical therapy to address the deficits listed in the problem list box on initial evaluation, provide pt/family education and to maximize pt's level of independence in the home and community environment. Pt prognosis is Good.     Pt's spiritual, cultural and educational needs considered and pt agreeable to plan of care and goals.    Anticipated barriers to physical therapy: None    Goals:  Short Term Goals: 8 weeks  1. Pt will be compliant with HEP 50% of prescribed amount. (Met)  2. The pt to demo improvement in R shoulder PROM to by 80% of the L shoulder. (Met)  3.  The pt to demo tolerance to being out of the sling for 24 hours with pain <2/10 (Met)    Long Term Goals: 24 weeks   1. Pt will be compliant with % of prescribed amount. (Met)  2. The pt to improvement  in AROM of R shoulder within 80% of L shoulder to improve tolerance to OH movement. (met)  3. The pt to  Demo at least 4+/5 of RTC muscle testing to demo improvement in tolerance to activity. (Met)  4. The pt to tolerate lifting 5# overhead to improve tolerance to ADLs and work related activities   5. The pt will report full participation in ADLs and IADLs without restrictions related to R Shoulder.       Plan     Continue per POC for massive cuff repair with emphasis on prime  and local stabilizer strengthening with progressions as appropriate.       Artur Patel, PT, DPT

## 2022-05-26 ENCOUNTER — CLINICAL SUPPORT (OUTPATIENT)
Dept: REHABILITATION | Facility: HOSPITAL | Age: 67
End: 2022-05-26
Payer: COMMERCIAL

## 2022-05-26 DIAGNOSIS — M62.81 MUSCLE WEAKNESS OF RIGHT UPPER EXTREMITY: ICD-10-CM

## 2022-05-26 DIAGNOSIS — M25.611 DECREASED RIGHT SHOULDER RANGE OF MOTION: Primary | ICD-10-CM

## 2022-05-26 PROCEDURE — 97112 NEUROMUSCULAR REEDUCATION: CPT

## 2022-05-26 PROCEDURE — 97110 THERAPEUTIC EXERCISES: CPT

## 2022-05-26 NOTE — PROGRESS NOTES
"  Physical Therapy Daily Treatment Note     Name: Soha Wheatley  Clinic Number: [id number]    Therapy Diagnosis:   Encounter Diagnoses   Name Primary?    Decreased right shoulder range of motion Yes    Muscle weakness of right upper extremity      Physician: Darryl Zamora PA-C    Visit Date: 5/26/2022  Physician Orders: PT Eval and Treat  Medical Diagnosis from Referral: M75.101 (ICD-10-CM) - Nontraumatic tear of right rotator cuff, unspecified tear extent  Evaluation Date: 6/28/2021  Authorization Period Expiration: 12/31/2021  Plan of Care Expiration: 12/31/2021  Visit # / Visits authorized: 33 / 40 (74 total)    Time In: 0950  Time Out: 1051  Total Billable Time: 56 minutes    Precautions: Standard    Postop plan for the patient is to follow the large size rotator cuff repair protocol.      DOS: 6/23/2021    Subjective     Pt reports: No soreness today. Her shoulder is feeling good.     She was compliant with home exercise program.  Response to previous treatment: Increased AROM/PROM.   Functional change: N/A    Pain: 0/10  Location: R shoulder     Objective     Daily Measurements:     NT      Daily Treatment       Soha received therapeutic exercises to develop strength, endurance, ROM and flexibility for 46 minutes including:   UBE completed for 4/4 min forward & backward to increase ROM, endurance and decrease pain to improve tolerance to ADLs and age related activities.   Seated thoracic extension in chair x10  S/L Open Book Thoracic ext x10 ea.   SA Bear Hugs PTB 2x12   Segmental Dumbell Ladder at Wall 2# 4x5   Supine 3# Wand flexion stretch 3x20"  Supine Cane ER Str 3x30"      Not Performed:  UE Supported teres Min. ER 3x10 3#  S/L ER 3# 3x12  Prone Row on table 3x10 10#  Prone LT Ext 3x10 3#      Soha received the following manual therapy techniques: were applied to the: L shoulder for 00 minutes, including:         Soha participated in neuromuscular re-education activities to improve: " Coordination, Kinesthetic, Sense, Proprioception and Motor Control for 10 minutes. The following activities were included:  Seated Cable Pull down w/LT cue 3x15 10#        Soha participated in dynamic functional therapeutic activities to improve functional performance for 11 minutes, including:            Home Exercises and Patient Education Provided     Education provided:   - Reviewed HEP.     Written Home Exercises Provided: Patient instructed to cont prior HEP.  Exercises were reviewed and Soha was able to demonstrate them prior to the end of the session.  Soha demonstrated good  understanding of the education provided.     See EMR under patient instructions for exercises given.     Assessment     Pt demo'd improved ability to control for UT/levator dominance with dumbbell ladder at wall. She continues to maintain AROM/PROM across treatments. I am expecting her to DC from PT following her next appointment with her physician.     Soha is progressing well towards her goals.     Pt will continue to benefit from skilled outpatient physical therapy to address the deficits listed in the problem list box on initial evaluation, provide pt/family education and to maximize pt's level of independence in the home and community environment. Pt prognosis is Good.     Pt's spiritual, cultural and educational needs considered and pt agreeable to plan of care and goals.    Anticipated barriers to physical therapy: None    Goals:  Short Term Goals: 8 weeks  1. Pt will be compliant with HEP 50% of prescribed amount. (Met)  2. The pt to demo improvement in R shoulder PROM to by 80% of the L shoulder. (Met)  3.  The pt to demo tolerance to being out of the sling for 24 hours with pain <2/10 (Met)    Long Term Goals: 24 weeks   1. Pt will be compliant with % of prescribed amount. (Met)  2. The pt to improvement in AROM of R shoulder within 80% of L shoulder to improve tolerance to OH movement. (met)  3. The pt to  Demo at  least 4+/5 of RTC muscle testing to demo improvement in tolerance to activity. (Met)  4. The pt to tolerate lifting 5# overhead to improve tolerance to ADLs and work related activities   5. The pt will report full participation in ADLs and IADLs without restrictions related to R Shoulder.       Plan     Continue per POC for massive cuff repair with emphasis on prime  and local stabilizer strengthening with progressions as appropriate.       Artur Patel, PT, DPT

## 2022-05-30 ENCOUNTER — PATIENT MESSAGE (OUTPATIENT)
Dept: ADMINISTRATIVE | Facility: HOSPITAL | Age: 67
End: 2022-05-30
Payer: COMMERCIAL

## 2022-05-31 ENCOUNTER — CLINICAL SUPPORT (OUTPATIENT)
Dept: REHABILITATION | Facility: HOSPITAL | Age: 67
End: 2022-05-31
Payer: COMMERCIAL

## 2022-05-31 DIAGNOSIS — M62.81 MUSCLE WEAKNESS OF RIGHT UPPER EXTREMITY: ICD-10-CM

## 2022-05-31 DIAGNOSIS — M25.611 DECREASED RIGHT SHOULDER RANGE OF MOTION: Primary | ICD-10-CM

## 2022-05-31 PROCEDURE — 97112 NEUROMUSCULAR REEDUCATION: CPT | Mod: CQ

## 2022-05-31 PROCEDURE — 97110 THERAPEUTIC EXERCISES: CPT | Mod: CQ

## 2022-05-31 NOTE — PROGRESS NOTES
"  Physical Therapy Daily Treatment Note     Name: Soha Wheatley  Clinic Number: [id number]    Therapy Diagnosis:   Encounter Diagnoses   Name Primary?    Decreased right shoulder range of motion Yes    Muscle weakness of right upper extremity      Physician: Darryl Zamora PA-C    Visit Date: 5/31/2022  Physician Orders: PT Eval and Treat  Medical Diagnosis from Referral: M75.101 (ICD-10-CM) - Nontraumatic tear of right rotator cuff, unspecified tear extent  Evaluation Date: 6/28/2021  Authorization Period Expiration: 12/31/2021  Plan of Care Expiration: 12/31/2021  Visit # / Visits authorized: 34 / 40 (74 total)    Time In: 1055  Time Out: 1155  Total Billable Time: 56 minutes    Precautions: Standard    Postop plan for the patient is to follow the large size rotator cuff repair protocol.      DOS: 6/23/2021    Subjective     Pt reports: no pain in R shld but stating tripping over area rug over the weekend on L shld and side but no injuries to R shld   She was compliant with home exercise program.  Response to previous treatment: Increased AROM/PROM.   Functional change: improved ADL's with R shld     Pain: 0/10  Location: R shoulder     Objective     Daily Measurements:     NT      Daily Treatment       Soha received therapeutic exercises to develop strength, endurance, ROM and flexibility for 46 minutes including:   UBE 4'/4' for increased ROM, circulation, and mm strength/endurance.   Prone Row on table 3x10 10#  Prone LT Ext 3x10 3#  S/L ER 3# 3x12  S/L Open Book Thoracic ext 30x ea   SA Bear Hugs PTB 3x10  Supine 3# Wand flexion stretch 3x20"  Supine Cane ER [id number]"      Soha received the following manual therapy techniques: were applied to the: L shoulder for 00 minutes, including:     Soha participated in neuromuscular re-education activities to improve: Coordination, Kinesthetic, Sense, Proprioception and Motor Control for 10 minutes. The following activities were included:  Seated Cable Pull " down w/LT cue 3x15 10#        Soha participated in dynamic functional therapeutic activities to improve functional performance for 11 minutes, including:    Home Exercises and Patient Education Provided     Education provided:   - Reviewed HEP.     Written Home Exercises Provided: Patient instructed to cont prior HEP.  Exercises were reviewed and Soha was able to demonstrate them prior to the end of the session.  Soha demonstrated good  understanding of the education provided.     See EMR under patient instructions for exercises given.     Assessment   Pt tolerating tx well demonstrating good ROM and periscapular/shld strength. VC/TC for correcting form/technique with therex. Continue to progress as tolerated. Supervising PT with D/C planning per next MD visit.  Soha is progressing well towards her goals.     Pt will continue to benefit from skilled outpatient physical therapy to address the deficits listed in the problem list box on initial evaluation, provide pt/family education and to maximize pt's level of independence in the home and community environment. Pt prognosis is Good.     Pt's spiritual, cultural and educational needs considered and pt agreeable to plan of care and goals.    Anticipated barriers to physical therapy: None    Goals:  Short Term Goals: 8 weeks  1. Pt will be compliant with HEP 50% of prescribed amount. (Met)  2. The pt to demo improvement in R shoulder PROM to by 80% of the L shoulder. (Met)  3.  The pt to demo tolerance to being out of the sling for 24 hours with pain <2/10 (Met)    Long Term Goals: 24 weeks   1. Pt will be compliant with % of prescribed amount. (Met)  2. The pt to improvement in AROM of R shoulder within 80% of L shoulder to improve tolerance to OH movement. (met)  3. The pt to  Demo at least 4+/5 of RTC muscle testing to demo improvement in tolerance to activity. (Met)  4. The pt to tolerate lifting 5# overhead to improve tolerance to ADLs and work related  activities   5. The pt will report full participation in ADLs and IADLs without restrictions related to R Shoulder.       Plan     Continue per POC for massive cuff repair with emphasis on prime  and local stabilizer strengthening with progressions as appropriate.       Papo Desai, PTA, STS

## 2022-06-07 ENCOUNTER — PATIENT MESSAGE (OUTPATIENT)
Dept: NEUROSURGERY | Facility: CLINIC | Age: 67
End: 2022-06-07
Payer: COMMERCIAL

## 2022-06-07 DIAGNOSIS — M47.812 CERVICAL SPONDYLOSIS WITHOUT MYELOPATHY: ICD-10-CM

## 2022-06-07 RX ORDER — TIZANIDINE 4 MG/1
4 TABLET ORAL EVERY 8 HOURS PRN
Qty: 270 TABLET | Refills: 0 | Status: SHIPPED | OUTPATIENT
Start: 2022-06-07 | End: 2023-03-01 | Stop reason: SDUPTHER

## 2022-06-07 NOTE — TELEPHONE ENCOUNTER
Tizanidine refilled through [facility].    Jeanne Crooks [facility], ESTHER  Neurosurgery  [facility]  06/07/2022

## 2022-06-08 ENCOUNTER — CLINICAL SUPPORT (OUTPATIENT)
Dept: REHABILITATION | Facility: HOSPITAL | Age: 67
End: 2022-06-08
Payer: COMMERCIAL

## 2022-06-08 DIAGNOSIS — M25.611 DECREASED RIGHT SHOULDER RANGE OF MOTION: Primary | ICD-10-CM

## 2022-06-08 DIAGNOSIS — M62.81 MUSCLE WEAKNESS OF RIGHT UPPER EXTREMITY: ICD-10-CM

## 2022-06-08 PROCEDURE — 97110 THERAPEUTIC EXERCISES: CPT

## 2022-06-08 PROCEDURE — 97112 NEUROMUSCULAR REEDUCATION: CPT

## 2022-06-08 NOTE — PROGRESS NOTES
"  Physical Therapy Daily Treatment Note     Name: Soha Wheatley  Clinic Number: [id number]    Therapy Diagnosis:   Encounter Diagnoses   Name Primary?    Decreased right shoulder range of motion Yes    Muscle weakness of right upper extremity      Physician: Darryl Zamora PA-C    Visit Date: 6/8/2022  Physician Orders: PT Eval and Treat  Medical Diagnosis from Referral: M75.101 (ICD-10-CM) - Nontraumatic tear of right rotator cuff, unspecified tear extent  Evaluation Date: 6/28/2021  Authorization Period Expiration: 12/31/2021  Plan of Care Expiration: 12/31/2021  Visit # / Visits authorized: 35 / 40 (76 total)    Time In: 1058  Time Out: 1157  Total Billable Time: 59 minutes    Precautions: Standard    Postop plan for the patient is to follow the large size rotator cuff repair protocol.      DOS: 6/23/2021    Subjective     Pt reports: no pain in R shld but stating tripping over area rug over the weekend on L shld and side but no injuries to R shld. 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     She was compliant with home exercise program.  Response to previous treatment: Increased AROM/PROM.   Functional change: improved ADL's with R shld     Pain: 0/10  Location: R shoulder     Objective     Daily Measurements:     NT      Daily Treatment       Soha received therapeutic exercises to develop strength, endurance, ROM and flexibility for 48 minutes including:   UBE 4'/4' for increased ROM, circulation, and mm strength/endurance.   Prone Row on table 3x10 10#  S/L Open Book Thoracic ext 30x ea   SA Landmine 7.5#  Supine 3# Wand flexion stretch 3x20"  Supine Cane ER [id number]"      Soha received the following manual therapy techniques: were applied to the: L shoulder for 00 minutes, including:     Soha participated in neuromuscular re-education activities to improve: Coordination, Kinesthetic, Sense, Proprioception and Motor Control for 00 minutes. The following " activities were included:        Soha participated in dynamic functional therapeutic activities to improve functional performance for 11 minutes, including:  Seated OH Shoulder press 1# 3x8   Bent over row 3x10 5#    Home Exercises and Patient Education Provided     Education provided:   - Reviewed HEP.     Written Home Exercises Provided: Patient instructed to cont prior HEP.  Exercises were reviewed and Soha was able to demonstrate them prior to the end of the session.  Soha demonstrated good  understanding of the education provided.     See EMR under patient instructions for exercises given.     Assessment     Continued improvement in motor control with overhead tasks which were progressed with OH shoulder press today and performed pain free with minimal cues needed to avoid UT/levator compensation. Pt is tolerating loading progressions for the ahould complex appropriately.   .  Soha is progressing well towards her goals.     Pt will continue to benefit from skilled outpatient physical therapy to address the deficits listed in the problem list box on initial evaluation, provide pt/family education and to maximize pt's level of independence in the home and community environment. Pt prognosis is Good.     Pt's spiritual, cultural and educational needs considered and pt agreeable to plan of care and goals.    Anticipated barriers to physical therapy: None    Goals:  Short Term Goals: 8 weeks  1. Pt will be compliant with HEP 50% of prescribed amount. (Met)  2. The pt to demo improvement in R shoulder PROM to by 80% of the L shoulder. (Met)  3.  The pt to demo tolerance to being out of the sling for 24 hours with pain <2/10 (Met)    Long Term Goals: 24 weeks   1. Pt will be compliant with % of prescribed amount. (Met)  2. The pt to improvement in AROM of R shoulder within 80% of L shoulder to improve tolerance to OH movement. (met)  3. The pt to  Demo at least 4+/5 of RTC muscle testing to demo improvement  in tolerance to activity. (Met)  4. The pt to tolerate lifting 5# overhead to improve tolerance to ADLs and work related activities   5. The pt will report full participation in ADLs and IADLs without restrictions related to R Shoulder.       Plan     Continue per POC for massive cuff repair with emphasis on prime  and local stabilizer strengthening with progressions as appropriate.       Artur Patel, PT, STS

## 2022-06-10 ENCOUNTER — PATIENT MESSAGE (OUTPATIENT)
Dept: BARIATRICS | Facility: CLINIC | Age: 67
End: 2022-06-10
Payer: COMMERCIAL

## 2022-06-10 ENCOUNTER — CLINICAL SUPPORT (OUTPATIENT)
Dept: REHABILITATION | Facility: HOSPITAL | Age: 67
End: 2022-06-10
Payer: COMMERCIAL

## 2022-06-10 DIAGNOSIS — M62.81 MUSCLE WEAKNESS OF RIGHT UPPER EXTREMITY: ICD-10-CM

## 2022-06-10 DIAGNOSIS — M25.611 DECREASED RIGHT SHOULDER RANGE OF MOTION: Primary | ICD-10-CM

## 2022-06-10 PROCEDURE — 97110 THERAPEUTIC EXERCISES: CPT

## 2022-06-10 PROCEDURE — 97112 NEUROMUSCULAR REEDUCATION: CPT

## 2022-06-10 NOTE — PROGRESS NOTES
"  Physical Therapy Daily Treatment Note     Name: Soha Wheatley  Clinic Number: [id number]    Therapy Diagnosis:   Encounter Diagnoses   Name Primary?    Decreased right shoulder range of motion Yes    Muscle weakness of right upper extremity      Physician: Darryl Zamora PA-C    Visit Date: 6/10/2022  Physician Orders: PT Eval and Treat  Medical Diagnosis from Referral: M75.101 (ICD-10-CM) - Nontraumatic tear of right rotator cuff, unspecified tear extent  Evaluation Date: 6/28/2021  Authorization Period Expiration: 12/31/2021  Plan of Care Expiration: 12/31/2021  Visit # / Visits authorized: 36 / 40 (77 total)    Time In: 1058  Time Out: 1152  Total Billable Time: 54 minutes    Precautions: Standard    Postop plan for the patient is to follow the large size rotator cuff repair protocol.      DOS: 6/23/2021    Subjective     Pt reports: Shoulder feels great. Mild muscle soreness following last treatment.     She was compliant with home exercise program.  Response to previous treatment: Increased AROM/PROM.   Functional change: improved ADL's with R shld     Pain: 0/10  Location: R shoulder     Objective     Daily Measurements:     NT      Daily Treatment       Soha received therapeutic exercises to develop strength, endurance, ROM and flexibility for 38 minutes including:   UBE 4'/4' for increased ROM, circulation, and mm strength/endurance.   SA Landmine 10# 3x10  Supine 3# Wand flexion stretch 3x20"  S/L ER 5# 3x12    Soha received the following manual therapy techniques: were applied to the: L shoulder for 00 minutes, including:     Soha participated in neuromuscular re-education activities to improve: Coordination, Kinesthetic, Sense, Proprioception and Motor Control for 00 minutes. The following activities were included:        Soha participated in dynamic functional therapeutic activities to improve functional performance for 16 minutes, including:  Seated OH Shoulder press 1# 3x8   Cable (B) Row " 10# ea. 3x12     Home Exercises and Patient Education Provided     Education provided:   - Reviewed HEP.     Written Home Exercises Provided: Patient instructed to cont prior HEP.  Exercises were reviewed and Soha was able to demonstrate them prior to the end of the session.  Soha demonstrated good  understanding of the education provided.     See EMR under patient instructions for exercises given.     Assessment     Pt tolerated load progressions with serratus strengthening today very well including being pain free. Continued functional push/pull progressions with improved scapular control which will facilitate optimal shoulder function.    .  Soha is progressing well towards her goals.     Pt will continue to benefit from skilled outpatient physical therapy to address the deficits listed in the problem list box on initial evaluation, provide pt/family education and to maximize pt's level of independence in the home and community environment. Pt prognosis is Good.     Pt's spiritual, cultural and educational needs considered and pt agreeable to plan of care and goals.    Anticipated barriers to physical therapy: None    Goals:  Short Term Goals: 8 weeks  1. Pt will be compliant with HEP 50% of prescribed amount. (Met)  2. The pt to demo improvement in R shoulder PROM to by 80% of the L shoulder. (Met)  3.  The pt to demo tolerance to being out of the sling for 24 hours with pain <2/10 (Met)    Long Term Goals: 24 weeks   1. Pt will be compliant with % of prescribed amount. (Met)  2. The pt to improvement in AROM of R shoulder within 80% of L shoulder to improve tolerance to OH movement. (met)  3. The pt to  Demo at least 4+/5 of RTC muscle testing to demo improvement in tolerance to activity. (Met)  4. The pt to tolerate lifting 5# overhead to improve tolerance to ADLs and work related activities   5. The pt will report full participation in ADLs and IADLs without restrictions related to R Shoulder.        Plan     Continue per POC for massive cuff repair with emphasis on prime  and local stabilizer strengthening with progressions as appropriate.       Artur Patel, PT, STS

## 2022-06-14 ENCOUNTER — PATIENT MESSAGE (OUTPATIENT)
Dept: BARIATRICS | Facility: CLINIC | Age: 67
End: 2022-06-14
Payer: COMMERCIAL

## 2022-06-21 ENCOUNTER — CLINICAL SUPPORT (OUTPATIENT)
Dept: REHABILITATION | Facility: HOSPITAL | Age: 67
End: 2022-06-21
Payer: COMMERCIAL

## 2022-06-21 ENCOUNTER — OFFICE VISIT (OUTPATIENT)
Dept: SPORTS MEDICINE | Facility: CLINIC | Age: 67
End: 2022-06-21
Payer: COMMERCIAL

## 2022-06-21 VITALS
HEART RATE: 66 BPM | BODY MASS INDEX: 35.79 KG/M2 | DIASTOLIC BLOOD PRESSURE: 63 MMHG | WEIGHT: 250 LBS | HEIGHT: 70 IN | SYSTOLIC BLOOD PRESSURE: 114 MMHG

## 2022-06-21 DIAGNOSIS — M25.611 DECREASED RIGHT SHOULDER RANGE OF MOTION: Primary | ICD-10-CM

## 2022-06-21 DIAGNOSIS — Z98.890 S/P ROTATOR CUFF REPAIR: Primary | ICD-10-CM

## 2022-06-21 DIAGNOSIS — M62.81 MUSCLE WEAKNESS OF RIGHT UPPER EXTREMITY: ICD-10-CM

## 2022-06-21 PROCEDURE — 3074F SYST BP LT 130 MM HG: CPT | Mod: CPTII,S$GLB,, | Performed by: ORTHOPAEDIC SURGERY

## 2022-06-21 PROCEDURE — 3008F PR BODY MASS INDEX (BMI) DOCUMENTED: ICD-10-PCS | Mod: CPTII,S$GLB,, | Performed by: ORTHOPAEDIC SURGERY

## 2022-06-21 PROCEDURE — 97530 THERAPEUTIC ACTIVITIES: CPT

## 2022-06-21 PROCEDURE — 1159F PR MEDICATION LIST DOCUMENTED IN MEDICAL RECORD: ICD-10-PCS | Mod: CPTII,S$GLB,, | Performed by: ORTHOPAEDIC SURGERY

## 2022-06-21 PROCEDURE — 3078F PR MOST RECENT DIASTOLIC BLOOD PRESSURE < 80 MM HG: ICD-10-PCS | Mod: CPTII,S$GLB,, | Performed by: ORTHOPAEDIC SURGERY

## 2022-06-21 PROCEDURE — 99214 OFFICE O/P EST MOD 30 MIN: CPT | Mod: S$GLB,,, | Performed by: ORTHOPAEDIC SURGERY

## 2022-06-21 PROCEDURE — 99999 PR PBB SHADOW E&M-EST. PATIENT-LVL III: ICD-10-PCS | Mod: PBBFAC,,, | Performed by: ORTHOPAEDIC SURGERY

## 2022-06-21 PROCEDURE — 3008F BODY MASS INDEX DOCD: CPT | Mod: CPTII,S$GLB,, | Performed by: ORTHOPAEDIC SURGERY

## 2022-06-21 PROCEDURE — 3074F PR MOST RECENT SYSTOLIC BLOOD PRESSURE < 130 MM HG: ICD-10-PCS | Mod: CPTII,S$GLB,, | Performed by: ORTHOPAEDIC SURGERY

## 2022-06-21 PROCEDURE — 97110 THERAPEUTIC EXERCISES: CPT

## 2022-06-21 PROCEDURE — 1159F MED LIST DOCD IN RCRD: CPT | Mod: CPTII,S$GLB,, | Performed by: ORTHOPAEDIC SURGERY

## 2022-06-21 PROCEDURE — 3078F DIAST BP <80 MM HG: CPT | Mod: CPTII,S$GLB,, | Performed by: ORTHOPAEDIC SURGERY

## 2022-06-21 PROCEDURE — 99999 PR PBB SHADOW E&M-EST. PATIENT-LVL III: CPT | Mod: PBBFAC,,, | Performed by: ORTHOPAEDIC SURGERY

## 2022-06-21 PROCEDURE — 99214 PR OFFICE/OUTPT VISIT, EST, LEVL IV, 30-39 MIN: ICD-10-PCS | Mod: S$GLB,,, | Performed by: ORTHOPAEDIC SURGERY

## 2022-06-21 NOTE — PROGRESS NOTES
"  Physical Therapy Daily Treatment Note     Name: Soha Wheatley  Clinic Number: [id number]    Therapy Diagnosis:   Encounter Diagnoses   Name Primary?    Decreased right shoulder range of motion Yes    Muscle weakness of right upper extremity      Physician: Darryl Zamora PA-C    Visit Date: 6/21/2022  Physician Orders: PT Eval and Treat  Medical Diagnosis from Referral: M75.101 (ICD-10-CM) - Nontraumatic tear of right rotator cuff, unspecified tear extent  Evaluation Date: 6/28/2021  Authorization Period Expiration: 12/31/2021  Plan of Care Expiration: 12/31/2021  Visit # / Visits authorized: 37 / 40 (78 total)    Time In: 0900  Time Out: 0955  Total Billable Time: 55 minutes    Precautions: Standard    Postop plan for the patient is to follow the large size rotator cuff repair protocol.      DOS: 6/23/2021    Subjective     Pt reports: She just returned from her cruise. Her shoulder is feeling really good. She feels ready to discharge from formal rehab and continue her strengthening independently.     She was compliant with home exercise program.  Response to previous treatment: Increased AROM/PROM.   Functional change: improved ADL's with R shld     Pain: 0/10  Location: R shoulder     Objective     Daily Measurements:     NT      Daily Treatment       Soha received therapeutic exercises to develop strength, endurance, ROM and flexibility for 39 minutes including:   UBE 4'/4' for increased ROM, circulation, and mm strength/endurance.   SA Landmine 10# 3x12  Supine 3# Wand flexion stretch 3x20"  S/L ER 5# 3x12    Soha received the following manual therapy techniques: were applied to the: L shoulder for 00 minutes, including:     Soha participated in neuromuscular re-education activities to improve: Coordination, Kinesthetic, Sense, Proprioception and Motor Control for 00 minutes. The following activities were included:        Soha participated in dynamic functional therapeutic activities to improve " functional performance for 16 minutes, including:  Seated OH Shoulder press 1# 3x8   Cable (B) Row 10# ea. 3x12     Home Exercises and Patient Education Provided     Education provided:   - Reviewed HEP.     Written Home Exercises Provided: Patient instructed to cont prior HEP.  Exercises were reviewed and Soha was able to demonstrate them prior to the end of the session.  Soha demonstrated good  understanding of the education provided.     See EMR under patient instructions for exercises given.     Assessment     Soha presents today 1 year s/p massive R RCR and BT. She demo's full AROM and has returned to all activities consistent with her prior level of function. She has met all goals of her POC at this time. I rounded with Dr. Garcia's team today regarding her progress. Soha is comfortable with continuing her strengthening independently and ceasing formal rehab at this time.   .  Soha is progressing well towards her goals.     Pt will continue to benefit from skilled outpatient physical therapy to address the deficits listed in the problem list box on initial evaluation, provide pt/family education and to maximize pt's level of independence in the home and community environment. Pt prognosis is Good.     Pt's spiritual, cultural and educational needs considered and pt agreeable to plan of care and goals.    Anticipated barriers to physical therapy: None    Goals:  Short Term Goals: 8 weeks  1. Pt will be compliant with HEP 50% of prescribed amount. (Met)  2. The pt to demo improvement in R shoulder PROM to by 80% of the L shoulder. (Met)  3.  The pt to demo tolerance to being out of the sling for 24 hours with pain <2/10 (Met)    Long Term Goals: 24 weeks   1. Pt will be compliant with % of prescribed amount. (Met)  2. The pt to improvement in AROM of R shoulder within 80% of L shoulder to improve tolerance to OH movement. (met)  3. The pt to  Demo at least 4+/5 of RTC muscle testing to demo  improvement in tolerance to activity. (Met)  4. The pt to tolerate lifting 5# overhead to improve tolerance to ADLs and work related activities. (Met)  5. The pt will report full participation in ADLs and IADLs without restrictions related to R Shoulder. (Met)      Plan     Pt to D/C from formal rehab at this time with HEP.       Artur Patel, PT, DPT

## 2022-06-21 NOTE — PROGRESS NOTES
"CC: Right shoulder post op 12 months     Patient is here for her 12 months post op appointment s/p below and is doing well. Patient has been discharged from PT at [place].   Patient received a CSI on 3/22/22and reports moderate relief.       DATE OF SURGERY:  6/23/2021      PREOPERATIVE DIAGNOSES:   1. Right shoulder rotator cuff tear.   2. Right shoulder AC joint arthritis  3. Right shoulder labral tear / biceps tendinopathy  4. Right shoulder adhesive capsulitis     POSTOPERATIVE DIAGNOSES:   1. Right shoulder rotator cuff tear.   2. Right shoulder AC joint arthritis  3. Right shoulder labral tear / biceps tendinopathy  4. Right shoulder adhesive capsulitis     PROCEDURE:   1. Right shoulder arthroscopic rotator cuff repair.   2. Right shoulder open subpectoral biceps tenodesis  3. Right shoulder arthroscopic distal clavicle excision  4. Right shoulder arthroscopic subacromial decompression.   5. Right shoulder arthroscopic debridement labrum  6. Right shoulder arthroscopic lysis of adhesions     SURGEON: Papo Garcia M.D.     Pain well tolerated on pain medication  Sling in place  No issues reported    Review of Systems   Constitution: Negative. Negative for chills, fever and night sweats.    Cardiovascular: Negative for chest pain and syncope.   Respiratory: Negative for cough and shortness of breath.    Gastrointestinal: Negative for abdominal pain and bowel incontinence.      PE:    /63   Pulse 66   Ht 5' 10" (1.778 m)   Wt 113.4 kg (250 lb)   BMI 35.87 kg/m²      Right shoulder    Incision healed  No sign of infection  Swelling resolved  Compartments soft  Neurovascular status intact in extremity    PROM  Forward elevation 120 degrees  External rotation 30 degrees    Assessment:  12 months s/p for arthroscopic rotator cuff repair, subacromial decompression, distal clavicle excision, lysis of adhesions, labral debridement, open subpectoral biceps tenodesis    Plan:  1. HEP    2. F/u PRN    "     All questions were answered. Instructed patient to call with questions or concerns in the interim.

## 2022-07-05 ENCOUNTER — PATIENT MESSAGE (OUTPATIENT)
Dept: BARIATRICS | Facility: CLINIC | Age: 67
End: 2022-07-05
Payer: COMMERCIAL

## 2022-07-07 ENCOUNTER — PATIENT OUTREACH (OUTPATIENT)
Dept: ADMINISTRATIVE | Facility: HOSPITAL | Age: 67
End: 2022-07-07
Payer: COMMERCIAL

## 2022-07-07 ENCOUNTER — PATIENT MESSAGE (OUTPATIENT)
Dept: ADMINISTRATIVE | Facility: HOSPITAL | Age: 67
End: 2022-07-07
Payer: COMMERCIAL

## 2022-07-07 DIAGNOSIS — Z12.4 ENCOUNTER FOR PAPANICOLAOU SMEAR FOR CERVICAL CANCER SCREENING: Primary | ICD-10-CM

## 2022-07-07 NOTE — PROGRESS NOTES
Health Maintenance Due   Topic Date Due    Cervical Cancer Screening  Never done    Shingles Vaccine (1 of 2) Never done    Mammogram  05/08/2013    COVID-19 Vaccine (3 - Booster for Moderna series) 02/19/2022     Triggered LINKS. Updated Care Everywhere. Checked for outside lab results in [facility] and [employer]; no results found. Referral placed to gynecology. Portal message sent asking pt to schedule health maintenance. Chart review completed.

## 2022-07-11 ENCOUNTER — PATIENT MESSAGE (OUTPATIENT)
Dept: ADMINISTRATIVE | Facility: HOSPITAL | Age: 67
End: 2022-07-11
Payer: COMMERCIAL

## 2022-07-28 DIAGNOSIS — M25.569 KNEE PAIN, UNSPECIFIED CHRONICITY, UNSPECIFIED LATERALITY: ICD-10-CM

## 2022-07-29 RX ORDER — OMEPRAZOLE 40 MG/1
CAPSULE, DELAYED RELEASE ORAL
Qty: 90 CAPSULE | Refills: 1 | Status: SHIPPED | OUTPATIENT
Start: 2022-07-29 | End: 2023-01-25

## 2022-07-29 NOTE — TELEPHONE ENCOUNTER
No new care gaps identified.  [facility] Embedded Care Gaps. Reference number: [id number]. 7/28/2022   11:13:26 PM CDT

## 2022-07-29 NOTE — TELEPHONE ENCOUNTER
Refill Authorization Note   Soha Wheatley  is requesting a refill authorization.  Brief Assessment and Rationale for Refill:  Approve     Medication Therapy Plan:       Medication Reconciliation Completed: No   Comments:     No Care Gaps recommended.     Note composed:10:09 AM 07/29/2022

## 2022-08-02 ENCOUNTER — PATIENT MESSAGE (OUTPATIENT)
Dept: BARIATRICS | Facility: CLINIC | Age: 67
End: 2022-08-02
Payer: COMMERCIAL

## 2022-08-04 ENCOUNTER — PATIENT MESSAGE (OUTPATIENT)
Dept: BARIATRICS | Facility: CLINIC | Age: 67
End: 2022-08-04
Payer: COMMERCIAL

## 2022-08-24 ENCOUNTER — PATIENT MESSAGE (OUTPATIENT)
Dept: ADMINISTRATIVE | Facility: HOSPITAL | Age: 67
End: 2022-08-24
Payer: COMMERCIAL

## 2022-09-07 ENCOUNTER — PATIENT MESSAGE (OUTPATIENT)
Dept: BARIATRICS | Facility: CLINIC | Age: 67
End: 2022-09-07
Payer: COMMERCIAL
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
## 2022-09-21 ENCOUNTER — IMMUNIZATION (OUTPATIENT)
Dept: INTERNAL MEDICINE | Facility: CLINIC | Age: 67
End: 2022-09-21
Payer: COMMERCIAL

## 2022-09-21 ENCOUNTER — HOSPITAL ENCOUNTER (OUTPATIENT)
Dept: RADIOLOGY | Facility: HOSPITAL | Age: 67
Discharge: HOME | End: 2022-09-21
Attending: NURSE PRACTITIONER
Payer: COMMERCIAL

## 2022-09-21 ENCOUNTER — OFFICE VISIT (OUTPATIENT)
Dept: INTERNAL MEDICINE | Facility: CLINIC | Age: 67
End: 2022-09-21
Payer: COMMERCIAL

## 2022-09-21 VITALS
BODY MASS INDEX: 34.94 KG/M2 | OXYGEN SATURATION: 96 % | SYSTOLIC BLOOD PRESSURE: 116 MMHG | WEIGHT: 244.06 LBS | DIASTOLIC BLOOD PRESSURE: 64 MMHG | HEART RATE: 62 BPM | HEIGHT: 70 IN

## 2022-09-21 DIAGNOSIS — M25.552 ACUTE PAIN OF BOTH HIPS: ICD-10-CM

## 2022-09-21 DIAGNOSIS — M79.10 MUSCLE PAIN: ICD-10-CM

## 2022-09-21 DIAGNOSIS — M43.02 SPONDYLOLYSIS, CERVICAL REGION: ICD-10-CM
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
## 2022-09-21 DIAGNOSIS — M54.2 NECK PAIN: ICD-10-CM

## 2022-09-21 DIAGNOSIS — M25.519 SHOULDER PAIN, UNSPECIFIED CHRONICITY, UNSPECIFIED LATERALITY: ICD-10-CM

## 2022-09-21 DIAGNOSIS — M25.551 ACUTE PAIN OF BOTH HIPS: ICD-10-CM
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
## 2022-09-21 DIAGNOSIS — M25.569 KNEE PAIN, UNSPECIFIED CHRONICITY, UNSPECIFIED LATERALITY: ICD-10-CM

## 2022-09-21 PROCEDURE — 3074F PR MOST RECENT SYSTOLIC BLOOD PRESSURE < 130 MM HG: ICD-10-PCS | Mod: CPTII,S$GLB,, | Performed by: NURSE PRACTITIONER

## 2022-09-21 PROCEDURE — 73030 X-RAY EXAM OF SHOULDER: CPT | Mod: 26,,, | Performed by: INTERNAL MEDICINE

## 2022-09-21 PROCEDURE — 73521 X-RAY EXAM HIPS BI 2 VIEWS: CPT | Mod: TC

## 2022-09-21 PROCEDURE — 90686 FLU VACCINE (QUAD) GREATER THAN OR EQUAL TO 3YO PRESERVATIVE FREE IM: ICD-10-PCS | Mod: S$GLB,,, | Performed by: INTERNAL MEDICINE

## 2022-09-21 PROCEDURE — 73521 XR HIPS BILATERAL 2 VIEW INCL AP PELVIS: ICD-10-PCS | Mod: 26,,, | Performed by: INTERNAL MEDICINE

## 2022-09-21 PROCEDURE — 3008F BODY MASS INDEX DOCD: CPT | Mod: CPTII,S$GLB,, | Performed by: NURSE PRACTITIONER

## 2022-09-21 PROCEDURE — 99214 PR OFFICE/OUTPT VISIT, EST, LEVL IV, 30-39 MIN: ICD-10-PCS | Mod: S$GLB,,, | Performed by: NURSE PRACTITIONER

## 2022-09-21 PROCEDURE — 3074F SYST BP LT 130 MM HG: CPT | Mod: CPTII,S$GLB,, | Performed by: NURSE PRACTITIONER

## 2022-09-21 PROCEDURE — 3008F PR BODY MASS INDEX (BMI) DOCUMENTED: ICD-10-PCS | Mod: CPTII,S$GLB,, | Performed by: NURSE PRACTITIONER

## 2022-09-21 PROCEDURE — 90471 FLU VACCINE (QUAD) GREATER THAN OR EQUAL TO 3YO PRESERVATIVE FREE IM: ICD-10-PCS | Mod: S$GLB,,, | Performed by: INTERNAL MEDICINE

## 2022-09-21 PROCEDURE — 72050 X-RAY EXAM NECK SPINE 4/5VWS: CPT | Mod: 26,,, | Performed by: INTERNAL MEDICINE

## 2022-09-21 PROCEDURE — 90686 IIV4 VACC NO PRSV 0.5 ML IM: CPT | Mod: S$GLB,,, | Performed by: INTERNAL MEDICINE

## 2022-09-21 PROCEDURE — 99214 OFFICE O/P EST MOD 30 MIN: CPT | Mod: S$GLB,,, | Performed by: NURSE PRACTITIONER

## 2022-09-21 PROCEDURE — 90471 IMMUNIZATION ADMIN: CPT | Mod: S$GLB,,, | Performed by: INTERNAL MEDICINE

## 2022-09-21 PROCEDURE — 1159F PR MEDICATION LIST DOCUMENTED IN MEDICAL RECORD: ICD-10-PCS | Mod: CPTII,S$GLB,, | Performed by: NURSE PRACTITIONER

## 2022-09-21 PROCEDURE — 73562 X-RAY EXAM OF KNEE 3: CPT | Mod: TC,50

## 2022-09-21 PROCEDURE — 73562 X-RAY EXAM OF KNEE 3: CPT | Mod: 26,,, | Performed by: INTERNAL MEDICINE

## 2022-09-21 PROCEDURE — 99999 PR PBB SHADOW E&M-EST. PATIENT-LVL V: ICD-10-PCS | Mod: PBBFAC,,, | Performed by: NURSE PRACTITIONER

## 2022-09-21 PROCEDURE — 3078F DIAST BP <80 MM HG: CPT | Mod: CPTII,S$GLB,, | Performed by: NURSE PRACTITIONER

## 2022-09-21 PROCEDURE — 73030 X-RAY EXAM OF SHOULDER: CPT | Mod: TC,50

## 2022-09-21 PROCEDURE — 73521 X-RAY EXAM HIPS BI 2 VIEWS: CPT | Mod: 26,,, | Performed by: INTERNAL MEDICINE

## 2022-09-21 PROCEDURE — 73030 XR SHOULDER TRAUMA 3 VIEW BILATERAL: ICD-10-PCS | Mod: 26,,, | Performed by: INTERNAL MEDICINE

## 2022-09-21 PROCEDURE — 73562 XR KNEE 3 VIEW BILATERAL: ICD-10-PCS | Mod: 26,,, | Performed by: INTERNAL MEDICINE

## 2022-09-21 PROCEDURE — 1159F MED LIST DOCD IN RCRD: CPT | Mod: CPTII,S$GLB,, | Performed by: NURSE PRACTITIONER

## 2022-09-21 PROCEDURE — 72050 XR CERVICAL SPINE COMPLETE 5 VIEW: ICD-10-PCS | Mod: 26,,, | Performed by: INTERNAL MEDICINE

## 2022-09-21 PROCEDURE — 99999 PR PBB SHADOW E&M-EST. PATIENT-LVL V: CPT | Mod: PBBFAC,,, | Performed by: NURSE PRACTITIONER

## 2022-09-21 PROCEDURE — 72050 X-RAY EXAM NECK SPINE 4/5VWS: CPT | Mod: TC

## 2022-09-21 PROCEDURE — 3078F PR MOST RECENT DIASTOLIC BLOOD PRESSURE < 80 MM HG: ICD-10-PCS | Mod: CPTII,S$GLB,, | Performed by: NURSE PRACTITIONER

## 2022-09-21 RX ORDER — MELOXICAM 15 MG/1
15 TABLET ORAL DAILY PRN
Qty: 30 TABLET | Refills: 0 | Status: CANCELLED | OUTPATIENT
Start: 2022-09-21

## 2022-09-21 RX ORDER — METHOCARBAMOL 500 MG/1
500 TABLET, FILM COATED ORAL EVERY 8 HOURS PRN
Qty: 30 TABLET | Refills: 0 | Status: SHIPPED | OUTPATIENT
Start: 2022-09-21 | End: 2022-10-01

## 2022-10-06 ENCOUNTER — PATIENT MESSAGE (OUTPATIENT)
Dept: BARIATRICS | Facility: CLINIC | Age: 67
End: 2022-10-06
Payer: COMMERCIAL

## 2022-10-10 ENCOUNTER — PATIENT MESSAGE (OUTPATIENT)
Dept: ADMINISTRATIVE | Facility: HOSPITAL | Age: 67
End: 2022-10-10
Payer: COMMERCIAL

## 2022-10-12 DIAGNOSIS — F41.9 ANXIETY: ICD-10-CM

## 2022-10-12 RX ORDER — SERTRALINE HYDROCHLORIDE 100 MG/1
TABLET, FILM COATED ORAL
Qty: 90 TABLET | Refills: 0 | Status: SHIPPED | OUTPATIENT
Start: 2022-10-12 | End: 2022-12-16 | Stop reason: SDUPTHER

## 2022-10-12 NOTE — TELEPHONE ENCOUNTER
Refill Decision Note   Soha Wheatley  is requesting a refill authorization.  Brief Assessment and Rationale for Refill:  Approve     Medication Therapy Plan:       Medication Reconciliation Completed: No   Comments:     No Care Gaps recommended.     Note composed:2:50 AM 10/12/2022

## 2022-10-12 NOTE — TELEPHONE ENCOUNTER
No new care gaps identified.  [facility] Embedded Care Gaps. Reference number: [id number]. 10/12/2022   12:39:38 AM THANHT

## 2022-11-04 ENCOUNTER — PATIENT MESSAGE (OUTPATIENT)
Dept: BARIATRICS | Facility: CLINIC | Age: 67
End: 2022-11-04
Payer: COMMERCIAL

## 2022-12-07 ENCOUNTER — PATIENT MESSAGE (OUTPATIENT)
Dept: BARIATRICS | Facility: CLINIC | Age: 67
End: 2022-12-07
Payer: COMMERCIAL

## 2022-12-08 ENCOUNTER — PATIENT MESSAGE (OUTPATIENT)
Dept: INTERNAL MEDICINE | Facility: CLINIC | Age: 67
End: 2022-12-08
Payer: COMMERCIAL

## 2022-12-08 DIAGNOSIS — E78.5 HYPERLIPIDEMIA, UNSPECIFIED HYPERLIPIDEMIA TYPE: Primary | ICD-10-CM

## 2022-12-15 ENCOUNTER — LAB VISIT (OUTPATIENT)
Dept: LAB | Facility: HOSPITAL | Age: 67
End: 2022-12-15
Attending: INTERNAL MEDICINE
Payer: COMMERCIAL

## 2022-12-15 DIAGNOSIS — E78.5 HYPERLIPIDEMIA, UNSPECIFIED HYPERLIPIDEMIA TYPE: ICD-10-CM

## 2022-12-15 LAB
ALBUMIN SERPL BCP-MCNC: 4.1 G/DL (ref 3.5–5.2)
ALP SERPL-CCNC: 77 U/L (ref 55–135)
ALT SERPL W/O P-5'-P-CCNC: 14 U/L (ref 10–44)
ANION GAP SERPL CALC-SCNC: 6 MMOL/L (ref 8–16)
AST SERPL-CCNC: 17 U/L (ref 10–40)
BILIRUB SERPL-MCNC: 0.6 MG/DL (ref 0.1–1)
BUN SERPL-MCNC: 20 MG/DL (ref 8–23)
CALCIUM SERPL-MCNC: 10.3 MG/DL (ref 8.7–10.5)
CHLORIDE SERPL-SCNC: 101 MMOL/L (ref 95–110)
CHOLEST SERPL-MCNC: 237 MG/DL (ref 120–199)
CHOLEST/HDLC SERPL: 3.2 {RATIO} (ref 2–5)
CO2 SERPL-SCNC: 30 MMOL/L (ref 23–29)
CREAT SERPL-MCNC: 0.7 MG/DL (ref 0.5–1.4)
EST. GFR  (NO RACE VARIABLE): >60 ML/MIN/1.73 M^2
GLUCOSE SERPL-MCNC: 96 MG/DL (ref 70–110)
HDLC SERPL-MCNC: 75 MG/DL (ref 40–75)
HDLC SERPL: 31.6 % (ref 20–50)
LDLC SERPL CALC-MCNC: 143.6 MG/DL (ref 63–159)
NONHDLC SERPL-MCNC: 162 MG/DL
POTASSIUM SERPL-SCNC: 4.9 MMOL/L (ref 3.5–5.1)
PROT SERPL-MCNC: 7.3 G/DL (ref 6–8.4)
SODIUM SERPL-SCNC: 137 MMOL/L (ref 136–145)
TRIGL SERPL-MCNC: 92 MG/DL (ref 30–150)

## 2022-12-15 PROCEDURE — 80061 LIPID PANEL: CPT | Performed by: INTERNAL MEDICINE

## 2022-12-15 PROCEDURE — 36415 COLL VENOUS BLD VENIPUNCTURE: CPT | Performed by: INTERNAL MEDICINE

## 2022-12-15 PROCEDURE — 80053 COMPREHEN METABOLIC PANEL: CPT | Performed by: INTERNAL MEDICINE

## 2022-12-16 ENCOUNTER — OFFICE VISIT (OUTPATIENT)
Dept: INTERNAL MEDICINE | Facility: CLINIC | Age: 67
End: 2022-12-16
Payer: COMMERCIAL

## 2022-12-16 VITALS
WEIGHT: 240.75 LBS | OXYGEN SATURATION: 95 % | BODY MASS INDEX: 34.47 KG/M2 | SYSTOLIC BLOOD PRESSURE: 112 MMHG | HEART RATE: 80 BPM | HEIGHT: 70 IN | DIASTOLIC BLOOD PRESSURE: 68 MMHG

## 2022-12-16 DIAGNOSIS — Z12.31 OTHER SCREENING MAMMOGRAM: ICD-10-CM

## 2022-12-16 DIAGNOSIS — E66.9 OBESITY (BMI 30-39.9): ICD-10-CM

## 2022-12-16 DIAGNOSIS — F41.9 ANXIETY: ICD-10-CM

## 2022-12-16 DIAGNOSIS — Z78.0 POST-MENOPAUSAL: ICD-10-CM

## 2022-12-16 DIAGNOSIS — F43.21 SITUATIONAL DEPRESSION: ICD-10-CM

## 2022-12-16 DIAGNOSIS — M54.9 BACK PAIN, UNSPECIFIED BACK LOCATION, UNSPECIFIED BACK PAIN LATERALITY, UNSPECIFIED CHRONICITY: Primary | ICD-10-CM

## 2022-12-16 DIAGNOSIS — Z78.0 MENOPAUSE: ICD-10-CM

## 2022-12-16 DIAGNOSIS — E78.5 HYPERLIPIDEMIA, UNSPECIFIED HYPERLIPIDEMIA TYPE: ICD-10-CM

## 2022-12-16 PROCEDURE — 3078F DIAST BP <80 MM HG: CPT | Mod: CPTII,S$GLB,, | Performed by: INTERNAL MEDICINE

## 2022-12-16 PROCEDURE — 1159F PR MEDICATION LIST DOCUMENTED IN MEDICAL RECORD: ICD-10-PCS | Mod: CPTII,S$GLB,, | Performed by: INTERNAL MEDICINE

## 2022-12-16 PROCEDURE — 3008F BODY MASS INDEX DOCD: CPT | Mod: CPTII,S$GLB,, | Performed by: INTERNAL MEDICINE

## 2022-12-16 PROCEDURE — 3074F PR MOST RECENT SYSTOLIC BLOOD PRESSURE < 130 MM HG: ICD-10-PCS | Mod: CPTII,S$GLB,, | Performed by: INTERNAL MEDICINE

## 2022-12-16 PROCEDURE — 1101F PR PT FALLS ASSESS DOC 0-1 FALLS W/OUT INJ PAST YR: ICD-10-PCS | Mod: CPTII,S$GLB,, | Performed by: INTERNAL MEDICINE

## 2022-12-16 PROCEDURE — 99214 PR OFFICE/OUTPT VISIT, EST, LEVL IV, 30-39 MIN: ICD-10-PCS | Mod: S$GLB,,, | Performed by: INTERNAL MEDICINE

## 2022-12-16 PROCEDURE — 3078F PR MOST RECENT DIASTOLIC BLOOD PRESSURE < 80 MM HG: ICD-10-PCS | Mod: CPTII,S$GLB,, | Performed by: INTERNAL MEDICINE

## 2022-12-16 PROCEDURE — 3074F SYST BP LT 130 MM HG: CPT | Mod: CPTII,S$GLB,, | Performed by: INTERNAL MEDICINE

## 2022-12-16 PROCEDURE — 1159F MED LIST DOCD IN RCRD: CPT | Mod: CPTII,S$GLB,, | Performed by: INTERNAL MEDICINE

## 2022-12-16 PROCEDURE — 1101F PT FALLS ASSESS-DOCD LE1/YR: CPT | Mod: CPTII,S$GLB,, | Performed by: INTERNAL MEDICINE

## 2022-12-16 PROCEDURE — 3288F FALL RISK ASSESSMENT DOCD: CPT | Mod: CPTII,S$GLB,, | Performed by: INTERNAL MEDICINE

## 2022-12-16 PROCEDURE — 99214 OFFICE O/P EST MOD 30 MIN: CPT | Mod: S$GLB,,, | Performed by: INTERNAL MEDICINE

## 2022-12-16 PROCEDURE — 3288F PR FALLS RISK ASSESSMENT DOCUMENTED: ICD-10-PCS | Mod: CPTII,S$GLB,, | Performed by: INTERNAL MEDICINE

## 2022-12-16 PROCEDURE — 99999 PR PBB SHADOW E&M-EST. PATIENT-LVL III: ICD-10-PCS | Mod: PBBFAC,,, | Performed by: INTERNAL MEDICINE

## 2022-12-16 PROCEDURE — 99999 PR PBB SHADOW E&M-EST. PATIENT-LVL III: CPT | Mod: PBBFAC,,, | Performed by: INTERNAL MEDICINE

## 2022-12-16 PROCEDURE — 3008F PR BODY MASS INDEX (BMI) DOCUMENTED: ICD-10-PCS | Mod: CPTII,S$GLB,, | Performed by: INTERNAL MEDICINE

## 2022-12-16 RX ORDER — SERTRALINE HYDROCHLORIDE 100 MG/1
100 TABLET, FILM COATED ORAL DAILY
Qty: 90 TABLET | Refills: 4 | Status: SHIPPED | OUTPATIENT
Start: 2022-12-16 | End: 2022-12-19 | Stop reason: SDUPTHER

## 2022-12-16 RX ORDER — ATORVASTATIN CALCIUM 20 MG/1
20 TABLET, FILM COATED ORAL DAILY
Qty: 90 TABLET | Refills: 3 | Status: SHIPPED | OUTPATIENT
Start: 2022-12-16 | End: 2023-06-01 | Stop reason: SDUPTHER
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
## 2022-12-19 ENCOUNTER — PATIENT MESSAGE (OUTPATIENT)
Dept: INTERNAL MEDICINE | Facility: CLINIC | Age: 67
End: 2022-12-19
Payer: COMMERCIAL

## 2022-12-19 DIAGNOSIS — F41.9 ANXIETY: ICD-10-CM

## 2022-12-19 RX ORDER — SERTRALINE HYDROCHLORIDE 100 MG/1
100 TABLET, FILM COATED ORAL DAILY
Qty: 90 TABLET | Refills: 4 | Status: SHIPPED | OUTPATIENT
Start: 2022-12-19 | End: 2023-06-01 | Stop reason: SDUPTHER

## 2022-12-29 ENCOUNTER — OFFICE VISIT (OUTPATIENT)
Dept: BARIATRICS | Facility: CLINIC | Age: 67
End: 2022-12-29
Payer: COMMERCIAL

## 2022-12-29 VITALS
HEART RATE: 72 BPM | WEIGHT: 241.38 LBS | HEIGHT: 69 IN | BODY MASS INDEX: 35.75 KG/M2 | SYSTOLIC BLOOD PRESSURE: 112 MMHG | OXYGEN SATURATION: 97 % | DIASTOLIC BLOOD PRESSURE: 54 MMHG

## 2022-12-29 DIAGNOSIS — K21.9 GASTROESOPHAGEAL REFLUX DISEASE WITHOUT ESOPHAGITIS: ICD-10-CM

## 2022-12-29 DIAGNOSIS — Z90.3 S/P GASTRIC SLEEVE PROCEDURE: Primary | ICD-10-CM

## 2022-12-29 DIAGNOSIS — R05.8 OTHER COUGH: ICD-10-CM

## 2022-12-29 DIAGNOSIS — R63.5 WEIGHT GAIN: ICD-10-CM

## 2022-12-29 PROCEDURE — 99999 PR PBB SHADOW E&M-EST. PATIENT-LVL IV: CPT | Mod: PBBFAC,,, | Performed by: PHYSICIAN ASSISTANT

## 2022-12-29 PROCEDURE — 3074F PR MOST RECENT SYSTOLIC BLOOD PRESSURE < 130 MM HG: ICD-10-PCS | Mod: CPTII,S$GLB,, | Performed by: PHYSICIAN ASSISTANT

## 2022-12-29 PROCEDURE — 1101F PR PT FALLS ASSESS DOC 0-1 FALLS W/OUT INJ PAST YR: ICD-10-PCS | Mod: CPTII,S$GLB,, | Performed by: PHYSICIAN ASSISTANT

## 2022-12-29 PROCEDURE — 1159F PR MEDICATION LIST DOCUMENTED IN MEDICAL RECORD: ICD-10-PCS | Mod: CPTII,S$GLB,, | Performed by: PHYSICIAN ASSISTANT

## 2022-12-29 PROCEDURE — 99999 PR PBB SHADOW E&M-EST. PATIENT-LVL IV: ICD-10-PCS | Mod: PBBFAC,,, | Performed by: PHYSICIAN ASSISTANT

## 2022-12-29 PROCEDURE — 1160F RVW MEDS BY RX/DR IN RCRD: CPT | Mod: CPTII,S$GLB,, | Performed by: PHYSICIAN ASSISTANT

## 2022-12-29 PROCEDURE — 99213 PR OFFICE/OUTPT VISIT, EST, LEVL III, 20-29 MIN: ICD-10-PCS | Mod: S$GLB,,, | Performed by: PHYSICIAN ASSISTANT

## 2022-12-29 PROCEDURE — 3078F DIAST BP <80 MM HG: CPT | Mod: CPTII,S$GLB,, | Performed by: PHYSICIAN ASSISTANT

## 2022-12-29 PROCEDURE — 99213 OFFICE O/P EST LOW 20 MIN: CPT | Mod: S$GLB,,, | Performed by: PHYSICIAN ASSISTANT

## 2022-12-29 PROCEDURE — 3288F PR FALLS RISK ASSESSMENT DOCUMENTED: ICD-10-PCS | Mod: CPTII,S$GLB,, | Performed by: PHYSICIAN ASSISTANT

## 2022-12-29 PROCEDURE — 3074F SYST BP LT 130 MM HG: CPT | Mod: CPTII,S$GLB,, | Performed by: PHYSICIAN ASSISTANT

## 2022-12-29 PROCEDURE — 1160F PR REVIEW ALL MEDS BY PRESCRIBER/CLIN PHARMACIST DOCUMENTED: ICD-10-PCS | Mod: CPTII,S$GLB,, | Performed by: PHYSICIAN ASSISTANT

## 2022-12-29 PROCEDURE — 1101F PT FALLS ASSESS-DOCD LE1/YR: CPT | Mod: CPTII,S$GLB,, | Performed by: PHYSICIAN ASSISTANT

## 2022-12-29 PROCEDURE — 3288F FALL RISK ASSESSMENT DOCD: CPT | Mod: CPTII,S$GLB,, | Performed by: PHYSICIAN ASSISTANT

## 2022-12-29 PROCEDURE — 3078F PR MOST RECENT DIASTOLIC BLOOD PRESSURE < 80 MM HG: ICD-10-PCS | Mod: CPTII,S$GLB,, | Performed by: PHYSICIAN ASSISTANT

## 2022-12-29 PROCEDURE — 3008F PR BODY MASS INDEX (BMI) DOCUMENTED: ICD-10-PCS | Mod: CPTII,S$GLB,, | Performed by: PHYSICIAN ASSISTANT

## 2022-12-29 PROCEDURE — 1159F MED LIST DOCD IN RCRD: CPT | Mod: CPTII,S$GLB,, | Performed by: PHYSICIAN ASSISTANT

## 2022-12-29 PROCEDURE — 3008F BODY MASS INDEX DOCD: CPT | Mod: CPTII,S$GLB,, | Performed by: PHYSICIAN ASSISTANT

## 2022-12-29 RX ORDER — BENZONATATE 200 MG/1
200 CAPSULE ORAL 3 TIMES DAILY PRN
Qty: 30 CAPSULE | Refills: 1 | Status: SHIPPED | OUTPATIENT
Start: 2022-12-29 | End: 2023-01-08

## 2022-12-29 NOTE — PATIENT INSTRUCTIONS
Meal Ideas for Regular Bariatric Diet  *Recipes and products available at [email or web address].com      Breakfast: (15-20g protein)    - Egg white omelet: 2 egg whites or ½ cup Egg Beaters. (Optional proteins: cheese, shrimp, black beans, chicken, sliced turkey) (Optional veggies: tomatoes, salsa, spinach, mushrooms, onions, green peppers, or small slice avocado)     - Egg and sausage: 1 egg or ¼ cup Egg Beaters (any variety), with 1 safia or 2 links of Turkey sausage or Veggie breakfast sausage ([employer] or Perceptis)    - Crust-less breakfast quiche: To make a glass pie dish, mix 4oz part skim Ricotta, 1 cup skim milk, and 2 eggs as your base. Add protein: shredded cheese, sliced lean ham or turkey, turkey mir/sausage. Add veggies: tomato, onion, green onion, mushroom, green pepper, spinach, etc.    - Yogurt parfait: Mix 1 - 6oz container Dannon Light N Fit vanilla yogurt, with ¼ cup crushed unsalted nuts    - Cottage cheese and fruit: ½ cup part-skim cottage cheese or ricotta cheese topped with fresh fruit or sugar free preserves     - Yamilka Bagley's Vanilla Egg custard* (add 2 Tbsp instant coffee granules to make Cappuccino Custard*)    - Hi-Protein café latte (skim milk, decaf coffee, 1 scoop protein powder). Optional to add Sugar free syrup or extract flavoring.    - Breakfast Lox: spread fat free cream cheese on slices of smoked salmon. Serve over scrambled or egg over easy (sauteed with nonstick cookspray) OR on a cucumber slice    - Eggwhich: Scramble or cook 1 large egg over easy using nonstick cookspray. Place between 2 slices of Italian mir and low fat cheese.     Lunch: (20-30g protein)    - ½ cup Black bean soup (Homemade or Progresso), with ¼ cup shredded low-fat cheese. Top with chopped tomato or fresh salsa.     - Lean deli turkey breast and low-fat sliced cheese, mustard or light lozano to moisten, rolled up together, or wrapped in a Adeel lettuce leaf    - Chicken salad made from dinner  leftovers, moisten with low-fat salad dressing or light lozano. Also try leftover salmon, shrimp, tuna or boiled eggs. Serve ½ cup over dark green salad    - Fat-free canned refried beans, topped with ¼ cup shredded low-fat cheese. Top with chopped tomato or fresh salsa.     - Greek salad: Top mixed greens with 1-2oz grilled chicken, tomatoes, red onions, 2-3 kalamata olives, and sprinkle lightly with feta cheese. Spritz with Balsamic vinegar to taste.     - Crust-less lunch quiche: To make a glass pie dish, mix 4oz part skim Ricotta, 1 cup skim milk, and 2 eggs as your base. Add protein: shredded cheese, sliced lean ham or turkey, shrimp, chicken. Add veggies: tomato, onion, green onion, mushroom, green pepper, spinach, artichoke, broccoli, etc.    - Pizza bake: spread a  carlos kanika mushroom with tomato sauce, low-fat shredded mozzarella and turkey pepperoni or [place] mir. Add any veggies. Roast for 10-15 minutes, until cheese melted.     - Cucumber crab bites: Spread ¼ cup crab dip (lump crabmeat + light cream cheese and green onions) over sliced cucumber.     - Chicken with light spinach and artichoke dip*: Puree in : 6oz cooked and drained spinach, 2 cloves garlic, 1 can cannelloni beans, ½ cup chopped green onions, 1 can drained artichoke hearts (not marinated in oil), lemon juice and basil. Mix in 2oz chopped up chicken.    Supper: (20-30g protein)    - Serve grilled fish over dark green salad tossed with low-fat dressing, served with grilled asparagus peterson     - Rotisserie chicken salad: served with sliced strawberries, walnuts, fat-free feta cheese crumbles and 1 tbsp Changs Own Light Raspberry [place] Vinaigrette    - Shrimp cocktail: Dip cold boiled shrimp in homemade low-sugar cocktail sauce (1/2 cup Wolf One Carb ketchup, 2 tbsp horseradish, 1/4 tsp hot sauce, 1 tsp Worcestershire sauce, 1 tbsp freshly-squeezed lemon juice). Serve with dark green salad, walnuts, and crumbled blue  cheese drizzled with olive oil and Balsamic vinegar    - Tuna Melt: Spread tuna salad onto 2 thick slices of tomato. Top with low-fat cheese and broil until cheese is melted. May also be made with chicken salad of shrimp salad. [place] with different types of cheeses.    - Chicken or beef fajitas (no tortilla, rice, beans, chips). Top meat and veggies w/ fresh salsa, fat free sour cream.     - Homemade low-fat Chili using extra lean ground beef or ground turkey. Top with shredded cheese and salsa as desired. May add dollop fat-free sour cream if desired    - Chicken parmesan: Top chicken breast w/ low sugar marinara sauce, mozzarella cheese and bake until chicken reaches 165*.  Serve w/ spaghetti SQUASH or Bangladeshi cut green beans    - Dinner Omelet with shrimp or chicken and onion, green peppers and chives.    - No noodle lasagna: Use sliced zucchini or eggplant in place of noodles.  Layer with part skim ricotta cheese and low sugar meat sauce (use very lean ground beef or ground turkey).    - Mexican chicken bake: Bake chunks of chicken breast or thigh with taco seasoning, Pace brand enchilada sauce, green onions and low-fat cheese. Serve with ¼ cup black beans or fat free refried beans topped with chopped tomatoes or salsa.    - Maurice frozen meatballs, simmered in Classico Marinara sauce. Different flavors of salsa or spaghetti sauce create different dishes! Sprinkle with parmesan cheese. Serve with grilled or steamed veggies, or a dark green salad.    - Simmer boneless skinless chicken thigh chunks in Classico Marinara sauce or roasted salsa until tender with chopped onion, bell pepper, garlic, mushrooms, spinach, etc.     - Hamburger or veggie burger, without the bun, dressed the way you like. Served with grilled or steamed veggies.    - Eggplant parmesan: Bake slices of eggplant at 350 degrees for 15 minutes. Layer tomato sauce, sliced eggplant and low-fat mozzarella cheese in a baking dish and cover with  foil. Bake 30-40 more minutes or until bubbly. Uncover and bake at 400 degrees for about 15 more minutes, or until top is slightly crisp.    - Fish tacos: grilled/baked white fish, wrapped in Adeel lettuce leaf, topped with salsa, shredded low-fat cheese, and light coleslaw.    - Chicken wanda: Sprinkle chicken w/ 1 tsp of hidden [place] ranch dip mix. Then grill chicken and top with black beans, salsa and 1 tsp fat free sour cream.     - Cauliflower pizza crust: Use cauliflower as crust (see recipe on pinterest, no flour!). Top w/ low fat cheese, turkey pepperoni and veggies and bake again    - chicken or turkey crust pizza: use ground chicken or turkey instead of cauliflower, spread in [place] and bake at 350 for about 20-30 minutes(may want to add garlic, black pepper, oregano and other herbs to ground meat mixture).  Remove and top w/ low fat cheese, turkey pepperoni and veggies and bake again for another 10 minutes or until cheese is browned.     Snacks: (100-200 calories; >5g protein)    - 1 low-fat cheese stick with 8 cherry tomatoes or 1 serving fresh fruit  - 4 thin slices fat-free turkey breast and 1 slice low-fat cheese  - 4 thin slices fat-free honey ham with wedge of melon  - 6-8 edamame pods (equivalent to about 1/4 cup edamame without pods).   - 1/4 cup unsalted nuts with ½ cup fruit  - 6-oz container Dannon Light n Fit vanilla yogurt, topped with 1oz unsalted nuts         - apple, celery or baby carrots spread with 2 Tbsp PB2  - apple slices with 1 oz slice low-fat cheese  - Apple slices dipped in 2 Tbsp of PB2  - celery, cucumber, bell pepper or baby carrots dipped in ¼ cup hummus bean spread or light spinach and artichoke dip (*recipe in lunch section)  - celery, cucumber, baby carrots dipped in high protein greek yogurt (Mix 16 oz plain greek yogurt + 1 packet of hidden [place] ranch dip mix)  - Delmar Links Beef Steak - 14g protein! (similar to beef jerky)  - 2 wedges Laughing Cow - Light Herb  & Garlic Cheese with sliced cucumber or green bell pepper  - 1/2 cup low-fat cottage cheese with ¼ cup fruit or ¼ cup salsa  - RTD Protein drinks: Atkins, Low Carb Slim Fast, EAS light, Muscle Milk Light, etc.  - Homemade Protein drinks: GNC Soy95, Isopure, Nectar, UNJURY, Whey Gourmet, etc. Mix 1 scoop powder with 8oz skim/1% milk or light soymilk.  - Protein bars: Atkins, EAS, Pure Protein, Think Thin, Detour, etc. Must have 0-4 grams sugar - Read the label.    Takeout Options: No more than twice/week  Deli - Salads (no pasta or rice), meats, cheeses. Roasted chicken. Lox (salmon)    Mexican - Platters which don't include tortillas, chips, or rice. Go easy on the beans. Example: Fajitas without the tortillas. Ask the  not to bring chips to the table if they are too tempting.    Greek - Meat or fish and vegetable, but no bread or rice. Including hummus, baba ganoush, etc, is OK. Most sit-down Greek restaurants can provide you with cucumber slices for dipping instead of alberto bread.    Fast Food (Avoid as much as possible) - Salads (no croutons and limit salad dressing to 2 tbsp), grilled chicken sandwich without the bun and ask for no lozano. Mary Ellens low fat chili or Taco Bell pintos and cheese.    BBQ - The meats are fine if you ask for sauces on the side, but most of the traditional side dishes are loaded with carbs. Vcítor slaw, baked beans and BBQ sauce are typically made with sugar.    Chinese - Nothing deep-fried, no rice or noodles. Many Chinese sauces have starch and sugar in them, so you'll have to use your judgement. If you find that these sauces trigger cravings, or cause Dumping, you can ask for the sauce to be made without sugar or just use soy sauce.

## 2022-12-29 NOTE — PROGRESS NOTES
"  BARIATRIC POST-OPERATIVE FOLLOW UP:    HPI:  67 y.o. female presents for fu s/p sleeve in 2017.     Pt states that she is doing ok, states that she is just under a lot of stress at home. Says that her son in law is currently in rehab for addiction issues   Daughter was PEC'd for seven days     Also having been having a cough for the past 2 weeks     Lots of stress at home states that she is an emotional eater    Going on a cruise on the 15 th hoping to get back on track       Denies: nausea, vomiting, abdominal pain, changes in bowel movement pattern, fever, chills, dysphagia, chest pain, and shortness of breath.    PHYSICAL EXAM:  BP (!) 112/54   Pulse 72   Ht 5' 9" (1.753 m)   Wt 109.5 kg (241 lb 6.5 oz)   SpO2 97%   BMI 35.65 kg/m²   Presurgery Weight: 315 lbs  Excess Weight: 159  Weight History Current Weight Total Weight Loss EWL   12/6/2017 315 0 0   1/9/2018 286 29 0.18   1/30/2018 279 36 0.23   3/16/2018 265.14 49.86 0.31   6/18/2018 247.1 67.9 0.43   12/7/2018 224.3 90.7 0.57   8/12/2020 234 81 0.51   12/28/2020 236 79 0.5   12/29/2022 241 74 0.47       GENERAL: In NAD, A&O x3  ABDOMEN: Soft, non-tender, non-distended. Well-healing surgical scars are clean, dry, and intact without signs of infection or bleeding.  EXTREMITIES: No clubbing, cyanosis, or edema.      ASSESSMENT:  - weight gain   - Morbid obesity s/p sleeve gastrectomy  2.5 years ago.  - Great Weight loss, but has gained recently    PLAN:  - Emphasized the importance of regular exercise and adherence to bariatric diet to achieve maximum weight loss.  - Encouraged patient to begin OR continue regular exercise.  - Follow-up with dietician to reinforce diet, may need a few monthly visits to make sure she is back on track.  - Continue daily vitamins and medications.  - RTC in one year or sooner if needed.  - Call the office for any issues.  - Check labs today.    "

## 2023-01-09 ENCOUNTER — PATIENT MESSAGE (OUTPATIENT)
Dept: BARIATRICS | Facility: CLINIC | Age: 68
End: 2023-01-09
Payer: COMMERCIAL

## 2023-01-24 DIAGNOSIS — M25.569 KNEE PAIN, UNSPECIFIED CHRONICITY, UNSPECIFIED LATERALITY: ICD-10-CM

## 2023-01-25 RX ORDER — OMEPRAZOLE 40 MG/1
CAPSULE, DELAYED RELEASE ORAL
Qty: 90 CAPSULE | Refills: 3 | Status: SHIPPED | OUTPATIENT
Start: 2023-01-25 | End: 2024-01-22

## 2023-01-25 NOTE — TELEPHONE ENCOUNTER
Refill Decision Note   Soha Wheatley  is requesting a refill authorization.  Brief Assessment and Rationale for Refill:  Approve     Medication Therapy Plan:       Medication Reconciliation Completed: No   Comments:     No Care Gaps recommended.     Note composed:3:20 AM 01/25/2023

## 2023-01-25 NOTE — TELEPHONE ENCOUNTER
No new care gaps identified.  [facility] Embedded Care Gaps. Reference number: [id number]. 1/24/2023   11:15:09 PM CST

## 2023-01-27 ENCOUNTER — HOSPITAL ENCOUNTER (OUTPATIENT)
Dept: RADIOLOGY | Facility: CLINIC | Age: 68
Discharge: HOME | End: 2023-01-27
Attending: INTERNAL MEDICINE
Payer: COMMERCIAL

## 2023-01-27 DIAGNOSIS — Z78.0 POST-MENOPAUSAL: ICD-10-CM

## 2023-01-27 PROCEDURE — 77080 DXA BONE DENSITY AXIAL: CPT | Mod: TC

## 2023-01-27 PROCEDURE — 77080 DXA BONE DENSITY AXIAL: CPT | Mod: 26,,, | Performed by: INTERNAL MEDICINE

## 2023-01-27 PROCEDURE — 77080 DEXA BONE DENSITY SPINE HIP: ICD-10-PCS | Mod: 26,,, | Performed by: INTERNAL MEDICINE

## 2023-02-09 ENCOUNTER — PATIENT MESSAGE (OUTPATIENT)
Dept: BARIATRICS | Facility: CLINIC | Age: 68
End: 2023-02-09
Payer: COMMERCIAL

## 2023-02-10 ENCOUNTER — HOSPITAL ENCOUNTER (OUTPATIENT)
Dept: RADIOLOGY | Facility: HOSPITAL | Age: 68
Discharge: HOME | End: 2023-02-10
Attending: INTERNAL MEDICINE
Payer: COMMERCIAL

## 2023-02-10 VITALS — HEIGHT: 70 IN | WEIGHT: 242 LBS | BODY MASS INDEX: 34.65 KG/M2

## 2023-02-10 DIAGNOSIS — Z12.31 OTHER SCREENING MAMMOGRAM: ICD-10-CM

## 2023-02-10 PROCEDURE — 77063 MAMMO DIGITAL SCREENING BILAT WITH TOMO: ICD-10-PCS | Mod: 26,,, | Performed by: RADIOLOGY

## 2023-02-10 PROCEDURE — 77063 BREAST TOMOSYNTHESIS BI: CPT | Mod: 26,,, | Performed by: RADIOLOGY

## 2023-02-10 PROCEDURE — 77067 SCR MAMMO BI INCL CAD: CPT | Mod: 26,,, | Performed by: RADIOLOGY

## 2023-02-10 PROCEDURE — 77067 SCR MAMMO BI INCL CAD: CPT | Mod: TC

## 2023-02-10 PROCEDURE — 77067 MAMMO DIGITAL SCREENING BILAT WITH TOMO: ICD-10-PCS | Mod: 26,,, | Performed by: RADIOLOGY

## 2023-02-14 ENCOUNTER — PATIENT MESSAGE (OUTPATIENT)
Dept: BARIATRICS | Facility: CLINIC | Age: 68
End: 2023-02-14
Payer: COMMERCIAL

## 2023-02-16 ENCOUNTER — PATIENT MESSAGE (OUTPATIENT)
Dept: NEUROSURGERY | Facility: CLINIC | Age: 68
End: 2023-02-16
Payer: COMMERCIAL

## 2023-02-22 ENCOUNTER — PATIENT MESSAGE (OUTPATIENT)
Dept: BARIATRICS | Facility: CLINIC | Age: 68
End: 2023-02-22
Payer: COMMERCIAL

## 2023-03-01 ENCOUNTER — OFFICE VISIT (OUTPATIENT)
Dept: NEUROSURGERY | Facility: CLINIC | Age: 68
End: 2023-03-01
Payer: COMMERCIAL

## 2023-03-01 VITALS
HEIGHT: 70 IN | BODY MASS INDEX: 34.65 KG/M2 | SYSTOLIC BLOOD PRESSURE: 119 MMHG | WEIGHT: 242.06 LBS | HEART RATE: 75 BPM | DIASTOLIC BLOOD PRESSURE: 57 MMHG

## 2023-03-01 DIAGNOSIS — M50.30 DDD (DEGENERATIVE DISC DISEASE), CERVICAL: ICD-10-CM

## 2023-03-01 DIAGNOSIS — M47.812 CERVICAL SPONDYLOSIS WITHOUT MYELOPATHY: ICD-10-CM

## 2023-03-01 DIAGNOSIS — M54.2 NECK PAIN: Primary | ICD-10-CM

## 2023-03-01 PROCEDURE — 3074F SYST BP LT 130 MM HG: CPT | Mod: CPTII,S$GLB,, | Performed by: PHYSICIAN ASSISTANT

## 2023-03-01 PROCEDURE — 1159F MED LIST DOCD IN RCRD: CPT | Mod: CPTII,S$GLB,, | Performed by: PHYSICIAN ASSISTANT

## 2023-03-01 PROCEDURE — 99214 PR OFFICE/OUTPT VISIT, EST, LEVL IV, 30-39 MIN: ICD-10-PCS | Mod: S$GLB,,, | Performed by: PHYSICIAN ASSISTANT

## 2023-03-01 PROCEDURE — 3078F DIAST BP <80 MM HG: CPT | Mod: CPTII,S$GLB,, | Performed by: PHYSICIAN ASSISTANT

## 2023-03-01 PROCEDURE — 1160F RVW MEDS BY RX/DR IN RCRD: CPT | Mod: CPTII,S$GLB,, | Performed by: PHYSICIAN ASSISTANT

## 2023-03-01 PROCEDURE — 3288F FALL RISK ASSESSMENT DOCD: CPT | Mod: CPTII,S$GLB,, | Performed by: PHYSICIAN ASSISTANT

## 2023-03-01 PROCEDURE — 99214 OFFICE O/P EST MOD 30 MIN: CPT | Mod: S$GLB,,, | Performed by: PHYSICIAN ASSISTANT

## 2023-03-01 PROCEDURE — 1125F AMNT PAIN NOTED PAIN PRSNT: CPT | Mod: CPTII,S$GLB,, | Performed by: PHYSICIAN ASSISTANT

## 2023-03-01 PROCEDURE — 3078F PR MOST RECENT DIASTOLIC BLOOD PRESSURE < 80 MM HG: ICD-10-PCS | Mod: CPTII,S$GLB,, | Performed by: PHYSICIAN ASSISTANT

## 2023-03-01 PROCEDURE — 99999 PR PBB SHADOW E&M-EST. PATIENT-LVL III: ICD-10-PCS | Mod: PBBFAC,,, | Performed by: PHYSICIAN ASSISTANT

## 2023-03-01 PROCEDURE — 1125F PR PAIN SEVERITY QUANTIFIED, PAIN PRESENT: ICD-10-PCS | Mod: CPTII,S$GLB,, | Performed by: PHYSICIAN ASSISTANT

## 2023-03-01 PROCEDURE — 1101F PR PT FALLS ASSESS DOC 0-1 FALLS W/OUT INJ PAST YR: ICD-10-PCS | Mod: CPTII,S$GLB,, | Performed by: PHYSICIAN ASSISTANT

## 2023-03-01 PROCEDURE — 3074F PR MOST RECENT SYSTOLIC BLOOD PRESSURE < 130 MM HG: ICD-10-PCS | Mod: CPTII,S$GLB,, | Performed by: PHYSICIAN ASSISTANT

## 2023-03-01 PROCEDURE — 3288F PR FALLS RISK ASSESSMENT DOCUMENTED: ICD-10-PCS | Mod: CPTII,S$GLB,, | Performed by: PHYSICIAN ASSISTANT

## 2023-03-01 PROCEDURE — 3008F BODY MASS INDEX DOCD: CPT | Mod: CPTII,S$GLB,, | Performed by: PHYSICIAN ASSISTANT

## 2023-03-01 PROCEDURE — 3008F PR BODY MASS INDEX (BMI) DOCUMENTED: ICD-10-PCS | Mod: CPTII,S$GLB,, | Performed by: PHYSICIAN ASSISTANT

## 2023-03-01 PROCEDURE — 99999 PR PBB SHADOW E&M-EST. PATIENT-LVL III: CPT | Mod: PBBFAC,,, | Performed by: PHYSICIAN ASSISTANT

## 2023-03-01 PROCEDURE — 1160F PR REVIEW ALL MEDS BY PRESCRIBER/CLIN PHARMACIST DOCUMENTED: ICD-10-PCS | Mod: CPTII,S$GLB,, | Performed by: PHYSICIAN ASSISTANT

## 2023-03-01 PROCEDURE — 1159F PR MEDICATION LIST DOCUMENTED IN MEDICAL RECORD: ICD-10-PCS | Mod: CPTII,S$GLB,, | Performed by: PHYSICIAN ASSISTANT

## 2023-03-01 PROCEDURE — 1101F PT FALLS ASSESS-DOCD LE1/YR: CPT | Mod: CPTII,S$GLB,, | Performed by: PHYSICIAN ASSISTANT

## 2023-03-01 RX ORDER — GABAPENTIN 600 MG/1
600 TABLET ORAL 3 TIMES DAILY
Qty: 270 TABLET | Refills: 4 | Status: SHIPPED | OUTPATIENT
Start: 2023-03-01 | End: 2024-02-29

## 2023-03-01 RX ORDER — TIZANIDINE 4 MG/1
4 TABLET ORAL EVERY 8 HOURS PRN
Qty: 270 TABLET | Refills: 0 | Status: SHIPPED | OUTPATIENT
Start: 2023-03-01 | End: 2023-12-11 | Stop reason: SDUPTHER

## 2023-03-01 NOTE — PROGRESS NOTES
"Subjective:     Patient ID:  Soha Wheatley is a 67 y.o. female.    Lui    Chief Complaint:  Neck pain    HPI    Soha Wheatley is a 67 y.o. female who presents for follow up.  Patient has some neck pain and stiffness mainly at nighttime.  She has some bilateral hand numbness.  She does feel like her difficulty swallowing at times has gotten progressively worse but happens on off and does not bother her too much at this point.  She denies any arm pain or paresthesias.  Some balance trouble.    Patient denies any recent accidents or trauma, no saddle anesthesias, and no bowel or bladder incontinence.    Patient denies any difficulty tying shoes or buttoning clothes, is not dropping things, does not have difficulty opening containers, and has had no change in handwriting.    Review of Systems:  Constitution: Negative for chills, fever, night sweats and weight loss.   Musculoskeletal: Negative for falls.   Gastrointestinal: Negative for bowel incontinence, nausea and vomiting.   Genitourinary: Negative for bladder incontinence.   Neurological: Negative for disturbances in coordination and loss of balance.      Objective:      Vitals:    03/01/23 1014   BP: (!) 119/57   Pulse: 75   Weight: 109.8 kg (242 lb 1 oz)   Height: 5' 10" (1.778 m)   PainSc:   2   PainLoc: Neck         Physical Exam:      General:  Soha Wheatley is well-developed, well-nourished, appears stated age, in no acute distress, alert and oriented to person, place, and time.    Pulmonary/Chest:  Respiratory effort normal  Abdominal: Exhibits no distension  Psychiatric:  Normal mood and affect.  Behavior is normal.  Judgement and thought content normal      Musculoskeletal:    Patient is able to walk heel to toe without difficulty.    Cervical ROM:   Stiffness in cervical spine flexion, extension, left rotation, and right rotation, left lateral bending, and right lateral bending.    Neurological:    Muscle strength against resistance:     " Right Left   Deltoid  5 / 5 5 / 5   Biceps  5 / 5 5 / 5   Triceps 5 / 5 5 / 5   Wrist flexion  5 / 5 5 / 5   Wrist extension 5 / 5 5 / 5   Finger abduction 5 / 5 5 / 5   Thumb opposition 5 / 5 5 / 5   Handgrip 5 / 5 5 / 5   Hip flexion  5 / 5 5 / 5   Hip extension 5 / 5 5 / 5   Hip abduction 5 / 5 5 / 5   Hip adduction 5/ 5 5 / 5   Knee extension  5 / 5 5 / 5   Knee flexion  5 / 5 5 / 5   Dorsiflexion  5 / 5 5 / 5   EHL  5 / 5 5 / 5   Plantar flexion  5 / 5 5 / 5   Inversion of the feet 5 / 5 5 / 5   Eversion of the feet 5 / 5 5 / 5       Reflexes:     Right Left   Triceps 2+ 2+   Biceps 2+ 2+   Brachioradialis 2+ 2+   Patellar 2+ 2+   Achilles 2+ 2+       Clonus:  Negative bilaterally  Baum: Negative bilaterally    On gross examination of the bilateral lower extremities, patient has full painfree ROM with no signs of clubbing, cyanosis, edema, and weakness.      XRAY Results:     Narrative & Impression  EXAMINATION:  XR CERVICAL SPINE COMPLETE 5 VIEW     CLINICAL HISTORY:  . Spondylolysis, cervical region     TECHNIQUE:  AP, Lateral, bilateral oblique and open mouth views of the cervical spine were performed.     COMPARISON:  November 2020     FINDINGS:  There is advanced osteoarthritic degenerative change.  There is osseous demineralization.  C5-6 demonstrates minimal retrolisthesis.  There is also severe disc space narrowing at this level and to a lesser extent at C6-7.  Bulky anterior osteophytes are present within the cervical spine.  Oblique views are suggestive of foraminal narrowing at C4-5 on the left and C5-6 and C6-7 on the right.     Impression:     Advanced osteoarthritic degenerative changes, fairly similar to previous imaging.        Electronically signed by: Elizabeth Lopez MD  Date:                                            09/21/2022  Time:                                           11:38           Exam Ended: 09/21/22 08:34 Last Resulted: 09/21/22 11:38               Assessment:          1.  Neck pain    2. Cervical spondylosis without myelopathy    3. DDD (degenerative disc disease), cervical            Plan:          Orders Placed This Encounter    gabapentin (NEURONTIN) 600 MG tablet    tiZANidine (ZANAFLEX) 4 MG tablet         Cervical DDD/spondylosis and anterior osteophytes have progressed since xrays in 2015     -She has some difficulty swallowing but does not bother her too much.  She was told to monitor this and to let me know if it gets worse.  If swallowing gets worse will get CT cpsine, MRI cspine and will refer to surgeon  -Refilled tizanidine  -Refilled gabapentin   -FU yearly or sooner if needed     Follow-Up:  Follow up in 1 year (on 3/1/2024), or if symptoms worsen or fail to improve. If there are any questions prior to this, the patient was instructed to contact the office.       Jeanne Crooks [facility], PA-C  Neurosurgery  [facility]  03/01/2023

## 2023-03-02 ENCOUNTER — OFFICE VISIT (OUTPATIENT)
Dept: OBSTETRICS AND GYNECOLOGY | Facility: CLINIC | Age: 68
End: 2023-03-02
Payer: COMMERCIAL

## 2023-03-02 VITALS
WEIGHT: 237.44 LBS | DIASTOLIC BLOOD PRESSURE: 70 MMHG | SYSTOLIC BLOOD PRESSURE: 114 MMHG | BODY MASS INDEX: 33.99 KG/M2 | HEIGHT: 70 IN

## 2023-03-02 DIAGNOSIS — Z01.419 ENCOUNTER FOR GYNECOLOGICAL EXAMINATION WITHOUT ABNORMAL FINDING: Primary | ICD-10-CM

## 2023-03-02 DIAGNOSIS — N95.2 VAGINAL ATROPHY: ICD-10-CM

## 2023-03-02 PROCEDURE — 99397 PR PREVENTIVE VISIT,EST,65 & OVER: ICD-10-PCS | Mod: S$GLB,,, | Performed by: OBSTETRICS & GYNECOLOGY

## 2023-03-02 PROCEDURE — 99999 PR PBB SHADOW E&M-EST. PATIENT-LVL III: CPT | Mod: PBBFAC,,, | Performed by: OBSTETRICS & GYNECOLOGY

## 2023-03-02 PROCEDURE — 3008F PR BODY MASS INDEX (BMI) DOCUMENTED: ICD-10-PCS | Mod: CPTII,S$GLB,, | Performed by: OBSTETRICS & GYNECOLOGY

## 2023-03-02 PROCEDURE — 99999 PR PBB SHADOW E&M-EST. PATIENT-LVL III: ICD-10-PCS | Mod: PBBFAC,,, | Performed by: OBSTETRICS & GYNECOLOGY

## 2023-03-02 PROCEDURE — 1126F PR PAIN SEVERITY QUANTIFIED, NO PAIN PRESENT: ICD-10-PCS | Mod: CPTII,S$GLB,, | Performed by: OBSTETRICS & GYNECOLOGY

## 2023-03-02 PROCEDURE — 1159F MED LIST DOCD IN RCRD: CPT | Mod: CPTII,S$GLB,, | Performed by: OBSTETRICS & GYNECOLOGY

## 2023-03-02 PROCEDURE — 3008F BODY MASS INDEX DOCD: CPT | Mod: CPTII,S$GLB,, | Performed by: OBSTETRICS & GYNECOLOGY

## 2023-03-02 PROCEDURE — 1126F AMNT PAIN NOTED NONE PRSNT: CPT | Mod: CPTII,S$GLB,, | Performed by: OBSTETRICS & GYNECOLOGY

## 2023-03-02 PROCEDURE — 3078F PR MOST RECENT DIASTOLIC BLOOD PRESSURE < 80 MM HG: ICD-10-PCS | Mod: CPTII,S$GLB,, | Performed by: OBSTETRICS & GYNECOLOGY

## 2023-03-02 PROCEDURE — 3074F PR MOST RECENT SYSTOLIC BLOOD PRESSURE < 130 MM HG: ICD-10-PCS | Mod: CPTII,S$GLB,, | Performed by: OBSTETRICS & GYNECOLOGY

## 2023-03-02 PROCEDURE — 3074F SYST BP LT 130 MM HG: CPT | Mod: CPTII,S$GLB,, | Performed by: OBSTETRICS & GYNECOLOGY

## 2023-03-02 PROCEDURE — 88175 CYTOPATH C/V AUTO FLUID REDO: CPT | Performed by: OBSTETRICS & GYNECOLOGY

## 2023-03-02 PROCEDURE — 1159F PR MEDICATION LIST DOCUMENTED IN MEDICAL RECORD: ICD-10-PCS | Mod: CPTII,S$GLB,, | Performed by: OBSTETRICS & GYNECOLOGY

## 2023-03-02 PROCEDURE — 99397 PER PM REEVAL EST PAT 65+ YR: CPT | Mod: S$GLB,,, | Performed by: OBSTETRICS & GYNECOLOGY

## 2023-03-02 PROCEDURE — 3078F DIAST BP <80 MM HG: CPT | Mod: CPTII,S$GLB,, | Performed by: OBSTETRICS & GYNECOLOGY

## 2023-03-02 RX ORDER — OSPEMIFENE 60 MG/1
1 TABLET, FILM COATED ORAL DAILY
Qty: 30 TABLET | Refills: 12 | Status: SHIPPED | OUTPATIENT
Start: 2023-03-02

## 2023-03-02 NOTE — PROGRESS NOTES
CC: Well woman exam    Soha Wheatley is a 67 y.o. female  presents for a well woman exam.  LMP: No LMP recorded. Patient is postmenopausal..   Vaginal dryness and irritation     Past Medical History:   Diagnosis Date    Anxiety     Depression     GERD (gastroesophageal reflux disease)     Obesity     Sleep apnea in adult     WEARS CPAP, DOESNT KNOW SETTINGS.     Past Surgical History:   Procedure Laterality Date    ARTHROSCOPIC DEBRIDEMENT OF SHOULDER Right 2021    Procedure: DEBRIDEMENT, SHOULDER, ARTHROSCOPIC;  Surgeon: aPpo Garcia MD;  Location: [facility] OR;  Service: Orthopedics;  Laterality: Right;  labrum    ARTHROSCOPIC REPAIR OF ROTATOR CUFF OF SHOULDER Right 2021    Procedure: REPAIR, ROTATOR CUFF, ARTHROSCOPIC;  Surgeon: Papo Garcia MD;  Location: [facility] OR;  Service: Orthopedics;  Laterality: Right;  regional w/catheter (interscalene)     SECTION      CHOLECYSTECTOMY      DECOMPRESSION OF SUBACROMIAL SPACE Right 2021    Procedure: DECOMPRESSION, SUBACROMIAL SPACE;  Surgeon: Papo Garcia MD;  Location: [facility] OR;  Service: Orthopedics;  Laterality: Right;    DISTAL CLAVICLE EXCISION Right 2021    Procedure: EXCISION, CLAVICLE, DISTAL;  Surgeon: Papo Garcia MD;  Location: [facility] OR;  Service: Orthopedics;  Laterality: Right;    FIXATION OF TENDON Right 2021    Procedure: FIXATION, TENDON;  Surgeon: Papo Garcia MD;  Location: [facility] OR;  Service: Orthopedics;  Laterality: Right;    GASTRECTOMY      KNEE ARTHRODESIS      arthroscopy - Left knee for meniscus     KNEE CARTILAGE SURGERY Left     TONSILLECTOMY      TOTAL KNEE ARTHROPLASTY Right 2018    Procedure: REPLACEMENT-KNEE-TOTAL;  Surgeon: John L. Ochsner Jr., MD;  Location: [address];  Service: Orthopedics;  Laterality: Right;    TOTAL KNEE ARTHROPLASTY Left 10/31/2018    Procedure: REPLACEMENT-KNEE-TOTAL;  Surgeon: John L. Ochsner Jr., MD;  Location:  [facility] OR 2ND FLR;  Service: Orthopedics;  Laterality: Left;    TOTAL REPLACEMENT OF HIP JOINT USING COMPUTER-ASSISTED NAVIGATION Right 2/25/2021    Procedure: ARTHROPLASTY, HIP, TOTAL, DELTA COMPUTER-ASSISTED NAVIGATION;  Surgeon: Lázaro Carlos MD;  Location: [facility];  Service: Orthopedics;  Laterality: Right;    URETEROSCOPY       Social History     Socioeconomic History    Marital status:      Spouse name: Angel    Number of children: 2   Occupational History     Employer: [employer]   Tobacco Use    Smoking status: Never    Smokeless tobacco: Never   Substance and Sexual Activity    Alcohol use: Yes     Comment: social    Drug use: No    Sexual activity: Yes     Partners: Male     Birth control/protection: None   Social History Narrative    House      Social Determinants of Health     Financial Resource Strain: Low Risk     Difficulty of Paying Living Expenses: Not hard at all   Food Insecurity: No Food Insecurity    Worried About Running Out of Food in the Last Year: Never true    Ran Out of Food in the Last Year: Never true   Transportation Needs: No Transportation Needs    Lack of Transportation (Medical): No    Lack of Transportation (Non-Medical): No   Physical Activity: Insufficiently Active    Days of Exercise per Week: 1 day    Minutes of Exercise per Session: 20 min   Stress: No Stress Concern Present    Feeling of Stress : Only a little   Social Connections: Unknown    Frequency of Social Gatherings with Friends and Family: Twice a week    Active Member of Clubs or Organizations: Patient refused    Attends Club or Organization Meetings: Patient refused    Marital Status:    Housing Stability: Unknown    Unable to Pay for Housing in the Last Year: No    Number of Places Lived in the Last Year: 1     Family History   Problem Relation Age of Onset    Breast cancer Mother 58    Cancer Mother         breast    Hyperlipidemia Father     Hypertension Father     Heart disease Maternal Uncle   "   VANDANA disease Maternal Grandmother     Heart disease Maternal Grandmother     Hyperlipidemia Maternal Grandmother     Colon cancer Maternal Grandmother     Pancreatic cancer Maternal Grandfather     Cancer Maternal Grandfather 88    Heart attack Paternal Grandfather     Celiac disease Neg Hx     Cirrhosis Neg Hx     Colon polyps Neg Hx     Crohn's disease Neg Hx     Cystic fibrosis Neg Hx     Esophageal cancer Neg Hx     Hemochromatosis Neg Hx     Inflammatory bowel disease Neg Hx     Irritable bowel syndrome Neg Hx     Liver cancer Neg Hx     Liver disease Neg Hx     Rectal cancer Neg Hx     Stomach cancer Neg Hx     Ulcerative colitis Neg Hx     Silvestre's disease Neg Hx      OB History          2    Para   2    Term   2            AB        Living             SAB        IAB        Ectopic        Multiple        Live Births                     /70   Ht 5' 10" (1.778 m)   Wt 107.7 kg (237 lb 7 oz)   BMI 34.07 kg/m²       ROS:    ROS:  GENERAL: Denies weight gain or weight loss. Feeling well overall.   SKIN: Denies rash or lesions.   HEAD: Denies head injury or headache.   NODES: Denies enlarged lymph nodes.   CHEST: Denies chest pain or shortness of breath.   CARDIOVASCULAR: Denies palpitations or left sided chest pain.   ABDOMEN: No abdominal pain, constipation, diarrhea, nausea, vomiting or rectal bleeding.   URINARY: No frequency, dysuria, hematuria, or burning on urination.  REPRODUCTIVE: See HPI.   BREASTS: The patient performs breast self-examination and denies pain, lumps, or nipple discharge.   HEMATOLOGIC: No easy bruisability or excessive bleeding.   MUSCULOSKELETAL: Denies joint pain or swelling.   NEUROLOGIC: Denies syncope or weakness.   PSYCHIATRIC: Denies depression, anxiety or mood swings.    PHYSICAL EXAM:    APPEARANCE: Well nourished, well developed, in no acute distress.  AFFECT: WNL, alert and oriented x 3  SKIN: No acne or hirsutism  NECK: Neck symmetric without masses or " thyromegaly  NODES: No inguinal, cervical, axillary, or femoral lymph node enlargement  CHEST: Good respiratory effect  ABDOMEN: Soft.  No tenderness or masses.  No hepatosplenomegaly.  No hernias.  BREASTS: Symmetrical, no skin changes or visible lesions.  No palpable masses, nipple discharge bilaterally.  PELVIC: Normal external genitalia without lesions.  Normal hair distribution.  Adequate perineal body, normal urethral meatus.  Vagina moist and well rugated without lesions or discharge.  Cervix pink, without lesions, discharge or tenderness.  No significant cystocele or rectocele.  Bimanual exam shows uterus to be normal size, regular, mobile and nontender.  Adnexa without masses or tenderness.    RECTAL: Rectovaginal exam confirms above with normal sphincter tone, no masses.  EXTREMITIES: No edema.      ICD-10-CM ICD-9-CM    1. Encounter for gynecological examination without abnormal finding  Z01.419 V72.31 Liquid-Based Pap Smear, Screening      2. Vaginal atrophy  N95.2 627.3 ospemifene (OSPHENA) 60 mg Tab        MMG up to date  Colonoscopy to be scheduled    Patient was counseled today on A.C.S. Pap guidelines and recommendations for yearly pelvic exams, mammograms and monthly self breast exams; to see her PCP for other health maintenance.     Follow up in about 1 year (around 3/2/2024), or if symptoms worsen or fail to improve.

## 2023-03-08 ENCOUNTER — PATIENT MESSAGE (OUTPATIENT)
Dept: BARIATRICS | Facility: CLINIC | Age: 68
End: 2023-03-08
Payer: COMMERCIAL

## 2023-03-08 LAB
FINAL PATHOLOGIC DIAGNOSIS: NORMAL
Lab: NORMAL

## 2023-03-14 ENCOUNTER — PATIENT MESSAGE (OUTPATIENT)
Dept: BARIATRICS | Facility: CLINIC | Age: 68
End: 2023-03-14
Payer: COMMERCIAL

## 2023-03-26 ENCOUNTER — PATIENT MESSAGE (OUTPATIENT)
Dept: INTERNAL MEDICINE | Facility: CLINIC | Age: 68
End: 2023-03-26
Payer: COMMERCIAL

## 2023-03-28 ENCOUNTER — OFFICE VISIT (OUTPATIENT)
Dept: INTERNAL MEDICINE | Facility: CLINIC | Age: 68
End: 2023-03-28
Payer: COMMERCIAL

## 2023-03-28 VITALS
DIASTOLIC BLOOD PRESSURE: 76 MMHG | WEIGHT: 240.06 LBS | HEIGHT: 70 IN | HEART RATE: 83 BPM | OXYGEN SATURATION: 97 % | BODY MASS INDEX: 34.37 KG/M2 | SYSTOLIC BLOOD PRESSURE: 100 MMHG

## 2023-03-28 DIAGNOSIS — L02.32 BOIL OF BUTTOCK: Primary | ICD-10-CM

## 2023-03-28 PROCEDURE — 3078F PR MOST RECENT DIASTOLIC BLOOD PRESSURE < 80 MM HG: ICD-10-PCS | Mod: CPTII,S$GLB,, | Performed by: INTERNAL MEDICINE

## 2023-03-28 PROCEDURE — 1125F PR PAIN SEVERITY QUANTIFIED, PAIN PRESENT: ICD-10-PCS | Mod: CPTII,S$GLB,, | Performed by: INTERNAL MEDICINE

## 2023-03-28 PROCEDURE — 1101F PR PT FALLS ASSESS DOC 0-1 FALLS W/OUT INJ PAST YR: ICD-10-PCS | Mod: CPTII,S$GLB,, | Performed by: INTERNAL MEDICINE

## 2023-03-28 PROCEDURE — 3078F DIAST BP <80 MM HG: CPT | Mod: CPTII,S$GLB,, | Performed by: INTERNAL MEDICINE

## 2023-03-28 PROCEDURE — 1101F PT FALLS ASSESS-DOCD LE1/YR: CPT | Mod: CPTII,S$GLB,, | Performed by: INTERNAL MEDICINE

## 2023-03-28 PROCEDURE — 99213 PR OFFICE/OUTPT VISIT, EST, LEVL III, 20-29 MIN: ICD-10-PCS | Mod: S$GLB,,, | Performed by: INTERNAL MEDICINE

## 2023-03-28 PROCEDURE — 3074F SYST BP LT 130 MM HG: CPT | Mod: CPTII,S$GLB,, | Performed by: INTERNAL MEDICINE

## 2023-03-28 PROCEDURE — 3008F PR BODY MASS INDEX (BMI) DOCUMENTED: ICD-10-PCS | Mod: CPTII,S$GLB,, | Performed by: INTERNAL MEDICINE

## 2023-03-28 PROCEDURE — 3074F PR MOST RECENT SYSTOLIC BLOOD PRESSURE < 130 MM HG: ICD-10-PCS | Mod: CPTII,S$GLB,, | Performed by: INTERNAL MEDICINE

## 2023-03-28 PROCEDURE — 3008F BODY MASS INDEX DOCD: CPT | Mod: CPTII,S$GLB,, | Performed by: INTERNAL MEDICINE

## 2023-03-28 PROCEDURE — 3288F PR FALLS RISK ASSESSMENT DOCUMENTED: ICD-10-PCS | Mod: CPTII,S$GLB,, | Performed by: INTERNAL MEDICINE

## 2023-03-28 PROCEDURE — 99999 PR PBB SHADOW E&M-EST. PATIENT-LVL III: CPT | Mod: PBBFAC,,, | Performed by: INTERNAL MEDICINE

## 2023-03-28 PROCEDURE — 99213 OFFICE O/P EST LOW 20 MIN: CPT | Mod: S$GLB,,, | Performed by: INTERNAL MEDICINE

## 2023-03-28 PROCEDURE — 1159F MED LIST DOCD IN RCRD: CPT | Mod: CPTII,S$GLB,, | Performed by: INTERNAL MEDICINE

## 2023-03-28 PROCEDURE — 3288F FALL RISK ASSESSMENT DOCD: CPT | Mod: CPTII,S$GLB,, | Performed by: INTERNAL MEDICINE

## 2023-03-28 PROCEDURE — 1160F RVW MEDS BY RX/DR IN RCRD: CPT | Mod: CPTII,S$GLB,, | Performed by: INTERNAL MEDICINE

## 2023-03-28 PROCEDURE — 1159F PR MEDICATION LIST DOCUMENTED IN MEDICAL RECORD: ICD-10-PCS | Mod: CPTII,S$GLB,, | Performed by: INTERNAL MEDICINE

## 2023-03-28 PROCEDURE — 99999 PR PBB SHADOW E&M-EST. PATIENT-LVL III: ICD-10-PCS | Mod: PBBFAC,,, | Performed by: INTERNAL MEDICINE

## 2023-03-28 PROCEDURE — 1125F AMNT PAIN NOTED PAIN PRSNT: CPT | Mod: CPTII,S$GLB,, | Performed by: INTERNAL MEDICINE

## 2023-03-28 PROCEDURE — 1160F PR REVIEW ALL MEDS BY PRESCRIBER/CLIN PHARMACIST DOCUMENTED: ICD-10-PCS | Mod: CPTII,S$GLB,, | Performed by: INTERNAL MEDICINE

## 2023-03-28 NOTE — PROGRESS NOTES
Subjective:       Patient ID: Soha Wheatley is a 67 y.o. female.    Chief Complaint: Bumps near rectum      Soha Wheatley is 67 y.o. female who presents for bump between vagina and rectum that is draining.  Increased pain with sitting secondary to pressure.  Pt has used warm compresses with draining of lesion.      Review of Systems   Constitutional:  Negative for chills and fever.   Gastrointestinal:  Negative for abdominal pain, blood in stool, constipation, diarrhea, nausea and vomiting.       Objective:      Physical Exam  Vitals reviewed.   Constitutional:       General: She is awake.      Appearance: Normal appearance.   HENT:      Head: Normocephalic and atraumatic.      Mouth/Throat:      Mouth: Mucous membranes are moist.      Pharynx: Oropharynx is clear.   Eyes:      Extraocular Movements: Extraocular movements intact.      Conjunctiva/sclera: Conjunctivae normal.      Pupils: Pupils are equal, round, and reactive to light.   Cardiovascular:      Rate and Rhythm: Normal rate and regular rhythm.      Heart sounds: No murmur heard.    No friction rub. No gallop.   Pulmonary:      Effort: Pulmonary effort is normal.      Breath sounds: Normal breath sounds.   Abdominal:      General: Bowel sounds are normal. There is no distension.      Tenderness: There is no abdominal tenderness. There is no guarding or rebound.   Genitourinary:     General: Normal vulva.      Labia:         Right: No rash, tenderness, lesion or injury.         Left: No rash, tenderness, lesion or injury.           Comments: Small area of induration without erythema or discharge at 11 oclock position above anus  Musculoskeletal:      Right lower leg: No edema.      Left lower leg: No edema.   Lymphadenopathy:      Cervical: No cervical adenopathy.   Neurological:      Mental Status: She is alert and oriented to person, place, and time.   Psychiatric:         Mood and Affect: Mood normal.         Behavior: Behavior is cooperative.        Assessment:       1. Boil of buttock        Plan:     Pt's history consistent with boil and exam shows that lesion has drained.  Pt has not signs of secondary cellulitis.  Recommend warm compress to area 1-2x/day for next 2 days.  Pt to f/u if return of fluctuant lesions, increase in pain/redness in area or fever.        Soha was seen today for bumps near rectum.    Diagnoses and all orders for this visit:    Boil of buttock

## 2023-03-31 ENCOUNTER — PATIENT MESSAGE (OUTPATIENT)
Dept: OBSTETRICS AND GYNECOLOGY | Facility: CLINIC | Age: 68
End: 2023-03-31
Payer: COMMERCIAL

## 2023-03-31 ENCOUNTER — TELEPHONE (OUTPATIENT)
Dept: OBSTETRICS AND GYNECOLOGY | Facility: CLINIC | Age: 68
End: 2023-03-31
Payer: COMMERCIAL

## 2023-03-31 DIAGNOSIS — B37.9 CANDIDA INFECTION: Primary | ICD-10-CM

## 2023-03-31 NOTE — TELEPHONE ENCOUNTER
Patient is requesting an alternative to Osphena due to vaginal discharge. Experiencing itching but no odor.

## 2023-04-04 ENCOUNTER — TELEPHONE (OUTPATIENT)
Dept: OBSTETRICS AND GYNECOLOGY | Facility: CLINIC | Age: 68
End: 2023-04-04
Payer: COMMERCIAL

## 2023-04-04 DIAGNOSIS — N95.2 VAGINAL ATROPHY: Primary | ICD-10-CM

## 2023-04-04 RX ORDER — FLUCONAZOLE 150 MG/1
TABLET ORAL
Qty: 2 TABLET | Refills: 0 | Status: SHIPPED | OUTPATIENT
Start: 2023-04-04

## 2023-04-04 NOTE — TELEPHONE ENCOUNTER
Patient does not wish to continue with osphena. Please send in a different medication for vaginal dryness.

## 2023-04-05 ENCOUNTER — PATIENT MESSAGE (OUTPATIENT)
Dept: BARIATRICS | Facility: CLINIC | Age: 68
End: 2023-04-05
Payer: COMMERCIAL

## 2023-04-05 RX ORDER — PRASTERONE 6.5 MG/1
6.5 INSERT VAGINAL DAILY
Qty: 30 EACH | Refills: 12 | Status: SHIPPED | OUTPATIENT
Start: 2023-04-05 | End: 2023-05-03

## 2023-04-05 NOTE — TELEPHONE ENCOUNTER
Intrarosa Rx sent in.   Every night prior to bedtime and after three months you can place in vagina twice a week  [facility]

## 2023-04-06 ENCOUNTER — PATIENT MESSAGE (OUTPATIENT)
Dept: OBSTETRICS AND GYNECOLOGY | Facility: CLINIC | Age: 68
End: 2023-04-06
Payer: COMMERCIAL

## 2023-04-11 ENCOUNTER — PATIENT MESSAGE (OUTPATIENT)
Dept: BARIATRICS | Facility: CLINIC | Age: 68
End: 2023-04-11
Payer: COMMERCIAL

## 2023-04-21 ENCOUNTER — PATIENT MESSAGE (OUTPATIENT)
Dept: ADMINISTRATIVE | Facility: HOSPITAL | Age: 68
End: 2023-04-21
Payer: COMMERCIAL

## 2023-05-03 ENCOUNTER — PATIENT MESSAGE (OUTPATIENT)
Dept: BARIATRICS | Facility: CLINIC | Age: 68
End: 2023-05-03
Payer: COMMERCIAL

## 2023-05-09 ENCOUNTER — PATIENT MESSAGE (OUTPATIENT)
Dept: BARIATRICS | Facility: CLINIC | Age: 68
End: 2023-05-09
Payer: COMMERCIAL

## 2023-05-26 ENCOUNTER — LAB VISIT (OUTPATIENT)
Dept: LAB | Facility: HOSPITAL | Age: 68
End: 2023-05-26
Attending: INTERNAL MEDICINE
Payer: COMMERCIAL

## 2023-05-26 DIAGNOSIS — E78.5 HYPERLIPIDEMIA, UNSPECIFIED HYPERLIPIDEMIA TYPE: ICD-10-CM

## 2023-05-26 LAB
ALBUMIN SERPL BCP-MCNC: 3.8 G/DL (ref 3.5–5.2)
ALP SERPL-CCNC: 81 U/L (ref 55–135)
ALT SERPL W/O P-5'-P-CCNC: 18 U/L (ref 10–44)
ANION GAP SERPL CALC-SCNC: 7 MMOL/L (ref 8–16)
AST SERPL-CCNC: 19 U/L (ref 10–40)
BILIRUB SERPL-MCNC: 0.6 MG/DL (ref 0.1–1)
BUN SERPL-MCNC: 17 MG/DL (ref 8–23)
CALCIUM SERPL-MCNC: 10.1 MG/DL (ref 8.7–10.5)
CHLORIDE SERPL-SCNC: 103 MMOL/L (ref 95–110)
CHOLEST SERPL-MCNC: 174 MG/DL (ref 120–199)
CHOLEST/HDLC SERPL: 2.6 {RATIO} (ref 2–5)
CO2 SERPL-SCNC: 32 MMOL/L (ref 23–29)
CREAT SERPL-MCNC: 0.7 MG/DL (ref 0.5–1.4)
EST. GFR  (NO RACE VARIABLE): >60 ML/MIN/1.73 M^2
GLUCOSE SERPL-MCNC: 94 MG/DL (ref 70–110)
HDLC SERPL-MCNC: 66 MG/DL (ref 40–75)
HDLC SERPL: 37.9 % (ref 20–50)
LDLC SERPL CALC-MCNC: 91.6 MG/DL (ref 63–159)
NONHDLC SERPL-MCNC: 108 MG/DL
POTASSIUM SERPL-SCNC: 4.1 MMOL/L (ref 3.5–5.1)
PROT SERPL-MCNC: 7.1 G/DL (ref 6–8.4)
SODIUM SERPL-SCNC: 142 MMOL/L (ref 136–145)
TRIGL SERPL-MCNC: 82 MG/DL (ref 30–150)

## 2023-05-26 PROCEDURE — 80061 LIPID PANEL: CPT | Performed by: INTERNAL MEDICINE

## 2023-05-26 PROCEDURE — 36415 COLL VENOUS BLD VENIPUNCTURE: CPT | Performed by: INTERNAL MEDICINE

## 2023-05-26 PROCEDURE — 80053 COMPREHEN METABOLIC PANEL: CPT | Performed by: INTERNAL MEDICINE

## 2023-06-01 ENCOUNTER — OFFICE VISIT (OUTPATIENT)
Dept: INTERNAL MEDICINE | Facility: CLINIC | Age: 68
End: 2023-06-01
Payer: COMMERCIAL

## 2023-06-01 VITALS
HEIGHT: 70 IN | HEART RATE: 73 BPM | SYSTOLIC BLOOD PRESSURE: 130 MMHG | BODY MASS INDEX: 34.4 KG/M2 | WEIGHT: 240.31 LBS | OXYGEN SATURATION: 95 % | DIASTOLIC BLOOD PRESSURE: 78 MMHG

## 2023-06-01 DIAGNOSIS — E78.5 HYPERLIPIDEMIA, UNSPECIFIED HYPERLIPIDEMIA TYPE: ICD-10-CM

## 2023-06-01 DIAGNOSIS — R10.31 RIGHT LOWER QUADRANT PAIN: ICD-10-CM

## 2023-06-01 DIAGNOSIS — N20.0 NEPHROLITHIASIS: Primary | ICD-10-CM

## 2023-06-01 DIAGNOSIS — F41.9 ANXIETY: ICD-10-CM

## 2023-06-01 PROCEDURE — 3078F DIAST BP <80 MM HG: CPT | Mod: CPTII,S$GLB,, | Performed by: INTERNAL MEDICINE

## 2023-06-01 PROCEDURE — 1159F PR MEDICATION LIST DOCUMENTED IN MEDICAL RECORD: ICD-10-PCS | Mod: CPTII,S$GLB,, | Performed by: INTERNAL MEDICINE

## 2023-06-01 PROCEDURE — 99999 PR PBB SHADOW E&M-EST. PATIENT-LVL III: CPT | Mod: PBBFAC,,, | Performed by: INTERNAL MEDICINE

## 2023-06-01 PROCEDURE — 3075F SYST BP GE 130 - 139MM HG: CPT | Mod: CPTII,S$GLB,, | Performed by: INTERNAL MEDICINE

## 2023-06-01 PROCEDURE — 3078F PR MOST RECENT DIASTOLIC BLOOD PRESSURE < 80 MM HG: ICD-10-PCS | Mod: CPTII,S$GLB,, | Performed by: INTERNAL MEDICINE

## 2023-06-01 PROCEDURE — 1101F PR PT FALLS ASSESS DOC 0-1 FALLS W/OUT INJ PAST YR: ICD-10-PCS | Mod: CPTII,S$GLB,, | Performed by: INTERNAL MEDICINE

## 2023-06-01 PROCEDURE — 99397 PR PREVENTIVE VISIT,EST,65 & OVER: ICD-10-PCS | Mod: S$GLB,,, | Performed by: INTERNAL MEDICINE

## 2023-06-01 PROCEDURE — 3075F PR MOST RECENT SYSTOLIC BLOOD PRESS GE 130-139MM HG: ICD-10-PCS | Mod: CPTII,S$GLB,, | Performed by: INTERNAL MEDICINE

## 2023-06-01 PROCEDURE — 1125F AMNT PAIN NOTED PAIN PRSNT: CPT | Mod: CPTII,S$GLB,, | Performed by: INTERNAL MEDICINE

## 2023-06-01 PROCEDURE — 1125F PR PAIN SEVERITY QUANTIFIED, PAIN PRESENT: ICD-10-PCS | Mod: CPTII,S$GLB,, | Performed by: INTERNAL MEDICINE

## 2023-06-01 PROCEDURE — 3288F PR FALLS RISK ASSESSMENT DOCUMENTED: ICD-10-PCS | Mod: CPTII,S$GLB,, | Performed by: INTERNAL MEDICINE

## 2023-06-01 PROCEDURE — 1160F PR REVIEW ALL MEDS BY PRESCRIBER/CLIN PHARMACIST DOCUMENTED: ICD-10-PCS | Mod: CPTII,S$GLB,, | Performed by: INTERNAL MEDICINE

## 2023-06-01 PROCEDURE — 3288F FALL RISK ASSESSMENT DOCD: CPT | Mod: CPTII,S$GLB,, | Performed by: INTERNAL MEDICINE

## 2023-06-01 PROCEDURE — 1159F MED LIST DOCD IN RCRD: CPT | Mod: CPTII,S$GLB,, | Performed by: INTERNAL MEDICINE

## 2023-06-01 PROCEDURE — 1160F RVW MEDS BY RX/DR IN RCRD: CPT | Mod: CPTII,S$GLB,, | Performed by: INTERNAL MEDICINE

## 2023-06-01 PROCEDURE — 99999 PR PBB SHADOW E&M-EST. PATIENT-LVL III: ICD-10-PCS | Mod: PBBFAC,,, | Performed by: INTERNAL MEDICINE

## 2023-06-01 PROCEDURE — 3008F BODY MASS INDEX DOCD: CPT | Mod: CPTII,S$GLB,, | Performed by: INTERNAL MEDICINE

## 2023-06-01 PROCEDURE — 1101F PT FALLS ASSESS-DOCD LE1/YR: CPT | Mod: CPTII,S$GLB,, | Performed by: INTERNAL MEDICINE

## 2023-06-01 PROCEDURE — 3008F PR BODY MASS INDEX (BMI) DOCUMENTED: ICD-10-PCS | Mod: CPTII,S$GLB,, | Performed by: INTERNAL MEDICINE

## 2023-06-01 PROCEDURE — 99397 PER PM REEVAL EST PAT 65+ YR: CPT | Mod: S$GLB,,, | Performed by: INTERNAL MEDICINE

## 2023-06-01 RX ORDER — SERTRALINE HYDROCHLORIDE 100 MG/1
100 TABLET, FILM COATED ORAL DAILY
Qty: 90 TABLET | Refills: 4 | Status: SHIPPED | OUTPATIENT
Start: 2023-06-01 | End: 2023-06-08 | Stop reason: SDUPTHER

## 2023-06-01 RX ORDER — ATORVASTATIN CALCIUM 20 MG/1
20 TABLET, FILM COATED ORAL DAILY
Qty: 90 TABLET | Refills: 3 | Status: SHIPPED | OUTPATIENT
Start: 2023-06-01 | End: 2023-06-08 | Stop reason: SDUPTHER

## 2023-06-01 NOTE — PROGRESS NOTES
Soha Wheatley  1955        Subjective     Chief Complaint: 6 month f/u    History of Present Illness:  Ms. Soha Wheatley is a 67 y.o. female with a medical hx of anxiety, arthritis, obesity, chronic kidney disease, depression, and GERD who presents to clinic for f/u appointment with Dr. Mallory. Last seen on 12/16/22. She states she is doing well overall with no major complaints. Last visit she was started on Atorvastatin 20 mg for HLD, her cholesterol levels have improved since then. She denies any muscle aches, muscle pains. Pt does report a nagging RLQ pain for the past several months, comes and goes, nothing specific exacerbates or makes pain better. She reports having increased stress in her life due to daughter, son in law, and grandchildren potentially losing their house and having to move in with pt.      Patient had laproscopic gastric sleeve surgery on 12/29/2017. Surgery had no complications. She had lost over 80 lbs. She is taking all prescribed vitamins and is trying to improve her dietary habits.  She has gained 4 # in the last year.      She had a right hip replacement in Feb 2021.  She  Tolerated surgery well.       Right rotator cuff repair in 6/2021-- she is doing well but has some problems with the right side.        S/p bilateral total knee replacement in 2018. She now reports worsening left hip pain with movement for which she has gotten imaging. She has an orthopedic appointment tomorrow for follow up. She also states that her right shoulder pain has not improved. She is unable to raise her arm above her head due to pain.     She has been having many family issues since the pandemic started which have caused her a lot of emotional stress. She states she is handling it well though.      Review of Systems   Constitutional:  Negative for chills and fever.   HENT:  Negative for ear pain and hearing loss.    Eyes:  Negative for blurred vision, double vision and photophobia.   Respiratory:   Negative for cough and shortness of breath.    Cardiovascular:  Negative for chest pain, palpitations and leg swelling.   Gastrointestinal:  Positive for abdominal pain. Negative for blood in stool, constipation, diarrhea, nausea and vomiting.   Genitourinary:  Negative for dysuria and hematuria.   Skin:  Negative for rash.   Neurological:  Negative for dizziness, weakness and headaches.   Psychiatric/Behavioral:  Negative for depression and suicidal ideas.       PAST HISTORY:     Past Medical History:   Diagnosis Date    Anxiety     Depression     GERD (gastroesophageal reflux disease)     Obesity     Sleep apnea in adult     WEARS CPAP, DOESNT KNOW SETTINGS.       Past Surgical History:   Procedure Laterality Date    ARTHROSCOPIC DEBRIDEMENT OF SHOULDER Right 2021    Procedure: DEBRIDEMENT, SHOULDER, ARTHROSCOPIC;  Surgeon: Papo Garcia MD;  Location: [facility] OR;  Service: Orthopedics;  Laterality: Right;  labrum    ARTHROSCOPIC REPAIR OF ROTATOR CUFF OF SHOULDER Right 2021    Procedure: REPAIR, ROTATOR CUFF, ARTHROSCOPIC;  Surgeon: Papo Garcia MD;  Location: [facility] OR;  Service: Orthopedics;  Laterality: Right;  regional w/catheter (interscalene)     SECTION      CHOLECYSTECTOMY      DECOMPRESSION OF SUBACROMIAL SPACE Right 2021    Procedure: DECOMPRESSION, SUBACROMIAL SPACE;  Surgeon: Papo Garcia MD;  Location: [facility] OR;  Service: Orthopedics;  Laterality: Right;    DISTAL CLAVICLE EXCISION Right 2021    Procedure: EXCISION, CLAVICLE, DISTAL;  Surgeon: Papo Garcia MD;  Location: [facility] OR;  Service: Orthopedics;  Laterality: Right;    FIXATION OF TENDON Right 2021    Procedure: FIXATION, TENDON;  Surgeon: Papo Garcia MD;  Location: [facility] OR;  Service: Orthopedics;  Laterality: Right;    GASTRECTOMY      KNEE ARTHRODESIS      arthroscopy - Left knee for meniscus     KNEE CARTILAGE SURGERY Left     TONSILLECTOMY  1986    TOTAL KNEE  ARTHROPLASTY Right 5/23/2018    Procedure: REPLACEMENT-KNEE-TOTAL;  Surgeon: John L. Ochsner Jr., MD;  Location: [facility] OR 2ND FLR;  Service: Orthopedics;  Laterality: Right;    TOTAL KNEE ARTHROPLASTY Left 10/31/2018    Procedure: REPLACEMENT-KNEE-TOTAL;  Surgeon: John L. Ochsner Jr., MD;  Location: [facility] OR 2ND FLR;  Service: Orthopedics;  Laterality: Left;    TOTAL REPLACEMENT OF HIP JOINT USING COMPUTER-ASSISTED NAVIGATION Right 2/25/2021    Procedure: ARTHROPLASTY, HIP, TOTAL, DELTA COMPUTER-ASSISTED NAVIGATION;  Surgeon: Lázaro Carlos MD;  Location: [facility] OR;  Service: Orthopedics;  Laterality: Right;    URETEROSCOPY         Family History   Problem Relation Age of Onset    Breast cancer Mother 58    Cancer Mother         breast    Hyperlipidemia Father     Hypertension Father     Heart disease Maternal Uncle     VANDANA disease Maternal Grandmother     Heart disease Maternal Grandmother     Hyperlipidemia Maternal Grandmother     Colon cancer Maternal Grandmother     Pancreatic cancer Maternal Grandfather     Cancer Maternal Grandfather 88    Heart attack Paternal Grandfather     Celiac disease Neg Hx     Cirrhosis Neg Hx     Colon polyps Neg Hx     Crohn's disease Neg Hx     Cystic fibrosis Neg Hx     Esophageal cancer Neg Hx     Hemochromatosis Neg Hx     Inflammatory bowel disease Neg Hx     Irritable bowel syndrome Neg Hx     Liver cancer Neg Hx     Liver disease Neg Hx     Rectal cancer Neg Hx     Stomach cancer Neg Hx     Ulcerative colitis Neg Hx     Silvestre's disease Neg Hx        Social History     Socioeconomic History    Marital status:      Spouse name: Angel    Number of children: 2   Occupational History     Employer: [employer]   Tobacco Use    Smoking status: Never    Smokeless tobacco: Never   Substance and Sexual Activity    Alcohol use: Yes     Comment: social    Drug use: No    Sexual activity: Yes     Partners: Male     Birth control/protection: None   Social History Narrative     "House        MEDICATIONS & ALLERGIES:     Current Outpatient Medications on File Prior to Visit   Medication Sig    b complex vitamins tablet Take 1 tablet by mouth once daily.    cyanocobalamin 500 MCG tablet Take 500 mcg by mouth once daily.    fluconazole (DIFLUCAN) 150 MG Tab Take 1 tablet (150mg) now, then in 3 days.    gabapentin (NEURONTIN) 600 MG tablet Take 1 tablet (600 mg total) by mouth 3 (three) times daily.    multivitamin (THERAGRAN) per tablet Take 1 tablet by mouth once daily.    omeprazole (PRILOSEC) 40 MG capsule TAKE 1 CAPSULE DAILY    ospemifene (OSPHENA) 60 mg Tab Take 1 tablet by mouth once daily.    tiZANidine (ZANAFLEX) 4 MG tablet Take 1 tablet (4 mg total) by mouth every 8 (eight) hours as needed (muscle spasms).    [DISCONTINUED] atorvastatin (LIPITOR) 20 MG tablet Take 1 tablet (20 mg total) by mouth once daily.    [DISCONTINUED] sertraline (ZOLOFT) 100 MG tablet Take 1 tablet (100 mg total) by mouth once daily.     No current facility-administered medications on file prior to visit.       Review of patient's allergies indicates:   Allergen Reactions    Dermabond [tissue glues] Rash    Adhesive      Paper tape usually ok- pulls skin off    Flagyl [metronidazole hcl]      confusion       OBJECTIVE:     Vital Signs:  Vitals:    06/01/23 1054   BP: 130/78   BP Location: Right arm   Patient Position: Sitting   BP Method: Large (Manual)   Pulse: 73   SpO2: 95%   Weight: 109 kg (240 lb 4.8 oz)   Height: 5' 10" (1.778 m)       Body mass index is 34.48 kg/m².     Physical Exam:  Physical Exam  Vitals and nursing note reviewed.   Constitutional:       General: She is not in acute distress.     Appearance: Normal appearance. She is obese. She is not ill-appearing.   HENT:      Head: Normocephalic and atraumatic.      Nose: Nose normal.      Mouth/Throat:      Mouth: Mucous membranes are moist.      Pharynx: Oropharynx is clear.   Eyes:      Extraocular Movements: Extraocular movements intact.      " Conjunctiva/sclera: Conjunctivae normal.      Pupils: Pupils are equal, round, and reactive to light.   Cardiovascular:      Rate and Rhythm: Normal rate and regular rhythm.      Pulses: Normal pulses.      Heart sounds: Normal heart sounds. No murmur heard.  Pulmonary:      Effort: Pulmonary effort is normal. No respiratory distress.      Breath sounds: Normal breath sounds. No wheezing or rales.   Abdominal:      General: Abdomen is flat. Bowel sounds are normal. There is no distension.      Palpations: Abdomen is soft.      Tenderness: There is abdominal tenderness in the right lower quadrant.   Musculoskeletal:         General: Normal range of motion.      Cervical back: Normal range of motion.   Skin:     General: Skin is warm and dry.      Capillary Refill: Capillary refill takes less than 2 seconds.   Neurological:      General: No focal deficit present.      Mental Status: She is alert and oriented to person, place, and time. Mental status is at baseline.      Cranial Nerves: No cranial nerve deficit.      Motor: No weakness.      Gait: Gait normal.   Psychiatric:         Mood and Affect: Mood normal.         Behavior: Behavior normal.          Laboratory  Lab Results   Component Value Date    WBC 5.26 06/08/2021    HGB 12.5 06/08/2021    HCT 40.7 06/08/2021    MCV 93 06/08/2021     06/08/2021     Lab Results   Component Value Date    GLU 94 05/26/2023     05/26/2023    K 4.1 05/26/2023     05/26/2023    CO2 32 (H) 05/26/2023    BUN 17 05/26/2023    CREATININE 0.7 05/26/2023    CALCIUM 10.1 05/26/2023    MG 2.0 12/30/2017     Lab Results   Component Value Date    INR 0.9 06/08/2021    INR 1.0 02/15/2021    INR 1.0 10/24/2018     Lab Results   Component Value Date    HGBA1C 5.1 12/06/2017     No results for input(s): POCTGLUCOSE in the last 72 hours.      Health Maintenance         Date Due Completion Date    Shingles Vaccine (1 of 2) Never done ---    Hemoglobin A1c (Diabetic Prevention  Screening) 12/06/2020 12/6/2017    COVID-19 Vaccine (3 - Moderna series) 11/14/2021 9/19/2021    Pneumococcal Vaccines (Age 65+) (1 - PCV) Never done ---    Colorectal Cancer Screening 01/18/2023 1/18/2013 (Done)    Override on 1/18/2013: Done (per [facility] claims data)    Mammogram 02/10/2024 2/10/2023    DEXA Scan 01/27/2025 1/27/2023    TETANUS VACCINE 09/28/2027 9/28/2017    Lipid Panel 05/26/2028 5/26/2023              ASSESSMENT & PLAN:   Ms. Soha Wheatley is a 67 y.o. female who was seen today in clinic for f/u appointment with Dr. Mallory. Will continue statin. Will f/u again in 6 months with repeat labs. Will also order CT Abdomen and Pelvis to investigate RLQ pain. Educated pt to reach out to clinic if she has any questions.     Soha was seen today for follow-up.    Diagnoses and all orders for this visit:    Nephrolithiasis    Anxiety  -     sertraline (ZOLOFT) 100 MG tablet; Take 1 tablet (100 mg total) by mouth once daily.    Hyperlipidemia, unspecified hyperlipidemia type  -     Comprehensive Metabolic Panel; Future  -     Hemoglobin A1C; Future  -     Lipid Panel; Future  -     Cologuard Screening (Multitarget Stool DNA); Future  -     Cologuard Screening (Multitarget Stool DNA)    Right lower quadrant pain  -     CT Abdomen Pelvis With Contrast; Future    Other orders  -     atorvastatin (LIPITOR) 20 MG tablet; Take 1 tablet (20 mg total) by mouth once daily.         1. Nephrolithiasis    2. Anxiety    3. Hyperlipidemia, unspecified hyperlipidemia type    4. Right lower quadrant pain        RTC in 6 months    Plan discussed with Dr. Shawnee Kerr DO  Internal Medicine PGY-1  [facility]  [address] [place] [address]

## 2023-06-02 NOTE — PROGRESS NOTES
"I have personally taken the history and examined this patient and agree with the resident's note as stated above with the following thoughts:  /78 (BP Location: Right arm, Patient Position: Sitting, BP Method: Large (Manual))   Pulse 73   Ht 5' 10" (1.778 m)   Wt 109 kg (240 lb 4.8 oz)   SpO2 95%   BMI 34.48 kg/m²       Discussed getting vaccines up to date.  Discussed importance of low fat diet and exercise .  Like to have a BMI less than 30.  We discussed diet and exercise today.  For the anxiety we will continue the Zoloft at 100 mg a day.  Screen for colon cancer with a Cologuard test.  We will also get a CT of her abdomen and pelvis due to family history of appendiceal carcinoma and some vague right lower quadrant abdominal pain.  "

## 2023-06-07 ENCOUNTER — PATIENT MESSAGE (OUTPATIENT)
Dept: INTERNAL MEDICINE | Facility: CLINIC | Age: 68
End: 2023-06-07
Payer: COMMERCIAL

## 2023-06-07 ENCOUNTER — PATIENT MESSAGE (OUTPATIENT)
Dept: BARIATRICS | Facility: CLINIC | Age: 68
End: 2023-06-07
Payer: COMMERCIAL

## 2023-06-07 DIAGNOSIS — F41.9 ANXIETY: ICD-10-CM

## 2023-06-08 RX ORDER — ATORVASTATIN CALCIUM 20 MG/1
20 TABLET, FILM COATED ORAL DAILY
Qty: 90 TABLET | Refills: 3 | Status: SHIPPED | OUTPATIENT
Start: 2023-06-08

## 2023-06-08 RX ORDER — SERTRALINE HYDROCHLORIDE 100 MG/1
100 TABLET, FILM COATED ORAL DAILY
Qty: 90 TABLET | Refills: 4 | Status: SHIPPED | OUTPATIENT
Start: 2023-06-08

## 2023-06-10 ENCOUNTER — HOSPITAL ENCOUNTER (OUTPATIENT)
Dept: RADIOLOGY | Facility: HOSPITAL | Age: 68
Discharge: HOME | End: 2023-06-10
Attending: INTERNAL MEDICINE
Payer: COMMERCIAL

## 2023-06-10 DIAGNOSIS — R10.31 RIGHT LOWER QUADRANT PAIN: ICD-10-CM

## 2023-06-10 PROCEDURE — 25500020 PHARM REV CODE 255: Performed by: INTERNAL MEDICINE

## 2023-06-10 PROCEDURE — 74177 CT ABD & PELVIS W/CONTRAST: CPT | Mod: 26,,, | Performed by: RADIOLOGY

## 2023-06-10 PROCEDURE — 74177 CT ABDOMEN PELVIS WITH CONTRAST: ICD-10-PCS | Mod: 26,,, | Performed by: RADIOLOGY

## 2023-06-10 PROCEDURE — 74177 CT ABD & PELVIS W/CONTRAST: CPT | Mod: TC

## 2023-06-10 RX ADMIN — IOHEXOL 15 ML: 350 INJECTION, SOLUTION INTRAVENOUS at 09:06

## 2023-06-10 RX ADMIN — IOHEXOL 100 ML: 350 INJECTION, SOLUTION INTRAVENOUS at 10:06

## 2023-06-13 ENCOUNTER — PATIENT MESSAGE (OUTPATIENT)
Dept: BARIATRICS | Facility: CLINIC | Age: 68
End: 2023-06-13
Payer: COMMERCIAL

## 2023-06-16 LAB — NONINV COLON CA DNA+OCC BLD SCRN STL QL: NORMAL

## 2023-06-22 ENCOUNTER — HOSPITAL ENCOUNTER (OUTPATIENT)
Dept: RADIOLOGY | Facility: HOSPITAL | Age: 68
Discharge: HOME | End: 2023-06-22
Attending: ORTHOPAEDIC SURGERY
Payer: COMMERCIAL

## 2023-06-22 ENCOUNTER — OFFICE VISIT (OUTPATIENT)
Dept: SPORTS MEDICINE | Facility: CLINIC | Age: 68
End: 2023-06-22
Payer: COMMERCIAL

## 2023-06-22 VITALS
SYSTOLIC BLOOD PRESSURE: 100 MMHG | WEIGHT: 237 LBS | HEIGHT: 70 IN | BODY MASS INDEX: 33.93 KG/M2 | HEART RATE: 65 BPM | DIASTOLIC BLOOD PRESSURE: 58 MMHG

## 2023-06-22 DIAGNOSIS — M25.512 LEFT SHOULDER PAIN, UNSPECIFIED CHRONICITY: ICD-10-CM

## 2023-06-22 DIAGNOSIS — M75.102 TEAR OF LEFT ROTATOR CUFF, UNSPECIFIED TEAR EXTENT, UNSPECIFIED WHETHER TRAUMATIC: Primary | ICD-10-CM

## 2023-06-22 PROCEDURE — 3008F PR BODY MASS INDEX (BMI) DOCUMENTED: ICD-10-PCS | Mod: CPTII,S$GLB,, | Performed by: ORTHOPAEDIC SURGERY

## 2023-06-22 PROCEDURE — 20610 LARGE JOINT ASPIRATION/INJECTION: L SUBACROMIAL BURSA: ICD-10-PCS | Mod: LT,S$GLB,, | Performed by: ORTHOPAEDIC SURGERY

## 2023-06-22 PROCEDURE — 3078F PR MOST RECENT DIASTOLIC BLOOD PRESSURE < 80 MM HG: ICD-10-PCS | Mod: CPTII,S$GLB,, | Performed by: ORTHOPAEDIC SURGERY

## 2023-06-22 PROCEDURE — 3288F PR FALLS RISK ASSESSMENT DOCUMENTED: ICD-10-PCS | Mod: CPTII,S$GLB,, | Performed by: ORTHOPAEDIC SURGERY

## 2023-06-22 PROCEDURE — 1101F PR PT FALLS ASSESS DOC 0-1 FALLS W/OUT INJ PAST YR: ICD-10-PCS | Mod: CPTII,S$GLB,, | Performed by: ORTHOPAEDIC SURGERY

## 2023-06-22 PROCEDURE — 73030 XR SHOULDER COMPLETE 2 OR MORE VIEWS LEFT: ICD-10-PCS | Mod: 26,LT,, | Performed by: RADIOLOGY

## 2023-06-22 PROCEDURE — 99999 PR PBB SHADOW E&M-EST. PATIENT-LVL IV: ICD-10-PCS | Mod: PBBFAC,,, | Performed by: ORTHOPAEDIC SURGERY

## 2023-06-22 PROCEDURE — 99999 PR PBB SHADOW E&M-EST. PATIENT-LVL IV: CPT | Mod: PBBFAC,,, | Performed by: ORTHOPAEDIC SURGERY

## 2023-06-22 PROCEDURE — 3288F FALL RISK ASSESSMENT DOCD: CPT | Mod: CPTII,S$GLB,, | Performed by: ORTHOPAEDIC SURGERY

## 2023-06-22 PROCEDURE — 99214 OFFICE O/P EST MOD 30 MIN: CPT | Mod: 25,S$GLB,, | Performed by: ORTHOPAEDIC SURGERY

## 2023-06-22 PROCEDURE — 1159F MED LIST DOCD IN RCRD: CPT | Mod: CPTII,S$GLB,, | Performed by: ORTHOPAEDIC SURGERY

## 2023-06-22 PROCEDURE — 3008F BODY MASS INDEX DOCD: CPT | Mod: CPTII,S$GLB,, | Performed by: ORTHOPAEDIC SURGERY

## 2023-06-22 PROCEDURE — 3078F DIAST BP <80 MM HG: CPT | Mod: CPTII,S$GLB,, | Performed by: ORTHOPAEDIC SURGERY

## 2023-06-22 PROCEDURE — 1159F PR MEDICATION LIST DOCUMENTED IN MEDICAL RECORD: ICD-10-PCS | Mod: CPTII,S$GLB,, | Performed by: ORTHOPAEDIC SURGERY

## 2023-06-22 PROCEDURE — 1125F AMNT PAIN NOTED PAIN PRSNT: CPT | Mod: CPTII,S$GLB,, | Performed by: ORTHOPAEDIC SURGERY

## 2023-06-22 PROCEDURE — 3074F SYST BP LT 130 MM HG: CPT | Mod: CPTII,S$GLB,, | Performed by: ORTHOPAEDIC SURGERY

## 2023-06-22 PROCEDURE — 3074F PR MOST RECENT SYSTOLIC BLOOD PRESSURE < 130 MM HG: ICD-10-PCS | Mod: CPTII,S$GLB,, | Performed by: ORTHOPAEDIC SURGERY

## 2023-06-22 PROCEDURE — 1101F PT FALLS ASSESS-DOCD LE1/YR: CPT | Mod: CPTII,S$GLB,, | Performed by: ORTHOPAEDIC SURGERY

## 2023-06-22 PROCEDURE — 99214 PR OFFICE/OUTPT VISIT, EST, LEVL IV, 30-39 MIN: ICD-10-PCS | Mod: 25,S$GLB,, | Performed by: ORTHOPAEDIC SURGERY

## 2023-06-22 PROCEDURE — 73030 X-RAY EXAM OF SHOULDER: CPT | Mod: TC,LT

## 2023-06-22 PROCEDURE — 1125F PR PAIN SEVERITY QUANTIFIED, PAIN PRESENT: ICD-10-PCS | Mod: CPTII,S$GLB,, | Performed by: ORTHOPAEDIC SURGERY

## 2023-06-22 PROCEDURE — 73030 X-RAY EXAM OF SHOULDER: CPT | Mod: 26,LT,, | Performed by: RADIOLOGY

## 2023-06-22 PROCEDURE — 20610 DRAIN/INJ JOINT/BURSA W/O US: CPT | Mod: LT,S$GLB,, | Performed by: ORTHOPAEDIC SURGERY

## 2023-06-22 RX ORDER — TRIAMCINOLONE ACETONIDE 40 MG/ML
60 INJECTION, SUSPENSION INTRA-ARTICULAR; INTRAMUSCULAR
Status: DISCONTINUED | OUTPATIENT
Start: 2023-06-22 | End: 2023-06-22 | Stop reason: HOSPADM

## 2023-06-22 RX ADMIN — TRIAMCINOLONE ACETONIDE 60 MG: 40 INJECTION, SUSPENSION INTRA-ARTICULAR; INTRAMUSCULAR at 10:06

## 2023-06-22 NOTE — PROGRESS NOTES
CC: LEFT shoulder pain     67 y.o. Female with a *** history of left shoulder atraumatic pain.  Patient has had a previous right shoulder rotator cuff repair by me.   She states that the pain is severe and not responding to any conservative care.      She reports that the pain and weakness is worse with overhead activity. It also bothers her at night.    Is affecting ADLs.  Pain is ***/10 at it's worst.      Past Medical History:   Diagnosis Date    Anxiety     Depression     GERD (gastroesophageal reflux disease)     Obesity     Sleep apnea in adult     WEARS CPAP, DOESNT KNOW SETTINGS.       Past Surgical History:   Procedure Laterality Date    ARTHROSCOPIC DEBRIDEMENT OF SHOULDER Right 2021    Procedure: DEBRIDEMENT, SHOULDER, ARTHROSCOPIC;  Surgeon: Papo Garcia MD;  Location: [facility] OR;  Service: Orthopedics;  Laterality: Right;  labrum    ARTHROSCOPIC REPAIR OF ROTATOR CUFF OF SHOULDER Right 2021    Procedure: REPAIR, ROTATOR CUFF, ARTHROSCOPIC;  Surgeon: Papo Garcia MD;  Location: [facility] OR;  Service: Orthopedics;  Laterality: Right;  regional w/catheter (interscalene)     SECTION      CHOLECYSTECTOMY      DECOMPRESSION OF SUBACROMIAL SPACE Right 2021    Procedure: DECOMPRESSION, SUBACROMIAL SPACE;  Surgeon: Papo Garcia MD;  Location: [facility] OR;  Service: Orthopedics;  Laterality: Right;    DISTAL CLAVICLE EXCISION Right 2021    Procedure: EXCISION, CLAVICLE, DISTAL;  Surgeon: Papo Garcia MD;  Location: [facility] OR;  Service: Orthopedics;  Laterality: Right;    FIXATION OF TENDON Right 2021    Procedure: FIXATION, TENDON;  Surgeon: Papo Garcia MD;  Location: [facility] OR;  Service: Orthopedics;  Laterality: Right;    GASTRECTOMY      KNEE ARTHRODESIS      arthroscopy - Left knee for meniscus     KNEE CARTILAGE SURGERY Left     TONSILLECTOMY      TOTAL KNEE ARTHROPLASTY Right 2018    Procedure: REPLACEMENT-KNEE-TOTAL;  Surgeon:  John L. Ochsner Jr., MD;  [facility];  Service: Orthopedics;  Laterality: Right;    TOTAL KNEE ARTHROPLASTY Left 10/31/2018    Procedure: REPLACEMENT-KNEE-TOTAL;  Surgeon: John L. Ochsner Jr., MD;  [facility];  Service: Orthopedics;  Laterality: Left;    TOTAL REPLACEMENT OF HIP JOINT USING COMPUTER-ASSISTED NAVIGATION Right 2/25/2021    Procedure: ARTHROPLASTY, HIP, TOTAL, DELTA COMPUTER-ASSISTED NAVIGATION;  Surgeon: Lázaro Carlos MD;  Location: [facility];  Service: Orthopedics;  Laterality: Right;    URETEROSCOPY         Family History   Problem Relation Age of Onset    Breast cancer Mother 58    Cancer Mother         breast    Hyperlipidemia Father     Hypertension Father     Heart disease Maternal Uncle     VANDANA disease Maternal Grandmother     Heart disease Maternal Grandmother     Hyperlipidemia Maternal Grandmother     Colon cancer Maternal Grandmother     Pancreatic cancer Maternal Grandfather     Cancer Maternal Grandfather 88    Heart attack Paternal Grandfather     Celiac disease Neg Hx     Cirrhosis Neg Hx     Colon polyps Neg Hx     Crohn's disease Neg Hx     Cystic fibrosis Neg Hx     Esophageal cancer Neg Hx     Hemochromatosis Neg Hx     Inflammatory bowel disease Neg Hx     Irritable bowel syndrome Neg Hx     Liver cancer Neg Hx     Liver disease Neg Hx     Rectal cancer Neg Hx     Stomach cancer Neg Hx     Ulcerative colitis Neg Hx     Silvestre's disease Neg Hx          Current Outpatient Medications:     atorvastatin (LIPITOR) 20 MG tablet, Take 1 tablet (20 mg total) by mouth once daily., Disp: 90 tablet, Rfl: 3    b complex vitamins tablet, Take 1 tablet by mouth once daily., Disp: , Rfl:     cyanocobalamin 500 MCG tablet, Take 500 mcg by mouth once daily., Disp: , Rfl:     fluconazole (DIFLUCAN) 150 MG Tab, Take 1 tablet (150mg) now, then in 3 days., Disp: 2 tablet, Rfl: 0    gabapentin (NEURONTIN) 600 MG tablet, Take 1 tablet (600 mg total) by mouth 3 (three)  "times daily., Disp: 270 tablet, Rfl: 4    multivitamin (THERAGRAN) per tablet, Take 1 tablet by mouth once daily., Disp: , Rfl:     omeprazole (PRILOSEC) 40 MG capsule, TAKE 1 CAPSULE DAILY, Disp: 90 capsule, Rfl: 3    ospemifene (OSPHENA) 60 mg Tab, Take 1 tablet by mouth once daily., Disp: 30 tablet, Rfl: 12    sertraline (ZOLOFT) 100 MG tablet, Take 1 tablet (100 mg total) by mouth once daily., Disp: 90 tablet, Rfl: 4    tiZANidine (ZANAFLEX) 4 MG tablet, Take 1 tablet (4 mg total) by mouth every 8 (eight) hours as needed (muscle spasms)., Disp: 270 tablet, Rfl: 0    Review of patient's allergies indicates:   Allergen Reactions    Dermabond [tissue glues] Rash    Adhesive      Paper tape usually ok- pulls skin off    Flagyl [metronidazole hcl]      confusion          REVIEW OF SYSTEMS:  Constitution: Negative. Negative for chills, fever and night sweats.   HENT: Negative for congestion and headaches.    Eyes: Negative for blurred vision, left vision loss and right vision loss.   Cardiovascular: Negative for chest pain and syncope.   Respiratory: Negative for cough and shortness of breath.    Endocrine: Negative for polydipsia, polyphagia and polyuria.   Hematologic/Lymphatic: Negative for bleeding problem. Does not bruise/bleed easily.   Skin: Negative for dry skin, itching and rash.   Musculoskeletal: Negative for falls.  Positive for left shoulder pain and muscle weakness.   Gastrointestinal: Negative for abdominal pain and bowel incontinence.   Genitourinary: Negative for bladder incontinence and nocturia.   Neurological: Negative for disturbances in coordination, loss of balance and seizures.   Psychiatric/Behavioral: Negative for depression. The patient does not have insomnia.    Allergic/Immunologic: Negative for hives and persistent infections.      PHYSICAL EXAMINATION:  Vitals:  Ht 5' 10" (1.778 m)   BMI 34.48 kg/m²    General: The patient is alert and oriented x 3.  Mood is pleasant.  Observation of " ears, eyes and nose reveal no gross abnormalities.  No labored breathing observed.  Gait is coordinated. Patient can toe walk and heel walk without difficulty.      LEFT Shoulder / Upper Extremity Exam    OBSERVATION:     Swelling  none  Deformity  none   Discoloration  none   Scapular winging none   Scars   none  Atrophy  none    TENDERNESS / CREPITUS (T/C):          T/C      T/C   Clavicle   -/-  SUPRAspinatus    -/-     AC Jt.    -/-  INFRAspinatus  -/-    SC Jt.    -/-  Deltoid    -/-      G. Tuberosity  -/-  LH BICEP groove  -/-   Acromion:  -/-  Midline Neck   -/-     Scapular Spine -/-  Trapezium   -/-   SMA Scapula  -/-  GH jt. line - post  -/-     Scapulothoracic  -/-         ROM: (* = with pain)  Right shoulder   Left shoulder        AROM (PROM)   AROM (PROM)   FE    170° (175°)     170° (175°)     ER at 0°    60°  (65°)    60°  (65°)   ER at 90° ABD  90°  (90°)    90°  (90°)   IR at 90°  ABD   NA  (40°)     NA  (40°)      IR (spine level)   T10     T10    STRENGTH: (* = with pain) Right shoulder   Left shoulder    SCAPTION   5/5    5/5    IR    5/5    5/5   ER    5/5    5/5   BICEPS   5/5    5/5   Deltoid    5/5    5/5     SIGNS:  Painful side       NEER   -    OANTHONYS  neg    CHOWDHURY   -    SPEEDS  neg     DROP ARM   -   BELLY PRESS neg   Superior escape none    LIFT-OFF  neg   X-Body ADD    neg    MOVING VALGUS neg        STABILITY TESTING    Right shoulder   Left shoulder    Translation     Anterior  up face     up face    Posterior  up face    up face    Sulcus   < 10mm    < 10 mm     Signs   Apprehension   neg      neg       Relocation   no change     no change      Jerk test  neg     neg    EXTREMITY NEURO-VASCULAR EXAM:    Sensation grossly intact to light touch all dermatomal regions.    DTR 2+ Biceps, Triceps, BR and Negative Junaids sign   Grossly intact motor function at Elbow, Wrist and Hand   Distal pulses radial and ulnar 2+, brisk cap refill, symmetric.      NECK:  Painless FROM  and spinous processes non-tender. Negative Spurlings sign.      OTHER FINDINGS:  *** scapular dyskinesia    XRAYS:  Xrays including AP, Outlet and Axillary Lateral of Left shoulder are ordered / images reviewed by me:   No fracture dislocation or other pathology   Acromion type ***   Proximal migration of humeral head: None   GH arthritis: None          ASSESSMENT:   Left shoulder pain, possible:  No diagnosis found.    PLAN:      1. MRI to rule out ***  2. Follow up in clinic to discuss MRI results    All questions were answered, patient will contact us for questions or concerns in the interim.

## 2023-06-22 NOTE — PROGRESS NOTES
CC: LEFT shoulder pain     67 y.o. Female with a history of left shoulder pain who has a history of right rotator cuff repair in 2021. She states she as involved in an accident in September of last year, but did not start experiencing pain until 2 months later in her shoulder. She has most pain while lifting overhead and doing her hair. She has a grandson and has difficulty picking him up. She does report night pain. She denies numbness and tingling down the arm. She states the pain feels very similar to her previous rotator cuff tear.  She states that the pain is severe and not responding to any conservative care.      She reports that the pain and weakness is worse with overhead activity. It also bothers her at night.    Is affecting ADLs.  Pain is 4/10 at it's worst.      Past Medical History:   Diagnosis Date    Anxiety     Depression     GERD (gastroesophageal reflux disease)     Obesity     Sleep apnea in adult     WEARS CPAP, DOESNT KNOW SETTINGS.       Past Surgical History:   Procedure Laterality Date    ARTHROSCOPIC DEBRIDEMENT OF SHOULDER Right 2021    Procedure: DEBRIDEMENT, SHOULDER, ARTHROSCOPIC;  Surgeon: Papo Garcia MD;  Location: [facility] OR;  Service: Orthopedics;  Laterality: Right;  labrum    ARTHROSCOPIC REPAIR OF ROTATOR CUFF OF SHOULDER Right 2021    Procedure: REPAIR, ROTATOR CUFF, ARTHROSCOPIC;  Surgeon: Papo Garcia MD;  Location: [facility] OR;  Service: Orthopedics;  Laterality: Right;  regional w/catheter (interscalene)     SECTION      CHOLECYSTECTOMY      DECOMPRESSION OF SUBACROMIAL SPACE Right 2021    Procedure: DECOMPRESSION, SUBACROMIAL SPACE;  Surgeon: Papo Garcia MD;  Location: [facility] OR;  Service: Orthopedics;  Laterality: Right;    DISTAL CLAVICLE EXCISION Right 2021    Procedure: EXCISION, CLAVICLE, DISTAL;  Surgeon: Papo Garcia MD;  Location: [facility] OR;  Service: Orthopedics;  Laterality: Right;    FIXATION OF TENDON  Right 6/23/2021    Procedure: FIXATION, TENDON;  Surgeon: Papo Garcia MD;  Location: [facility] OR;  Service: Orthopedics;  Laterality: Right;    GASTRECTOMY      KNEE ARTHRODESIS  2001    arthroscopy - Left knee for meniscus     KNEE CARTILAGE SURGERY Left 2001    TONSILLECTOMY  1986    TOTAL KNEE ARTHROPLASTY Right 5/23/2018    Procedure: REPLACEMENT-KNEE-TOTAL;  Surgeon: John L. Ochsner Jr., MD;  Location: [facility] OR [facility];  Service: Orthopedics;  Laterality: Right;    TOTAL KNEE ARTHROPLASTY Left 10/31/2018    Procedure: REPLACEMENT-KNEE-TOTAL;  Surgeon: John L. Ochsner Jr., MD;  Location: [facility] OR 2ND [facility];  Service: Orthopedics;  Laterality: Left;    TOTAL REPLACEMENT OF HIP JOINT USING COMPUTER-ASSISTED NAVIGATION Right 2/25/2021    Procedure: ARTHROPLASTY, HIP, TOTAL, DELTA COMPUTER-ASSISTED NAVIGATION;  Surgeon: Lázaro Carlos MD;  Location: [facility] OR;  Service: Orthopedics;  Laterality: Right;    URETEROSCOPY         Family History   Problem Relation Age of Onset    Breast cancer Mother 58    Cancer Mother         breast    Hyperlipidemia Father     Hypertension Father     Heart disease Maternal Uncle     VANDANA disease Maternal Grandmother     Heart disease Maternal Grandmother     Hyperlipidemia Maternal Grandmother     Colon cancer Maternal Grandmother     Pancreatic cancer Maternal Grandfather     Cancer Maternal Grandfather 88    Heart attack Paternal Grandfather     Celiac disease Neg Hx     Cirrhosis Neg Hx     Colon polyps Neg Hx     Crohn's disease Neg Hx     Cystic fibrosis Neg Hx     Esophageal cancer Neg Hx     Hemochromatosis Neg Hx     Inflammatory bowel disease Neg Hx     Irritable bowel syndrome Neg Hx     Liver cancer Neg Hx     Liver disease Neg Hx     Rectal cancer Neg Hx     Stomach cancer Neg Hx     Ulcerative colitis Neg Hx     Silvestre's disease Neg Hx          Current Outpatient Medications:     atorvastatin (LIPITOR) 20 MG tablet, Take 1 tablet (20 mg total) by mouth once daily.,  Disp: 90 tablet, Rfl: 3    b complex vitamins tablet, Take 1 tablet by mouth once daily., Disp: , Rfl:     cyanocobalamin 500 MCG tablet, Take 500 mcg by mouth once daily., Disp: , Rfl:     fluconazole (DIFLUCAN) 150 MG Tab, Take 1 tablet (150mg) now, then in 3 days., Disp: 2 tablet, Rfl: 0    gabapentin (NEURONTIN) 600 MG tablet, Take 1 tablet (600 mg total) by mouth 3 (three) times daily., Disp: 270 tablet, Rfl: 4    multivitamin (THERAGRAN) per tablet, Take 1 tablet by mouth once daily., Disp: , Rfl:     omeprazole (PRILOSEC) 40 MG capsule, TAKE 1 CAPSULE DAILY, Disp: 90 capsule, Rfl: 3    ospemifene (OSPHENA) 60 mg Tab, Take 1 tablet by mouth once daily., Disp: 30 tablet, Rfl: 12    sertraline (ZOLOFT) 100 MG tablet, Take 1 tablet (100 mg total) by mouth once daily., Disp: 90 tablet, Rfl: 4    tiZANidine (ZANAFLEX) 4 MG tablet, Take 1 tablet (4 mg total) by mouth every 8 (eight) hours as needed (muscle spasms)., Disp: 270 tablet, Rfl: 0    Review of patient's allergies indicates:   Allergen Reactions    Dermabond [tissue glues] Rash    Adhesive      Paper tape usually ok- pulls skin off    Flagyl [metronidazole hcl]      confusion          REVIEW OF SYSTEMS:  Constitution: Negative. Negative for chills, fever and night sweats.   HENT: Negative for congestion and headaches.    Eyes: Negative for blurred vision, left vision loss and right vision loss.   Cardiovascular: Negative for chest pain and syncope.   Respiratory: Negative for cough and shortness of breath.    Endocrine: Negative for polydipsia, polyphagia and polyuria.   Hematologic/Lymphatic: Negative for bleeding problem. Does not bruise/bleed easily.   Skin: Negative for dry skin, itching and rash.   Musculoskeletal: Negative for falls.  Positive for left shoulder pain and muscle weakness.   Gastrointestinal: Negative for abdominal pain and bowel incontinence.   Genitourinary: Negative for bladder incontinence and nocturia.   Neurological: Negative for  "disturbances in coordination, loss of balance and seizures.   Psychiatric/Behavioral: Negative for depression. The patient does not have insomnia.    Allergic/Immunologic: Negative for hives and persistent infections.      PHYSICAL EXAMINATION:  Vitals:  BP (!) 100/58   Pulse 65   Ht 5' 10" (1.778 m)   Wt 107.5 kg (237 lb)   BMI 34.01 kg/m²    General: The patient is alert and oriented x 3.  Mood is pleasant.  Observation of ears, eyes and nose reveal no gross abnormalities.  No labored breathing observed.  Gait is coordinated. Patient can toe walk and heel walk without difficulty.      LEFT Shoulder / Upper Extremity Exam    OBSERVATION:     Swelling  none  Deformity  none   Discoloration  none   Scapular winging none   Scars   none  Atrophy  none    TENDERNESS / CREPITUS (T/C):          T/C      T/C   Clavicle   -/-  SUPRAspinatus    -/-     AC Jt.    -/-  INFRAspinatus  -/-    SC Jt.    -/-  Deltoid    -/-      G. Tuberosity  -/-  LH BICEP groove  -/-   Acromion:  -/-  Midline Neck   -/-     Scapular Spine -/-  Trapezium   -/-   SMA Scapula  -/-  GH jt. line - post  -/-     Scapulothoracic  -/-         ROM: (* = with pain)  Right shoulder   Left shoulder        AROM (PROM)   AROM (PROM)   FE    170° (175°)     90° (175°)     ER at 0°    60°  (65°)    45°  (65°)    IR (spine level)   T10     T10    STRENGTH: (* = with pain) Right shoulder   Left shoulder    IR    5/5    5/5   ER    5/5    4/5   BICEPS   5/5    5/5   Deltoid    5/5    5/5     SIGNS:  Painful side LEFT      NEER   -    OANTHONYS  neg    CHOWDHURY   -    SPEEDS  pos     DROP ARM   +   BELLY PRESS neg   Superior escape none    LIFT-OFF  neg   X-Body ADD    neg    MOVING VALGUS neg        STABILITY TESTING    Right shoulder   Left shoulder    Translation     Anterior  up face     up face    Posterior  up face    up face    Sulcus   < 10mm    < 10 mm     Signs   Apprehension   neg      neg       Relocation   no change     no change      Jerk " test  neg     neg    EXTREMITY NEURO-VASCULAR EXAM:    Sensation grossly intact to light touch all dermatomal regions.    DTR 2+ Biceps, Triceps, BR and Negative Junaids sign   Grossly intact motor function at Elbow, Wrist and Hand   Distal pulses radial and ulnar 2+, brisk cap refill, symmetric.      NECK:  Painless FROM and spinous processes non-tender. Negative Spurlings sign.      OTHER FINDINGS:  - scapular dyskinesia    XRAYS:  Xrays including AP, Outlet and Axillary Lateral of Left shoulder are ordered / images reviewed by me:   No fracture dislocation or other pathology   Acromion type 1   Proximal migration of humeral head: None   GH arthritis: None          ASSESSMENT:   Left shoulder pain, possible:  1.    2.    3.    PLAN:      1. CSI of left sub-acromial bursa performed today in clinic  2. The patient has a trip in July and will come back and revisit to discuss a possible MRI to evaluate the rotator cuff at that time    All questions were answered, patient will contact us for questions or concerns in the interim.

## 2023-06-22 NOTE — PROCEDURES
Large Joint Aspiration/Injection: L subacromial bursa    Date/Time: 6/22/2023 10:00 AM  Performed by: Papo Garcia MD  Authorized by: Papo Garcia MD     Consent Done?:  Yes (Verbal)  Indications:  Pain  Site marked: the procedure site was marked    Timeout: prior to procedure the correct patient, procedure, and site was verified    Prep: patient was prepped and draped in usual sterile fashion      Details:  Needle Size:  22 G  Ultrasonic Guidance for needle placement?: No    Approach:  Posterior  Location:  Shoulder  Site:  L subacromial bursa  Medications:  60 mg triamcinolone acetonide 40 mg/mL  Patient tolerance:  Patient tolerated the procedure well with no immediate complications

## 2023-07-17 ENCOUNTER — HOSPITAL ENCOUNTER (OUTPATIENT)
Dept: RADIOLOGY | Facility: HOSPITAL | Age: 68
Discharge: HOME | End: 2023-07-17
Attending: ORTHOPAEDIC SURGERY
Payer: COMMERCIAL

## 2023-07-17 DIAGNOSIS — M25.512 LEFT SHOULDER PAIN, UNSPECIFIED CHRONICITY: ICD-10-CM

## 2023-07-17 DIAGNOSIS — M75.102 TEAR OF LEFT ROTATOR CUFF, UNSPECIFIED TEAR EXTENT, UNSPECIFIED WHETHER TRAUMATIC: ICD-10-CM

## 2023-07-17 PROCEDURE — 73221 MRI JOINT UPR EXTREM W/O DYE: CPT | Mod: 26,LT,, | Performed by: RADIOLOGY

## 2023-07-17 PROCEDURE — 73221 MRI JOINT UPR EXTREM W/O DYE: CPT | Mod: TC,LT

## 2023-07-17 PROCEDURE — 73221 MRI SHOULDER WITHOUT CONTRAST LEFT: ICD-10-PCS | Mod: 26,LT,, | Performed by: RADIOLOGY

## 2023-07-19 LAB — NONINV COLON CA DNA+OCC BLD SCRN STL QL: NEGATIVE

## 2023-08-02 ENCOUNTER — PATIENT MESSAGE (OUTPATIENT)
Dept: BARIATRICS | Facility: CLINIC | Age: 68
End: 2023-08-02
Payer: COMMERCIAL

## 2023-08-08 ENCOUNTER — OFFICE VISIT (OUTPATIENT)
Dept: SPORTS MEDICINE | Facility: CLINIC | Age: 68
End: 2023-08-08
Payer: COMMERCIAL

## 2023-08-08 VITALS
HEIGHT: 70 IN | BODY MASS INDEX: 33.77 KG/M2 | SYSTOLIC BLOOD PRESSURE: 105 MMHG | HEART RATE: 62 BPM | WEIGHT: 235.88 LBS | DIASTOLIC BLOOD PRESSURE: 58 MMHG

## 2023-08-08 DIAGNOSIS — G89.29 CHRONIC LEFT SHOULDER PAIN: ICD-10-CM

## 2023-08-08 DIAGNOSIS — M67.912 TENDINOPATHY OF LEFT ROTATOR CUFF: Primary | ICD-10-CM

## 2023-08-08 DIAGNOSIS — M25.512 CHRONIC LEFT SHOULDER PAIN: ICD-10-CM

## 2023-08-08 PROCEDURE — 3078F PR MOST RECENT DIASTOLIC BLOOD PRESSURE < 80 MM HG: ICD-10-PCS | Mod: CPTII,S$GLB,, | Performed by: ORTHOPAEDIC SURGERY

## 2023-08-08 PROCEDURE — 1125F AMNT PAIN NOTED PAIN PRSNT: CPT | Mod: CPTII,S$GLB,, | Performed by: ORTHOPAEDIC SURGERY

## 2023-08-08 PROCEDURE — 99999 PR PBB SHADOW E&M-EST. PATIENT-LVL III: ICD-10-PCS | Mod: PBBFAC,,, | Performed by: ORTHOPAEDIC SURGERY

## 2023-08-08 PROCEDURE — 3074F SYST BP LT 130 MM HG: CPT | Mod: CPTII,S$GLB,, | Performed by: ORTHOPAEDIC SURGERY

## 2023-08-08 PROCEDURE — 3078F DIAST BP <80 MM HG: CPT | Mod: CPTII,S$GLB,, | Performed by: ORTHOPAEDIC SURGERY

## 2023-08-08 PROCEDURE — 3008F PR BODY MASS INDEX (BMI) DOCUMENTED: ICD-10-PCS | Mod: CPTII,S$GLB,, | Performed by: ORTHOPAEDIC SURGERY

## 2023-08-08 PROCEDURE — 1125F PR PAIN SEVERITY QUANTIFIED, PAIN PRESENT: ICD-10-PCS | Mod: CPTII,S$GLB,, | Performed by: ORTHOPAEDIC SURGERY

## 2023-08-08 PROCEDURE — 99214 PR OFFICE/OUTPT VISIT, EST, LEVL IV, 30-39 MIN: ICD-10-PCS | Mod: S$GLB,,, | Performed by: ORTHOPAEDIC SURGERY

## 2023-08-08 PROCEDURE — 99214 OFFICE O/P EST MOD 30 MIN: CPT | Mod: S$GLB,,, | Performed by: ORTHOPAEDIC SURGERY

## 2023-08-08 PROCEDURE — 99999 PR PBB SHADOW E&M-EST. PATIENT-LVL III: CPT | Mod: PBBFAC,,, | Performed by: ORTHOPAEDIC SURGERY

## 2023-08-08 PROCEDURE — 1159F MED LIST DOCD IN RCRD: CPT | Mod: CPTII,S$GLB,, | Performed by: ORTHOPAEDIC SURGERY

## 2023-08-08 PROCEDURE — 3288F PR FALLS RISK ASSESSMENT DOCUMENTED: ICD-10-PCS | Mod: CPTII,S$GLB,, | Performed by: ORTHOPAEDIC SURGERY

## 2023-08-08 PROCEDURE — 3074F PR MOST RECENT SYSTOLIC BLOOD PRESSURE < 130 MM HG: ICD-10-PCS | Mod: CPTII,S$GLB,, | Performed by: ORTHOPAEDIC SURGERY

## 2023-08-08 PROCEDURE — 1101F PR PT FALLS ASSESS DOC 0-1 FALLS W/OUT INJ PAST YR: ICD-10-PCS | Mod: CPTII,S$GLB,, | Performed by: ORTHOPAEDIC SURGERY

## 2023-08-08 PROCEDURE — 1159F PR MEDICATION LIST DOCUMENTED IN MEDICAL RECORD: ICD-10-PCS | Mod: CPTII,S$GLB,, | Performed by: ORTHOPAEDIC SURGERY

## 2023-08-08 PROCEDURE — 1101F PT FALLS ASSESS-DOCD LE1/YR: CPT | Mod: CPTII,S$GLB,, | Performed by: ORTHOPAEDIC SURGERY

## 2023-08-08 PROCEDURE — 3008F BODY MASS INDEX DOCD: CPT | Mod: CPTII,S$GLB,, | Performed by: ORTHOPAEDIC SURGERY

## 2023-08-08 PROCEDURE — 3288F FALL RISK ASSESSMENT DOCD: CPT | Mod: CPTII,S$GLB,, | Performed by: ORTHOPAEDIC SURGERY

## 2023-08-08 NOTE — PROGRESS NOTES
CC: LEFT shoulder pain    67 y.o. female presents for MRI review of left shoulder.  Concern for rotator cuff re-tear. Patient does not report any new incidents or injuries since their last appointment. Pain and symptoms improved following the injection. She does report weakness with lifting overhead. Here today to discuss treatment options.       Initial Hx:    67 y.o. Female with a history of left shoulder pain who has a history of right rotator cuff repair in 2021. She states she as involved in an accident in September of last year, but did not start experiencing pain until 2 months later in her shoulder. She has most pain while lifting overhead and doing her hair. She has a grandson and has difficulty picking him up. She does report night pain. She denies numbness and tingling down the arm. She states the pain feels very similar to her previous rotator cuff tear.  She states that the pain is severe and not responding to any conservative care.      She reports that the pain and weakness is worse with overhead activity. It also bothers her at night.    Is affecting ADLs.  Pain is 4/10 at it's worst.      Past Medical History:   Diagnosis Date    Anxiety     Depression     GERD (gastroesophageal reflux disease)     Obesity     Sleep apnea in adult     WEARS CPAP, DOESNT KNOW SETTINGS.       Past Surgical History:   Procedure Laterality Date    ARTHROSCOPIC DEBRIDEMENT OF SHOULDER Right 2021    Procedure: DEBRIDEMENT, SHOULDER, ARTHROSCOPIC;  Surgeon: Papo Garcia MD;  Location: [facility] OR;  Service: Orthopedics;  Laterality: Right;  labrum    ARTHROSCOPIC REPAIR OF ROTATOR CUFF OF SHOULDER Right 2021    Procedure: REPAIR, ROTATOR CUFF, ARTHROSCOPIC;  Surgeon: Papo Garcia MD;  Location: [facility] OR;  Service: Orthopedics;  Laterality: Right;  regional w/catheter (interscalene)     SECTION      CHOLECYSTECTOMY      DECOMPRESSION OF SUBACROMIAL SPACE Right 2021    Procedure:  DECOMPRESSION, SUBACROMIAL SPACE;  Surgeon: Papo Garcia MD;  Location: [facility] OR;  Service: Orthopedics;  Laterality: Right;    DISTAL CLAVICLE EXCISION Right 6/23/2021    Procedure: EXCISION, CLAVICLE, DISTAL;  Surgeon: Papo Garcia MD;  Location: [facility] OR;  Service: Orthopedics;  Laterality: Right;    FIXATION OF TENDON Right 6/23/2021    Procedure: FIXATION, TENDON;  Surgeon: Papo Garcia MD;  Location: [facility] OR;  Service: Orthopedics;  Laterality: Right;    GASTRECTOMY      KNEE ARTHRODESIS  2001    arthroscopy - Left knee for meniscus     KNEE CARTILAGE SURGERY Left 2001    TONSILLECTOMY  1986    TOTAL KNEE ARTHROPLASTY Right 5/23/2018    Procedure: REPLACEMENT-KNEE-TOTAL;  Surgeon: John L. Ochsner Jr., MD;  Location: [facility] OR [address] [place] [address];  Service: Orthopedics;  Laterality: Right;    TOTAL KNEE ARTHROPLASTY Left 10/31/2018    Procedure: REPLACEMENT-KNEE-TOTAL;  Surgeon: John L. Ochsner Jr., MD;  [facility];  Service: Orthopedics;  Laterality: Left;    TOTAL REPLACEMENT OF HIP JOINT USING COMPUTER-ASSISTED NAVIGATION Right 2/25/2021    Procedure: ARTHROPLASTY, HIP, TOTAL, DELTA COMPUTER-ASSISTED NAVIGATION;  Surgeon: Lázaro Carlos MD;  Location: [facility] OR;  Service: Orthopedics;  Laterality: Right;    URETEROSCOPY         Family History   Problem Relation Age of Onset    Breast cancer Mother 58    Cancer Mother         breast    Hyperlipidemia Father     Hypertension Father     Heart disease Maternal Uncle     VANDANA disease Maternal Grandmother     Heart disease Maternal Grandmother     Hyperlipidemia Maternal Grandmother     Colon cancer Maternal Grandmother     Pancreatic cancer Maternal Grandfather     Cancer Maternal Grandfather 88    Heart attack Paternal Grandfather     Celiac disease Neg Hx     Cirrhosis Neg Hx     Colon polyps Neg Hx     Crohn's disease Neg Hx     Cystic fibrosis Neg Hx     Esophageal cancer Neg Hx     Hemochromatosis Neg Hx     Inflammatory bowel disease  Neg Hx     Irritable bowel syndrome Neg Hx     Liver cancer Neg Hx     Liver disease Neg Hx     Rectal cancer Neg Hx     Stomach cancer Neg Hx     Ulcerative colitis Neg Hx     Silvestre's disease Neg Hx          Current Outpatient Medications:     atorvastatin (LIPITOR) 20 MG tablet, Take 1 tablet (20 mg total) by mouth once daily., Disp: 90 tablet, Rfl: 3    b complex vitamins tablet, Take 1 tablet by mouth once daily., Disp: , Rfl:     cyanocobalamin 500 MCG tablet, Take 500 mcg by mouth once daily., Disp: , Rfl:     fluconazole (DIFLUCAN) 150 MG Tab, Take 1 tablet (150mg) now, then in 3 days., Disp: 2 tablet, Rfl: 0    gabapentin (NEURONTIN) 600 MG tablet, Take 1 tablet (600 mg total) by mouth 3 (three) times daily., Disp: 270 tablet, Rfl: 4    multivitamin (THERAGRAN) per tablet, Take 1 tablet by mouth once daily., Disp: , Rfl:     omeprazole (PRILOSEC) 40 MG capsule, TAKE 1 CAPSULE DAILY, Disp: 90 capsule, Rfl: 3    ospemifene (OSPHENA) 60 mg Tab, Take 1 tablet by mouth once daily., Disp: 30 tablet, Rfl: 12    sertraline (ZOLOFT) 100 MG tablet, Take 1 tablet (100 mg total) by mouth once daily., Disp: 90 tablet, Rfl: 4    tiZANidine (ZANAFLEX) 4 MG tablet, Take 1 tablet (4 mg total) by mouth every 8 (eight) hours as needed (muscle spasms)., Disp: 270 tablet, Rfl: 0    Review of patient's allergies indicates:   Allergen Reactions    Dermabond [tissue glues] Rash    Adhesive      Paper tape usually ok- pulls skin off    Flagyl [metronidazole hcl]      confusion          REVIEW OF SYSTEMS:  Constitution: Negative. Negative for chills, fever and night sweats.   HENT: Negative for congestion and headaches.    Eyes: Negative for blurred vision, left vision loss and right vision loss.   Cardiovascular: Negative for chest pain and syncope.   Respiratory: Negative for cough and shortness of breath.    Endocrine: Negative for polydipsia, polyphagia and polyuria.   Hematologic/Lymphatic: Negative for bleeding problem. Does  "not bruise/bleed easily.   Skin: Negative for dry skin, itching and rash.   Musculoskeletal: Negative for falls.  Positive for left shoulder pain and muscle weakness.   Gastrointestinal: Negative for abdominal pain and bowel incontinence.   Genitourinary: Negative for bladder incontinence and nocturia.   Neurological: Negative for disturbances in coordination, loss of balance and seizures.   Psychiatric/Behavioral: Negative for depression. The patient does not have insomnia.    Allergic/Immunologic: Negative for hives and persistent infections.      PHYSICAL EXAMINATION:  Vitals:  BP (!) 105/58   Pulse 62   Ht 5' 10" (1.778 m)   Wt 107 kg (235 lb 14.3 oz)   BMI 33.85 kg/m²    General: The patient is alert and oriented x 3.  Mood is pleasant.  Observation of ears, eyes and nose reveal no gross abnormalities.  No labored breathing observed.  Gait is coordinated. Patient can toe walk and heel walk without difficulty.      LEFT Shoulder / Upper Extremity Exam    OBSERVATION:     Swelling  none  Deformity  none   Discoloration  none   Scapular winging none   Scars   none  Atrophy  none    TENDERNESS / CREPITUS (T/C):          T/C      T/C   Clavicle   -/-  SUPRAspinatus    -/-     AC Jt.    -/-  INFRAspinatus  -/-    SC Jt.    -/-  Deltoid    -/-      G. Tuberosity  -/-  LH BICEP groove  -/-   Acromion:  -/-  Midline Neck   -/-     Scapular Spine -/-  Trapezium   -/-   SMA Scapula  -/-  GH jt. line - post  -/-     Scapulothoracic  -/-         ROM: (* = with pain)  Right shoulder   Left shoulder        AROM (PROM)   AROM (PROM)   FE    170° (175°)     90° (175°)     ER at 0°    60°  (65°)    45°  (65°)    IR (spine level)   T10     T10    STRENGTH: (* = with pain) Right shoulder   Left shoulder    IR    5/5    5/5   ER    5/5    4/5   BICEPS   5/5    5/5   Deltoid    5/5    5/5     SIGNS:  Painful side LEFT      NEER   -    OANTHONYS  neg    CHOWDHURY   -    SPEEDS  pos     DROP ARM   +   BELLY PRESS neg   Superior " escape none    LIFT-OFF  neg   X-Body ADD    neg    MOVING VALGUS neg        STABILITY TESTING    Right shoulder   Left shoulder    Translation     Anterior  up face     up face    Posterior  up face    up face    Sulcus   < 10mm    < 10 mm     Signs   Apprehension   neg      neg       Relocation   no change     no change      Jerk test  neg     neg    EXTREMITY NEURO-VASCULAR EXAM:    Sensation grossly intact to light touch all dermatomal regions.    DTR 2+ Biceps, Triceps, BR and Negative Junaids sign   Grossly intact motor function at Elbow, Wrist and Hand   Distal pulses radial and ulnar 2+, brisk cap refill, symmetric.      NECK:  Painless FROM and spinous processes non-tender. Negative Spurlings sign.      OTHER FINDINGS:  - scapular dyskinesia    XRAYS:  Xrays including AP, Outlet and Axillary Lateral of Left shoulder are ordered / images reviewed by me:   No fracture dislocation or other pathology   Acromion type 1   Proximal migration of humeral head: None   GH arthritis: None  MRI SHOULDER WITHOUT CONTRAST LEFT     CLINICAL HISTORY:  Pain in left shoulderShoulder pain, rotator cuff disorder suspected, xray done;     TECHNIQUE:  MRI of left shoulder was performed on a 1.5T magnet utilizing the following sequences: Localizer; axial T2 FS; coronal T2 FS and PD FS; sagittal T1, T2 FS and PD FS.     COMPARISON:  X-ray 06/22/2023     FINDINGS:  Rotator cuff: There is diffuse rotator cuff tendinosis.  There is fraying of both the bursal and articular surface of the anterior supraspinatus.  At the level of the mid supraspinatus there is a focal high-grade partial-thickness articular sided tear involving at least 90% of the tendon thickness which measures approximately 8 x 9 mm.  The infraspinatus tendon demonstrates a 12 x 10 mm partial thickness articular sided tear.  There is diffuse subscapularis tendinosis.  There is a partial-thickness tear involving the superior fibers at the insertion measuring  approximately 6 by 6 mm.     Biceps: There is severe tenosynovitis of the long head of the biceps tendon.  It is perched medially on the lesser tuberosity and demonstrates abnormal signal and flattening suggestive of tendinosis and split tearing which extends into and involves the intra-articular portion.     Labrum: SLAP tear involving the biceps anchor     Glenohumeral Joint: Glenohumeral joint effusion present with some low-grade chondromalacia involving the posteromedial glenohumeral joint.  Some high-grade chondromalacia and some associated marrow edema of the cephalad aspect of the humeral head with some bony cystic changes of the greater tuberosity and lesser tuberosity and marginal osteophyte formation.     Acromioclavicular joint: Osteoarthritis with type 1 acromion.     Misc: Mild subacromial/subdeltoid bursitis.     Impression:     Rotator cuff tendinosis with multiple low to high-grade partial-thickness rotator cuff tears as detailed above     Extra-articular biceps tenosynovitis, intra-articular tendinosis and split tearing and partial medial dislocation/perching on the lesser tuberosity.     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 Left shoulder pain, unspecified chronicity    2. Tendinopathy of left rotator cuff          PLAN:      1. PT at [place].     2. F/u in 3 months. Possible repeat CSI.     All questions were answered, patient will contact us for questions or concerns in the interim.

## 2023-08-21 ENCOUNTER — CLINICAL SUPPORT (OUTPATIENT)
Dept: REHABILITATION | Facility: HOSPITAL | Age: 68
End: 2023-08-21
Attending: ORTHOPAEDIC SURGERY
Payer: COMMERCIAL

## 2023-08-21 DIAGNOSIS — G89.29 CHRONIC LEFT SHOULDER PAIN: ICD-10-CM

## 2023-08-21 DIAGNOSIS — M67.912 TENDINOPATHY OF LEFT ROTATOR CUFF: ICD-10-CM

## 2023-08-21 DIAGNOSIS — M25.512 ACUTE PAIN OF LEFT SHOULDER: ICD-10-CM

## 2023-08-21 DIAGNOSIS — M25.512 CHRONIC LEFT SHOULDER PAIN: ICD-10-CM

## 2023-08-21 PROCEDURE — 97165 OT EVAL LOW COMPLEX 30 MIN: CPT

## 2023-08-21 PROCEDURE — 97140 MANUAL THERAPY 1/> REGIONS: CPT

## 2023-08-21 PROCEDURE — 97110 THERAPEUTIC EXERCISES: CPT
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
## 2023-08-24 ENCOUNTER — CLINICAL SUPPORT (OUTPATIENT)
Dept: REHABILITATION | Facility: HOSPITAL | Age: 68
End: 2023-08-24
Payer: COMMERCIAL

## 2023-08-24 DIAGNOSIS — M25.512 ACUTE PAIN OF LEFT SHOULDER: Primary | ICD-10-CM

## 2023-08-24 PROCEDURE — 97110 THERAPEUTIC EXERCISES: CPT

## 2023-08-24 PROCEDURE — 97140 MANUAL THERAPY 1/> REGIONS: CPT
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
## 2023-08-28 ENCOUNTER — CLINICAL SUPPORT (OUTPATIENT)
Dept: REHABILITATION | Facility: HOSPITAL | Age: 68
End: 2023-08-28
Payer: COMMERCIAL

## 2023-08-28 DIAGNOSIS — M25.512 ACUTE PAIN OF LEFT SHOULDER: Primary | ICD-10-CM

## 2023-08-28 PROCEDURE — 97140 MANUAL THERAPY 1/> REGIONS: CPT

## 2023-08-28 PROCEDURE — 97110 THERAPEUTIC EXERCISES: CPT
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
## 2023-08-31 ENCOUNTER — CLINICAL SUPPORT (OUTPATIENT)
Dept: REHABILITATION | Facility: HOSPITAL | Age: 68
End: 2023-08-31
Payer: COMMERCIAL

## 2023-08-31 DIAGNOSIS — M25.512 ACUTE PAIN OF LEFT SHOULDER: Primary | ICD-10-CM

## 2023-08-31 PROCEDURE — 97140 MANUAL THERAPY 1/> REGIONS: CPT

## 2023-08-31 PROCEDURE — 97110 THERAPEUTIC EXERCISES: CPT
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
## 2023-09-05 ENCOUNTER — CLINICAL SUPPORT (OUTPATIENT)
Dept: REHABILITATION | Facility: HOSPITAL | Age: 68
End: 2023-09-05
Payer: COMMERCIAL

## 2023-09-05 DIAGNOSIS — M25.512 ACUTE PAIN OF LEFT SHOULDER: Primary | ICD-10-CM

## 2023-09-05 PROCEDURE — 97140 MANUAL THERAPY 1/> REGIONS: CPT

## 2023-09-05 PROCEDURE — 97110 THERAPEUTIC EXERCISES: CPT
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
## 2023-09-06 ENCOUNTER — PATIENT MESSAGE (OUTPATIENT)
Dept: BARIATRICS | Facility: CLINIC | Age: 68
End: 2023-09-06
Payer: COMMERCIAL

## 2023-09-07 ENCOUNTER — CLINICAL SUPPORT (OUTPATIENT)
Dept: REHABILITATION | Facility: HOSPITAL | Age: 68
End: 2023-09-07
Payer: COMMERCIAL

## 2023-09-07 DIAGNOSIS — M25.512 ACUTE PAIN OF LEFT SHOULDER: Primary | ICD-10-CM

## 2023-09-07 PROCEDURE — 97110 THERAPEUTIC EXERCISES: CPT

## 2023-09-07 PROCEDURE — 97140 MANUAL THERAPY 1/> REGIONS: CPT
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
## 2023-09-11 ENCOUNTER — CLINICAL SUPPORT (OUTPATIENT)
Dept: REHABILITATION | Facility: HOSPITAL | Age: 68
End: 2023-09-11
Payer: COMMERCIAL

## 2023-09-11 DIAGNOSIS — M25.512 ACUTE PAIN OF LEFT SHOULDER: Primary | ICD-10-CM

## 2023-09-11 PROCEDURE — 97140 MANUAL THERAPY 1/> REGIONS: CPT

## 2023-09-11 PROCEDURE — 97110 THERAPEUTIC EXERCISES: CPT
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
## 2023-09-12 ENCOUNTER — PATIENT MESSAGE (OUTPATIENT)
Dept: BARIATRICS | Facility: CLINIC | Age: 68
End: 2023-09-12
Payer: COMMERCIAL

## 2023-09-14 ENCOUNTER — CLINICAL SUPPORT (OUTPATIENT)
Dept: REHABILITATION | Facility: HOSPITAL | Age: 68
End: 2023-09-14
Payer: COMMERCIAL

## 2023-09-14 DIAGNOSIS — M25.512 ACUTE PAIN OF LEFT SHOULDER: Primary | ICD-10-CM

## 2023-09-14 PROCEDURE — 97110 THERAPEUTIC EXERCISES: CPT

## 2023-09-14 PROCEDURE — 97140 MANUAL THERAPY 1/> REGIONS: CPT
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
## 2023-09-18 ENCOUNTER — CLINICAL SUPPORT (OUTPATIENT)
Dept: REHABILITATION | Facility: HOSPITAL | Age: 68
End: 2023-09-18
Payer: COMMERCIAL

## 2023-09-18 DIAGNOSIS — M25.512 LEFT SHOULDER PAIN, UNSPECIFIED CHRONICITY: Primary | ICD-10-CM

## 2023-09-18 PROCEDURE — 97110 THERAPEUTIC EXERCISES: CPT
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
## 2023-09-21 ENCOUNTER — CLINICAL SUPPORT (OUTPATIENT)
Dept: REHABILITATION | Facility: HOSPITAL | Age: 68
End: 2023-09-21
Payer: COMMERCIAL

## 2023-09-21 DIAGNOSIS — M25.512 ACUTE PAIN OF LEFT SHOULDER: Primary | ICD-10-CM

## 2023-09-21 PROCEDURE — 97110 THERAPEUTIC EXERCISES: CPT

## 2023-09-21 PROCEDURE — 97140 MANUAL THERAPY 1/> REGIONS: CPT
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
## 2023-09-25 ENCOUNTER — CLINICAL SUPPORT (OUTPATIENT)
Dept: REHABILITATION | Facility: HOSPITAL | Age: 68
End: 2023-09-25
Payer: COMMERCIAL

## 2023-09-25 DIAGNOSIS — M25.512 ACUTE PAIN OF LEFT SHOULDER: Primary | ICD-10-CM

## 2023-09-25 PROCEDURE — 97140 MANUAL THERAPY 1/> REGIONS: CPT

## 2023-09-25 PROCEDURE — 97530 THERAPEUTIC ACTIVITIES: CPT

## 2023-09-25 PROCEDURE — 97110 THERAPEUTIC EXERCISES: CPT
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
## 2023-09-28 ENCOUNTER — CLINICAL SUPPORT (OUTPATIENT)
Dept: REHABILITATION | Facility: HOSPITAL | Age: 68
End: 2023-09-28
Payer: COMMERCIAL

## 2023-09-28 DIAGNOSIS — M25.512 ACUTE PAIN OF LEFT SHOULDER: Primary | ICD-10-CM

## 2023-09-28 PROCEDURE — 97530 THERAPEUTIC ACTIVITIES: CPT

## 2023-09-28 PROCEDURE — 97140 MANUAL THERAPY 1/> REGIONS: CPT

## 2023-09-28 PROCEDURE — 97110 THERAPEUTIC EXERCISES: CPT
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
## 2023-10-03 ENCOUNTER — CLINICAL SUPPORT (OUTPATIENT)
Dept: REHABILITATION | Facility: HOSPITAL | Age: 68
End: 2023-10-03
Payer: COMMERCIAL

## 2023-10-03 DIAGNOSIS — M25.512 LEFT SHOULDER PAIN, UNSPECIFIED CHRONICITY: Primary | ICD-10-CM

## 2023-10-03 PROCEDURE — 97140 MANUAL THERAPY 1/> REGIONS: CPT

## 2023-10-03 PROCEDURE — 97530 THERAPEUTIC ACTIVITIES: CPT

## 2023-10-03 PROCEDURE — 97110 THERAPEUTIC EXERCISES: CPT
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
## 2023-10-04 ENCOUNTER — PATIENT MESSAGE (OUTPATIENT)
Dept: BARIATRICS | Facility: CLINIC | Age: 68
End: 2023-10-04
Payer: COMMERCIAL

## 2023-10-10 ENCOUNTER — PATIENT MESSAGE (OUTPATIENT)
Dept: BARIATRICS | Facility: CLINIC | Age: 68
End: 2023-10-10
Payer: COMMERCIAL

## 2023-11-14 ENCOUNTER — PATIENT MESSAGE (OUTPATIENT)
Dept: BARIATRICS | Facility: CLINIC | Age: 68
End: 2023-11-14
Payer: COMMERCIAL

## 2023-11-16 ENCOUNTER — OFFICE VISIT (OUTPATIENT)
Dept: SPORTS MEDICINE | Facility: CLINIC | Age: 68
End: 2023-11-16
Payer: COMMERCIAL

## 2023-11-16 VITALS
HEART RATE: 63 BPM | SYSTOLIC BLOOD PRESSURE: 99 MMHG | HEIGHT: 70 IN | DIASTOLIC BLOOD PRESSURE: 52 MMHG | WEIGHT: 238.13 LBS | BODY MASS INDEX: 34.09 KG/M2

## 2023-11-16 DIAGNOSIS — M25.512 CHRONIC LEFT SHOULDER PAIN: Primary | ICD-10-CM

## 2023-11-16 DIAGNOSIS — G89.29 CHRONIC LEFT SHOULDER PAIN: Primary | ICD-10-CM

## 2023-11-16 PROCEDURE — 1101F PR PT FALLS ASSESS DOC 0-1 FALLS W/OUT INJ PAST YR: ICD-10-PCS | Mod: CPTII,S$GLB,, | Performed by: ORTHOPAEDIC SURGERY

## 2023-11-16 PROCEDURE — 20610 LARGE JOINT ASPIRATION/INJECTION: L SUBACROMIAL BURSA: ICD-10-PCS | Mod: LT,S$GLB,, | Performed by: ORTHOPAEDIC SURGERY

## 2023-11-16 PROCEDURE — 3008F BODY MASS INDEX DOCD: CPT | Mod: CPTII,S$GLB,, | Performed by: ORTHOPAEDIC SURGERY

## 2023-11-16 PROCEDURE — 3008F PR BODY MASS INDEX (BMI) DOCUMENTED: ICD-10-PCS | Mod: CPTII,S$GLB,, | Performed by: ORTHOPAEDIC SURGERY

## 2023-11-16 PROCEDURE — 1101F PT FALLS ASSESS-DOCD LE1/YR: CPT | Mod: CPTII,S$GLB,, | Performed by: ORTHOPAEDIC SURGERY

## 2023-11-16 PROCEDURE — 3078F DIAST BP <80 MM HG: CPT | Mod: CPTII,S$GLB,, | Performed by: ORTHOPAEDIC SURGERY

## 2023-11-16 PROCEDURE — 99214 PR OFFICE/OUTPT VISIT, EST, LEVL IV, 30-39 MIN: ICD-10-PCS | Mod: 25,S$GLB,, | Performed by: ORTHOPAEDIC SURGERY

## 2023-11-16 PROCEDURE — 3074F PR MOST RECENT SYSTOLIC BLOOD PRESSURE < 130 MM HG: ICD-10-PCS | Mod: CPTII,S$GLB,, | Performed by: ORTHOPAEDIC SURGERY

## 2023-11-16 PROCEDURE — 1159F MED LIST DOCD IN RCRD: CPT | Mod: CPTII,S$GLB,, | Performed by: ORTHOPAEDIC SURGERY

## 2023-11-16 PROCEDURE — 3078F PR MOST RECENT DIASTOLIC BLOOD PRESSURE < 80 MM HG: ICD-10-PCS | Mod: CPTII,S$GLB,, | Performed by: ORTHOPAEDIC SURGERY

## 2023-11-16 PROCEDURE — 99999 PR PBB SHADOW E&M-EST. PATIENT-LVL III: CPT | Mod: PBBFAC,,, | Performed by: ORTHOPAEDIC SURGERY

## 2023-11-16 PROCEDURE — 3288F FALL RISK ASSESSMENT DOCD: CPT | Mod: CPTII,S$GLB,, | Performed by: ORTHOPAEDIC SURGERY

## 2023-11-16 PROCEDURE — 99999 PR PBB SHADOW E&M-EST. PATIENT-LVL III: ICD-10-PCS | Mod: PBBFAC,,, | Performed by: ORTHOPAEDIC SURGERY

## 2023-11-16 PROCEDURE — 20610 DRAIN/INJ JOINT/BURSA W/O US: CPT | Mod: LT,S$GLB,, | Performed by: ORTHOPAEDIC SURGERY

## 2023-11-16 PROCEDURE — 3074F SYST BP LT 130 MM HG: CPT | Mod: CPTII,S$GLB,, | Performed by: ORTHOPAEDIC SURGERY

## 2023-11-16 PROCEDURE — 3288F PR FALLS RISK ASSESSMENT DOCUMENTED: ICD-10-PCS | Mod: CPTII,S$GLB,, | Performed by: ORTHOPAEDIC SURGERY

## 2023-11-16 PROCEDURE — 99214 OFFICE O/P EST MOD 30 MIN: CPT | Mod: 25,S$GLB,, | Performed by: ORTHOPAEDIC SURGERY

## 2023-11-16 PROCEDURE — 1125F AMNT PAIN NOTED PAIN PRSNT: CPT | Mod: CPTII,S$GLB,, | Performed by: ORTHOPAEDIC SURGERY

## 2023-11-16 PROCEDURE — 1159F PR MEDICATION LIST DOCUMENTED IN MEDICAL RECORD: ICD-10-PCS | Mod: CPTII,S$GLB,, | Performed by: ORTHOPAEDIC SURGERY

## 2023-11-16 PROCEDURE — 1125F PR PAIN SEVERITY QUANTIFIED, PAIN PRESENT: ICD-10-PCS | Mod: CPTII,S$GLB,, | Performed by: ORTHOPAEDIC SURGERY

## 2023-11-16 RX ORDER — TRIAMCINOLONE ACETONIDE 40 MG/ML
60 INJECTION, SUSPENSION INTRA-ARTICULAR; INTRAMUSCULAR
Status: DISCONTINUED | OUTPATIENT
Start: 2023-11-16 | End: 2023-11-16 | Stop reason: HOSPADM

## 2023-11-16 RX ADMIN — TRIAMCINOLONE ACETONIDE 60 MG: 40 INJECTION, SUSPENSION INTRA-ARTICULAR; INTRAMUSCULAR at 09:11

## 2023-11-16 NOTE — PROCEDURES
Large Joint Aspiration/Injection: L subacromial bursa    Date/Time: 11/16/2023 9:30 AM    Performed by: Papo Garcia MD  Authorized by: Papo Garcia MD    Consent Done?:  Yes (Verbal)  Indications:  Pain  Site marked: the procedure site was marked    Timeout: prior to procedure the correct patient, procedure, and site was verified    Prep: patient was prepped and draped in usual sterile fashion      Details:  Needle Size:  22 G  Ultrasonic Guidance for needle placement?: No    Approach:  Posterior  Location:  Shoulder  Site:  L subacromial bursa  Medications:  60 mg triamcinolone acetonide 40 mg/mL  Patient tolerance:  Patient tolerated the procedure well with no immediate complications
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
## 2023-12-06 NOTE — PROGRESS NOTES
"  BARIATRIC POST-OPERATIVE FOLLOW UP:    HPI:  65 y.o. female presents for fu s/p sleeve in 2017.     Here today for follow up visit states that she has had a rough year outside of covid with some family personal issues.   States that she lost track of time but here today get back track.     States that she believes that she has regained some weight ( calculated 10 lbs from last visit) and would like to get back to weighing 217 lbs if possible.     Intermittent heart burn.       Denies: nausea, vomiting, abdominal pain, changes in bowel movement pattern, fever, chills, dysphagia, chest pain, and shortness of breath.    PHYSICAL EXAM:  Ht 5' 10" (1.778 m)   Wt 107.1 kg (236 lb 1.8 oz)   BMI 33.88 kg/m²      Weight History Current Weight Total Weight Loss EWL   12/6/2017 315 0 0   1/9/2018 286 29 0.18   1/30/2018 279 36 0.23   3/16/2018 265.14 49.86 0.31   6/18/2018 247.1 67.9 0.43   12/7/2018 224.3 90.7 0.57   8/12/2020 234 81 0.51       GENERAL: In NAD, A&O x3  ABDOMEN: Soft, non-tender, non-distended. Well-healing surgical scars are clean, dry, and intact without signs of infection or bleeding.  EXTREMITIES: No clubbing, cyanosis, or edema.      ASSESSMENT:  - weight gain   - Morbid obesity s/p sleeve gastrectomy  2.5 years ago.  - Great Weight loss, but has gained recently    PLAN:  - Emphasized the importance of regular exercise and adherence to bariatric diet to achieve maximum weight loss.  - Encouraged patient to begin OR continue regular exercise.  - Follow-up with dietician to reinforce diet, may need a few monthly visits to make sure she is back on track.  - Continue daily vitamins and medications.  - RTC in one year or sooner if needed.  - Call the office for any issues.  - Check labs today.  "
yes

## 2023-12-11 ENCOUNTER — PATIENT MESSAGE (OUTPATIENT)
Dept: NEUROSURGERY | Facility: CLINIC | Age: 68
End: 2023-12-11
Payer: COMMERCIAL

## 2023-12-11 DIAGNOSIS — M47.812 CERVICAL SPONDYLOSIS WITHOUT MYELOPATHY: ICD-10-CM

## 2023-12-11 NOTE — TELEPHONE ENCOUNTER
Called patient to let them know that Jeanne WRIGHT will send refill request when she sees it and that Jeanne WRIGHT is not in the office today.     AS

## 2023-12-12 ENCOUNTER — LAB VISIT (OUTPATIENT)
Dept: LAB | Facility: HOSPITAL | Age: 68
End: 2023-12-12
Attending: INTERNAL MEDICINE
Payer: COMMERCIAL

## 2023-12-12 ENCOUNTER — PATIENT MESSAGE (OUTPATIENT)
Dept: BARIATRICS | Facility: CLINIC | Age: 68
End: 2023-12-12
Payer: COMMERCIAL

## 2023-12-12 DIAGNOSIS — E78.5 HYPERLIPIDEMIA, UNSPECIFIED HYPERLIPIDEMIA TYPE: ICD-10-CM

## 2023-12-12 LAB
ALBUMIN SERPL BCP-MCNC: 3.9 G/DL (ref 3.5–5.2)
ALP SERPL-CCNC: 66 U/L (ref 55–135)
ALT SERPL W/O P-5'-P-CCNC: 24 U/L (ref 10–44)
ANION GAP SERPL CALC-SCNC: 9 MMOL/L (ref 8–16)
AST SERPL-CCNC: 24 U/L (ref 10–40)
BILIRUB SERPL-MCNC: 0.6 MG/DL (ref 0.1–1)
BUN SERPL-MCNC: 15 MG/DL (ref 8–23)
CALCIUM SERPL-MCNC: 9.9 MG/DL (ref 8.7–10.5)
CHLORIDE SERPL-SCNC: 103 MMOL/L (ref 95–110)
CHOLEST SERPL-MCNC: 180 MG/DL (ref 120–199)
CHOLEST/HDLC SERPL: 2.3 {RATIO} (ref 2–5)
CO2 SERPL-SCNC: 31 MMOL/L (ref 23–29)
CREAT SERPL-MCNC: 0.8 MG/DL (ref 0.5–1.4)
EST. GFR  (NO RACE VARIABLE): >60 ML/MIN/1.73 M^2
ESTIMATED AVG GLUCOSE: 103 MG/DL (ref 68–131)
GLUCOSE SERPL-MCNC: 96 MG/DL (ref 70–110)
HBA1C MFR BLD: 5.2 % (ref 4–5.6)
HDLC SERPL-MCNC: 77 MG/DL (ref 40–75)
HDLC SERPL: 42.8 % (ref 20–50)
LDLC SERPL CALC-MCNC: 91.6 MG/DL (ref 63–159)
NONHDLC SERPL-MCNC: 103 MG/DL
POTASSIUM SERPL-SCNC: 5.1 MMOL/L (ref 3.5–5.1)
PROT SERPL-MCNC: 7.1 G/DL (ref 6–8.4)
SODIUM SERPL-SCNC: 143 MMOL/L (ref 136–145)
TRIGL SERPL-MCNC: 57 MG/DL (ref 30–150)

## 2023-12-12 PROCEDURE — 80053 COMPREHEN METABOLIC PANEL: CPT | Performed by: INTERNAL MEDICINE

## 2023-12-12 PROCEDURE — 83036 HEMOGLOBIN GLYCOSYLATED A1C: CPT | Performed by: INTERNAL MEDICINE

## 2023-12-12 PROCEDURE — 36415 COLL VENOUS BLD VENIPUNCTURE: CPT | Performed by: INTERNAL MEDICINE

## 2023-12-12 PROCEDURE — 80061 LIPID PANEL: CPT | Performed by: INTERNAL MEDICINE

## 2023-12-12 RX ORDER — TIZANIDINE 4 MG/1
4 TABLET ORAL EVERY 8 HOURS PRN
Qty: 270 TABLET | Refills: 0 | Status: SHIPPED | OUTPATIENT
Start: 2023-12-12

## 2023-12-13 ENCOUNTER — PATIENT MESSAGE (OUTPATIENT)
Dept: BARIATRICS | Facility: CLINIC | Age: 68
End: 2023-12-13
Payer: COMMERCIAL

## 2023-12-19 ENCOUNTER — OFFICE VISIT (OUTPATIENT)
Dept: INTERNAL MEDICINE | Facility: CLINIC | Age: 68
End: 2023-12-19
Payer: COMMERCIAL

## 2023-12-19 ENCOUNTER — IMMUNIZATION (OUTPATIENT)
Dept: INTERNAL MEDICINE | Facility: CLINIC | Age: 68
End: 2023-12-19
Payer: COMMERCIAL

## 2023-12-19 VITALS
BODY MASS INDEX: 34.28 KG/M2 | SYSTOLIC BLOOD PRESSURE: 126 MMHG | HEIGHT: 70 IN | WEIGHT: 239.44 LBS | OXYGEN SATURATION: 97 % | HEART RATE: 70 BPM | DIASTOLIC BLOOD PRESSURE: 68 MMHG

## 2023-12-19 DIAGNOSIS — Z90.3 H/O GASTRIC SLEEVE: ICD-10-CM

## 2023-12-19 DIAGNOSIS — M25.561 ACUTE PAIN OF RIGHT KNEE: ICD-10-CM

## 2023-12-19 DIAGNOSIS — Z78.0 POST-MENOPAUSAL: ICD-10-CM

## 2023-12-19 DIAGNOSIS — Z00.00 HEALTHCARE MAINTENANCE: ICD-10-CM

## 2023-12-19 DIAGNOSIS — E66.9 OBESITY (BMI 30-39.9): Chronic | ICD-10-CM

## 2023-12-19 DIAGNOSIS — M25.521 RIGHT ELBOW PAIN: Primary | ICD-10-CM

## 2023-12-19 DIAGNOSIS — Z23 NEEDS FLU SHOT: Primary | ICD-10-CM

## 2023-12-19 PROCEDURE — 3074F PR MOST RECENT SYSTOLIC BLOOD PRESSURE < 130 MM HG: ICD-10-PCS | Mod: CPTII,S$GLB,, | Performed by: INTERNAL MEDICINE

## 2023-12-19 PROCEDURE — 90694 FLU VACCINE - QUADRIVALENT - ADJUVANTED: ICD-10-PCS | Mod: S$GLB,,, | Performed by: INTERNAL MEDICINE

## 2023-12-19 PROCEDURE — 3288F FALL RISK ASSESSMENT DOCD: CPT | Mod: CPTII,S$GLB,, | Performed by: INTERNAL MEDICINE

## 2023-12-19 PROCEDURE — 3078F PR MOST RECENT DIASTOLIC BLOOD PRESSURE < 80 MM HG: ICD-10-PCS | Mod: CPTII,S$GLB,, | Performed by: INTERNAL MEDICINE

## 2023-12-19 PROCEDURE — 1125F PR PAIN SEVERITY QUANTIFIED, PAIN PRESENT: ICD-10-PCS | Mod: CPTII,S$GLB,, | Performed by: INTERNAL MEDICINE

## 2023-12-19 PROCEDURE — 3044F PR MOST RECENT HEMOGLOBIN A1C LEVEL <7.0%: ICD-10-PCS | Mod: CPTII,S$GLB,, | Performed by: INTERNAL MEDICINE

## 2023-12-19 PROCEDURE — 1125F AMNT PAIN NOTED PAIN PRSNT: CPT | Mod: CPTII,S$GLB,, | Performed by: INTERNAL MEDICINE

## 2023-12-19 PROCEDURE — 3288F PR FALLS RISK ASSESSMENT DOCUMENTED: ICD-10-PCS | Mod: CPTII,S$GLB,, | Performed by: INTERNAL MEDICINE

## 2023-12-19 PROCEDURE — 99999 PR PBB SHADOW E&M-EST. PATIENT-LVL III: CPT | Mod: PBBFAC,,, | Performed by: INTERNAL MEDICINE

## 2023-12-19 PROCEDURE — 1160F PR REVIEW ALL MEDS BY PRESCRIBER/CLIN PHARMACIST DOCUMENTED: ICD-10-PCS | Mod: CPTII,S$GLB,, | Performed by: INTERNAL MEDICINE

## 2023-12-19 PROCEDURE — 1101F PT FALLS ASSESS-DOCD LE1/YR: CPT | Mod: CPTII,S$GLB,, | Performed by: INTERNAL MEDICINE

## 2023-12-19 PROCEDURE — 3044F HG A1C LEVEL LT 7.0%: CPT | Mod: CPTII,S$GLB,, | Performed by: INTERNAL MEDICINE

## 2023-12-19 PROCEDURE — 99214 PR OFFICE/OUTPT VISIT, EST, LEVL IV, 30-39 MIN: ICD-10-PCS | Mod: 25,S$GLB,, | Performed by: INTERNAL MEDICINE

## 2023-12-19 PROCEDURE — 3074F SYST BP LT 130 MM HG: CPT | Mod: CPTII,S$GLB,, | Performed by: INTERNAL MEDICINE

## 2023-12-19 PROCEDURE — 3008F BODY MASS INDEX DOCD: CPT | Mod: CPTII,S$GLB,, | Performed by: INTERNAL MEDICINE

## 2023-12-19 PROCEDURE — 90694 VACC AIIV4 NO PRSRV 0.5ML IM: CPT | Mod: S$GLB,,, | Performed by: INTERNAL MEDICINE

## 2023-12-19 PROCEDURE — 90471 FLU VACCINE - QUADRIVALENT - ADJUVANTED: ICD-10-PCS | Mod: S$GLB,,, | Performed by: INTERNAL MEDICINE

## 2023-12-19 PROCEDURE — 1159F PR MEDICATION LIST DOCUMENTED IN MEDICAL RECORD: ICD-10-PCS | Mod: CPTII,S$GLB,, | Performed by: INTERNAL MEDICINE

## 2023-12-19 PROCEDURE — 3008F PR BODY MASS INDEX (BMI) DOCUMENTED: ICD-10-PCS | Mod: CPTII,S$GLB,, | Performed by: INTERNAL MEDICINE

## 2023-12-19 PROCEDURE — 99999 PR PBB SHADOW E&M-EST. PATIENT-LVL III: ICD-10-PCS | Mod: PBBFAC,,, | Performed by: INTERNAL MEDICINE

## 2023-12-19 PROCEDURE — 90471 IMMUNIZATION ADMIN: CPT | Mod: S$GLB,,, | Performed by: INTERNAL MEDICINE

## 2023-12-19 PROCEDURE — 1101F PR PT FALLS ASSESS DOC 0-1 FALLS W/OUT INJ PAST YR: ICD-10-PCS | Mod: CPTII,S$GLB,, | Performed by: INTERNAL MEDICINE

## 2023-12-19 PROCEDURE — 1160F RVW MEDS BY RX/DR IN RCRD: CPT | Mod: CPTII,S$GLB,, | Performed by: INTERNAL MEDICINE

## 2023-12-19 PROCEDURE — 1159F MED LIST DOCD IN RCRD: CPT | Mod: CPTII,S$GLB,, | Performed by: INTERNAL MEDICINE

## 2023-12-19 PROCEDURE — 99214 OFFICE O/P EST MOD 30 MIN: CPT | Mod: 25,S$GLB,, | Performed by: INTERNAL MEDICINE

## 2023-12-19 PROCEDURE — 3078F DIAST BP <80 MM HG: CPT | Mod: CPTII,S$GLB,, | Performed by: INTERNAL MEDICINE

## 2023-12-19 NOTE — PROGRESS NOTES
"  Subjective     Chief Complaint: R elbow and R knee pain    History of Present Illness:  Ms. Soha Wheatley is a 68 y.o. female with anxiety, arthritis s/p R Knee replacement, obesity s/p gastric sleeve (12/29/2017), CKD, depression, and GERD who present for 6mo follow-up and to discuss new onset R elbow pain and R knee pain. Last seen 6/1/2023. R elbow pain started morning of 12/19, achy in nature, constant, non-radiating. No bony tenderness, 5/5 strength in RUE, no  weakness, no neuropathic pain. R knee pain described as achy muscular pain with additional sensation of knee "catching," but no popping noted. Able to ambulate without difficulty, but noticed she limps. Started 12/16/23. Returned from cruise about one week ago and helps take care of her young grandchildren, therefore patient feels the sudden change in pace in actiivty may be contrubuting to knee discomfort. Otherwise, patient endorses no issues taking medications. She states she started taking Intrarosaa (Prasterone - vaginal insert patient using for vaginal dryness, non-hormonal). No other new medications. Recent labwork 12/12/23 without severe abnormalities.     Review of Systems   Constitutional:  Negative for chills and fever.   HENT: Negative.     Eyes: Negative.    Respiratory:  Negative for cough and shortness of breath.    Cardiovascular:  Negative for chest pain and palpitations.   Gastrointestinal:  Negative for nausea and vomiting.   Genitourinary:  Negative for dysuria and frequency.   Musculoskeletal:  Positive for joint pain. Negative for myalgias.   Skin: Negative.    Neurological: Negative.    Endo/Heme/Allergies: Negative.    Psychiatric/Behavioral: Negative.         PAST HISTORY:     Past Medical History:   Diagnosis Date    Anxiety     Depression     GERD (gastroesophageal reflux disease)     Obesity     Sleep apnea in adult     WEARS CPAP, DOESNT KNOW SETTINGS.       Past Surgical History:   Procedure Laterality Date    " ARTHROSCOPIC DEBRIDEMENT OF SHOULDER Right 2021    Procedure: DEBRIDEMENT, SHOULDER, ARTHROSCOPIC;  Surgeon: Papo Garcia MD;  Location: [facility] OR;  Service: Orthopedics;  Laterality: Right;  labrum    ARTHROSCOPIC REPAIR OF ROTATOR CUFF OF SHOULDER Right 2021    Procedure: REPAIR, ROTATOR CUFF, ARTHROSCOPIC;  Surgeon: Papo Garcia MD;  Location: [facility] OR;  Service: Orthopedics;  Laterality: Right;  regional w/catheter (interscalene)     SECTION      CHOLECYSTECTOMY      DECOMPRESSION OF SUBACROMIAL SPACE Right 2021    Procedure: DECOMPRESSION, SUBACROMIAL SPACE;  Surgeon: Papo Garcia MD;  Location: [facility] OR;  Service: Orthopedics;  Laterality: Right;    DISTAL CLAVICLE EXCISION Right 2021    Procedure: EXCISION, CLAVICLE, DISTAL;  Surgeon: Papo Garcia MD;  Location: [facility] OR;  Service: Orthopedics;  Laterality: Right;    FIXATION OF TENDON Right 2021    Procedure: FIXATION, TENDON;  Surgeon: Papo Garcia MD;  Location: [facility] OR;  Service: Orthopedics;  Laterality: Right;    GASTRECTOMY      KNEE ARTHRODESIS      arthroscopy - Left knee for meniscus     KNEE CARTILAGE SURGERY Left     TONSILLECTOMY      TOTAL KNEE ARTHROPLASTY Right 2018    Procedure: REPLACEMENT-KNEE-TOTAL;  Surgeon: John L. Ochsner Jr., MD;  Location: [facility] OR [address] [place] [address];  Service: Orthopedics;  Laterality: Right;    TOTAL KNEE ARTHROPLASTY Left 10/31/2018    Procedure: REPLACEMENT-KNEE-TOTAL;  Surgeon: John L. Ochsner Jr., MD;  Location: [facility] OR [facility];  Service: Orthopedics;  Laterality: Left;    TOTAL REPLACEMENT OF HIP JOINT USING COMPUTER-ASSISTED NAVIGATION Right 2021    Procedure: ARTHROPLASTY, HIP, TOTAL, DELTA COMPUTER-ASSISTED NAVIGATION;  Surgeon: Lázaro Carlos MD;  Location: [facility] OR;  Service: Orthopedics;  Laterality: Right;    URETEROSCOPY         Family History   Problem Relation Age of Onset    Breast cancer Mother 58    Cancer Mother          breast    Hyperlipidemia Father     Hypertension Father     Heart disease Maternal Uncle     VANDANA disease Maternal Grandmother     Heart disease Maternal Grandmother     Hyperlipidemia Maternal Grandmother     Colon cancer Maternal Grandmother     Pancreatic cancer Maternal Grandfather     Cancer Maternal Grandfather 88    Heart attack Paternal Grandfather     Celiac disease Neg Hx     Cirrhosis Neg Hx     Colon polyps Neg Hx     Crohn's disease Neg Hx     Cystic fibrosis Neg Hx     Esophageal cancer Neg Hx     Hemochromatosis Neg Hx     Inflammatory bowel disease Neg Hx     Irritable bowel syndrome Neg Hx     Liver cancer Neg Hx     Liver disease Neg Hx     Rectal cancer Neg Hx     Stomach cancer Neg Hx     Ulcerative colitis Neg Hx     Silvestre's disease Neg Hx        Social History     Tobacco Use    Smoking status: Never    Smokeless tobacco: Never   Substance Use Topics    Alcohol use: Yes     Comment: social    Drug use: No       MEDICATIONS & ALLERGIES:     Current Outpatient Medications on File Prior to Visit   Medication Sig    atorvastatin (LIPITOR) 20 MG tablet Take 1 tablet (20 mg total) by mouth once daily.    b complex vitamins tablet Take 1 tablet by mouth once daily.    cyanocobalamin 500 MCG tablet Take 500 mcg by mouth once daily.    fluconazole (DIFLUCAN) 150 MG Tab Take 1 tablet (150mg) now, then in 3 days.    gabapentin (NEURONTIN) 600 MG tablet Take 1 tablet (600 mg total) by mouth 3 (three) times daily.    multivitamin (THERAGRAN) per tablet Take 1 tablet by mouth once daily.    omeprazole (PRILOSEC) 40 MG capsule TAKE 1 CAPSULE DAILY    ospemifene (OSPHENA) 60 mg Tab Take 1 tablet by mouth once daily.    sertraline (ZOLOFT) 100 MG tablet Take 1 tablet (100 mg total) by mouth once daily.    tiZANidine (ZANAFLEX) 4 MG tablet Take 1 tablet (4 mg total) by mouth every 8 (eight) hours as needed (muscle spasms).     No current facility-administered medications on file prior to visit.  "      Review of patient's allergies indicates:   Allergen Reactions    Dermabond [tissue glues] Rash    Adhesive      Paper tape usually ok- pulls skin off    Flagyl [metronidazole hcl]      confusion       OBJECTIVE:     Vital Signs:  Vitals:    12/19/23 0902   BP: 126/68   BP Location: Left arm   Patient Position: Sitting   BP Method: Large (Manual)   Pulse: 70   SpO2: 97%   Weight: 108.6 kg (239 lb 6.7 oz)   Height: 5' 10" (1.778 m)       Body mass index is 34.35 kg/m².     Physical Exam  Vitals and nursing note reviewed.   Constitutional:       General: She is not in acute distress.     Appearance: Normal appearance. She is not diaphoretic.   HENT:      Head: Normocephalic and atraumatic.      Mouth/Throat:      Mouth: Mucous membranes are moist.   Eyes:      General: No scleral icterus.     Extraocular Movements: Extraocular movements intact.   Neck:      Vascular: No carotid bruit, hepatojugular reflux or JVD.   Cardiovascular:      Rate and Rhythm: Normal rate and regular rhythm.      Pulses: Normal pulses.      Heart sounds: No murmur heard.     No friction rub.   Pulmonary:      Effort: Pulmonary effort is normal. No respiratory distress.      Breath sounds: Normal breath sounds. No wheezing.   Chest:      Chest wall: No tenderness.   Abdominal:      General: Abdomen is flat. Bowel sounds are normal. There is no distension.      Tenderness: There is no abdominal tenderness.   Musculoskeletal:      Right lower leg: No edema.      Left lower leg: No edema.      Comments: No tenderness along medial, lateral, or posterior aspect of elbow. Sensation intact. No redness.   R knee w/o redness or effusion   Skin:     General: Skin is warm and dry.      Capillary Refill: Capillary refill takes less than 2 seconds.      Findings: No rash.   Neurological:      General: No focal deficit present.      Mental Status: She is alert and oriented to person, place, and time.      Comments: Antalgic gait - somewhat limping on R " side   Psychiatric:         Mood and Affect: Mood normal.         Thought Content: Thought content normal.         Health Maintenance Due   Topic Date Due    Shingles Vaccine (1 of 2) Never done    RSV Vaccine (Age 60+ and Pregnant patients) (1 - 1-dose 60+ series) Never done    Pneumococcal Vaccines (Age 65+) (1 - PCV) Never done    COVID-19 Vaccine (3 - 2023-24 season) 09/01/2023    Mammogram  02/10/2024         ASSESSMENT & PLAN:   Ms. Soha Wheatley is a 68 y.o. female with anxiety, arthritis s/p R Knee replacement, obesity s/p gastric sleeve (12/29/2017), CKD, depression, and GERD who present for 6mo follow-up and to discuss new onset R elbow pain and R knee pain. Elbow pain not traumatic in nature, no deficits or alterations in strength/sensation noted. Advised to symptomatically treat with tylenol and to f/u if not resolving or becoming worse. 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 S/p knee replacement in this area. Labwork obtained 12/12/23 revealed normal CMP except for elevated CO2 of 31, no evidence of renal dysfunction (GFR >60). A1c 5.2%. Lipid panel normal.     Right elbow pain  Comments:  Started morning of 12/19, achy in nature, constant, non-radiating. No bony tenderness, 5/5 strength in RUE, no  weakness, no neuropathic pain.    Acute pain of right knee  Comments:  Unlikely tear, most likely related to increased activity following vacation, noted to be more muscular in nature as opposed to inside joint.    Post-menopausal  Comments:  Discussed good safety profile of vaginal ER wrt not increasing risk of breast cancer    Obesity (BMI 30-39.9)  Comments:  Gained 1lb since 11/2023, however returned from vacation. Maintains good activity.    H/O gastric sleeve  Comments:  Reports good adherence with vitamins    Healthcare maintenance  Comments:  Interested in RSV vaccine, patient  requested checking to see if Shingles covered before proceeding        RTC in 6m    Discussed with Dr. Mallory - staff attestation to follow    Ana M Prasad MD  Internal Medicine, PGY-I  [facility]  [address] [place] [address] [phone]

## 2023-12-19 NOTE — PROGRESS NOTES
Two pt identifier verified. Pt tolerated well. Advised pt to wait 15 minutes post immunization. Pt verbalized understanding.    Daniele DUBON

## 2024-01-09 ENCOUNTER — PATIENT MESSAGE (OUTPATIENT)
Dept: BARIATRICS | Facility: CLINIC | Age: 69
End: 2024-01-09
Payer: COMMERCIAL

## 2024-01-21 DIAGNOSIS — M25.569 KNEE PAIN, UNSPECIFIED CHRONICITY, UNSPECIFIED LATERALITY: ICD-10-CM

## 2024-01-22 RX ORDER — OMEPRAZOLE 40 MG/1
CAPSULE, DELAYED RELEASE ORAL
Qty: 90 CAPSULE | Refills: 3 | Status: SHIPPED | OUTPATIENT
Start: 2024-01-22

## 2024-01-22 NOTE — TELEPHONE ENCOUNTER
Refill Decision Note   Soha Wheatley  is requesting a refill authorization.  Brief Assessment and Rationale for Refill:  Approve     Medication Therapy Plan:         Comments:     Note composed:1:54 PM 01/22/2024

## 2024-02-14 ENCOUNTER — PATIENT MESSAGE (OUTPATIENT)
Dept: BARIATRICS | Facility: CLINIC | Age: 69
End: 2024-02-14
Payer: COMMERCIAL

## 2024-02-20 ENCOUNTER — PATIENT MESSAGE (OUTPATIENT)
Dept: BARIATRICS | Facility: CLINIC | Age: 69
End: 2024-02-20
Payer: COMMERCIAL

## 2024-02-21 DIAGNOSIS — Z12.31 OTHER SCREENING MAMMOGRAM: ICD-10-CM

## 2024-02-26 ENCOUNTER — PATIENT MESSAGE (OUTPATIENT)
Dept: ADMINISTRATIVE | Facility: HOSPITAL | Age: 69
End: 2024-02-26
Payer: COMMERCIAL

## 2024-02-29 ENCOUNTER — PATIENT MESSAGE (OUTPATIENT)
Dept: BARIATRICS | Facility: CLINIC | Age: 69
End: 2024-02-29
Payer: COMMERCIAL

## 2024-03-06 ENCOUNTER — PATIENT MESSAGE (OUTPATIENT)
Dept: BARIATRICS | Facility: CLINIC | Age: 69
End: 2024-03-06
Payer: COMMERCIAL

## 2024-03-11 ENCOUNTER — HOSPITAL ENCOUNTER (OUTPATIENT)
Dept: RADIOLOGY | Facility: HOSPITAL | Age: 69
Discharge: HOME | End: 2024-03-11
Attending: INTERNAL MEDICINE
Payer: COMMERCIAL

## 2024-03-11 DIAGNOSIS — Z12.31 OTHER SCREENING MAMMOGRAM: ICD-10-CM

## 2024-03-11 PROCEDURE — 77067 SCR MAMMO BI INCL CAD: CPT | Mod: 26,,, | Performed by: RADIOLOGY

## 2024-03-11 PROCEDURE — 77067 SCR MAMMO BI INCL CAD: CPT | Mod: TC

## 2024-03-11 PROCEDURE — 77063 BREAST TOMOSYNTHESIS BI: CPT | Mod: 26,,, | Performed by: RADIOLOGY

## 2024-03-18 ENCOUNTER — OFFICE VISIT (OUTPATIENT)
Dept: SPORTS MEDICINE | Facility: CLINIC | Age: 69
End: 2024-03-18
Payer: COMMERCIAL

## 2024-03-18 VITALS
WEIGHT: 240 LBS | HEIGHT: 70 IN | SYSTOLIC BLOOD PRESSURE: 105 MMHG | BODY MASS INDEX: 34.36 KG/M2 | DIASTOLIC BLOOD PRESSURE: 51 MMHG | HEART RATE: 54 BPM

## 2024-03-18 DIAGNOSIS — M67.912 TENDINOPATHY OF LEFT ROTATOR CUFF: ICD-10-CM

## 2024-03-18 DIAGNOSIS — G89.29 CHRONIC LEFT SHOULDER PAIN: Primary | ICD-10-CM

## 2024-03-18 DIAGNOSIS — M25.512 CHRONIC LEFT SHOULDER PAIN: Primary | ICD-10-CM

## 2024-03-18 DIAGNOSIS — M75.22 BICEPS TENDINITIS OF LEFT UPPER EXTREMITY: ICD-10-CM

## 2024-03-18 PROCEDURE — 1101F PT FALLS ASSESS-DOCD LE1/YR: CPT | Mod: CPTII,S$GLB,, | Performed by: PHYSICIAN ASSISTANT

## 2024-03-18 PROCEDURE — 3078F DIAST BP <80 MM HG: CPT | Mod: CPTII,S$GLB,, | Performed by: PHYSICIAN ASSISTANT

## 2024-03-18 PROCEDURE — 1159F MED LIST DOCD IN RCRD: CPT | Mod: CPTII,S$GLB,, | Performed by: PHYSICIAN ASSISTANT

## 2024-03-18 PROCEDURE — 3288F FALL RISK ASSESSMENT DOCD: CPT | Mod: CPTII,S$GLB,, | Performed by: PHYSICIAN ASSISTANT

## 2024-03-18 PROCEDURE — 99999 PR PBB SHADOW E&M-EST. PATIENT-LVL III: CPT | Mod: PBBFAC,,, | Performed by: PHYSICIAN ASSISTANT

## 2024-03-18 PROCEDURE — 99214 OFFICE O/P EST MOD 30 MIN: CPT | Mod: 25,S$GLB,, | Performed by: PHYSICIAN ASSISTANT

## 2024-03-18 PROCEDURE — 1160F RVW MEDS BY RX/DR IN RCRD: CPT | Mod: CPTII,S$GLB,, | Performed by: PHYSICIAN ASSISTANT

## 2024-03-18 PROCEDURE — 3074F SYST BP LT 130 MM HG: CPT | Mod: CPTII,S$GLB,, | Performed by: PHYSICIAN ASSISTANT

## 2024-03-18 PROCEDURE — 20610 DRAIN/INJ JOINT/BURSA W/O US: CPT | Mod: LT,S$GLB,, | Performed by: PHYSICIAN ASSISTANT

## 2024-03-18 PROCEDURE — 3008F BODY MASS INDEX DOCD: CPT | Mod: CPTII,S$GLB,, | Performed by: PHYSICIAN ASSISTANT

## 2024-03-18 PROCEDURE — 1125F AMNT PAIN NOTED PAIN PRSNT: CPT | Mod: CPTII,S$GLB,, | Performed by: PHYSICIAN ASSISTANT

## 2024-03-18 RX ORDER — TRIAMCINOLONE ACETONIDE 40 MG/ML
40 INJECTION, SUSPENSION INTRA-ARTICULAR; INTRAMUSCULAR
Status: DISCONTINUED | OUTPATIENT
Start: 2024-03-18 | End: 2024-03-18 | Stop reason: HOSPADM

## 2024-03-18 RX ADMIN — TRIAMCINOLONE ACETONIDE 40 MG: 40 INJECTION, SUSPENSION INTRA-ARTICULAR; INTRAMUSCULAR at 11:03

## 2024-03-18 NOTE — PROGRESS NOTES
CC: LEFT shoulder pain    This is a 68 y.o. female who presents today for follow up evaluation of left shoulder pain. Last seen by Dr. Garcia in November 2023 and received a subacromial CSI which helped significantly with pain until a few weeks ago.  A new falls, injuries, or trauma to the left shoulder since her last visit.  Pain is primarily localized to the anterior and lateral aspects of the shoulder with occasional radiation into the biceps muscle.  She denies any associated numbness/tingling of the left upper extremity.  Pain is typically worse with overhead activity, and reaching behind her back.  This has been bothersome at night.  Thus far treatment has included over-the-counter Tylenol, topical Biofreeze, and using a heating pad.        Initial Hx:    68 y.o. Female with a history of left shoulder pain who has a history of right rotator cuff repair in June 2021. She states she as involved in an accident in September of last year, but did not start experiencing pain until 2 months later in her shoulder. She has most pain while lifting overhead and doing her hair. She has a grandson and has difficulty picking him up. She does report night pain. She denies numbness and tingling down the arm. She states the pain feels very similar to her previous rotator cuff tear.  She states that the pain is severe and not responding to any conservative care.      She reports that the pain and weakness is worse with overhead activity. It also bothers her at night.    Is affecting ADLs.  Pain is 4/10 at it's worst.      Past Medical History:   Diagnosis Date    Anxiety     Depression     GERD (gastroesophageal reflux disease)     Obesity     Sleep apnea in adult     WEARS CPAP, DOESNT KNOW SETTINGS.       Past Surgical History:   Procedure Laterality Date    ARTHROSCOPIC DEBRIDEMENT OF SHOULDER Right 6/23/2021    Procedure: DEBRIDEMENT, SHOULDER, ARTHROSCOPIC;  Surgeon: Papo Garcia MD;  Location: [facility] OR;   Service: Orthopedics;  Laterality: Right;  labrum    ARTHROSCOPIC REPAIR OF ROTATOR CUFF OF SHOULDER Right 2021    Procedure: REPAIR, ROTATOR CUFF, ARTHROSCOPIC;  Surgeon: Papo Garcia MD;  Location: [facility] OR;  Service: Orthopedics;  Laterality: Right;  regional w/catheter (interscalene)     SECTION      CHOLECYSTECTOMY      DECOMPRESSION OF SUBACROMIAL SPACE Right 2021    Procedure: DECOMPRESSION, SUBACROMIAL SPACE;  Surgeon: Papo Garcia MD;  Location: [facility] OR;  Service: Orthopedics;  Laterality: Right;    DISTAL CLAVICLE EXCISION Right 2021    Procedure: EXCISION, CLAVICLE, DISTAL;  Surgeon: Papo Garcia MD;  Location: [facility] OR;  Service: Orthopedics;  Laterality: Right;    FIXATION OF TENDON Right 2021    Procedure: FIXATION, TENDON;  Surgeon: Papo Garcia MD;  Location: [facility] OR;  Service: Orthopedics;  Laterality: Right;    GASTRECTOMY      KNEE ARTHRODESIS      arthroscopy - Left knee for meniscus     KNEE CARTILAGE SURGERY Left     TONSILLECTOMY      TOTAL KNEE ARTHROPLASTY Right 2018    Procedure: REPLACEMENT-KNEE-TOTAL;  Surgeon: John L. Ochsner Jr., MD;  [facility];  Service: Orthopedics;  Laterality: Right;    TOTAL KNEE ARTHROPLASTY Left 10/31/2018    Procedure: REPLACEMENT-KNEE-TOTAL;  Surgeon: John L. Ochsner Jr., MD;  [facility];  Service: Orthopedics;  Laterality: Left;    TOTAL REPLACEMENT OF HIP JOINT USING COMPUTER-ASSISTED NAVIGATION Right 2021    Procedure: ARTHROPLASTY, HIP, TOTAL, DELTA COMPUTER-ASSISTED NAVIGATION;  Surgeon: Lázaro Carlos MD;  Location: [facility] OR;  Service: Orthopedics;  Laterality: Right;    URETEROSCOPY         Family History   Problem Relation Age of Onset    Breast cancer Mother 58    Cancer Mother         breast    Hyperlipidemia Father     Hypertension Father     Heart disease Maternal Uncle     VANDANA disease Maternal Grandmother     Heart disease Maternal  Grandmother     Hyperlipidemia Maternal Grandmother     Colon cancer Maternal Grandmother     Pancreatic cancer Maternal Grandfather     Cancer Maternal Grandfather 88    Heart attack Paternal Grandfather     Breast cancer Cousin     Celiac disease Neg Hx     Cirrhosis Neg Hx     Colon polyps Neg Hx     Crohn's disease Neg Hx     Cystic fibrosis Neg Hx     Esophageal cancer Neg Hx     Hemochromatosis Neg Hx     Inflammatory bowel disease Neg Hx     Irritable bowel syndrome Neg Hx     Liver cancer Neg Hx     Liver disease Neg Hx     Rectal cancer Neg Hx     Stomach cancer Neg Hx     Ulcerative colitis Neg Hx     Silvestre's disease Neg Hx          Current Outpatient Medications:     atorvastatin (LIPITOR) 20 MG tablet, Take 1 tablet (20 mg total) by mouth once daily., Disp: 90 tablet, Rfl: 3    fluconazole (DIFLUCAN) 150 MG Tab, Take 1 tablet (150mg) now, then in 3 days., Disp: 2 tablet, Rfl: 0    omeprazole (PRILOSEC) 40 MG capsule, TAKE 1 CAPSULE DAILY, Disp: 90 capsule, Rfl: 3    ospemifene (OSPHENA) 60 mg Tab, Take 1 tablet by mouth once daily., Disp: 30 tablet, Rfl: 12    sertraline (ZOLOFT) 100 MG tablet, Take 1 tablet (100 mg total) by mouth once daily., Disp: 90 tablet, Rfl: 4    tiZANidine (ZANAFLEX) 4 MG tablet, Take 1 tablet (4 mg total) by mouth every 8 (eight) hours as needed (muscle spasms)., Disp: 270 tablet, Rfl: 0    b complex vitamins tablet, Take 1 tablet by mouth once daily., Disp: , Rfl:     cyanocobalamin 500 MCG tablet, Take 500 mcg by mouth once daily., Disp: , Rfl:     gabapentin (NEURONTIN) 600 MG tablet, Take 1 tablet (600 mg total) by mouth 3 (three) times daily., Disp: 270 tablet, Rfl: 4    multivitamin (THERAGRAN) per tablet, Take 1 tablet by mouth once daily., Disp: , Rfl:     Review of patient's allergies indicates:   Allergen Reactions    Dermabond [tissue glues] Rash    Adhesive      Paper tape usually ok- pulls skin off    Flagyl [metronidazole hcl]      confusion          REVIEW OF  "SYSTEMS:  Constitution: Negative. Negative for chills, fever and night sweats.   HENT: Negative for congestion and headaches.    Eyes: Negative for blurred vision, left vision loss and right vision loss.   Cardiovascular: Negative for chest pain and syncope.   Respiratory: Negative for cough and shortness of breath.    Endocrine: Negative for polydipsia, polyphagia and polyuria.   Hematologic/Lymphatic: Negative for bleeding problem. Does not bruise/bleed easily.   Skin: Negative for dry skin, itching and rash.   Musculoskeletal: Negative for falls.  Positive for left shoulder pain and muscle weakness.   Gastrointestinal: Negative for abdominal pain and bowel incontinence.   Genitourinary: Negative for bladder incontinence and nocturia.   Neurological: Negative for disturbances in coordination, loss of balance and seizures.   Psychiatric/Behavioral: Negative for depression. The patient does not have insomnia.    Allergic/Immunologic: Negative for hives and persistent infections.      PHYSICAL EXAMINATION:  Vitals:  BP (!) 105/51   Pulse (!) 54   Ht 5' 10" (1.778 m)   Wt 108.9 kg (240 lb)   BMI 34.44 kg/m²    General: The patient is alert and oriented x 3.  Mood is pleasant.  Observation of ears, eyes and nose reveal no gross abnormalities.  No labored breathing observed.  Gait is coordinated. Patient can toe walk and heel walk without difficulty.      LEFT Shoulder / Upper Extremity Exam    OBSERVATION:     Swelling  none  Deformity  none   Discoloration  none   Scapular winging none   Scars   none  Atrophy  none    TENDERNESS / CREPITUS (T/C):          T/C      T/C   Clavicle   -/-  SUPRAspinatus    +/-     AC Jt.    -/-  INFRAspinatus  -/-    SC Jt.    -/-  Deltoid    -/-      G. Tuberosity  -/-  LH BICEP groove  +/-   Acromion:  -/-  Midline Neck   -/-     Scapular Spine -/-  Trapezium   -/-   SMA Scapula  -/-  GH jt. line - post  -/-     Scapulothoracic  -/-         ROM: (* = with pain)  Right " shoulder   Left shoulder        AROM (PROM)   AROM (PROM)   FE    170° (175°)     100°* (160°*)     ER at 0°    60°  (65°)    45°*  (65°*)    IR (spine level)   T10     T10*    STRENGTH: (* = with pain) Right shoulder   Left shoulder    SCAPTION   5/5    4/5*    IR    5/5    5/5   ER    5/5    4/5*   BICEPS   5/5    5/5   Deltoid    5/5    5/5     SIGNS:  Painful side LEFT      NEER   -    OANTHONYS  neg    CHOWDHURY   +    SPEEDS  +     DROP ARM   +   BELLY PRESS neg   Superior escape none    LIFT-OFF  neg   X-Body ADD    neg    MOVING VALGUS neg        STABILITY TESTING    Right shoulder   Left shoulder    Translation     Anterior  up face     up face    Posterior  up face    up face    Sulcus   < 10mm    < 10 mm     Signs   Apprehension   neg      neg       Relocation   no change     no change      Jerk test  neg     neg    EXTREMITY NEURO-VASCULAR EXAM:    Sensation grossly intact to light touch all dermatomal regions.    DTR 2+ Biceps, Triceps, BR and Negative Junaids sign   Grossly intact motor function at Elbow, Wrist and Hand   Distal pulses radial and ulnar 2+, brisk cap refill, symmetric.      NECK:  Painless FROM and spinous processes non-tender. Negative Spurlings sign.      OTHER FINDINGS:  - scapular dyskinesia    XRAYS:  Xrays including AP, Outlet and Axillary Lateral of Left shoulder are ordered / images reviewed by me:   No fracture dislocation or other pathology   Acromion type 1   Proximal migration of humeral head: None   GH arthritis: None  MRI SHOULDER WITHOUT CONTRAST LEFT     CLINICAL HISTORY:  Pain in left shoulderShoulder pain, rotator cuff disorder suspected, xray done;     TECHNIQUE:  MRI of left shoulder was performed on a 1.5T magnet utilizing the following sequences: Localizer; axial T2 FS; coronal T2 FS and PD FS; sagittal T1, T2 FS and PD FS.     COMPARISON:  X-ray 06/22/2023     FINDINGS:  Rotator cuff: There is diffuse rotator cuff tendinosis.  There is fraying of both the  bursal and articular surface of the anterior supraspinatus.  At the level of the mid supraspinatus there is a focal high-grade partial-thickness articular sided tear involving at least 90% of the tendon thickness which measures approximately 8 x 9 mm.  The infraspinatus tendon demonstrates a 12 x 10 mm partial thickness articular sided tear.  There is diffuse subscapularis tendinosis.  There is a partial-thickness tear involving the superior fibers at the insertion measuring approximately 6 by 6 mm.     Biceps: There is severe tenosynovitis of the long head of the biceps tendon.  It is perched medially on the lesser tuberosity and demonstrates abnormal signal and flattening suggestive of tendinosis and split tearing which extends into and involves the intra-articular portion.     Labrum: SLAP tear involving the biceps anchor     Glenohumeral Joint: Glenohumeral joint effusion present with some low-grade chondromalacia involving the posteromedial glenohumeral joint.  Some high-grade chondromalacia and some associated marrow edema of the cephalad aspect of the humeral head with some bony cystic changes of the greater tuberosity and lesser tuberosity and marginal osteophyte formation.     Acromioclavicular joint: Osteoarthritis with type 1 acromion.     Misc: Mild subacromial/subdeltoid bursitis.     Impression:     Rotator cuff tendinosis with multiple low to high-grade partial-thickness rotator cuff tears as detailed above     Extra-articular biceps tenosynovitis, intra-articular tendinosis and split tearing and partial medial dislocation/perching on the lesser tuberosity.     Intermediate to high-grade glenohumeral chondromalacia and early osteoarthritis           ASSESSMENT:     Left shoulder pain  Rotator cuff tendinopathy with partial thickness tear, left  Biceps tendinitis, left shoulder          PLAN:      Prior imaging reviewed and discussed with patient in detail.     We discussed at length different  treatment options including conservative vs surgical management. These include anti-inflammatories, acetaminophen, rest, ice, heat, formal physical therapy including strengthening and stretching exercises, home exercise programs, dry needling, corticosteroid injections, and finally surgical intervention.      Left shoulder subacromial corticosteroid injection performed today.  See procedure note for details.    Recommend she continue to rest and ice the left shoulder and continue taking over-the-counter extra-strength Tylenol as needed for pain.    She would like to follow up as needed.        All questions were answered, patient will contact us for questions or concerns in the interim.      Medical Dictation software was used during the dictation of portions or the entirety of this medical record.  Phonetic or grammatic errors may exist due to the use of this software. For clarification, refer to the author of the document.

## 2024-03-18 NOTE — PROCEDURES
Large Joint Aspiration/Injection: L subacromial bursa    Date/Time: 3/18/2024 11:30 AM    Performed by: Darryl Zamora PA-C  Authorized by: Darryl Zamora PA-C    Consent Done?:  Yes (Verbal)  Indications:  Pain  Site marked: the procedure site was marked    Timeout: prior to procedure the correct patient, procedure, and site was verified    Prep: patient was prepped and draped in usual sterile fashion    Local anesthesia used?: No      Details:  Needle Size:  22 G  Ultrasonic Guidance for needle placement?: No    Approach:  Posterior  Location:  Shoulder  Site:  L subacromial bursa  Medications:  40 mg triamcinolone acetonide 40 mg/mL  Patient tolerance:  Patient tolerated the procedure well with no immediate complications    Injection Procedure  A time out was performed, including verification of patient ID, procedure, site and side, availability of information and equipment, review of safety issues, and agreement with consent, the procedure site was marked.    After time out was performed, the patient was prepped aseptically with povidone-iodine swabsticks. A diagnostic and therapeutic injection of 1:3cc Kenalog/Marcaine was given under sterile technique using a 22g x 1.5 needle from the Posterior  aspect of the left Subacromial in the sitting position.      Soha Wheatley had no adverse reactions to the medication. Pain decreased. She was instructed to apply ice to the joint for 20 minutes and avoid strenuous activities for 24-36 hours following the injection. She was warned of possible blood sugar and/or blood pressure changes during that time. Following that time, she can resume regular activities.    She was reminded to call the clinic immediately for any adverse side effects as explained in clinic today.

## 2024-04-03 ENCOUNTER — PATIENT MESSAGE (OUTPATIENT)
Dept: BARIATRICS | Facility: CLINIC | Age: 69
End: 2024-04-03
Payer: COMMERCIAL

## 2024-04-09 ENCOUNTER — PATIENT MESSAGE (OUTPATIENT)
Dept: BARIATRICS | Facility: CLINIC | Age: 69
End: 2024-04-09
Payer: COMMERCIAL

## 2024-04-18 ENCOUNTER — OFFICE VISIT (OUTPATIENT)
Dept: OBSTETRICS AND GYNECOLOGY | Facility: CLINIC | Age: 69
End: 2024-04-18
Payer: COMMERCIAL

## 2024-04-18 VITALS
BODY MASS INDEX: 33.7 KG/M2 | DIASTOLIC BLOOD PRESSURE: 68 MMHG | WEIGHT: 235.44 LBS | SYSTOLIC BLOOD PRESSURE: 122 MMHG | HEIGHT: 70 IN

## 2024-04-18 DIAGNOSIS — Z01.419 ENCOUNTER FOR GYNECOLOGICAL EXAMINATION WITHOUT ABNORMAL FINDING: Primary | ICD-10-CM

## 2024-04-18 DIAGNOSIS — N95.2 VAGINAL ATROPHY: ICD-10-CM

## 2024-04-18 DIAGNOSIS — Z78.0 POSTMENOPAUSAL: ICD-10-CM

## 2024-04-18 PROCEDURE — 3078F DIAST BP <80 MM HG: CPT | Mod: CPTII,S$GLB,, | Performed by: OBSTETRICS & GYNECOLOGY

## 2024-04-18 PROCEDURE — 99999 PR PBB SHADOW E&M-EST. PATIENT-LVL III: CPT | Mod: PBBFAC,,, | Performed by: OBSTETRICS & GYNECOLOGY

## 2024-04-18 PROCEDURE — 1101F PT FALLS ASSESS-DOCD LE1/YR: CPT | Mod: CPTII,S$GLB,, | Performed by: OBSTETRICS & GYNECOLOGY

## 2024-04-18 PROCEDURE — 3288F FALL RISK ASSESSMENT DOCD: CPT | Mod: CPTII,S$GLB,, | Performed by: OBSTETRICS & GYNECOLOGY

## 2024-04-18 PROCEDURE — 3008F BODY MASS INDEX DOCD: CPT | Mod: CPTII,S$GLB,, | Performed by: OBSTETRICS & GYNECOLOGY

## 2024-04-18 PROCEDURE — 1160F RVW MEDS BY RX/DR IN RCRD: CPT | Mod: CPTII,S$GLB,, | Performed by: OBSTETRICS & GYNECOLOGY

## 2024-04-18 PROCEDURE — 99397 PER PM REEVAL EST PAT 65+ YR: CPT | Mod: S$GLB,,, | Performed by: OBSTETRICS & GYNECOLOGY

## 2024-04-18 PROCEDURE — 1159F MED LIST DOCD IN RCRD: CPT | Mod: CPTII,S$GLB,, | Performed by: OBSTETRICS & GYNECOLOGY

## 2024-04-18 PROCEDURE — 1126F AMNT PAIN NOTED NONE PRSNT: CPT | Mod: CPTII,S$GLB,, | Performed by: OBSTETRICS & GYNECOLOGY

## 2024-04-18 PROCEDURE — 3074F SYST BP LT 130 MM HG: CPT | Mod: CPTII,S$GLB,, | Performed by: OBSTETRICS & GYNECOLOGY

## 2024-04-18 RX ORDER — PRASTERONE 6.5 MG/1
INSERT VAGINAL
COMMUNITY
Start: 2024-03-19

## 2024-04-18 NOTE — PROGRESS NOTES
CC: Well woman exam    Soha Wheatley is a 68 y.o. female  presents for a well woman exam.  LMP: No LMP recorded. Patient is postmenopausal..        Past Medical History:   Diagnosis Date    Anxiety     Depression     GERD (gastroesophageal reflux disease)     Obesity     Sleep apnea in adult     WEARS CPAP, DOESNT KNOW SETTINGS.     Past Surgical History:   Procedure Laterality Date    ARTHROSCOPIC DEBRIDEMENT OF SHOULDER Right 2021    Procedure: DEBRIDEMENT, SHOULDER, ARTHROSCOPIC;  Surgeon: Papo Garcia MD;  Location: [facility] OR;  Service: Orthopedics;  Laterality: Right;  labrum    ARTHROSCOPIC REPAIR OF ROTATOR CUFF OF SHOULDER Right 2021    Procedure: REPAIR, ROTATOR CUFF, ARTHROSCOPIC;  Surgeon: Papo Garcia MD;  Location: [facility] OR;  Service: Orthopedics;  Laterality: Right;  regional w/catheter (interscalene)     SECTION      CHOLECYSTECTOMY      DECOMPRESSION OF SUBACROMIAL SPACE Right 2021    Procedure: DECOMPRESSION, SUBACROMIAL SPACE;  Surgeon: Papo Garcia MD;  Location: [facility] OR;  Service: Orthopedics;  Laterality: Right;    DISTAL CLAVICLE EXCISION Right 2021    Procedure: EXCISION, CLAVICLE, DISTAL;  Surgeon: Papo Garcia MD;  Location: [facility] OR;  Service: Orthopedics;  Laterality: Right;    FIXATION OF TENDON Right 2021    Procedure: FIXATION, TENDON;  Surgeon: Papo Garcia MD;  Location: [facility] OR;  Service: Orthopedics;  Laterality: Right;    GASTRECTOMY      KNEE ARTHRODESIS      arthroscopy - Left knee for meniscus     KNEE CARTILAGE SURGERY Left     TONSILLECTOMY      TOTAL KNEE ARTHROPLASTY Right 2018    Procedure: REPLACEMENT-KNEE-TOTAL;  Surgeon: John L. Ochsner Jr., MD;  [facility];  Service: Orthopedics;  Laterality: Right;    TOTAL KNEE ARTHROPLASTY Left 10/31/2018    Procedure: REPLACEMENT-KNEE-TOTAL;  Surgeon: John L. Ochsner Jr., MD;  Location: [facility];  Service:  Orthopedics;  Laterality: Left;    TOTAL REPLACEMENT OF HIP JOINT USING COMPUTER-ASSISTED NAVIGATION Right 2/25/2021    Procedure: ARTHROPLASTY, HIP, TOTAL, DELTA COMPUTER-ASSISTED NAVIGATION;  Surgeon: Lázaro Carlos MD;  Location: [facility];  Service: Orthopedics;  Laterality: Right;    URETEROSCOPY       Social History     Socioeconomic History    Marital status:      Spouse name: Angel    Number of children: 2   Occupational History     Employer: [employer]   Tobacco Use    Smoking status: Never    Smokeless tobacco: Never   Substance and Sexual Activity    Alcohol use: Not Currently     Comment: social    Drug use: No    Sexual activity: Yes     Partners: Male     Birth control/protection: None   Social History Narrative    House      Social Determinants of Health     Financial Resource Strain: Low Risk  (5/11/2022)    Overall Financial Resource Strain (CARDIA)     Difficulty of Paying Living Expenses: Not hard at all   Food Insecurity: No Food Insecurity (5/11/2022)    Hunger Vital Sign     Worried About Running Out of Food in the Last Year: Never true     Ran Out of Food in the Last Year: Never true   Transportation Needs: No Transportation Needs (5/11/2022)    PRAPARE - Transportation     Lack of Transportation (Medical): No     Lack of Transportation (Non-Medical): No   Physical Activity: Insufficiently Active (5/11/2022)    Exercise Vital Sign     Days of Exercise per Week: 1 day     Minutes of Exercise per Session: 20 min   Stress: No Stress Concern Present (5/11/2022)    [employer] - Occupational Stress Questionnaire     Feeling of Stress : Only a little   Social Connections: Unknown (5/11/2022)    Social Connection and Isolation Panel [NHANES]     Frequency of Social Gatherings with Friends and Family: Twice a week     Active Member of Clubs or Organizations: Patient declined     Attends Club or Organization Meetings: Patient declined     Marital Status:   "  Housing Stability: Unknown (2022)    Housing Stability Vital Sign     Unable to Pay for Housing in the Last Year: No     Number of Places Lived in the Last Year: 1     Family History   Problem Relation Name Age of Onset    Heart attack Paternal Grandfather      VANDANA disease Maternal Grandmother      Heart disease Maternal Grandmother      Hyperlipidemia Maternal Grandmother      Colon cancer Maternal Grandmother      Pancreatic cancer Maternal Grandfather      Cancer Maternal Grandfather  88    Hyperlipidemia Father      Hypertension Father      Breast cancer Mother  58    Cancer Mother          breast    Heart disease Maternal Uncle      Breast cancer Cousin 1st cousin     Celiac disease Neg Hx      Cirrhosis Neg Hx      Colon polyps Neg Hx      Crohn's disease Neg Hx      Cystic fibrosis Neg Hx      Esophageal cancer Neg Hx      Hemochromatosis Neg Hx      Inflammatory bowel disease Neg Hx      Irritable bowel syndrome Neg Hx      Liver cancer Neg Hx      Liver disease Neg Hx      Rectal cancer Neg Hx      Stomach cancer Neg Hx      Ulcerative colitis Neg Hx      Silvestre's disease Neg Hx      Ovarian cancer Neg Hx      Uterine cancer Neg Hx      Cervical cancer Neg Hx       OB History          1    Para   1    Term   1            AB        Living             SAB        IAB        Ectopic        Multiple        Live Births                     /68   Ht 5' 10" (1.778 m)   Wt 106.8 kg (235 lb 7.2 oz)   BMI 33.78 kg/m²       ROS:    ROS:  GENERAL: Denies weight gain or weight loss. Feeling well overall.   SKIN: Denies rash or lesions.   HEAD: Denies head injury or headache.   NODES: Denies enlarged lymph nodes.   CHEST: Denies chest pain or shortness of breath.   CARDIOVASCULAR: Denies palpitations or left sided chest pain.   ABDOMEN: No abdominal pain, constipation, diarrhea, nausea, vomiting or rectal bleeding.   URINARY: No frequency, dysuria, hematuria, or burning on " urination.  REPRODUCTIVE: See HPI.   BREASTS: The patient performs breast self-examination and denies pain, lumps, or nipple discharge.   HEMATOLOGIC: No easy bruisability or excessive bleeding.   MUSCULOSKELETAL: Denies joint pain or swelling.   NEUROLOGIC: Denies syncope or weakness.   PSYCHIATRIC: Denies depression, anxiety or mood swings.    PHYSICAL EXAM:    APPEARANCE: Well nourished, well developed, in no acute distress.  AFFECT: WNL, alert and oriented x 3  SKIN: No acne or hirsutism  NECK: Neck symmetric without masses or thyromegaly  NODES: No inguinal, cervical, axillary, or femoral lymph node enlargement  CHEST: Good respiratory effect  ABDOMEN: Soft.  No tenderness or masses.  No hepatosplenomegaly.  No hernias.  BREASTS: Symmetrical, no skin changes or visible lesions.  No palpable masses, nipple discharge bilaterally.  PELVIC: Normal external genitalia without lesions.  Normal hair distribution.  Adequate perineal body, normal urethral meatus.  Vagina moist and well rugated without lesions or discharge.  Cervix pink, without lesions, discharge or tenderness.  No significant cystocele or rectocele.  Bimanual exam shows uterus to be normal size, regular, mobile and nontender.  Adnexa without masses or tenderness.    RECTAL: Rectovaginal exam confirms above with normal sphincter tone, no masses.  EXTREMITIES: No edema.    Diagnosis      ICD-10-CM ICD-9-CM    1. Encounter for gynecological examination without abnormal finding  Z01.419 V72.31       2. Postmenopausal  Z78.0 V49.81       3. Vaginal atrophy  N95.2 627.3 estradioL 10 mcg InPk      estradioL 10 mcg Inst        Bonafide/ revaree offerred  MMG annually  Cologuard done    Patient was counseled today on A.C.S. Pap guidelines and recommendations for yearly pelvic exams, mammograms and monthly self breast exams; to see her PCP for other health maintenance.     Follow up in about 1 year (around 4/18/2025), or if symptoms worsen or fail to  improve.

## 2024-04-24 ENCOUNTER — PATIENT MESSAGE (OUTPATIENT)
Dept: NEUROSURGERY | Facility: CLINIC | Age: 69
End: 2024-04-24
Payer: COMMERCIAL

## 2024-05-08 RX ORDER — GABAPENTIN 600 MG/1
600 TABLET ORAL 3 TIMES DAILY
Qty: 270 TABLET | Refills: 3 | Status: SHIPPED | OUTPATIENT
Start: 2024-05-08 | End: 2025-05-08

## 2024-05-13 RX ORDER — ATORVASTATIN CALCIUM 20 MG/1
20 TABLET, FILM COATED ORAL
Qty: 90 TABLET | Refills: 2 | Status: SHIPPED | OUTPATIENT
Start: 2024-05-13

## 2024-05-13 NOTE — TELEPHONE ENCOUNTER
Refill Decision Note   Soha Wheatley  is requesting a refill authorization.  Brief Assessment and Rationale for Refill:  Approve     Medication Therapy Plan:         Comments:     Note composed:12:36 PM 05/13/2024             Appointments     Last Visit   12/19/2023 Andrew Mallory Jr., MD   Next Visit   Visit date not found Andrew Mallory Jr., MD

## 2024-05-13 NOTE — TELEPHONE ENCOUNTER
No care due was identified.  [facility] Embedded Care Due Messages. Reference number: [id number].   5/12/2024 11:16:45 PM CDT

## 2024-05-14 ENCOUNTER — PATIENT MESSAGE (OUTPATIENT)
Dept: BARIATRICS | Facility: CLINIC | Age: 69
End: 2024-05-14
Payer: COMMERCIAL

## 2024-06-10 ENCOUNTER — PATIENT MESSAGE (OUTPATIENT)
Dept: INTERNAL MEDICINE | Facility: CLINIC | Age: 69
End: 2024-06-10
Payer: COMMERCIAL

## 2024-06-11 ENCOUNTER — PATIENT MESSAGE (OUTPATIENT)
Dept: BARIATRICS | Facility: CLINIC | Age: 69
End: 2024-06-11
Payer: COMMERCIAL

## 2024-07-03 ENCOUNTER — PATIENT MESSAGE (OUTPATIENT)
Dept: BARIATRICS | Facility: CLINIC | Age: 69
End: 2024-07-03
Payer: COMMERCIAL

## 2024-07-09 ENCOUNTER — PATIENT MESSAGE (OUTPATIENT)
Dept: BARIATRICS | Facility: CLINIC | Age: 69
End: 2024-07-09
Payer: COMMERCIAL

## 2024-08-12 ENCOUNTER — PATIENT MESSAGE (OUTPATIENT)
Dept: BARIATRICS | Facility: CLINIC | Age: 69
End: 2024-08-12
Payer: COMMERCIAL

## 2024-08-19 ENCOUNTER — PATIENT MESSAGE (OUTPATIENT)
Dept: NEUROSURGERY | Facility: CLINIC | Age: 69
End: 2024-08-19
Payer: COMMERCIAL

## 2024-08-19 DIAGNOSIS — M47.812 CERVICAL SPONDYLOSIS WITHOUT MYELOPATHY: ICD-10-CM

## 2024-08-19 RX ORDER — TIZANIDINE 4 MG/1
4 TABLET ORAL EVERY 8 HOURS PRN
Qty: 270 TABLET | Refills: 0 | Status: SHIPPED | OUTPATIENT
Start: 2024-08-19

## 2024-08-29 DIAGNOSIS — F41.9 ANXIETY: ICD-10-CM

## 2024-08-30 RX ORDER — SERTRALINE HYDROCHLORIDE 100 MG/1
100 TABLET, FILM COATED ORAL
Qty: 90 TABLET | Refills: 1 | Status: SHIPPED | OUTPATIENT
Start: 2024-08-30

## 2024-08-30 NOTE — TELEPHONE ENCOUNTER
Refill Decision Note   Soha Wheatley  is requesting a refill authorization.  Brief Assessment and Rationale for Refill:  Approve     Medication Therapy Plan:         Comments:     Note composed:10:16 AM 08/30/2024

## 2024-08-30 NOTE — TELEPHONE ENCOUNTER
No care due was identified.  [facility] Embedded Care Due Messages. Reference number: [id number].   8/29/2024 11:20:47 PM CDT

## 2024-09-03 ENCOUNTER — PATIENT MESSAGE (OUTPATIENT)
Dept: BARIATRICS | Facility: CLINIC | Age: 69
End: 2024-09-03
Payer: COMMERCIAL

## 2024-09-10 ENCOUNTER — OFFICE VISIT (OUTPATIENT)
Dept: SPORTS MEDICINE | Facility: CLINIC | Age: 69
End: 2024-09-10
Payer: COMMERCIAL

## 2024-09-10 ENCOUNTER — PATIENT MESSAGE (OUTPATIENT)
Dept: BARIATRICS | Facility: CLINIC | Age: 69
End: 2024-09-10
Payer: COMMERCIAL

## 2024-09-10 VITALS
BODY MASS INDEX: 34.59 KG/M2 | HEIGHT: 70 IN | WEIGHT: 241.63 LBS | DIASTOLIC BLOOD PRESSURE: 64 MMHG | HEART RATE: 71 BPM | SYSTOLIC BLOOD PRESSURE: 97 MMHG

## 2024-09-10 DIAGNOSIS — M67.912 TENDINOPATHY OF LEFT ROTATOR CUFF: Primary | ICD-10-CM

## 2024-09-10 DIAGNOSIS — M25.512 CHRONIC LEFT SHOULDER PAIN: ICD-10-CM

## 2024-09-10 DIAGNOSIS — M75.22 BICEPS TENDINITIS OF LEFT UPPER EXTREMITY: ICD-10-CM

## 2024-09-10 DIAGNOSIS — G89.29 CHRONIC LEFT SHOULDER PAIN: ICD-10-CM

## 2024-09-10 PROCEDURE — 1101F PT FALLS ASSESS-DOCD LE1/YR: CPT | Mod: CPTII,S$GLB,, | Performed by: ORTHOPAEDIC SURGERY

## 2024-09-10 PROCEDURE — 3078F DIAST BP <80 MM HG: CPT | Mod: CPTII,S$GLB,, | Performed by: ORTHOPAEDIC SURGERY

## 2024-09-10 PROCEDURE — 99999 PR PBB SHADOW E&M-EST. PATIENT-LVL III: CPT | Mod: PBBFAC,,, | Performed by: ORTHOPAEDIC SURGERY

## 2024-09-10 PROCEDURE — 99214 OFFICE O/P EST MOD 30 MIN: CPT | Mod: 25,S$GLB,, | Performed by: ORTHOPAEDIC SURGERY

## 2024-09-10 PROCEDURE — 1125F AMNT PAIN NOTED PAIN PRSNT: CPT | Mod: CPTII,S$GLB,, | Performed by: ORTHOPAEDIC SURGERY

## 2024-09-10 PROCEDURE — 3008F BODY MASS INDEX DOCD: CPT | Mod: CPTII,S$GLB,, | Performed by: ORTHOPAEDIC SURGERY

## 2024-09-10 PROCEDURE — 20610 DRAIN/INJ JOINT/BURSA W/O US: CPT | Mod: LT,S$GLB,, | Performed by: ORTHOPAEDIC SURGERY

## 2024-09-10 PROCEDURE — 3074F SYST BP LT 130 MM HG: CPT | Mod: CPTII,S$GLB,, | Performed by: ORTHOPAEDIC SURGERY

## 2024-09-10 PROCEDURE — 3288F FALL RISK ASSESSMENT DOCD: CPT | Mod: CPTII,S$GLB,, | Performed by: ORTHOPAEDIC SURGERY

## 2024-09-10 PROCEDURE — 1159F MED LIST DOCD IN RCRD: CPT | Mod: CPTII,S$GLB,, | Performed by: ORTHOPAEDIC SURGERY

## 2024-09-10 RX ORDER — TRIAMCINOLONE ACETONIDE 40 MG/ML
40 INJECTION, SUSPENSION INTRA-ARTICULAR; INTRAMUSCULAR
Status: DISCONTINUED | OUTPATIENT
Start: 2024-09-10 | End: 2024-09-10 | Stop reason: HOSPADM

## 2024-09-10 RX ADMIN — TRIAMCINOLONE ACETONIDE 40 MG: 40 INJECTION, SUSPENSION INTRA-ARTICULAR; INTRAMUSCULAR at 10:09

## 2024-09-10 NOTE — PROCEDURES
Large Joint Aspiration/Injection: L subacromial bursa    Date/Time: 9/10/2024 10:30 AM    Performed by: Papo Garcia MD  Authorized by: Papo Garcia MD    Consent Done?:  Yes (Verbal)  Indications:  Pain  Site marked: the procedure site was marked    Timeout: prior to procedure the correct patient, procedure, and site was verified    Prep: patient was prepped and draped in usual sterile fashion    Local anesthesia used?: No      Details:  Needle Size:  22 G  Ultrasonic Guidance for needle placement?: No    Approach:  Posterior  Location:  Shoulder  Site:  L subacromial bursa  Medications:  40 mg triamcinolone acetonide 40 mg/mL  Patient tolerance:  Patient tolerated the procedure well with no immediate complications

## 2024-09-10 NOTE — PROGRESS NOTES
CC: LEFT shoulder pain    Soha Wheatley is a 68 y.o. female who presents today for follow up evaluation of left shoulder pain. Last seen by Darryl Zamora in March 2024 and received a subacromial CSI which helped significantly with pain until a few weeks ago.  A new falls, injuries, or trauma to the left shoulder since her last visit.  Pain is primarily localized to the anterior and lateral aspects of the shoulder with occasional radiation into the biceps muscle.  She denies any associated numbness/tingling of the left upper extremity.  Pain is typically worse with overhead activity, and reaching behind her back.  This has been bothersome at night.  Other treatment has included over-the-counter Tylenol, topical Biofreeze, and using a heating pad. Pain currently 6/10, affecting ADL's.       Initial Hx:    68 y.o. Female with a history of left shoulder pain who has a history of right rotator cuff repair in June 2021. She states she as involved in an accident in September of last year, but did not start experiencing pain until 2 months later in her left shoulder. She has most pain while lifting overhead and doing her hair. She has a grandson and has difficulty picking him up. She does report night pain. She denies numbness and tingling down the arm. She states the pain feels very similar to her previous rotator cuff tear.  She states that the pain is severe and not responding to any conservative care.      She reports that the pain and weakness is worse with overhead activity. It also bothers her at night.    Is affecting ADLs.      Past Medical History:   Diagnosis Date    Anxiety     Depression     GERD (gastroesophageal reflux disease)     Obesity     Sleep apnea in adult     WEARS CPAP, DOESNT KNOW SETTINGS.       Past Surgical History:   Procedure Laterality Date    ARTHROSCOPIC DEBRIDEMENT OF SHOULDER Right 6/23/2021    Procedure: DEBRIDEMENT, SHOULDER, ARTHROSCOPIC;  Surgeon: Papo Garcia MD;   Location: [facility] OR;  Service: Orthopedics;  Laterality: Right;  labrum    ARTHROSCOPIC REPAIR OF ROTATOR CUFF OF SHOULDER Right 2021    Procedure: REPAIR, ROTATOR CUFF, ARTHROSCOPIC;  Surgeon: Papo Garcia MD;  Location: [facility] OR;  Service: Orthopedics;  Laterality: Right;  regional w/catheter (interscalene)     SECTION      CHOLECYSTECTOMY      DECOMPRESSION OF SUBACROMIAL SPACE Right 2021    Procedure: DECOMPRESSION, SUBACROMIAL SPACE;  Surgeon: Papo Garcia MD;  Location: [facility] OR;  Service: Orthopedics;  Laterality: Right;    DISTAL CLAVICLE EXCISION Right 2021    Procedure: EXCISION, CLAVICLE, DISTAL;  Surgeon: Papo Garcia MD;  Location: [facility] OR;  Service: Orthopedics;  Laterality: Right;    FIXATION OF TENDON Right 2021    Procedure: FIXATION, TENDON;  Surgeon: Papo Garcia MD;  Location: [facility] OR;  Service: Orthopedics;  Laterality: Right;    GASTRECTOMY      KNEE ARTHRODESIS      arthroscopy - Left knee for meniscus     KNEE CARTILAGE SURGERY Left     TONSILLECTOMY      TOTAL KNEE ARTHROPLASTY Right 2018    Procedure: REPLACEMENT-KNEE-TOTAL;  Surgeon: John L. Ochsner Jr., MD;  Location: [facility] OR 2ND FLR;  Service: Orthopedics;  Laterality: Right;    TOTAL KNEE ARTHROPLASTY Left 10/31/2018    Procedure: REPLACEMENT-KNEE-TOTAL;  Surgeon: John L. Ochsner Jr., MD;  Location: [facility] OR 2ND FLR;  Service: Orthopedics;  Laterality: Left;    TOTAL REPLACEMENT OF HIP JOINT USING COMPUTER-ASSISTED NAVIGATION Right 2021    Procedure: ARTHROPLASTY, HIP, TOTAL, DELTA COMPUTER-ASSISTED NAVIGATION;  Surgeon: Lázaro Carlos MD;  Location: [facility] OR;  Service: Orthopedics;  Laterality: Right;    URETEROSCOPY         Family History   Problem Relation Name Age of Onset    Heart attack Paternal Grandfather      VANDANA disease Maternal Grandmother      Heart disease Maternal Grandmother      Hyperlipidemia Maternal Grandmother      Colon cancer  Maternal Grandmother      Pancreatic cancer Maternal Grandfather      Cancer Maternal Grandfather  88    Hyperlipidemia Father      Hypertension Father      Breast cancer Mother  58    Cancer Mother          breast    Heart disease Maternal Uncle      Breast cancer Cousin 1st cousin     Celiac disease Neg Hx      Cirrhosis Neg Hx      Colon polyps Neg Hx      Crohn's disease Neg Hx      Cystic fibrosis Neg Hx      Esophageal cancer Neg Hx      Hemochromatosis Neg Hx      Inflammatory bowel disease Neg Hx      Irritable bowel syndrome Neg Hx      Liver cancer Neg Hx      Liver disease Neg Hx      Rectal cancer Neg Hx      Stomach cancer Neg Hx      Ulcerative colitis Neg Hx      Silvestre's disease Neg Hx      Ovarian cancer Neg Hx      Uterine cancer Neg Hx      Cervical cancer Neg Hx           Current Outpatient Medications:     atorvastatin (LIPITOR) 20 MG tablet, TAKE 1 TABLET DAILY, Disp: 90 tablet, Rfl: 2    b complex vitamins tablet, Take 1 tablet by mouth once daily., Disp: , Rfl:     cyanocobalamin 500 MCG tablet, Take 500 mcg by mouth once daily., Disp: , Rfl:     estradioL 10 mcg InPk, Place 1 tablet vaginally every evening., Disp: 18 each, Rfl: 18    estradioL 10 mcg Inst, Place 1 tablet vaginally twice a week., Disp: 8 each, Rfl: 12    gabapentin (NEURONTIN) 600 MG tablet, Take 1 tablet (600 mg total) by mouth 3 (three) times daily., Disp: 270 tablet, Rfl: 3    INTRAROSA 6.5 mg Inst, SMARTSI Vaginal Daily, Disp: , Rfl:     multivitamin (THERAGRAN) per tablet, Take 1 tablet by mouth once daily., Disp: , Rfl:     omeprazole (PRILOSEC) 40 MG capsule, TAKE 1 CAPSULE DAILY, Disp: 90 capsule, Rfl: 3    sertraline (ZOLOFT) 100 MG tablet, TAKE 1 TABLET DAILY, Disp: 90 tablet, Rfl: 1    tiZANidine (ZANAFLEX) 4 MG tablet, Take 1 tablet (4 mg total) by mouth every 8 (eight) hours as needed (muscle spasms)., Disp: 270 tablet, Rfl: 0    fluconazole (DIFLUCAN) 150 MG Tab, Take 1 tablet (150mg) now, then in 3 days.  "(Patient not taking: Reported on 4/18/2024), Disp: 2 tablet, Rfl: 0    ospemifene (OSPHENA) 60 mg Tab, Take 1 tablet by mouth once daily. (Patient not taking: Reported on 4/18/2024), Disp: 30 tablet, Rfl: 12    Review of patient's allergies indicates:   Allergen Reactions    Dermabond [tissue glues] Rash    Adhesive      Paper tape usually ok- pulls skin off    Flagyl [metronidazole hcl]      confusion          REVIEW OF SYSTEMS:  Constitution: Negative. Negative for chills, fever and night sweats.   HENT: Negative for congestion and headaches.    Eyes: Negative for blurred vision, left vision loss and right vision loss.   Cardiovascular: Negative for chest pain and syncope.   Respiratory: Negative for cough and shortness of breath.    Endocrine: Negative for polydipsia, polyphagia and polyuria.   Hematologic/Lymphatic: Negative for bleeding problem. Does not bruise/bleed easily.   Skin: Negative for dry skin, itching and rash.   Musculoskeletal: Negative for falls.  Positive for left shoulder pain and muscle weakness.   Gastrointestinal: Negative for abdominal pain and bowel incontinence.   Genitourinary: Negative for bladder incontinence and nocturia.   Neurological: Negative for disturbances in coordination, loss of balance and seizures.   Psychiatric/Behavioral: Negative for depression. The patient does not have insomnia.    Allergic/Immunologic: Negative for hives and persistent infections.      PHYSICAL EXAMINATION:  Vitals:  BP 97/64   Pulse 71   Ht 5' 10" (1.778 m)   Wt 109.6 kg (241 lb 10 oz)   BMI 34.67 kg/m²    General: The patient is alert and oriented x 3.  Mood is pleasant.  Observation of ears, eyes and nose reveal no gross abnormalities.  No labored breathing observed.  Gait is coordinated. Patient can toe walk and heel walk without difficulty.      LEFT Shoulder / Upper Extremity Exam    OBSERVATION:     Swelling  none  Deformity  none   Discoloration  none   Scapular " winging none   Scars   none  Atrophy  none    TENDERNESS / CREPITUS (T/C):          T/C      T/C   Clavicle   -/-  SUPRAspinatus    +/-     AC Jt.    -/-  INFRAspinatus  -/-    SC Jt.    -/-  Deltoid    -/-      G. Tuberosity  -/-  LH BICEP groove  +/-   Acromion:  -/-  Midline Neck   -/-     Scapular Spine -/-  Trapezium   -/-   SMA Scapula  -/-  GH jt. line - post  -/-     Scapulothoracic  -/-         ROM: (* = with pain)  Right shoulder   Left shoulder        AROM (PROM)   AROM (PROM)   FE    170° (175°)     100°* (160°*)     ER at 0°    60°  (65°)    45°*  (65°*)    IR (spine level)   T10     T10*    STRENGTH: (* = with pain) Right shoulder   Left shoulder    SCAPTION   5/5    4/5*    IR    5/5    5/5   ER    5/5    4/5*   BICEPS   5/5    5/5   Deltoid    5/5    5/5     SIGNS:  Painful side LEFT      NEER   -    OANTHONYS  neg    CHOWDHURY   +    SPEEDS  +     DROP ARM   +   BELLY PRESS neg   Superior escape none    LIFT-OFF  neg   X-Body ADD    neg    MOVING VALGUS neg        STABILITY TESTING    Right shoulder   Left shoulder    Translation     Anterior  up face     up face    Posterior  up face    up face    Sulcus   < 10mm    < 10 mm     Signs   Apprehension   neg      neg       Relocation   no change     no change      Jerk test  neg     neg    EXTREMITY NEURO-VASCULAR EXAM:    Sensation grossly intact to light touch all dermatomal regions.    DTR 2+ Biceps, Triceps, BR and Negative Junaids sign   Grossly intact motor function at Elbow, Wrist and Hand   Distal pulses radial and ulnar 2+, brisk cap refill, symmetric.      NECK:  Painless FROM and spinous processes non-tender. Negative Spurlings sign.      OTHER FINDINGS:  - scapular dyskinesia    XRAYS:  Xrays including AP, Outlet and Axillary Lateral of Left shoulder are ordered / images reviewed by me:   No fracture dislocation or other pathology   Acromion type 1   Proximal migration of humeral head: None   GH arthritis: None    MRI SHOULDER  WITHOUT CONTRAST LEFT     CLINICAL HISTORY:  Pain in left shoulderShoulder pain, rotator cuff disorder suspected, xray done;     TECHNIQUE:  MRI of left shoulder was performed on a 1.5T magnet utilizing the following sequences: Localizer; axial T2 FS; coronal T2 FS and PD FS; sagittal T1, T2 FS and PD FS.     COMPARISON:  X-ray 06/22/2023     FINDINGS:  Rotator cuff: There is diffuse rotator cuff tendinosis.  There is fraying of both the bursal and articular surface of the anterior supraspinatus.  At the level of the mid supraspinatus there is a focal high-grade partial-thickness articular sided tear involving at least 90% of the tendon thickness which measures approximately 8 x 9 mm.  The infraspinatus tendon demonstrates a 12 x 10 mm partial thickness articular sided tear.  There is diffuse subscapularis tendinosis.  There is a partial-thickness tear involving the superior fibers at the insertion measuring approximately 6 by 6 mm.     Biceps: There is severe tenosynovitis of the long head of the biceps tendon.  It is perched medially on the lesser tuberosity and demonstrates abnormal signal and flattening suggestive of tendinosis and split tearing which extends into and involves the intra-articular portion.     Labrum: SLAP tear involving the biceps anchor     Glenohumeral Joint: Glenohumeral joint effusion present with some low-grade chondromalacia involving the posteromedial glenohumeral joint.  Some high-grade chondromalacia and some associated marrow edema of the cephalad aspect of the humeral head with some bony cystic changes of the greater tuberosity and lesser tuberosity and marginal osteophyte formation.     Acromioclavicular joint: Osteoarthritis with type 1 acromion.     Misc: Mild subacromial/subdeltoid bursitis.     Impression:     Rotator cuff tendinosis with multiple low to high-grade partial-thickness rotator cuff tears as detailed above     Extra-articular biceps tenosynovitis, intra-articular  tendinosis and split tearing and partial medial dislocation/perching on the lesser tuberosity.     Intermediate to high-grade glenohumeral chondromalacia and early osteoarthritis           ASSESSMENT:     Left shoulder pain  Rotator cuff tendinopathy with partial thickness tear, left  Biceps tendinitis, left shoulder          PLAN:      Prior imaging reviewed and discussed with patient in detail.     We discussed at length different treatment options including conservative vs surgical management. These include anti-inflammatories, acetaminophen, rest, ice, heat, formal physical therapy including strengthening and stretching exercises, home exercise programs, dry needling, corticosteroid injections, and finally surgical intervention.      Left shoulder subacromial corticosteroid injection performed today.  See procedure note for details.    Recommend she continue to rest and ice the left shoulder and continue taking over-the-counter extra-strength Tylenol as needed for pain.    She would like to follow up as needed. We discussed possible surgical intervention in 2025 if symptoms are not improving.      All questions were answered, patient will contact us for questions or concerns in the interim.

## 2024-10-01 ENCOUNTER — PATIENT MESSAGE (OUTPATIENT)
Dept: BARIATRICS | Facility: CLINIC | Age: 69
End: 2024-10-01
Payer: COMMERCIAL

## 2024-10-08 ENCOUNTER — PATIENT MESSAGE (OUTPATIENT)
Dept: BARIATRICS | Facility: CLINIC | Age: 69
End: 2024-10-08
Payer: COMMERCIAL

## 2024-11-01 ENCOUNTER — OFFICE VISIT (OUTPATIENT)
Dept: INTERNAL MEDICINE | Facility: CLINIC | Age: 69
End: 2024-11-01
Payer: COMMERCIAL

## 2024-11-01 VITALS
BODY MASS INDEX: 34.34 KG/M2 | DIASTOLIC BLOOD PRESSURE: 60 MMHG | WEIGHT: 239.88 LBS | SYSTOLIC BLOOD PRESSURE: 112 MMHG | OXYGEN SATURATION: 97 % | HEART RATE: 59 BPM | HEIGHT: 70 IN

## 2024-11-01 DIAGNOSIS — B37.9 CANDIDA INFECTION: ICD-10-CM

## 2024-11-01 DIAGNOSIS — J32.0 MAXILLARY SINUSITIS, UNSPECIFIED CHRONICITY: Primary | ICD-10-CM

## 2024-11-01 PROCEDURE — 3074F SYST BP LT 130 MM HG: CPT | Mod: CPTII,S$GLB,, | Performed by: INTERNAL MEDICINE

## 2024-11-01 PROCEDURE — 3008F BODY MASS INDEX DOCD: CPT | Mod: CPTII,S$GLB,, | Performed by: INTERNAL MEDICINE

## 2024-11-01 PROCEDURE — 99213 OFFICE O/P EST LOW 20 MIN: CPT | Mod: S$GLB,,, | Performed by: INTERNAL MEDICINE

## 2024-11-01 PROCEDURE — 3288F FALL RISK ASSESSMENT DOCD: CPT | Mod: CPTII,S$GLB,, | Performed by: INTERNAL MEDICINE

## 2024-11-01 PROCEDURE — 1101F PT FALLS ASSESS-DOCD LE1/YR: CPT | Mod: CPTII,S$GLB,, | Performed by: INTERNAL MEDICINE

## 2024-11-01 PROCEDURE — 1126F AMNT PAIN NOTED NONE PRSNT: CPT | Mod: CPTII,S$GLB,, | Performed by: INTERNAL MEDICINE

## 2024-11-01 PROCEDURE — 99999 PR PBB SHADOW E&M-EST. PATIENT-LVL IV: CPT | Mod: PBBFAC,,, | Performed by: INTERNAL MEDICINE

## 2024-11-01 PROCEDURE — 3078F DIAST BP <80 MM HG: CPT | Mod: CPTII,S$GLB,, | Performed by: INTERNAL MEDICINE

## 2024-11-01 PROCEDURE — 1159F MED LIST DOCD IN RCRD: CPT | Mod: CPTII,S$GLB,, | Performed by: INTERNAL MEDICINE

## 2024-11-01 RX ORDER — AMOXICILLIN 500 MG/1
500 CAPSULE ORAL 3 TIMES DAILY
Qty: 30 CAPSULE | Refills: 0 | Status: SHIPPED | OUTPATIENT
Start: 2024-11-01 | End: 2024-11-11

## 2024-11-02 ENCOUNTER — PATIENT MESSAGE (OUTPATIENT)
Dept: BARIATRICS | Facility: CLINIC | Age: 69
End: 2024-11-02
Payer: COMMERCIAL

## 2024-11-02 RX ORDER — FLUCONAZOLE 150 MG/1
TABLET ORAL
Qty: 2 TABLET | Refills: 0 | Status: SHIPPED | OUTPATIENT
Start: 2024-11-02

## 2024-11-12 ENCOUNTER — PATIENT MESSAGE (OUTPATIENT)
Dept: BARIATRICS | Facility: CLINIC | Age: 69
End: 2024-11-12
Payer: COMMERCIAL

## 2024-12-03 ENCOUNTER — PATIENT MESSAGE (OUTPATIENT)
Dept: BARIATRICS | Facility: CLINIC | Age: 69
End: 2024-12-03
Payer: COMMERCIAL

## 2024-12-10 ENCOUNTER — PATIENT MESSAGE (OUTPATIENT)
Dept: BARIATRICS | Facility: CLINIC | Age: 69
End: 2024-12-10
Payer: COMMERCIAL

## 2024-12-30 ENCOUNTER — OFFICE VISIT (OUTPATIENT)
Dept: INTERNAL MEDICINE | Facility: CLINIC | Age: 69
End: 2024-12-30
Payer: COMMERCIAL

## 2024-12-30 ENCOUNTER — LAB VISIT (OUTPATIENT)
Dept: LAB | Facility: HOSPITAL | Age: 69
End: 2024-12-30
Attending: INTERNAL MEDICINE
Payer: COMMERCIAL

## 2024-12-30 VITALS
OXYGEN SATURATION: 99 % | HEIGHT: 70 IN | WEIGHT: 246.69 LBS | SYSTOLIC BLOOD PRESSURE: 118 MMHG | BODY MASS INDEX: 35.32 KG/M2 | DIASTOLIC BLOOD PRESSURE: 60 MMHG | HEART RATE: 60 BPM

## 2024-12-30 DIAGNOSIS — F41.9 ANXIETY: ICD-10-CM

## 2024-12-30 DIAGNOSIS — Z98.84 BARIATRIC SURGERY STATUS: ICD-10-CM

## 2024-12-30 DIAGNOSIS — E66.9 OBESITY (BMI 30-39.9): Primary | ICD-10-CM

## 2024-12-30 DIAGNOSIS — Z12.31 OTHER SCREENING MAMMOGRAM: ICD-10-CM

## 2024-12-30 DIAGNOSIS — M25.569 KNEE PAIN, UNSPECIFIED CHRONICITY, UNSPECIFIED LATERALITY: ICD-10-CM

## 2024-12-30 LAB
ALBUMIN SERPL BCP-MCNC: 4 G/DL (ref 3.5–5.2)
ALP SERPL-CCNC: 73 U/L (ref 40–150)
ALT SERPL W/O P-5'-P-CCNC: 17 U/L (ref 10–44)
ANION GAP SERPL CALC-SCNC: 6 MMOL/L (ref 8–16)
AST SERPL-CCNC: 20 U/L (ref 10–40)
BILIRUB SERPL-MCNC: 0.6 MG/DL (ref 0.1–1)
BUN SERPL-MCNC: 17 MG/DL (ref 8–23)
CALCIUM SERPL-MCNC: 10 MG/DL (ref 8.7–10.5)
CHLORIDE SERPL-SCNC: 107 MMOL/L (ref 95–110)
CHOLEST SERPL-MCNC: 175 MG/DL (ref 120–199)
CHOLEST/HDLC SERPL: 2.4 {RATIO} (ref 2–5)
CO2 SERPL-SCNC: 29 MMOL/L (ref 23–29)
CREAT SERPL-MCNC: 0.7 MG/DL (ref 0.5–1.4)
EST. GFR  (NO RACE VARIABLE): >60 ML/MIN/1.73 M^2
ESTIMATED AVG GLUCOSE: 103 MG/DL (ref 68–131)
GLUCOSE SERPL-MCNC: 106 MG/DL (ref 70–110)
HBA1C MFR BLD: 5.2 % (ref 4–5.6)
HDLC SERPL-MCNC: 73 MG/DL (ref 40–75)
HDLC SERPL: 41.7 % (ref 20–50)
LDLC SERPL CALC-MCNC: 84 MG/DL (ref 63–159)
NONHDLC SERPL-MCNC: 102 MG/DL
POTASSIUM SERPL-SCNC: 4.8 MMOL/L (ref 3.5–5.1)
PROT SERPL-MCNC: 7.4 G/DL (ref 6–8.4)
SODIUM SERPL-SCNC: 142 MMOL/L (ref 136–145)
TRIGL SERPL-MCNC: 90 MG/DL (ref 30–150)

## 2024-12-30 PROCEDURE — 99999 PR PBB SHADOW E&M-EST. PATIENT-LVL III: CPT | Mod: PBBFAC,,, | Performed by: INTERNAL MEDICINE

## 2024-12-30 PROCEDURE — 80061 LIPID PANEL: CPT | Performed by: INTERNAL MEDICINE

## 2024-12-30 PROCEDURE — 83036 HEMOGLOBIN GLYCOSYLATED A1C: CPT | Performed by: INTERNAL MEDICINE

## 2024-12-30 PROCEDURE — 80053 COMPREHEN METABOLIC PANEL: CPT | Performed by: INTERNAL MEDICINE

## 2024-12-30 RX ORDER — SEMAGLUTIDE 1.34 MG/ML
1 INJECTION, SOLUTION SUBCUTANEOUS
Qty: 3 ML | Refills: 11 | Status: SHIPPED | OUTPATIENT
Start: 2024-12-30

## 2024-12-30 RX ORDER — SERTRALINE HYDROCHLORIDE 100 MG/1
100 TABLET, FILM COATED ORAL NIGHTLY
Qty: 90 TABLET | Refills: 3 | Status: SHIPPED | OUTPATIENT
Start: 2024-12-30

## 2024-12-30 RX ORDER — OMEPRAZOLE 40 MG/1
40 CAPSULE, DELAYED RELEASE ORAL DAILY
Qty: 90 CAPSULE | Refills: 3 | Status: SHIPPED | OUTPATIENT
Start: 2024-12-30

## 2024-12-30 RX ORDER — ATORVASTATIN CALCIUM 20 MG/1
20 TABLET, FILM COATED ORAL DAILY
Qty: 90 TABLET | Refills: 3 | Status: SHIPPED | OUTPATIENT
Start: 2024-12-30

## 2024-12-30 NOTE — PROGRESS NOTES
"  Ms. Soha Wheatley is a 69 y.o. female with anxiety, arthritis, obesity, chronic kidney disease, depression; GERD presenting for follow up.   She was last seen 12/19/2023.           Patient had laproscopic gastric sleeve surgery on 12/29/2017. Surgery had no complications. She had lost over 80 lbs. She is taking all prescribed vitamins and is trying to improve her dietary habits.  She has gained 6 # in the last year. Has had a lot of celebrating birthdays and they had a cruise.       She had a right hip replacement in Feb 2021.  She  Tolerated surgery well.       Right rotator cuff repair in 6/2021-- she is doing well but has some problems with the right side.        S/p bilateral total knee replacement in 2018. She now reports worsening left hip pain with movement for which she has gotten imaging. She has an orthopedic appointment tomorrow for follow up. She also states that her right shoulder pain has not improved. She is unable to raise her arm above her head due to pain.     She has been having many family issues since the pandemic started which have caused her a lot of emotional stress. She states she is handling it well though.       Review of Systems   Constitutional:  . Negative for chills and malaise/fatigue.   HENT: Negative for hearing loss.    Eyes: Negative for discharge.   Respiratory: Negative for wheezing.    Cardiovascular: Negative for chest pain and palpitations.   Gastrointestinal: Negative for blood in stool, constipation, diarrhea and vomiting.   Genitourinary: Negative for dysuria and hematuria.   Musculoskeletal: Positive for joint pain and myalgias. Negative for falls and neck pain.   Neurological: Negative for weakness and headaches.   Endo/Heme/Allergies: Negative for polydipsia.   Psychiatric/Behavioral: The patient is nervous/anxious.             OBJECTIVE:            Physical Exam       /60 (BP Location: Right arm, Patient Position: Sitting)   Pulse 60   Ht 5' 10" (1.778 m) "   Wt 111.9 kg (246 lb 11.1 oz)   SpO2 99%   BMI 35.40 kg/m²                 Constitutional:       Appearance: Normal appearance. She is obese.   HENT:      Head: Normocephalic and atraumatic.   Eyes:      Extraocular Movements: Extraocular movements intact.   Cardiovascular:      Rate and Rhythm: Normal rate and regular rhythm.      Heart sounds: Normal heart sounds.   Pulmonary:      Effort: Pulmonary effort is normal.      Breath sounds: Normal breath sounds. No wheezing or rales.   Abdominal:      General: Bowel sounds are normal.      Palpations: Abdomen is soft.      Tenderness: There is no abdominal tenderness.   Musculoskeletal:      Cervical back: Normal range of motion.      Skin:     General: Skin is warm and dry.   Neurological:      General: No focal deficit present.      Mental Status: She is alert and oriented to person, place, and time.   Psychiatric:         Mood and Affect: Mood normal.                  ASSESSMENT & PLAN:    Status post right rotator cuff repair July 2021-- continue pt.     Status post total right hip replacement in Feb 2021  Status post total right knee replacement 5/23/2018  Status post total left knee replacement 10/31/2018            Obesity (BMI 30-39.9)  Bariatric surgery status- s/p lap sleeve  on 12/29/2017  - Pt is following up with bariatric surgery. Aware of which dietary changes she needs to make to maintain her weight loss. Estelita try compound Ozempic - discussed. Will send rx to [facility] pharmacy       Hyperlipidemia  On Lipitor 20 mg a day           MMG-- due march            Situational stress.  -- discussed .  Will refill zoloft.  Discussed counseling.       Follow up in 6 months  -- covid booster--         Follow up in 6 months     Our office providers are the focal point for all needed health care services that are part of ongoing care related to a patient's single serious condition or the patient's complex conditions.

## 2025-01-28 NOTE — TELEPHONE ENCOUNTER
He has been doing well as far as urinating.        No care due was identified.  [facility] Embedded Care Due Messages. Reference number: [id number].   1/21/2024 11:15:00 PM CST

## 2025-02-04 ENCOUNTER — OFFICE VISIT (OUTPATIENT)
Dept: INTERNAL MEDICINE | Facility: CLINIC | Age: 70
End: 2025-02-04
Payer: COMMERCIAL

## 2025-02-04 VITALS
HEART RATE: 89 BPM | WEIGHT: 241.19 LBS | OXYGEN SATURATION: 96 % | DIASTOLIC BLOOD PRESSURE: 60 MMHG | SYSTOLIC BLOOD PRESSURE: 110 MMHG | BODY MASS INDEX: 34.53 KG/M2 | HEIGHT: 70 IN
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
## 2025-02-04 DIAGNOSIS — R07.89 LEFT-SIDED CHEST WALL PAIN: ICD-10-CM

## 2025-02-04 DIAGNOSIS — M25.532 LEFT WRIST PAIN: ICD-10-CM

## 2025-02-04 DIAGNOSIS — M25.562 ACUTE PAIN OF LEFT KNEE: ICD-10-CM

## 2025-02-04 PROCEDURE — 1159F MED LIST DOCD IN RCRD: CPT | Mod: CPTII,S$GLB,, | Performed by: NURSE PRACTITIONER

## 2025-02-04 PROCEDURE — 99999 PR PBB SHADOW E&M-EST. PATIENT-LVL IV: CPT | Mod: PBBFAC,,, | Performed by: NURSE PRACTITIONER

## 2025-02-04 PROCEDURE — 3074F SYST BP LT 130 MM HG: CPT | Mod: CPTII,S$GLB,, | Performed by: NURSE PRACTITIONER

## 2025-02-04 PROCEDURE — 3008F BODY MASS INDEX DOCD: CPT | Mod: CPTII,S$GLB,, | Performed by: NURSE PRACTITIONER

## 2025-02-04 PROCEDURE — 1125F AMNT PAIN NOTED PAIN PRSNT: CPT | Mod: CPTII,S$GLB,, | Performed by: NURSE PRACTITIONER

## 2025-02-04 PROCEDURE — 1100F PTFALLS ASSESS-DOCD GE2>/YR: CPT | Mod: CPTII,S$GLB,, | Performed by: NURSE PRACTITIONER

## 2025-02-04 PROCEDURE — 3078F DIAST BP <80 MM HG: CPT | Mod: CPTII,S$GLB,, | Performed by: NURSE PRACTITIONER

## 2025-02-04 PROCEDURE — 99214 OFFICE O/P EST MOD 30 MIN: CPT | Mod: S$GLB,,, | Performed by: NURSE PRACTITIONER

## 2025-02-04 PROCEDURE — 3288F FALL RISK ASSESSMENT DOCD: CPT | Mod: CPTII,S$GLB,, | Performed by: NURSE PRACTITIONER

## 2025-02-04 RX ORDER — TRAMADOL HYDROCHLORIDE 50 MG/1
50 TABLET ORAL EVERY 12 HOURS PRN
Qty: 14 EACH | Refills: 0 | Status: SHIPPED | OUTPATIENT
Start: 2025-02-04

## 2025-02-04 RX ORDER — METHOCARBAMOL 500 MG/1
TABLET, FILM COATED ORAL
COMMUNITY
Start: 2025-02-01

## 2025-02-04 RX ORDER — LIDOCAINE 50 MG/G
PATCH TOPICAL
COMMUNITY
Start: 2025-02-01

## 2025-02-04 NOTE — PROGRESS NOTES
"Subjective     Patient ID: Soha Wheatley is a 69 y.o. female.  /60 (BP Location: Right arm, Patient Position: Sitting)   Pulse 89   Ht 5' 10" (1.778 m)   Wt 109.4 kg (241 lb 2.9 oz)   SpO2 96%   BMI 34.61 kg/m²      Chief Complaint: Follow-up    Presenting for ED follow up after sustaining mechanical fall from standing. Medical history significant for gastric sleeve and bilateral knee replacement. Patient was [place] her [place] this past Saturday when she fell from standing onto tile floor. Patient was walking when she fell forward and onto her L side. She was seen at their ED (records not available), and had imaging that was negative for fractures. She has residual pain to her L arm, L wrist, L chest wall, and L knee. She has been taking tylenol and using lidocaine patches with minimal relief. She feels like yesterday was the worst day of pain, and that it has slightly improved today.        Patient Active Problem List   Diagnosis    Back pain    Chronic pain of right knee    Calculus of right kidney    Nephrolithiasis    Cervical neuropathy    Family history of early CAD    Anxiety    GERD (gastroesophageal reflux disease)    Bariatric surgery status- s/p lap sleeve  on 12/29/2017    Sleep apnea in adult    Status post total right knee replacement 5/23/2018    Adjustment disorder with depressed mood    Obesity (BMI 30-39.9)    Status post total left knee replacement 10/31/2018    Right hip pain    Depression    Primary osteoarthritis of right hip    Status post total replacement of right hip    Nontraumatic complete tear of right rotator cuff    Nontraumatic tear of right rotator cuff    Decreased right shoulder range of motion    Muscle weakness of right upper extremity    Left shoulder pain    Tendinopathy of left rotator cuff    Biceps tendinitis of left upper extremity        Current Outpatient Medications   Medication Sig Dispense Refill    atorvastatin (LIPITOR) 20 MG " tablet Take 1 tablet (20 mg total) by mouth once daily. 90 tablet 3    b complex vitamins tablet Take 1 tablet by mouth once daily.      cyanocobalamin 500 MCG tablet Take 500 mcg by mouth once daily.      estradioL 10 mcg InPk Place 1 tablet vaginally every evening. 18 each 18    estradioL 10 mcg Inst Place 1 tablet vaginally twice a week. 8 each 12    fluconazole (DIFLUCAN) 150 MG Tab Take 1 tablet (150mg) now, then in 3 days. 2 tablet 0    gabapentin (NEURONTIN) 600 MG tablet Take 1 tablet (600 mg total) by mouth 3 (three) times daily. 270 tablet 3    INTRAROSA 6.5 mg Inst SMARTSI Vaginal Daily      LIDOcaine (LIDODERM) 5 % SMARTSIG:Topical      methocarbamoL (ROBAXIN) 500 MG Tab Take by mouth.      multivitamin (THERAGRAN) per tablet Take 1 tablet by mouth once daily.      omeprazole (PRILOSEC) 40 MG capsule Take 1 capsule (40 mg total) by mouth once daily. 90 capsule 3    semaglutide (OZEMPIC) 1 mg/dose (4 mg/3 mL) Inject 1 mg into the skin every 7 days. 3 mL 11    sertraline (ZOLOFT) 100 MG tablet Take 1 tablet (100 mg total) by mouth every evening. 90 tablet 3    tiZANidine (ZANAFLEX) 4 MG tablet Take 1 tablet (4 mg total) by mouth every 8 (eight) hours as needed (muscle spasms). 270 tablet 0    ospemifene (OSPHENA) 60 mg Tab Take 1 tablet by mouth once daily. (Patient not taking: Reported on 2024) 30 tablet 12    traMADoL (ULTRAM) 50 mg tablet Take 1 tablet (50 mg total) by mouth every 12 (twelve) hours as needed for Pain. 14 each 0     No current facility-administered medications for this visit.        Review of Systems   Cardiovascular:  Negative for chest pain.   Musculoskeletal:  Positive for arthralgias. Negative for gait problem and joint swelling.          Objective     Physical Exam  Constitutional:       General: She is not in acute distress.     Appearance: Normal appearance. She is not ill-appearing, toxic-appearing or diaphoretic.   HENT:      Head: Normocephalic and atraumatic.    Cardiovascular:      Rate and Rhythm: Normal rate.   Pulmonary:      Effort: Pulmonary effort is normal.   Musculoskeletal:         General: No swelling, tenderness, deformity or signs of injury. Normal range of motion.      Cervical back: Normal range of motion.      Right lower leg: No edema.      Left lower leg: No edema.   Skin:     Findings: Bruising (scattered bruising to L side, minimal) present.   Neurological:      Mental Status: She is alert and oriented to person, place, and time.   Psychiatric:         Mood and Affect: Mood normal.         Behavior: Behavior normal.          Assessment and Plan     1. Fall, subsequent encounter; no further work up indicated at this time. Pain slightly improved but recent treatments have not been very effective. Cannot take NSAIDS due to history of gastric sleeve. Will prescribe short course of tramadol for acute pain  -     traMADoL (ULTRAM) 50 mg tablet; Take 1 tablet (50 mg total) by mouth every 12 (twelve) hours as needed for Pain.  Dispense: 14 each; Refill: 0    2. Acute pain of left knee; no further work up indicated at this time. Pain slightly improved but recent treatments have not been very effective. Cannot take NSAIDS due to history of gastric sleeve. Will prescribe short course of tramadol for acute pain  -     traMADoL (ULTRAM) 50 mg tablet; Take 1 tablet (50 mg total) by mouth every 12 (twelve) hours as needed for Pain.  Dispense: 14 each; Refill: 0    3. Left wrist pain; no further work up indicated at this time. Pain slightly improved but recent treatments have not been very effective. Cannot take NSAIDS due to history of gastric sleeve. Will prescribe short course of tramadol for acute pain  -     traMADoL (ULTRAM) 50 mg tablet; Take 1 tablet (50 mg total) by mouth every 12 (twelve) hours as needed for Pain.  Dispense: 14 each; Refill: 0    4. Left-sided chest wall pain; no further work up indicated at this time. Pain slightly improved but recent  treatments have not been very effective. Cannot take NSAIDS due to history of gastric sleeve. Will prescribe short course of tramadol for acute pain  -     traMADoL (ULTRAM) 50 mg tablet; Take 1 tablet (50 mg total) by mouth every 12 (twelve) hours as needed for Pain.  Dispense: 14 each; Refill: 0          Surya Herrera NP   Internal Medicine           Follow up if symptoms worsen or fail to improve.

## 2025-03-05 DIAGNOSIS — Z78.0 MENOPAUSE: ICD-10-CM

## 2025-03-24 ENCOUNTER — HOSPITAL ENCOUNTER (OUTPATIENT)
Dept: RADIOLOGY | Facility: HOSPITAL | Age: 70
Discharge: HOME | End: 2025-03-24
Attending: INTERNAL MEDICINE
Payer: COMMERCIAL

## 2025-03-24 DIAGNOSIS — Z12.31 OTHER SCREENING MAMMOGRAM: ICD-10-CM

## 2025-03-24 PROCEDURE — 77063 BREAST TOMOSYNTHESIS BI: CPT | Mod: 26,,, | Performed by: RADIOLOGY

## 2025-03-24 PROCEDURE — 77063 BREAST TOMOSYNTHESIS BI: CPT | Mod: TC

## 2025-03-24 PROCEDURE — 77067 SCR MAMMO BI INCL CAD: CPT | Mod: 26,,, | Performed by: RADIOLOGY

## 2025-04-22 ENCOUNTER — OFFICE VISIT (OUTPATIENT)
Dept: OBSTETRICS AND GYNECOLOGY | Facility: CLINIC | Age: 70
End: 2025-04-22
Payer: COMMERCIAL

## 2025-04-22 VITALS
HEIGHT: 70 IN | SYSTOLIC BLOOD PRESSURE: 122 MMHG | BODY MASS INDEX: 34.9 KG/M2 | DIASTOLIC BLOOD PRESSURE: 78 MMHG | WEIGHT: 243.81 LBS

## 2025-04-22 DIAGNOSIS — N95.2 VAGINAL ATROPHY: ICD-10-CM

## 2025-04-22 DIAGNOSIS — Z78.0 POSTMENOPAUSAL: ICD-10-CM

## 2025-04-22 DIAGNOSIS — Z01.419 ENCOUNTER FOR GYNECOLOGICAL EXAMINATION WITHOUT ABNORMAL FINDING: Primary | ICD-10-CM

## 2025-04-22 PROCEDURE — 99999 PR PBB SHADOW E&M-EST. PATIENT-LVL IV: CPT | Mod: PBBFAC,,, | Performed by: OBSTETRICS & GYNECOLOGY

## 2025-04-22 PROCEDURE — 1159F MED LIST DOCD IN RCRD: CPT | Mod: CPTII,S$GLB,, | Performed by: OBSTETRICS & GYNECOLOGY

## 2025-04-22 PROCEDURE — 3288F FALL RISK ASSESSMENT DOCD: CPT | Mod: CPTII,S$GLB,, | Performed by: OBSTETRICS & GYNECOLOGY

## 2025-04-22 PROCEDURE — 3008F BODY MASS INDEX DOCD: CPT | Mod: CPTII,S$GLB,, | Performed by: OBSTETRICS & GYNECOLOGY

## 2025-04-22 PROCEDURE — 99397 PER PM REEVAL EST PAT 65+ YR: CPT | Mod: S$GLB,,, | Performed by: OBSTETRICS & GYNECOLOGY

## 2025-04-22 PROCEDURE — 1101F PT FALLS ASSESS-DOCD LE1/YR: CPT | Mod: CPTII,S$GLB,, | Performed by: OBSTETRICS & GYNECOLOGY

## 2025-04-22 PROCEDURE — 3074F SYST BP LT 130 MM HG: CPT | Mod: CPTII,S$GLB,, | Performed by: OBSTETRICS & GYNECOLOGY

## 2025-04-22 PROCEDURE — 3078F DIAST BP <80 MM HG: CPT | Mod: CPTII,S$GLB,, | Performed by: OBSTETRICS & GYNECOLOGY

## 2025-04-22 PROCEDURE — 1126F AMNT PAIN NOTED NONE PRSNT: CPT | Mod: CPTII,S$GLB,, | Performed by: OBSTETRICS & GYNECOLOGY

## 2025-04-22 NOTE — PROGRESS NOTES
CC: Well woman exam    Soha Wheatley is a 69 y.o. female  presents for a well woman exam.  LMP: No LMP recorded. Patient is postmenopausal..  No issues, problems, or complaints.    Past Medical History:   Diagnosis Date    Anxiety     Depression     GERD (gastroesophageal reflux disease)     Obesity     Sleep apnea in adult     WEARS CPAP, DOESNT KNOW SETTINGS.     Past Surgical History:   Procedure Laterality Date    ARTHROSCOPIC DEBRIDEMENT OF SHOULDER Right 2021    Procedure: DEBRIDEMENT, SHOULDER, ARTHROSCOPIC;  Surgeon: Papo Garcia MD;  Location: [facility] OR;  Service: Orthopedics;  Laterality: Right;  labrum    ARTHROSCOPIC REPAIR OF ROTATOR CUFF OF SHOULDER Right 2021    Procedure: REPAIR, ROTATOR CUFF, ARTHROSCOPIC;  Surgeon: Papo Garcia MD;  Location: [facility] OR;  Service: Orthopedics;  Laterality: Right;  regional w/catheter (interscalene)     SECTION      CHOLECYSTECTOMY      DECOMPRESSION OF SUBACROMIAL SPACE Right 2021    Procedure: DECOMPRESSION, SUBACROMIAL SPACE;  Surgeon: Papo Garcia MD;  Location: [facility] OR;  Service: Orthopedics;  Laterality: Right;    DISTAL CLAVICLE EXCISION Right 2021    Procedure: EXCISION, CLAVICLE, DISTAL;  Surgeon: Papo Garcia MD;  Location: [facility] OR;  Service: Orthopedics;  Laterality: Right;    FIXATION OF TENDON Right 2021    Procedure: FIXATION, TENDON;  Surgeon: Papo Garcia MD;  Location: [facility] OR;  Service: Orthopedics;  Laterality: Right;    GASTRECTOMY      KNEE ARTHRODESIS      arthroscopy - Left knee for meniscus     KNEE CARTILAGE SURGERY Left     TONSILLECTOMY      TOTAL KNEE ARTHROPLASTY Right 2018    Procedure: REPLACEMENT-KNEE-TOTAL;  Surgeon: John L. [facility]ner Jr., MD;  Location: [address];  Service: Orthopedics;  Laterality: Right;    TOTAL KNEE ARTHROPLASTY Left 10/31/2018    Procedure: REPLACEMENT-KNEE-TOTAL;  Surgeon: John L. [facility]ner Jr., MD;  Location:  [facility] OR 2ND FLR;  Service: Orthopedics;  Laterality: Left;    TOTAL REPLACEMENT OF HIP JOINT USING COMPUTER-ASSISTED NAVIGATION Right 2/25/2021    Procedure: ARTHROPLASTY, HIP, TOTAL, DELTA COMPUTER-ASSISTED NAVIGATION;  Surgeon: Lázaro Carlos MD;  Location: [facility];  Service: Orthopedics;  Laterality: Right;    URETEROSCOPY       Social History     Socioeconomic History    Marital status:      Spouse name: Angel    Number of children: 2   Occupational History     Employer: [employer]   Tobacco Use    Smoking status: Never    Smokeless tobacco: Never   Substance and Sexual Activity    Alcohol use: Not Currently     Comment: social    Drug use: No    Sexual activity: Yes     Partners: Male     Birth control/protection: None   Social History Narrative    House      Social Drivers of Health     Financial Resource Strain: Low Risk  (5/11/2022)    Overall Financial Resource Strain (CARDIA)     Difficulty of Paying Living Expenses: Not hard at all   Food Insecurity: No Food Insecurity (5/11/2022)    Hunger Vital Sign     Worried About Running Out of Food in the Last Year: Never true     Ran Out of Food in the Last Year: Never true   Transportation Needs: No Transportation Needs (5/11/2022)    PRAPARE - Transportation     Lack of Transportation (Medical): No     Lack of Transportation (Non-Medical): No   Physical Activity: Insufficiently Active (5/11/2022)    Exercise Vital Sign     Days of Exercise per Week: 1 day     Minutes of Exercise per Session: 20 min   Stress: No Stress Concern Present (5/11/2022)    [employer] [place] [place] [employer] - Occupational Stress Questionnaire     Feeling of Stress : Only a little   Housing Stability: Unknown (5/11/2022)    Housing Stability Vital Sign     Unable to Pay for Housing in the Last Year: No     Number of Places Lived in the Last Year: 1     Family History   Problem Relation Name Age of Onset    Heart attack Paternal Grandfather      VANDANA disease Maternal  "Grandmother      Heart disease Maternal Grandmother      Hyperlipidemia Maternal Grandmother      Colon cancer Maternal Grandmother      Pancreatic cancer Maternal Grandfather      Cancer Maternal Grandfather  88    Hyperlipidemia Father      Hypertension Father      Breast cancer Mother  58    Cancer Mother          breast    Heart disease Maternal Uncle      Breast cancer Cousin 1st cousin     Celiac disease Neg Hx      Cirrhosis Neg Hx      Colon polyps Neg Hx      Crohn's disease Neg Hx      Cystic fibrosis Neg Hx      Esophageal cancer Neg Hx      Hemochromatosis Neg Hx      Inflammatory bowel disease Neg Hx      Irritable bowel syndrome Neg Hx      Liver cancer Neg Hx      Liver disease Neg Hx      Rectal cancer Neg Hx      Stomach cancer Neg Hx      Ulcerative colitis Neg Hx      Silvestre's disease Neg Hx      Ovarian cancer Neg Hx      Uterine cancer Neg Hx      Cervical cancer Neg Hx       OB History          1    Para   1    Term   1            AB        Living             SAB        IAB        Ectopic        Multiple        Live Births                     /78   Ht 5' 10" (1.778 m)   Wt 110.6 kg (243 lb 13.3 oz)   BMI 34.99 kg/m²       ROS:    ROS:  GENERAL: Denies weight gain or weight loss. Feeling well overall.   SKIN: Denies rash or lesions.   HEAD: Denies head injury or headache.   NODES: Denies enlarged lymph nodes.   CHEST: Denies chest pain or shortness of breath.   CARDIOVASCULAR: Denies palpitations or left sided chest pain.   ABDOMEN: No abdominal pain, constipation, diarrhea, nausea, vomiting or rectal bleeding.   URINARY: No frequency, dysuria, hematuria, or burning on urination.  REPRODUCTIVE: See HPI.   BREASTS: The patient performs breast self-examination and denies pain, lumps, or nipple discharge.   HEMATOLOGIC: No easy bruisability or excessive bleeding.   MUSCULOSKELETAL: Denies joint pain or swelling.   NEUROLOGIC: Denies syncope or weakness.   PSYCHIATRIC: Denies " depression, anxiety or mood swings.    PHYSICAL EXAM:    APPEARANCE: Well nourished, well developed, in no acute distress.  AFFECT: WNL, alert and oriented x 3  SKIN: No acne or hirsutism  NECK: Neck symmetric without masses or thyromegaly  NODES: No inguinal, cervical, axillary, or femoral lymph node enlargement  CHEST: Good respiratory effect  ABDOMEN: Soft.  No tenderness or masses.  No hepatosplenomegaly.  No hernias.  BREASTS: Symmetrical, no skin changes or visible lesions.  No palpable masses, nipple discharge bilaterally.  PELVIC: Normal external genitalia without lesions.  Normal hair distribution.  Adequate perineal body, normal urethral meatus.  Vagina moist and well rugated without lesions or discharge.  Cervix pink, without lesions, discharge or tenderness.  No significant cystocele or rectocele.  Bimanual exam shows uterus to be normal size, regular, mobile and nontender.  Adnexa without masses or tenderness.    RECTAL: Rectovaginal exam confirms above with normal sphincter tone, no masses.  EXTREMITIES: No edema.      ICD-10-CM ICD-9-CM    1. Encounter for gynecological examination without abnormal finding  Z01.419 V72.31       2. Vaginal atrophy  N95.2 627.3 estradioL 10 mcg InPk      3. Postmenopausal  Z78.0 V49.81         MMG up todate  Exercise / health    Patient was counseled today on A.C.S. Pap guidelines and recommendations for yearly pelvic exams, mammograms and monthly self breast exams; to see her PCP for other health maintenance.     Follow up in about 1 year (around 4/22/2026), or if symptoms worsen or fail to improve.

## 2025-05-01 DIAGNOSIS — M47.812 CERVICAL SPONDYLOSIS WITHOUT MYELOPATHY: ICD-10-CM

## 2025-05-02 RX ORDER — TIZANIDINE 4 MG/1
4 TABLET ORAL EVERY 8 HOURS PRN
Qty: 270 TABLET | Refills: 0 | Status: SHIPPED | OUTPATIENT
Start: 2025-05-02

## 2025-05-02 RX ORDER — GABAPENTIN 600 MG/1
600 TABLET ORAL 3 TIMES DAILY
Qty: 270 TABLET | Refills: 3 | Status: SHIPPED | OUTPATIENT
Start: 2025-05-02 | End: 2026-05-02

## 2025-06-06 NOTE — PROGRESS NOTES
[facility] Established Patient Visit    Reason for Visit:  The encounter diagnosis was History of Helicobacter pylori infection.    PCP: Andrew Mallory    HPI:  This is a 61 y.o. female here for f/u h. Pylori.  h pylori + serology in 2017 per bariatrics (followed for weight loss management, reports will be having gastric sleeve soon)-treated with triple therapy- completed as RX.  Subsequent EGD in 2017 with h. Pylori+ bx-treated with triple therapy completed as RX.    Currently taking 40 mg Prilosec once daily with good control of acid reflux.    Abdominal pain - denies  Dysphagia/odynophagia - no   Bowel habits - normal. 2 formed BM/day. Takes daily probiotic.  GI bleeding - denies hematochezia, hematemesis, melena, BRBPR, black/tarry stools, and coffee ground emesis  NSAID usage - 15 mg mobic daily knee and shoulder pain/arthritis.    ROS:  Constitutional: No fevers, no chills, No unintentional weight loss, + baseline fatigue,   ENT: No allergies  CV: No chest pain, no palpitations, no perif. edema, no sob on exertion  Pulm: No cough, No shortness of breath, no wheezes, no sputum  Ophtho: No vision changes  GI: see HPI; also no nausea, no vomiting, no change in appetite  Derm: No rash  Heme: No lymphadenopathy, No bruising  MSK: + arthritis, no muscle pain, no muscle weakness  : No dysuria, No hematuria  Endo: No hot or cold intolerance  Neuro: No syncope, No seizure,     PMHX:  has a past medical history of Allergy; Anxiety; Arthritis; Chronic kidney disease; Depression; GERD (gastroesophageal reflux disease); History of kidney stones; Kidney stones; Migraines; and Obesity.    PSHX:  has a past surgical history that includes Tonsillectomy ();  section (); Knee arthrodesis (); Knee cartilage surgery; Cholecystectomy; and Ureteroscopy.    The patient's social and family histories were reviewed by me and updated in the appropriate section of the electronic medical  Bedside report given to Iwona Cotton with  on iPad.    "record.    Review of patient's allergies indicates:   Allergen Reactions    Adhesive      Paper tape usually ok    Flagyl  [metronidazole hcl]      Other reaction(s): Unknown       Current Outpatient Prescriptions   Medication Sig    acetaminophen (TYLENOL ARTHRITIS) 650 MG TbSR Take 650 mg by mouth 2 (two) times daily.     cinnamon bark-chromium picolin 500-100 mg-mcg Cap Take by mouth.    gabapentin (NEURONTIN) 600 MG tablet TAKE 1 TABLET THREE TIMES A DAY    GLUCOSAMINE HCL/CHONDR APODACA A NA (OSTEO BI-FLEX ORAL) Take by mouth once daily.    LACTOBACILLUS RHAMNOSUS GG (CULTURELLE ORAL) Take by mouth once daily.    meloxicam (MOBIC) 15 MG tablet TAKE 1 TABLET DAILY    omeprazole (PRILOSEC) 40 MG capsule Take 1 capsule (40 mg total) by mouth once daily.    tizanidine (ZANAFLEX) 4 MG tablet Take 1 tablet (4 mg total) by mouth every 8 (eight) hours as needed.    sertraline (ZOLOFT) 50 MG tablet Take 1 tablet (50 mg total) by mouth once daily.     No current facility-administered medications for this visit.          Objective Findings:    Vital Signs:  BP (!) 145/67   Pulse 77   Ht 5' 10" (1.778 m)   Wt (!) 142 kg (313 lb 0.9 oz)   BMI 44.92 kg/m²  Body mass index is 44.92 kg/m².    Physical Exam:  General Appearance: Well appearing in no acute distress  Head:   Normocephalic, without obvious abnormality  Eyes:    No scleral icterus, EOMI  Throat: Lips, mucosa, and tongue normal; teeth and gums normal  Lungs: CTA bilaterally in anterior and posterior fields, no wheezes, no crackles.  Heart:  Regular rate and rhythm, S1, S2 normal, no murmurs heard  Abdomen: Soft, non tender, non distended with positive bowel sounds in all four quadrants. No hepatosplenomegaly, ascites, or mass  Extremities:    2+ radial pulses, no clubbing, cyanosis or edema  Skin: No rash to exposed areas  Neurologic: A&Ox4      Labs:  Lab Results   Component Value Date    WBC 7.27 08/16/2017    HGB 12.3 08/16/2017    HCT 38.9 08/16/2017 "     08/16/2017    CHOL 209 (H) 06/05/2017    TRIG 178 (H) 06/05/2017    HDL 62 06/05/2017    ALT 28 08/16/2017    AST 18 08/16/2017     08/16/2017    K 4.2 08/16/2017     08/16/2017    CREATININE 0.8 08/16/2017    BUN 20 08/16/2017    CO2 26 08/16/2017    TSH 1.064 08/16/2017    INR 1.0 04/09/2017    HGBA1C 5.1 08/16/2017       Endoscopy:   9/18/2017 EGD Dr. Chaudhary- NL esophagus. NL stomach( h.pylori + gastritis). NL duodenum.     6/2013 EGD Dr. Gonzalez-gastric erythema (-).    2008 colonoscopy Dr. Jackson- [facility]. NL. Rpt 10 yrs.    2004 EGD Dr. Kelly-.  Assessment:    1. History of Helicobacter pylori infection          Recommendations:  1.h/o h. Pylori infection- stools to test for cure off all abx x 4 weeks, PPI/pepto/H2RA x 2 weeks as per written instructions provided.    F/u PRN if h. Pylori testing negative.    Order summary:  Orders Placed This Encounter    H. pylori antigen, stool       Thank you for allowing me to participate in the care of Soha Sandoval, APRN, FNP-C

## 2025-07-07 ENCOUNTER — OFFICE VISIT (OUTPATIENT)
Dept: INTERNAL MEDICINE | Facility: CLINIC | Age: 70
End: 2025-07-07
Payer: COMMERCIAL

## 2025-07-07 VITALS
DIASTOLIC BLOOD PRESSURE: 64 MMHG | SYSTOLIC BLOOD PRESSURE: 124 MMHG | HEIGHT: 70 IN | WEIGHT: 247.81 LBS | OXYGEN SATURATION: 98 % | BODY MASS INDEX: 35.48 KG/M2 | HEART RATE: 78 BPM

## 2025-07-07 DIAGNOSIS — E78.5 HYPERLIPIDEMIA, UNSPECIFIED HYPERLIPIDEMIA TYPE: ICD-10-CM

## 2025-07-07 DIAGNOSIS — E66.9 OBESITY (BMI 30-39.9): ICD-10-CM

## 2025-07-07 DIAGNOSIS — Z98.84 BARIATRIC SURGERY STATUS: Primary | ICD-10-CM

## 2025-07-07 DIAGNOSIS — F41.9 ANXIETY: ICD-10-CM

## 2025-07-07 DIAGNOSIS — F32.A DEPRESSION, UNSPECIFIED DEPRESSION TYPE: ICD-10-CM

## 2025-07-07 PROBLEM — M25.611 DECREASED RIGHT SHOULDER RANGE OF MOTION: Status: RESOLVED | Noted: 2021-06-28 | Resolved: 2025-07-07

## 2025-07-07 PROBLEM — M25.512 LEFT SHOULDER PAIN: Status: RESOLVED | Noted: 2023-08-21 | Resolved: 2025-07-07

## 2025-07-07 PROCEDURE — 99999 PR PBB SHADOW E&M-EST. PATIENT-LVL III: CPT | Mod: PBBFAC,,, | Performed by: INTERNAL MEDICINE

## 2025-07-07 PROCEDURE — 99214 OFFICE O/P EST MOD 30 MIN: CPT | Mod: S$GLB,,, | Performed by: INTERNAL MEDICINE

## 2025-07-07 PROCEDURE — 1126F AMNT PAIN NOTED NONE PRSNT: CPT | Mod: CPTII,S$GLB,, | Performed by: INTERNAL MEDICINE

## 2025-07-07 PROCEDURE — 3008F BODY MASS INDEX DOCD: CPT | Mod: CPTII,S$GLB,, | Performed by: INTERNAL MEDICINE

## 2025-07-07 PROCEDURE — 3074F SYST BP LT 130 MM HG: CPT | Mod: CPTII,S$GLB,, | Performed by: INTERNAL MEDICINE

## 2025-07-07 PROCEDURE — 1159F MED LIST DOCD IN RCRD: CPT | Mod: CPTII,S$GLB,, | Performed by: INTERNAL MEDICINE

## 2025-07-07 PROCEDURE — 1101F PT FALLS ASSESS-DOCD LE1/YR: CPT | Mod: CPTII,S$GLB,, | Performed by: INTERNAL MEDICINE

## 2025-07-07 PROCEDURE — 3078F DIAST BP <80 MM HG: CPT | Mod: CPTII,S$GLB,, | Performed by: INTERNAL MEDICINE

## 2025-07-07 PROCEDURE — 3288F FALL RISK ASSESSMENT DOCD: CPT | Mod: CPTII,S$GLB,, | Performed by: INTERNAL MEDICINE

## 2025-07-07 PROCEDURE — G2211 COMPLEX E/M VISIT ADD ON: HCPCS | Mod: S$GLB,,, | Performed by: INTERNAL MEDICINE

## 2025-07-07 RX ORDER — ATORVASTATIN CALCIUM 20 MG/1
20 TABLET, FILM COATED ORAL DAILY
Qty: 90 TABLET | Refills: 3 | Status: SHIPPED | OUTPATIENT
Start: 2025-07-07

## 2025-07-07 RX ORDER — SERTRALINE HYDROCHLORIDE 100 MG/1
100 TABLET, FILM COATED ORAL NIGHTLY
Qty: 90 TABLET | Refills: 3 | Status: SHIPPED | OUTPATIENT
Start: 2025-07-07

## 2025-07-07 NOTE — PROGRESS NOTES
"  Ms. Soha Wheatley is a 69 y.o. female with anxiety, arthritis, obesity, chronic kidney disease, depression; GERD presenting for follow up.   She was last seen 12/30/2024           Patient had laproscopic gastric sleeve surgery on 12/29/2017. Surgery had no complications. She had lost over 80 lbs. She is taking all prescribed vitamins and is trying to improve her dietary habits.  She has gained 1 # in the last year.       She had a right hip replacement in Feb 2021.  She  Tolerated surgery well.  Left hip "needs to be done but I am fighting it."      Right rotator cuff repair in 6/2021-- she is doing well but has some problems with the right side.        S/p bilateral total knee replacement in 2018. She now reports worsening left hip pain with movement for which she has gotten imaging. She has an orthopedic appointment tomorrow for follow up. She also states that her right shoulder pain has not improved. She is unable to raise her arm above her head due to pain.     She has been having many family issues since the pandemic started which have caused her a lot of emotional stress. She states she is handling it well though.       Review of Systems   Constitutional:  . Negative for chills and malaise/fatigue.   HENT: Negative for hearing loss.    Eyes: Negative for discharge.   Respiratory: Negative for wheezing.    Cardiovascular: Negative for chest pain and palpitations.   Gastrointestinal: Negative for blood in stool, constipation, diarrhea and vomiting.   Genitourinary: Negative for dysuria and hematuria.   Musculoskeletal: Positive for joint pain and myalgias. Negative for falls and neck pain.   Neurological: Negative for weakness and headaches.   Endo/Heme/Allergies: Negative for polydipsia.   Psychiatric/Behavioral: The patient is nervous/anxious.             OBJECTIVE:            Physical Exam            /64 (BP Location: Right arm, Patient Position: Sitting)   Pulse 78   Ht 5' 10" (1.778 m)   Wt " 112.4 kg (247 lb 12.8 oz)   SpO2 98%   BMI 35.56 kg/m²              Constitutional:       Appearance: Normal appearance. She is obese.   HENT:      Head: Normocephalic and atraumatic.   Eyes:      Extraocular Movements: Extraocular movements intact.   Cardiovascular:      Rate and Rhythm: Normal rate and regular rhythm.      Heart sounds: Normal heart sounds.   Pulmonary:      Effort: Pulmonary effort is normal.      Breath sounds: Normal breath sounds. No wheezing or rales.   Abdominal:      General: Bowel sounds are normal.      Palpations: Abdomen is soft.      Tenderness: There is no abdominal tenderness.   Musculoskeletal:      Cervical back: Normal range of motion.      Skin:     General: Skin is warm and dry.   Neurological:      General: No focal deficit present.      Mental Status: She is alert and oriented to person, place, and time.   Psychiatric:         Mood and Affect: Mood normal.                  ASSESSMENT & PLAN:    Status post right rotator cuff repair July 2021-- continue pt.     Status post total right hip replacement in Feb 2021  Status post total right knee replacement 5/23/2018  Status post total left knee replacement 10/31/2018            Obesity (BMI 30-39.9)  Bariatric surgery status- s/p lap sleeve  on 12/29/2017  - Pt is following up with bariatric surgery. Aware of which dietary changes she needs to make to maintain her weight loss. Estelita try compound Ozempic - discussed. Will send rx to [facility]       Hyperlipidemia  On Lipitor 20 mg a day           MMG-- done in 3/5/2025.            Situational stress.  -- discussed .  Will refill zoloft.                 Follow up in 6 months      Our office providers are the focal point for all needed health care services that are part of ongoing care related to a patient's single serious condition or the patient's complex conditions.

## (undated) DEVICE — SCREW HEX HEAD 3.5X38MM
Type: IMPLANTABLE DEVICE | Site: KNEE | Status: NON-FUNCTIONAL
Removed: 2018-05-23

## (undated) DEVICE — KIT IRR SUCTION HND PIECE

## (undated) DEVICE — NDL SUTURE FLEXIBLE

## (undated) DEVICE — BLADE SURG CARBON STEEL #10

## (undated) DEVICE — DRAPE ABDOMINAL TIBURON 14X11

## (undated) DEVICE — TAPE SURG DURAPORE 2 X10YD

## (undated) DEVICE — DRESSING AQUACEL AG ADV 3.5X12

## (undated) DEVICE — CLOSURE SKIN STERI STRIP 1/2X4

## (undated) DEVICE — SOL 9P NACL IRR PIC IL

## (undated) DEVICE — NDL HYPO REG 25G X 1 1/2

## (undated) DEVICE — SUT CTD VICRYL 0 UND BR CPX

## (undated) DEVICE — KIT TOTAL KNEE TKOFG

## (undated) DEVICE — DRAPE INCISE IOBAN 2 23X17IN

## (undated) DEVICE — BANDAGE ESMARK 6X12

## (undated) DEVICE — GLOVE BIOGEL SKINSENSE PI 8.0

## (undated) DEVICE — POSITIONER IV ARMBOARD FOAM

## (undated) DEVICE — HOOD T-5 TEAR AWAY STERILE

## (undated) DEVICE — BIT DRILL PIN TROCAR 3.2MM
Type: IMPLANTABLE DEVICE | Site: KNEE | Status: NON-FUNCTIONAL
Removed: 2018-05-23

## (undated) DEVICE — SEE MEDLINE ITEM 152529

## (undated) DEVICE — SUT VICRYL BR 1 GEN 27 CT-1

## (undated) DEVICE — DRESSING TRANS 4X4 TEGADERM

## (undated) DEVICE — UNDERGLOVES BIOGEL PI SIZE 8.5

## (undated) DEVICE — PAD CAST SPECIALIST STRL 6

## (undated) DEVICE — ELECTRODE REM PLYHSV RETURN 9

## (undated) DEVICE — DRAPE STERI INSTRUMENT 1018

## (undated) DEVICE — ADHESIVE DERMABOND ADVANCED

## (undated) DEVICE — SUT VICRYL+ 1 CT1 18IN

## (undated) DEVICE — BLADE SAG 13.0 X1.27X90

## (undated) DEVICE — DRAPE PLASTIC U 60X72

## (undated) DEVICE — APPLICATOR CHLORAPREP ORN 26ML

## (undated) DEVICE — PAD COLD THERAPY KNEE WRAP ON

## (undated) DEVICE — KIT SHOULDER POSITIONER SPIDER

## (undated) DEVICE — PUMP COLD THERAPY

## (undated) DEVICE — PAD SHOULDER CARE POLAR

## (undated) DEVICE — SEE MEDLINE ITEM 154981

## (undated) DEVICE — SOL IRR NACL .9% 3000ML

## (undated) DEVICE — SUT VICRYL PLUS 3-0 SH 18IN

## (undated) DEVICE — SYR 10CC LUER LOCK

## (undated) DEVICE — CANNULA ENDOPATH XCEL 5X100MM

## (undated) DEVICE — UNDERGLOVES BIOGEL PI SIZE 8

## (undated) DEVICE — BLADE RECP OFFSET 77.5X11X1.23

## (undated) DEVICE — BANDAGE ACE ELASTIC 6"

## (undated) DEVICE — NDL 18GA X1 1/2 REG BEVEL

## (undated) DEVICE — SEE MEDLINE ITEM 157117

## (undated) DEVICE — SEE MEDLINE ITEM 157160

## (undated) DEVICE — SEE MEDLINE ITEM 157131

## (undated) DEVICE — ELECTRODE 90 DEGREE ANGLE

## (undated) DEVICE — KIT TRIMANO CHIN

## (undated) DEVICE — SYR ONLY LUER LOCK 20CC

## (undated) DEVICE — SKIN MARKER DEVON 160

## (undated) DEVICE — SEE MEDLINE ITEM 146298

## (undated) DEVICE — MASK FLYTE HOOD PEEL AWAY

## (undated) DEVICE — TROCAR ENDOPATH XCEL 5X100MM

## (undated) DEVICE — ADHESIVE MASTISOL VIAL 48/BX

## (undated) DEVICE — SEE MEDLINE ITEM 152487

## (undated) DEVICE — SYR 30CC LUER LOCK

## (undated) DEVICE — SEE MEDLINE ITEM 157166

## (undated) DEVICE — SUT VICRYL PLUS 0 CT1 18IN

## (undated) DEVICE — SEE MEDLINE ITEM 157150

## (undated) DEVICE — BLADE SAG 18.0X1.27X100

## (undated) DEVICE — SUT MONOCRYL 3-0 SH U/D

## (undated) DEVICE — TOURNIQUET SB QC DP 34X4IN

## (undated) DEVICE — CANNULA DRY DOC 7 X 85

## (undated) DEVICE — TUBE SET INFLOW/OUTFLOW

## (undated) DEVICE — SUT MONOCRYL 4-0 PS-2

## (undated) DEVICE — REPAIR KIT SMALL JOINT BIO TEN

## (undated) DEVICE — GRASPER SUTURE 60 DEG 15CM PNK

## (undated) DEVICE — Device

## (undated) DEVICE — TUBING HF INSUFFLATION W/ FLTR

## (undated) DEVICE — TROCAR ENDOPATH XCEL 12MM 10CM

## (undated) DEVICE — SUT ETHIBOND XTRA 1 OS-6

## (undated) DEVICE — PAD ELECTRODE STER 1.5X3

## (undated) DEVICE — DRAPE STERI U-SHAPED 47X51IN

## (undated) DEVICE — SUT 0 VICRYL / UR6 (J603)

## (undated) DEVICE — SET CEMENT (SCULP)

## (undated) DEVICE — BRUSH SURGICAL SCRUB STERILE

## (undated) DEVICE — SHEARS HARMONIC 5CM 36CM

## (undated) DEVICE — SCREW HEX HEAD
Type: IMPLANTABLE DEVICE | Site: KNEE | Status: NON-FUNCTIONAL
Removed: 2018-10-31

## (undated) DEVICE — GOWN SMARTGOWN LVL4 X-LONG XL

## (undated) DEVICE — SEE MEDLINE ITEM 146347

## (undated) DEVICE — BIT DRILL PIN TROCAR 3.2MM
Type: IMPLANTABLE DEVICE | Site: KNEE | Status: NON-FUNCTIONAL
Removed: 2018-10-31

## (undated) DEVICE — SUT VICRYL 3-0 27 SH

## (undated) DEVICE — BLADE RECIP RIBBED

## (undated) DEVICE — BURR OVAL CUTTING 6 MM

## (undated) DEVICE — SCREW HEX HEAD
Type: IMPLANTABLE DEVICE | Site: KNEE | Status: NON-FUNCTIONAL
Removed: 2018-05-23

## (undated) DEVICE — CHECKPOINT IMPACT 3.5MM HEX ST

## (undated) DEVICE — SEE MEDLINE ITEM 152515

## (undated) DEVICE — SUT MCRYL PLUS 4-0 PS2 27IN

## (undated) DEVICE — RELOAD ECHELON FLEX BLU 60MM

## (undated) DEVICE — RELOAD ECHELON FLEX GRN 60MM

## (undated) DEVICE — BOWL CEMENT

## (undated) DEVICE — SEE MEDLINE ITEM 152622

## (undated) DEVICE — KIT TRACKING VIZADISC HIP

## (undated) DEVICE — PAD ABD 8X10 STERILE

## (undated) DEVICE — TROCAR ENDOPATH XCEL 12X100MM

## (undated) DEVICE — SEE MEDLINE ITEM 156902

## (undated) DEVICE — DRAPE SURG W/TWL 17 5/8X23

## (undated) DEVICE — STAPLER ECHELON FLEX 60MM 44CM

## (undated) DEVICE — SEE L#156916

## (undated) DEVICE — DRESSING AQUACEL AG SILVER 4X4

## (undated) DEVICE — SUT VICRYL PLUS 2-0 CT1 18

## (undated) DEVICE — SLING SHOT II LARGE

## (undated) DEVICE — DRESSING GAUZE 6PLY 4X4

## (undated) DEVICE — BLADE SHAVER LANZA 4.2X13CM

## (undated) DEVICE — KIT TRIMANO

## (undated) DEVICE — SUT PDS VIL/BLU DUAL ORTHO

## (undated) DEVICE — DRESSING AQUACEL FOAM 4X4IN

## (undated) DEVICE — SUPPORT SLING SHOT II MEDIUM

## (undated) DEVICE — SCREW HEX HEAD 3.5X38MM
Type: IMPLANTABLE DEVICE | Site: KNEE | Status: NON-FUNCTIONAL
Removed: 2018-10-31

## (undated) DEVICE — SUT 2-0 VICRYL / SH (J417)

## (undated) DEVICE — DRESSING AQUACEL AG RBBN 2X45

## (undated) DEVICE — SOL NS 1000CC

## (undated) DEVICE — SEE MEDLINE ITEM 146313

## (undated) DEVICE — DRESSING TRANS 4X10 TEGADERM

## (undated) DEVICE — SEE MEDLINE ITEM 146292